# Patient Record
Sex: FEMALE | Race: WHITE | NOT HISPANIC OR LATINO
[De-identification: names, ages, dates, MRNs, and addresses within clinical notes are randomized per-mention and may not be internally consistent; named-entity substitution may affect disease eponyms.]

---

## 2017-04-05 ENCOUNTER — APPOINTMENT (OUTPATIENT)
Dept: HEMATOLOGY ONCOLOGY | Facility: CLINIC | Age: 77
End: 2017-04-05

## 2017-04-05 VITALS
BODY MASS INDEX: 28.71 KG/M2 | SYSTOLIC BLOOD PRESSURE: 150 MMHG | RESPIRATION RATE: 15 BRPM | WEIGHT: 156 LBS | DIASTOLIC BLOOD PRESSURE: 75 MMHG | HEART RATE: 79 BPM | TEMPERATURE: 98.9 F | HEIGHT: 62 IN

## 2017-04-10 ENCOUNTER — APPOINTMENT (OUTPATIENT)
Dept: INFUSION THERAPY | Facility: CLINIC | Age: 77
End: 2017-04-10

## 2017-04-13 ENCOUNTER — APPOINTMENT (OUTPATIENT)
Dept: INFUSION THERAPY | Facility: CLINIC | Age: 77
End: 2017-04-13

## 2017-04-13 LAB
BASOPHILS # BLD: 0.04 TH/MM3
BASOPHILS NFR BLD: 0.6 %
EOSINOPHIL # BLD: 0.1 TH/MM3
EOSINOPHIL NFR BLD: 1.4 %
ERYTHROCYTE [DISTWIDTH] IN BLOOD BY AUTOMATED COUNT: 22.7 %
GRANULOCYTES # BLD: 5.7 TH/MM3
GRANULOCYTES NFR BLD: 78.9 %
HCT VFR BLD AUTO: 28.9 %
HGB BLD-MCNC: 8.9 G/DL
IMM GRANULOCYTES # BLD: 0.04 TH/MM3
IMM GRANULOCYTES NFR BLD: 0.6 %
LYMPHOCYTES # BLD: 0.79 TH/MM3
LYMPHOCYTES NFR BLD: 10.9 %
MCH RBC QN AUTO: 21.2 PG
MCHC RBC AUTO-ENTMCNC: 30.8 G/DL
MCV RBC AUTO: 68.8 FL
MONOCYTES # BLD: 0.55 TH/MM3
MONOCYTES NFR BLD: 7.6 %
PLATELET # BLD: 275 TH/MM3
PMV BLD AUTO: 11.1 FL
RBC # BLD AUTO: 4.2 MIL/MM3
WBC # BLD: 7.22 TH/MM3

## 2017-04-14 ENCOUNTER — RESULT REVIEW (OUTPATIENT)
Age: 77
End: 2017-04-14

## 2017-04-14 LAB
INR PPP: 2.8
PROTHROMBIN TIME: 32.1 SEC

## 2017-04-20 ENCOUNTER — APPOINTMENT (OUTPATIENT)
Dept: INFUSION THERAPY | Facility: CLINIC | Age: 77
End: 2017-04-20

## 2017-04-24 ENCOUNTER — APPOINTMENT (OUTPATIENT)
Dept: HEMATOLOGY ONCOLOGY | Facility: CLINIC | Age: 77
End: 2017-04-24

## 2017-04-24 LAB
BASOPHILS # BLD: 0.05 TH/MM3
BASOPHILS NFR BLD: 0.8 %
EOSINOPHIL # BLD: 0.13 TH/MM3
EOSINOPHIL NFR BLD: 2.2 %
ERYTHROCYTE [DISTWIDTH] IN BLOOD BY AUTOMATED COUNT: 23 %
GRANULOCYTES # BLD: 4.38 TH/MM3
GRANULOCYTES NFR BLD: 74.1 %
HCT VFR BLD AUTO: 29.2 %
HGB BLD-MCNC: 8.9 G/DL
IMM GRANULOCYTES # BLD: 0.06 TH/MM3
IMM GRANULOCYTES NFR BLD: 1 %
LYMPHOCYTES # BLD: 0.83 TH/MM3
LYMPHOCYTES NFR BLD: 14 %
MCH RBC QN AUTO: 20.9 PG
MCHC RBC AUTO-ENTMCNC: 30.5 G/DL
MCV RBC AUTO: 68.5 FL
MONOCYTES # BLD: 0.47 TH/MM3
MONOCYTES NFR BLD: 7.9 %
PLATELET # BLD: 163 TH/MM3
RBC # BLD AUTO: 4.26 MIL/MM3
WBC # BLD: 5.92 TH/MM3

## 2017-05-03 ENCOUNTER — APPOINTMENT (OUTPATIENT)
Dept: HEMATOLOGY ONCOLOGY | Facility: CLINIC | Age: 77
End: 2017-05-03

## 2017-05-03 ENCOUNTER — RESULT REVIEW (OUTPATIENT)
Age: 77
End: 2017-05-03

## 2017-05-03 VITALS
HEART RATE: 73 BPM | TEMPERATURE: 98.6 F | SYSTOLIC BLOOD PRESSURE: 135 MMHG | WEIGHT: 152 LBS | HEIGHT: 62 IN | BODY MASS INDEX: 27.97 KG/M2 | DIASTOLIC BLOOD PRESSURE: 67 MMHG | RESPIRATION RATE: 14 BRPM

## 2017-05-04 LAB
ALBUMIN MFR SERPL ELPH: 53.5 %
ALBUMIN SERPL ELPH-MCNC: 3.9 G/DL
ALBUMIN SERPL-MCNC: 3.7 G/DL
ALBUMIN/GLOB SERPL ELPH: 1.2 ZZ
ALBUMIN/GLOB SERPL: 1.28
ALP SERPL-CCNC: 68 IU/L
ALPHA1 GLOB MFR SERPL ELPH: 6.6 %
ALPHA1 GLOB SERPL ELPH-MCNC: 0.5 G/DL
ALPHA2 GLOB MFR SERPL ELPH: 8 %
ALPHA2 GLOB SERPL ELPH-MCNC: 0.6 G/DL
ALT SERPL-CCNC: 13 IU/L
ANION GAP SERPL CALC-SCNC: 11 MEQ/L
AST SERPL-CCNC: 19 IU/L
B-GLOBULIN SERPL ELPH-MCNC: 0.9 G/DL
BASOPHILS # BLD: 0.05 TH/MM3
BASOPHILS NFR BLD: 0.8 %
BETA1 GLOB MFR SERPL ELPH: 12.1 %
BILIRUB SERPL-MCNC: 1.5 MG/DL
BUN SERPL-MCNC: 14 MG/DL
BUN/CREAT SERPL: 14.9 %
CALCIUM SERPL-MCNC: 8.7 MG/DL
CHLORIDE SERPL-SCNC: 98 MEQ/L
CO2 SERPL-SCNC: 30 MEQ/L
CREAT SERPL-MCNC: 0.94 MG/DL
EOSINOPHIL # BLD: 0.19 TH/MM3
EOSINOPHIL NFR BLD: 3 %
ERYTHROCYTE [DISTWIDTH] IN BLOOD BY AUTOMATED COUNT: 22.2 %
FERRITIN SERPL-MCNC: 1319 NG/ML
GAMMA GLOB MFR SERPL ELPH: 19.8 %
GAMMA GLOB SERPL ELPH-MCNC: 1.4 G/DL
GFR SERPL CREATININE-BSD FRML MDRD: 58
GLUCOSE SERPL-MCNC: 65 MG/DL
GRANULOCYTES # BLD: 4.43 TH/MM3
GRANULOCYTES NFR BLD: 70.2 %
HCT VFR BLD AUTO: 29.8 %
HGB BLD-MCNC: 9.3 G/DL
IMM GRANULOCYTES # BLD: 0.03 TH/MM3
IMM GRANULOCYTES NFR BLD: 0.5 %
LACTATE DEHYDROGENASE (NORTH): 244 IU/L
LYMPHOCYTES # BLD: 1.15 TH/MM3
LYMPHOCYTES NFR BLD: 18.2 %
MCH RBC QN AUTO: 21 PG
MCHC RBC AUTO-ENTMCNC: 31.2 G/DL
MCV RBC AUTO: 67.3 FL
MONOCYTES # BLD: 0.46 TH/MM3
MONOCYTES NFR BLD: 7.3 %
PLATELET # BLD: 201 TH/MM3
PMV BLD AUTO: 10.8 FL
POTASSIUM SERPL-SCNC: 3.9 MMOL/L
PROT PATTERN SERPL ELPH-IMP: 7.2 G/DL
PROT PATTERN SERPL ELPH-IMP: NORMAL
PROT SERPL-MCNC: 6.6 G/DL
PROT SERPL-MCNC: 7.2 G/DL
RBC # BLD AUTO: 4.43 MIL/MM3
RETICS/RBC NFR: 1.3 %
SODIUM SERPL-SCNC: 139 MEQ/L
TIBC SERPL-MCNC: 226 UG/DL
WBC # BLD: 6.31 TH/MM3

## 2017-05-08 LAB
B2 MICROGLOB SERPL-MCNC: 3.3 MG/L
EPO SERPL-MCNC: 13.8 MIU/ML
HGB A MFR BLD: 96.9 %
HGB A2 MFR BLD: 3.1 %
HGB FRACT BLD ELPH CITRATE-IMP: NORMAL
INTERPRETATION SERPL IFE-IMP: NORMAL
KAPPA LC FREE SER-MCNC: 46 MG/L
KAPPA LC FREE/LAMBDA FREE SER: 1.13
LAMBDA LC FREE SERPL-MCNC: 40.8 MG/L

## 2017-05-09 ENCOUNTER — APPOINTMENT (OUTPATIENT)
Dept: HEMATOLOGY ONCOLOGY | Facility: CLINIC | Age: 77
End: 2017-05-09

## 2017-05-26 ENCOUNTER — APPOINTMENT (OUTPATIENT)
Dept: HEMATOLOGY ONCOLOGY | Facility: CLINIC | Age: 77
End: 2017-05-26

## 2017-05-26 VITALS
DIASTOLIC BLOOD PRESSURE: 62 MMHG | RESPIRATION RATE: 14 BRPM | HEART RATE: 80 BPM | BODY MASS INDEX: 27.42 KG/M2 | SYSTOLIC BLOOD PRESSURE: 129 MMHG | TEMPERATURE: 98.2 F | WEIGHT: 149 LBS | HEIGHT: 62 IN

## 2017-05-30 ENCOUNTER — RESULT REVIEW (OUTPATIENT)
Age: 77
End: 2017-05-30

## 2017-05-30 ENCOUNTER — APPOINTMENT (OUTPATIENT)
Dept: HEMATOLOGY ONCOLOGY | Facility: CLINIC | Age: 77
End: 2017-05-30

## 2017-05-30 LAB
BASOPHILS # BLD: 0.03 TH/MM3
BASOPHILS NFR BLD: 0.4 %
EOSINOPHIL # BLD: 0.2 TH/MM3
EOSINOPHIL NFR BLD: 2.8 %
ERYTHROCYTE [DISTWIDTH] IN BLOOD BY AUTOMATED COUNT: 20.2 %
GRANULOCYTES # BLD: 5.47 TH/MM3
GRANULOCYTES NFR BLD: 77.8 %
HCT VFR BLD AUTO: 25.3 %
HGB BLD-MCNC: 7.7 G/DL
HGB RETIC QN: 22.2 PG
IMM GRANULOCYTES # BLD: 0.01 TH/MM3
IMM GRANULOCYTES NFR BLD: 0.1 %
IMM RETICS NFR: 6.2 %
LYMPHOCYTES # BLD: 0.85 TH/MM3
LYMPHOCYTES NFR BLD: 12.1 %
MCH RBC QN AUTO: 20.1 PG
MCHC RBC AUTO-ENTMCNC: 30.4 G/DL
MCV RBC AUTO: 66.1 FL
MONOCYTES # BLD: 0.48 TH/MM3
MONOCYTES NFR BLD: 6.8 %
PLATELET # BLD: 195 TH/MM3
PMV BLD AUTO: 9.5 FL
RBC # BLD AUTO: 3.83 MIL/MM3
RETICS/RBC NFR: 1.74 %
WBC # BLD: 7.04 TH/MM3

## 2017-06-01 LAB
BASOPHILS # BLD: 0.04 TH/MM3
BASOPHILS NFR BLD: 0.6 %
EOSINOPHIL # BLD: 0.2 TH/MM3
EOSINOPHIL NFR BLD: 2.9 %
ERYTHROCYTE [DISTWIDTH] IN BLOOD BY AUTOMATED COUNT: 20.4 %
FERRITIN SERPL-MCNC: 946 NG/ML
GRANULOCYTES # BLD: 5.13 TH/MM3
GRANULOCYTES NFR BLD: 74.7 %
HAPTOGLOB SERPL-MCNC: < 20 MG/DL
HCT VFR BLD AUTO: 26.6 %
HGB BLD-MCNC: 8.1 G/DL
IMM GRANULOCYTES # BLD: 0.02 TH/MM3
IMM GRANULOCYTES NFR BLD: 0.3 %
LYMPHOCYTES # BLD: 0.95 TH/MM3
LYMPHOCYTES NFR BLD: 13.8 %
MCH RBC QN AUTO: 20 PG
MCHC RBC AUTO-ENTMCNC: 30.5 G/DL
MCV RBC AUTO: 65.7 FL
MONOCYTES # BLD: 0.53 TH/MM3
MONOCYTES NFR BLD: 7.7 %
PLATELET # BLD: 212 TH/MM3
PMV BLD AUTO: 10.1 FL
RBC # BLD AUTO: 4.05 MIL/MM3
TIBC SERPL-MCNC: 201 UG/DL
WBC # BLD: 6.87 TH/MM3

## 2017-06-02 ENCOUNTER — APPOINTMENT (OUTPATIENT)
Dept: INFUSION THERAPY | Facility: CLINIC | Age: 77
End: 2017-06-02

## 2017-06-02 LAB
BASOPHILS # BLD: 0.03 TH/MM3
BASOPHILS NFR BLD: 0.5 %
EOSINOPHIL # BLD: 0.12 TH/MM3
EOSINOPHIL NFR BLD: 1.9 %
ERYTHROCYTE [DISTWIDTH] IN BLOOD BY AUTOMATED COUNT: 20.2 %
GRANULOCYTES # BLD: 4.88 TH/MM3
GRANULOCYTES NFR BLD: 75.4 %
HCT VFR BLD AUTO: 25.7 %
HGB BLD-MCNC: 7.8 G/DL
IMM GRANULOCYTES # BLD: 0.01 TH/MM3
IMM GRANULOCYTES NFR BLD: 0.2 %
LYMPHOCYTES # BLD: 1.04 TH/MM3
LYMPHOCYTES NFR BLD: 16.1 %
MCH RBC QN AUTO: 19.9 PG
MCHC RBC AUTO-ENTMCNC: 30.4 G/DL
MCV RBC AUTO: 65.7 FL
MONOCYTES # BLD: 0.38 TH/MM3
MONOCYTES NFR BLD: 5.9 %
PLATELET # BLD: 191 TH/MM3
PMV BLD AUTO: 10.1 FL
RBC # BLD AUTO: 3.91 MIL/MM3
WBC # BLD: 6.46 TH/MM3

## 2017-06-05 LAB
ABO + RH BLD: NORMAL
BLD GP AB SCN SERPL QL: NEGATIVE
LDH SERPL-CCNC: 259 U/L
LDH1 CFR SERPL ELPH: 32 %
LDH1/LDH2 SERPL-CRTO: 1.08
LDH2 CFR SERPL ELPH: 29 %
LDH3 CFR SERPL ELPH: 17 %
LDH4 CFR SERPL ELPH: 8 %
LDH5 CFR SERPL ELPH: 14 %

## 2017-06-13 ENCOUNTER — APPOINTMENT (OUTPATIENT)
Dept: HEMATOLOGY ONCOLOGY | Facility: CLINIC | Age: 77
End: 2017-06-13

## 2017-06-13 ENCOUNTER — RESULT REVIEW (OUTPATIENT)
Age: 77
End: 2017-06-13

## 2017-06-13 VITALS
TEMPERATURE: 98 F | DIASTOLIC BLOOD PRESSURE: 53 MMHG | SYSTOLIC BLOOD PRESSURE: 112 MMHG | HEIGHT: 62 IN | HEART RATE: 63 BPM | RESPIRATION RATE: 14 BRPM | WEIGHT: 150 LBS | BODY MASS INDEX: 27.6 KG/M2

## 2017-06-20 ENCOUNTER — APPOINTMENT (OUTPATIENT)
Dept: HEMATOLOGY ONCOLOGY | Facility: CLINIC | Age: 77
End: 2017-06-20

## 2017-06-20 LAB
BASOPHILS # BLD: 0.03 TH/MM3
BASOPHILS NFR BLD: 0.4 %
EOSINOPHIL # BLD: 0.14 TH/MM3
EOSINOPHIL NFR BLD: 1.9 %
ERYTHROCYTE [DISTWIDTH] IN BLOOD BY AUTOMATED COUNT: 20.3 %
GRANULOCYTES # BLD: 5.42 TH/MM3
GRANULOCYTES NFR BLD: 75.3 %
HCT VFR BLD AUTO: 26.1 %
HGB BLD-MCNC: 8 G/DL
IMM GRANULOCYTES # BLD: 0.04 TH/MM3
IMM GRANULOCYTES NFR BLD: 0.6 %
LYMPHOCYTES # BLD: 1.14 TH/MM3
LYMPHOCYTES NFR BLD: 15.8 %
MCH RBC QN AUTO: 20.6 PG
MCHC RBC AUTO-ENTMCNC: 30.7 G/DL
MCV RBC AUTO: 67.3 FL
MONOCYTES # BLD: 0.43 TH/MM3
MONOCYTES NFR BLD: 6 %
PLATELET # BLD: 164 TH/MM3
PMV BLD AUTO: 9.7 FL
RBC # BLD AUTO: 3.88 MIL/MM3
WBC # BLD: 7.2 TH/MM3

## 2017-06-27 ENCOUNTER — APPOINTMENT (OUTPATIENT)
Dept: HEMATOLOGY ONCOLOGY | Facility: CLINIC | Age: 77
End: 2017-06-27

## 2017-06-27 ENCOUNTER — RX RENEWAL (OUTPATIENT)
Age: 77
End: 2017-06-27

## 2017-06-27 LAB
BASOPHILS # BLD: 0.09 TH/MM3
BASOPHILS NFR BLD: 1.4 %
EOSINOPHIL # BLD: 0.16 TH/MM3
EOSINOPHIL NFR BLD: 2.4 %
ERYTHROCYTE [DISTWIDTH] IN BLOOD BY AUTOMATED COUNT: 19.8 %
GRANULOCYTES # BLD: 4.89 TH/MM3
GRANULOCYTES NFR BLD: 74.2 %
HCT VFR BLD AUTO: 26.3 %
HGB BLD-MCNC: 8 G/DL
IMM GRANULOCYTES # BLD: 0.26 TH/MM3
IMM GRANULOCYTES NFR BLD: 3.9 %
LYMPHOCYTES # BLD: 0.89 TH/MM3
LYMPHOCYTES NFR BLD: 13.5 %
MCH RBC QN AUTO: 20.5 PG
MCHC RBC AUTO-ENTMCNC: 30.4 G/DL
MCV RBC AUTO: 67.4 FL
MONOCYTES # BLD: 0.3 TH/MM3
MONOCYTES NFR BLD: 4.6 %
PLATELET # BLD: 183 TH/MM3
PMV BLD AUTO: 10.1 FL
RBC # BLD AUTO: 3.9 MIL/MM3
WBC # BLD: 6.59 TH/MM3

## 2017-07-07 ENCOUNTER — RESULT REVIEW (OUTPATIENT)
Age: 77
End: 2017-07-07

## 2017-07-07 ENCOUNTER — APPOINTMENT (OUTPATIENT)
Dept: INFUSION THERAPY | Facility: CLINIC | Age: 77
End: 2017-07-07

## 2017-07-07 ENCOUNTER — APPOINTMENT (OUTPATIENT)
Dept: HEMATOLOGY ONCOLOGY | Facility: CLINIC | Age: 77
End: 2017-07-07

## 2017-07-07 VITALS
BODY MASS INDEX: 27.23 KG/M2 | RESPIRATION RATE: 14 BRPM | TEMPERATURE: 98.1 F | HEIGHT: 62 IN | DIASTOLIC BLOOD PRESSURE: 60 MMHG | SYSTOLIC BLOOD PRESSURE: 149 MMHG | WEIGHT: 148 LBS | HEART RATE: 82 BPM

## 2017-07-07 LAB
BASOPHILS # BLD: 0.05 TH/MM3
BASOPHILS NFR BLD: 0.6 %
EOSINOPHIL # BLD: 0.22 TH/MM3
EOSINOPHIL NFR BLD: 2.5 %
ERYTHROCYTE [DISTWIDTH] IN BLOOD BY AUTOMATED COUNT: 18.7 %
GRANULOCYTES # BLD: 6.49 TH/MM3
GRANULOCYTES NFR BLD: 75 %
HCT VFR BLD AUTO: 27.7 %
HGB BLD-MCNC: 8.3 G/DL
IMM GRANULOCYTES # BLD: 0.06 TH/MM3
IMM GRANULOCYTES NFR BLD: 0.7 %
LYMPHOCYTES # BLD: 1.33 TH/MM3
LYMPHOCYTES NFR BLD: 15.4 %
MCH RBC QN AUTO: 20.1 PG
MCHC RBC AUTO-ENTMCNC: 30 G/DL
MCV RBC AUTO: 67.2 FL
MONOCYTES # BLD: 0.5 TH/MM3
MONOCYTES NFR BLD: 5.8 %
PLATELET # BLD: 179 TH/MM3
PMV BLD AUTO: 10.2 FL
RBC # BLD AUTO: 4.12 MIL/MM3
WBC # BLD: 8.65 TH/MM3

## 2017-07-14 ENCOUNTER — APPOINTMENT (OUTPATIENT)
Dept: INFUSION THERAPY | Facility: CLINIC | Age: 77
End: 2017-07-14

## 2017-07-14 LAB
BASOPHILS # BLD: 0.06 TH/MM3
BASOPHILS NFR BLD: 0.9 %
EOSINOPHIL # BLD: 0.17 TH/MM3
EOSINOPHIL NFR BLD: 2.7 %
ERYTHROCYTE [DISTWIDTH] IN BLOOD BY AUTOMATED COUNT: 18.4 %
GRANULOCYTES # BLD: 4.62 TH/MM3
GRANULOCYTES NFR BLD: 72.4 %
HCT VFR BLD AUTO: 26.3 %
HGB BLD-MCNC: 8 G/DL
IMM GRANULOCYTES # BLD: 0.05 TH/MM3
IMM GRANULOCYTES NFR BLD: 0.8 %
LYMPHOCYTES # BLD: 1.01 TH/MM3
LYMPHOCYTES NFR BLD: 15.8 %
MCH RBC QN AUTO: 20.6 PG
MCHC RBC AUTO-ENTMCNC: 30.4 G/DL
MCV RBC AUTO: 67.8 FL
MONOCYTES # BLD: 0.47 TH/MM3
MONOCYTES NFR BLD: 7.4 %
PLATELET # BLD: 175 TH/MM3
PMV BLD AUTO: 10.2 FL
RBC # BLD AUTO: 3.88 MIL/MM3
WBC # BLD: 6.38 TH/MM3

## 2017-07-21 ENCOUNTER — APPOINTMENT (OUTPATIENT)
Dept: INFUSION THERAPY | Facility: CLINIC | Age: 77
End: 2017-07-21

## 2017-07-21 LAB
BASOPHILS # BLD: 0.04 TH/MM3
BASOPHILS NFR BLD: 0.7 %
EOSINOPHIL # BLD: 0.19 TH/MM3
EOSINOPHIL NFR BLD: 3.3 %
ERYTHROCYTE [DISTWIDTH] IN BLOOD BY AUTOMATED COUNT: 18.6 %
GRANULOCYTES # BLD: 4.37 TH/MM3
GRANULOCYTES NFR BLD: 76.2 %
HCT VFR BLD AUTO: 26.1 %
HGB BLD-MCNC: 7.7 G/DL
IMM GRANULOCYTES # BLD: 0.03 TH/MM3
IMM GRANULOCYTES NFR BLD: 0.5 %
LYMPHOCYTES # BLD: 0.73 TH/MM3
LYMPHOCYTES NFR BLD: 12.7 %
MCH RBC QN AUTO: 20.3 PG
MCHC RBC AUTO-ENTMCNC: 29.5 G/DL
MCV RBC AUTO: 68.9 FL
MONOCYTES # BLD: 0.38 TH/MM3
MONOCYTES NFR BLD: 6.6 %
PLATELET # BLD: 180 TH/MM3
PMV BLD AUTO: 10.1 FL
RBC # BLD AUTO: 3.79 MIL/MM3
WBC # BLD: 5.74 TH/MM3

## 2017-07-24 ENCOUNTER — APPOINTMENT (OUTPATIENT)
Dept: HEMATOLOGY ONCOLOGY | Facility: CLINIC | Age: 77
End: 2017-07-24

## 2017-07-24 LAB
BASOPHILS # BLD: 0.05 TH/MM3
BASOPHILS NFR BLD: 0.9 %
EOSINOPHIL # BLD: 0.13 TH/MM3
EOSINOPHIL NFR BLD: 2.2 %
ERYTHROCYTE [DISTWIDTH] IN BLOOD BY AUTOMATED COUNT: 18.3 %
GRANULOCYTES # BLD: 4.51 TH/MM3
GRANULOCYTES NFR BLD: 77.6 %
HCT VFR BLD AUTO: 27.4 %
HGB BLD-MCNC: 8.1 G/DL
IMM GRANULOCYTES # BLD: 0.03 TH/MM3
IMM GRANULOCYTES NFR BLD: 0.5 %
LYMPHOCYTES # BLD: 0.79 TH/MM3
LYMPHOCYTES NFR BLD: 13.6 %
MCH RBC QN AUTO: 20.3 PG
MCHC RBC AUTO-ENTMCNC: 29.6 G/DL
MCV RBC AUTO: 68.7 FL
MONOCYTES # BLD: 0.3 TH/MM3
MONOCYTES NFR BLD: 5.2 %
PLATELET # BLD: 197 TH/MM3
PMV BLD AUTO: 9.4 FL
RBC # BLD AUTO: 3.99 MIL/MM3
WBC # BLD: 5.81 TH/MM3

## 2017-07-28 ENCOUNTER — APPOINTMENT (OUTPATIENT)
Dept: INFUSION THERAPY | Facility: CLINIC | Age: 77
End: 2017-07-28

## 2017-08-01 LAB
BASOPHILS # BLD: 0.05 TH/MM3
BASOPHILS NFR BLD: 0.9 %
EOSINOPHIL # BLD: 0.2 TH/MM3
EOSINOPHIL NFR BLD: 3.7 %
ERYTHROCYTE [DISTWIDTH] IN BLOOD BY AUTOMATED COUNT: 18.4 %
GRANULOCYTES # BLD: 3.95 TH/MM3
GRANULOCYTES NFR BLD: 73.8 %
HCT VFR BLD AUTO: 27.7 %
HGB BLD-MCNC: 8.1 G/DL
IMM GRANULOCYTES # BLD: 0.04 TH/MM3
IMM GRANULOCYTES NFR BLD: 0.7 %
LYMPHOCYTES # BLD: 0.79 TH/MM3
LYMPHOCYTES NFR BLD: 14.7 %
MCH RBC QN AUTO: 20.3 PG
MCHC RBC AUTO-ENTMCNC: 29.2 G/DL
MCV RBC AUTO: 69.3 FL
MONOCYTES # BLD: 0.33 TH/MM3
MONOCYTES NFR BLD: 6.2 %
PLATELET # BLD: 190 TH/MM3
PMV BLD AUTO: 9.6 FL
RBC # BLD AUTO: 4 MIL/MM3
WBC # BLD: 5.36 TH/MM3

## 2017-08-04 ENCOUNTER — APPOINTMENT (OUTPATIENT)
Dept: INFUSION THERAPY | Facility: CLINIC | Age: 77
End: 2017-08-04

## 2017-08-04 ENCOUNTER — APPOINTMENT (OUTPATIENT)
Dept: HEMATOLOGY ONCOLOGY | Facility: CLINIC | Age: 77
End: 2017-08-04

## 2017-08-04 VITALS
HEIGHT: 62 IN | RESPIRATION RATE: 14 BRPM | HEART RATE: 73 BPM | SYSTOLIC BLOOD PRESSURE: 146 MMHG | TEMPERATURE: 97.9 F | DIASTOLIC BLOOD PRESSURE: 73 MMHG | WEIGHT: 148 LBS | BODY MASS INDEX: 27.23 KG/M2

## 2017-08-04 LAB
BASOPHILS # BLD: 0.05 TH/MM3
BASOPHILS NFR BLD: 0.9 %
EOSINOPHIL # BLD: 0.19 TH/MM3
EOSINOPHIL NFR BLD: 3.5 %
ERYTHROCYTE [DISTWIDTH] IN BLOOD BY AUTOMATED COUNT: 18.3 %
GRANULOCYTES # BLD: 3.86 TH/MM3
GRANULOCYTES NFR BLD: 70.3 %
HCT VFR BLD AUTO: 29 %
HGB BLD-MCNC: 8.7 G/DL
IMM GRANULOCYTES # BLD: 0.02 TH/MM3
IMM GRANULOCYTES NFR BLD: 0.4 %
LYMPHOCYTES # BLD: 1.05 TH/MM3
LYMPHOCYTES NFR BLD: 19.1 %
MCH RBC QN AUTO: 20.7 PG
MCHC RBC AUTO-ENTMCNC: 30 G/DL
MCV RBC AUTO: 68.9 FL
MONOCYTES # BLD: 0.32 TH/MM3
MONOCYTES NFR BLD: 5.8 %
PLATELET # BLD: 186 TH/MM3
PMV BLD AUTO: 10.2 FL
RBC # BLD AUTO: 4.21 MIL/MM3
WBC # BLD: 5.49 TH/MM3

## 2017-08-11 ENCOUNTER — OUTPATIENT (OUTPATIENT)
Dept: OUTPATIENT SERVICES | Facility: HOSPITAL | Age: 77
LOS: 1 days | Discharge: HOME | End: 2017-08-11

## 2017-08-11 ENCOUNTER — APPOINTMENT (OUTPATIENT)
Dept: INFUSION THERAPY | Facility: CLINIC | Age: 77
End: 2017-08-11

## 2017-08-11 VITALS
HEART RATE: 74 BPM | TEMPERATURE: 98.2 F | DIASTOLIC BLOOD PRESSURE: 60 MMHG | RESPIRATION RATE: 18 BRPM | SYSTOLIC BLOOD PRESSURE: 132 MMHG

## 2017-08-11 DIAGNOSIS — R31.9 HEMATURIA, UNSPECIFIED: ICD-10-CM

## 2017-08-11 DIAGNOSIS — D50.9 IRON DEFICIENCY ANEMIA, UNSPECIFIED: ICD-10-CM

## 2017-08-11 DIAGNOSIS — D64.9 ANEMIA, UNSPECIFIED: ICD-10-CM

## 2017-08-11 LAB
BASOPHILS # BLD: 0.06 TH/MM3
BASOPHILS NFR BLD: 1.2 %
EOSINOPHIL # BLD: 0.21 TH/MM3
EOSINOPHIL NFR BLD: 4.2 %
ERYTHROCYTE [DISTWIDTH] IN BLOOD BY AUTOMATED COUNT: 17.9 %
GRANULOCYTES # BLD: 3.45 TH/MM3
GRANULOCYTES NFR BLD: 69.2 %
HCT VFR BLD AUTO: 31 %
HGB BLD-MCNC: 9.2 G/DL
IMM GRANULOCYTES # BLD: 0.04 TH/MM3
IMM GRANULOCYTES NFR BLD: 0.8 %
LYMPHOCYTES # BLD: 0.86 TH/MM3
LYMPHOCYTES NFR BLD: 17.2 %
MCH RBC QN AUTO: 20.5 PG
MCHC RBC AUTO-ENTMCNC: 29.7 G/DL
MCV RBC AUTO: 69.2 FL
MONOCYTES # BLD: 0.37 TH/MM3
MONOCYTES NFR BLD: 7.4 %
PLATELET # BLD: 159 TH/MM3
PMV BLD AUTO: 10.5 FL
RBC # BLD AUTO: 4.48 MIL/MM3
WBC # BLD: 4.99 TH/MM3

## 2017-08-18 ENCOUNTER — APPOINTMENT (OUTPATIENT)
Dept: INFUSION THERAPY | Facility: CLINIC | Age: 77
End: 2017-08-18

## 2017-08-18 VITALS
SYSTOLIC BLOOD PRESSURE: 125 MMHG | HEART RATE: 72 BPM | DIASTOLIC BLOOD PRESSURE: 61 MMHG | TEMPERATURE: 97.9 F | RESPIRATION RATE: 14 BRPM

## 2017-08-21 LAB
BASOPHILS # BLD: 0.05 TH/MM3
BASOPHILS NFR BLD: 1 %
EOSINOPHIL # BLD: 0.2 TH/MM3
EOSINOPHIL NFR BLD: 4 %
ERYTHROCYTE [DISTWIDTH] IN BLOOD BY AUTOMATED COUNT: 17.4 %
GRANULOCYTES # BLD: 3.18 TH/MM3
GRANULOCYTES NFR BLD: 64.1 %
HCT VFR BLD AUTO: 32.8 %
HGB BLD-MCNC: 9.7 G/DL
IMM GRANULOCYTES # BLD: 0.05 TH/MM3
IMM GRANULOCYTES NFR BLD: 1 %
LYMPHOCYTES # BLD: 1.07 TH/MM3
LYMPHOCYTES NFR BLD: 21.6 %
MCH RBC QN AUTO: 20.3 PG
MCHC RBC AUTO-ENTMCNC: 29.6 G/DL
MCV RBC AUTO: 68.5 FL
MONOCYTES # BLD: 0.41 TH/MM3
MONOCYTES NFR BLD: 8.3 %
PLATELET # BLD: 194 TH/MM3
PMV BLD AUTO: 10.6 FL
RBC # BLD AUTO: 4.79 MIL/MM3
WBC # BLD: 4.96 TH/MM3

## 2017-08-25 ENCOUNTER — RESULT REVIEW (OUTPATIENT)
Age: 77
End: 2017-08-25

## 2017-08-25 ENCOUNTER — APPOINTMENT (OUTPATIENT)
Dept: INFUSION THERAPY | Facility: CLINIC | Age: 77
End: 2017-08-25

## 2017-09-01 ENCOUNTER — APPOINTMENT (OUTPATIENT)
Dept: INFUSION THERAPY | Facility: CLINIC | Age: 77
End: 2017-09-01

## 2017-09-01 ENCOUNTER — APPOINTMENT (OUTPATIENT)
Dept: HEMATOLOGY ONCOLOGY | Facility: CLINIC | Age: 77
End: 2017-09-01

## 2017-09-01 VITALS
SYSTOLIC BLOOD PRESSURE: 143 MMHG | BODY MASS INDEX: 26.87 KG/M2 | WEIGHT: 146 LBS | TEMPERATURE: 97.7 F | HEIGHT: 62 IN | DIASTOLIC BLOOD PRESSURE: 77 MMHG | RESPIRATION RATE: 18 BRPM | HEART RATE: 67 BPM

## 2017-09-01 LAB
BASOPHILS # BLD: 0.06 TH/MM3
BASOPHILS NFR BLD: 1.1 %
EOSINOPHIL # BLD: 0.21 TH/MM3
EOSINOPHIL NFR BLD: 3.7 %
ERYTHROCYTE [DISTWIDTH] IN BLOOD BY AUTOMATED COUNT: 15.6 %
GRANULOCYTES # BLD: 3.89 TH/MM3
GRANULOCYTES NFR BLD: 68.9 %
HCT VFR BLD AUTO: 29.7 %
HGB BLD-MCNC: 9 G/DL
IMM GRANULOCYTES # BLD: 0.07 TH/MM3
IMM GRANULOCYTES NFR BLD: 1.2 %
LYMPHOCYTES # BLD: 1.07 TH/MM3
LYMPHOCYTES NFR BLD: 18.9 %
MCH RBC QN AUTO: 20.2 PG
MCHC RBC AUTO-ENTMCNC: 30.3 G/DL
MCV RBC AUTO: 66.6 FL
MONOCYTES # BLD: 0.35 TH/MM3
MONOCYTES NFR BLD: 6.2 %
PLATELET # BLD: 152 TH/MM3
PMV BLD AUTO: 10 FL
RBC # BLD AUTO: 4.46 MIL/MM3
WBC # BLD: 5.65 TH/MM3

## 2017-09-08 ENCOUNTER — APPOINTMENT (OUTPATIENT)
Dept: INFUSION THERAPY | Facility: CLINIC | Age: 77
End: 2017-09-08

## 2017-09-12 ENCOUNTER — APPOINTMENT (OUTPATIENT)
Dept: HEMATOLOGY ONCOLOGY | Facility: CLINIC | Age: 77
End: 2017-09-12

## 2017-09-13 LAB
BASOPHILS # BLD: 0.03 TH/MM3
BASOPHILS NFR BLD: 0.5 %
EOSINOPHIL # BLD: 0.23 TH/MM3
EOSINOPHIL NFR BLD: 4.2 %
ERYTHROCYTE [DISTWIDTH] IN BLOOD BY AUTOMATED COUNT: 16.6 %
GRANULOCYTES # BLD: 3.87 TH/MM3
GRANULOCYTES NFR BLD: 70.7 %
HCT VFR BLD AUTO: 28.2 %
HGB BLD-MCNC: 8.3 G/DL
IMM GRANULOCYTES # BLD: 0.03 TH/MM3
IMM GRANULOCYTES NFR BLD: 0.5 %
LYMPHOCYTES # BLD: 0.96 TH/MM3
LYMPHOCYTES NFR BLD: 17.5 %
MCH RBC QN AUTO: 19.8 PG
MCHC RBC AUTO-ENTMCNC: 29.4 G/DL
MCV RBC AUTO: 67.1 FL
MONOCYTES # BLD: 0.36 TH/MM3
MONOCYTES NFR BLD: 6.6 %
PLATELET # BLD: 170 TH/MM3
PMV BLD AUTO: 10.7 FL
RBC # BLD AUTO: 4.2 MIL/MM3
WBC # BLD: 5.48 TH/MM3

## 2017-09-15 ENCOUNTER — RESULT REVIEW (OUTPATIENT)
Age: 77
End: 2017-09-15

## 2017-09-15 ENCOUNTER — APPOINTMENT (OUTPATIENT)
Dept: INFUSION THERAPY | Facility: CLINIC | Age: 77
End: 2017-09-15

## 2017-09-18 LAB
BASOPHILS # BLD: 0.03 TH/MM3
BASOPHILS NFR BLD: 0.6 %
EOSINOPHIL # BLD: 0.19 TH/MM3
EOSINOPHIL NFR BLD: 3.8 %
ERYTHROCYTE [DISTWIDTH] IN BLOOD BY AUTOMATED COUNT: 16.5 %
FERRITIN SERPL-MCNC: 397 NG/ML
FOLATE SERPL-MCNC: > 20 NG/ML
GRANULOCYTES # BLD: 3.7 TH/MM3
GRANULOCYTES NFR BLD: 74 %
HCT VFR BLD AUTO: 28.1 %
HGB BLD-MCNC: 8.3 G/DL
IMM GRANULOCYTES # BLD: 0.02 TH/MM3
IMM GRANULOCYTES NFR BLD: 0.4 %
IRON SERPL-MCNC: 31 UG/DL
LYMPHOCYTES # BLD: 0.73 TH/MM3
LYMPHOCYTES NFR BLD: 14.6 %
MCH RBC QN AUTO: 20 PG
MCHC RBC AUTO-ENTMCNC: 29.5 G/DL
MCV RBC AUTO: 67.5 FL
MONOCYTES # BLD: 0.33 TH/MM3
MONOCYTES NFR BLD: 6.6 %
PLATELET # BLD: 167 TH/MM3
PMV BLD AUTO: 9.9 FL
RBC # BLD AUTO: 4.16 MIL/MM3
TIBC SERPL-MCNC: 219 UG/DL
VIT B12 SERPL-MCNC: 349 PG/ML
WBC # BLD: 5 TH/MM3

## 2017-09-29 ENCOUNTER — RESULT REVIEW (OUTPATIENT)
Age: 77
End: 2017-09-29

## 2017-09-29 ENCOUNTER — APPOINTMENT (OUTPATIENT)
Dept: INFUSION THERAPY | Facility: CLINIC | Age: 77
End: 2017-09-29

## 2017-09-29 ENCOUNTER — APPOINTMENT (OUTPATIENT)
Dept: HEMATOLOGY ONCOLOGY | Facility: CLINIC | Age: 77
End: 2017-09-29

## 2017-09-29 VITALS
RESPIRATION RATE: 16 BRPM | HEIGHT: 62 IN | DIASTOLIC BLOOD PRESSURE: 60 MMHG | WEIGHT: 146 LBS | TEMPERATURE: 97.6 F | HEART RATE: 67 BPM | BODY MASS INDEX: 26.87 KG/M2 | SYSTOLIC BLOOD PRESSURE: 131 MMHG

## 2017-09-29 RX ORDER — CHLORHEXIDINE GLUCONATE 4 %
325 (65 FE) LIQUID (ML) TOPICAL
Refills: 0 | Status: COMPLETED | COMMUNITY
End: 2017-09-29

## 2017-10-12 ENCOUNTER — RESULT REVIEW (OUTPATIENT)
Age: 77
End: 2017-10-12

## 2017-10-12 ENCOUNTER — APPOINTMENT (OUTPATIENT)
Dept: HEMATOLOGY ONCOLOGY | Facility: CLINIC | Age: 77
End: 2017-10-12

## 2017-10-12 ENCOUNTER — APPOINTMENT (OUTPATIENT)
Dept: INFUSION THERAPY | Facility: CLINIC | Age: 77
End: 2017-10-12

## 2017-10-12 VITALS
HEART RATE: 80 BPM | WEIGHT: 146 LBS | HEIGHT: 62 IN | DIASTOLIC BLOOD PRESSURE: 71 MMHG | SYSTOLIC BLOOD PRESSURE: 125 MMHG | TEMPERATURE: 98.3 F | RESPIRATION RATE: 16 BRPM | BODY MASS INDEX: 26.87 KG/M2

## 2017-10-13 ENCOUNTER — APPOINTMENT (OUTPATIENT)
Dept: INFUSION THERAPY | Facility: CLINIC | Age: 77
End: 2017-10-13

## 2017-10-13 LAB
ALBUMIN MFR SERPL ELPH: 53.1 %
ALBUMIN SERPL ELPH-MCNC: 3.8 G/DL
ALBUMIN SERPL-MCNC: 3.4 G/DL
ALBUMIN/GLOB SERPL ELPH: 1.1 ZZ
ALBUMIN/GLOB SERPL: 1.17
ALP SERPL-CCNC: 93 IU/L
ALPHA1 GLOB MFR SERPL ELPH: 6.4 %
ALPHA1 GLOB SERPL ELPH-MCNC: 0.5 G/DL
ALPHA2 GLOB MFR SERPL ELPH: 7.6 %
ALPHA2 GLOB SERPL ELPH-MCNC: 0.5 G/DL
ALT SERPL-CCNC: 7 IU/L
ANION GAP SERPL CALC-SCNC: 6 MEQ/L
AST SERPL-CCNC: 17 IU/L
B-GLOBULIN SERPL ELPH-MCNC: 0.9 G/DL
BASOPHILS # BLD: 0.08 TH/MM3
BASOPHILS NFR BLD: 1.1 %
BETA1 GLOB MFR SERPL ELPH: 12.2 %
BILIRUB SERPL-MCNC: 1.3 MG/DL
BUN SERPL-MCNC: 13 MG/DL
BUN/CREAT SERPL: 12.9 %
CALCIUM SERPL-MCNC: 8.7 MG/DL
CHLORIDE SERPL-SCNC: 103 MEQ/L
CO2 SERPL-SCNC: 30 MEQ/L
CREAT SERPL-MCNC: 1.01 MG/DL
EOSINOPHIL # BLD: 0.17 TH/MM3
EOSINOPHIL NFR BLD: 2.2 %
ERYTHROCYTE [DISTWIDTH] IN BLOOD BY AUTOMATED COUNT: 16.3 %
FERRITIN SERPL-MCNC: 437 NG/ML
GAMMA GLOB MFR SERPL ELPH: 20.7 %
GAMMA GLOB SERPL ELPH-MCNC: 1.5 G/DL
GFR SERPL CREATININE-BSD FRML MDRD: 54
GLUCOSE SERPL-MCNC: 95 MG/DL
GRANULOCYTES # BLD: 5.88 TH/MM3
GRANULOCYTES NFR BLD: 77.8 %
HAPTOGLOB SERPL-MCNC: < 20 MG/DL
HCT VFR BLD AUTO: 29.4 %
HGB BLD-MCNC: 8.8 G/DL
IMM GRANULOCYTES # BLD: 0.01 TH/MM3
IMM GRANULOCYTES NFR BLD: 0.1 %
IRON SERPL-MCNC: 30 UG/DL
LACTATE DEHYDROGENASE (NORTH): 313 IU/L
LYMPHOCYTES # BLD: 0.98 TH/MM3
LYMPHOCYTES NFR BLD: 13 %
MCH RBC QN AUTO: 19.9 PG
MCHC RBC AUTO-ENTMCNC: 29.9 G/DL
MCV RBC AUTO: 66.4 FL
MONOCYTES # BLD: 0.44 TH/MM3
MONOCYTES NFR BLD: 5.8 %
PLATELET # BLD: 174 TH/MM3
PMV BLD AUTO: 10 FL
POTASSIUM SERPL-SCNC: 4.2 MMOL/L
PROT PATTERN SERPL ELPH-IMP: 7.2 G/DL
PROT PATTERN SERPL ELPH-IMP: NORMAL
PROT SERPL-MCNC: 6.3 G/DL
PROT SERPL-MCNC: 7.2 G/DL
RBC # BLD AUTO: 4.43 MIL/MM3
RETICS/RBC NFR: 1.43 %
SODIUM SERPL-SCNC: 139 MEQ/L
WBC # BLD: 7.56 TH/MM3

## 2017-10-20 ENCOUNTER — APPOINTMENT (OUTPATIENT)
Dept: INFUSION THERAPY | Facility: CLINIC | Age: 77
End: 2017-10-20

## 2017-10-20 ENCOUNTER — RESULT REVIEW (OUTPATIENT)
Age: 77
End: 2017-10-20

## 2017-10-20 VITALS
TEMPERATURE: 97.2 F | SYSTOLIC BLOOD PRESSURE: 115 MMHG | DIASTOLIC BLOOD PRESSURE: 69 MMHG | HEART RATE: 80 BPM | RESPIRATION RATE: 18 BRPM

## 2017-10-20 VITALS
SYSTOLIC BLOOD PRESSURE: 140 MMHG | DIASTOLIC BLOOD PRESSURE: 71 MMHG | TEMPERATURE: 98.3 F | HEART RATE: 74 BPM | RESPIRATION RATE: 18 BRPM

## 2017-10-20 VITALS
TEMPERATURE: 98.1 F | DIASTOLIC BLOOD PRESSURE: 74 MMHG | SYSTOLIC BLOOD PRESSURE: 108 MMHG | HEART RATE: 97 BPM | RESPIRATION RATE: 18 BRPM

## 2017-10-20 LAB
BASOPHILS # BLD: 0.03 TH/MM3
BASOPHILS NFR BLD: 0.5 %
EOSINOPHIL # BLD: 0.11 TH/MM3
EOSINOPHIL NFR BLD: 1.8 %
ERYTHROCYTE [DISTWIDTH] IN BLOOD BY AUTOMATED COUNT: 17.4 %
GRANULOCYTES # BLD: 4.81 TH/MM3
GRANULOCYTES NFR BLD: 77.8 %
HCT VFR BLD AUTO: 23 %
HGB BLD-MCNC: 6.7 G/DL
IMM GRANULOCYTES # BLD: 0.05 TH/MM3
IMM GRANULOCYTES NFR BLD: 0.8 %
IRON SERPL-MCNC: 36 UG/DL
LYMPHOCYTES # BLD: 0.74 TH/MM3
LYMPHOCYTES NFR BLD: 12 %
MCH RBC QN AUTO: 19.6 PG
MCHC RBC AUTO-ENTMCNC: 29.1 G/DL
MCV RBC AUTO: 67.3 FL
MONOCYTES # BLD: 0.44 TH/MM3
MONOCYTES NFR BLD: 7.1 %
PLATELET # BLD: 211 TH/MM3
PMV BLD AUTO: 10.4 FL
RBC # BLD AUTO: 3.42 MIL/MM3
TIBC SERPL-MCNC: 208 UG/DL
WBC # BLD: 6.18 TH/MM3

## 2017-10-23 LAB — FERRITIN SERPL-MCNC: 303 NG/ML

## 2017-10-25 LAB
ABO + RH BLD: NORMAL
BLD GP AB SCN SERPL QL: NORMAL
R#: NORMAL

## 2017-10-27 ENCOUNTER — APPOINTMENT (OUTPATIENT)
Dept: INFUSION THERAPY | Facility: CLINIC | Age: 77
End: 2017-10-27

## 2017-10-27 LAB
BASOPHILS # BLD: 0.04 TH/MM3
BASOPHILS NFR BLD: 0.5 %
EOSINOPHIL # BLD: 0.14 TH/MM3
EOSINOPHIL NFR BLD: 1.9 %
ERYTHROCYTE [DISTWIDTH] IN BLOOD BY AUTOMATED COUNT: 19.9 %
GRANULOCYTES # BLD: 5.93 TH/MM3
GRANULOCYTES NFR BLD: 80.1 %
HCT VFR BLD AUTO: 24.4 %
HGB BLD-MCNC: 7.4 G/DL
IMM GRANULOCYTES # BLD: 0.12 TH/MM3
IMM GRANULOCYTES NFR BLD: 1.6 %
LYMPHOCYTES # BLD: 0.66 TH/MM3
LYMPHOCYTES NFR BLD: 8.9 %
MCH RBC QN AUTO: 20.7 PG
MCHC RBC AUTO-ENTMCNC: 30.3 G/DL
MCV RBC AUTO: 68.2 FL
MONOCYTES # BLD: 0.52 TH/MM3
MONOCYTES NFR BLD: 7 %
PLATELET # BLD: 196 TH/MM3
PMV BLD AUTO: 10.6 FL
RBC # BLD AUTO: 3.58 MIL/MM3
WBC # BLD: 7.41 TH/MM3

## 2017-11-03 ENCOUNTER — APPOINTMENT (OUTPATIENT)
Dept: INFUSION THERAPY | Facility: CLINIC | Age: 77
End: 2017-11-03

## 2017-11-08 LAB
BASOPHILS # BLD: 0.04 TH/MM3
BASOPHILS NFR BLD: 0.6 %
EOSINOPHIL # BLD: 0.1 TH/MM3
EOSINOPHIL NFR BLD: 1.5 %
ERYTHROCYTE [DISTWIDTH] IN BLOOD BY AUTOMATED COUNT: 19.3 %
GRANULOCYTES # BLD: 5.24 TH/MM3
GRANULOCYTES NFR BLD: 79.9 %
HCT VFR BLD AUTO: 25.3 %
HGB BLD-MCNC: 7.7 G/DL
IMM GRANULOCYTES # BLD: 0.04 TH/MM3
IMM GRANULOCYTES NFR BLD: 0.6 %
LYMPHOCYTES # BLD: 0.68 TH/MM3
LYMPHOCYTES NFR BLD: 10.4 %
MCH RBC QN AUTO: 20.5 PG
MCHC RBC AUTO-ENTMCNC: 30.4 G/DL
MCV RBC AUTO: 67.5 FL
MONOCYTES # BLD: 0.46 TH/MM3
MONOCYTES NFR BLD: 7 %
PLATELET # BLD: 195 TH/MM3
PMV BLD AUTO: 9.5 FL
RBC # BLD AUTO: 3.75 MIL/MM3
WBC # BLD: 6.56 TH/MM3

## 2017-11-10 ENCOUNTER — APPOINTMENT (OUTPATIENT)
Dept: HEMATOLOGY ONCOLOGY | Facility: CLINIC | Age: 77
End: 2017-11-10

## 2017-11-10 ENCOUNTER — APPOINTMENT (OUTPATIENT)
Dept: INFUSION THERAPY | Facility: CLINIC | Age: 77
End: 2017-11-10

## 2017-11-10 VITALS
DIASTOLIC BLOOD PRESSURE: 66 MMHG | HEIGHT: 62 IN | TEMPERATURE: 98.4 F | WEIGHT: 145 LBS | HEART RATE: 76 BPM | RESPIRATION RATE: 16 BRPM | BODY MASS INDEX: 26.68 KG/M2 | SYSTOLIC BLOOD PRESSURE: 135 MMHG

## 2017-11-13 LAB
BASOPHILS # BLD: 0.03 TH/MM3
BASOPHILS NFR BLD: 0.5 %
EOSINOPHIL # BLD: 0.1 TH/MM3
EOSINOPHIL NFR BLD: 1.7 %
ERYTHROCYTE [DISTWIDTH] IN BLOOD BY AUTOMATED COUNT: 18.9 %
FERRITIN SERPL-MCNC: 362 NG/ML
GRANULOCYTES # BLD: 4.7 TH/MM3
GRANULOCYTES NFR BLD: 78.6 %
HCT VFR BLD AUTO: 23 %
HGB BLD-MCNC: 6.8 G/DL
IMM GRANULOCYTES # BLD: 0.03 TH/MM3
IMM GRANULOCYTES NFR BLD: 0.5 %
IRON SERPL-MCNC: 21 UG/DL
LYMPHOCYTES # BLD: 0.69 TH/MM3
LYMPHOCYTES NFR BLD: 11.5 %
MCH RBC QN AUTO: 19.8 PG
MCHC RBC AUTO-ENTMCNC: 29.6 G/DL
MCV RBC AUTO: 66.9 FL
MONOCYTES # BLD: 0.43 TH/MM3
MONOCYTES NFR BLD: 7.2 %
PERCENT SATURATION (NORTH): 13.7 %
PLATELET # BLD: 193 TH/MM3
PMV BLD AUTO: 9.4 FL
RBC # BLD AUTO: 3.44 MIL/MM3
TIBC SERPL-MCNC: 200 UG/DL
WBC # BLD: 5.98 TH/MM3

## 2017-11-17 ENCOUNTER — APPOINTMENT (OUTPATIENT)
Dept: INFUSION THERAPY | Facility: CLINIC | Age: 77
End: 2017-11-17

## 2017-11-21 LAB
BASOPHILS # BLD: 0.02 TH/MM3
BASOPHILS NFR BLD: 0.3 %
EOSINOPHIL # BLD: 0.06 TH/MM3
EOSINOPHIL NFR BLD: 0.8 %
ERYTHROCYTE [DISTWIDTH] IN BLOOD BY AUTOMATED COUNT: 19.8 %
GRANULOCYTES # BLD: 6.21 TH/MM3
GRANULOCYTES NFR BLD: 82.4 %
HCT VFR BLD AUTO: 22.7 %
HGB BLD-MCNC: 6.7 G/DL
IMM GRANULOCYTES # BLD: 0.06 TH/MM3
IMM GRANULOCYTES NFR BLD: 0.8 %
LYMPHOCYTES # BLD: 0.72 TH/MM3
LYMPHOCYTES NFR BLD: 9.5 %
MCH RBC QN AUTO: 19.9 PG
MCHC RBC AUTO-ENTMCNC: 29.5 G/DL
MCV RBC AUTO: 67.4 FL
MONOCYTES # BLD: 0.47 TH/MM3
MONOCYTES NFR BLD: 6.2 %
PLATELET # BLD: 201 TH/MM3
RBC # BLD AUTO: 3.37 MIL/MM3
WBC # BLD: 7.54 TH/MM3

## 2017-11-24 ENCOUNTER — APPOINTMENT (OUTPATIENT)
Dept: INFUSION THERAPY | Facility: CLINIC | Age: 77
End: 2017-11-24

## 2017-11-24 LAB
BASOPHILS # BLD: 0.06 TH/MM3
BASOPHILS NFR BLD: 0.7 %
EOSINOPHIL # BLD: 0.02 TH/MM3
EOSINOPHIL NFR BLD: 0.2 %
ERYTHROCYTE [DISTWIDTH] IN BLOOD BY AUTOMATED COUNT: 20.9 %
GRANULOCYTES # BLD: 7.53 TH/MM3
GRANULOCYTES NFR BLD: 84.2 %
HCT VFR BLD AUTO: 24.3 %
HGB BLD-MCNC: 7.4 G/DL
IMM GRANULOCYTES # BLD: 0.17 TH/MM3
IMM GRANULOCYTES NFR BLD: 1.9 %
LYMPHOCYTES # BLD: 0.71 TH/MM3
LYMPHOCYTES NFR BLD: 7.9 %
MCH RBC QN AUTO: 20 PG
MCHC RBC AUTO-ENTMCNC: 30.5 G/DL
MCV RBC AUTO: 65.7 FL
MONOCYTES # BLD: 0.46 TH/MM3
MONOCYTES NFR BLD: 5.1 %
PLATELET # BLD: 267 TH/MM3
RBC # BLD AUTO: 3.7 MIL/MM3
WBC # BLD: 8.95 TH/MM3

## 2017-11-28 ENCOUNTER — RESULT REVIEW (OUTPATIENT)
Age: 77
End: 2017-11-28

## 2017-11-28 ENCOUNTER — APPOINTMENT (OUTPATIENT)
Dept: INFUSION THERAPY | Facility: CLINIC | Age: 77
End: 2017-11-28

## 2017-11-29 ENCOUNTER — OUTPATIENT (OUTPATIENT)
Dept: OUTPATIENT SERVICES | Facility: HOSPITAL | Age: 77
LOS: 1 days | Discharge: HOME | End: 2017-11-29

## 2017-11-29 ENCOUNTER — APPOINTMENT (OUTPATIENT)
Dept: INFUSION THERAPY | Facility: CLINIC | Age: 77
End: 2017-11-29

## 2017-11-29 DIAGNOSIS — D64.9 ANEMIA, UNSPECIFIED: ICD-10-CM

## 2017-11-29 DIAGNOSIS — D63.8 ANEMIA IN OTHER CHRONIC DISEASES CLASSIFIED ELSEWHERE: ICD-10-CM

## 2017-11-29 DIAGNOSIS — D50.9 IRON DEFICIENCY ANEMIA, UNSPECIFIED: ICD-10-CM

## 2017-11-29 DIAGNOSIS — R31.9 HEMATURIA, UNSPECIFIED: ICD-10-CM

## 2017-11-29 LAB
BASOPHILS # BLD: 0.03 TH/MM3
BASOPHILS NFR BLD: 0.4 %
EOSINOPHIL # BLD: 0.04 TH/MM3
EOSINOPHIL NFR BLD: 0.5 %
ERYTHROCYTE [DISTWIDTH] IN BLOOD BY AUTOMATED COUNT: 20.5 %
FERRITIN SERPL-MCNC: 1405 NG/ML
GRANULOCYTES # BLD: 6.44 TH/MM3
GRANULOCYTES NFR BLD: 85.4 %
HCT VFR BLD AUTO: 22.3 %
HGB BLD-MCNC: 6.6 G/DL
IMM GRANULOCYTES # BLD: 0.02 TH/MM3
IMM GRANULOCYTES NFR BLD: 0.3 %
IRON SERPL-MCNC: 30 UG/DL
LYMPHOCYTES # BLD: 0.61 TH/MM3
LYMPHOCYTES NFR BLD: 8.1 %
MCH RBC QN AUTO: 19.6 PG
MCHC RBC AUTO-ENTMCNC: 29.6 G/DL
MCV RBC AUTO: 66.4 FL
MONOCYTES # BLD: 0.4 TH/MM3
MONOCYTES NFR BLD: 5.3 %
PLATELET # BLD: 283 TH/MM3
RBC # BLD AUTO: 3.36 MIL/MM3
TIBC SERPL-MCNC: 196 UG/DL
WBC # BLD: 7.54 TH/MM3

## 2017-12-04 LAB
ABO + RH BLD: NORMAL
BLD GP AB SCN SERPL QL: NORMAL
R#: NORMAL
RBC LEUKO RED IRR REQ (NORTH): NORMAL

## 2017-12-08 ENCOUNTER — APPOINTMENT (OUTPATIENT)
Dept: INFUSION THERAPY | Facility: CLINIC | Age: 77
End: 2017-12-08

## 2017-12-08 ENCOUNTER — APPOINTMENT (OUTPATIENT)
Dept: HEMATOLOGY ONCOLOGY | Facility: CLINIC | Age: 77
End: 2017-12-08

## 2017-12-15 ENCOUNTER — RESULT REVIEW (OUTPATIENT)
Age: 77
End: 2017-12-15

## 2017-12-15 ENCOUNTER — APPOINTMENT (OUTPATIENT)
Dept: HEMATOLOGY ONCOLOGY | Facility: CLINIC | Age: 77
End: 2017-12-15

## 2017-12-16 LAB
BASOPHILS # BLD: 0.04 TH/MM3
BASOPHILS NFR BLD: 0.7 %
EOSINOPHIL # BLD: 0.08 TH/MM3
EOSINOPHIL NFR BLD: 1.4 %
ERYTHROCYTE [DISTWIDTH] IN BLOOD BY AUTOMATED COUNT: 23.1 %
FERRITIN SERPL-MCNC: 1543 NG/ML
GRANULOCYTES # BLD: 4.19 TH/MM3
GRANULOCYTES NFR BLD: 74.8 %
HCT VFR BLD AUTO: 21.5 %
HGB BLD-MCNC: 6.3 G/DL
IMM GRANULOCYTES # BLD: 0.01 TH/MM3
IMM GRANULOCYTES NFR BLD: 0.2 %
IRON SERPL-MCNC: 41 UG/DL
LYMPHOCYTES # BLD: 0.94 TH/MM3
LYMPHOCYTES NFR BLD: 16.8 %
MCH RBC QN AUTO: 21.6 PG
MCHC RBC AUTO-ENTMCNC: 29.3 G/DL
MCV RBC AUTO: 73.6 FL
MONOCYTES # BLD: 0.34 TH/MM3
MONOCYTES NFR BLD: 6.1 %
PLATELET # BLD: 295 TH/MM3
PMV BLD AUTO: 10.5 FL
RBC # BLD AUTO: 2.92 MIL/MM3
TIBC SERPL-MCNC: 247 UG/DL
WBC # BLD: 5.6 TH/MM3

## 2017-12-18 ENCOUNTER — APPOINTMENT (OUTPATIENT)
Dept: INFUSION THERAPY | Facility: CLINIC | Age: 77
End: 2017-12-18

## 2017-12-19 ENCOUNTER — APPOINTMENT (OUTPATIENT)
Dept: HEMATOLOGY ONCOLOGY | Facility: CLINIC | Age: 77
End: 2017-12-19

## 2017-12-19 ENCOUNTER — RESULT REVIEW (OUTPATIENT)
Age: 77
End: 2017-12-19

## 2017-12-19 VITALS
DIASTOLIC BLOOD PRESSURE: 63 MMHG | HEART RATE: 78 BPM | RESPIRATION RATE: 17 BRPM | WEIGHT: 146 LBS | HEIGHT: 62 IN | SYSTOLIC BLOOD PRESSURE: 145 MMHG | TEMPERATURE: 97 F | BODY MASS INDEX: 26.87 KG/M2

## 2017-12-21 ENCOUNTER — APPOINTMENT (OUTPATIENT)
Dept: INFUSION THERAPY | Facility: CLINIC | Age: 77
End: 2017-12-21

## 2017-12-21 LAB
BASOPHILS # BLD: 0.06 TH/MM3
BASOPHILS NFR BLD: 1 %
EOSINOPHIL # BLD: 0.08 TH/MM3
EOSINOPHIL NFR BLD: 1.3 %
ERYTHROCYTE [DISTWIDTH] IN BLOOD BY AUTOMATED COUNT: 22.8 %
GRANULOCYTES # BLD: 4.66 TH/MM3
GRANULOCYTES NFR BLD: 77.2 %
HCT VFR BLD AUTO: 24.5 %
HGB BLD-MCNC: 7.6 G/DL
IMM GRANULOCYTES # BLD: 0.09 TH/MM3
IMM GRANULOCYTES NFR BLD: 1.5 %
LYMPHOCYTES # BLD: 0.78 TH/MM3
LYMPHOCYTES NFR BLD: 12.9 %
MCH RBC QN AUTO: 23.8 PG
MCHC RBC AUTO-ENTMCNC: 31 G/DL
MCV RBC AUTO: 76.8 FL
MONOCYTES # BLD: 0.37 TH/MM3
MONOCYTES NFR BLD: 6.1 %
PLATELET # BLD: 260 TH/MM3
PMV BLD AUTO: 10.8 FL
RBC # BLD AUTO: 3.19 MIL/MM3
WBC # BLD: 6.04 TH/MM3

## 2017-12-22 ENCOUNTER — APPOINTMENT (OUTPATIENT)
Dept: INFUSION THERAPY | Facility: CLINIC | Age: 77
End: 2017-12-22

## 2017-12-27 LAB
ABO + RH BLD: NORMAL
ABO + RH BLD: NORMAL
BLD GP AB SCN SERPL QL: NORMAL
BLD GP AB SCN SERPL QL: NORMAL
Lab: NORMAL
R#: NORMAL
R#: NORMAL
RBC LEUKO RED IRR REQ (NORTH): NORMAL

## 2017-12-28 ENCOUNTER — APPOINTMENT (OUTPATIENT)
Dept: INFUSION THERAPY | Facility: CLINIC | Age: 77
End: 2017-12-28

## 2017-12-28 LAB
BASOPHILS # BLD: 0.03 TH/MM3
BASOPHILS NFR BLD: 0.6 %
EOSINOPHIL # BLD: 0.19 TH/MM3
EOSINOPHIL NFR BLD: 3.8 %
ERYTHROCYTE [DISTWIDTH] IN BLOOD BY AUTOMATED COUNT: 22.5 %
GRANULOCYTES # BLD: 3.25 TH/MM3
GRANULOCYTES NFR BLD: 65.3 %
HCT VFR BLD AUTO: 23.3 %
HGB BLD-MCNC: 7.1 G/DL
IMM GRANULOCYTES # BLD: 0.04 TH/MM3
IMM GRANULOCYTES NFR BLD: 0.8 %
LYMPHOCYTES # BLD: 1.1 TH/MM3
LYMPHOCYTES NFR BLD: 22.1 %
MCH RBC QN AUTO: 24.1 PG
MCHC RBC AUTO-ENTMCNC: 30.5 G/DL
MCV RBC AUTO: 79 FL
MONOCYTES # BLD: 0.37 TH/MM3
MONOCYTES NFR BLD: 7.4 %
PLATELET # BLD: 156 TH/MM3
RBC # BLD AUTO: 2.95 MIL/MM3
WBC # BLD: 4.98 TH/MM3

## 2018-01-04 ENCOUNTER — RESULT REVIEW (OUTPATIENT)
Age: 78
End: 2018-01-04

## 2018-01-04 ENCOUNTER — APPOINTMENT (OUTPATIENT)
Dept: INFUSION THERAPY | Facility: CLINIC | Age: 78
End: 2018-01-04

## 2018-01-05 ENCOUNTER — APPOINTMENT (OUTPATIENT)
Dept: INFUSION THERAPY | Facility: CLINIC | Age: 78
End: 2018-01-05

## 2018-01-11 ENCOUNTER — APPOINTMENT (OUTPATIENT)
Dept: INFUSION THERAPY | Facility: CLINIC | Age: 78
End: 2018-01-11

## 2018-01-15 LAB
BASOPHILS # BLD: 0.05 TH/MM3
BASOPHILS # BLD: 0.05 TH/MM3
BASOPHILS NFR BLD: 0.9 %
BASOPHILS NFR BLD: 0.9 %
EOSINOPHIL # BLD: 0.12 TH/MM3
EOSINOPHIL # BLD: 0.15 TH/MM3
EOSINOPHIL NFR BLD: 2.3 %
EOSINOPHIL NFR BLD: 2.6 %
ERYTHROCYTE [DISTWIDTH] IN BLOOD BY AUTOMATED COUNT: 20 %
ERYTHROCYTE [DISTWIDTH] IN BLOOD BY AUTOMATED COUNT: 21.8 %
GRANULOCYTES # BLD: 3.95 TH/MM3
GRANULOCYTES # BLD: 4.15 TH/MM3
GRANULOCYTES NFR BLD: 70.6 %
GRANULOCYTES NFR BLD: 74.7 %
HCT VFR BLD AUTO: 21.7 %
HCT VFR BLD AUTO: 25.2 %
HGB BLD-MCNC: 6.4 G/DL
HGB BLD-MCNC: 7.5 G/DL
IMM GRANULOCYTES # BLD: 0.01 TH/MM3
IMM GRANULOCYTES # BLD: 0.08 TH/MM3
IMM GRANULOCYTES NFR BLD: 0.2 %
IMM GRANULOCYTES NFR BLD: 1.4 %
LYMPHOCYTES # BLD: 0.81 TH/MM3
LYMPHOCYTES # BLD: 1.07 TH/MM3
LYMPHOCYTES NFR BLD: 15.3 %
LYMPHOCYTES NFR BLD: 18.2 %
MCH RBC QN AUTO: 23.4 PG
MCH RBC QN AUTO: 24.1 PG
MCHC RBC AUTO-ENTMCNC: 29.5 G/DL
MCHC RBC AUTO-ENTMCNC: 29.8 G/DL
MCV RBC AUTO: 79.5 FL
MCV RBC AUTO: 81 FL
MONOCYTES # BLD: 0.35 TH/MM3
MONOCYTES # BLD: 0.37 TH/MM3
MONOCYTES NFR BLD: 6.3 %
MONOCYTES NFR BLD: 6.6 %
PLATELET # BLD: 195 TH/MM3
PLATELET # BLD: 200 TH/MM3
RBC # BLD AUTO: 2.73 MIL/MM3
RBC # BLD AUTO: 3.11 MIL/MM3
WBC # BLD: 5.29 TH/MM3
WBC # BLD: 5.87 TH/MM3

## 2018-01-16 ENCOUNTER — APPOINTMENT (OUTPATIENT)
Dept: HEMATOLOGY ONCOLOGY | Facility: CLINIC | Age: 78
End: 2018-01-16

## 2018-01-16 ENCOUNTER — APPOINTMENT (OUTPATIENT)
Dept: INFUSION THERAPY | Facility: CLINIC | Age: 78
End: 2018-01-16

## 2018-01-16 VITALS
SYSTOLIC BLOOD PRESSURE: 119 MMHG | TEMPERATURE: 97.8 F | BODY MASS INDEX: 26.13 KG/M2 | RESPIRATION RATE: 14 BRPM | HEIGHT: 62 IN | DIASTOLIC BLOOD PRESSURE: 60 MMHG | HEART RATE: 72 BPM | WEIGHT: 142 LBS

## 2018-01-19 LAB
ABO + RH BLD: NORMAL
BASOPHILS # BLD: 0.07 TH/MM3
BASOPHILS NFR BLD: 1.3 %
BLD GP AB SCN SERPL QL: NORMAL
EOSINOPHIL # BLD: 0.16 TH/MM3
EOSINOPHIL NFR BLD: 2.9 %
ERYTHROCYTE [DISTWIDTH] IN BLOOD BY AUTOMATED COUNT: 19.3 %
FERRITIN SERPL-MCNC: 1076 NG/ML
FOLATE SERPL-MCNC: > 20 NG/ML
GRANULOCYTES # BLD: 3.96 TH/MM3
GRANULOCYTES NFR BLD: 71.4 %
HCT VFR BLD AUTO: 23.9 %
HGB BLD-MCNC: 7 G/DL
IMM GRANULOCYTES # BLD: 0.03 TH/MM3
IMM GRANULOCYTES NFR BLD: 0.5 %
IRON SERPL-MCNC: 47 UG/DL
LYMPHOCYTES # BLD: 0.94 TH/MM3
LYMPHOCYTES NFR BLD: 17 %
MCH RBC QN AUTO: 23.7 PG
MCHC RBC AUTO-ENTMCNC: 29.3 G/DL
MCV RBC AUTO: 81 FL
METHYLMALONATE SERPL-SCNC: 486 NMOL/L
MONOCYTES # BLD: 0.38 TH/MM3
MONOCYTES NFR BLD: 6.9 %
PERCENT SATURATION (NORTH): 20.5 %
PLATELET # BLD: 187 TH/MM3
R#: NORMAL
RBC # BLD AUTO: 2.95 MIL/MM3
RBC LEUKO RED IRR REQ (NORTH): NORMAL
TIBC SERPL-MCNC: 229 UG/DL
VIT B12 SERPL-MCNC: 498 PG/ML
WBC # BLD: 5.54 TH/MM3

## 2018-01-25 ENCOUNTER — RESULT REVIEW (OUTPATIENT)
Age: 78
End: 2018-01-25

## 2018-01-25 ENCOUNTER — APPOINTMENT (OUTPATIENT)
Dept: INFUSION THERAPY | Facility: CLINIC | Age: 78
End: 2018-01-25

## 2018-01-25 LAB
BASOPHILS # BLD: 0.05 TH/MM3
BASOPHILS NFR BLD: 0.8 %
EOSINOPHIL # BLD: 0.15 TH/MM3
EOSINOPHIL NFR BLD: 2.4 %
ERYTHROCYTE [DISTWIDTH] IN BLOOD BY AUTOMATED COUNT: 19.2 %
GRANULOCYTES # BLD: 4.67 TH/MM3
GRANULOCYTES NFR BLD: 73.5 %
HCT VFR BLD AUTO: 20.7 %
HGB BLD-MCNC: 6.1 G/DL
IMM GRANULOCYTES # BLD: 0.06 TH/MM3
IMM GRANULOCYTES NFR BLD: 0.9 %
LYMPHOCYTES # BLD: 0.99 TH/MM3
LYMPHOCYTES NFR BLD: 15.6 %
MCH RBC QN AUTO: 23.6 PG
MCHC RBC AUTO-ENTMCNC: 29.5 G/DL
MCV RBC AUTO: 79.9 FL
MONOCYTES # BLD: 0.43 TH/MM3
MONOCYTES NFR BLD: 6.8 %
PLATELET # BLD: 187 TH/MM3
RBC # BLD AUTO: 2.59 MIL/MM3
WBC # BLD: 6.35 TH/MM3

## 2018-01-26 ENCOUNTER — APPOINTMENT (OUTPATIENT)
Dept: INFUSION THERAPY | Facility: CLINIC | Age: 78
End: 2018-01-26

## 2018-01-26 VITALS
HEART RATE: 72 BPM | DIASTOLIC BLOOD PRESSURE: 73 MMHG | RESPIRATION RATE: 18 BRPM | SYSTOLIC BLOOD PRESSURE: 122 MMHG | TEMPERATURE: 98.4 F

## 2018-01-26 VITALS
RESPIRATION RATE: 16 BRPM | TEMPERATURE: 97.9 F | HEART RATE: 76 BPM | SYSTOLIC BLOOD PRESSURE: 121 MMHG | DIASTOLIC BLOOD PRESSURE: 66 MMHG

## 2018-01-26 VITALS
TEMPERATURE: 98.7 F | SYSTOLIC BLOOD PRESSURE: 103 MMHG | HEART RATE: 76 BPM | RESPIRATION RATE: 18 BRPM | DIASTOLIC BLOOD PRESSURE: 69 MMHG

## 2018-01-26 VITALS
SYSTOLIC BLOOD PRESSURE: 125 MMHG | HEART RATE: 78 BPM | DIASTOLIC BLOOD PRESSURE: 69 MMHG | TEMPERATURE: 98.03 F | RESPIRATION RATE: 18 BRPM

## 2018-01-26 VITALS
TEMPERATURE: 98.2 F | DIASTOLIC BLOOD PRESSURE: 61 MMHG | RESPIRATION RATE: 16 BRPM | HEART RATE: 73 BPM | SYSTOLIC BLOOD PRESSURE: 125 MMHG

## 2018-01-30 DIAGNOSIS — R31.9 HEMATURIA, UNSPECIFIED: ICD-10-CM

## 2018-02-01 ENCOUNTER — APPOINTMENT (OUTPATIENT)
Dept: INFUSION THERAPY | Facility: CLINIC | Age: 78
End: 2018-02-01

## 2018-02-01 ENCOUNTER — RESULT REVIEW (OUTPATIENT)
Age: 78
End: 2018-02-01

## 2018-02-05 ENCOUNTER — APPOINTMENT (OUTPATIENT)
Dept: HEMATOLOGY ONCOLOGY | Facility: CLINIC | Age: 78
End: 2018-02-05

## 2018-02-07 LAB
HCT VFR BLD CALC: 24.6 %
HGB BLD-MCNC: 7.4 G/DL
MCHC RBC-ENTMCNC: 23.9 PG
MCHC RBC-ENTMCNC: 30.1 G/DL
MCV RBC AUTO: 79.6 FL
PLATELET # BLD AUTO: 179 K/UL
PMV BLD: NORMAL
RBC # BLD: 3.09 M/UL
RBC # FLD: 17.3 %
WBC # FLD AUTO: 4.4 K/UL

## 2018-02-08 ENCOUNTER — LABORATORY RESULT (OUTPATIENT)
Age: 78
End: 2018-02-08

## 2018-02-08 ENCOUNTER — APPOINTMENT (OUTPATIENT)
Dept: INFUSION THERAPY | Facility: CLINIC | Age: 78
End: 2018-02-08

## 2018-02-08 RX ORDER — ERYTHROPOIETIN 10000 [IU]/ML
40000 INJECTION, SOLUTION INTRAVENOUS; SUBCUTANEOUS ONCE
Qty: 0 | Refills: 0 | Status: COMPLETED | OUTPATIENT
Start: 2018-02-08 | End: 2018-02-08

## 2018-02-08 RX ADMIN — ERYTHROPOIETIN 40000 UNIT(S): 10000 INJECTION, SOLUTION INTRAVENOUS; SUBCUTANEOUS at 11:31

## 2018-02-13 ENCOUNTER — APPOINTMENT (OUTPATIENT)
Dept: HEMATOLOGY ONCOLOGY | Facility: CLINIC | Age: 78
End: 2018-02-13

## 2018-02-15 ENCOUNTER — APPOINTMENT (OUTPATIENT)
Dept: INFUSION THERAPY | Facility: CLINIC | Age: 78
End: 2018-02-15

## 2018-02-15 ENCOUNTER — LABORATORY RESULT (OUTPATIENT)
Age: 78
End: 2018-02-15

## 2018-02-15 LAB
HCT VFR BLD CALC: 22.7 %
HCT VFR BLD CALC: 28 %
HGB BLD-MCNC: 6.7 G/DL
HGB BLD-MCNC: 8.4 G/DL
MCHC RBC-ENTMCNC: 22.9 PG
MCHC RBC-ENTMCNC: 23.5 PG
MCHC RBC-ENTMCNC: 29.5 G/DL
MCHC RBC-ENTMCNC: 30 G/DL
MCV RBC AUTO: 77.7 FL
MCV RBC AUTO: 78.4 FL
PLATELET # BLD AUTO: 135 K/UL
PLATELET # BLD AUTO: 190 K/UL
PMV BLD: NORMAL
PMV BLD: NORMAL
RBC # BLD: 2.92 M/UL
RBC # BLD: 3.57 M/UL
RBC # FLD: 17 %
RBC # FLD: 19.1 %
WBC # FLD AUTO: 3.02 K/UL
WBC # FLD AUTO: 5.57 K/UL

## 2018-02-15 RX ORDER — ERYTHROPOIETIN 10000 [IU]/ML
40000 INJECTION, SOLUTION INTRAVENOUS; SUBCUTANEOUS ONCE
Qty: 0 | Refills: 0 | Status: COMPLETED | OUTPATIENT
Start: 2018-02-15 | End: 2018-02-15

## 2018-02-15 RX ADMIN — ERYTHROPOIETIN 40000 UNIT(S): 10000 INJECTION, SOLUTION INTRAVENOUS; SUBCUTANEOUS at 12:50

## 2018-02-16 ENCOUNTER — APPOINTMENT (OUTPATIENT)
Dept: INFUSION THERAPY | Facility: CLINIC | Age: 78
End: 2018-02-16

## 2018-02-22 ENCOUNTER — APPOINTMENT (OUTPATIENT)
Dept: INFUSION THERAPY | Facility: CLINIC | Age: 78
End: 2018-02-22

## 2018-02-22 ENCOUNTER — LABORATORY RESULT (OUTPATIENT)
Age: 78
End: 2018-02-22

## 2018-02-22 ENCOUNTER — OUTPATIENT (OUTPATIENT)
Dept: OUTPATIENT SERVICES | Facility: HOSPITAL | Age: 78
LOS: 1 days | Discharge: HOME | End: 2018-02-22

## 2018-02-22 DIAGNOSIS — Z95.2 PRESENCE OF PROSTHETIC HEART VALVE: ICD-10-CM

## 2018-02-22 DIAGNOSIS — R31.9 HEMATURIA, UNSPECIFIED: ICD-10-CM

## 2018-02-22 DIAGNOSIS — D50.9 IRON DEFICIENCY ANEMIA, UNSPECIFIED: ICD-10-CM

## 2018-02-22 DIAGNOSIS — D64.9 ANEMIA, UNSPECIFIED: ICD-10-CM

## 2018-02-22 LAB
ABO + RH PNL BLD: NORMAL
BLD GP AB SCN SERPL QL: NORMAL
FOLATE SERPL-MCNC: >20 NG/ML
HCT VFR BLD CALC: 25.2 %
HGB BLD-MCNC: 7.5 G/DL
IRON SATN MFR SERPL: 35 %
IRON SERPL-MCNC: 66 UG/DL
MCHC RBC-ENTMCNC: 24.2 PG
MCHC RBC-ENTMCNC: 29.8 G/DL
MCV RBC AUTO: 81.3 FL
METHYLMALONATE SERPL-SCNC: 475 NMOL/L
PLATELET # BLD AUTO: 156 K/UL
PMV BLD: NORMAL
RBC # BLD: 3.1 M/UL
RBC # FLD: 18.9 %
TIBC SERPL-MCNC: 187 UG/DL
UIBC SERPL-MCNC: 121 UG/DL
VIT B12 SERPL-MCNC: 457 PG/ML
WBC # FLD AUTO: 5.27 K/UL

## 2018-02-22 RX ORDER — ERYTHROPOIETIN 10000 [IU]/ML
40000 INJECTION, SOLUTION INTRAVENOUS; SUBCUTANEOUS ONCE
Qty: 0 | Refills: 0 | Status: COMPLETED | OUTPATIENT
Start: 2018-02-22 | End: 2018-02-22

## 2018-02-22 RX ADMIN — ERYTHROPOIETIN 40000 UNIT(S): 10000 INJECTION, SOLUTION INTRAVENOUS; SUBCUTANEOUS at 12:34

## 2018-03-01 ENCOUNTER — LABORATORY RESULT (OUTPATIENT)
Age: 78
End: 2018-03-01

## 2018-03-01 ENCOUNTER — OUTPATIENT (OUTPATIENT)
Dept: OUTPATIENT SERVICES | Facility: HOSPITAL | Age: 78
LOS: 1 days | Discharge: HOME | End: 2018-03-01

## 2018-03-01 ENCOUNTER — APPOINTMENT (OUTPATIENT)
Dept: INFUSION THERAPY | Facility: CLINIC | Age: 78
End: 2018-03-01

## 2018-03-01 VITALS
RESPIRATION RATE: 16 BRPM | TEMPERATURE: 98 F | DIASTOLIC BLOOD PRESSURE: 66 MMHG | SYSTOLIC BLOOD PRESSURE: 131 MMHG | HEART RATE: 65 BPM

## 2018-03-01 LAB
HCT VFR BLD CALC: 23.7 %
HGB BLD-MCNC: 6.9 G/DL
MCHC RBC-ENTMCNC: 23.3 PG
MCHC RBC-ENTMCNC: 29.1 G/DL
MCV RBC AUTO: 80.1 FL
PLATELET # BLD AUTO: 192 K/UL
PMV BLD: NORMAL
RBC # BLD: 2.96 M/UL
RBC # FLD: 19 %
WBC # FLD AUTO: 5.38 K/UL

## 2018-03-01 RX ORDER — ERYTHROPOIETIN 10000 [IU]/ML
40000 INJECTION, SOLUTION INTRAVENOUS; SUBCUTANEOUS ONCE
Qty: 0 | Refills: 0 | Status: COMPLETED | OUTPATIENT
Start: 2018-03-01 | End: 2018-03-01

## 2018-03-01 RX ORDER — PREGABALIN 225 MG/1
1000 CAPSULE ORAL ONCE
Qty: 0 | Refills: 0 | Status: COMPLETED | OUTPATIENT
Start: 2018-03-01 | End: 2018-03-01

## 2018-03-01 RX ADMIN — ERYTHROPOIETIN 40000 UNIT(S): 10000 INJECTION, SOLUTION INTRAVENOUS; SUBCUTANEOUS at 11:59

## 2018-03-01 RX ADMIN — PREGABALIN 1000 MICROGRAM(S): 225 CAPSULE ORAL at 12:00

## 2018-03-02 DIAGNOSIS — D50.9 IRON DEFICIENCY ANEMIA, UNSPECIFIED: ICD-10-CM

## 2018-03-06 ENCOUNTER — LABORATORY RESULT (OUTPATIENT)
Age: 78
End: 2018-03-06

## 2018-03-06 ENCOUNTER — APPOINTMENT (OUTPATIENT)
Dept: HEMATOLOGY ONCOLOGY | Facility: CLINIC | Age: 78
End: 2018-03-06

## 2018-03-06 VITALS
BODY MASS INDEX: 24.66 KG/M2 | HEART RATE: 77 BPM | TEMPERATURE: 98.8 F | HEIGHT: 62 IN | RESPIRATION RATE: 16 BRPM | WEIGHT: 134 LBS | DIASTOLIC BLOOD PRESSURE: 67 MMHG | SYSTOLIC BLOOD PRESSURE: 115 MMHG

## 2018-03-07 ENCOUNTER — LABORATORY RESULT (OUTPATIENT)
Age: 78
End: 2018-03-07

## 2018-03-07 LAB
ABO + RH PNL BLD: NORMAL
BLD GP AB SCN SERPL QL: NORMAL
CANCER AG125 SERPL-ACNC: 222 U/ML
CANCER AG19-9 SERPL-ACNC: 14.8 U/ML
CANCER AG27-29 SERPL-ACNC: 32.9 U/ML
CEA SERPL-MCNC: 2.2 NG/ML
CRP SERPL-MCNC: 1.1 MG/DL
HCT VFR BLD CALC: 23.1 %
HGB BLD-MCNC: 6.7 G/DL
MCHC RBC-ENTMCNC: 23.1 PG
MCHC RBC-ENTMCNC: 29 G/DL
MCV RBC AUTO: 79.7 FL
PLATELET # BLD AUTO: 217 K/UL
PMV BLD: NORMAL
RBC # BLD: 2.9 M/UL
RBC # FLD: 18.7 %
WBC # FLD AUTO: 5.44 K/UL

## 2018-03-09 ENCOUNTER — LABORATORY RESULT (OUTPATIENT)
Age: 78
End: 2018-03-09

## 2018-03-09 ENCOUNTER — APPOINTMENT (OUTPATIENT)
Dept: HEMATOLOGY ONCOLOGY | Facility: CLINIC | Age: 78
End: 2018-03-09

## 2018-03-09 ENCOUNTER — APPOINTMENT (OUTPATIENT)
Dept: INFUSION THERAPY | Facility: CLINIC | Age: 78
End: 2018-03-09

## 2018-03-09 LAB
ABR COMMENT: NORMAL
PNH GRANULOCYTES: 0 %
PNH MONOCYTES: 0 %
PNH RBC - COMPLETE: 0 %
PNH RBC - PARTIAL: 0.01 %

## 2018-03-09 RX ORDER — ERYTHROPOIETIN 10000 [IU]/ML
40000 INJECTION, SOLUTION INTRAVENOUS; SUBCUTANEOUS ONCE
Qty: 0 | Refills: 0 | Status: COMPLETED | OUTPATIENT
Start: 2018-03-09 | End: 2018-03-09

## 2018-03-09 RX ADMIN — ERYTHROPOIETIN 40000 UNIT(S): 10000 INJECTION, SOLUTION INTRAVENOUS; SUBCUTANEOUS at 11:03

## 2018-03-10 ENCOUNTER — LABORATORY RESULT (OUTPATIENT)
Age: 78
End: 2018-03-10

## 2018-03-15 ENCOUNTER — LABORATORY RESULT (OUTPATIENT)
Age: 78
End: 2018-03-15

## 2018-03-15 ENCOUNTER — APPOINTMENT (OUTPATIENT)
Dept: HEMATOLOGY ONCOLOGY | Facility: CLINIC | Age: 78
End: 2018-03-15

## 2018-03-15 ENCOUNTER — OUTPATIENT (OUTPATIENT)
Dept: OUTPATIENT SERVICES | Facility: HOSPITAL | Age: 78
LOS: 1 days | Discharge: HOME | End: 2018-03-15

## 2018-03-15 ENCOUNTER — APPOINTMENT (OUTPATIENT)
Dept: INFUSION THERAPY | Facility: CLINIC | Age: 78
End: 2018-03-15

## 2018-03-15 ENCOUNTER — RESULT REVIEW (OUTPATIENT)
Age: 78
End: 2018-03-15

## 2018-03-15 DIAGNOSIS — D63.8 ANEMIA IN OTHER CHRONIC DISEASES CLASSIFIED ELSEWHERE: ICD-10-CM

## 2018-03-15 DIAGNOSIS — D64.9 ANEMIA, UNSPECIFIED: ICD-10-CM

## 2018-03-15 DIAGNOSIS — D50.9 IRON DEFICIENCY ANEMIA, UNSPECIFIED: ICD-10-CM

## 2018-03-15 RX ORDER — ERYTHROPOIETIN 10000 [IU]/ML
40000 INJECTION, SOLUTION INTRAVENOUS; SUBCUTANEOUS ONCE
Qty: 0 | Refills: 0 | Status: COMPLETED | OUTPATIENT
Start: 2018-03-15 | End: 2018-03-15

## 2018-03-15 RX ADMIN — ERYTHROPOIETIN 40000 UNIT(S): 10000 INJECTION, SOLUTION INTRAVENOUS; SUBCUTANEOUS at 17:37

## 2018-03-16 ENCOUNTER — LABORATORY RESULT (OUTPATIENT)
Age: 78
End: 2018-03-16

## 2018-03-16 ENCOUNTER — APPOINTMENT (OUTPATIENT)
Dept: HEMATOLOGY ONCOLOGY | Facility: CLINIC | Age: 78
End: 2018-03-16

## 2018-03-16 ENCOUNTER — INPATIENT (INPATIENT)
Facility: HOSPITAL | Age: 78
LOS: 7 days | Discharge: ANOTHER HOSPITAL | End: 2018-03-24
Attending: INTERNAL MEDICINE

## 2018-03-16 VITALS
DIASTOLIC BLOOD PRESSURE: 61 MMHG | RESPIRATION RATE: 18 BRPM | TEMPERATURE: 97 F | HEART RATE: 81 BPM | SYSTOLIC BLOOD PRESSURE: 132 MMHG

## 2018-03-16 VITALS
WEIGHT: 136 LBS | RESPIRATION RATE: 18 BRPM | DIASTOLIC BLOOD PRESSURE: 63 MMHG | TEMPERATURE: 97.7 F | HEART RATE: 77 BPM | SYSTOLIC BLOOD PRESSURE: 126 MMHG | HEIGHT: 62 IN | BODY MASS INDEX: 25.03 KG/M2

## 2018-03-16 DIAGNOSIS — I10 ESSENTIAL (PRIMARY) HYPERTENSION: ICD-10-CM

## 2018-03-16 DIAGNOSIS — D64.9 ANEMIA, UNSPECIFIED: ICD-10-CM

## 2018-03-16 DIAGNOSIS — Z98.890 OTHER SPECIFIED POSTPROCEDURAL STATES: ICD-10-CM

## 2018-03-16 DIAGNOSIS — E11.9 TYPE 2 DIABETES MELLITUS WITHOUT COMPLICATIONS: ICD-10-CM

## 2018-03-16 DIAGNOSIS — Z98.890 OTHER SPECIFIED POSTPROCEDURAL STATES: Chronic | ICD-10-CM

## 2018-03-16 LAB
ALBUMIN SERPL ELPH-MCNC: 3.7 G/DL — SIGNIFICANT CHANGE UP (ref 3.5–5.2)
ALBUMIN SERPL ELPH-MCNC: 3.8 G/DL — SIGNIFICANT CHANGE UP (ref 3.5–5.2)
ALP SERPL-CCNC: 94 U/L — SIGNIFICANT CHANGE UP (ref 30–115)
ALP SERPL-CCNC: 95 U/L — SIGNIFICANT CHANGE UP (ref 30–115)
ALT FLD-CCNC: 13 U/L — SIGNIFICANT CHANGE UP (ref 0–41)
ALT FLD-CCNC: 13 U/L — SIGNIFICANT CHANGE UP (ref 0–41)
ANION GAP SERPL CALC-SCNC: 10 MMOL/L — SIGNIFICANT CHANGE UP (ref 7–14)
ANION GAP SERPL CALC-SCNC: 12 MMOL/L — SIGNIFICANT CHANGE UP (ref 7–14)
APTT BLD: 29.8 SEC — SIGNIFICANT CHANGE UP (ref 27–39.2)
AST SERPL-CCNC: 53 U/L — HIGH (ref 0–41)
AST SERPL-CCNC: 54 U/L — HIGH (ref 0–41)
BILIRUB SERPL-MCNC: 2.5 MG/DL — HIGH (ref 0.2–1.2)
BILIRUB SERPL-MCNC: 2.9 MG/DL — HIGH (ref 0.2–1.2)
BUN SERPL-MCNC: 32 MG/DL — HIGH (ref 10–20)
BUN SERPL-MCNC: 33 MG/DL — HIGH (ref 10–20)
CALCIUM SERPL-MCNC: 8.2 MG/DL — LOW (ref 8.5–10.1)
CALCIUM SERPL-MCNC: 8.2 MG/DL — LOW (ref 8.5–10.1)
CHLORIDE SERPL-SCNC: 100 MMOL/L — SIGNIFICANT CHANGE UP (ref 98–110)
CHLORIDE SERPL-SCNC: 101 MMOL/L — SIGNIFICANT CHANGE UP (ref 98–110)
CO2 SERPL-SCNC: 28 MMOL/L — SIGNIFICANT CHANGE UP (ref 17–32)
CO2 SERPL-SCNC: 28 MMOL/L — SIGNIFICANT CHANGE UP (ref 17–32)
CREAT SERPL-MCNC: 1.2 MG/DL — SIGNIFICANT CHANGE UP (ref 0.7–1.5)
CREAT SERPL-MCNC: 1.2 MG/DL — SIGNIFICANT CHANGE UP (ref 0.7–1.5)
GLUCOSE SERPL-MCNC: 125 MG/DL — HIGH (ref 70–110)
GLUCOSE SERPL-MCNC: 228 MG/DL — HIGH (ref 70–110)
HCT VFR BLD CALC: 21.1 % — LOW (ref 37–47)
HGB BLD-MCNC: 6.4 G/DL — CRITICAL LOW (ref 12–16)
INR BLD: 1.96 RATIO — HIGH (ref 0.65–1.3)
LDH SERPL L TO P-CCNC: >1000 — HIGH (ref 50–242)
MCHC RBC-ENTMCNC: 24.4 PG — LOW (ref 27–31)
MCHC RBC-ENTMCNC: 30.3 G/DL — LOW (ref 32–37)
MCV RBC AUTO: 80.5 FL — LOW (ref 81–99)
NRBC # BLD: 0 /100 WBCS — SIGNIFICANT CHANGE UP (ref 0–0)
PLATELET # BLD AUTO: 221 K/UL — SIGNIFICANT CHANGE UP (ref 130–400)
POTASSIUM SERPL-MCNC: 4.1 MMOL/L — SIGNIFICANT CHANGE UP (ref 3.5–5)
POTASSIUM SERPL-MCNC: 4.2 MMOL/L — SIGNIFICANT CHANGE UP (ref 3.5–5)
POTASSIUM SERPL-SCNC: 4.1 MMOL/L — SIGNIFICANT CHANGE UP (ref 3.5–5)
POTASSIUM SERPL-SCNC: 4.2 MMOL/L — SIGNIFICANT CHANGE UP (ref 3.5–5)
PROT SERPL-MCNC: 6.2 G/DL — SIGNIFICANT CHANGE UP (ref 6–8)
PROT SERPL-MCNC: 6.4 G/DL — SIGNIFICANT CHANGE UP (ref 6–8)
PROTHROM AB SERPL-ACNC: 21.5 SEC — HIGH (ref 9.95–12.87)
RBC # BLD: 2.62 M/UL — LOW (ref 4.2–5.4)
RBC # BLD: 2.62 M/UL — LOW (ref 4.2–5.4)
RBC # FLD: 19.2 % — HIGH (ref 11.5–14.5)
RETICS #: 158.8 K/UL — HIGH (ref 25–125)
RETICS/RBC NFR: 6.1 % — HIGH (ref 0.5–1.5)
SODIUM SERPL-SCNC: 139 MMOL/L — SIGNIFICANT CHANGE UP (ref 135–146)
SODIUM SERPL-SCNC: 140 MMOL/L — SIGNIFICANT CHANGE UP (ref 135–146)
TYPE + AB SCN PNL BLD: SIGNIFICANT CHANGE UP
WBC # BLD: 6.13 K/UL — SIGNIFICANT CHANGE UP (ref 4.8–10.8)
WBC # FLD AUTO: 6.13 K/UL — SIGNIFICANT CHANGE UP (ref 4.8–10.8)

## 2018-03-16 RX ORDER — WARFARIN SODIUM 2.5 MG/1
5 TABLET ORAL ONCE
Qty: 0 | Refills: 0 | Status: COMPLETED | OUTPATIENT
Start: 2018-03-16 | End: 2018-03-16

## 2018-03-16 RX ORDER — FERROUS SULFATE 325(65) MG
325 TABLET ORAL DAILY
Qty: 0 | Refills: 0 | Status: DISCONTINUED | OUTPATIENT
Start: 2018-03-16 | End: 2018-03-24

## 2018-03-16 RX ORDER — FOLIC ACID 0.8 MG
1 TABLET ORAL DAILY
Qty: 0 | Refills: 0 | Status: DISCONTINUED | OUTPATIENT
Start: 2018-03-16 | End: 2018-03-24

## 2018-03-16 RX ORDER — ASPIRIN/CALCIUM CARB/MAGNESIUM 324 MG
81 TABLET ORAL DAILY
Qty: 0 | Refills: 0 | Status: DISCONTINUED | OUTPATIENT
Start: 2018-03-16 | End: 2018-03-16

## 2018-03-16 RX ORDER — METOPROLOL TARTRATE 50 MG
25 TABLET ORAL ONCE
Qty: 0 | Refills: 0 | Status: COMPLETED | OUTPATIENT
Start: 2018-03-16 | End: 2018-03-16

## 2018-03-16 RX ORDER — ATORVASTATIN CALCIUM 80 MG/1
40 TABLET, FILM COATED ORAL AT BEDTIME
Qty: 0 | Refills: 0 | Status: DISCONTINUED | OUTPATIENT
Start: 2018-03-16 | End: 2018-03-24

## 2018-03-16 RX ORDER — CEFTRIAXONE 500 MG/1
1 INJECTION, POWDER, FOR SOLUTION INTRAMUSCULAR; INTRAVENOUS EVERY 12 HOURS
Qty: 0 | Refills: 0 | Status: DISCONTINUED | OUTPATIENT
Start: 2018-03-16 | End: 2018-03-17

## 2018-03-16 RX ORDER — PANTOPRAZOLE SODIUM 20 MG/1
40 TABLET, DELAYED RELEASE ORAL
Qty: 0 | Refills: 0 | Status: DISCONTINUED | OUTPATIENT
Start: 2018-03-16 | End: 2018-03-24

## 2018-03-16 RX ORDER — RALOXIFENE HYDROCHLORIDE 60 MG/1
60 TABLET, COATED ORAL DAILY
Qty: 0 | Refills: 0 | Status: DISCONTINUED | OUTPATIENT
Start: 2018-03-16 | End: 2018-03-24

## 2018-03-16 RX ORDER — ATORVASTATIN CALCIUM 80 MG/1
10 TABLET, FILM COATED ORAL AT BEDTIME
Qty: 0 | Refills: 0 | Status: DISCONTINUED | OUTPATIENT
Start: 2018-03-16 | End: 2018-03-16

## 2018-03-16 RX ORDER — FUROSEMIDE 40 MG
20 TABLET ORAL DAILY
Qty: 0 | Refills: 0 | Status: DISCONTINUED | OUTPATIENT
Start: 2018-03-16 | End: 2018-03-24

## 2018-03-16 RX ORDER — METOPROLOL TARTRATE 50 MG
25 TABLET ORAL
Qty: 0 | Refills: 0 | Status: DISCONTINUED | OUTPATIENT
Start: 2018-03-16 | End: 2018-03-24

## 2018-03-16 RX ADMIN — WARFARIN SODIUM 5 MILLIGRAM(S): 2.5 TABLET ORAL at 22:51

## 2018-03-16 RX ADMIN — ATORVASTATIN CALCIUM 40 MILLIGRAM(S): 80 TABLET, FILM COATED ORAL at 23:02

## 2018-03-16 RX ADMIN — Medication 25 MILLIGRAM(S): at 22:51

## 2018-03-16 RX ADMIN — CEFTRIAXONE 100 GRAM(S): 500 INJECTION, POWDER, FOR SOLUTION INTRAMUSCULAR; INTRAVENOUS at 19:12

## 2018-03-16 NOTE — H&P ADULT - PMH
Anemia    Atrial fibrillation  on coumadin at home  Diabetes    Hypertension    Mitral valve replaced    Osteoporosis    Rheumatic fever

## 2018-03-16 NOTE — CONSULT NOTE ADULT - SUBJECTIVE AND OBJECTIVE BOX
Patient is a 77 year old Female sent in for admission by Dr. Alvarenga for low H/H, worsening hematuria & UTI. Patient w history of anemia (last PRBC transfusion 1 wk ago)     PAST MEDICAL & SURGICAL HISTORY: Anemia, Rheumatic fever, Diabetes, Osteoporosis, Atrial fibrillation: on coumadin at home, Hypertension, H/O mitral valve replacement    MEDS: atorvastatin 10 milliGRAM(s), cefTRIAXone   IVPB 1 Gram(s), ferrous    sulfate 325 milliGRAM(s), folic acid 1 milliGRAM(s), furosemide    Tablet 20 milliGRAM(s), metoprolol     tartrate 25 milliGRAM(s), pantoprazole    Tablet 40 milliGRAM(s), raloxifene 60 milliGRAM(s), warfarin 5 milliGRAM(s)    ALLERGIES: No Known Allergies    VS: T(F): 98.9, Max: 98.9 ( @ 20:35), HR: 85 (81 - 85), BP: 122/59 (122/59 - 132/61), RR: 18  GEN: Alert, awake, NAD  ABD/:    LABS/IMAGIN.4    6.13  )-----------( 221      ( 16 Mar 2018 18:06 )             21.1     139  |  101  |  32<H>  ----------------------------<  228<H>  4.2   |  28  |  1.2    Ca    8.2<L>      16 Mar 2018 18:06    TPro  6.4  /  Alb  3.8  /  TBili  2.9<H>  /  DBili  x   /  AST  54<H>  /  ALT  13  /  AlkPhos  95  03-16    PT/INR - ( 16 Mar 2018 19:00 )   PT: 21.50 sec;   INR: 1.96 ratio   PTT:29.8 sec      She had several episodes of gross hematuria and a right uteral stent placed for uncexplained narrowing or thickenening of the ureter, workup was negative for malignancy despite progressive weight loss. GI workup was negative. Heme workup showed low retic count and haptoglobin, elevated ferritin levels, elevated LDH but denied any fatigue or dizziness. She received 2 units pRBC last friday and she continued to have hematuria. Patient is a 77 year old Female sent in for admission by Dr. Alvarenga for low H/H, worsening hematuria & UTI. Patient w history of chronic anemia (last PRBC transfusion 1 wk ago).   Pt states she had intermittent gross hematuria for 1 year, w/u by  in [place], Dr. Pablo, 1.5 months ago-- per pt, cysto was neg, no urothelial tumors/masses but had "L ureteral obstruction" for which a L ureteral stent was placed 1.5 months ago. Unknown if hydronephrosis with STAR. She remained without hematuria since the stent had been placed until 2 days ago. Now endorses persistent hematuria, blood tinged to bright red without clots. +dysuria 1 wk ago, now resolved. Denies suprapubic fullness/pressure, incomplete emptying, PV fullness, abd/flank pain, fever/chills, N/V.        PAST MEDICAL & SURGICAL HISTORY: Anemia, Rheumatic fever, Diabetes, Osteoporosis, Atrial fibrillation: on coumadin at home, Hypertension, H/O mitral valve replacement    MEDS: atorvastatin 10 milliGRAM(s), cefTRIAXone   IVPB 1 Gram(s), ferrous    sulfate 325 milliGRAM(s), folic acid 1 milliGRAM(s), furosemide    Tablet 20 milliGRAM(s), metoprolol     tartrate 25 milliGRAM(s), pantoprazole    Tablet 40 milliGRAM(s), raloxifene 60 milliGRAM(s), warfarin 5 milliGRAM(s)    ALLERGIES: No Known Allergies    VS: T(F): 98.9, Max: 98.9 (03-16 @ 20:35), HR: 85 (81 - 85), BP: 122/59 (122/59 - 132/61), RR: 18  GEN: Alert, awake, NAD  ABD/:    LABS/IMAGIN.4    6.13  )-----------( 221      ( 16 Mar 2018 18:06 )             21.1     139  |  101  |  32<H>  ----------------------------<  228<H>  4.2   |  28  |  1.2    Ca    8.2<L>      16 Mar 2018 18:06    TPro  6.4  /  Alb  3.8  /  TBili  2.9<H>  /  DBili  x   /  AST  54<H>  /  ALT  13  /  AlkPhos  95  03-16    PT/INR - ( 16 Mar 2018 19:00 )   PT: 21.50 sec;   INR: 1.96 ratio   PTT:29.8 sec Patient is a 77 year old Female sent in for admission by Dr. Alvarenga for low H/H, worsening hematuria & UTI. Patient w history of chronic anemia (last PRBC transfusion 1 wk ago).   Pt states she had intermittent gross hematuria for 1 year, w/u by  in [place], Dr. Pablo, 1.5 months ago-- per pt, cysto was neg, no urothelial tumors/masses but had "L ureteral obstruction" for which a L ureteral stent was placed 1.5 months ago. Unknown if hydronephrosis with STAR. She remained without hematuria since the stent had been placed until 2 days ago. Now endorses persistent hematuria, blood tinged to bright red without clots. +dysuria 1 wk ago, now resolved. Denies suprapubic fullness/pressure, incomplete emptying, PV fullness, abd/flank pain, fever/chills, N/V.        PAST MEDICAL & SURGICAL HISTORY: Anemia, Rheumatic fever, Diabetes, Osteoporosis, Atrial fibrillation: on coumadin at home, Hypertension, H/O mitral valve replacement    MEDS: atorvastatin 10 milliGRAM(s), cefTRIAXone   IVPB 1 Gram(s), ferrous    sulfate 325 milliGRAM(s), folic acid 1 milliGRAM(s), furosemide    Tablet 20 milliGRAM(s), metoprolol     tartrate 25 milliGRAM(s), pantoprazole    Tablet 40 milliGRAM(s), raloxifene 60 milliGRAM(s), warfarin 5 milliGRAM(s)    ALLERGIES: No Known Allergies    Denies hx tobacco    VS: T(F): 98.9, Max: 98.9 (03-16 @ 20:35), HR: 85 (81 - 85), BP: 122/59 (122/59 - 132/61), RR: 18  GEN: Alert, awake, NAD  ABD/: soft, NT/ND, non-palpable bladder, no SP tenderness, no CVAT b/l    LABS/IMAGIN.4    6.13  )-----------( 221      ( 16 Mar 2018 18:06 )             21.1     139  |  101  |  32<H>  ----------------------------<  228<H>  4.2   |  28  |  1.2    Ca    8.2<L>      16 Mar 2018 18:06    TPro  6.4  /  Alb  3.8  /  TBili  2.9<H>  /  DBili  x   /  AST  54<H>  /  ALT  13  /  AlkPhos  95  03-16    PT/INR - ( 16 Mar 2018 19:00 )   PT: 21.50 sec;   INR: 1.96 ratio   PTT:29.8 sec

## 2018-03-16 NOTE — INPATIENT CERTIFICATION FOR MEDICARE PATIENTS - RISKS OF ADVERSE EVENTS
Concern for worsening infectious process worsening anemia/Concern for worsening infectious process/Other:

## 2018-03-16 NOTE — H&P ADULT - HISTORY OF PRESENT ILLNESS
77 year old female with chronic hypoproliferative anemia, no response to EPO and iron, transfusion dependent for 6-8 months. 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 She had several episodes of gross hematuria and a right uteral stent placed for uncexplained narrowing or thickenening of the ureter, workup was negative for malignancy despite progressive weight loss. GI workup was negative. Heme workup showed low retic count and haptoglobin, elevated ferritin levels, elevated LDH but denied any fatigue or dizziness. She received 2 units pRBC last friday and she continued to have hematuria. She presented to the hospital for hematuria without any other urinary symtpoms.

## 2018-03-16 NOTE — H&P ADULT - PROBLEM SELECTOR PLAN 1
Chronic  Extensive out pt work up- low haptoglobin, elevated LDH, low ret ct  No apparent response to iron and EPO  Recent transfusion 1 week ago given h/o hematuria  Plan for BM biopsy as outpt  Follow with hematology regarding further recs  f/u CBC, type and screen

## 2018-03-16 NOTE — H&P ADULT - ASSESSMENT
76 yo F with chronic, hypoproliferative anemia with no response to iron or EPO presented to the hospital as a direct admit under Dr. Alvarenga for hematuria and low hemoglobin. Pt received 2 units pRBCs last friday and continued to have hematuria, low hemoglobin, had a positive UTI outpatient.

## 2018-03-16 NOTE — H&P ADULT - NSHPPHYSICALEXAM_GEN_ALL_CORE
PHYSICAL EXAM:  GENERAL: NAD, speaks in full sentences, no signs of respiratory distress  CHEST/LUNG: Clear to auscultation bilaterally; No wheeze; No crackles; No accessory muscles used  HEART: Regular rate and rhythm; No murmurs;   ABDOMEN: Soft, Nontender, Nondistended; Bowel sounds present; No guarding  EXTREMITIES:  2+ Peripheral Pulses, No cyanosis or edema  PSYCH: AAOx3  NEUROLOGY: non-focal

## 2018-03-16 NOTE — H&P ADULT - NSHPREVIEWOFSYSTEMS_GEN_ALL_CORE
REVIEW OF SYSTEMS:    CONSTITUTIONAL: No weakness, fevers or chills  RESPIRATORY: No cough, wheezing, hemoptysis; No shortness of breath  CARDIOVASCULAR: No chest pain or palpitations  GASTROINTESTINAL: No abdominal or epigastric pain. No nausea, vomiting, or hematemesis; No diarrhea or constipation. No melena or hematochezia.  GENITOURINARY: No dysuria, frequency. c/o hematuria  NEUROLOGICAL: No numbness or weakness

## 2018-03-16 NOTE — H&P ADULT - ATTENDING COMMENTS
No required admission in the context of hematuria, lasting 2 days. Additionally she was noted to have elevated bilirubin in blood and urine, high ldh and retic, and worsening anemia, rasing suspicion for delayed transfusion reaction. She has mild hemolysis at baseline 2/2 metallic valve in mitral position, current work up reveals a much greater degree of hemolysis.   Continue on Coumadin, consider cardiology consult, worsening hemolysis maybe related to valve malfunction as well.  Start on IV antibiotics for UTI, given urethral stent, ID consult.   Continue coumadin for now, hold ASA.   Transfuse slowly if needed.  She will require observation over the weekend for management of worsening anemia.

## 2018-03-16 NOTE — H&P ADULT - PROBLEM SELECTOR PLAN 2
Positive UA out pt 3/15/18  f/u U cx  c/w iv rocephin  Once BMP resulted. will order CT abdomen to r/o pyelonephritis

## 2018-03-16 NOTE — CONSULT NOTE ADULT - ASSESSMENT
77 year old Female with   ·	Acute blood loss anemia with hx chronic anemia  ·	Gross hematuria x 1 yr (recent outpt w/u neg per pt) - current worsening hematuria may be 2* UTI & stent irritation  ·	L ureteral stent in place    PLAN:  1.	F/u outpt urine cx & treat appropriately  2.	CT A/P to check stent position  3.	Monitor H/H. Transfuse prn. 77 year old Female with   ·	Acute blood loss anemia with hx chronic anemia  ·	Gross hematuria x 1 yr (recent outpt w/u neg per pt) - current worsening hematuria may be 2* UTI & stent irritation w AC  ·	L ureteral stent in place    PLAN:  1.	F/u outpt urine cx & treat appropriately  2.	CT A/P to check stent position  3.	Monitor H/H. Transfuse prn.

## 2018-03-17 LAB
APPEARANCE UR: (no result)
APTT BLD: 29 SEC — SIGNIFICANT CHANGE UP (ref 27–39.2)
BACTERIA # UR AUTO: (no result) /HPF
BILIRUB UR-MCNC: (no result)
COLOR SPEC: (no result)
DIFF PNL FLD: (no result)
EPI CELLS # UR: (no result) /HPF
GLUCOSE UR QL: NEGATIVE MG/DL — SIGNIFICANT CHANGE UP
HCT VFR BLD CALC: 22.7 % — LOW (ref 37–47)
HGB BLD-MCNC: 7.1 G/DL — CRITICAL LOW (ref 12–16)
INR BLD: 1.84 RATIO — HIGH (ref 0.65–1.3)
KETONES UR-MCNC: 15
LDH SERPL L TO P-CCNC: >1000 — HIGH (ref 50–242)
LEUKOCYTE ESTERASE UR-ACNC: (no result)
MCHC RBC-ENTMCNC: 25.1 PG — LOW (ref 27–31)
MCHC RBC-ENTMCNC: 31.3 G/DL — LOW (ref 32–37)
MCV RBC AUTO: 80.2 FL — LOW (ref 81–99)
NITRITE UR-MCNC: POSITIVE
NRBC # BLD: 0 /100 WBCS — SIGNIFICANT CHANGE UP (ref 0–0)
PH UR: 5.5 — SIGNIFICANT CHANGE UP (ref 5–8)
PLATELET # BLD AUTO: 219 K/UL — SIGNIFICANT CHANGE UP (ref 130–400)
PROT UR-MCNC: >=300 MG/DL
PROTHROM AB SERPL-ACNC: 20.1 SEC — HIGH (ref 9.95–12.87)
RBC # BLD: 2.83 M/UL — LOW (ref 4.2–5.4)
RBC # BLD: 2.83 M/UL — LOW (ref 4.2–5.4)
RBC # FLD: 18.3 % — HIGH (ref 11.5–14.5)
RBC CASTS # UR COMP ASSIST: >50 /HPF
RETICS #: 172.6 K/UL — HIGH (ref 25–125)
RETICS/RBC NFR: 6.1 % — HIGH (ref 0.5–1.5)
SP GR SPEC: 1.01 — SIGNIFICANT CHANGE UP (ref 1.01–1.03)
UROBILINOGEN FLD QL: 1 MG/DL (ref 0.2–0.2)
WBC # BLD: 5.74 K/UL — SIGNIFICANT CHANGE UP (ref 4.8–10.8)
WBC # FLD AUTO: 5.74 K/UL — SIGNIFICANT CHANGE UP (ref 4.8–10.8)
WBC UR QL: (no result) /HPF

## 2018-03-17 RX ORDER — INSULIN LISPRO 100/ML
4 VIAL (ML) SUBCUTANEOUS
Qty: 0 | Refills: 0 | Status: DISCONTINUED | OUTPATIENT
Start: 2018-03-17 | End: 2018-03-24

## 2018-03-17 RX ORDER — CEFTRIAXONE 500 MG/1
1 INJECTION, POWDER, FOR SOLUTION INTRAMUSCULAR; INTRAVENOUS EVERY 24 HOURS
Qty: 0 | Refills: 0 | Status: DISCONTINUED | OUTPATIENT
Start: 2018-03-17 | End: 2018-03-24

## 2018-03-17 RX ORDER — WARFARIN SODIUM 2.5 MG/1
5 TABLET ORAL ONCE
Qty: 0 | Refills: 0 | Status: COMPLETED | OUTPATIENT
Start: 2018-03-17 | End: 2018-03-17

## 2018-03-17 RX ORDER — DEXTROSE 50 % IN WATER 50 %
25 SYRINGE (ML) INTRAVENOUS ONCE
Qty: 0 | Refills: 0 | Status: DISCONTINUED | OUTPATIENT
Start: 2018-03-17 | End: 2018-03-24

## 2018-03-17 RX ORDER — SODIUM CHLORIDE 9 MG/ML
1000 INJECTION, SOLUTION INTRAVENOUS
Qty: 0 | Refills: 0 | Status: DISCONTINUED | OUTPATIENT
Start: 2018-03-17 | End: 2018-03-24

## 2018-03-17 RX ORDER — INSULIN GLARGINE 100 [IU]/ML
12 INJECTION, SOLUTION SUBCUTANEOUS AT BEDTIME
Qty: 0 | Refills: 0 | Status: DISCONTINUED | OUTPATIENT
Start: 2018-03-17 | End: 2018-03-24

## 2018-03-17 RX ORDER — DEXTROSE 50 % IN WATER 50 %
12.5 SYRINGE (ML) INTRAVENOUS ONCE
Qty: 0 | Refills: 0 | Status: DISCONTINUED | OUTPATIENT
Start: 2018-03-17 | End: 2018-03-24

## 2018-03-17 RX ORDER — GLUCAGON INJECTION, SOLUTION 0.5 MG/.1ML
1 INJECTION, SOLUTION SUBCUTANEOUS ONCE
Qty: 0 | Refills: 0 | Status: DISCONTINUED | OUTPATIENT
Start: 2018-03-17 | End: 2018-03-24

## 2018-03-17 RX ORDER — DEXTROSE 50 % IN WATER 50 %
1 SYRINGE (ML) INTRAVENOUS ONCE
Qty: 0 | Refills: 0 | Status: DISCONTINUED | OUTPATIENT
Start: 2018-03-17 | End: 2018-03-24

## 2018-03-17 RX ORDER — INSULIN LISPRO 100/ML
VIAL (ML) SUBCUTANEOUS AT BEDTIME
Qty: 0 | Refills: 0 | Status: DISCONTINUED | OUTPATIENT
Start: 2018-03-17 | End: 2018-03-24

## 2018-03-17 RX ADMIN — Medication 1 MILLIGRAM(S): at 11:17

## 2018-03-17 RX ADMIN — Medication 25 MILLIGRAM(S): at 17:08

## 2018-03-17 RX ADMIN — CEFTRIAXONE 100 GRAM(S): 500 INJECTION, POWDER, FOR SOLUTION INTRAMUSCULAR; INTRAVENOUS at 12:38

## 2018-03-17 RX ADMIN — RALOXIFENE HYDROCHLORIDE 60 MILLIGRAM(S): 60 TABLET, COATED ORAL at 11:17

## 2018-03-17 RX ADMIN — CEFTRIAXONE 100 GRAM(S): 500 INJECTION, POWDER, FOR SOLUTION INTRAMUSCULAR; INTRAVENOUS at 06:08

## 2018-03-17 RX ADMIN — Medication 325 MILLIGRAM(S): at 11:17

## 2018-03-17 RX ADMIN — INSULIN GLARGINE 12 UNIT(S): 100 INJECTION, SOLUTION SUBCUTANEOUS at 21:42

## 2018-03-17 RX ADMIN — WARFARIN SODIUM 5 MILLIGRAM(S): 2.5 TABLET ORAL at 21:42

## 2018-03-17 RX ADMIN — PANTOPRAZOLE SODIUM 40 MILLIGRAM(S): 20 TABLET, DELAYED RELEASE ORAL at 07:46

## 2018-03-17 RX ADMIN — Medication 25 MILLIGRAM(S): at 05:26

## 2018-03-17 RX ADMIN — ATORVASTATIN CALCIUM 40 MILLIGRAM(S): 80 TABLET, FILM COATED ORAL at 21:43

## 2018-03-17 RX ADMIN — Medication 20 MILLIGRAM(S): at 05:26

## 2018-03-17 NOTE — PROGRESS NOTE ADULT - ASSESSMENT
Hemolytic Anemia:  s/p 1 unit slow transfusion given last night  LDH 1000, retic 6  Recent transfusion 1 week ago given h/o hematuria- possible delayed hemolytic reaction   Transfusion reaction lab work sent  Plan for BM biopsy as outpt  Monitor CBC and hemolysis panel daily   Cardio evaluation to determine if mechanical valve is cause of hemolytic anemia  2D echo ordered     Hematuria:   s/p ureteral stent placement   Uro c/s Dr ugarte- Flu outpt UCx results, CT A/P to evaluate stent placement  Hold ASA, must continue coumadin at this time for valves    c/w rocephin for possible UTI     Mechanical Valves:  2D echo pending   Cardiology to evaluate valves possible cause of hemolysis   continue with coumadin  check INR daily

## 2018-03-17 NOTE — CONSULT NOTE ADULT - SUBJECTIVE AND OBJECTIVE BOX
AUSTIN DAVIS 77yFemalePatient is a 77y old  Female who presents with a chief complaint of bleeding when urinating (16 Mar 2018 19:01)      Patient has history of:  No Known Allergies      Adult/Nursing Home residence    ACUTE LEUKOCYTOSIS  ^ACUTE LEUKOCYTOSIS  No h/o HF  Unknown h/o HF  No pertinent family history in first degree relatives  Anemia  Rheumatic fever  Diabetes  Osteoporosis  Mitral valve replaced  Atrial fibrillation  Hypertension  Hypertension  Mitral valve replaced  Diabetes  Anemia  H/O mitral valve replacement  Hematuria        Patient treated with:  cefTRIAXone   IVPB 1 Gram(s) IV Intermittent every 12 hours        PHYSICAL EXAM  T(F): 97.7 (18 @ 04:29), Max: 98.9 (18 @ 20:35)  HR: 78 (18 @ 04:29) (78 - 85)  BP: 110/54 (18 @ 04:29) (110/53 - 132/61)  RR: 18 (18 @ 04:29) (18 - 18)  SpO2: 97% (18 @ 03:00) (97% - 97%)  Daily     Daily   HEENT: normal, no nuchal rigidity  Cor: RSR Nl S1 S2  Lungs: clear      Abdomen: Nontender, Nl BS,     Ext: No clubbing,cyanosis or edema    LAB & RADIOLOGIC RESULTS:                        6.4    6.13  )-----------( 221      ( 16 Mar 2018 18:06 )             21.1             140  |  100  |  33<H>  ----------------------------<  125<H>  4.1   |  28  |  1.2      TPro  6.2  /  Alb  3.7  /  TBili  2.5<H>  /  DBili  x   /  AST  53<H>  /  ALT  13  /  AlkPhos  94             Creatinine, Serum: 1.2 mg/dL (18 @ 20:55)  eGFR if Non African American: 44 mL/min/1.73M2 (18 @ 20:55)  eGFR if : 51 mL/min/1.73M2 (18 @ 20:55)  Creatinine, Serum: 1.2 mg/dL (18 @ 18:06)  eGFR if Non African American: 44 mL/min/1.73M2 (18 @ 18:06)  eGFR if : 51 mL/min/1.73M2 (18 @ 18:06)        Urinalysis Basic - ( 17 Mar 2018 02:58 )    Color: Red / Appearance: Turbid / S.015 / pH: x  Gluc: x / Ketone: 15  / Bili: Large / Urobili: 1.0 mg/dL   Blood: x / Protein: >=300 mg/dL / Nitrite: Positive   Leuk Esterase: Moderate / RBC: >50 /HPF / WBC 10-25 /HPF   Sq Epi: x / Non Sq Epi: Few /HPF / Bacteria: Moderate /HPF      PT/INR - ( 16 Mar 2018 19:00 )   PT: 21.50 sec;   INR: 1.96 ratio         PTT - ( 16 Mar 2018 19:00 )  PTT:29.8 sec        Cxray: AUSTIN DAVIS 77yFemalePatient is a 77y old  Female who presents with a chief complaint of bleeding when urinating (16 Mar 2018 19:01)      Patient has history of:  No Known Allergies      ACUTE LEUKOCYTOSIS  ^ACUTE LEUKOCYTOSIS  No h/o HF  Unknown h/o HF  No pertinent family history in first degree relatives  Anemia  Rheumatic fever  Diabetes  Osteoporosis  Mitral valve replaced  Atrial fibrillation  Hypertension  Hypertension  Mitral valve replaced  Diabetes  Anemia  H/O mitral valve replacement  Hematuria        Patient treated with:  cefTRIAXone   IVPB 1 Gram(s) IV Intermittent every 12 hours        PHYSICAL EXAM  T(F): 97.7 (18 @ 04:29), Max: 98.9 (18 @ 20:35)  HR: 78 (18 @ 04:29) (78 - 85)  BP: 110/54 (18 @ 04:29) (110/53 - 132/61)  RR: 18 (18 @ 04:29) (18 - 18)  SpO2: 97% (18 @ 03:00) (97% - 97%)  Daily     Daily   HEENT: normal, no nuchal rigidity  Cor: RSR Nl S1 S2  Lungs: clear      Abdomen: Nontender, Nl BS,     Ext: No clubbing,cyanosis or edema    LAB & RADIOLOGIC RESULTS:                        6.4    6.13  )-----------( 221      ( 16 Mar 2018 18:06 )             21.1         -    140  |  100  |  33<H>  ----------------------------<  125<H>  4.1   |  28  |  1.2      TPro  6.2  /  Alb  3.7  /  TBili  2.5<H>  /  DBili  x   /  AST  53<H>  /  ALT  13  /  AlkPhos  94             Creatinine, Serum: 1.2 mg/dL (18 @ 20:55)  eGFR if Non African American: 44 mL/min/1.73M2 (18 @ 20:55)  eGFR if : 51 mL/min/1.73M2 (18 @ 20:55)  Creatinine, Serum: 1.2 mg/dL (18 @ 18:06)  eGFR if Non African American: 44 mL/min/1.73M2 (18 @ 18:06)  eGFR if : 51 mL/min/1.73M2 (18 @ 18:06)        Urinalysis Basic - ( 17 Mar 2018 02:58 )    Color: Red / Appearance: Turbid / S.015 / pH: x  Gluc: x / Ketone: 15  / Bili: Large / Urobili: 1.0 mg/dL   Blood: x / Protein: >=300 mg/dL / Nitrite: Positive   Leuk Esterase: Moderate / RBC: >50 /HPF / WBC 10-25 /HPF   Sq Epi: x / Non Sq Epi: Few /HPF / Bacteria: Moderate /HPF      PT/INR - ( 16 Mar 2018 19:00 )   PT: 21.50 sec;   INR: 1.96 ratio         PTT - ( 16 Mar 2018 19:00 )  PTT:29.8 sec

## 2018-03-17 NOTE — PROGRESS NOTE ADULT - SUBJECTIVE AND OBJECTIVE BOX
77 year old female with chronic hypoproliferative anemia, no response to EPO and iron, transfusion dependent for 6-8 months. 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 She had several episodes of gross hematuria and a right uteral stent placed for unexplained narrowing or thickening of the ureter, workup was negative for malignancy despite progressive weight loss. GI workup was negative. Heme workup showed low retic count and haptoglobin, elevated ferritin levels, elevated LDH but denied any fatigue or dizziness. She received 2 units pRBC last friday and she continued to have hematuria and evidence of hemolytic anemia.     MEDICATIONS  (STANDING):  atorvastatin 40 milliGRAM(s) Oral at bedtime  cefTRIAXone   IVPB 1 Gram(s) IV Intermittent every 12 hours  ferrous    sulfate 325 milliGRAM(s) Oral daily  folic acid 1 milliGRAM(s) Oral daily  furosemide    Tablet 20 milliGRAM(s) Oral daily  metoprolol     tartrate 25 milliGRAM(s) Oral two times a day  pantoprazole    Tablet 40 milliGRAM(s) Oral before breakfast  raloxifene 60 milliGRAM(s) Oral daily    MEDICATIONS  (PRN):      Allergies    No Known Allergies    Intolerances        Vital Signs Last 24 Hrs  T(C): 36.5 (17 Mar 2018 04:29), Max: 37.2 (16 Mar 2018 20:35)  T(F): 97.7 (17 Mar 2018 04:29), Max: 98.9 (16 Mar 2018 20:35)  HR: 78 (17 Mar 2018 04:29) (78 - 85)  BP: 110/54 (17 Mar 2018 04:29) (110/53 - 132/61)  BP(mean): --  RR: 18 (17 Mar 2018 04:29) (18 - 18)  SpO2: 97% (17 Mar 2018 03:00) (97% - 97%)    PHYSICAL EXAM  General: adult in NAD  CV: normal S1/S2 with no murmur rubs or gallops  Lungs: positive air movement b/l ant lungs,clear to auscultation, no wheezes, no rales  Abdomen: soft non-tender non-distended, no hepatosplenomegaly  Ext: no clubbing cyanosis or edema  Neuro: alert and oriented X 4, no focal deficits    LABS:                          6.4    6.13  )-----------( 221      ( 16 Mar 2018 18:06 )             21.1         Mean Cell Volume : 80.5 fL  Mean Cell Hemoglobin : 24.4 pg  Mean Cell Hemoglobin Concentration : 30.3 g/dL  Auto Neutrophil # : x  Auto Lymphocyte # : x  Auto Monocyte # : x  Auto Eosinophil # : x  Auto Basophil # : x  Auto Neutrophil % : x  Auto Lymphocyte % : x  Auto Monocyte % : x  Auto Eosinophil % : x  Auto Basophil % : x    Serial CBC's  03-16 @ 18:06  Hct-21.1 / Hgb-6.4 / Plat-221 / RBC-2.62 / WBC-6.13            03-16    140  |  100  |  33<H>  ----------------------------<  125<H>  4.1   |  28  |  1.2    Ca    8.2<L>      16 Mar 2018 20:55    TPro  6.2  /  Alb  3.7  /  TBili  2.5<H>  /  DBili  x   /  AST  53<H>  /  ALT  13  /  AlkPhos  94  03-16      PT/INR - ( 16 Mar 2018 19:00 )   PT: 21.50 sec;   INR: 1.96 ratio         PTT - ( 16 Mar 2018 19:00 )  PTT:29.8 sec    Lactate Dehydrogenase, Serum: >1000 (03.16.18 @ 18:06)    Reticulocyte Count in AM (03.16.18 @ 18:06)    RBC Count: 2.62 M/uL    Reticulocyte Percent: 6.1 %    Absolute Reticulocytes: 158.8 K/uL          Hemoglobin: 6.4 g/dL (03-16-18 @ 18:06)  WBC Count: 6.13 K/uL (03-16-18 @ 18:06)  Hematocrit: 21.1 % (03-16-18 @ 18:06)  Platelet Count - Automated: 221 K/uL (03-16-18 @ 18:06)  Reticulocyte Percent: 6.1 % (03-16-18 @ 18:06)    Urine Microscopic-Add On (NC) (03.17.18 @ 02:58)    Bacteria: Moderate /HPF    Epithelial Cells: Few /HPF    Red Blood Cell - Urine: >50 /HPF    White Blood Cell - Urine: 10-25 /HPF    Urinalysis (03.17.18 @ 02:58)    pH Urine: 5.5    Glucose Qualitative, Urine: Negative mg/dL    Blood, Urine: Large    Color: Red    Urine Appearance: Turbid    Bilirubin: Large    Ketone - Urine: 15    Specific Gravity: 1.015    Protein, Urine: >=300 mg/dL    Urobilinogen: 1.0 mg/dL    Nitrite: Positive    Leukocyte Esterase Concentration: Moderate 77 year old female with chronic hypoproliferative anemia, no response to EPO and iron, transfusion dependent for 6-8 months. 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 She had several episodes of gross hematuria and a right uteral stent placed for unexplained narrowing or thickening of the ureter, workup was negative for malignancy despite progressive weight loss. GI workup was negative. Heme workup showed low retic count and haptoglobin, elevated ferritin levels, elevated LDH but denied any fatigue or dizziness. She received 2 units pRBC last friday and she continued to have hematuria and evidence of hemolytic anemia. Patient feels well, states she continues to have some blood in her urine but seems mildly improved after start of antibiotics     MEDICATIONS  (STANDING):  atorvastatin 40 milliGRAM(s) Oral at bedtime  cefTRIAXone   IVPB 1 Gram(s) IV Intermittent every 12 hours  ferrous    sulfate 325 milliGRAM(s) Oral daily  folic acid 1 milliGRAM(s) Oral daily  furosemide    Tablet 20 milliGRAM(s) Oral daily  metoprolol     tartrate 25 milliGRAM(s) Oral two times a day  pantoprazole    Tablet 40 milliGRAM(s) Oral before breakfast  raloxifene 60 milliGRAM(s) Oral daily    MEDICATIONS  (PRN):      Allergies    No Known Allergies    Intolerances        Vital Signs Last 24 Hrs  T(C): 36.5 (17 Mar 2018 04:29), Max: 37.2 (16 Mar 2018 20:35)  T(F): 97.7 (17 Mar 2018 04:29), Max: 98.9 (16 Mar 2018 20:35)  HR: 78 (17 Mar 2018 04:29) (78 - 85)  BP: 110/54 (17 Mar 2018 04:29) (110/53 - 132/61)  BP(mean): --  RR: 18 (17 Mar 2018 04:29) (18 - 18)  SpO2: 97% (17 Mar 2018 03:00) (97% - 97%)    PHYSICAL EXAM  General: adult in NAD  CV: normal S1/S2 with no murmur rubs or gallops  Lungs: positive air movement b/l ant lungs,clear to auscultation, no wheezes, no rales  Abdomen: soft non-tender non-distended, no hepatosplenomegaly  Ext: no clubbing cyanosis or edema  Neuro: alert and oriented X 4, no focal deficits    LABS:                          6.4    6.13  )-----------( 221      ( 16 Mar 2018 18:06 )             21.1         Mean Cell Volume : 80.5 fL  Mean Cell Hemoglobin : 24.4 pg  Mean Cell Hemoglobin Concentration : 30.3 g/dL  Auto Neutrophil # : x  Auto Lymphocyte # : x  Auto Monocyte # : x  Auto Eosinophil # : x  Auto Basophil # : x  Auto Neutrophil % : x  Auto Lymphocyte % : x  Auto Monocyte % : x  Auto Eosinophil % : x  Auto Basophil % : x    Serial CBC's  03-16 @ 18:06  Hct-21.1 / Hgb-6.4 / Plat-221 / RBC-2.62 / WBC-6.13            03-16    140  |  100  |  33<H>  ----------------------------<  125<H>  4.1   |  28  |  1.2    Ca    8.2<L>      16 Mar 2018 20:55    TPro  6.2  /  Alb  3.7  /  TBili  2.5<H>  /  DBili  x   /  AST  53<H>  /  ALT  13  /  AlkPhos  94  03-16      PT/INR - ( 16 Mar 2018 19:00 )   PT: 21.50 sec;   INR: 1.96 ratio         PTT - ( 16 Mar 2018 19:00 )  PTT:29.8 sec    Lactate Dehydrogenase, Serum: >1000 (03.16.18 @ 18:06)    Reticulocyte Count in AM (03.16.18 @ 18:06)    RBC Count: 2.62 M/uL    Reticulocyte Percent: 6.1 %    Absolute Reticulocytes: 158.8 K/uL          Hemoglobin: 6.4 g/dL (03-16-18 @ 18:06)  WBC Count: 6.13 K/uL (03-16-18 @ 18:06)  Hematocrit: 21.1 % (03-16-18 @ 18:06)  Platelet Count - Automated: 221 K/uL (03-16-18 @ 18:06)  Reticulocyte Percent: 6.1 % (03-16-18 @ 18:06)    Urine Microscopic-Add On (NC) (03.17.18 @ 02:58)    Bacteria: Moderate /HPF    Epithelial Cells: Few /HPF    Red Blood Cell - Urine: >50 /HPF    White Blood Cell - Urine: 10-25 /HPF    Urinalysis (03.17.18 @ 02:58)    pH Urine: 5.5    Glucose Qualitative, Urine: Negative mg/dL    Blood, Urine: Large    Color: Red    Urine Appearance: Turbid    Bilirubin: Large    Ketone - Urine: 15    Specific Gravity: 1.015    Protein, Urine: >=300 mg/dL    Urobilinogen: 1.0 mg/dL    Nitrite: Positive    Leukocyte Esterase Concentration: Moderate

## 2018-03-17 NOTE — CONSULT NOTE ADULT - ASSESSMENT
78 y/o F was admitted for anemia and hematuria    A/P    1.	Valvular heart disease  2.	s/p  Mechanical Mitral and Aortic prosthetic valves  3.	with possible Valvular malfunction  4.	Atrial Fibrillation  5.	Mitral stenosis   6.	Severe AI  7.	HTN    Recommendations  ·	EKG  ·	Continue BB  ·	Keep AC with coumadin with target INR 2.5-3.5  ·	Will arrange for mechanical valves evaluation under fluoroscopy by Monday  ·	Hematology follow up 78 y/o F was admitted for anemia and hematuria    A/P    1.	Valvular heart disease  2.	s/p  Mechanical Mitral and Aortic prosthetic valves  3.	Atrial Fibrillation  4.	Mitral stenosis   5.	Severe AI  6.	HTN    Recommendations  ·	EKG  ·	Continue treatment  ·	Keep AC with coumadin with target INR 2.5-3.5  ·	Mechanical valves evaluation with [facility] and fluoroscopy (will discuss with patient's cardiologist in [place])  ·	Hematology follow up

## 2018-03-17 NOTE — CONSULT NOTE ADULT - SUBJECTIVE AND OBJECTIVE BOX
Cardiology Consultation    CHIEF COMPLAINT: Patient is a 77y old  Female who presents with a chief complaint of bleeding when urinating (16 Mar 2018 19:01)      HPI:  77 year old female with chronic hypoproliferative anemia, no response to EPO and iron, transfusion dependent for 6-8 months. 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 She had several episodes of gross hematuria and a right uteral stent placed for uncexplained narrowing or thickenening of the ureter, workup was negative for malignancy despite progressive weight loss. Patient denied any active chest pain, SOB or palpitation.      PAST MEDICAL & SURGICAL HISTORY:  Anemia  Rheumatic fever  Diabetes  Osteoporosis  Mitral valve replaced  Atrial fibrillation: on coumadin at home  Hypertension  H/O mitral valve replacement      SOCIAL HISTORY: Non smoker, no etoh or drug abuse    FAMILY HISTORY: FAMILY HISTORY:  No pertinent family history in first degree relatives      Home Medications:  Aspir 81 oral delayed release tablet: 1 tab(s) orally once a day (16 Mar 2018 18:16)  Coumadin 5 mg oral tablet: 1 tab(s) orally once a day (16 Mar 2018 18:16)  Evista 60 mg oral tablet: 1 tab(s) orally once a day (16 Mar 2018 18:16)  ferrous sulfate 324 mg (65 mg elemental iron) oral tablet: orally once a day (16 Mar 2018 18:16)  folic acid 1 mg oral tablet: 1 tab(s) orally once a day (16 Mar 2018 18:16)  furosemide 20 mg oral tablet: 1 tab(s) orally once a day (16 Mar 2018 18:16)  glimepiride 4 mg oral tablet: 1 tab(s) orally once a day (16 Mar 2018 18:16)  metFORMIN 500 mg oral tablet, extended release: 1 tab(s) orally once a day (16 Mar 2018 18:16)  Metoprolol Tartrate 25 mg oral tablet: 1 tab(s) orally 2 times a day (16 Mar 2018 18:16)  pantoprazole 40 mg oral delayed release tablet: 1 tab(s) orally once a day (16 Mar 2018 18:16)  pravastatin 40 mg oral tablet: 1 tab(s) orally once a day (16 Mar 2018 18:16)  ramipril 10 mg oral capsule: 1 cap(s) orally once a day (16 Mar 2018 18:16)      MEDICATIONS  (STANDING):  atorvastatin 40 milliGRAM(s) Oral at bedtime  cefTRIAXone   IVPB 1 Gram(s) IV Intermittent every 24 hours  dextrose 5%. 1000 milliLiter(s) (50 mL/Hr) IV Continuous <Continuous>  dextrose 50% Injectable 12.5 Gram(s) IV Push once  dextrose 50% Injectable 25 Gram(s) IV Push once  dextrose 50% Injectable 25 Gram(s) IV Push once  ferrous    sulfate 325 milliGRAM(s) Oral daily  folic acid 1 milliGRAM(s) Oral daily  furosemide    Tablet 20 milliGRAM(s) Oral daily  insulin glargine Injectable (LANTUS) 12 Unit(s) SubCutaneous at bedtime  insulin lispro (HumaLOG) corrective regimen sliding scale   SubCutaneous at bedtime  insulin lispro Injectable (HumaLOG) 4 Unit(s) SubCutaneous before breakfast  insulin lispro Injectable (HumaLOG) 4 Unit(s) SubCutaneous before lunch  insulin lispro Injectable (HumaLOG) 4 Unit(s) SubCutaneous before dinner  metoprolol     tartrate 25 milliGRAM(s) Oral two times a day  pantoprazole    Tablet 40 milliGRAM(s) Oral before breakfast  raloxifene 60 milliGRAM(s) Oral daily  warfarin 5 milliGRAM(s) Oral once    MEDICATIONS  (PRN):  dextrose Gel 1 Dose(s) Oral once PRN Blood Glucose LESS THAN 70 milliGRAM(s)/deciliter  glucagon  Injectable 1 milliGRAM(s) IntraMuscular once PRN Glucose LESS THAN 70 milligrams/deciliter      Allergies    No Known Allergies    Intolerances        REVIEW OF SYSTEMS:    All other review of systems is negative unless indicated above    VITAL SIGNS:   Vital Signs Last 24 Hrs  T(C): 37 (17 Mar 2018 20:34), Max: 37 (17 Mar 2018 03:00)  T(F): 98.6 (17 Mar 2018 20:34), Max: 98.6 (17 Mar 2018 03:00)  HR: 70 (17 Mar 2018 20:34) (70 - 78)  BP: 168/57 (17 Mar 2018 20:34) (110/53 - 168/57)  BP(mean): --  RR: 18 (17 Mar 2018 20:34) (18 - 18)  SpO2: 97% (17 Mar 2018 03:00) (97% - 97%)    I&O's Summary    16 Mar 2018 07:01  -  17 Mar 2018 07:00  --------------------------------------------------------  IN: 543 mL / OUT: 250 mL / NET: 293 mL    17 Mar 2018 07:01  -  17 Mar 2018 21:36  --------------------------------------------------------  IN: 120 mL / OUT: 0 mL / NET: 120 mL      PHYSICAL EXAM:    Constitutional: NAD, awake and alert, well-developed  Pulmonary: Non-labored, breath sounds are clear bilaterally, No wheezing, rales or rhonchi  Cardiovascular:  Regular S1 and S2 mechanical sound, LPS systolic murmurs, Apical systolic murmur.  Gastrointestinal: Bowel Sounds present, soft, nontender.   Neurological: Alert, no focal deficits  LE: +ve B/L edema      LABS: All Labs Reviewed:                        7.1    5.74  )-----------( 219      ( 17 Mar 2018 07:28 )             22.7                         6.4    6.13  )-----------( 221      ( 16 Mar 2018 18:06 )             21.1     16 Mar 2018 20:55    140    |  100    |  33     ----------------------------<  125    4.1     |  28     |  1.2    16 Mar 2018 18:06    139    |  101    |  32     ----------------------------<  228    4.2     |  28     |  1.2      Ca    8.2        16 Mar 2018 20:55  Ca    8.2        16 Mar 2018 18:06    TPro  6.2    /  Alb  3.7    /  TBili  2.5    /  DBili  x      /  AST  53     /  ALT  13     /  AlkPhos  94     16 Mar 2018 20:55  TPro  6.4    /  Alb  3.8    /  TBili  2.9    /  DBili  x      /  AST  54     /  ALT  13     /  AlkPhos  95     16 Mar 2018 18:06    PT/INR - ( 17 Mar 2018 07:28 )   PT: 20.10 sec;   INR: 1.84 ratio         PTT - ( 17 Mar 2018 07:28 )  PTT:29.0 sec    RADIOLOGY/EKG:  EKG  < from: Transthoracic Echocardiogram (03.17.18 @ 08:30) >  Summary:   1. Left ventricular ejection fraction, by visual estimation, is 60 to   65%.   2. Normal global left ventricular systolic function.   3. Severely enlarged left atrium.   4. Severely enlarged right atrium.   5. RV is mildly dilated, moderately hypokinetic.   6. Mechanical valve in the mitral position is not well visualized.   Increased flow velocities consistent with peak PG > 30 mmHg but MVA is   calculated > 2 cm^2 by PHT. Trace MR. Comparison with prior ECHO studies   would be helpful.   7.Moderate tricuspid regurgitation.   8. Severe aortic regurgitation.   9. Mechanical valve in aortic position is not well vusalized.  10. Estimated pulmonary artery systolic pressure is 62.6 mmHg assuming a   right atrial pressure of 15 mmHg, which isconsistent with severe   pulmonary hypertension.  11. Peak transaortic gradient equals 49.5 mmHg, mean transaortic gradient   equals 33.7 mmHg, the calculated aortic valve area equals 0.85 cm² by the   continuity equation consistent with severe aortic stenosis.    < end of copied text > Cardiology Consultation    CHIEF COMPLAINT: Patient is a 77y old  Female who presents with a chief complaint of bleeding when urinating (16 Mar 2018 19:01)      HPI:  77 year old female with chronic hypoproliferative anemia, no response to EPO and iron, transfusion dependent for 6-8 months. 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 She had several episodes of gross hematuria and a right uteral stent placed for uncexplained narrowing or thickenening of the ureter, workup has been negative for malignancy so far despite progressive weight loss. Patient denied any active chest pain, SOB or palpitation.      PAST MEDICAL & SURGICAL HISTORY:  Anemia  Rheumatic fever  Diabetes  Osteoporosis  MV, AV replaced  Atrial fibrillation: on coumadin at home  Hypertension  H/O mitral valve replacement      SOCIAL HISTORY: Non smoker, no etoh or drug abuse    FAMILY HISTORY: FAMILY HISTORY:  No pertinent family history in first degree relatives      Home Medications:  Aspir 81 oral delayed release tablet: 1 tab(s) orally once a day (16 Mar 2018 18:16)  Coumadin 5 mg oral tablet: 1 tab(s) orally once a day (16 Mar 2018 18:16)  Evista 60 mg oral tablet: 1 tab(s) orally once a day (16 Mar 2018 18:16)  ferrous sulfate 324 mg (65 mg elemental iron) oral tablet: orally once a day (16 Mar 2018 18:16)  folic acid 1 mg oral tablet: 1 tab(s) orally once a day (16 Mar 2018 18:16)  furosemide 20 mg oral tablet: 1 tab(s) orally once a day (16 Mar 2018 18:16)  glimepiride 4 mg oral tablet: 1 tab(s) orally once a day (16 Mar 2018 18:16)  metFORMIN 500 mg oral tablet, extended release: 1 tab(s) orally once a day (16 Mar 2018 18:16)  Metoprolol Tartrate 25 mg oral tablet: 1 tab(s) orally 2 times a day (16 Mar 2018 18:16)  pantoprazole 40 mg oral delayed release tablet: 1 tab(s) orally once a day (16 Mar 2018 18:16)  pravastatin 40 mg oral tablet: 1 tab(s) orally once a day (16 Mar 2018 18:16)  ramipril 10 mg oral capsule: 1 cap(s) orally once a day (16 Mar 2018 18:16)      MEDICATIONS  (STANDING):  atorvastatin 40 milliGRAM(s) Oral at bedtime  cefTRIAXone   IVPB 1 Gram(s) IV Intermittent every 24 hours  dextrose 5%. 1000 milliLiter(s) (50 mL/Hr) IV Continuous <Continuous>  dextrose 50% Injectable 12.5 Gram(s) IV Push once  dextrose 50% Injectable 25 Gram(s) IV Push once  dextrose 50% Injectable 25 Gram(s) IV Push once  ferrous    sulfate 325 milliGRAM(s) Oral daily  folic acid 1 milliGRAM(s) Oral daily  furosemide    Tablet 20 milliGRAM(s) Oral daily  insulin glargine Injectable (LANTUS) 12 Unit(s) SubCutaneous at bedtime  insulin lispro (HumaLOG) corrective regimen sliding scale   SubCutaneous at bedtime  insulin lispro Injectable (HumaLOG) 4 Unit(s) SubCutaneous before breakfast  insulin lispro Injectable (HumaLOG) 4 Unit(s) SubCutaneous before lunch  insulin lispro Injectable (HumaLOG) 4 Unit(s) SubCutaneous before dinner  metoprolol     tartrate 25 milliGRAM(s) Oral two times a day  pantoprazole    Tablet 40 milliGRAM(s) Oral before breakfast  raloxifene 60 milliGRAM(s) Oral daily  warfarin 5 milliGRAM(s) Oral once    MEDICATIONS  (PRN):  dextrose Gel 1 Dose(s) Oral once PRN Blood Glucose LESS THAN 70 milliGRAM(s)/deciliter  glucagon  Injectable 1 milliGRAM(s) IntraMuscular once PRN Glucose LESS THAN 70 milligrams/deciliter      Allergies    No Known Allergies    Intolerances        REVIEW OF SYSTEMS:    All other review of systems is negative unless indicated above    VITAL SIGNS:   Vital Signs Last 24 Hrs  T(C): 37 (17 Mar 2018 20:34), Max: 37 (17 Mar 2018 03:00)  T(F): 98.6 (17 Mar 2018 20:34), Max: 98.6 (17 Mar 2018 03:00)  HR: 70 (17 Mar 2018 20:34) (70 - 78)  BP: 168/57 (17 Mar 2018 20:34) (110/53 - 168/57)  BP(mean): --  RR: 18 (17 Mar 2018 20:34) (18 - 18)  SpO2: 97% (17 Mar 2018 03:00) (97% - 97%)    I&O's Summary    16 Mar 2018 07:01  -  17 Mar 2018 07:00  --------------------------------------------------------  IN: 543 mL / OUT: 250 mL / NET: 293 mL    17 Mar 2018 07:01  -  17 Mar 2018 21:36  --------------------------------------------------------  IN: 120 mL / OUT: 0 mL / NET: 120 mL      PHYSICAL EXAM:    Constitutional: NAD, awake and alert, well-developed  Pulmonary: Non-labored, breath sounds are clear bilaterally, No wheezing, rales or rhonchi  Cardiovascular:  Regular S1 and S2 mechanical sound, LPS systolic murmurs, Apical systolic murmur.  Gastrointestinal: Bowel Sounds present, soft, nontender.   Neurological: Alert, no focal deficits  LE: +ve B/L edema      LABS: All Labs Reviewed:                        7.1    5.74  )-----------( 219      ( 17 Mar 2018 07:28 )             22.7                         6.4    6.13  )-----------( 221      ( 16 Mar 2018 18:06 )             21.1     16 Mar 2018 20:55    140    |  100    |  33     ----------------------------<  125    4.1     |  28     |  1.2    16 Mar 2018 18:06    139    |  101    |  32     ----------------------------<  228    4.2     |  28     |  1.2      Ca    8.2        16 Mar 2018 20:55  Ca    8.2        16 Mar 2018 18:06    TPro  6.2    /  Alb  3.7    /  TBili  2.5    /  DBili  x      /  AST  53     /  ALT  13     /  AlkPhos  94     16 Mar 2018 20:55  TPro  6.4    /  Alb  3.8    /  TBili  2.9    /  DBili  x      /  AST  54     /  ALT  13     /  AlkPhos  95     16 Mar 2018 18:06    PT/INR - ( 17 Mar 2018 07:28 )   PT: 20.10 sec;   INR: 1.84 ratio         PTT - ( 17 Mar 2018 07:28 )  PTT:29.0 sec    RADIOLOGY/EKG:  EKG  < from: Transthoracic Echocardiogram (03.17.18 @ 08:30) >  Summary:   1. Left ventricular ejection fraction, by visual estimation, is 60 to   65%.   2. Normal global left ventricular systolic function.   3. Severely enlarged left atrium.   4. Severely enlarged right atrium.   5. RV is mildly dilated, moderately hypokinetic.   6. Mechanical valve in the mitral position is not well visualized.   Increased flow velocities consistent with peak PG > 30 mmHg but MVA is   calculated > 2 cm^2 by PHT. Trace MR. Comparison with prior ECHO studies   would be helpful.   7.Moderate tricuspid regurgitation.   8. Severe aortic regurgitation.   9. Mechanical valve in aortic position is not well vusalized.  10. Estimated pulmonary artery systolic pressure is 62.6 mmHg assuming a   right atrial pressure of 15 mmHg, which isconsistent with severe   pulmonary hypertension.  11. Peak transaortic gradient equals 49.5 mmHg, mean transaortic gradient   equals 33.7 mmHg, the calculated aortic valve area equals 0.85 cm² by the   continuity equation consistent with severe aortic stenosis.    < end of copied text >

## 2018-03-17 NOTE — CONSULT NOTE ADULT - ASSESSMENT
IMPRESSION     Urinary tract infection in pt with H/O mitral valve replacement, DM, rheumatic fever, hemolytic anemia, chronic hypoproliferative anemia, negative oncologic W/U, Ureteral stent for narrowing    Anemia (Hb 6.1) and hematuria (on coumadin)      SUGGESTIONs  Need Urine C&S (need copy of any outpt C&S along with a repeat done here)    Continue Rocephin     CT A/P to evaluate stent placement; Urology F/U    Cardiology evaluation IMPRESSION     Urinary tract infection in pt with H/O mitral valve replacement, DM, rheumatic fever, hemolytic anemia, chronic hypoproliferative anemia, negative oncologic W/U, Ureteral stent for narrowing    Anemia (Hb 6.1) and hematuria (on coumadin)      SUGGESTIONs  Await Urine C&S which was obtained last night.    Need copies of any recent Urine C&S    Decrease Rocephin to 1gm IVPB q24h    CT A/P to evaluate stent placement; Urology F/U    Cardiology evaluation

## 2018-03-18 LAB
ALBUMIN SERPL ELPH-MCNC: 3.7 G/DL — SIGNIFICANT CHANGE UP (ref 3.5–5.2)
ALP SERPL-CCNC: 82 U/L — SIGNIFICANT CHANGE UP (ref 30–115)
ALT FLD-CCNC: 13 U/L — SIGNIFICANT CHANGE UP (ref 0–41)
AST SERPL-CCNC: 53 U/L — HIGH (ref 0–41)
BILIRUB SERPL-MCNC: 3 MG/DL — HIGH (ref 0.2–1.2)
BUN SERPL-MCNC: 27 MG/DL — HIGH (ref 10–20)
CALCIUM SERPL-MCNC: 8.5 MG/DL — SIGNIFICANT CHANGE UP (ref 8.5–10.1)
CHLORIDE SERPL-SCNC: 101 MMOL/L — SIGNIFICANT CHANGE UP (ref 98–110)
CO2 SERPL-SCNC: 33 MMOL/L — HIGH (ref 17–32)
CREAT SERPL-MCNC: 1.3 MG/DL — SIGNIFICANT CHANGE UP (ref 0.7–1.5)
CULTURE RESULTS: NO GROWTH — SIGNIFICANT CHANGE UP
ESTIMATED AVERAGE GLUCOSE: 88 MG/DL — SIGNIFICANT CHANGE UP (ref 68–114)
GLUCOSE SERPL-MCNC: 116 MG/DL — HIGH (ref 70–110)
HAPTOGLOB SERPL-MCNC: <20 MG/DL — LOW (ref 34–200)
HBA1C BLD-MCNC: 4.7 % — SIGNIFICANT CHANGE UP (ref 4–5.6)
HCT VFR BLD CALC: 24.1 % — LOW (ref 37–47)
HGB BLD-MCNC: 7.3 G/DL — CRITICAL LOW (ref 12–16)
INR BLD: 1.93 RATIO — HIGH (ref 0.65–1.3)
INR BLD: 1.99 RATIO — HIGH (ref 0.65–1.3)
LDH SERPL L TO P-CCNC: >1000 — HIGH (ref 50–242)
MCHC RBC-ENTMCNC: 24.8 PG — LOW (ref 27–31)
MCHC RBC-ENTMCNC: 30.3 G/DL — LOW (ref 32–37)
MCV RBC AUTO: 82 FL — SIGNIFICANT CHANGE UP (ref 81–99)
NRBC # BLD: 0 /100 WBCS — SIGNIFICANT CHANGE UP (ref 0–0)
PLATELET # BLD AUTO: 193 K/UL — SIGNIFICANT CHANGE UP (ref 130–400)
POTASSIUM SERPL-MCNC: 4.4 MMOL/L — SIGNIFICANT CHANGE UP (ref 3.5–5)
POTASSIUM SERPL-SCNC: 4.4 MMOL/L — SIGNIFICANT CHANGE UP (ref 3.5–5)
PROT SERPL-MCNC: 6.2 G/DL — SIGNIFICANT CHANGE UP (ref 6–8)
PROTHROM AB SERPL-ACNC: 21.1 SEC — HIGH (ref 9.95–12.87)
PROTHROM AB SERPL-ACNC: 21.8 SEC — HIGH (ref 9.95–12.87)
RBC # BLD: 2.94 M/UL — LOW (ref 4.2–5.4)
RBC # BLD: 2.94 M/UL — LOW (ref 4.2–5.4)
RBC # FLD: 18.3 % — HIGH (ref 11.5–14.5)
RETICS #: 151.1 K/UL — HIGH (ref 25–125)
RETICS/RBC NFR: 5.1 % — HIGH (ref 0.5–1.5)
SODIUM SERPL-SCNC: 144 MMOL/L — SIGNIFICANT CHANGE UP (ref 135–146)
SPECIMEN SOURCE: SIGNIFICANT CHANGE UP
WBC # BLD: 5.29 K/UL — SIGNIFICANT CHANGE UP (ref 4.8–10.8)
WBC # FLD AUTO: 5.29 K/UL — SIGNIFICANT CHANGE UP (ref 4.8–10.8)

## 2018-03-18 RX ORDER — WARFARIN SODIUM 2.5 MG/1
5 TABLET ORAL ONCE
Qty: 0 | Refills: 0 | Status: COMPLETED | OUTPATIENT
Start: 2018-03-18 | End: 2018-03-18

## 2018-03-18 RX ADMIN — Medication 325 MILLIGRAM(S): at 11:32

## 2018-03-18 RX ADMIN — WARFARIN SODIUM 5 MILLIGRAM(S): 2.5 TABLET ORAL at 21:13

## 2018-03-18 RX ADMIN — CEFTRIAXONE 100 GRAM(S): 500 INJECTION, POWDER, FOR SOLUTION INTRAMUSCULAR; INTRAVENOUS at 11:34

## 2018-03-18 RX ADMIN — Medication 20 MILLIGRAM(S): at 05:42

## 2018-03-18 RX ADMIN — RALOXIFENE HYDROCHLORIDE 60 MILLIGRAM(S): 60 TABLET, COATED ORAL at 11:32

## 2018-03-18 RX ADMIN — Medication 4 UNIT(S): at 18:23

## 2018-03-18 RX ADMIN — PANTOPRAZOLE SODIUM 40 MILLIGRAM(S): 20 TABLET, DELAYED RELEASE ORAL at 08:20

## 2018-03-18 RX ADMIN — Medication 4 UNIT(S): at 11:31

## 2018-03-18 RX ADMIN — Medication 25 MILLIGRAM(S): at 05:41

## 2018-03-18 RX ADMIN — ATORVASTATIN CALCIUM 40 MILLIGRAM(S): 80 TABLET, FILM COATED ORAL at 21:14

## 2018-03-18 RX ADMIN — Medication 25 MILLIGRAM(S): at 18:16

## 2018-03-18 RX ADMIN — Medication 1 MILLIGRAM(S): at 11:32

## 2018-03-18 NOTE — PROGRESS NOTE ADULT - SUBJECTIVE AND OBJECTIVE BOX
77 year old female with chronic hypoproliferative anemia, no response to EPO and iron, transfusion dependent for 6-8 months. 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 She had several episodes of gross hematuria and a right uteral stent placed for unexplained narrowing or thickening of the ureter, workup was negative for malignancy despite progressive weight loss. GI workup was negative. Heme workup showed low retic count and haptoglobin, elevated ferritin levels, elevated LDH but denied any fatigue or dizziness. She received 2 units pRBC last friday and she continued to have hematuria and evidence of hemolytic anemia. Patient feels well, states she continues to have some blood in her urine but seems mildly improved after start of antibiotics     MEDICATIONS  (STANDING):  atorvastatin 40 milliGRAM(s) Oral at bedtime  cefTRIAXone   IVPB 1 Gram(s) IV Intermittent every 24 hours  dextrose 5%. 1000 milliLiter(s) (50 mL/Hr) IV Continuous <Continuous>  dextrose 50% Injectable 12.5 Gram(s) IV Push once  dextrose 50% Injectable 25 Gram(s) IV Push once  dextrose 50% Injectable 25 Gram(s) IV Push once  ferrous    sulfate 325 milliGRAM(s) Oral daily  folic acid 1 milliGRAM(s) Oral daily  furosemide    Tablet 20 milliGRAM(s) Oral daily  insulin glargine Injectable (LANTUS) 12 Unit(s) SubCutaneous at bedtime  insulin lispro (HumaLOG) corrective regimen sliding scale   SubCutaneous at bedtime  insulin lispro Injectable (HumaLOG) 4 Unit(s) SubCutaneous before breakfast  insulin lispro Injectable (HumaLOG) 4 Unit(s) SubCutaneous before lunch  insulin lispro Injectable (HumaLOG) 4 Unit(s) SubCutaneous before dinner  metoprolol     tartrate 25 milliGRAM(s) Oral two times a day  pantoprazole    Tablet 40 milliGRAM(s) Oral before breakfast  raloxifene 60 milliGRAM(s) Oral daily    MEDICATIONS  (PRN):  dextrose Gel 1 Dose(s) Oral once PRN Blood Glucose LESS THAN 70 milliGRAM(s)/deciliter  glucagon  Injectable 1 milliGRAM(s) IntraMuscular once PRN Glucose LESS THAN 70 milligrams/deciliter      Allergies    No Known Allergies    Intolerances        Vital Signs Last 24 Hrs  T(C): 36.2 (18 Mar 2018 05:03), Max: 37 (17 Mar 2018 20:34)  T(F): 97.2 (18 Mar 2018 05:03), Max: 98.6 (17 Mar 2018 20:34)  HR: 85 (18 Mar 2018 05:03) (70 - 85)  BP: 124/64 (18 Mar 2018 05:03) (111/56 - 168/57)  BP(mean): --  RR: 18 (18 Mar 2018 05:03) (18 - 18)  SpO2: --    PHYSICAL EXAM  General: adult in NAD  CV: normal S1/S2 + mechanical valve  Lungs: positive air movement b/l ant lungs,clear to auscultation, no wheezes, no rales  Abdomen: soft non-tender non-distended, no hepatosplenomegaly  Ext: B/L LE edema  Skin: no rashes and no petechiae  Neuro: alert and oriented X 4, no focal deficits    LABS:                          7.1    5.74  )-----------( 219      ( 17 Mar 2018 07:28 )             22.7         Mean Cell Volume : 80.2 fL  Mean Cell Hemoglobin : 25.1 pg  Mean Cell Hemoglobin Concentration : 31.3 g/dL  Auto Neutrophil # : x  Auto Lymphocyte # : x  Auto Monocyte # : x  Auto Eosinophil # : x  Auto Basophil # : x  Auto Neutrophil % : x  Auto Lymphocyte % : x  Auto Monocyte % : x  Auto Eosinophil % : x  Auto Basophil % : x    Serial CBC's  03-17 @ 07:28  Hct-22.7 / Hgb-7.1 / Plat-219 / RBC-2.83 / WBC-5.74          Serial CBC's  03-16 @ 18:06  Hct-21.1 / Hgb-6.4 / Plat-221 / RBC-2.62 / WBC-6.13            03-16    140  |  100  |  33<H>  ----------------------------<  125<H>  4.1   |  28  |  1.2    Ca    8.2<L>      16 Mar 2018 20:55    TPro  6.2  /  Alb  3.7  /  TBili  2.5<H>  /  DBili  x   /  AST  53<H>  /  ALT  13  /  AlkPhos  94  03-16      PT/INR - ( 17 Mar 2018 07:28 )   PT: 20.10 sec;   INR: 1.84 ratio         PTT - ( 17 Mar 2018 07:28 )  PTT:29.0 sec    Hematocrit: 22.7 % (03-17-18 @ 07:28)  Platelet Count - Automated: 219 K/uL (03-17-18 @ 07:28)  Hemoglobin: 7.1 g/dL (03-17-18 @ 07:28)  WBC Count: 5.74 K/uL (03-17-18 @ 07:28)  Reticulocyte Percent: 6.1 % (03-17-18 @ 07:28)  Hemoglobin: 6.4 g/dL (03-16-18 @ 18:06)  WBC Count: 6.13 K/uL (03-16-18 @ 18:06)  Hematocrit: 21.1 % (03-16-18 @ 18:06)  Platelet Count - Automated: 221 K/uL (03-16-18 @ 18:06)  Reticulocyte Percent: 6.1 % (03-16-18 @ 18:06)              RADIOLOGY & ADDITIONAL STUDIES:  < from: Transthoracic Echocardiogram (03.17.18 @ 08:30) >  Left Ventricle: The left ventricular internal cavity size is normal. Left   ventricular wall thickness is normal. Global LV systolic function was   normal. Left ventricular ejection fraction, by visual estimation, is 60   to 65%.  Right Ventricle: RV is mildly dilated, moderately hypokinetic.  Left Atrium: Severely enlarged left atrium.  Right Atrium: Severely enlarged right atrium.  Pericardium: There is no evidence of pericardial effusion.  Mitral Valve:Mechanical valve in the mitral position is not well   visualized. Increased flow velocities consistent with peak PG > 30 mmHg   but MVA is calculated > 2 cm^2 by PHT. Trace MR. Comparison with prior   ECHO studies would be helpful.  Tricuspid Valve: The tricuspid valve is normal in structure. Moderate   tricuspid regurgitation is visualized. Estimated pulmonary artery   systolic pressure is 62.6 mmHg assuming a right atrial pressure of 15   mmHg, which is consistent with severe pulmonary hypertension.  Aortic Valve: Peak transaortic gradient equals 49.5 mmHg, mean   transaortic gradient equals 33.7 mmHg, the calculated aortic valve area   equals 0.85 cm² by the continuity equation consistent with severe aortic   stenosis. Severe aortic valve regurgitation is seen. Mechanical valve in   aortic position is not well vusalized.  Aorta: The aortic root is normal in size and structure.  Pulmonary Artery: The main pulmonary artery is normal in size.    < end of copied text >      < from: Xray Chest 1 View AP/PA (03.16.18 @ 20:47) >  EXAM:  XR CHEST FRONTAL 1V            PROCEDURE DATE:  03/16/2018            INTERPRETATION:  Clinical History / Reason for exam: Clinical concern for   infection.    Comparison : Chest radiograph March 25, 2017.    Technique/Positioning: Single APview.    Findings:    Support devices: None.    Cardiac/mediastinum/hilum: Post median sternotomy and valve replacement.   Pulmonary vascular congestion.    Lung parenchyma/Pleura: Right basilar opacity. No pneumothorax.    Skeleton/soft tissues: Unchanged.    Impression:      Right basilar opacity.    Pulmonary vascular congestion.    < end of copied text >

## 2018-03-18 NOTE — PROGRESS NOTE ADULT - ASSESSMENT
Hemolytic Anemia:  s/p 1 unit slow transfusion given 3/16 Hgb 6.4-->7.1  LDH 1000, retic 6  Recent transfusion 1 week ago given h/o hematuria- possible delayed hemolytic reaction   Transfusion reaction lab work sent  Plan for BM biopsy as outpt  Monitor CBC and hemolysis panel daily   Cardio evaluation to determine if mechanical valve is cause of hemolytic anemia- patient will go for valve evaluation under fluroscopy on monday  2D echo completed    Hematuria:   s/p ureteral stent placement   Uro c/s Dr ugarte- Flu outpt UCx results, CT A/P to evaluate stent placement  Hold ASA, must continue coumadin at this time for valves    c/w rocephin for possible UTI     Mechanical Valves:  2D echo completed  Cardiology to evaluate valves possible cause of hemolysis   continue with coumadin goal 2.5-3.5   check INR daily

## 2018-03-19 ENCOUNTER — APPOINTMENT (OUTPATIENT)
Dept: HEMATOLOGY ONCOLOGY | Facility: CLINIC | Age: 78
End: 2018-03-19

## 2018-03-19 LAB
ABO + RH PNL BLD: NORMAL
ALBUMIN SERPL ELPH-MCNC: 3.8 G/DL — SIGNIFICANT CHANGE UP (ref 3.5–5.2)
ALBUMIN SERPL ELPH-MCNC: 3.9 G/DL
ALP BLD-CCNC: 96 U/L
ALP SERPL-CCNC: 81 U/L — SIGNIFICANT CHANGE UP (ref 30–115)
ALT FLD-CCNC: 14 U/L — SIGNIFICANT CHANGE UP (ref 0–41)
ALT SERPL-CCNC: 13 U/L
ANION GAP SERPL CALC-SCNC: 12 MMOL/L — SIGNIFICANT CHANGE UP (ref 7–14)
ANION GAP SERPL CALC-SCNC: 14 MMOL/L
AST SERPL-CCNC: 54 U/L
AST SERPL-CCNC: 59 U/L — HIGH (ref 0–41)
BASOPHILS # BLD AUTO: 0.05 K/UL — SIGNIFICANT CHANGE UP (ref 0–0.2)
BASOPHILS NFR BLD AUTO: 1 % — SIGNIFICANT CHANGE UP (ref 0–1)
BILIRUB DIRECT SERPL-MCNC: 0.5 MG/DL
BILIRUB DIRECT SERPL-MCNC: 0.5 MG/DL
BILIRUB SERPL-MCNC: 3.1 MG/DL
BILIRUB SERPL-MCNC: 3.3 MG/DL — HIGH (ref 0.2–1.2)
BLD GP AB SCN SERPL QL: NORMAL
BUN SERPL-MCNC: 25 MG/DL — HIGH (ref 10–20)
BUN SERPL-MCNC: 30 MG/DL
CALCIUM SERPL-MCNC: 8.4 MG/DL
CALCIUM SERPL-MCNC: 8.7 MG/DL — SIGNIFICANT CHANGE UP (ref 8.5–10.1)
CHLORIDE SERPL-SCNC: 101 MMOL/L
CHLORIDE SERPL-SCNC: 101 MMOL/L — SIGNIFICANT CHANGE UP (ref 98–110)
CO2 SERPL-SCNC: 27 MMOL/L
CO2 SERPL-SCNC: 29 MMOL/L — SIGNIFICANT CHANGE UP (ref 17–32)
CREAT SERPL-MCNC: 1.2 MG/DL
CREAT SERPL-MCNC: 1.2 MG/DL — SIGNIFICANT CHANGE UP (ref 0.7–1.5)
DIRECT COOMBS: NORMAL
EOSINOPHIL # BLD AUTO: 0.15 K/UL — SIGNIFICANT CHANGE UP (ref 0–0.7)
EOSINOPHIL NFR BLD AUTO: 2.9 % — SIGNIFICANT CHANGE UP (ref 0–8)
FERRITIN SERPL-MCNC: 876 NG/ML
GLUCOSE SERPL-MCNC: 113 MG/DL
GLUCOSE SERPL-MCNC: 183 MG/DL — HIGH (ref 70–110)
HCT VFR BLD CALC: 22.8 %
HCT VFR BLD CALC: 23.9 %
HCT VFR BLD CALC: 24.4 % — LOW (ref 37–47)
HCT VFR BLD CALC: 24.9 % — LOW (ref 37–47)
HGB BLD-MCNC: 6.8 G/DL
HGB BLD-MCNC: 7.1 G/DL
HGB BLD-MCNC: 7.1 G/DL — CRITICAL LOW (ref 12–16)
HGB BLD-MCNC: 7.4 G/DL — CRITICAL LOW (ref 12–16)
IMM GRANULOCYTES NFR BLD AUTO: 0.6 % — HIGH (ref 0.1–0.3)
INR BLD: 1.87 RATIO — HIGH (ref 0.65–1.3)
LDH SERPL L TO P-CCNC: >1000 — HIGH (ref 50–242)
LDH SERPL-CCNC: 961
LDH SERPL-CCNC: >1000
LYMPHOCYTES # BLD AUTO: 0.77 K/UL — LOW (ref 1.2–3.4)
LYMPHOCYTES # BLD AUTO: 15 % — LOW (ref 20.5–51.1)
MCHC RBC-ENTMCNC: 23.7 PG — LOW (ref 27–31)
MCHC RBC-ENTMCNC: 24 PG
MCHC RBC-ENTMCNC: 24.3 PG — LOW (ref 27–31)
MCHC RBC-ENTMCNC: 24.5 PG
MCHC RBC-ENTMCNC: 29.1 G/DL — LOW (ref 32–37)
MCHC RBC-ENTMCNC: 29.7 G/DL
MCHC RBC-ENTMCNC: 29.7 G/DL — LOW (ref 32–37)
MCHC RBC-ENTMCNC: 29.8 G/DL
MCV RBC AUTO: 80.6 FL
MCV RBC AUTO: 81.6 FL — SIGNIFICANT CHANGE UP (ref 81–99)
MCV RBC AUTO: 81.6 FL — SIGNIFICANT CHANGE UP (ref 81–99)
MCV RBC AUTO: 82.4 FL
MONOCYTES # BLD AUTO: 0.33 K/UL — SIGNIFICANT CHANGE UP (ref 0.1–0.6)
MONOCYTES NFR BLD AUTO: 6.4 % — SIGNIFICANT CHANGE UP (ref 1.7–9.3)
NEUTROPHILS # BLD AUTO: 3.82 K/UL — SIGNIFICANT CHANGE UP (ref 1.4–6.5)
NEUTROPHILS NFR BLD AUTO: 74.1 % — SIGNIFICANT CHANGE UP (ref 42.2–75.2)
NRBC # BLD: 0 /100 WBCS — SIGNIFICANT CHANGE UP (ref 0–0)
PLATELET # BLD AUTO: 203 K/UL
PLATELET # BLD AUTO: 204 K/UL — SIGNIFICANT CHANGE UP (ref 130–400)
PLATELET # BLD AUTO: 211 K/UL
PLATELET # BLD AUTO: 257 K/UL — SIGNIFICANT CHANGE UP (ref 130–400)
PMV BLD: 10.4 FL
PMV BLD: NORMAL
POST UNIT NUMBER: SIGNIFICANT CHANGE UP
POTASSIUM SERPL-MCNC: 4.2 MMOL/L — SIGNIFICANT CHANGE UP (ref 3.5–5)
POTASSIUM SERPL-SCNC: 4.2 MMOL/L — SIGNIFICANT CHANGE UP (ref 3.5–5)
POTASSIUM SERPL-SCNC: 4.4 MMOL/L
PROT SERPL-MCNC: 6.5 G/DL — SIGNIFICANT CHANGE UP (ref 6–8)
PROT SERPL-MCNC: 6.7 G/DL
PROTHROM AB SERPL-ACNC: 20.4 SEC — HIGH (ref 9.95–12.87)
RBC # BLD: 2.83 M/UL
RBC # BLD: 2.9 M/UL
RBC # BLD: 2.99 M/UL — LOW (ref 4.2–5.4)
RBC # BLD: 3.05 M/UL — LOW (ref 4.2–5.4)
RBC # BLD: 3.05 M/UL — LOW (ref 4.2–5.4)
RBC # FLD: 18.3 % — HIGH (ref 11.5–14.5)
RBC # FLD: 18.4 % — HIGH (ref 11.5–14.5)
RBC # FLD: 19 %
RBC # FLD: 19.1 %
RETICS # AUTO: 5.6 %
RETICS #: 172.3 K/UL — HIGH (ref 25–125)
RETICS AGGREG/RBC NFR: 157.4 K/UL
RETICS/RBC NFR: 5.7 % — HIGH (ref 0.5–1.5)
SODIUM SERPL-SCNC: 142 MMOL/L
SODIUM SERPL-SCNC: 142 MMOL/L — SIGNIFICANT CHANGE UP (ref 135–146)
WBC # BLD: 5.15 K/UL — SIGNIFICANT CHANGE UP (ref 4.8–10.8)
WBC # BLD: 5.42 K/UL — SIGNIFICANT CHANGE UP (ref 4.8–10.8)
WBC # FLD AUTO: 5.15 K/UL — SIGNIFICANT CHANGE UP (ref 4.8–10.8)
WBC # FLD AUTO: 5.42 K/UL — SIGNIFICANT CHANGE UP (ref 4.8–10.8)
WBC # FLD AUTO: 6.23 K/UL
WBC # FLD AUTO: 7.55 K/UL

## 2018-03-19 RX ORDER — WARFARIN SODIUM 2.5 MG/1
5 TABLET ORAL ONCE
Qty: 0 | Refills: 0 | Status: DISCONTINUED | OUTPATIENT
Start: 2018-03-19 | End: 2018-03-19

## 2018-03-19 RX ORDER — ENOXAPARIN SODIUM 100 MG/ML
60 INJECTION SUBCUTANEOUS
Qty: 0 | Refills: 0 | Status: DISCONTINUED | OUTPATIENT
Start: 2018-03-19 | End: 2018-03-20

## 2018-03-19 RX ORDER — WARFARIN SODIUM 2.5 MG/1
7 TABLET ORAL ONCE
Qty: 0 | Refills: 0 | Status: COMPLETED | OUTPATIENT
Start: 2018-03-19 | End: 2018-03-19

## 2018-03-19 RX ADMIN — Medication 325 MILLIGRAM(S): at 11:55

## 2018-03-19 RX ADMIN — ATORVASTATIN CALCIUM 40 MILLIGRAM(S): 80 TABLET, FILM COATED ORAL at 21:44

## 2018-03-19 RX ADMIN — PANTOPRAZOLE SODIUM 40 MILLIGRAM(S): 20 TABLET, DELAYED RELEASE ORAL at 06:13

## 2018-03-19 RX ADMIN — Medication 25 MILLIGRAM(S): at 17:03

## 2018-03-19 RX ADMIN — Medication 25 MILLIGRAM(S): at 05:40

## 2018-03-19 RX ADMIN — CEFTRIAXONE 100 GRAM(S): 500 INJECTION, POWDER, FOR SOLUTION INTRAMUSCULAR; INTRAVENOUS at 12:55

## 2018-03-19 RX ADMIN — Medication 4 UNIT(S): at 11:56

## 2018-03-19 RX ADMIN — ENOXAPARIN SODIUM 60 MILLIGRAM(S): 100 INJECTION SUBCUTANEOUS at 13:24

## 2018-03-19 RX ADMIN — Medication 20 MILLIGRAM(S): at 05:40

## 2018-03-19 RX ADMIN — RALOXIFENE HYDROCHLORIDE 60 MILLIGRAM(S): 60 TABLET, COATED ORAL at 11:56

## 2018-03-19 RX ADMIN — WARFARIN SODIUM 7 MILLIGRAM(S): 2.5 TABLET ORAL at 22:42

## 2018-03-19 RX ADMIN — Medication 1 MILLIGRAM(S): at 11:55

## 2018-03-19 NOTE — PROGRESS NOTE ADULT - SUBJECTIVE AND OBJECTIVE BOX
AUSTIN DAVIS  77y, Female      OVERNIGHT EVENTS:    none    VITALS:  T(F): 97.3, Max: 98.1 (03-18-18 @ 20:26)  HR: 77  BP: 148/66  RR: 20Vital Signs Last 24 Hrs  T(C): 36.3 (19 Mar 2018 13:39), Max: 36.7 (18 Mar 2018 20:26)  T(F): 97.3 (19 Mar 2018 13:39), Max: 98.1 (18 Mar 2018 20:26)  HR: 77 (19 Mar 2018 13:39) (77 - 87)  BP: 148/66 (19 Mar 2018 13:39) (119/62 - 165/68)  BP(mean): --  RR: 20 (19 Mar 2018 13:39) (20 - 20)  SpO2: --    TESTS & MEASUREMENTS:                        7.1    5.15  )-----------( 204      ( 19 Mar 2018 11:11 )             24.4     03-19    142  |  101  |  25<H>  ----------------------------<  183<H>  4.2   |  29  |  1.2    Ca    8.7      19 Mar 2018 08:36    TPro  6.5  /  Alb  3.8  /  TBili  3.3<H>  /  DBili  x   /  AST  59<H>  /  ALT  14  /  AlkPhos  81  03-19    LIVER FUNCTIONS - ( 19 Mar 2018 08:36 )  Alb: 3.8 g/dL / Pro: 6.5 g/dL / ALK PHOS: 81 U/L / ALT: 14 U/L / AST: 59 U/L / GGT: x             Culture - Urine (collected 03-17-18 @ 02:58)  Source: .Urine Clean Catch (Midstream)  Final Report (03-18-18 @ 09:54):    No growth    Culture - Blood (collected 03-16-18 @ 20:55)  Source: .Blood None  Preliminary Report (03-18-18 @ 11:01):    No growth to date.            RADIOLOGY & ADDITIONAL TESTS:    ANTIBIOTICS:< from: CT Abdomen and Pelvis No Cont (03.19.18 @ 13:00) >  Double-J left ureteral stent with proximal pigtail within the proximal   ureter as opposed to the left renal pelvis. Mild   dilatation/hydronephrosis of the upper pole calyces. No calculus is   identified along the course of the left ureter/stent    < end of copied text >    cefTRIAXone   IVPB 1 Gram(s) IV Intermittent every 24 hours

## 2018-03-19 NOTE — PROGRESS NOTE ADULT - SUBJECTIVE AND OBJECTIVE BOX
77 year old female with chronic hypoproliferative anemia, no response to EPO and iron, transfusion dependent for 6-8 months. 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 She had several episodes of gross hematuria and a right uteral stent placed for unexplained narrowing or thickening of the ureter, workup was negative for malignancy despite progressive weight loss. GI workup was negative. Heme workup showed low retic count and haptoglobin, elevated ferritin levels, elevated LDH but denied any fatigue or dizziness. She received 2 units pRBC last friday and she continued to have hematuria and evidence of hemolytic anemia. Patient feels well, states she continues to have some blood in her urine but seems mildly improved after start of antibiotics Hmoglobin on presentation 6.4 given 1unit of prbc hgb increased to 7.1    MEDICATIONS  (STANDING):  atorvastatin 40 milliGRAM(s) Oral at bedtime  cefTRIAXone   IVPB 1 Gram(s) IV Intermittent every 24 hours  dextrose 5%. 1000 milliLiter(s) (50 mL/Hr) IV Continuous <Continuous>  dextrose 50% Injectable 12.5 Gram(s) IV Push once  dextrose 50% Injectable 25 Gram(s) IV Push once  dextrose 50% Injectable 25 Gram(s) IV Push once  ferrous    sulfate 325 milliGRAM(s) Oral daily  folic acid 1 milliGRAM(s) Oral daily  furosemide    Tablet 20 milliGRAM(s) Oral daily  insulin glargine Injectable (LANTUS) 12 Unit(s) SubCutaneous at bedtime  insulin lispro (HumaLOG) corrective regimen sliding scale   SubCutaneous at bedtime  insulin lispro Injectable (HumaLOG) 4 Unit(s) SubCutaneous before breakfast  insulin lispro Injectable (HumaLOG) 4 Unit(s) SubCutaneous before lunch  insulin lispro Injectable (HumaLOG) 4 Unit(s) SubCutaneous before dinner  metoprolol     tartrate 25 milliGRAM(s) Oral two times a day  pantoprazole    Tablet 40 milliGRAM(s) Oral before breakfast  raloxifene 60 milliGRAM(s) Oral daily    MEDICATIONS  (PRN):  dextrose Gel 1 Dose(s) Oral once PRN Blood Glucose LESS THAN 70 milliGRAM(s)/deciliter  glucagon  Injectable 1 milliGRAM(s) IntraMuscular once PRN Glucose LESS THAN 70 milligrams/deciliter      Allergies    No Known Allergies    Intolerances        Vital Signs Last 24 Hrs  T(C): 36.2 (18 Mar 2018 05:03), Max: 37 (17 Mar 2018 20:34)  T(F): 97.2 (18 Mar 2018 05:03), Max: 98.6 (17 Mar 2018 20:34)  HR: 85 (18 Mar 2018 05:03) (70 - 85)  BP: 124/64 (18 Mar 2018 05:03) (111/56 - 168/57)  BP(mean): --  RR: 18 (18 Mar 2018 05:03) (18 - 18)  SpO2: --    PHYSICAL EXAM  General: adult in NAD  CV: normal S1/S2 + mechanical valve  Lungs: positive air movement b/l ant lungs,clear to auscultation, no wheezes, no rales  Abdomen: soft non-tender non-distended, no hepatosplenomegaly  Ext: B/L LE edema  Skin: no rashes and no petechiae  Neuro: alert and oriented X 4, no focal deficits    LABS:                                      7.4    5.42  )-----------( 257      ( 19 Mar 2018 08:36 )             24.9       Serial CBC's  03-17 @ 07:28  Hct-22.7 / Hgb-7.1 / Plat-219 / RBC-2.83 / WBC-5.74          Serial CBC's  03-16 @ 18:06  Hct-21.1 / Hgb-6.4 / Plat-221 / RBC-2.62 / WBC-6.13          03-18    144  |  101  |  27<H>  ----------------------------<  116<H>  4.4   |  33<H>  |  1.3    Ca    8.5      18 Mar 2018 10:38    TPro  6.2  /  Alb  3.7  /  TBili  3.0<H>  /  DBili  x   /  AST  53<H>  /  ALT  13  /  AlkPhos  82  03-18 03-16    140  |  100  |  33<H>  ----------------------------<  125<H>  4.1   |  28  |  1.2    Ca    8.2<L>      16 Mar 2018 20:55    TPro  6.2  /  Alb  3.7  /  TBili  2.5<H>  /  DBili  x   /  AST  53<H>  /  ALT  13  /  AlkPhos  94  03-16      PT/INR - ( 19 Mar 2018 08:36 )   PT: 20.40 sec;   INR: 1.87 ratio           PT/INR - ( 17 Mar 2018 07:28 )   PT: 20.10 sec;   INR: 1.84 ratio         PTT - ( 17 Mar 2018 07:28 )  PTT:29.0 sec    Hematocrit: 22.7 % (03-17-18 @ 07:28)  Platelet Count - Automated: 219 K/uL (03-17-18 @ 07:28)  Hemoglobin: 7.1 g/dL (03-17-18 @ 07:28)  WBC Count: 5.74 K/uL (03-17-18 @ 07:28)  Reticulocyte Percent: 6.1 % (03-17-18 @ 07:28)  Hemoglobin: 6.4 g/dL (03-16-18 @ 18:06)  WBC Count: 6.13 K/uL (03-16-18 @ 18:06)  Hematocrit: 21.1 % (03-16-18 @ 18:06)  Platelet Count - Automated: 221 K/uL (03-16-18 @ 18:06)  Reticulocyte Percent: 6.1 % (03-16-18 @ 18:06)              RADIOLOGY & ADDITIONAL STUDIES:  < from: Transthoracic Echocardiogram (03.17.18 @ 08:30) >  Left Ventricle: The left ventricular internal cavity size is normal. Left   ventricular wall thickness is normal. Global LV systolic function was   normal. Left ventricular ejection fraction, by visual estimation, is 60   to 65%.  Right Ventricle: RV is mildly dilated, moderately hypokinetic.  Left Atrium: Severely enlarged left atrium.  Right Atrium: Severely enlarged right atrium.  Pericardium: There is no evidence of pericardial effusion.  Mitral Valve:Mechanical valve in the mitral position is not well   visualized. Increased flow velocities consistent with peak PG > 30 mmHg   but MVA is calculated > 2 cm^2 by PHT. Trace MR. Comparison with prior   ECHO studies would be helpful.  Tricuspid Valve: The tricuspid valve is normal in structure. Moderate   tricuspid regurgitation is visualized. Estimated pulmonary artery   systolic pressure is 62.6 mmHg assuming a right atrial pressure of 15   mmHg, which is consistent with severe pulmonary hypertension.  Aortic Valve: Peak transaortic gradient equals 49.5 mmHg, mean   transaortic gradient equals 33.7 mmHg, the calculated aortic valve area   equals 0.85 cm² by the continuity equation consistent with severe aortic   stenosis. Severe aortic valve regurgitation is seen. Mechanical valve in   aortic position is not well vusalized.  Aorta: The aortic root is normal in size and structure.  Pulmonary Artery: The main pulmonary artery is normal in size.    < end of copied text >      < from: Xray Chest 1 View AP/PA (03.16.18 @ 20:47) >  EXAM:  XR CHEST FRONTAL 1V            PROCEDURE DATE:  03/16/2018            INTERPRETATION:  Clinical History / Reason for exam: Clinical concern for   infection.    Comparison : Chest radiograph March 25, 2017.    Technique/Positioning: Single APview.    Findings:    Support devices: None.    Cardiac/mediastinum/hilum: Post median sternotomy and valve replacement.   Pulmonary vascular congestion.    Lung parenchyma/Pleura: Right basilar opacity. No pneumothorax.    Skeleton/soft tissues: Unchanged.    Impression:      Right basilar opacity.    Pulmonary vascular congestion.    < end of copied text >

## 2018-03-19 NOTE — PROGRESS NOTE ADULT - ASSESSMENT
Hemolytic Anemia:  s/p 1 unit PRBC over the weekend  LDH 1000, retic 6  Hemolytic anemia- Delayed transfusion reaction vs valvular heart disease.  Cardiology note appreciated plan for [facility]ROE. Patient is yet to decide if she wants to go ahead or not.  Plan for BM biopsy as outpt  Monitor CBC and hemolysis panel daily       Hematuria:   s/p ureteral stent placement   Uro c/s Dr ugarte-   CT A/P to evaluate stent placement. GFR on lower side so it was not ordered over the weekend. We will order CT abd/pelvis non contrast to assess the stent  Hold ASA, must continue coumadin at this time for valves    c/w rocephin for possible UTI     Mechanical Valves:    continue with coumadin  check INR daily Hemolytic Anemia:  s/p 1 unit PRBC over the weekend  LDH 1000, retic 6  Hemolytic anemia- Delayed transfusion reaction vs valvular heart disease.  Cardiology note appreciated plan for MONROE. Patient agreed for it. Procedure likely to happen  tomorrow. keep her npo .   Give 6mg warfarin tonight.  Plan for BM biopsy as outpt  Monitor CBC and hemolysis panel daily       Hematuria:   s/p ureteral stent placement   Uro c/s Dr ugarte-   CT A/P to evaluate stent placement. GFR on lower side so it was not ordered over the weekend. We will order CT abd/pelvis non contrast to assess the stent  Hold ASA, must continue coumadin at this time for valves    c/w rocephin for possible UTI     Mechanical Valves:    continue with coumadin  check INR daily Hemolytic Anemia:  s/p 1 unit PRBC over the weekend  LDH 1000, retic 6  Hemolytic anemia- Delayed transfusion reaction vs valvular heart disease.  Cardiology note appreciated plan for [facility]ROE. Patient agreed for it. Procedure likely to happen  tomorrow. keep her npo .   Give 7 mg warfarin tonight.  Plan for BM biopsy as outpt  Monitor CBC and hemolysis panel daily       Hematuria:   s/p ureteral stent placement   Uro c/s Dr ugarte-   CT A/P to evaluate stent placement. GFR on lower side so it was not ordered over the weekend. We will order CT abd/pelvis non contrast to assess the stent  Hold ASA, must continue coumadin at this time for valves    c/w rocephin for possible UTI     Mechanical Valves:    continue with coumadin  check INR daily

## 2018-03-19 NOTE — PROGRESS NOTE ADULT - SUBJECTIVE AND OBJECTIVE BOX
77 year old female with chronic hypoproliferative anemia, no response to EPO and iron, transfusion dependent for 6-8 months. 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 She had several episodes of gross hematuria and a right uteral stent placed for unexplained narrowing or thickening of the ureter, workup was negative for malignancy despite progressive weight loss. GI workup was negative. Heme workup showed low retic count and haptoglobin, elevated ferritin levels, elevated LDH but denied any fatigue or dizziness. She received 2 units pRBC last friday and she continued to have hematuria and evidence of hemolytic anemia. Patient feels well, states she continues to have some blood in her urine but seems mildly improved after start of antibiotics     MEDICATIONS  (STANDING):  atorvastatin 40 milliGRAM(s) Oral at bedtime  cefTRIAXone   IVPB 1 Gram(s) IV Intermittent every 12 hours  ferrous    sulfate 325 milliGRAM(s) Oral daily  folic acid 1 milliGRAM(s) Oral daily  furosemide    Tablet 20 milliGRAM(s) Oral daily  metoprolol     tartrate 25 milliGRAM(s) Oral two times a day  pantoprazole    Tablet 40 milliGRAM(s) Oral before breakfast  raloxifene 60 milliGRAM(s) Oral daily    MEDICATIONS  (PRN):      Allergies    No Known Allergies    Intolerances        Vital Signs Last 24 Hrs  T(C): 36.2 (19 Mar 2018 04:53), Max: 36.8 (18 Mar 2018 15:03)  T(F): 97.1 (19 Mar 2018 04:53), Max: 98.3 (18 Mar 2018 15:03)  HR: 85 (19 Mar 2018 04:53) (74 - 87)  BP: 119/62 (19 Mar 2018 04:53) (119/62 - 165/68)  BP(mean): --  RR: 20 (19 Mar 2018 04:53) (20 - 20)  SpO2: --      PHYSICAL EXAM  General: adult in NAD  CV: normal S1/S2 with no murmur rubs or gallops  Lungs: positive air movement b/l ant lungs,clear to auscultation, no wheezes, no rales  Abdomen: soft non-tender non-distended, no hepatosplenomegaly  Ext: no clubbing cyanosis or edema  Neuro: alert and oriented X 4, no focal deficits    LABS:                             7.4    5.42  )-----------( 257      ( 19 Mar 2018 08:36 )             24.9   03-19    142  |  101  |  25<H>  ----------------------------<  183<H>  4.2   |  29  |  1.2    Ca    8.7      19 Mar 2018 08:36    TPro  6.5  /  Alb  3.8  /  TBili  3.3<H>  /  DBili  x   /  AST  59<H>  /  ALT  14  /  AlkPhos  81  03-19                     6.4    6.13  )-----------( 221      ( 16 Mar 2018 18:06 )             21.1         Mean Cell Volume : 80.5 fL  Mean Cell Hemoglobin : 24.4 pg  Mean Cell Hemoglobin Concentration : 30.3 g/dL  Auto Neutrophil # : x  Auto Lymphocyte # : x  Auto Monocyte # : x  Auto Eosinophil # : x  Auto Basophil # : x  Auto Neutrophil % : x  Auto Lymphocyte % : x  Auto Monocyte % : x  Auto Eosinophil % : x  Auto Basophil % : x    Serial CBC's  03-16 @ 18:06  Hct-21.1 / Hgb-6.4 / Plat-221 / RBC-2.62 / WBC-6.13            03-16    140  |  100  |  33<H>  ----------------------------<  125<H>  4.1   |  28  |  1.2    Ca    8.2<L>      16 Mar 2018 20:55    TPro  6.2  /  Alb  3.7  /  TBili  2.5<H>  /  DBili  x   /  AST  53<H>  /  ALT  13  /  AlkPhos  94  03-16      PT/INR - ( 16 Mar 2018 19:00 )   PT: 21.50 sec;   INR: 1.96 ratio         PTT - ( 16 Mar 2018 19:00 )  PTT:29.8 sec    Lactate Dehydrogenase, Serum: >1000 (03.16.18 @ 18:06)    Reticulocyte Count in AM (03.16.18 @ 18:06)    RBC Count: 2.62 M/uL    Reticulocyte Percent: 6.1 %    Absolute Reticulocytes: 158.8 K/uL          Hemoglobin: 6.4 g/dL (03-16-18 @ 18:06)  WBC Count: 6.13 K/uL (03-16-18 @ 18:06)  Hematocrit: 21.1 % (03-16-18 @ 18:06)  Platelet Count - Automated: 221 K/uL (03-16-18 @ 18:06)  Reticulocyte Percent: 6.1 % (03-16-18 @ 18:06)    Urine Microscopic-Add On (NC) (03.17.18 @ 02:58)    Bacteria: Moderate /HPF    Epithelial Cells: Few /HPF    Red Blood Cell - Urine: >50 /HPF    White Blood Cell - Urine: 10-25 /HPF    Urinalysis (03.17.18 @ 02:58)    pH Urine: 5.5    Glucose Qualitative, Urine: Negative mg/dL    Blood, Urine: Large    Color: Red    Urine Appearance: Turbid    Bilirubin: Large    Ketone - Urine: 15    Specific Gravity: 1.015    Protein, Urine: >=300 mg/dL    Urobilinogen: 1.0 mg/dL    Nitrite: Positive    Leukocyte Esterase Concentration: Moderate        Culture - Urine (03.17.18 @ 02:58)    Specimen Source: .Urine Clean Catch (Midstream)    Culture Results:   No growth          Culture - Blood (03.16.18 @ 20:55)    Specimen Source: .Blood None    Culture Results:   No growth to date.

## 2018-03-19 NOTE — PROGRESS NOTE ADULT - ASSESSMENT
Hemolytic Anemia:  s/p 1 unit slow transfusion given 3/16 Hgb 6.4-->7.1  LDH 1000, retic 6  Recent transfusion(2u prbc) 1 week ago given h/o hematuria- possible delayed hemolytic reaction   Transfusion reaction lab work sent  Plan for BM biopsy as outpt per heme/onc  Monitor CBC and hemolysis panel daily   Cardio evaluation to determine if mechanical valve is cause of hemolytic anemia- patient will go for valve evaluation under fluroscopy on monday  2D echo completed- results above (mitral valve needs further evaluation)    Hematuria:   s/p ureteral stent placement   Uro c/s Dr ugarte- Flu UCx results, CT A/P to evaluate stent placement (case d/w radiology)  Hold ASA, must continue coumadin at this time for valves    c/w rocephin for possible UTI   Urine culture inpatient- no growth (however patient has been on antibiotics)  hemoglobin stable    Mechanical Valves:  2D echo completed  Cardiology to evaluate valves possible cause of hemolysis   continue with coumadin goal 2.5-3.5   check INR daily   patient INR has paulino subtherapeutic x 3 days, start LMWH, c/w coumadin. will d/c LMWH once INR is in therapeutic range

## 2018-03-20 DIAGNOSIS — M81.0 AGE-RELATED OSTEOPOROSIS WITHOUT CURRENT PATHOLOGICAL FRACTURE: ICD-10-CM

## 2018-03-20 DIAGNOSIS — Z95.2 PRESENCE OF PROSTHETIC HEART VALVE: Chronic | ICD-10-CM

## 2018-03-20 DIAGNOSIS — Z96.0 PRESENCE OF UROGENITAL IMPLANTS: Chronic | ICD-10-CM

## 2018-03-20 DIAGNOSIS — N12 TUBULO-INTERSTITIAL NEPHRITIS, NOT SPECIFIED AS ACUTE OR CHRONIC: ICD-10-CM

## 2018-03-20 DIAGNOSIS — I09.9 RHEUMATIC HEART DISEASE, UNSPECIFIED: ICD-10-CM

## 2018-03-20 DIAGNOSIS — I48.91 UNSPECIFIED ATRIAL FIBRILLATION: ICD-10-CM

## 2018-03-20 LAB
ANION GAP SERPL CALC-SCNC: 11 MMOL/L — SIGNIFICANT CHANGE UP (ref 7–14)
APTT BLD: 33.7 SEC — SIGNIFICANT CHANGE UP (ref 27–39.2)
BASOPHILS # BLD AUTO: 0.05 K/UL — SIGNIFICANT CHANGE UP (ref 0–0.2)
BASOPHILS NFR BLD AUTO: 1 % — SIGNIFICANT CHANGE UP (ref 0–1)
BLD GP AB SCN SERPL QL: SIGNIFICANT CHANGE UP
BUN SERPL-MCNC: 25 MG/DL — HIGH (ref 10–20)
CALCIUM SERPL-MCNC: 8.5 MG/DL — SIGNIFICANT CHANGE UP (ref 8.5–10.1)
CHLORIDE SERPL-SCNC: 104 MMOL/L — SIGNIFICANT CHANGE UP (ref 98–110)
CO2 SERPL-SCNC: 29 MMOL/L — SIGNIFICANT CHANGE UP (ref 17–32)
CREAT SERPL-MCNC: 1.2 MG/DL — SIGNIFICANT CHANGE UP (ref 0.7–1.5)
EOSINOPHIL # BLD AUTO: 0.19 K/UL — SIGNIFICANT CHANGE UP (ref 0–0.7)
EOSINOPHIL NFR BLD AUTO: 3.8 % — SIGNIFICANT CHANGE UP (ref 0–8)
GLUCOSE SERPL-MCNC: 124 MG/DL — HIGH (ref 70–110)
HAPTOGLOB SERPL-MCNC: <20 MG/DL — LOW (ref 34–200)
HCT VFR BLD CALC: 22.9 % — LOW (ref 37–47)
HGB BLD-MCNC: 6.9 G/DL — CRITICAL LOW (ref 12–16)
IMM GRANULOCYTES NFR BLD AUTO: 0.4 % — HIGH (ref 0.1–0.3)
INR BLD: 2.1 RATIO — HIGH (ref 0.65–1.3)
INR BLD: 2.14 RATIO — HIGH (ref 0.65–1.3)
LDH SERPL L TO P-CCNC: 922 — HIGH (ref 50–242)
LYMPHOCYTES # BLD AUTO: 0.9 K/UL — LOW (ref 1.2–3.4)
LYMPHOCYTES # BLD AUTO: 18.1 % — LOW (ref 20.5–51.1)
MAGNESIUM SERPL-MCNC: 2.2 MG/DL — SIGNIFICANT CHANGE UP (ref 1.8–2.4)
MCHC RBC-ENTMCNC: 24.4 PG — LOW (ref 27–31)
MCHC RBC-ENTMCNC: 30.1 G/DL — LOW (ref 32–37)
MCV RBC AUTO: 80.9 FL — LOW (ref 81–99)
MONOCYTES # BLD AUTO: 0.33 K/UL — SIGNIFICANT CHANGE UP (ref 0.1–0.6)
MONOCYTES NFR BLD AUTO: 6.7 % — SIGNIFICANT CHANGE UP (ref 1.7–9.3)
NEUTROPHILS # BLD AUTO: 3.47 K/UL — SIGNIFICANT CHANGE UP (ref 1.4–6.5)
NEUTROPHILS NFR BLD AUTO: 70 % — SIGNIFICANT CHANGE UP (ref 42.2–75.2)
PLATELET # BLD AUTO: 203 K/UL — SIGNIFICANT CHANGE UP (ref 130–400)
POTASSIUM SERPL-MCNC: 4.3 MMOL/L — SIGNIFICANT CHANGE UP (ref 3.5–5)
POTASSIUM SERPL-SCNC: 4.3 MMOL/L — SIGNIFICANT CHANGE UP (ref 3.5–5)
PROTHROM AB SERPL-ACNC: 23 SEC — HIGH (ref 9.95–12.87)
PROTHROM AB SERPL-ACNC: 23.5 SEC — HIGH (ref 9.95–12.87)
RBC # BLD: 2.83 M/UL — LOW (ref 4.2–5.4)
RBC # BLD: 2.83 M/UL — LOW (ref 4.2–5.4)
RBC # FLD: 18.6 % — HIGH (ref 11.5–14.5)
RETICS #: 174.3 K/UL — HIGH (ref 25–125)
RETICS/RBC NFR: 6.2 % — HIGH (ref 0.5–1.5)
SODIUM SERPL-SCNC: 144 MMOL/L — SIGNIFICANT CHANGE UP (ref 135–146)
TYPE + AB SCN PNL BLD: SIGNIFICANT CHANGE UP
WBC # BLD: 4.96 K/UL — SIGNIFICANT CHANGE UP (ref 4.8–10.8)
WBC # FLD AUTO: 4.96 K/UL — SIGNIFICANT CHANGE UP (ref 4.8–10.8)

## 2018-03-20 RX ORDER — HEPARIN SODIUM 5000 [USP'U]/ML
1000 INJECTION INTRAVENOUS; SUBCUTANEOUS
Qty: 25000 | Refills: 0 | Status: DISCONTINUED | OUTPATIENT
Start: 2018-03-20 | End: 2018-03-21

## 2018-03-20 RX ADMIN — Medication 25 MILLIGRAM(S): at 17:50

## 2018-03-20 RX ADMIN — Medication 4 UNIT(S): at 17:50

## 2018-03-20 RX ADMIN — ENOXAPARIN SODIUM 60 MILLIGRAM(S): 100 INJECTION SUBCUTANEOUS at 08:07

## 2018-03-20 RX ADMIN — ATORVASTATIN CALCIUM 40 MILLIGRAM(S): 80 TABLET, FILM COATED ORAL at 21:34

## 2018-03-20 RX ADMIN — HEPARIN SODIUM 10 UNIT(S)/HR: 5000 INJECTION INTRAVENOUS; SUBCUTANEOUS at 19:51

## 2018-03-20 NOTE — PROGRESS NOTE ADULT - ASSESSMENT
pyelonephritis  Negative cultures.   Clinically asymptomatic  Give today's dose of iv antibiotic and from 3/21 start po Vantin 200mg q12h for 7 more days.  Recall prn please.

## 2018-03-20 NOTE — PRE-ANESTHESIA EVALUATION ADULT - NSRADCARDRESULTSFT_GEN_ALL_CORE
1. Left ventricular ejection fraction, by visual estimation, is 60 to 65%.    2. Normal global left ventricular systolic function.    3. Severely enlarged left atrium.    4. Severely enlarged right atrium.    5. RV is mildly dilated, moderately hypokinetic.    6. Mechanical valve in the mitral position is not well visualized.   Increased flow velocities consistent with peak PG > 30 mmHg but MVA is   calculated > 2 cm^2 by PHT. Trace MR. Comparison with prior ECHO studies   would be helpful.    7. Moderate tricuspid regurgitation.    8. Severe aortic regurgitation.    9. Mechanical valve in aortic position is not well vusalized.   10. Estimated pulmonary artery systolic pressure is 62.6 mmHg assuming a   right atrial pressure of 15 mmHg, which is consistent with severe pulmonary   hypertension.   11. Peak transaortic gradient equals 49.5 mmHg, mean transaortic gradient   equals 33.7 mmHg, the calculated aortic valve area equals 0.85 cmï¿½ by the   continuity equation consistent with severe aortic stenosis.

## 2018-03-20 NOTE — PRE-ANESTHESIA EVALUATION ADULT - NSANTHLABRESULTSFT_GEN_ALL_CORE
CBC Full  -  ( 20 Mar 2018 06:55 )  WBC Count : 4.96 K/uL  Hemoglobin : 6.9 g/dL  Hematocrit : 22.9 %  Platelet Count - Automated : 203 K/uL  Mean Cell Volume : 80.9 fL  Mean Cell Hemoglobin : 24.4 pg  Mean Cell Hemoglobin Concentration : 30.1 g/dL  Auto Neutrophil # : 3.47 K/uL  Auto Lymphocyte # : 0.90 K/uL  Auto Monocyte # : 0.33 K/uL  Auto Eosinophil # : 0.19 K/uL  Auto Basophil # : 0.05 K/uL  Auto Neutrophil % : 70.0 %  Auto Lymphocyte % : 18.1 %  Auto Monocyte % : 6.7 %  Auto Eosinophil % : 3.8 %  Auto Basophil % : 1.0 %

## 2018-03-20 NOTE — PROGRESS NOTE ADULT - ASSESSMENT
Hemolytic Anemia:  s/p 1 unit PRBC over the weekend  LDH 1000, retic 6  Hemolytic anemia- Delayed transfusion reaction vs valvular heart disease.  Cardiology note appreciated. MONROE today.  Plan for BM biopsy as outpt  Monitor CBC and hemolysis panel daily       Hematuria:   s/p ureteral stent placement   Uro c/s Dr ugarte-   CT A/P showed stent in proximal ureter rather than in pelvis. Hydronephrosis as well. Recall Urology.  Hold ASA, must continue coumadin at this time for valves    c/w rocephin for possible UTI     Mechanical Valves:    continue with coumadin  check INR daily

## 2018-03-20 NOTE — PROGRESS NOTE ADULT - SUBJECTIVE AND OBJECTIVE BOX
Patient is a 77y old  Female who presents with a chief complaint of bleeding when urinating and anemia (16 Mar 2018 19:01)      PAST MEDICAL & SURGICAL HISTORY:  Chronic Anemia  Rheumatic fever  Diabetes  Osteoporosis  Mitral valve replaced  Atrial fibrillation: on coumadin at home  Hypertension  H/O mitral valve replacement      MEDICATIONS  (STANDING):  atorvastatin 40 milliGRAM(s) Oral at bedtime  cefTRIAXone   IVPB 1 Gram(s) IV Intermittent every 24 hours  dextrose 5%. 1000 milliLiter(s) (50 mL/Hr) IV Continuous <Continuous>  dextrose 50% Injectable 12.5 Gram(s) IV Push once  dextrose 50% Injectable 25 Gram(s) IV Push once  dextrose 50% Injectable 25 Gram(s) IV Push once  enoxaparin Injectable 60 milliGRAM(s) SubCutaneous two times a day  ferrous    sulfate 325 milliGRAM(s) Oral daily  folic acid 1 milliGRAM(s) Oral daily  furosemide    Tablet 20 milliGRAM(s) Oral daily  insulin glargine Injectable (LANTUS) 12 Unit(s) SubCutaneous at bedtime  insulin lispro (HumaLOG) corrective regimen sliding scale   SubCutaneous at bedtime  insulin lispro Injectable (HumaLOG) 4 Unit(s) SubCutaneous before breakfast  insulin lispro Injectable (HumaLOG) 4 Unit(s) SubCutaneous before lunch  insulin lispro Injectable (HumaLOG) 4 Unit(s) SubCutaneous before dinner  metoprolol     tartrate 25 milliGRAM(s) Oral two times a day  pantoprazole    Tablet 40 milliGRAM(s) Oral before breakfast  raloxifene 60 milliGRAM(s) Oral daily    MEDICATIONS  (PRN):  dextrose Gel 1 Dose(s) Oral once PRN Blood Glucose LESS THAN 70 milliGRAM(s)/deciliter  glucagon  Injectable 1 milliGRAM(s) IntraMuscular once PRN Glucose LESS THAN 70 milligrams/deciliter      Overnight events:    Vital Signs Last 24 Hrs  T(C): 36.3 (20 Mar 2018 05:39), Max: 36.6 (19 Mar 2018 21:19)  T(F): 97.3 (20 Mar 2018 05:39), Max: 97.8 (19 Mar 2018 21:19)  HR: 73 (20 Mar 2018 05:39) (71 - 77)  BP: 126/65 (20 Mar 2018 05:39) (126/65 - 153/68)  BP(mean): --  RR: 20 (20 Mar 2018 05:39) (18 - 20)  SpO2: --  CAPILLARY BLOOD GLUCOSE  129 (20 Mar 2018 05:39)  155 (19 Mar 2018 20:36)  123 (19 Mar 2018 16:26)  238 (19 Mar 2018 11:47)        I&O's Summary      Physical Exam:    -     General : NAD    -      Cardiac: Irregular RR, Cardiac murmur appreciated over 2nd left intercoastal space radiating to [place]    -      Pulm: GBBS    -      GI: +ve Bowel sounds, soft non tender non distended abdomen, no organomegalies    -      Musculoskeletal: no joint or muscle pain, LE erythema over chin as patient scratches her feet always    -      Neuro: AAOx3,Motor power grossly intact, intact sensorium, Knee jerk reflexes +2, CN2-12 intact, Normal gait.         Labs:                        6.9    4.96  )-----------( 203      ( 20 Mar 2018 06:55 )             22.9             03-20    144  |  104  |  25<H>  ----------------------------<  124<H>  4.3   |  29  |  1.2    Ca    8.5      20 Mar 2018 06:55  Mg     2.2     03-20    TPro  6.5  /  Alb  3.8  /  TBili  3.3<H>  /  DBili  x   /  AST  59<H>  /  ALT  14  /  AlkPhos  81  03-19    LIVER FUNCTIONS - ( 19 Mar 2018 08:36 )  Alb: 3.8 g/dL / Pro: 6.5 g/dL / ALK PHOS: 81 U/L / ALT: 14 U/L / AST: 59 U/L / GGT: x                 PT/INR - ( 20 Mar 2018 06:55 )   PT: 23.00 sec;   INR: 2.10 ratio      Imaging:    CT Abdomen pelvis non contrast:   Double-J left ureteral stent with proximal pigtail within the proximal   ureter as opposed to the left renal pelvis. Mild   dilatation/hydronephrosis of the upper pole calyces. No calculus is   identified along the course of the left ureter/stent

## 2018-03-20 NOTE — PROGRESS NOTE ADULT - PROBLEM SELECTOR PLAN 1
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

## 2018-03-20 NOTE — PROCEDURE NOTE - ADDITIONAL PROCEDURE DETAILS
[facility]ROE Procedure Note:     After risks, benefits and alternatives of the procedure were explained, consent was signed and placed in the medical record.  Procedural timeout was taken.  Sedation was administered by anesthesia.  MONROE probe inserted without complication and MONROE performed.  Patient tolerated the procedure well without complication.  Post-procedure vital signs were stable.    MONROE findings discussed with patient.     MONROE findings:  LVEF       See full report for findings.      Post procedure vitals: Stable      Recommendations:  Continue current medications.        Vicki Miller MD MONROE Procedure Note:     After risks, benefits and alternatives of the procedure were explained, consent was signed and placed in the medical record.  Procedural timeout was taken.  Sedation was administered by anesthesia.  MONROE probe inserted without complication and MONROE performed.  Patient tolerated the procedure well without complication.  Post-procedure vital signs were stable.    MONROE findings discussed with patient.     MONROE findings:  LVEF 55-60%  Mechanical mitral valve with moderate MR and paravalvular leak.   A mobile echodense lesion was seen on the atrial aspect of the mitral valve possibly representing a thrombus.  Mechanical bileaflet aortic valve which appears to be functioning normally.     See full report for findings.      Post procedure vitals: Stable      Recommendations:  Continue current medications.  Will discuss with patient and cardiothoracic surgeon regarding further treatment options MONROE Procedure Note:     After risks, benefits and alternatives of the procedure were explained, consent was signed and placed in the medical record.  Procedural timeout was taken.  Sedation was administered by anesthesia.  MONROE probe inserted without complication and MONROE performed.  Patient tolerated the procedure well without complication.  Post-procedure vital signs were stable.    MONROE findings discussed with patient.     MONROE findings:  LVEF 55-60%  Mechanical mitral valve with moderate MR and paravalvular leak.   A mobile echodense lesion was seen on the atrial aspect of the mitral valve possibly representing a thrombus. Significant pannus noted.  Mechanical bileaflet aortic valve which appears to be functioning normally.     See full report for findings.      Post procedure vitals: Stable      Recommendations:  Continue current medications.  Check blood cultures.  [facility] prior to AVR/MVR

## 2018-03-20 NOTE — PROGRESS NOTE ADULT - SUBJECTIVE AND OBJECTIVE BOX
AUSTIN DAVIS  77y, Female      OVERNIGHT EVENTS:    no fevers, feels well, no complaints    VITALS:  T(F): 97.3, Max: 97.8 (03-19-18 @ 21:19)  HR: 73  BP: 126/65  RR: 20Vital Signs Last 24 Hrs  T(C): 36.3 (20 Mar 2018 05:39), Max: 36.6 (19 Mar 2018 21:19)  T(F): 97.3 (20 Mar 2018 05:39), Max: 97.8 (19 Mar 2018 21:19)  HR: 73 (20 Mar 2018 05:39) (71 - 77)  BP: 126/65 (20 Mar 2018 05:39) (126/65 - 153/68)  BP(mean): --  RR: 20 (20 Mar 2018 05:39) (18 - 20)  SpO2: --    TESTS & MEASUREMENTS:                        6.9    4.96  )-----------( 203      ( 20 Mar 2018 06:55 )             22.9     03-20    144  |  104  |  25<H>  ----------------------------<  124<H>  4.3   |  29  |  1.2    Ca    8.5      20 Mar 2018 06:55  Mg     2.2     03-20    TPro  6.5  /  Alb  3.8  /  TBili  3.3<H>  /  DBili  x   /  AST  59<H>  /  ALT  14  /  AlkPhos  81  03-19    LIVER FUNCTIONS - ( 19 Mar 2018 08:36 )  Alb: 3.8 g/dL / Pro: 6.5 g/dL / ALK PHOS: 81 U/L / ALT: 14 U/L / AST: 59 U/L / GGT: x             Culture - Urine (collected 03-17-18 @ 02:58)  Source: .Urine Clean Catch (Midstream)  Final Report (03-18-18 @ 09:54):    No growth    Culture - Blood (collected 03-16-18 @ 20:55)  Source: .Blood None  Preliminary Report (03-18-18 @ 11:01):    No growth to date.            RADIOLOGY & ADDITIONAL TESTS:    ANTIBIOTICS:  cefTRIAXone   IVPB 1 Gram(s) IV Intermittent every 24 hours

## 2018-03-20 NOTE — PRE-ANESTHESIA EVALUATION ADULT - NSANTHPEFT_GEN_ALL_CORE
AAOX3, NAD, NC,AT  CVS: S1S2 RRR  RESP: CTABL no W/R/R  ABD: Soft NT, ND   EXT: Motor sensory grossly intact.

## 2018-03-20 NOTE — CHART NOTE - NSCHARTNOTEFT_GEN_A_CORE
PACU ANESTHESIA ADMISSION NOTE      Procedure: MONROE  Post op diagnosis: Perivavular leak       ____  Intubated  TV:______       Rate: ______      FiO2: ______    ____  Patent Airway    __x__  Full return of protective reflexes    __x__  Full recovery from anesthesia / back to baseline status    Vitals:  T(C): 36.3 (03-20-18 @ 05:39), Max: 36.6 (03-19-18 @ 21:19)  HR: 73 (03-20-18 @ 05:39) (71 - 77)  BP: 148/67 (03-20-18 @ 11:24) (126/65 - 153/68)  RR: 20 (03-20-18 @ 05:39) (18 - 20)  SpO2: --    Mental Status:  ___x_ Awake   __x___ Alert   _____ Drowsy   _____ Sedated    Nausea/Vomiting:  ___x_ NO  ______Yes,   See Post - Op Orders          Pain Scale (0-10):  ____0_    Treatment: ____ None    ____ See Post - Op/PCA Orders    Post - Operative Fluids:   _x___ Oral   _x___ See Post - Op Orders    Plan: Discharge:   ____Home       ____x_Floor     _____Critical Care    _____  Other:_________________    Comments: smooth course, no anesthesia complications

## 2018-03-20 NOTE — PROGRESS NOTE ADULT - SUBJECTIVE AND OBJECTIVE BOX
77 year old female with chronic hypoproliferative anemia, no response to EPO and iron, transfusion dependent for 6-8 months. 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 She had several episodes of gross hematuria and a right uteral stent placed for unexplained narrowing or thickening of the ureter, workup was negative for malignancy despite progressive weight loss. GI workup was negative. Heme workup showed low retic count and haptoglobin, elevated ferritin levels, elevated LDH but denied any fatigue or dizziness. She received 2 units pRBC last friday and she continued to have hematuria and evidence of hemolytic anemia. Patient feels well, states she continues to have some blood in her urine but seems mildly improved after start of antibiotics     MEDICATIONS  (STANDING):  atorvastatin 40 milliGRAM(s) Oral at bedtime  cefTRIAXone   IVPB 1 Gram(s) IV Intermittent every 12 hours  ferrous    sulfate 325 milliGRAM(s) Oral daily  folic acid 1 milliGRAM(s) Oral daily  furosemide    Tablet 20 milliGRAM(s) Oral daily  metoprolol     tartrate 25 milliGRAM(s) Oral two times a day  pantoprazole    Tablet 40 milliGRAM(s) Oral before breakfast  raloxifene 60 milliGRAM(s) Oral daily    MEDICATIONS  (PRN):      Allergies    No Known Allergies    Intolerances      Vital Signs Last 24 Hrs  T(C): 36.3 (20 Mar 2018 05:39), Max: 36.6 (19 Mar 2018 21:19)  T(F): 97.3 (20 Mar 2018 05:39), Max: 97.8 (19 Mar 2018 21:19)  HR: 73 (20 Mar 2018 05:39) (71 - 77)  BP: 126/65 (20 Mar 2018 05:39) (126/65 - 153/68)  BP(mean): --  RR: 20 (20 Mar 2018 05:39) (18 - 20)  SpO2: --      PHYSICAL EXAM  General: adult in NAD  CV: normal S1/S2 with no murmur rubs or gallops  Lungs: positive air movement b/l ant lungs,clear to auscultation, no wheezes, no rales  Abdomen: soft non-tender non-distended, no hepatosplenomegaly  Ext: no clubbing cyanosis or edema  Neuro: alert and oriented X 3, no focal deficits    LABS:                          6.9    4.96  )-----------( 203      ( 20 Mar 2018 06:55 )             22.9   03-20    144  |  104  |  25<H>  ----------------------------<  124<H>  4.3   |  29  |  1.2    Ca    8.5      20 Mar 2018 06:55  Mg     2.2     03-20    TPro  6.5  /  Alb  3.8  /  TBili  3.3<H>  /  DBili  x   /  AST  59<H>  /  ALT  14  /  AlkPhos  81  03-19    PT/INR - ( 20 Mar 2018 06:55 )   PT: 23.00 sec;   INR: 2.10 ratio                                    7.4    5.42  )-----------( 257      ( 19 Mar 2018 08:36 )             24.9   03-19    142  |  101  |  25<H>  ----------------------------<  183<H>  4.2   |  29  |  1.2    Ca    8.7      19 Mar 2018 08:36    TPro  6.5  /  Alb  3.8  /  TBili  3.3<H>  /  DBili  x   /  AST  59<H>  /  ALT  14  /  AlkPhos  81  03-19                     6.4    6.13  )-----------( 221      ( 16 Mar 2018 18:06 )             21.1         Mean Cell Volume : 80.5 fL  Mean Cell Hemoglobin : 24.4 pg  Mean Cell Hemoglobin Concentration : 30.3 g/dL  Auto Neutrophil # : x  Auto Lymphocyte # : x  Auto Monocyte # : x  Auto Eosinophil # : x  Auto Basophil # : x  Auto Neutrophil % : x  Auto Lymphocyte % : x  Auto Monocyte % : x  Auto Eosinophil % : x  Auto Basophil % : x    Serial CBC's  03-16 @ 18:06  Hct-21.1 / Hgb-6.4 / Plat-221 / RBC-2.62 / WBC-6.13            03-16    140  |  100  |  33<H>  ----------------------------<  125<H>  4.1   |  28  |  1.2    Ca    8.2<L>      16 Mar 2018 20:55    TPro  6.2  /  Alb  3.7  /  TBili  2.5<H>  /  DBili  x   /  AST  53<H>  /  ALT  13  /  AlkPhos  94  03-16      PT/INR - ( 16 Mar 2018 19:00 )   PT: 21.50 sec;   INR: 1.96 ratio         PTT - ( 16 Mar 2018 19:00 )  PTT:29.8 sec    Lactate Dehydrogenase, Serum: >1000 (03.16.18 @ 18:06)    Reticulocyte Count in AM (03.16.18 @ 18:06)    RBC Count: 2.62 M/uL    Reticulocyte Percent: 6.1 %    Absolute Reticulocytes: 158.8 K/uL          Hemoglobin: 6.4 g/dL (03-16-18 @ 18:06)  WBC Count: 6.13 K/uL (03-16-18 @ 18:06)  Hematocrit: 21.1 % (03-16-18 @ 18:06)  Platelet Count - Automated: 221 K/uL (03-16-18 @ 18:06)  Reticulocyte Percent: 6.1 % (03-16-18 @ 18:06)    Urine Microscopic-Add On (NC) (03.17.18 @ 02:58)    Bacteria: Moderate /HPF    Epithelial Cells: Few /HPF    Red Blood Cell - Urine: >50 /HPF    White Blood Cell - Urine: 10-25 /HPF    Urinalysis (03.17.18 @ 02:58)    pH Urine: 5.5    Glucose Qualitative, Urine: Negative mg/dL    Blood, Urine: Large    Color: Red    Urine Appearance: Turbid    Bilirubin: Large    Ketone - Urine: 15    Specific Gravity: 1.015    Protein, Urine: >=300 mg/dL    Urobilinogen: 1.0 mg/dL    Nitrite: Positive    Leukocyte Esterase Concentration: Moderate        Culture - Urine (03.17.18 @ 02:58)    Specimen Source: .Urine Clean Catch (Midstream)    Culture Results:   No growth          Culture - Blood (03.16.18 @ 20:55)    Specimen Source: .Blood None    Culture Results:   No growth to date.      < from: CT Abdomen and Pelvis No Cont (03.19.18 @ 13:00) >  MPRESSION:    Double-J left ureteral stent with proximal pigtail within the proximal   ureter as opposed to the left renal pelvis. Mild   dilatation/hydronephrosis of the upper pole calyces. No calculus is   identified along the course of the left ureter/stent        < end of copied text > 77 year old female with chronic anemia, on Procrit, transfusion dependent for 6-8 months. She has history of chronic hematuria, source in unclear, she is s/p JJ stent to L ureter by Dr. Pablo and over the past 6 weeks hematuria improved.  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 Most recently she received 2 units of PRBC on 3/9/2018, and prior to admission presented with lesly hemolysis. 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 She was ruled out for delayed transfusion reaction and AIHA, echo revealed concerning valve abnormalities that may be accounting for worsening hemolysis.     MEDICATIONS  (STANDING):  atorvastatin 40 milliGRAM(s) Oral at bedtime  cefTRIAXone   IVPB 1 Gram(s) IV Intermittent every 12 hours  ferrous    sulfate 325 milliGRAM(s) Oral daily  folic acid 1 milliGRAM(s) Oral daily  furosemide    Tablet 20 milliGRAM(s) Oral daily  metoprolol     tartrate 25 milliGRAM(s) Oral two times a day  pantoprazole    Tablet 40 milliGRAM(s) Oral before breakfast  raloxifene 60 milliGRAM(s) Oral daily    MEDICATIONS  (PRN):      Allergies    No Known Allergies    Intolerances      Vital Signs Last 24 Hrs  T(C): 36.3 (20 Mar 2018 05:39), Max: 36.6 (19 Mar 2018 21:19)  T(F): 97.3 (20 Mar 2018 05:39), Max: 97.8 (19 Mar 2018 21:19)  HR: 73 (20 Mar 2018 05:39) (71 - 77)  BP: 126/65 (20 Mar 2018 05:39) (126/65 - 153/68)  BP(mean): --  RR: 20 (20 Mar 2018 05:39) (18 - 20)  SpO2: --      PHYSICAL EXAM  General: adult in NAD, jaundiced  CV: normal S1/S2 irregularly irregular, audible click  Lungs: positive air movement b/l ant lungs, clear to auscultation, no wheezes, no rales  Abdomen: soft non-tender non-distended, no hepatosplenomegaly  Ext: no clubbing cyanosis or edema  Neuro: alert and oriented X 3, no focal deficits    LABS:                          6.9    4.96  )-----------( 203      ( 20 Mar 2018 06:55 )             22.9   03-20    144  |  104  |  25<H>  ----------------------------<  124<H>  4.3   |  29  |  1.2    Ca    8.5      20 Mar 2018 06:55  Mg     2.2     03-20    TPro  6.5  /  Alb  3.8  /  TBili  3.3<H>  /  DBili  x   /  AST  59<H>  /  ALT  14  /  AlkPhos  81  03-19    PT/INR - ( 20 Mar 2018 06:55 )   PT: 23.00 sec;   INR: 2.10 ratio                                    7.4    5.42  )-----------( 257      ( 19 Mar 2018 08:36 )             24.9   03-19    142  |  101  |  25<H>  ----------------------------<  183<H>  4.2   |  29  |  1.2    Ca    8.7      19 Mar 2018 08:36    TPro  6.5  /  Alb  3.8  /  TBili  3.3<H>  /  DBili  x   /  AST  59<H>  /  ALT  14  /  AlkPhos  81  03-19                     6.4    6.13  )-----------( 221      ( 16 Mar 2018 18:06 )             21.1         Mean Cell Volume : 80.5 fL  Mean Cell Hemoglobin : 24.4 pg  Mean Cell Hemoglobin Concentration : 30.3 g/dL  Auto Neutrophil # : x  Auto Lymphocyte # : x  Auto Monocyte # : x  Auto Eosinophil # : x  Auto Basophil # : x  Auto Neutrophil % : x  Auto Lymphocyte % : x  Auto Monocyte % : x  Auto Eosinophil % : x  Auto Basophil % : x    Serial CBC's  03-16 @ 18:06  Hct-21.1 / Hgb-6.4 / Plat-221 / RBC-2.62 / WBC-6.13            03-16    140  |  100  |  33<H>  ----------------------------<  125<H>  4.1   |  28  |  1.2    Ca    8.2<L>      16 Mar 2018 20:55    TPro  6.2  /  Alb  3.7  /  TBili  2.5<H>  /  DBili  x   /  AST  53<H>  /  ALT  13  /  AlkPhos  94  03-16      PT/INR - ( 16 Mar 2018 19:00 )   PT: 21.50 sec;   INR: 1.96 ratio         PTT - ( 16 Mar 2018 19:00 )  PTT:29.8 sec    Lactate Dehydrogenase, Serum: >1000 (03.16.18 @ 18:06)    Reticulocyte Count in AM (03.16.18 @ 18:06)    RBC Count: 2.62 M/uL    Reticulocyte Percent: 6.1 %    Absolute Reticulocytes: 158.8 K/uL          Hemoglobin: 6.4 g/dL (03-16-18 @ 18:06)  WBC Count: 6.13 K/uL (03-16-18 @ 18:06)  Hematocrit: 21.1 % (03-16-18 @ 18:06)  Platelet Count - Automated: 221 K/uL (03-16-18 @ 18:06)  Reticulocyte Percent: 6.1 % (03-16-18 @ 18:06)    Urine Microscopic-Add On (NC) (03.17.18 @ 02:58)    Bacteria: Moderate /HPF    Epithelial Cells: Few /HPF    Red Blood Cell - Urine: >50 /HPF    White Blood Cell - Urine: 10-25 /HPF    Urinalysis (03.17.18 @ 02:58)    pH Urine: 5.5    Glucose Qualitative, Urine: Negative mg/dL    Blood, Urine: Large    Color: Red    Urine Appearance: Turbid    Bilirubin: Large    Ketone - Urine: 15    Specific Gravity: 1.015    Protein, Urine: >=300 mg/dL    Urobilinogen: 1.0 mg/dL    Nitrite: Positive    Leukocyte Esterase Concentration: Moderate        Culture - Urine (03.17.18 @ 02:58)    Specimen Source: .Urine Clean Catch (Midstream)    Culture Results:   No growth          Culture - Blood (03.16.18 @ 20:55)    Specimen Source: .Blood None    Culture Results:   No growth to date.      < from: CT Abdomen and Pelvis No Cont (03.19.18 @ 13:00) >  MPRESSION:    Double-J left ureteral stent with proximal pigtail within the proximal   ureter as opposed to the left renal pelvis. Mild   dilatation/hydronephrosis of the upper pole calyces. No calculus is   identified along the course of the left ureter/stent        < end of copied text >

## 2018-03-20 NOTE — PROGRESS NOTE ADULT - ASSESSMENT
Chronic hemolytic anemia  Rheumatic heart disease  Mitral valve replacement  Valvular A fib  Diabetes Mellitus  S/p Left ureteral dilation with Double J stent  Possible Pyelonephritis

## 2018-03-20 NOTE — CHART NOTE - NSCHARTNOTEFT_GEN_A_CORE
Patient had MONROE which was suggestive of Mitral valve possible atrial side thrombus and paravalvular leak   Called by Cardio  They recommend daily blood cultures  Starting Heparin drip and stopping coumadin when INR less than 2.5   as per cardiology  Patient transferred to Telemetry for monitoring  Plan discussed with Patient and .   Hospitalist accepted admission Dr Bennett

## 2018-03-20 NOTE — PACU DISCHARGE NOTE - COMMENTS
PACU vital signs are:  HR: 85       BP: 143   / 69      O2sat:  98  %     RR:  20      Temp:  97.6            Please discharge patient when criteria met.

## 2018-03-21 LAB
ANION GAP SERPL CALC-SCNC: 11 MMOL/L — SIGNIFICANT CHANGE UP (ref 7–14)
APTT BLD: 53.2 SEC — HIGH (ref 27–39.2)
APTT BLD: 66.7 SEC — HIGH (ref 27–39.2)
APTT BLD: 84.3 SEC — CRITICAL HIGH (ref 27–39.2)
BASOPHILS # BLD AUTO: 0.04 K/UL — SIGNIFICANT CHANGE UP (ref 0–0.2)
BASOPHILS # BLD AUTO: 0.05 K/UL — SIGNIFICANT CHANGE UP (ref 0–0.2)
BASOPHILS NFR BLD AUTO: 0.8 % — SIGNIFICANT CHANGE UP (ref 0–1)
BASOPHILS NFR BLD AUTO: 0.8 % — SIGNIFICANT CHANGE UP (ref 0–1)
BUN SERPL-MCNC: 24 MG/DL — HIGH (ref 10–20)
CALCIUM SERPL-MCNC: 8.5 MG/DL — SIGNIFICANT CHANGE UP (ref 8.5–10.1)
CHLORIDE SERPL-SCNC: 102 MMOL/L — SIGNIFICANT CHANGE UP (ref 98–110)
CO2 SERPL-SCNC: 28 MMOL/L — SIGNIFICANT CHANGE UP (ref 17–32)
CREAT SERPL-MCNC: 1.1 MG/DL — SIGNIFICANT CHANGE UP (ref 0.7–1.5)
EOSINOPHIL # BLD AUTO: 0.13 K/UL — SIGNIFICANT CHANGE UP (ref 0–0.7)
EOSINOPHIL # BLD AUTO: 0.22 K/UL — SIGNIFICANT CHANGE UP (ref 0–0.7)
EOSINOPHIL NFR BLD AUTO: 2.5 % — SIGNIFICANT CHANGE UP (ref 0–8)
EOSINOPHIL NFR BLD AUTO: 3.6 % — SIGNIFICANT CHANGE UP (ref 0–8)
GLUCOSE SERPL-MCNC: 136 MG/DL — HIGH (ref 70–110)
HCT VFR BLD CALC: 25.4 % — LOW (ref 37–47)
HCT VFR BLD CALC: 26.1 % — LOW (ref 37–47)
HGB BLD-MCNC: 7.9 G/DL — LOW (ref 12–16)
HGB BLD-MCNC: 8.2 G/DL — LOW (ref 12–16)
IMM GRANULOCYTES NFR BLD AUTO: 0.4 % — HIGH (ref 0.1–0.3)
IMM GRANULOCYTES NFR BLD AUTO: 0.5 % — HIGH (ref 0.1–0.3)
INR BLD: 2.02 RATIO — HIGH (ref 0.65–1.3)
INR BLD: 2.27 RATIO — HIGH (ref 0.65–1.3)
LYMPHOCYTES # BLD AUTO: 0.87 K/UL — LOW (ref 1.2–3.4)
LYMPHOCYTES # BLD AUTO: 1.37 K/UL — SIGNIFICANT CHANGE UP (ref 1.2–3.4)
LYMPHOCYTES # BLD AUTO: 16.5 % — LOW (ref 20.5–51.1)
LYMPHOCYTES # BLD AUTO: 22.6 % — SIGNIFICANT CHANGE UP (ref 20.5–51.1)
MAGNESIUM SERPL-MCNC: 2.1 MG/DL — SIGNIFICANT CHANGE UP (ref 1.8–2.4)
MCHC RBC-ENTMCNC: 25 PG — LOW (ref 27–31)
MCHC RBC-ENTMCNC: 25.4 PG — LOW (ref 27–31)
MCHC RBC-ENTMCNC: 31.1 G/DL — LOW (ref 32–37)
MCHC RBC-ENTMCNC: 31.4 G/DL — LOW (ref 32–37)
MCV RBC AUTO: 80.4 FL — LOW (ref 81–99)
MCV RBC AUTO: 80.8 FL — LOW (ref 81–99)
MONOCYTES # BLD AUTO: 0.24 K/UL — SIGNIFICANT CHANGE UP (ref 0.1–0.6)
MONOCYTES # BLD AUTO: 0.31 K/UL — SIGNIFICANT CHANGE UP (ref 0.1–0.6)
MONOCYTES NFR BLD AUTO: 4.5 % — SIGNIFICANT CHANGE UP (ref 1.7–9.3)
MONOCYTES NFR BLD AUTO: 5.1 % — SIGNIFICANT CHANGE UP (ref 1.7–9.3)
NEUTROPHILS # BLD AUTO: 3.98 K/UL — SIGNIFICANT CHANGE UP (ref 1.4–6.5)
NEUTROPHILS # BLD AUTO: 4.08 K/UL — SIGNIFICANT CHANGE UP (ref 1.4–6.5)
NEUTROPHILS NFR BLD AUTO: 67.4 % — SIGNIFICANT CHANGE UP (ref 42.2–75.2)
NEUTROPHILS NFR BLD AUTO: 75.3 % — HIGH (ref 42.2–75.2)
NRBC # BLD: 0 /100 WBCS — SIGNIFICANT CHANGE UP (ref 0–0)
PLATELET # BLD AUTO: 224 K/UL — SIGNIFICANT CHANGE UP (ref 130–400)
PLATELET # BLD AUTO: 228 K/UL — SIGNIFICANT CHANGE UP (ref 130–400)
POTASSIUM SERPL-MCNC: 4 MMOL/L — SIGNIFICANT CHANGE UP (ref 3.5–5)
POTASSIUM SERPL-SCNC: 4 MMOL/L — SIGNIFICANT CHANGE UP (ref 3.5–5)
PROTHROM AB SERPL-ACNC: 22.1 SEC — HIGH (ref 9.95–12.87)
PROTHROM AB SERPL-ACNC: 24.9 SEC — HIGH (ref 9.95–12.87)
RBC # BLD: 3.16 M/UL — LOW (ref 4.2–5.4)
RBC # BLD: 3.23 M/UL — LOW (ref 4.2–5.4)
RBC # FLD: 17.4 % — HIGH (ref 11.5–14.5)
RBC # FLD: 17.5 % — HIGH (ref 11.5–14.5)
SODIUM SERPL-SCNC: 141 MMOL/L — SIGNIFICANT CHANGE UP (ref 135–146)
VWF AG ACT/NOR PPP IA: 235 % — HIGH (ref 63–170)
WBC # BLD: 5.28 K/UL — SIGNIFICANT CHANGE UP (ref 4.8–10.8)
WBC # BLD: 6.06 K/UL — SIGNIFICANT CHANGE UP (ref 4.8–10.8)
WBC # FLD AUTO: 5.28 K/UL — SIGNIFICANT CHANGE UP (ref 4.8–10.8)
WBC # FLD AUTO: 6.06 K/UL — SIGNIFICANT CHANGE UP (ref 4.8–10.8)

## 2018-03-21 RX ORDER — HEPARIN SODIUM 5000 [USP'U]/ML
1000 INJECTION INTRAVENOUS; SUBCUTANEOUS
Qty: 25000 | Refills: 0 | Status: DISCONTINUED | OUTPATIENT
Start: 2018-03-21 | End: 2018-03-23

## 2018-03-21 RX ORDER — HEPARIN SODIUM 5000 [USP'U]/ML
1100 INJECTION INTRAVENOUS; SUBCUTANEOUS
Qty: 25000 | Refills: 0 | Status: DISCONTINUED | OUTPATIENT
Start: 2018-03-21 | End: 2018-03-21

## 2018-03-21 RX ADMIN — Medication 20 MILLIGRAM(S): at 06:05

## 2018-03-21 RX ADMIN — ATORVASTATIN CALCIUM 40 MILLIGRAM(S): 80 TABLET, FILM COATED ORAL at 21:27

## 2018-03-21 RX ADMIN — Medication 25 MILLIGRAM(S): at 06:04

## 2018-03-21 RX ADMIN — HEPARIN SODIUM 10 UNIT(S)/HR: 5000 INJECTION INTRAVENOUS; SUBCUTANEOUS at 19:49

## 2018-03-21 RX ADMIN — Medication 4 UNIT(S): at 17:42

## 2018-03-21 RX ADMIN — Medication 25 MILLIGRAM(S): at 17:43

## 2018-03-21 RX ADMIN — INSULIN GLARGINE 12 UNIT(S): 100 INJECTION, SOLUTION SUBCUTANEOUS at 21:27

## 2018-03-21 RX ADMIN — Medication 1 MILLIGRAM(S): at 12:10

## 2018-03-21 RX ADMIN — CEFTRIAXONE 100 GRAM(S): 500 INJECTION, POWDER, FOR SOLUTION INTRAMUSCULAR; INTRAVENOUS at 12:12

## 2018-03-21 RX ADMIN — Medication 325 MILLIGRAM(S): at 12:10

## 2018-03-21 RX ADMIN — RALOXIFENE HYDROCHLORIDE 60 MILLIGRAM(S): 60 TABLET, COATED ORAL at 12:11

## 2018-03-21 RX ADMIN — Medication 4 UNIT(S): at 08:15

## 2018-03-21 RX ADMIN — HEPARIN SODIUM 11 UNIT(S)/HR: 5000 INJECTION INTRAVENOUS; SUBCUTANEOUS at 04:00

## 2018-03-21 RX ADMIN — PANTOPRAZOLE SODIUM 40 MILLIGRAM(S): 20 TABLET, DELAYED RELEASE ORAL at 06:04

## 2018-03-21 RX ADMIN — HEPARIN SODIUM 10 UNIT(S)/HR: 5000 INJECTION INTRAVENOUS; SUBCUTANEOUS at 03:38

## 2018-03-21 RX ADMIN — Medication 4 UNIT(S): at 12:08

## 2018-03-21 NOTE — PROGRESS NOTE ADULT - SUBJECTIVE AND OBJECTIVE BOX
SUBJ:  77-yo patient, s/p mechanical MVR and AVR, presented with hemolytic anemia due to valve malfunction. S/p MONROE (MV pannus, small mobile mass (? thrombus versus vegetation), paravalvular leak with moderate MR). Patient denies SOB, palpitations.      MEDICATIONS  (STANDING):  atorvastatin 40 milliGRAM(s) Oral at bedtime  cefTRIAXone   IVPB 1 Gram(s) IV Intermittent every 24 hours  dextrose 5%. 1000 milliLiter(s) (50 mL/Hr) IV Continuous <Continuous>  dextrose 50% Injectable 12.5 Gram(s) IV Push once  dextrose 50% Injectable 25 Gram(s) IV Push once  dextrose 50% Injectable 25 Gram(s) IV Push once  ferrous    sulfate 325 milliGRAM(s) Oral daily  folic acid 1 milliGRAM(s) Oral daily  furosemide    Tablet 20 milliGRAM(s) Oral daily  heparin  Infusion 1100 Unit(s)/Hr (11 mL/Hr) IV Continuous <Continuous>  insulin glargine Injectable (LANTUS) 12 Unit(s) SubCutaneous at bedtime  insulin lispro (HumaLOG) corrective regimen sliding scale   SubCutaneous at bedtime  insulin lispro Injectable (HumaLOG) 4 Unit(s) SubCutaneous before breakfast  insulin lispro Injectable (HumaLOG) 4 Unit(s) SubCutaneous before lunch  insulin lispro Injectable (HumaLOG) 4 Unit(s) SubCutaneous before dinner  metoprolol     tartrate 25 milliGRAM(s) Oral two times a day  pantoprazole    Tablet 40 milliGRAM(s) Oral before breakfast  raloxifene 60 milliGRAM(s) Oral daily    MEDICATIONS  (PRN):  dextrose Gel 1 Dose(s) Oral once PRN Blood Glucose LESS THAN 70 milliGRAM(s)/deciliter  glucagon  Injectable 1 milliGRAM(s) IntraMuscular once PRN Glucose LESS THAN 70 milligrams/deciliter            Vital Signs Last 24 Hrs  T(C): 36 (21 Mar 2018 05:45), Max: 36.4 (20 Mar 2018 20:55)  T(F): 96.8 (21 Mar 2018 05:45), Max: 97.6 (20 Mar 2018 20:55)  HR: 72 (21 Mar 2018 05:45) (72 - 95)  BP: 137/64 (21 Mar 2018 05:45) (116/56 - 148/67)  BP(mean): --  RR: 18 (21 Mar 2018 05:45) (18 - 20)  SpO2: --    REVIEW OF SYSTEMS:  CONSTITUTIONAL: No fever, weight loss, or fatigue  Patient denies chest pain, shortness of breath or syncopal episodes.       PHYSICAL EXAM:  · CONSTITUTIONAL:	Well-developed, well nourished,   BMI-  · RESPIRATORY:   breath sounds equal; good air movement; respirations non-labored; clear to auscultation bilaterally; no rales, rhonchi or wheezing  · CARDIOVASCULAR	irregular rhythm, no gallop, murmur unchanged  · EXTREMITIES: No edema  · NECK: 	no JVD  	  TELEMETRY: A-fib    ECG:    LABS:                        6.9    4.96  )-----------( 203      ( 20 Mar 2018 06:55 )             22.9     03-20    144  |  104  |  25<H>  ----------------------------<  124<H>  4.3   |  29  |  1.2    Ca    8.5      20 Mar 2018 06:55  Mg     2.2     03-20          PT/INR - ( 20 Mar 2018 17:19 )   PT: 23.50 sec;   INR: 2.14 ratio         PTT - ( 21 Mar 2018 02:12 )  PTT:53.2 sec    I&O's Summary    BNP  RADIOLOGY & ADDITIONAL STUDIES:    IMPRESSION AND PLAN:  Mechanical valve malfunction with hemolytic anemia.    Recommend:  Heparin started but INR is still pending, needs to be checked today.  S/p RBC transfusion, Hb pending, needs to be followed daily.  BC done last night, please repeat daily.  [facility] on Friday if INR is < 1.7. Patient wants to go to [facility] for valve surgery; if INR is still high on Friday, may be better to transfer to [facility] for cath and surgery to be done there.  Keep on telemetry.

## 2018-03-21 NOTE — PROGRESS NOTE ADULT - ASSESSMENT
ASSESSMENT:    1.	Chronic hemolytic anemia.  2.	Rheumatic heart disease.  3.	Mitral valve replacement. Patient needs left heart catheterization prior to AVR/MVR. If agrees to stay for it, please hold coumadin and start IV Heparin once INR < 2.  4.	Valvular A fib.  5.	Diabetes Mellitus.  6.	S/p Left ureteral dilation with Double J stent.  7.	Possible Pyelonephritis.  8.	Hematuria. Resolved. CT showed Double J in ureter.  9.	Hypertension.  10.	Osteoporosis.    PLAN:    ·	F/U MONROE results possible due to Valve disease  ·	Keep Hg>7  ·	F/U hem/Onc.   ·	Continue with coumadin and lovenox bridge until INR 2.5-3.5.   ·	Continue ABX as per ID recommendations. F/U urology.  ·	Continue with insulin sliding scale.  ·	C/W Raloxifene. ASSESSMENT:    1.	Chronic hemolytic anemia. s/p 1 u prbc overnight. Hb 8.2 this morning.   2.	Rheumatic heart disease.  3.	Mitral valve replacement. Patient needs left heart catheterization prior to AVR/MVR. If agrees to stay for it, please hold coumadin and start IV Heparin once INR < 2.  4.	Valvular A fib.  5.	Diabetes Mellitus.  6.	S/p Left ureteral dilation with Double J stent.  7.	Possible Pyelonephritis.  8.	Hematuria. Resolved. CT showed Double J in ureter.  9.	Hypertension.  10.	Osteoporosis.    PLAN:    ·	F/U 8PM CBC,  ·	F/U MONROE results possible due to Valve disease  ·	Keep Hg>7  ·	F/U hem/Onc.   ·	Continue with coumadin and lovenox bridge until INR 2.5-3.5.   ·	Continue ABX as per ID recommendations. F/U urology.  ·	Continue with insulin sliding scale.  ·	C/W Raloxifene. ASSESSMENT:    1.	Chronic hemolytic anemia. s/p 1 u prbc overnight. Hb 8.2 this morning.   2.	Rheumatic heart disease.  3.	Mitral valve replacement. Patient needs left heart catheterization prior to AVR/MVR. Possible left heart cath on Friday vs. transfer to [place] for AVR/MVR if INR is still elevated.   4.	Valvular A fib.  5.	Diabetes Mellitus.  6.	S/p Left ureteral dilation with Double J stent.  7.	Possible Pyelonephritis.  8.	Hematuria. Resolved. CT showed Double J in ureter.  9.	Hypertension.  10.	Osteoporosis.    PLAN:    ·	F/U 8PM CBC,  ·	F/U MONROE results possible due to Valve disease  ·	Keep Hg>7  ·	F/U hem/Onc.   ·	Holding coumadin as per cardiology, continue heparin.  ·	Continue ABX as per ID recommendations. F/U urology.  ·	Continue with insulin sliding scale.  ·	C/W Raloxifene.

## 2018-03-21 NOTE — PROGRESS NOTE ADULT - SUBJECTIVE AND OBJECTIVE BOX
SUBJECTIVE:    Patient is a 77y old Female who presents with a chief complaint of bleeding when urinating (16 Mar 2018 19:01)    Currently admitted to medicine with the primary diagnosis of    Today is hospital day 5d. This morning she is resting comfortably in bed and reports no new issues or overnight events.     PAST MEDICAL & SURGICAL HISTORY  Anemia  Rheumatic fever  Diabetes  Osteoporosis  Mitral valve replaced  Atrial fibrillation: on coumadin at home  Hypertension  History of ureter stent  S/P AVR  H/O mitral valve replacement    SOCIAL HISTORY:  Negative for smoking/alcohol/drug use.     ALLERGIES:  No Known Allergies    MEDICATIONS:  STANDING MEDICATIONS  atorvastatin 40 milliGRAM(s) Oral at bedtime  cefTRIAXone   IVPB 1 Gram(s) IV Intermittent every 24 hours  dextrose 5%. 1000 milliLiter(s) IV Continuous <Continuous>  dextrose 50% Injectable 12.5 Gram(s) IV Push once  dextrose 50% Injectable 25 Gram(s) IV Push once  dextrose 50% Injectable 25 Gram(s) IV Push once  ferrous    sulfate 325 milliGRAM(s) Oral daily  folic acid 1 milliGRAM(s) Oral daily  furosemide    Tablet 20 milliGRAM(s) Oral daily  heparin  Infusion 1100 Unit(s)/Hr IV Continuous <Continuous>  insulin glargine Injectable (LANTUS) 12 Unit(s) SubCutaneous at bedtime  insulin lispro (HumaLOG) corrective regimen sliding scale   SubCutaneous at bedtime  insulin lispro Injectable (HumaLOG) 4 Unit(s) SubCutaneous before breakfast  insulin lispro Injectable (HumaLOG) 4 Unit(s) SubCutaneous before lunch  insulin lispro Injectable (HumaLOG) 4 Unit(s) SubCutaneous before dinner  metoprolol     tartrate 25 milliGRAM(s) Oral two times a day  pantoprazole    Tablet 40 milliGRAM(s) Oral before breakfast  raloxifene 60 milliGRAM(s) Oral daily    PRN MEDICATIONS  dextrose Gel 1 Dose(s) Oral once PRN  glucagon  Injectable 1 milliGRAM(s) IntraMuscular once PRN    VITALS:   T(F): 96.8  HR: 72  BP: 137/64  RR: 18  SpO2: --    LABS:                        6.9    4.96  )-----------( 203      ( 20 Mar 2018 06:55 )             22.9     03-20    144  |  104  |  25<H>  ----------------------------<  124<H>  4.3   |  29  |  1.2    Ca    8.5      20 Mar 2018 06:55  Mg     2.2     03-20      PT/INR - ( 20 Mar 2018 17:19 )   PT: 23.50 sec;   INR: 2.14 ratio         PTT - ( 21 Mar 2018 02:12 )  PTT:53.2 sec    RADIOLOGY:    Pending nuclear stress and echo.     PHYSICAL EXAM:  GEN: No acute distress  LUNGS: Clear to auscultation bilaterally   HEART: S1/S2 present. RRR.   ABD: Soft, non-tender, non-distended. Bowel sounds present  EXT: NC/NC/NE/2+PP/MARTELL  NEURO: AAOX3 SUBJECTIVE:    Patient is a 77y old Female who presents with a chief complaint of bleeding when urinating (16 Mar 2018 19:01)    Currently admitted to medicine with the primary diagnosis of chronic hemolytic anemia.     Today is hospital day 5d. This morning she is resting comfortably in bed s/p one unit prbc overnight.     PAST MEDICAL & SURGICAL HISTORY  Anemia  Rheumatic fever  Diabetes  Osteoporosis  Mitral valve replaced  Atrial fibrillation: on coumadin at home  Hypertension  History of ureter stent  S/P AVR  H/O mitral valve replacement    SOCIAL HISTORY:  Negative for smoking/alcohol/drug use.     ALLERGIES:  No Known Allergies    MEDICATIONS:  STANDING MEDICATIONS  atorvastatin 40 milliGRAM(s) Oral at bedtime  cefTRIAXone   IVPB 1 Gram(s) IV Intermittent every 24 hours  dextrose 5%. 1000 milliLiter(s) IV Continuous <Continuous>  dextrose 50% Injectable 12.5 Gram(s) IV Push once  dextrose 50% Injectable 25 Gram(s) IV Push once  dextrose 50% Injectable 25 Gram(s) IV Push once  ferrous    sulfate 325 milliGRAM(s) Oral daily  folic acid 1 milliGRAM(s) Oral daily  furosemide    Tablet 20 milliGRAM(s) Oral daily  heparin  Infusion 1100 Unit(s)/Hr IV Continuous <Continuous>  insulin glargine Injectable (LANTUS) 12 Unit(s) SubCutaneous at bedtime  insulin lispro (HumaLOG) corrective regimen sliding scale   SubCutaneous at bedtime  insulin lispro Injectable (HumaLOG) 4 Unit(s) SubCutaneous before breakfast  insulin lispro Injectable (HumaLOG) 4 Unit(s) SubCutaneous before lunch  insulin lispro Injectable (HumaLOG) 4 Unit(s) SubCutaneous before dinner  metoprolol     tartrate 25 milliGRAM(s) Oral two times a day  pantoprazole    Tablet 40 milliGRAM(s) Oral before breakfast  raloxifene 60 milliGRAM(s) Oral daily    PRN MEDICATIONS  dextrose Gel 1 Dose(s) Oral once PRN  glucagon  Injectable 1 milliGRAM(s) IntraMuscular once PRN    VITALS:   T(F): 96.8  HR: 72  BP: 137/64  RR: 18  SpO2: --    LABS:                        6.9    4.96  )-----------( 203      ( 20 Mar 2018 06:55 )             22.9     03-20    144  |  104  |  25<H>  ----------------------------<  124<H>  4.3   |  29  |  1.2    Ca    8.5      20 Mar 2018 06:55  Mg     2.2     03-20      PT/INR - ( 20 Mar 2018 17:19 )   PT: 23.50 sec;   INR: 2.14 ratio         PTT - ( 21 Mar 2018 02:12 )  PTT:53.2 sec    RADIOLOGY:    Pending nuclear stress and echo.     PHYSICAL EXAM:  GEN: No acute distress  LUNGS: Clear to auscultation bilaterally   HEART: S1/S2 present. RRR.   ABD: Soft, non-tender, non-distended. Bowel sounds present  EXT: NC/NC/NE/2+PP/MARTELL  NEURO: AAOX3

## 2018-03-21 NOTE — PROGRESS NOTE ADULT - ATTENDING COMMENTS
The patient was seen and examined. Agree with above.  Still had hematuria but lighter than before.  CBC from today shows Hb 7.1, stable and slightly improving.  Will continue current care.  Waiting for cardiology consult.  Urology consult appreciated. Will need CT A/P to evaluate stent.
I have spent at least 1 hour today coordinating care. MONROE this morning revealed possible clot and perivalvular leak on the mitral valve, blood culturer were recommended. Additionally cath was recommended for further work up, she will need to be switched over to Heparin gtt today.   I have discussed case with No and her , as well as her son Skyler and family member who is a ROSALINDA Palma.   Ultimately she is agreeable with the plan.  Case was also at length discussed with cardiology.  Transfuse 1 unit of pRBC today.   Daily Hemolysis panel, keep Hb above 7.0.
The patient was seen and examined. Agree with above.  Severe anemia with elevated retic count, low haptoglobin and marked elevated LDH, suggestive of hemolysis. Both direct and indirect [place] are negative.  Agree with above workup plan.  Monitor CBC daily.
Hemolysis is likely related  to compromised valve function, no evidence of autoimmune hemolysis or delayed hemolytic reaction.  For MONROE tomorrow, case was discussed with cardiology.  CT a/p done result is pending.   Continue on IV abx.  Keep Hb above 7.0.  Keep Coumadin between 2.5-3.5.
Patient seen and examined independently. I agree with the resident's note, physical exam, and plan except as below.  #hemolytic anemia due to valvular disease (hx of mechanical MVR/AVR) - sp transfusion - Hgb stable today 8.2 - no need for further transfusion at this point  #s/p MONROE - possible thrombus vs vegetation  - continue Iv heparin for anticoagulation , check blood cultures  #discussed plan with Dr Cifuentes today - hold coumadin, continue heparin PTT - 50 -80   cath on friday if INr <1.7 - if still elevated may transfer to [facility] for both cath and double valve replacement   follow up coags and hgb daily   followup with CM team for possible transfer to [facility] - need accepting MD, bed, insurance approval

## 2018-03-22 DIAGNOSIS — R06.02 SHORTNESS OF BREATH: ICD-10-CM

## 2018-03-22 LAB
ANION GAP SERPL CALC-SCNC: 10 MMOL/L — SIGNIFICANT CHANGE UP (ref 7–14)
APTT BLD: 57.1 SEC — HIGH (ref 27–39.2)
APTT BLD: 63 SEC — HIGH (ref 27–39.2)
APTT BLD: 66 SEC — HIGH (ref 27–39.2)
BASOPHILS # BLD AUTO: 0.04 K/UL — SIGNIFICANT CHANGE UP (ref 0–0.2)
BASOPHILS NFR BLD AUTO: 0.8 % — SIGNIFICANT CHANGE UP (ref 0–1)
BUN SERPL-MCNC: 25 MG/DL — HIGH (ref 10–20)
CALCIUM SERPL-MCNC: 8.2 MG/DL — LOW (ref 8.5–10.1)
CHLORIDE SERPL-SCNC: 102 MMOL/L — SIGNIFICANT CHANGE UP (ref 98–110)
CO2 SERPL-SCNC: 29 MMOL/L — SIGNIFICANT CHANGE UP (ref 17–32)
CREAT SERPL-MCNC: 1.2 MG/DL — SIGNIFICANT CHANGE UP (ref 0.7–1.5)
CULTURE RESULTS: SIGNIFICANT CHANGE UP
EOSINOPHIL # BLD AUTO: 0.25 K/UL — SIGNIFICANT CHANGE UP (ref 0–0.7)
EOSINOPHIL NFR BLD AUTO: 4.8 % — SIGNIFICANT CHANGE UP (ref 0–8)
FACT VIII ACT/NOR PPP: 160 % — HIGH (ref 50–150)
GLUCOSE SERPL-MCNC: 103 MG/DL — SIGNIFICANT CHANGE UP (ref 70–110)
HCT VFR BLD CALC: 24 % — LOW (ref 37–47)
HGB BLD-MCNC: 7.7 G/DL — LOW (ref 12–16)
IMM GRANULOCYTES NFR BLD AUTO: 0.6 % — HIGH (ref 0.1–0.3)
LYMPHOCYTES # BLD AUTO: 0.86 K/UL — LOW (ref 1.2–3.4)
LYMPHOCYTES # BLD AUTO: 16.6 % — LOW (ref 20.5–51.1)
MCHC RBC-ENTMCNC: 25.8 PG — LOW (ref 27–31)
MCHC RBC-ENTMCNC: 32.1 G/DL — SIGNIFICANT CHANGE UP (ref 32–37)
MCV RBC AUTO: 80.3 FL — LOW (ref 81–99)
MONOCYTES # BLD AUTO: 0.36 K/UL — SIGNIFICANT CHANGE UP (ref 0.1–0.6)
MONOCYTES NFR BLD AUTO: 6.9 % — SIGNIFICANT CHANGE UP (ref 1.7–9.3)
NEUTROPHILS # BLD AUTO: 3.65 K/UL — SIGNIFICANT CHANGE UP (ref 1.4–6.5)
NEUTROPHILS NFR BLD AUTO: 70.3 % — SIGNIFICANT CHANGE UP (ref 42.2–75.2)
NRBC # BLD: 0 /100 WBCS — SIGNIFICANT CHANGE UP (ref 0–0)
PLATELET # BLD AUTO: 202 K/UL — SIGNIFICANT CHANGE UP (ref 130–400)
POTASSIUM SERPL-MCNC: 3.6 MMOL/L — SIGNIFICANT CHANGE UP (ref 3.5–5)
POTASSIUM SERPL-SCNC: 3.6 MMOL/L — SIGNIFICANT CHANGE UP (ref 3.5–5)
RBC # BLD: 2.99 M/UL — LOW (ref 4.2–5.4)
RBC # FLD: 17.9 % — HIGH (ref 11.5–14.5)
SODIUM SERPL-SCNC: 141 MMOL/L — SIGNIFICANT CHANGE UP (ref 135–146)
SPECIMEN SOURCE: SIGNIFICANT CHANGE UP
WBC # BLD: 5.19 K/UL — SIGNIFICANT CHANGE UP (ref 4.8–10.8)
WBC # FLD AUTO: 5.19 K/UL — SIGNIFICANT CHANGE UP (ref 4.8–10.8)

## 2018-03-22 RX ADMIN — Medication 20 MILLIGRAM(S): at 05:57

## 2018-03-22 RX ADMIN — CEFTRIAXONE 100 GRAM(S): 500 INJECTION, POWDER, FOR SOLUTION INTRAMUSCULAR; INTRAVENOUS at 11:56

## 2018-03-22 RX ADMIN — Medication 325 MILLIGRAM(S): at 11:57

## 2018-03-22 RX ADMIN — ATORVASTATIN CALCIUM 40 MILLIGRAM(S): 80 TABLET, FILM COATED ORAL at 21:54

## 2018-03-22 RX ADMIN — INSULIN GLARGINE 12 UNIT(S): 100 INJECTION, SOLUTION SUBCUTANEOUS at 21:54

## 2018-03-22 RX ADMIN — Medication 4 UNIT(S): at 11:57

## 2018-03-22 RX ADMIN — RALOXIFENE HYDROCHLORIDE 60 MILLIGRAM(S): 60 TABLET, COATED ORAL at 11:57

## 2018-03-22 RX ADMIN — Medication 4 UNIT(S): at 08:10

## 2018-03-22 RX ADMIN — Medication 1 MILLIGRAM(S): at 11:58

## 2018-03-22 RX ADMIN — Medication 25 MILLIGRAM(S): at 17:50

## 2018-03-22 RX ADMIN — Medication 4 UNIT(S): at 17:49

## 2018-03-22 RX ADMIN — Medication 25 MILLIGRAM(S): at 05:57

## 2018-03-22 RX ADMIN — PANTOPRAZOLE SODIUM 40 MILLIGRAM(S): 20 TABLET, DELAYED RELEASE ORAL at 06:00

## 2018-03-22 NOTE — PROGRESS NOTE ADULT - SUBJECTIVE AND OBJECTIVE BOX
SUBJECTIVE:    Patient is a 77y old Female who presents with a chief complaint of bleeding when urinating (16 Mar 2018 19:01)    Currently admitted to medicine with the primary diagnosis of    Today is hospital day 6d. This morning she is resting comfortably in bed and reports no new issues or overnight events.     PAST MEDICAL & SURGICAL HISTORY  Anemia  Rheumatic fever  Diabetes  Osteoporosis  Mitral valve replaced  Atrial fibrillation: on coumadin at home  Hypertension  History of ureter stent  S/P AVR  H/O mitral valve replacement    SOCIAL HISTORY:  Negative for smoking/alcohol/drug use.     ALLERGIES:  No Known Allergies    MEDICATIONS:  STANDING MEDICATIONS  atorvastatin 40 milliGRAM(s) Oral at bedtime  cefTRIAXone   IVPB 1 Gram(s) IV Intermittent every 24 hours  dextrose 5%. 1000 milliLiter(s) IV Continuous <Continuous>  dextrose 50% Injectable 12.5 Gram(s) IV Push once  dextrose 50% Injectable 25 Gram(s) IV Push once  dextrose 50% Injectable 25 Gram(s) IV Push once  ferrous    sulfate 325 milliGRAM(s) Oral daily  folic acid 1 milliGRAM(s) Oral daily  furosemide    Tablet 20 milliGRAM(s) Oral daily  heparin  Infusion 1000 Unit(s)/Hr IV Continuous <Continuous>  insulin glargine Injectable (LANTUS) 12 Unit(s) SubCutaneous at bedtime  insulin lispro (HumaLOG) corrective regimen sliding scale   SubCutaneous at bedtime  insulin lispro Injectable (HumaLOG) 4 Unit(s) SubCutaneous before breakfast  insulin lispro Injectable (HumaLOG) 4 Unit(s) SubCutaneous before lunch  insulin lispro Injectable (HumaLOG) 4 Unit(s) SubCutaneous before dinner  metoprolol     tartrate 25 milliGRAM(s) Oral two times a day  pantoprazole    Tablet 40 milliGRAM(s) Oral before breakfast  raloxifene 60 milliGRAM(s) Oral daily    PRN MEDICATIONS  dextrose Gel 1 Dose(s) Oral once PRN  glucagon  Injectable 1 milliGRAM(s) IntraMuscular once PRN    VITALS:   T(F): 95.8  HR: 73  BP: 129/62  RR: 18  SpO2: 95%    LABS:                        7.7    5.19  )-----------( 202      ( 22 Mar 2018 07:00 )             24.0     03-22    141  |  102  |  25<H>  ----------------------------<  103  3.6   |  29  |  1.2    Ca    8.2<L>      22 Mar 2018 07:00  Mg     2.1     03-21      PT/INR - ( 21 Mar 2018 17:56 )   PT: 22.10 sec;   INR: 2.02 ratio         PTT - ( 22 Mar 2018 06:55 )  PTT:66.0 sec          Culture - Blood (collected 20 Mar 2018 16:00)  Source: .Blood Blood  Preliminary Report (22 Mar 2018 01:02):    No growth to date.          RADIOLOGY:  < from: CT Abdomen and Pelvis No Cont (03.19.18 @ 13:00) >  Double-J left ureteral stent with proximal pigtail within the proximal   ureter as opposed to the left renal pelvis. Mild   dilatation/hydronephrosis of the upper pole calyces. No calculus is   identified along the course of the left ureter/stent    < end of copied text >    < from: Transthoracic Echocardiogram (03.17.18 @ 08:30) >  Left Ventricle: The left ventricular internal cavity size is normal. Left   ventricular wall thickness is normal. Global LV systolic function was   normal. Left ventricular ejection fraction, by visual estimation, is 60   to 65%.  Right Ventricle: RV is mildly dilated, moderately hypokinetic.    < end of copied text >      PHYSICAL EXAM:  GEN: No acute distress  LUNGS: Clear to auscultation bilaterally   HEART: systolic murmur, irregular   ABD: soft, non tender, +BS   EXT: no edema  NEURO: AAOX3

## 2018-03-22 NOTE — CONSULT NOTE ADULT - SUBJECTIVE AND OBJECTIVE BOX
NEPHROLOGY CONSULTATION NOTE    Patient is a 77y Female whom presented to the hospital with     PAST MEDICAL & SURGICAL HISTORY:  Anemia  Rheumatic fever  Diabetes  Osteoporosis  Mitral valve replaced  Atrial fibrillation: on coumadin at home  Hypertension  History of ureter stent  S/P AVR  H/O mitral valve replacement    Allergies:  No Known Allergies    Home Medications Reviewed  Hospital Medications:   MEDICATIONS  (STANDING):  atorvastatin 40 milliGRAM(s) Oral at bedtime  cefTRIAXone   IVPB 1 Gram(s) IV Intermittent every 24 hours  dextrose 5%. 1000 milliLiter(s) (50 mL/Hr) IV Continuous <Continuous>  dextrose 50% Injectable 12.5 Gram(s) IV Push once  dextrose 50% Injectable 25 Gram(s) IV Push once  dextrose 50% Injectable 25 Gram(s) IV Push once  ferrous    sulfate 325 milliGRAM(s) Oral daily  folic acid 1 milliGRAM(s) Oral daily  furosemide    Tablet 20 milliGRAM(s) Oral daily  heparin  Infusion 1000 Unit(s)/Hr (10 mL/Hr) IV Continuous <Continuous>  insulin glargine Injectable (LANTUS) 12 Unit(s) SubCutaneous at bedtime  insulin lispro (HumaLOG) corrective regimen sliding scale   SubCutaneous at bedtime  insulin lispro Injectable (HumaLOG) 4 Unit(s) SubCutaneous before breakfast  insulin lispro Injectable (HumaLOG) 4 Unit(s) SubCutaneous before lunch  insulin lispro Injectable (HumaLOG) 4 Unit(s) SubCutaneous before dinner  metoprolol     tartrate 25 milliGRAM(s) Oral two times a day  pantoprazole    Tablet 40 milliGRAM(s) Oral before breakfast  raloxifene 60 milliGRAM(s) Oral daily      SOCIAL HISTORY:  Denies ETOH,Smoking,   FAMILY HISTORY:  No pertinent family history in first degree relatives        REVIEW OF SYSTEMS:  CONSTITUTIONAL: No weakness, fevers or chills  EYES/ENT: No visual changes;  No vertigo or throat pain   NECK: No pain or stiffness  RESPIRATORY: No cough, wheezing, hemoptysis; No shortness of breath  CARDIOVASCULAR: No chest pain or palpitations.  GASTROINTESTINAL: No abdominal or epigastric pain. No nausea, vomiting, or hematemesis; No diarrhea or constipation. No melena or hematochezia.  GENITOURINARY: No dysuria, frequency, foamy urine, urinary urgency, incontinence or hematuria  NEUROLOGICAL: No numbness or weakness  SKIN: No itching, burning, rashes, or lesions   VASCULAR: No bilateral lower extremity edema.   All other review of systems is negative unless indicated above.    VITALS:  T(F): 98.2 (18 @ 13:52), Max: 98.2 (18 @ 13:52)  HR: 97 (18 @ 13:52)  BP: 120/68 (18 @ 13:52)  RR: 18 (18 @ 13:52)  SpO2: 95% (18 @ 20:15)     @ 07:01  -   @ 07:00  --------------------------------------------------------  IN: 132 mL / OUT: 0 mL / NET: 132 mL            I&O's Detail    21 Mar 2018 07:01  -  22 Mar 2018 07:00  --------------------------------------------------------  IN:    heparin Infusion: 132 mL  Total IN: 132 mL    OUT:  Total OUT: 0 mL    Total NET: 132 mL            PHYSICAL EXAM:  Constitutional: NAD  HEENT: anicteric sclera, oropharynx clear, MMM  Neck: No JVD  Respiratory: CTAB, no wheezes, rales or rhonchi  Cardiovascular: S1, S2, RRR  Gastrointestinal: BS+, soft, NT/ND  Extremities: No cyanosis or clubbing. No peripheral edema  Neurological: A/O x 3, no focal deficits  Psychiatric: Normal mood, normal affect  : No CVA tenderness. No luna.   Skin: No rashes  Vascular Access:    LABS:      141  |  102  |  25<H>  ----------------------------<  103  3.6   |  29  |  1.2    Ca    8.2<L>      22 Mar 2018 07:00  Mg     2.1           Creatinine Trend: 1.2 <--, 1.1 <--, 1.2 <--, 1.2 <--, 1.3 <--, 1.2 <--, 1.2 <--                        7.7    5.19  )-----------( 202      ( 22 Mar 2018 07:00 )             24.0     Urine Studies:  Urinalysis Basic - ( 17 Mar 2018 02:58 )    Color: Red / Appearance: Turbid / S.015 / pH:   Gluc:  / Ketone: 15  / Bili: Large / Urobili: 1.0 mg/dL   Blood:  / Protein: >=300 mg/dL / Nitrite: Positive   Leuk Esterase: Moderate / RBC: >50 /HPF / WBC 10-25 /HPF   Sq Epi:  / Non Sq Epi: Few /HPF / Bacteria: Moderate /HPF                RADIOLOGY & ADDITIONAL STUDIES:    < from: CT Abdomen and Pelvis No Cont (18 @ 13:00) >  KIDNEYS: Unremarkable unenhanced appearance of the right kidney. There is   a left double-J ureteral stent with proximal pigtail within the proximal   ureter instead of the renal pelvis. Distal pigtail is within the urinary   bladder. There is suggestion of mild hydronephrosis of the upper pole   calyces. Left ureteral thickening is noted. No calculus is identified   along the course of the left ureter/stent.    < end of copied text >

## 2018-03-22 NOTE — PROGRESS NOTE ADULT - SUBJECTIVE AND OBJECTIVE BOX
AUSTIN DAVIS  77y Female    INTERVAL HPI/OVERNIGHT EVENTS:  Patient seen and examined.    ROS: All other systems are negative.    Vital Signs:    T(F): 98.2 (03-22-18 @ 13:52), Max: 98.2 (03-22-18 @ 13:52)  HR: 97 (03-22-18 @ 13:52) (73 - 97)  BP: 120/68 (03-22-18 @ 13:52) (120/68 - 179/69)  RR: 18 (03-22-18 @ 13:52) (16 - 18)  SpO2: 95% (03-21-18 @ 20:15) (95% - 95%)  I&O's Summary    21 Mar 2018 07:01  -  22 Mar 2018 07:00  --------------------------------------------------------  IN: 132 mL / OUT: 0 mL / NET: 132 mL      Daily     Daily   CAPILLARY BLOOD GLUCOSE  189 (22 Mar 2018 11:21)  117 (22 Mar 2018 08:03)  148 (21 Mar 2018 20:25)          PHYSICAL EXAM:    GENERAL: Alert & oriented X 3. NAD  SKIN: No rashes or lesions  HENT: Atrumatic. Normocephalic. PERRL. Moist membranes.  NECK: Supple, No JVD. No lymphadenopathy.  PULMONARY: CTA B/L. No wheezing. No rales  CVS: Normal S1, S2. Rate and Rythm are regular. No murmurs, rubs or gallops.  ABDOMEN: Soft, Nontender, Nondistended; Bowel sounds present  EXTREMITIES: Peripheral pulses intact. No edema B/L.  NEUROLOGIC:  No motor or sensory deficit.    Consultant(s) Notes Reviewed:  [x ] YES  [ ] NO  Care Discussed with Consultants/Other Providers [ x] YES  [ ] NO    EKG reviewed  Telemetry reviewed    LABS:                        7.7    5.19  )-----------( 202      ( 22 Mar 2018 07:00 )             24.0     03-22    141  |  102  |  25<H>  ----------------------------<  103  3.6   |  29  |  1.2    Ca    8.2<L>      22 Mar 2018 07:00  Mg     2.1     03-21      PT/INR - ( 21 Mar 2018 17:56 )   PT: 22.10 sec;   INR: 2.02 ratio         PTT - ( 22 Mar 2018 06:55 )  PTT:66.0 sec      Culture - Blood (collected 21 Mar 2018 10:22)  Source: .Blood None  Preliminary Report (22 Mar 2018 14:01):    No growth to date.    Culture - Blood (collected 20 Mar 2018 16:00)  Source: .Blood Blood  Preliminary Report (22 Mar 2018 01:02):    No growth to date.        RADIOLOGY & ADDITIONAL TESTS:      Imaging or report Personally Reviewed:  [ ] YES  [ ] NO    Medications:  Standing  atorvastatin 40 milliGRAM(s) Oral at bedtime  cefTRIAXone   IVPB 1 Gram(s) IV Intermittent every 24 hours  dextrose 5%. 1000 milliLiter(s) IV Continuous <Continuous>  dextrose 50% Injectable 12.5 Gram(s) IV Push once  dextrose 50% Injectable 25 Gram(s) IV Push once  dextrose 50% Injectable 25 Gram(s) IV Push once  ferrous    sulfate 325 milliGRAM(s) Oral daily  folic acid 1 milliGRAM(s) Oral daily  furosemide    Tablet 20 milliGRAM(s) Oral daily  heparin  Infusion 1000 Unit(s)/Hr IV Continuous <Continuous>  insulin glargine Injectable (LANTUS) 12 Unit(s) SubCutaneous at bedtime  insulin lispro (HumaLOG) corrective regimen sliding scale   SubCutaneous at bedtime  insulin lispro Injectable (HumaLOG) 4 Unit(s) SubCutaneous before breakfast  insulin lispro Injectable (HumaLOG) 4 Unit(s) SubCutaneous before lunch  insulin lispro Injectable (HumaLOG) 4 Unit(s) SubCutaneous before dinner  metoprolol     tartrate 25 milliGRAM(s) Oral two times a day  pantoprazole    Tablet 40 milliGRAM(s) Oral before breakfast  raloxifene 60 milliGRAM(s) Oral daily    PRN Meds  dextrose Gel 1 Dose(s) Oral once PRN  glucagon  Injectable 1 milliGRAM(s) IntraMuscular once PRN      Case discussed with resident    Care discussed with pt/family AUSTIN DAVIS  77y Female    INTERVAL HPI/OVERNIGHT EVENTS:  Patient seen and examined. Sitting in a chair comfortably. Denies any hematuria. No SOB.    ROS: All other systems are negative.    Vital Signs:    T(F): 98.2 (03-22-18 @ 13:52), Max: 98.2 (03-22-18 @ 13:52)  HR: 97 (03-22-18 @ 13:52) (73 - 97)  BP: 120/68 (03-22-18 @ 13:52) (120/68 - 179/69)  RR: 18 (03-22-18 @ 13:52) (16 - 18)  SpO2: 95% (03-21-18 @ 20:15) (95% - 95%)  I&O's Summary    21 Mar 2018 07:01  -  22 Mar 2018 07:00  --------------------------------------------------------  IN: 132 mL / OUT: 0 mL / NET: 132 mL      Daily     Daily   CAPILLARY BLOOD GLUCOSE  189 (22 Mar 2018 11:21)  117 (22 Mar 2018 08:03)  148 (21 Mar 2018 20:25)          PHYSICAL EXAM:    GENERAL: Alert & oriented X 3. NAD  SKIN: No rashes or lesions  HENT: Atrumatic. Normocephalic. PERRL. Moist membranes.  NECK: Supple, No JVD. No lymphadenopathy.  PULMONARY: CTA B/L. No wheezing. No rales  CVS: +ve mechanical aortic and mitral valve clicks. Rate and Rythm are regular. A III/VI syst mm over aortic area.  ABDOMEN: Soft, Nontender, Nondistended; Bowel sounds present  EXTREMITIES: Peripheral pulses intact. Trace LE edema B/L.  NEUROLOGIC:  No motor or sensory deficit.    Consultant(s) Notes Reviewed:  [x ] YES  [ ] NO  Care Discussed with Consultants/Other Providers [ x] YES  [ ] NO    EKG reviewed  Telemetry reviewed    LABS:                        7.7    5.19  )-----------( 202      ( 22 Mar 2018 07:00 )             24.0   Hemoglobin: 7.7 g/dL (03-22 @ 07:00)  Hemoglobin: 7.9 g/dL (03-21 @ 21:49)  Hemoglobin: 8.2 g/dL (03-21 @ 10:22)  Hemoglobin: 6.9 g/dL (03-20 @ 06:55)  Hemoglobin: 7.1 g/dL (03-19 @ 11:11)  Hemoglobin: 7.4 g/dL (03-19 @ 08:36)  Hemoglobin: 7.3 g/dL (03-18 @ 10:38)    03-22    141  |  102  |  25<H>  ----------------------------<  103  3.6   |  29  |  1.2    Ca    8.2<L>      22 Mar 2018 07:00  Mg     2.1     03-21      PT/INR - ( 21 Mar 2018 17:56 )   PT: 22.10 sec;   INR: 2.02 ratio         PTT - ( 22 Mar 2018 06:55 )  PTT:66.0 sec      Culture - Blood (collected 21 Mar 2018 10:22)  Source: .Blood None  Preliminary Report (22 Mar 2018 14:01):    No growth to date.    Culture - Blood (collected 20 Mar 2018 16:00)  Source: .Blood Blood  Preliminary Report (22 Mar 2018 01:02):    No growth to date.        RADIOLOGY & ADDITIONAL TESTS:    < from: CT Abdomen and Pelvis No Cont (03.19.18 @ 13:00) >  IMPRESSION:    Double-J left ureteral stent with proximal pigtail within the proximal   ureter as opposed to the left renal pelvis. Mild   dilatation/hydronephrosis of the upper pole calyces. No calculus is   identified along the course of the left ureter/stent    < end of copied text >    Imaging or report Personally Reviewed:  [ ] YES  [ ] NO    Medications:  Standing  atorvastatin 40 milliGRAM(s) Oral at bedtime  cefTRIAXone   IVPB 1 Gram(s) IV Intermittent every 24 hours  dextrose 5%. 1000 milliLiter(s) IV Continuous <Continuous>  dextrose 50% Injectable 12.5 Gram(s) IV Push once  dextrose 50% Injectable 25 Gram(s) IV Push once  dextrose 50% Injectable 25 Gram(s) IV Push once  ferrous    sulfate 325 milliGRAM(s) Oral daily  folic acid 1 milliGRAM(s) Oral daily  furosemide    Tablet 20 milliGRAM(s) Oral daily  heparin  Infusion 1000 Unit(s)/Hr IV Continuous <Continuous>  insulin glargine Injectable (LANTUS) 12 Unit(s) SubCutaneous at bedtime  insulin lispro (HumaLOG) corrective regimen sliding scale   SubCutaneous at bedtime  insulin lispro Injectable (HumaLOG) 4 Unit(s) SubCutaneous before breakfast  insulin lispro Injectable (HumaLOG) 4 Unit(s) SubCutaneous before lunch  insulin lispro Injectable (HumaLOG) 4 Unit(s) SubCutaneous before dinner  metoprolol     tartrate 25 milliGRAM(s) Oral two times a day  pantoprazole    Tablet 40 milliGRAM(s) Oral before breakfast  raloxifene 60 milliGRAM(s) Oral daily    PRN Meds  dextrose Gel 1 Dose(s) Oral once PRN  glucagon  Injectable 1 milliGRAM(s) IntraMuscular once PRN      Case discussed with resident    Care discussed with pt/family

## 2018-03-22 NOTE — CONSULT NOTE ADULT - ASSESSMENT
77 year old female with chronic hypoproliferative anemia, no response to EPO and iron, transfusion dependent for 6-8 months. She received a double mechanical valve replacement 12 years ago.   She had several episodes of gross hematuria and a left ureteral stent placed (one month ago) for unexplained narrowing or thickening of the ureter, workup was negative for malignancy despite progressive weight loss. GI workup was negative. Heme workup showed low retic count and haptoglobin, elevated ferritin levels, elevated LDH.     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    HEmaturia - episodes of gross hematuria can definitely contribute to anemia   - as of yesterday - urine has cleared as per the pt  - causes for hematuria - r/o UTI - UA with LE and nitrates noted     - irritation form stent not in place as per last CT 3-19 -18,  needs  f/u     - possible glomerulonephritis although less likely is Ig A GN, postinfectious GN (endocarditis?), or AIN    Proteinuria >300 on UA, DM for 14 yrs (on oral meds), is due in part to hematuria and probably DM, provided myeloma is ruled out.   Rx: Repeat UA, UCx, Urine protein/creat ratio       C3 C4 in serum        f/u     D/W medical staff

## 2018-03-22 NOTE — PROGRESS NOTE ADULT - SUBJECTIVE AND OBJECTIVE BOX
77 year old female with chronic anemia, on Procrit, transfusion dependent for 6-8 months. She has history of chronic hematuria, source in unclear, she is s/p JJ stent to L ureter by Dr. Pablo and over the past 6 weeks hematuria improved.  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 Most recently she received 2 units of PRBC on 3/9/2018, and prior to admission presented with lesly hemolysis. 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 She was ruled out for delayed transfusion reaction and AIHA, echo revealed concerning valve abnormalities that may be accounting for worsening hemolysis.     MEDICATIONS  (STANDING):  atorvastatin 40 milliGRAM(s) Oral at bedtime  cefTRIAXone   IVPB 1 Gram(s) IV Intermittent every 12 hours  ferrous    sulfate 325 milliGRAM(s) Oral daily  folic acid 1 milliGRAM(s) Oral daily  furosemide    Tablet 20 milliGRAM(s) Oral daily  metoprolol     tartrate 25 milliGRAM(s) Oral two times a day  pantoprazole    Tablet 40 milliGRAM(s) Oral before breakfast  raloxifene 60 milliGRAM(s) Oral daily    MEDICATIONS  (PRN):      Allergies    No Known Allergies    Intolerances      Vital Signs Last 24 Hrs  were reviewed      PHYSICAL EXAM  General: adult in NAD, jaundiced  CV: normal S1/S2 irregularly irregular, audible click  Lungs: positive air movement b/l ant lungs, clear to auscultation, no wheezes, no rales  Abdomen: soft non-tender non-distended, no hepatosplenomegaly  Ext: no clubbing cyanosis or edema  Neuro: alert and oriented X 3, no focal deficits    LABS:                          6.9    4.96  )-----------( 203      ( 20 Mar 2018 06:55 )             22.9   03-20    144  |  104  |  25<H>  ----------------------------<  124<H>  4.3   |  29  |  1.2    Ca    8.5      20 Mar 2018 06:55  Mg     2.2     03-20    TPro  6.5  /  Alb  3.8  /  TBili  3.3<H>  /  DBili  x   /  AST  59<H>  /  ALT  14  /  AlkPhos  81  03-19    PT/INR - ( 20 Mar 2018 06:55 )   PT: 23.00 sec;   INR: 2.10 ratio                                    7.4    5.42  )-----------( 257      ( 19 Mar 2018 08:36 )             24.9   03-19    142  |  101  |  25<H>  ----------------------------<  183<H>  4.2   |  29  |  1.2    Ca    8.7      19 Mar 2018 08:36    TPro  6.5  /  Alb  3.8  /  TBili  3.3<H>  /  DBili  x   /  AST  59<H>  /  ALT  14  /  AlkPhos  81  03-19                     6.4    6.13  )-----------( 221      ( 16 Mar 2018 18:06 )             21.1         Mean Cell Volume : 80.5 fL  Mean Cell Hemoglobin : 24.4 pg  Mean Cell Hemoglobin Concentration : 30.3 g/dL  Auto Neutrophil # : x  Auto Lymphocyte # : x  Auto Monocyte # : x  Auto Eosinophil # : x  Auto Basophil # : x  Auto Neutrophil % : x  Auto Lymphocyte % : x  Auto Monocyte % : x  Auto Eosinophil % : x  Auto Basophil % : x    Serial CBC's  03-16 @ 18:06  Hct-21.1 / Hgb-6.4 / Plat-221 / RBC-2.62 / WBC-6.13            03-16    140  |  100  |  33<H>  ----------------------------<  125<H>  4.1   |  28  |  1.2    Ca    8.2<L>      16 Mar 2018 20:55    TPro  6.2  /  Alb  3.7  /  TBili  2.5<H>  /  DBili  x   /  AST  53<H>  /  ALT  13  /  AlkPhos  94  03-16      PT/INR - ( 16 Mar 2018 19:00 )   PT: 21.50 sec;   INR: 1.96 ratio         PTT - ( 16 Mar 2018 19:00 )  PTT:29.8 sec    Lactate Dehydrogenase, Serum: >1000 (03.16.18 @ 18:06)    Reticulocyte Count in AM (03.16.18 @ 18:06)    RBC Count: 2.62 M/uL    Reticulocyte Percent: 6.1 %    Absolute Reticulocytes: 158.8 K/uL          Hemoglobin: 6.4 g/dL (03-16-18 @ 18:06)  WBC Count: 6.13 K/uL (03-16-18 @ 18:06)  Hematocrit: 21.1 % (03-16-18 @ 18:06)  Platelet Count - Automated: 221 K/uL (03-16-18 @ 18:06)  Reticulocyte Percent: 6.1 % (03-16-18 @ 18:06)    Urine Microscopic-Add On (NC) (03.17.18 @ 02:58)    Bacteria: Moderate /HPF    Epithelial Cells: Few /HPF    Red Blood Cell - Urine: >50 /HPF    White Blood Cell - Urine: 10-25 /HPF    Urinalysis (03.17.18 @ 02:58)    pH Urine: 5.5    Glucose Qualitative, Urine: Negative mg/dL    Blood, Urine: Large    Color: Red    Urine Appearance: Turbid    Bilirubin: Large    Ketone - Urine: 15    Specific Gravity: 1.015    Protein, Urine: >=300 mg/dL    Urobilinogen: 1.0 mg/dL    Nitrite: Positive    Leukocyte Esterase Concentration: Moderate        Culture - Urine (03.17.18 @ 02:58)    Specimen Source: .Urine Clean Catch (Midstream)    Culture Results:   No growth          Culture - Blood (03.16.18 @ 20:55)    Specimen Source: .Blood None    Culture Results:   No growth to date.      < from: CT Abdomen and Pelvis No Cont (03.19.18 @ 13:00) >  MPRESSION:    Double-J left ureteral stent with proximal pigtail within the proximal   ureter as opposed to the left renal pelvis. Mild   dilatation/hydronephrosis of the upper pole calyces. No calculus is   identified along the course of the left ureter/stent        < end of copied text >

## 2018-03-22 NOTE — PROGRESS NOTE ADULT - ASSESSMENT
Hemolytic Anemia:  likely 2/2 valve, no requirement for transfusion today    Hematuria likely 2/2 UTI:  resolved, continue on Cefriaxone    Mechanical Valves:  On Heparin gtt, for cath tomorrow, may require transfer to [facility] for valve replacement  Cardiology following

## 2018-03-22 NOTE — PROGRESS NOTE ADULT - ASSESSMENT
77 year old female with chronic hypoproliferative anemia, no response to EPO and iron, transfusion dependent for 6-8 months    1. 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 Cifuentes: hold coumadin, continue heparin PTT 50-80   -Cath on Friday if INR<1.7, if still elevated may transfer to [facility] for cath and double valve replacement     2. s/p left ureteral dilation with double J stent  -hematuria resolved, CT shows double J in ureter   ·+ UA, negative culture--Continue rocephin  -f/u nephrology consult    3. Diabetes   -Continue with insulin sliding scale.

## 2018-03-22 NOTE — PROGRESS NOTE ADULT - SUBJECTIVE AND OBJECTIVE BOX
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 NO SOB or CP.      MEDICATIONS  (STANDING):  atorvastatin 40 milliGRAM(s) Oral at bedtime  cefTRIAXone   IVPB 1 Gram(s) IV Intermittent every 24 hours  dextrose 5%. 1000 milliLiter(s) (50 mL/Hr) IV Continuous <Continuous>  dextrose 50% Injectable 12.5 Gram(s) IV Push once  dextrose 50% Injectable 25 Gram(s) IV Push once  dextrose 50% Injectable 25 Gram(s) IV Push once  ferrous    sulfate 325 milliGRAM(s) Oral daily  folic acid 1 milliGRAM(s) Oral daily  furosemide    Tablet 20 milliGRAM(s) Oral daily  heparin  Infusion 1000 Unit(s)/Hr (10 mL/Hr) IV Continuous <Continuous>  insulin glargine Injectable (LANTUS) 12 Unit(s) SubCutaneous at bedtime  insulin lispro (HumaLOG) corrective regimen sliding scale   SubCutaneous at bedtime  insulin lispro Injectable (HumaLOG) 4 Unit(s) SubCutaneous before breakfast  insulin lispro Injectable (HumaLOG) 4 Unit(s) SubCutaneous before lunch  insulin lispro Injectable (HumaLOG) 4 Unit(s) SubCutaneous before dinner  metoprolol     tartrate 25 milliGRAM(s) Oral two times a day  pantoprazole    Tablet 40 milliGRAM(s) Oral before breakfast  raloxifene 60 milliGRAM(s) Oral daily    MEDICATIONS  (PRN):  dextrose Gel 1 Dose(s) Oral once PRN Blood Glucose LESS THAN 70 milliGRAM(s)/deciliter  glucagon  Injectable 1 milliGRAM(s) IntraMuscular once PRN Glucose LESS THAN 70 milligrams/deciliter            Vital Signs Last 24 Hrs  T(C): 35.4 (22 Mar 2018 05:55), Max: 36.8 (21 Mar 2018 14:37)  T(F): 95.8 (22 Mar 2018 05:55), Max: 98.2 (21 Mar 2018 14:37)  HR: 73 (22 Mar 2018 05:55) (73 - 82)  BP: 129/62 (22 Mar 2018 05:55) (113/56 - 179/69)  BP(mean): --  RR: 18 (22 Mar 2018 05:55) (16 - 18)  SpO2: 95% (21 Mar 2018 20:15) (95% - 95%)    REVIEW OF SYSTEMS:  CONSTITUTIONAL: No fever, weight loss, or fatigue  Patient denies chest pain, shortness of breath or syncopal episodes.       PHYSICAL EXAM:  · CONSTITUTIONAL:	Well-developed, well nourished,   BMI-  · RESPIRATORY:   breath sounds equal; good air movement; respirations non-labored; clear to auscultation bilaterally; no rales, rhonchi or wheezing  · CARDIOVASCULAR	irregular rhythm,  no rub,  no murmur, no gallop  · EXTREMITIES: No edema      TELEMETRY: A-fib, VR controlled    LABS:                        7.7    5.19  )-----------( 202      ( 22 Mar 2018 07:00 )             24.0     03-22    141  |  102  |  25<H>  ----------------------------<  103  3.6   |  29  |  1.2    Ca    8.2<L>      22 Mar 2018 07:00  Mg     2.1     03-21          PT/INR - ( 21 Mar 2018 17:56 )   PT: 22.10 sec;   INR: 2.02 ratio         PTT - ( 22 Mar 2018 06:55 )  PTT:66.0 sec    I&O's Summary    21 Mar 2018 07:01  -  22 Mar 2018 07:00  --------------------------------------------------------  IN: 132 mL / OUT: 0 mL / NET: 132 mL      BNP  RADIOLOGY & ADDITIONAL STUDIES:    IMPRESSION AND PLAN:  MVR, AVR, thombus r/o vegetation on the MV, 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  Hemolytic anemia.  Permanent A-fib.    Plan:  Continue Heparin, Coumadin on hold.  Please repeat BC daily.  Check INR today, tomorrow.  [facility] tomorrow in INR < 1.7.  Will need AVR, MVR.

## 2018-03-22 NOTE — PROGRESS NOTE ADULT - NSHPATTENDINGPLANDISCUSS_GEN_ALL_CORE
medical attending
medical resident
cardiology fellow, hematology attending, patient's cardiologist in [place]
cardio and  at bedside

## 2018-03-22 NOTE — PROGRESS NOTE ADULT - ASSESSMENT
77 year old female with chronic hypoproliferative anemia, no response to EPO and iron, transfusion dependent for 6-8 months presented with hematuria.    1.	Hematuria  2.	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	Mitral valve Thrombus  4.	Ac. Pyelonephritis  5.	CkD-III  6.	Permanent AF  7.	DM-2  8.	HTN 77 year old female with chronic hypoproliferative anemia, no response to EPO and iron, transfusion dependent for 6-8 months presented with hematuria.    1.	Hematuria  2.	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	Mitral valve Thrombus  4.	Ac. Pyelonephritis  5.	CkD-III  6.	Permanent AF  7.	DM-2  8.	HTN        PLAN:    ·	Cont tele  ·	Follow CBC  ·	Urology eval  ·	Card, ID, and HEM/ONC eval noted  ·	Cont IV heparin. Follow PTT  ·	Cath in AM. Planning valve replacement  ·	Can switch to PO abx in AM  ·	FS AC & QHS. Cont Insulin and other meds.  ·	Plan of care D/W the family at length on bed side.

## 2018-03-23 ENCOUNTER — APPOINTMENT (OUTPATIENT)
Dept: INFUSION THERAPY | Facility: CLINIC | Age: 78
End: 2018-03-23

## 2018-03-23 LAB
APTT BLD: 28.9 SEC — SIGNIFICANT CHANGE UP (ref 27–39.2)
APTT BLD: 54.6 SEC — HIGH (ref 27–39.2)
APTT BLD: 57.6 SEC — HIGH (ref 27–39.2)
INR BLD: 1.28 RATIO — SIGNIFICANT CHANGE UP (ref 0.65–1.3)
PROTHROM AB SERPL-ACNC: 13.9 SEC — HIGH (ref 9.95–12.87)
VWF:RCO ACT/NOR PPP PL AGG: 207 % — HIGH (ref 45–133)

## 2018-03-23 RX ORDER — HEPARIN SODIUM 5000 [USP'U]/ML
1300 INJECTION INTRAVENOUS; SUBCUTANEOUS
Qty: 25000 | Refills: 0 | Status: DISCONTINUED | OUTPATIENT
Start: 2018-03-23 | End: 2018-03-24

## 2018-03-23 RX ORDER — SODIUM CHLORIDE 9 MG/ML
1000 INJECTION INTRAMUSCULAR; INTRAVENOUS; SUBCUTANEOUS
Qty: 0 | Refills: 0 | Status: DISCONTINUED | OUTPATIENT
Start: 2018-03-23 | End: 2018-03-24

## 2018-03-23 RX ORDER — HEPARIN SODIUM 5000 [USP'U]/ML
1000 INJECTION INTRAVENOUS; SUBCUTANEOUS
Qty: 25000 | Refills: 0 | Status: DISCONTINUED | OUTPATIENT
Start: 2018-03-23 | End: 2018-03-23

## 2018-03-23 RX ORDER — HEPARIN SODIUM 5000 [USP'U]/ML
10 INJECTION INTRAVENOUS; SUBCUTANEOUS
Qty: 25000 | Refills: 0 | Status: DISCONTINUED | OUTPATIENT
Start: 2018-03-23 | End: 2018-03-23

## 2018-03-23 RX ADMIN — RALOXIFENE HYDROCHLORIDE 60 MILLIGRAM(S): 60 TABLET, COATED ORAL at 18:06

## 2018-03-23 RX ADMIN — PANTOPRAZOLE SODIUM 40 MILLIGRAM(S): 20 TABLET, DELAYED RELEASE ORAL at 06:09

## 2018-03-23 RX ADMIN — Medication 4: at 22:32

## 2018-03-23 RX ADMIN — INSULIN GLARGINE 12 UNIT(S): 100 INJECTION, SOLUTION SUBCUTANEOUS at 21:16

## 2018-03-23 RX ADMIN — Medication 25 MILLIGRAM(S): at 06:09

## 2018-03-23 RX ADMIN — HEPARIN SODIUM 13 UNIT(S)/HR: 5000 INJECTION INTRAVENOUS; SUBCUTANEOUS at 23:41

## 2018-03-23 RX ADMIN — ATORVASTATIN CALCIUM 40 MILLIGRAM(S): 80 TABLET, FILM COATED ORAL at 21:16

## 2018-03-23 RX ADMIN — HEPARIN SODIUM 10 UNIT(S)/HR: 5000 INJECTION INTRAVENOUS; SUBCUTANEOUS at 20:04

## 2018-03-23 RX ADMIN — Medication 25 MILLIGRAM(S): at 18:07

## 2018-03-23 RX ADMIN — Medication 20 MILLIGRAM(S): at 06:09

## 2018-03-23 NOTE — PROGRESS NOTE ADULT - ASSESSMENT
77 year old female with chronic hypoproliferative anemia, no response to EPO and iron, transfusion dependent for 6-8 months presented with hematuria.    1.	Hematuria  2.	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	Mitral valve Thrombus  4.	Ac. Pyelonephritis  5.	CkD-III  6.	Permanent AF  7.	DM-2  8.	HTN        PLAN:    ·	Cont tele  ·	Follow CBC  ·	Urology eval  ·	Card, ID, and HEM/ONC eval noted  ·	Cont IV heparin. Follow PTT  ·	Cath in AM. Planning valve replacement  ·	Can switch to PO abx in AM  ·	FS AC & QHS. Cont Insulin and other meds.  ·	Plan of care D/W the family at length on bed side. 77 year old female with chronic hypoproliferative anemia, no response to EPO and iron, transfusion dependent for 6-8 months presented with hematuria.    1.	Hematuria  2.	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	Mitral valve Thrombus  4.	Ac. Pyelonephritis  5.	CkD-III  6.	Permanent AF  7.	DM-2  8.	HTN        PLAN:    ·	Cont tele  ·	Follow CBC  ·	Urology eval noted. Out pt. F/U  ·	Card, ID, and HEM/ONC eval noted  ·	Cont IV heparin. Follow PTT  ·	 Planning valve replacement  ·	Can switch to PO cefpodoxine in AM  ·	FS AC & QHS. Cont Insulin and other meds.  ·	Plan to transfer to [facility] in AM for Valve surgery under Dr. Escalante service.

## 2018-03-23 NOTE — PROGRESS NOTE ADULT - SUBJECTIVE AND OBJECTIVE BOX
JANA TELLEZ NOTE    She has history of chronic hematuria, source in unclear, she is s/p JJ stent to L ureter by Dr. Pablo and over the past 6 weeks hematuria improved.     Vital Signs Last 24 Hrs    T(F): 98.2 (23 Mar 2018 05:51), Max: 98.2 (22 Mar 2018 13:52)  HR: 80 (23 Mar 2018 05:51) (80 - 97)  BP: 122/58 (23 Mar 2018 05:51) (120/68 - 141/65)    RR: 18 (23 Mar 2018 05:51) (18 - 18)  SpO2: 96% (23 Mar 2018 08:40) (96% - 96%)     CT Abdomen and Pelvis No Cont (03.19.18 @ 13:00) >  Double-J left ureteral stent with proximal pigtail within the proximal   ureter as opposed to the left renal pelvis. Mild   dilatation/hydronephrosis of the upper pole calyces. No calculus is   identified along the course of the left ureter/stent

## 2018-03-23 NOTE — DIETITIAN INITIAL EVALUATION ADULT. - PERTINENT MEDS FT
heparin, abx, insulin, atorvastatin, ferrous sulfate, folic acid, furosemide, metoprolol, pantoprazole, raloxifene

## 2018-03-23 NOTE — PROGRESS NOTE ADULT - SUBJECTIVE AND OBJECTIVE BOX
I have personally seen and examined the patient.  I agree with the history and physical which I have reviewed and noted any changes below.  03-23-18 @ 13:25    PRE-OP DIAGNOSIS: Cardiac cath for Valvar heart disease    PROCEDURE: [facility]    Physician: Dr Davis  Assistant: Dr Mcelroy    ANESTHESIA TYPE:  [  ]General Anesthesia  [x ] Sedation  [  ] Local/Regional    ESTIMATED BLOOD LOSS:       mL    CONDITION  [  ] Critical  [  ] Serious  [  ]Fair  [  ]Good      SPECIMENS REMOVED (IF APPLICABLE):      IV CONTRAST:             mL      IMPLANTS (IF APPLICABLE)      FINDINGS    Left Heart Catheterization:  LVEF%:  LVEDP:  [ ] Normal Coronary Arteries  [ ] Luminal Irregularities  [ ] Non-obstructive CAD      LEFT HEART CATHERIZATION                                    Left main     LAD:                        Diag:    Left Circumflex:  OM:    Right Cornary Artery:  RPDA  RPL    Ramus Intermed:    INTERVENTION  IMPLANTS:    RIGHT HEART CATHERIZATION  PA:  PCW:  CO/CI:      POST-OP DIAGNOSIS          PLAN OF CARE  [ ] D/C Home today  [ ]  D/C in AM  [ ] Return to In-patient bed  [ ] Admit for observation  [ ] Return for staged procedure:  [ ] CT Surgery consult called  [ ]  Continue DAPT, B-blocker & Statin therapy

## 2018-03-23 NOTE — PROGRESS NOTE ADULT - ASSESSMENT
A/P pt s/p left stent placement    No further  intervention at this time    Pt to follow up out pt with her Urologist    attn aware

## 2018-03-23 NOTE — PROGRESS NOTE ADULT - SUBJECTIVE AND OBJECTIVE BOX
AUSTIN DAVIS  77y Female    INTERVAL HPI/OVERNIGHT EVENTS:  Patient seen and examined.    ROS: All other systems are negative.    Vital Signs:    T(F): 98.2 (03-23-18 @ 05:51), Max: 98.2 (03-23-18 @ 05:51)  HR: 80 (03-23-18 @ 05:51) (80 - 82)  BP: 122/58 (03-23-18 @ 05:51) (122/58 - 141/65)  RR: 18 (03-23-18 @ 05:51) (18 - 18)  SpO2: 96% (03-23-18 @ 08:40) (96% - 96%)  I&O's Summary    22 Mar 2018 07:01  -  23 Mar 2018 07:00  --------------------------------------------------------  IN: 240 mL / OUT: 0 mL / NET: 240 mL      Daily Height in cm: 157.48 (23 Mar 2018 13:11)    Daily NS AS Nutri Dx Weight: Unintended weight loss (23 Mar 2018 13:11)  CAPILLARY BLOOD GLUCOSE  131 (23 Mar 2018 11:53)  127 (23 Mar 2018 07:43)  219 (22 Mar 2018 20:11)          PHYSICAL EXAM:    GENERAL: Alert & oriented X 3. NAD  SKIN: No rashes or lesions  HENT: Atrumatic. Normocephalic. PERRL. Moist membranes.  NECK: Supple, No JVD. No lymphadenopathy.  PULMONARY: CTA B/L. No wheezing. No rales  CVS: Normal S1, S2. Rate and Rythm are regular. No murmurs, rubs or gallops.  ABDOMEN: Soft, Nontender, Nondistended; Bowel sounds present  EXTREMITIES: Peripheral pulses intact. No edema B/L.  NEUROLOGIC:  No motor or sensory deficit.    Consultant(s) Notes Reviewed:  [x ] YES  [ ] NO  Care Discussed with Consultants/Other Providers [ x] YES  [ ] NO    EKG reviewed  Telemetry reviewed    LABS:                        7.7    5.19  )-----------( 202      ( 22 Mar 2018 07:00 )             24.0     03-22    141  |  102  |  25<H>  ----------------------------<  103  3.6   |  29  |  1.2    Ca    8.2<L>      22 Mar 2018 07:00      PT/INR - ( 23 Mar 2018 08:37 )   PT: 13.90 sec;   INR: 1.28 ratio         PTT - ( 23 Mar 2018 08:37 )  PTT:57.6 sec      Culture - Blood (collected 22 Mar 2018 07:00)  Source: .Blood None  Preliminary Report (23 Mar 2018 15:01):    No growth to date.    Culture - Blood (collected 21 Mar 2018 21:49)  Source: .Blood None  Preliminary Report (23 Mar 2018 10:01):    No growth to date.    Culture - Blood (collected 21 Mar 2018 10:22)  Source: .Blood None  Preliminary Report (22 Mar 2018 14:01):    No growth to date.        RADIOLOGY & ADDITIONAL TESTS:      Imaging or report Personally Reviewed:  [ ] YES  [ ] NO    Medications:  Standing  atorvastatin 40 milliGRAM(s) Oral at bedtime  cefTRIAXone   IVPB 1 Gram(s) IV Intermittent every 24 hours  dextrose 5%. 1000 milliLiter(s) IV Continuous <Continuous>  dextrose 50% Injectable 12.5 Gram(s) IV Push once  dextrose 50% Injectable 25 Gram(s) IV Push once  dextrose 50% Injectable 25 Gram(s) IV Push once  ferrous    sulfate 325 milliGRAM(s) Oral daily  folic acid 1 milliGRAM(s) Oral daily  furosemide    Tablet 20 milliGRAM(s) Oral daily  heparin  Infusion 1000 Unit(s)/Hr IV Continuous <Continuous>  insulin glargine Injectable (LANTUS) 12 Unit(s) SubCutaneous at bedtime  insulin lispro (HumaLOG) corrective regimen sliding scale   SubCutaneous at bedtime  insulin lispro Injectable (HumaLOG) 4 Unit(s) SubCutaneous before breakfast  insulin lispro Injectable (HumaLOG) 4 Unit(s) SubCutaneous before lunch  insulin lispro Injectable (HumaLOG) 4 Unit(s) SubCutaneous before dinner  metoprolol     tartrate 25 milliGRAM(s) Oral two times a day  pantoprazole    Tablet 40 milliGRAM(s) Oral before breakfast  raloxifene 60 milliGRAM(s) Oral daily  sodium chloride 0.9%. 1000 milliLiter(s) IV Continuous <Continuous>    PRN Meds  dextrose Gel 1 Dose(s) Oral once PRN  glucagon  Injectable 1 milliGRAM(s) IntraMuscular once PRN      Case discussed with resident    Care discussed with pt/family AUSTIN DAVIS  77y Female    INTERVAL HPI/OVERNIGHT EVENTS:  Patient seen and examined. S/P cath. Clean coronaries. No new complaint. No hematuria    ROS: All other systems are negative.    Vital Signs:    T(F): 98.2 (03-23-18 @ 05:51), Max: 98.2 (03-23-18 @ 05:51)  HR: 80 (03-23-18 @ 05:51) (80 - 82)  BP: 122/58 (03-23-18 @ 05:51) (122/58 - 141/65)  RR: 18 (03-23-18 @ 05:51) (18 - 18)  SpO2: 96% (03-23-18 @ 08:40) (96% - 96%)  I&O's Summary    22 Mar 2018 07:01  -  23 Mar 2018 07:00  --------------------------------------------------------  IN: 240 mL / OUT: 0 mL / NET: 240 mL      Daily Height in cm: 157.48 (23 Mar 2018 13:11)    Daily NS AS Nutri Dx Weight: Unintended weight loss (23 Mar 2018 13:11)  CAPILLARY BLOOD GLUCOSE  131 (23 Mar 2018 11:53)  127 (23 Mar 2018 07:43)  219 (22 Mar 2018 20:11)          PHYSICAL EXAM:    GENERAL: Alert & oriented X 3. NAD  SKIN: No rashes or lesions  HENT: Atrumatic. Normocephalic. PERRL. Moist membranes.  NECK: Supple, No JVD. No lymphadenopathy.  PULMONARY: CTA B/L. No wheezing. No rales  CVS: +ve Aortic and Mitral mechanical valve clicks. Rate and Rythm are regular. A III/VI systolic mm over aortic area.  ABDOMEN: Soft, Nontender, Nondistended; Bowel sounds present  EXTREMITIES: Peripheral pulses intact. No edema B/L.  NEUROLOGIC:  No motor or sensory deficit.    Consultant(s) Notes Reviewed:  [x ] YES  [ ] NO  Care Discussed with Consultants/Other Providers [ x] YES  [ ] NO    EKG reviewed  Telemetry reviewed    LABS:                        7.7    5.19  )-----------( 202      ( 22 Mar 2018 07:00 )             24.0   Hemoglobin: 7.7 g/dL (03-22 @ 07:00)  Hemoglobin: 7.9 g/dL (03-21 @ 21:49)  Hemoglobin: 8.2 g/dL (03-21 @ 10:22)  Hemoglobin: 6.9 g/dL (03-20 @ 06:55)  Hemoglobin: 7.1 g/dL (03-19 @ 11:11)  Hemoglobin: 7.4 g/dL (03-19 @ 08:36)    03-22    141  |  102  |  25<H>  ----------------------------<  103  3.6   |  29  |  1.2    Ca    8.2<L>      22 Mar 2018 07:00      PT/INR - ( 23 Mar 2018 08:37 )   PT: 13.90 sec;   INR: 1.28 ratio         PTT - ( 23 Mar 2018 08:37 )  PTT:57.6 sec      Culture - Blood (collected 22 Mar 2018 07:00)  Source: .Blood None  Preliminary Report (23 Mar 2018 15:01):    No growth to date.    Culture - Blood (collected 21 Mar 2018 21:49)  Source: .Blood None  Preliminary Report (23 Mar 2018 10:01):    No growth to date.    Culture - Blood (collected 21 Mar 2018 10:22)  Source: .Blood None  Preliminary Report (22 Mar 2018 14:01):    No growth to date.        RADIOLOGY & ADDITIONAL TESTS:      Imaging or report Personally Reviewed:  [ ] YES  [ ] NO    Medications:  Standing  atorvastatin 40 milliGRAM(s) Oral at bedtime  cefTRIAXone   IVPB 1 Gram(s) IV Intermittent every 24 hours  dextrose 5%. 1000 milliLiter(s) IV Continuous <Continuous>  dextrose 50% Injectable 12.5 Gram(s) IV Push once  dextrose 50% Injectable 25 Gram(s) IV Push once  dextrose 50% Injectable 25 Gram(s) IV Push once  ferrous    sulfate 325 milliGRAM(s) Oral daily  folic acid 1 milliGRAM(s) Oral daily  furosemide    Tablet 20 milliGRAM(s) Oral daily  heparin  Infusion 1000 Unit(s)/Hr IV Continuous <Continuous>  insulin glargine Injectable (LANTUS) 12 Unit(s) SubCutaneous at bedtime  insulin lispro (HumaLOG) corrective regimen sliding scale   SubCutaneous at bedtime  insulin lispro Injectable (HumaLOG) 4 Unit(s) SubCutaneous before breakfast  insulin lispro Injectable (HumaLOG) 4 Unit(s) SubCutaneous before lunch  insulin lispro Injectable (HumaLOG) 4 Unit(s) SubCutaneous before dinner  metoprolol     tartrate 25 milliGRAM(s) Oral two times a day  pantoprazole    Tablet 40 milliGRAM(s) Oral before breakfast  raloxifene 60 milliGRAM(s) Oral daily  sodium chloride 0.9%. 1000 milliLiter(s) IV Continuous <Continuous>    PRN Meds  dextrose Gel 1 Dose(s) Oral once PRN  glucagon  Injectable 1 milliGRAM(s) IntraMuscular once PRN      Case discussed with resident    Care discussed with pt/family

## 2018-03-23 NOTE — PROGRESS NOTE ADULT - SUBJECTIVE AND OBJECTIVE BOX
SUBJECTIVE:    Patient is a 77y old Female who presents with a chief complaint of bleeding when urinating (16 Mar 2018 19:01)    Currently admitted to medicine with the primary diagnosis of    Today is hospital day 7d. This morning she is resting comfortably in bed and reports no new issues or overnight events.     PAST MEDICAL & SURGICAL HISTORY  Anemia  Rheumatic fever  Diabetes  Osteoporosis  Mitral valve replaced  Atrial fibrillation: on coumadin at home  Hypertension  History of ureter stent  S/P AVR  H/O mitral valve replacement    SOCIAL HISTORY:  Negative for smoking/alcohol/drug use.     ALLERGIES:  No Known Allergies    MEDICATIONS:  STANDING MEDICATIONS  atorvastatin 40 milliGRAM(s) Oral at bedtime  cefTRIAXone   IVPB 1 Gram(s) IV Intermittent every 24 hours  dextrose 5%. 1000 milliLiter(s) IV Continuous <Continuous>  dextrose 50% Injectable 12.5 Gram(s) IV Push once  dextrose 50% Injectable 25 Gram(s) IV Push once  dextrose 50% Injectable 25 Gram(s) IV Push once  ferrous    sulfate 325 milliGRAM(s) Oral daily  folic acid 1 milliGRAM(s) Oral daily  furosemide    Tablet 20 milliGRAM(s) Oral daily  heparin  Infusion 1000 Unit(s)/Hr IV Continuous <Continuous>  insulin glargine Injectable (LANTUS) 12 Unit(s) SubCutaneous at bedtime  insulin lispro (HumaLOG) corrective regimen sliding scale   SubCutaneous at bedtime  insulin lispro Injectable (HumaLOG) 4 Unit(s) SubCutaneous before breakfast  insulin lispro Injectable (HumaLOG) 4 Unit(s) SubCutaneous before lunch  insulin lispro Injectable (HumaLOG) 4 Unit(s) SubCutaneous before dinner  metoprolol     tartrate 25 milliGRAM(s) Oral two times a day  pantoprazole    Tablet 40 milliGRAM(s) Oral before breakfast  raloxifene 60 milliGRAM(s) Oral daily    PRN MEDICATIONS  dextrose Gel 1 Dose(s) Oral once PRN  glucagon  Injectable 1 milliGRAM(s) IntraMuscular once PRN    VITALS:   T(F): 98.2  HR: 80  BP: 122/58  RR: 18  SpO2: 96%    LABS:                        7.7    5.19  )-----------( 202      ( 22 Mar 2018 07:00 )             24.0     03-22    141  |  102  |  25<H>  ----------------------------<  103  3.6   |  29  |  1.2    Ca    8.2<L>      22 Mar 2018 07:00      PT/INR - ( 23 Mar 2018 08:37 )   PT: 13.90 sec;   INR: 1.28 ratio         PTT - ( 23 Mar 2018 08:37 )  PTT:57.6 sec          Culture - Blood (collected 21 Mar 2018 21:49)  Source: .Blood None  Preliminary Report (23 Mar 2018 10:01):    No growth to date.    Culture - Blood (collected 21 Mar 2018 10:22)  Source: .Blood None  Preliminary Report (22 Mar 2018 14:01):    No growth to date.    Culture - Blood (collected 20 Mar 2018 16:00)  Source: .Blood Blood  Preliminary Report (22 Mar 2018 01:02):    No growth to date.          RADIOLOGY:  < from: CT Abdomen and Pelvis No Cont (03.19.18 @ 13:00) >  IMPRESSION:    Double-J left ureteral stent with proximal pigtail within the proximal   ureter as opposed to the left renal pelvis. Mild   dilatation/hydronephrosis of the upper pole calyces. No calculus is   identified along the course of the left ureter/stent    < end of copied text >    PHYSICAL EXAM:  GEN: No acute distress  LUNGS: Clear to auscultation bilaterally   HEART: systolic murmur, irregular   ABD: soft, non tender, +BS  EXT: no edema, sitting with legs up.   NEURO: AAOX3

## 2018-03-23 NOTE — DIETITIAN INITIAL EVALUATION ADULT. - OTHER INFO
Pt reports  lbs (1 year ago), current wt 134.4 lbs. Pt reports weight loss was due to no appetite and poor PO intake, however Pt reports appetite & PO intake has since improved. PT is NPO today for procedure. Prior intake before NPO 50-75%. Skin: Edema 1+ B/L feet, intact, ecchymosis. Last BM 3/22. Pertinent Medical Information: Patient admitted with SOB and acute leukocytosis. 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 Reason for RD assessment: LOS Patient admitted with SOB and acute leukocytosis. 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 Reason for RD assessment: LOS. Pt reports samir/PO intake has been improving since admission (50-75% of meals). Now NPO today for procedure. Noted, reported 17% unintentional wt loss x 1 year. Does not meet criteria for PCM and pt intake has been improving, will continue to monitor.  Skin: Edema 1+ B/L feet, intact, ecchymosis. Last BM 3/22.

## 2018-03-23 NOTE — DIETITIAN INITIAL EVALUATION ADULT. - ENERGY NEEDS
Energy: 5452-6831 kcal/day (MSJ x 1.2-1.3 AF, ABW)  Protein: 67-73 g/day (1.1-1.2 g/kcal, ABW)  Fluids: 3858-9313 mL/day or per LIP

## 2018-03-23 NOTE — DIETITIAN INITIAL EVALUATION ADULT. - ORAL INTAKE PTA
Pt reports poor intake PTA due to having no appetite & fear due to acuteness of illness; Pt avoids green vegetables and tries to follow a low sodium diet at home. Pt consumes 3 meals/day plus snacks and 1 ensure.  At home, pt takes folic acid, vit D, iron, and other vitamins that could not be recalled. Pt dislikes mushrooms. Pt reports decreased samir/PO x 1 year PTA, consuming small meals/snacks + 1 Ensure daily.  At home, pt takes folic acid, vit D, iron, and other vitamins that could not be recalled. Pt dislikes mushrooms.

## 2018-03-23 NOTE — PROGRESS NOTE ADULT - ASSESSMENT
77 year old female with chronic hypoproliferative anemia, no response to EPO and iron, transfusion dependent for 6-8 months    1. 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 Cifuentes: Cath today, INR 1.28, hold coumadin, continue heparin PTT 50-80.     2. s/p left ureteral dilation with double J stent  -hematuria resolved, CT shows double J in ureter   ·+ UA, negative culture--Continue rocephin  -Seen by nephrology: f/u UA/UCX, pro/cr ratio   -Uro: no further intervention at this time, f/u with own urologist as outpt.       3. Diabetes   -Continue with insulin sliding scale.

## 2018-03-24 ENCOUNTER — INPATIENT (INPATIENT)
Facility: HOSPITAL | Age: 78
LOS: 11 days | Discharge: HOME HEALTH | DRG: 216 | End: 2018-04-05
Attending: THORACIC SURGERY (CARDIOTHORACIC VASCULAR SURGERY) | Admitting: THORACIC SURGERY (CARDIOTHORACIC VASCULAR SURGERY)
Payer: MEDICARE

## 2018-03-24 ENCOUNTER — TRANSCRIPTION ENCOUNTER (OUTPATIENT)
Age: 78
End: 2018-03-24

## 2018-03-24 VITALS
DIASTOLIC BLOOD PRESSURE: 70 MMHG | WEIGHT: 126.99 LBS | RESPIRATION RATE: 16 BRPM | SYSTOLIC BLOOD PRESSURE: 152 MMHG | OXYGEN SATURATION: 93 % | HEART RATE: 84 BPM

## 2018-03-24 VITALS
DIASTOLIC BLOOD PRESSURE: 56 MMHG | SYSTOLIC BLOOD PRESSURE: 118 MMHG | RESPIRATION RATE: 18 BRPM | HEART RATE: 82 BPM | TEMPERATURE: 97 F

## 2018-03-24 DIAGNOSIS — Z98.890 OTHER SPECIFIED POSTPROCEDURAL STATES: Chronic | ICD-10-CM

## 2018-03-24 DIAGNOSIS — Z96.0 PRESENCE OF UROGENITAL IMPLANTS: Chronic | ICD-10-CM

## 2018-03-24 DIAGNOSIS — Z95.2 PRESENCE OF PROSTHETIC HEART VALVE: Chronic | ICD-10-CM

## 2018-03-24 LAB
ALBUMIN SERPL ELPH-MCNC: 3.9 G/DL — SIGNIFICANT CHANGE UP (ref 3.3–5)
ALP SERPL-CCNC: 85 U/L — SIGNIFICANT CHANGE UP (ref 40–120)
ALT FLD-CCNC: 20 U/L — SIGNIFICANT CHANGE UP (ref 10–45)
ANION GAP SERPL CALC-SCNC: 10 MMOL/L — SIGNIFICANT CHANGE UP (ref 5–17)
ANION GAP SERPL CALC-SCNC: 11 MMOL/L — SIGNIFICANT CHANGE UP (ref 7–14)
ANISOCYTOSIS BLD QL: SIGNIFICANT CHANGE UP
APPEARANCE UR: (no result)
APTT BLD: 80.6 SEC — CRITICAL HIGH (ref 27–39.2)
APTT BLD: 91.9 SEC — HIGH (ref 27.5–37.4)
AST SERPL-CCNC: 71 U/L — HIGH (ref 10–40)
BASO STIPL BLD QL SMEAR: SIGNIFICANT CHANGE UP
BASOPHILS # BLD AUTO: 0.05 K/UL — SIGNIFICANT CHANGE UP (ref 0–0.2)
BASOPHILS NFR BLD AUTO: 0.6 % — SIGNIFICANT CHANGE UP (ref 0–2)
BASOPHILS NFR BLD AUTO: 0.9 % — SIGNIFICANT CHANGE UP (ref 0–1)
BILIRUB SERPL-MCNC: 3.5 MG/DL — HIGH (ref 0.2–1.2)
BILIRUB UR-MCNC: (no result)
BUN SERPL-MCNC: 23 MG/DL — HIGH (ref 10–20)
BUN SERPL-MCNC: 25 MG/DL — HIGH (ref 7–23)
C3 SERPL-MCNC: 95 MG/DL — SIGNIFICANT CHANGE UP (ref 80–180)
C4 SERPL-MCNC: 26 MG/DL — SIGNIFICANT CHANGE UP (ref 10–45)
CALCIUM SERPL-MCNC: 8.1 MG/DL — LOW (ref 8.5–10.1)
CALCIUM SERPL-MCNC: 8.7 MG/DL — SIGNIFICANT CHANGE UP (ref 8.4–10.5)
CHLORIDE SERPL-SCNC: 102 MMOL/L — SIGNIFICANT CHANGE UP (ref 96–108)
CHLORIDE SERPL-SCNC: 102 MMOL/L — SIGNIFICANT CHANGE UP (ref 98–110)
CO2 SERPL-SCNC: 29 MMOL/L — SIGNIFICANT CHANGE UP (ref 17–32)
CO2 SERPL-SCNC: 30 MMOL/L — SIGNIFICANT CHANGE UP (ref 22–31)
COLOR SPEC: (no result)
CREAT SERPL-MCNC: 1.1 MG/DL — SIGNIFICANT CHANGE UP (ref 0.7–1.5)
CREAT SERPL-MCNC: 1.28 MG/DL — SIGNIFICANT CHANGE UP (ref 0.5–1.3)
DACRYOCYTES BLD QL SMEAR: SLIGHT — SIGNIFICANT CHANGE UP
DIFF PNL FLD: (no result)
EOSINOPHIL # BLD AUTO: 0.25 K/UL — SIGNIFICANT CHANGE UP (ref 0–0.7)
EOSINOPHIL NFR BLD AUTO: 3 % — SIGNIFICANT CHANGE UP (ref 0–6)
EOSINOPHIL NFR BLD AUTO: 4.5 % — SIGNIFICANT CHANGE UP (ref 0–8)
GLUCOSE BLDC GLUCOMTR-MCNC: 137 MG/DL — HIGH (ref 70–99)
GLUCOSE BLDC GLUCOMTR-MCNC: 198 MG/DL — HIGH (ref 70–99)
GLUCOSE SERPL-MCNC: 143 MG/DL — HIGH (ref 70–99)
GLUCOSE SERPL-MCNC: 99 MG/DL — SIGNIFICANT CHANGE UP (ref 70–99)
GLUCOSE UR QL: NEGATIVE — SIGNIFICANT CHANGE UP
HBA1C BLD-MCNC: 4.6 % — SIGNIFICANT CHANGE UP (ref 4–5.6)
HCT VFR BLD CALC: 23.6 % — LOW (ref 37–47)
HCT VFR BLD CALC: 25.8 % — LOW (ref 34.5–45)
HGB BLD-MCNC: 7.1 G/DL — CRITICAL LOW (ref 12–16)
HGB BLD-MCNC: 7.7 G/DL — LOW (ref 11.5–15.5)
HYPOCHROMIA BLD QL: SLIGHT — SIGNIFICANT CHANGE UP
IMM GRANULOCYTES NFR BLD AUTO: 0.4 % — HIGH (ref 0.1–0.3)
INR BLD: 1.17 — HIGH (ref 0.88–1.16)
KETONES UR-MCNC: NEGATIVE — SIGNIFICANT CHANGE UP
LEUKOCYTE ESTERASE UR-ACNC: NEGATIVE — SIGNIFICANT CHANGE UP
LG PLATELETS BLD QL AUTO: PRESENT — SIGNIFICANT CHANGE UP
LYMPHOCYTES # BLD AUTO: 1.09 K/UL — LOW (ref 1.2–3.4)
LYMPHOCYTES # BLD AUTO: 18.7 % — SIGNIFICANT CHANGE UP (ref 13–44)
LYMPHOCYTES # BLD AUTO: 19.5 % — LOW (ref 20.5–51.1)
MACROCYTES BLD QL: SLIGHT — SIGNIFICANT CHANGE UP
MANUAL SMEAR VERIFICATION: SIGNIFICANT CHANGE UP
MCHC RBC-ENTMCNC: 24.4 PG — LOW (ref 27–34)
MCHC RBC-ENTMCNC: 24.7 PG — LOW (ref 27–31)
MCHC RBC-ENTMCNC: 29.8 G/DL — LOW (ref 32–36)
MCHC RBC-ENTMCNC: 30.1 G/DL — LOW (ref 32–37)
MCV RBC AUTO: 81.9 FL — SIGNIFICANT CHANGE UP (ref 80–100)
MCV RBC AUTO: 81.9 FL — SIGNIFICANT CHANGE UP (ref 81–99)
MICROCYTES BLD QL: SLIGHT — SIGNIFICANT CHANGE UP
MONOCYTES # BLD AUTO: 0.41 K/UL — SIGNIFICANT CHANGE UP (ref 0.1–0.6)
MONOCYTES NFR BLD AUTO: 4.8 % — SIGNIFICANT CHANGE UP (ref 2–14)
MONOCYTES NFR BLD AUTO: 7.3 % — SIGNIFICANT CHANGE UP (ref 1.7–9.3)
NEUTROPHILS # BLD AUTO: 3.77 K/UL — SIGNIFICANT CHANGE UP (ref 1.4–6.5)
NEUTROPHILS NFR BLD AUTO: 67.4 % — SIGNIFICANT CHANGE UP (ref 42.2–75.2)
NEUTROPHILS NFR BLD AUTO: 72.9 % — SIGNIFICANT CHANGE UP (ref 43–77)
NITRITE UR-MCNC: NEGATIVE — SIGNIFICANT CHANGE UP
NRBC # BLD: 0 /100 WBCS — SIGNIFICANT CHANGE UP (ref 0–0)
NT-PROBNP SERPL-SCNC: 2095 PG/ML — HIGH (ref 0–300)
OVALOCYTES BLD QL SMEAR: SLIGHT — SIGNIFICANT CHANGE UP
PH UR: 5.5 — SIGNIFICANT CHANGE UP (ref 5–8)
PLAT MORPH BLD: (no result)
PLATELET # BLD AUTO: 136 K/UL — LOW (ref 150–400)
PLATELET # BLD AUTO: 177 K/UL — SIGNIFICANT CHANGE UP (ref 130–400)
POIKILOCYTOSIS BLD QL AUTO: SLIGHT — SIGNIFICANT CHANGE UP
POLYCHROMASIA BLD QL SMEAR: SLIGHT — SIGNIFICANT CHANGE UP
POTASSIUM SERPL-MCNC: 4 MMOL/L — SIGNIFICANT CHANGE UP (ref 3.5–5)
POTASSIUM SERPL-MCNC: 4 MMOL/L — SIGNIFICANT CHANGE UP (ref 3.5–5.3)
POTASSIUM SERPL-SCNC: 4 MMOL/L — SIGNIFICANT CHANGE UP (ref 3.5–5)
POTASSIUM SERPL-SCNC: 4 MMOL/L — SIGNIFICANT CHANGE UP (ref 3.5–5.3)
PROT SERPL-MCNC: 6.8 G/DL — SIGNIFICANT CHANGE UP (ref 6–8.3)
PROT UR-MCNC: 100 MG/DL
PROTHROM AB SERPL-ACNC: 13 SEC — HIGH (ref 9.8–12.7)
RBC # BLD: 2.88 M/UL — LOW (ref 4.2–5.4)
RBC # BLD: 3.15 M/UL — LOW (ref 3.8–5.2)
RBC # FLD: 18.3 % — HIGH (ref 11.5–14.5)
RBC # FLD: 19 % — HIGH (ref 10.3–16.9)
RBC BLD AUTO: (no result)
SODIUM SERPL-SCNC: 142 MMOL/L — SIGNIFICANT CHANGE UP (ref 135–145)
SODIUM SERPL-SCNC: 142 MMOL/L — SIGNIFICANT CHANGE UP (ref 135–146)
SP GR SPEC: 1.02 — SIGNIFICANT CHANGE UP (ref 1–1.03)
SPHEROCYTES BLD QL SMEAR: SLIGHT — SIGNIFICANT CHANGE UP
TSH SERPL-MCNC: 2.97 UIU/ML — SIGNIFICANT CHANGE UP (ref 0.35–4.94)
UROBILINOGEN FLD QL: 0.2 E.U./DL — SIGNIFICANT CHANGE UP
WBC # BLD: 5.2 K/UL — SIGNIFICANT CHANGE UP (ref 3.8–10.5)
WBC # BLD: 5.59 K/UL — SIGNIFICANT CHANGE UP (ref 4.8–10.8)
WBC # FLD AUTO: 5.2 K/UL — SIGNIFICANT CHANGE UP (ref 3.8–10.5)
WBC # FLD AUTO: 5.59 K/UL — SIGNIFICANT CHANGE UP (ref 4.8–10.8)

## 2018-03-24 PROCEDURE — 71045 X-RAY EXAM CHEST 1 VIEW: CPT | Mod: 26

## 2018-03-24 PROCEDURE — 99222 1ST HOSP IP/OBS MODERATE 55: CPT

## 2018-03-24 PROCEDURE — 93010 ELECTROCARDIOGRAM REPORT: CPT

## 2018-03-24 RX ORDER — FERROUS SULFATE 325(65) MG
325 TABLET ORAL DAILY
Qty: 0 | Refills: 0 | Status: DISCONTINUED | OUTPATIENT
Start: 2018-03-24 | End: 2018-03-29

## 2018-03-24 RX ORDER — SODIUM CHLORIDE 9 MG/ML
3 INJECTION INTRAMUSCULAR; INTRAVENOUS; SUBCUTANEOUS EVERY 8 HOURS
Qty: 0 | Refills: 0 | Status: DISCONTINUED | OUTPATIENT
Start: 2018-03-24 | End: 2018-03-29

## 2018-03-24 RX ORDER — METFORMIN HYDROCHLORIDE 850 MG/1
1 TABLET ORAL
Qty: 0 | Refills: 0 | COMMUNITY

## 2018-03-24 RX ORDER — FUROSEMIDE 40 MG
1 TABLET ORAL
Qty: 0 | Refills: 0 | COMMUNITY

## 2018-03-24 RX ORDER — METOPROLOL TARTRATE 50 MG
1 TABLET ORAL
Qty: 0 | Refills: 0 | COMMUNITY
Start: 2018-03-24

## 2018-03-24 RX ORDER — ATORVASTATIN CALCIUM 80 MG/1
40 TABLET, FILM COATED ORAL AT BEDTIME
Qty: 0 | Refills: 0 | Status: DISCONTINUED | OUTPATIENT
Start: 2018-03-24 | End: 2018-03-29

## 2018-03-24 RX ORDER — FOLIC ACID 0.8 MG
1 TABLET ORAL
Qty: 0 | Refills: 0 | COMMUNITY
Start: 2018-03-24

## 2018-03-24 RX ORDER — INSULIN GLARGINE 100 [IU]/ML
0 INJECTION, SOLUTION SUBCUTANEOUS
Qty: 0 | Refills: 0 | COMMUNITY
Start: 2018-03-24

## 2018-03-24 RX ORDER — METOPROLOL TARTRATE 50 MG
25 TABLET ORAL EVERY 12 HOURS
Qty: 0 | Refills: 0 | Status: DISCONTINUED | OUTPATIENT
Start: 2018-03-24 | End: 2018-03-29

## 2018-03-24 RX ORDER — HEPARIN SODIUM 5000 [USP'U]/ML
1200 INJECTION INTRAVENOUS; SUBCUTANEOUS
Qty: 25000 | Refills: 0 | Status: DISCONTINUED | OUTPATIENT
Start: 2018-03-24 | End: 2018-03-24

## 2018-03-24 RX ORDER — PANTOPRAZOLE SODIUM 20 MG/1
1 TABLET, DELAYED RELEASE ORAL
Qty: 0 | Refills: 0 | COMMUNITY

## 2018-03-24 RX ORDER — METOPROLOL TARTRATE 50 MG
1 TABLET ORAL
Qty: 0 | Refills: 0 | COMMUNITY

## 2018-03-24 RX ORDER — INSULIN LISPRO 100/ML
VIAL (ML) SUBCUTANEOUS
Qty: 0 | Refills: 0 | Status: DISCONTINUED | OUTPATIENT
Start: 2018-03-24 | End: 2018-03-29

## 2018-03-24 RX ORDER — CEFPODOXIME PROXETIL 100 MG
1 TABLET ORAL
Qty: 4 | Refills: 0 | OUTPATIENT
Start: 2018-03-24 | End: 2018-03-27

## 2018-03-24 RX ORDER — DEXTROSE 50 % IN WATER 50 %
25 SYRINGE (ML) INTRAVENOUS ONCE
Qty: 0 | Refills: 0 | Status: DISCONTINUED | OUTPATIENT
Start: 2018-03-24 | End: 2018-03-29

## 2018-03-24 RX ORDER — DOCUSATE SODIUM 100 MG
100 CAPSULE ORAL THREE TIMES A DAY
Qty: 0 | Refills: 0 | Status: DISCONTINUED | OUTPATIENT
Start: 2018-03-24 | End: 2018-03-29

## 2018-03-24 RX ORDER — ASPIRIN/CALCIUM CARB/MAGNESIUM 324 MG
1 TABLET ORAL
Qty: 0 | Refills: 0 | COMMUNITY

## 2018-03-24 RX ORDER — CEFDINIR 250 MG/5ML
300 POWDER, FOR SUSPENSION ORAL
Qty: 0 | Refills: 0 | Status: COMPLETED | OUTPATIENT
Start: 2018-03-24 | End: 2018-03-28

## 2018-03-24 RX ORDER — WARFARIN SODIUM 2.5 MG/1
1 TABLET ORAL
Qty: 0 | Refills: 0 | COMMUNITY

## 2018-03-24 RX ORDER — GLIMEPIRIDE 1 MG
1 TABLET ORAL
Qty: 0 | Refills: 0 | COMMUNITY

## 2018-03-24 RX ORDER — SODIUM CHLORIDE 9 MG/ML
1000 INJECTION, SOLUTION INTRAVENOUS
Qty: 0 | Refills: 0 | Status: DISCONTINUED | OUTPATIENT
Start: 2018-03-24 | End: 2018-03-29

## 2018-03-24 RX ORDER — PANTOPRAZOLE SODIUM 20 MG/1
1 TABLET, DELAYED RELEASE ORAL
Qty: 0 | Refills: 0 | COMMUNITY
Start: 2018-03-24

## 2018-03-24 RX ORDER — FUROSEMIDE 40 MG
1 TABLET ORAL
Qty: 0 | Refills: 0 | COMMUNITY
Start: 2018-03-24

## 2018-03-24 RX ORDER — DEXTROSE 50 % IN WATER 50 %
1 SYRINGE (ML) INTRAVENOUS ONCE
Qty: 0 | Refills: 0 | Status: DISCONTINUED | OUTPATIENT
Start: 2018-03-24 | End: 2018-03-29

## 2018-03-24 RX ORDER — HEPARIN SODIUM 5000 [USP'U]/ML
0 INJECTION INTRAVENOUS; SUBCUTANEOUS
Qty: 0 | Refills: 0 | COMMUNITY
Start: 2018-03-24

## 2018-03-24 RX ORDER — RAMIPRIL 5 MG
1 CAPSULE ORAL
Qty: 0 | Refills: 0 | COMMUNITY

## 2018-03-24 RX ORDER — FOLIC ACID 0.8 MG
1 TABLET ORAL
Qty: 0 | Refills: 0 | COMMUNITY

## 2018-03-24 RX ORDER — DEXTROSE 50 % IN WATER 50 %
12.5 SYRINGE (ML) INTRAVENOUS ONCE
Qty: 0 | Refills: 0 | Status: DISCONTINUED | OUTPATIENT
Start: 2018-03-24 | End: 2018-03-29

## 2018-03-24 RX ORDER — FUROSEMIDE 40 MG
20 TABLET ORAL DAILY
Qty: 0 | Refills: 0 | Status: DISCONTINUED | OUTPATIENT
Start: 2018-03-24 | End: 2018-03-29

## 2018-03-24 RX ORDER — SENNA PLUS 8.6 MG/1
2 TABLET ORAL AT BEDTIME
Qty: 0 | Refills: 0 | Status: DISCONTINUED | OUTPATIENT
Start: 2018-03-24 | End: 2018-03-29

## 2018-03-24 RX ORDER — ATORVASTATIN CALCIUM 80 MG/1
1 TABLET, FILM COATED ORAL
Qty: 0 | Refills: 0 | COMMUNITY
Start: 2018-03-24

## 2018-03-24 RX ORDER — GLUCAGON INJECTION, SOLUTION 0.5 MG/.1ML
1 INJECTION, SOLUTION SUBCUTANEOUS ONCE
Qty: 0 | Refills: 0 | Status: DISCONTINUED | OUTPATIENT
Start: 2018-03-24 | End: 2018-03-29

## 2018-03-24 RX ORDER — POTASSIUM CHLORIDE 20 MEQ
10 PACKET (EA) ORAL DAILY
Qty: 0 | Refills: 0 | Status: DISCONTINUED | OUTPATIENT
Start: 2018-03-24 | End: 2018-03-29

## 2018-03-24 RX ORDER — PANTOPRAZOLE SODIUM 20 MG/1
40 TABLET, DELAYED RELEASE ORAL
Qty: 0 | Refills: 0 | Status: DISCONTINUED | OUTPATIENT
Start: 2018-03-24 | End: 2018-03-29

## 2018-03-24 RX ORDER — INSULIN LISPRO 100/ML
0 VIAL (ML) SUBCUTANEOUS
Qty: 0 | Refills: 0 | COMMUNITY
Start: 2018-03-24

## 2018-03-24 RX ORDER — FOLIC ACID 0.8 MG
1 TABLET ORAL DAILY
Qty: 0 | Refills: 0 | Status: DISCONTINUED | OUTPATIENT
Start: 2018-03-24 | End: 2018-03-29

## 2018-03-24 RX ORDER — HEPARIN SODIUM 5000 [USP'U]/ML
1000 INJECTION INTRAVENOUS; SUBCUTANEOUS
Qty: 25000 | Refills: 0 | Status: DISCONTINUED | OUTPATIENT
Start: 2018-03-24 | End: 2018-03-25

## 2018-03-24 RX ADMIN — Medication 25 MILLIGRAM(S): at 19:59

## 2018-03-24 RX ADMIN — Medication 4 UNIT(S): at 12:54

## 2018-03-24 RX ADMIN — SODIUM CHLORIDE 3 MILLILITER(S): 9 INJECTION INTRAMUSCULAR; INTRAVENOUS; SUBCUTANEOUS at 22:18

## 2018-03-24 RX ADMIN — HEPARIN SODIUM 12 UNIT(S)/HR: 5000 INJECTION INTRAVENOUS; SUBCUTANEOUS at 14:00

## 2018-03-24 RX ADMIN — ATORVASTATIN CALCIUM 40 MILLIGRAM(S): 80 TABLET, FILM COATED ORAL at 22:19

## 2018-03-24 RX ADMIN — Medication 325 MILLIGRAM(S): at 12:56

## 2018-03-24 RX ADMIN — Medication 100 MILLIGRAM(S): at 22:19

## 2018-03-24 RX ADMIN — PANTOPRAZOLE SODIUM 40 MILLIGRAM(S): 20 TABLET, DELAYED RELEASE ORAL at 06:16

## 2018-03-24 RX ADMIN — CEFTRIAXONE 100 GRAM(S): 500 INJECTION, POWDER, FOR SOLUTION INTRAMUSCULAR; INTRAVENOUS at 13:04

## 2018-03-24 RX ADMIN — SENNA PLUS 2 TABLET(S): 8.6 TABLET ORAL at 22:19

## 2018-03-24 RX ADMIN — RALOXIFENE HYDROCHLORIDE 60 MILLIGRAM(S): 60 TABLET, COATED ORAL at 12:57

## 2018-03-24 RX ADMIN — Medication 1 MILLIGRAM(S): at 12:56

## 2018-03-24 RX ADMIN — Medication 25 MILLIGRAM(S): at 05:51

## 2018-03-24 RX ADMIN — Medication 20 MILLIGRAM(S): at 05:51

## 2018-03-24 NOTE — DISCHARGE NOTE ADULT - HOSPITAL COURSE
No required admission in the context of hematuria, lasting 2 days. Additionally she was noted to have elevated bilirubin in blood and urine, high ldh and retic, and worsening anemia, rasing suspicion for delayed transfusion reaction. She has mild hemolysis at baseline 2/2 metallic valve in mitral position, current work up reveals a much greater degree of hemolysis.   She was initially on coumadin, pt was switched to heparin drip. Catheterization was completed on 3/23/2018--which revealed normal coronary arteries.   Patient concurrently was found to have a + UA and was started on IV rocephin given urethral stent. Patient to be discharged on PO vantin for 3 more days.   Patient is stable on heparin drip, and can be transferred to [facility] for aortic and mitral valve replacements.  Continue to hold aspirin and coumadin until accepting physician deems it acceptable to restart.

## 2018-03-24 NOTE — PROGRESS NOTE ADULT - SUBJECTIVE AND OBJECTIVE BOX
no chest pain and no sob       Vital Signs Last 24 Hrs  T(C): 35.9 (24 Mar 2018 05:47), Max: 36.5 (23 Mar 2018 20:30)  T(F): 96.7 (24 Mar 2018 05:47), Max: 97.7 (23 Mar 2018 20:30)  HR: 76 (24 Mar 2018 05:47) (76 - 80)  BP: 142/63 (24 Mar 2018 05:47) (109/54 - 142/63)  BP(mean): --  RR: 18 (24 Mar 2018 05:47) (18 - 18)  SpO2: 96% (23 Mar 2018 20:15) (96% - 96%)    PHYSICAL EXAM:  GENERAL: NAD, well-developed  HEAD:  Atraumatic, Normocephalic  EYES: EOMI, PERRLA, conjunctiva and sclera clear  NECK: Supple, No JVD  CHEST/LUNG: Clear to auscultation bilaterally; No wheeze  HEART: positive Heart murmur   ABDOMEN: Soft, Nontender, Nondistended; Bowel sounds present  EXTREMITIES:  2+ Peripheral Pulses, No clubbing, cyanosis, or edema  PSYCH: AAOx3  NEUROLOGY: non-focal                          7.1    5.59  )-----------( 177      ( 24 Mar 2018 07:24 )             23.6             PT/INR - ( 23 Mar 2018 08:37 )   PT: 13.90 sec;   INR: 1.28 ratio         PTT - ( 24 Mar 2018 07:24 )  PTT:80.6 sec        Culture - Blood (collected 22 Mar 2018 12:21)  Source: .Blood None  Preliminary Report (23 Mar 2018 23:02):    No growth to date.    Culture - Blood (collected 22 Mar 2018 07:00)  Source: .Blood None  Preliminary Report (23 Mar 2018 15:01):    No growth to date.    Culture - Blood (collected 21 Mar 2018 21:49)  Source: .Blood None  Preliminary Report (23 Mar 2018 10:01):    No growth to date.

## 2018-03-24 NOTE — DISCHARGE NOTE ADULT - PLAN OF CARE
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  Hemoglobin 7.1 today. Please continue to monitor.   Continue ferrous sulfate 325 mg daily.  Continue to hold coumadin, continue heparin drip for AVR/MVR. continue med management Please continue metoprolol 25mg twice daily   Continue heparin drip Please continue lasix 20mg daily and metoprolol 25mg twice daily replacement pt had catheterization yesterday which revealed normal coronary arteries.   Replacement to be done at [facility] Replacement to be done at [facility] continue abx Continue cefpodoxime for 3 more days, patient had a +UA on admission with negative cx  No evidence of hematuria currently.   Pt has outpatient urologist for double J stents placed 2 months ago placed for narrowing/unexplained thickening of the ureter. Pt can f/u with her outpatient urologist, Dr. Pablo, upon discharge.

## 2018-03-24 NOTE — DISCHARGE NOTE ADULT - CARE PROVIDER_API CALL
Jase Pablo), Urology  [address] [place] [address]  Phone: [phone]  Fax: [phone]

## 2018-03-24 NOTE — PROGRESS NOTE ADULT - ASSESSMENT
77 year old female with chronic hypoproliferative anemia, no response to EPO and iron, transfusion dependent for 6-8 months    1. 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   -Dr. Cifuentes: performed cath yesterday...    2. s/p left ureteral dilation with double J stent  -hematuria resolved, CT shows double J in ureter   ·+ UA, negative culture--switch from rocephin to cefpodoxime   -Seen by nephrology: f/u UA/UCX, pro/cr ratio   -Uro: no further intervention at this time, f/u with own urologist as outpt.       3. Diabetes   -Continue with insulin sliding scale. 77 year old female with chronic hypoproliferative anemia, no response to EPO and iron, transfusion dependent for 6-8 months    1. 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   -Dr. Cifuentes: performed cath yesterday-- normal coronary arteries     2. s/p left ureteral dilation with double J stent  -hematuria resolved, CT shows double J in ureter   ·+ UA, negative culture--switch from rocephin to cefpodoxime   -Uro: no further intervention at this time, f/u with own urologist as outpt, Dr. Pablo.       3. Diabetes   -Continue with insulin sliding scale.    PATIENT TO BE TRANSFERRED TO [facility] TODAY FOR AORTIC AND MITRAL VALVE REPLACEMENTS

## 2018-03-24 NOTE — DISCHARGE NOTE ADULT - MEDICATION SUMMARY - MEDICATIONS TO STOP TAKING
I will STOP taking the medications listed below when I get home from the hospital:    metFORMIN 500 mg oral tablet, extended release  -- 1 tab(s) by mouth once a day    Aspir 81 oral delayed release tablet  -- 1 tab(s) by mouth once a day    ramipril 10 mg oral capsule  -- 1 cap(s) by mouth once a day    glimepiride 4 mg oral tablet  -- 1 tab(s) by mouth once a day    Coumadin 5 mg oral tablet  -- 1 tab(s) by mouth once a day    pravastatin 40 mg oral tablet  -- 1 tab(s) by mouth once a day

## 2018-03-24 NOTE — H&P ADULT - NSHPSOCIALHISTORY_GEN_ALL_CORE
SOCIAL HISTORY:  Smoker:  FORMER- 7 pack years, quit 40 years ago  ETOH use:  NO     Ilicit Drug use:  NO  Occupation: Retired  Assisted device use (Cane / Walker): none  Live with: Family, 8 steps at home

## 2018-03-24 NOTE — H&P ADULT - PMH
Anemia    Atrial fibrillation  on coumadin at home  CVA (cerebral vascular accident)    Diabetes    Hypertension    Mitral valve replaced    Osteoporosis    Rheumatic fever

## 2018-03-24 NOTE — PROGRESS NOTE ADULT - SUBJECTIVE AND OBJECTIVE BOX
SUBJECTIVE:    Patient is a 77y old Female who presents with a chief complaint of bleeding when urinating (16 Mar 2018 19:01)    Currently admitted to medicine with the primary diagnosis of    Today is hospital day 8d. This morning she is resting comfortably in bed and reports no new issues or overnight events.     PAST MEDICAL & SURGICAL HISTORY  Anemia  Rheumatic fever  Diabetes  Osteoporosis  Mitral valve replaced  Atrial fibrillation: on coumadin at home  Hypertension  History of ureter stent  S/P AVR  H/O mitral valve replacement    SOCIAL HISTORY:  Negative for smoking/alcohol/drug use.     ALLERGIES:  No Known Allergies    MEDICATIONS:  STANDING MEDICATIONS  atorvastatin 40 milliGRAM(s) Oral at bedtime  cefTRIAXone   IVPB 1 Gram(s) IV Intermittent every 24 hours  dextrose 5%. 1000 milliLiter(s) IV Continuous <Continuous>  dextrose 50% Injectable 12.5 Gram(s) IV Push once  dextrose 50% Injectable 25 Gram(s) IV Push once  dextrose 50% Injectable 25 Gram(s) IV Push once  ferrous    sulfate 325 milliGRAM(s) Oral daily  folic acid 1 milliGRAM(s) Oral daily  furosemide    Tablet 20 milliGRAM(s) Oral daily  heparin  Infusion 1300 Unit(s)/Hr IV Continuous <Continuous>  insulin glargine Injectable (LANTUS) 12 Unit(s) SubCutaneous at bedtime  insulin lispro (HumaLOG) corrective regimen sliding scale   SubCutaneous at bedtime  insulin lispro Injectable (HumaLOG) 4 Unit(s) SubCutaneous before breakfast  insulin lispro Injectable (HumaLOG) 4 Unit(s) SubCutaneous before lunch  insulin lispro Injectable (HumaLOG) 4 Unit(s) SubCutaneous before dinner  metoprolol     tartrate 25 milliGRAM(s) Oral two times a day  pantoprazole    Tablet 40 milliGRAM(s) Oral before breakfast  raloxifene 60 milliGRAM(s) Oral daily  sodium chloride 0.9%. 1000 milliLiter(s) IV Continuous <Continuous>    PRN MEDICATIONS  dextrose Gel 1 Dose(s) Oral once PRN  glucagon  Injectable 1 milliGRAM(s) IntraMuscular once PRN    VITALS:   T(F): 96.7  HR: 76  BP: 142/63  RR: 18  SpO2: 96%    LABS:                        7.1    5.59  )-----------( 177      ( 24 Mar 2018 07:24 )             23.6           PT/INR - ( 23 Mar 2018 08:37 )   PT: 13.90 sec;   INR: 1.28 ratio         PTT - ( 24 Mar 2018 07:24 )  PTT:80.6 sec          Culture - Blood (collected 22 Mar 2018 12:21)  Source: .Blood None  Preliminary Report (23 Mar 2018 23:02):    No growth to date.    Culture - Blood (collected 22 Mar 2018 07:00)  Source: .Blood None  Preliminary Report (23 Mar 2018 15:01):    No growth to date.    Culture - Blood (collected 21 Mar 2018 21:49)  Source: .Blood None  Preliminary Report (23 Mar 2018 10:01):    No growth to date.    Culture - Blood (collected 21 Mar 2018 10:22)  Source: .Blood None  Preliminary Report (22 Mar 2018 14:01):    No growth to date.          RADIOLOGY:    PHYSICAL EXAM:  GEN: No acute distress  LUNGS: Clear to auscultation bilaterally   HEART: S1/S2 present. RRR.   ABD: Soft, non-tender, non-distended. Bowel sounds present  EXT: NC/NC/NE/2+PP/MARTELL  NEURO: AAOX3 SUBJECTIVE:    Patient is a 77y old Female who presents with a chief complaint of bleeding when urinating (16 Mar 2018 19:01)    Currently admitted to medicine with the primary diagnosis of    Today is hospital day 8d. This morning she is resting comfortably in bed and reports no new issues or overnight events.   PATIENT TO BE TRANSFERRED TO [facility] TODAY FOR AORTIC AND MITRAL VALVE REPLACEMENTS       PAST MEDICAL & SURGICAL HISTORY  Anemia  Rheumatic fever  Diabetes  Osteoporosis  Mitral valve replaced  Atrial fibrillation: on coumadin at home  Hypertension  History of ureter stent  S/P AVR  H/O mitral valve replacement    SOCIAL HISTORY:  Negative for smoking/alcohol/drug use.     ALLERGIES:  No Known Allergies    MEDICATIONS:  STANDING MEDICATIONS  atorvastatin 40 milliGRAM(s) Oral at bedtime  cefTRIAXone   IVPB 1 Gram(s) IV Intermittent every 24 hours  dextrose 5%. 1000 milliLiter(s) IV Continuous <Continuous>  dextrose 50% Injectable 12.5 Gram(s) IV Push once  dextrose 50% Injectable 25 Gram(s) IV Push once  dextrose 50% Injectable 25 Gram(s) IV Push once  ferrous    sulfate 325 milliGRAM(s) Oral daily  folic acid 1 milliGRAM(s) Oral daily  furosemide    Tablet 20 milliGRAM(s) Oral daily  heparin  Infusion 1300 Unit(s)/Hr IV Continuous <Continuous>  insulin glargine Injectable (LANTUS) 12 Unit(s) SubCutaneous at bedtime  insulin lispro (HumaLOG) corrective regimen sliding scale   SubCutaneous at bedtime  insulin lispro Injectable (HumaLOG) 4 Unit(s) SubCutaneous before breakfast  insulin lispro Injectable (HumaLOG) 4 Unit(s) SubCutaneous before lunch  insulin lispro Injectable (HumaLOG) 4 Unit(s) SubCutaneous before dinner  metoprolol     tartrate 25 milliGRAM(s) Oral two times a day  pantoprazole    Tablet 40 milliGRAM(s) Oral before breakfast  raloxifene 60 milliGRAM(s) Oral daily  sodium chloride 0.9%. 1000 milliLiter(s) IV Continuous <Continuous>    PRN MEDICATIONS  dextrose Gel 1 Dose(s) Oral once PRN  glucagon  Injectable 1 milliGRAM(s) IntraMuscular once PRN    VITALS:   T(F): 96.7  HR: 76  BP: 142/63  RR: 18  SpO2: 96%    LABS:                        7.1    5.59  )-----------( 177      ( 24 Mar 2018 07:24 )             23.6           PT/INR - ( 23 Mar 2018 08:37 )   PT: 13.90 sec;   INR: 1.28 ratio         PTT - ( 24 Mar 2018 07:24 )  PTT:80.6 sec          Culture - Blood (collected 22 Mar 2018 12:21)  Source: .Blood None  Preliminary Report (23 Mar 2018 23:02):    No growth to date.    Culture - Blood (collected 22 Mar 2018 07:00)  Source: .Blood None  Preliminary Report (23 Mar 2018 15:01):    No growth to date.    Culture - Blood (collected 21 Mar 2018 21:49)  Source: .Blood None  Preliminary Report (23 Mar 2018 10:01):    No growth to date.    Culture - Blood (collected 21 Mar 2018 10:22)  Source: .Blood None  Preliminary Report (22 Mar 2018 14:01):    No growth to date.

## 2018-03-24 NOTE — PROGRESS NOTE ADULT - ASSESSMENT
77 year old female with chronic hypoproliferative anemia, no response to EPO and iron, transfusion dependent for 6-8 months presented with hematuria.    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 Pyelonephritis  CkD-III  Permanent AF  DM-2  HTN    PLAN:    Cont tele  Follow CBC  Urology eval noted. Out pt. F/U  Card, ID, and HEM/ONC eval noted  Cont IV heparin. follow PTT   Planning valve replacement  Can switch to PO cefpodoxine   FS AC & QHS. Cont Insulin and other meds.  pending transfer to [facility] under DR Boris lr

## 2018-03-24 NOTE — PROGRESS NOTE ADULT - PROVIDER SPECIALTY LIST ADULT
Cardiology
Heme/Onc
Hospitalist
Infectious Disease
Internal Medicine
Urology
Heme/Onc
Infectious Disease

## 2018-03-24 NOTE — DISCHARGE NOTE ADULT - PATIENT PORTAL LINK FT
You can access the [facility] Patient Portal, offered by [facility], by registering with the following website: [email or web address]

## 2018-03-24 NOTE — H&P ADULT - NSHPREVIEWOFSYSTEMS_GEN_ALL_CORE
Review of Systems  CONSTITUTIONAL:  +fatigue; Denies Fevers / chills, sweats, weight loss, weight gain                                      NEURO:  Denies paresthesia, seizures, syncope, confusion                                                                                EYES:  Denies Blurry vision, discharge, pain, loss of vision                                                                                    ENMT:  Denies Difficulty hearing, vertigo, dysphagia, epistaxis, recent dental work                                       CV:  +YUNG, orthopnea; Denies Chest pain, palpitations                                                                                          RESPIRATORY:  +SOB; Denies Wheezing, cough / sputum, hemoptysis                                                                GI:  Denies Nausea, vomiting, diarrhea, constipation, melena, difficulty swallowing                                               : +hematuria, dysuria; Denies urgency, incontinence                                                                                         MUSCULOSKELETAL:  Denies arthritis, joint swelling, muscle weakness                                                             SKIN/BREAST:  Denies rash, itching, hair loss, masses                                                                                            PSYCH:  Denies depression, anxiety, suicidal ideation                                                                                               HEME/LYMPH:  Denies bruises easily, enlarged lymph nodes, tender lymph nodes                                        ENDOCRINE:  Denies cold intolerance, heat intolerance, polydipsia

## 2018-03-24 NOTE — PROGRESS NOTE ADULT - SUBJECTIVE AND OBJECTIVE BOX
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 S/p LHC yesterday, no obstructive CAD. No complaints.    MEDICATIONS  (STANDING):  atorvastatin 40 milliGRAM(s) Oral at bedtime  cefTRIAXone   IVPB 1 Gram(s) IV Intermittent every 24 hours  dextrose 5%. 1000 milliLiter(s) (50 mL/Hr) IV Continuous <Continuous>  dextrose 50% Injectable 12.5 Gram(s) IV Push once  dextrose 50% Injectable 25 Gram(s) IV Push once  dextrose 50% Injectable 25 Gram(s) IV Push once  ferrous    sulfate 325 milliGRAM(s) Oral daily  folic acid 1 milliGRAM(s) Oral daily  furosemide    Tablet 20 milliGRAM(s) Oral daily  heparin  Infusion 1200 Unit(s)/Hr (12 mL/Hr) IV Continuous <Continuous>  insulin glargine Injectable (LANTUS) 12 Unit(s) SubCutaneous at bedtime  insulin lispro (HumaLOG) corrective regimen sliding scale   SubCutaneous at bedtime  insulin lispro Injectable (HumaLOG) 4 Unit(s) SubCutaneous before breakfast  insulin lispro Injectable (HumaLOG) 4 Unit(s) SubCutaneous before lunch  insulin lispro Injectable (HumaLOG) 4 Unit(s) SubCutaneous before dinner  metoprolol     tartrate 25 milliGRAM(s) Oral two times a day  pantoprazole    Tablet 40 milliGRAM(s) Oral before breakfast  raloxifene 60 milliGRAM(s) Oral daily  sodium chloride 0.9%. 1000 milliLiter(s) (50 mL/Hr) IV Continuous <Continuous>    MEDICATIONS  (PRN):  dextrose Gel 1 Dose(s) Oral once PRN Blood Glucose LESS THAN 70 milliGRAM(s)/deciliter  glucagon  Injectable 1 milliGRAM(s) IntraMuscular once PRN Glucose LESS THAN 70 milligrams/deciliter            Vital Signs Last 24 Hrs  T(C): 35.9 (24 Mar 2018 05:47), Max: 36.5 (23 Mar 2018 20:30)  T(F): 96.7 (24 Mar 2018 05:47), Max: 97.7 (23 Mar 2018 20:30)  HR: 76 (24 Mar 2018 05:47) (76 - 80)  BP: 142/63 (24 Mar 2018 05:47) (109/54 - 142/63)  BP(mean): --  RR: 18 (24 Mar 2018 05:47) (18 - 18)  SpO2: 96% (23 Mar 2018 20:15) (96% - 96%)    REVIEW OF SYSTEMS:  CONSTITUTIONAL: No fever, weight loss, or fatigue  Patient denies chest pain, shortness of breath or syncopal episodes.       PHYSICAL EXAM:  · CONSTITUTIONAL:	Well-developed, well nourished,    · RESPIRATORY:   breath sounds equal; good air movement; respirations non-labored; clear to auscultation bilaterally; no rales, rhonchi or wheezing  · CARDIOVASCULAR	irregular rhythm,  no rub, no gallop, murmur unchanged  · EXTREMITIES: No cyanosis, clubbing or edema, right wrist: no hematoma  	  TELEMETRY: VR controlled    LABS:                        7.1    5.59  )-----------( 177      ( 24 Mar 2018 07:24 )             23.6     03-24    142  |  102  |  23<H>  ----------------------------<  99  4.0   |  29  |  1.1    Ca    8.1<L>      24 Mar 2018 07:24          PT/INR - ( 23 Mar 2018 08:37 )   PT: 13.90 sec;   INR: 1.28 ratio         PTT - ( 24 Mar 2018 07:24 )  PTT:80.6 sec    I&O's Summary    23 Mar 2018 07:01  -  24 Mar 2018 07:00  --------------------------------------------------------  IN: 144 mL / OUT: 0 mL / NET: 144 mL    24 Mar 2018 07:01  -  24 Mar 2018 12:12  --------------------------------------------------------  IN: 240 mL / OUT: 0 mL / NET: 240 mL      BNP  RADIOLOGY & ADDITIONAL STUDIES:    IMPRESSION AND PLAN:  Rheumatic heart disease, s/p mechanical MVR, AVR.  MV malfunction with pannus, thrombus, PVL.  Hemolytic anemia.  A-fib.    Plan:  Continue IV Heparin, no Coumadin for now.  Continue current medications.  Follow all blood cultures.  To be transferred to [facility] today for CTS evaluation and MVR (possibly, AVR as well).

## 2018-03-24 NOTE — H&P ADULT - ASSESSMENT
77 year old Hebrew-speaking female with history of chronic hypoproliferative anemia, transfusion dependent (sees Dr. Ruiz as an outpatient) with no improvement after Fe and EPO, Afib on Coumadin, NIDDM, osteoporosis, idiopathic ureteral stricture s/p left ureteral stent, CVA 20 years ago without residual deficits, HTN, HLD, Rheumatic fever as a child s/p mechanical MVR and AVR 14 years ago at [facility] with Dr. Christie, who was recently admitted to [facility] on 3/17/18 for persistent hematuria x 1 year and progressive SOB/YUNG with associated 4 pillow orthopnea, NYHA class IV symptoms (dyspnea after climbing 4 stairs).  Workup demonstrated severe mechanical AR/AS and mobile echodensity on mechanical MR and non-obstructive CAD.  Admitted to [facility] for pre-surgical testing.     Neurovascular: No delirium, pain well controlled on current regimen.  -Hx of CVA, no deficits.  CT head on admission, f/u results.   -C/w PRNs for pain control    Respiratory: Saturating well on room air  -Admitted with baseline SOB/YUNG.  Bedside PFTs ordered, f/u results.   -CXR on admission, f/u results.   -Encourage IS 10x/hour while awake; C+DB; Ambulation  -Monitor SpO2 for respiratory status    Cardiovascular: HD Stable, HR controlled. History above, EF 60%  -Afib: Hep gtt for AC, goal PTT 50-70 and Lopressor 25mg BID  -MV thrombus vs vegetation: Hep gtt  -AVR/MVR: CT chest, ECHO, carotids, EKG for pre-op testing.  Surgery this admission with Dr. Escalante  -HTN: BB, holding ACE 2/2 periop risk.   -Resume appropriate home medications  -Monitor HR/BP/Tele    GI: Stable, tolerating PO  -GI PPX: Protonix  -Colace and senna for bowel regimen    /Renal: BUN/Cr: 23/1.1; History above  -Hematuria:  consulted, appreciate recs  -UA on admission, f/u results.   -Trend AM Cr  -Monitor I/O    ID: Afebrile, Asymptomatic  -WCC 5.59  -UTI on previous admission, complete Cefpodoxime regimen for 3 days.    -MV thrombus vs. IE, pan cultures sent. F/u results.   -F/u UA, send culture if indicated.   -Continue to monitor for SIRS    Endo: A1C: 4.7; TSH: pending; Hx of NIDDM  -C/w ISS for glycemic control  -Trend FS    Heme/onc: H/H pending; chronic anemia  -Left message for heme/onc.  Pt follows with Dr. Ruiz as an outpatient.    -Resumed Fe/Folate  -Resume Evista for osteoporosis.  -Maintain active T/s for transfusion if needed.   -DVT PPX: Hep gtt and SCDs  -CBC, chem, coags in AM    Dispo: Admit to CT Surgery.  OR workup in progress. 77 year old Greenlandic-speaking female with history of chronic hypoproliferative anemia, transfusion dependent (sees Dr. Ruiz as an outpatient) with no improvement after Fe and EPO, Afib on Coumadin, NIDDM, osteoporosis, idiopathic ureteral stricture s/p left ureteral stent, CVA 20 years ago without residual deficits, HTN, HLD, Rheumatic fever as a child s/p mechanical MVR and AVR 14 years ago at [facility] with Dr. Christie, who was recently admitted to [facility] on 3/17/18 for persistent hematuria x 1 year and progressive SOB/YUNG with associated 4 pillow orthopnea, NYHA class IV symptoms (dyspnea after climbing 4 stairs).  Workup demonstrated severe mechanical AR/AS and mobile echodensity on mechanical MR and non-obstructive CAD.  Admitted to [facility] for pre-surgical testing.     Neurovascular: No delirium, pain well controlled on current regimen.  -Hx of CVA, no deficits.  CT head on admission, f/u results.   -C/w PRNs for pain control    Respiratory: Saturating well on room air  -Admitted with baseline SOB/YUNG.  Bedside PFTs ordered, f/u results.   -CXR on admission, f/u results.   -Encourage IS 10x/hour while awake; C+DB; Ambulation  -Monitor SpO2 for respiratory status    Cardiovascular: HD Stable, HR controlled. History above, EF 60%  -Afib: Hep gtt for AC, goal PTT 50-70 and Lopressor 25mg BID  -MV thrombus vs vegetation: Hep gtt  -AVR/MVR: CT chest, ECHO, carotids, EKG for pre-op testing.  Surgery this admission with Dr. Escalante  -HTN: BB, holding ACE 2/2 periop risk.   -Resume appropriate home medications  -Monitor HR/BP/Tele    GI: Stable, tolerating PO  -GI PPX: Protonix  -Colace and senna for bowel regimen    /Renal: BUN/Cr: 23/1.1; History above  -Hematuria:  consulted, appreciate recs  -UA on admission, f/u results.   -Trend AM Cr  -Monitor I/O    ID: Afebrile, Asymptomatic  -WCC 5.59  -UTI on previous admission, Cefdinir 300mg PO x 4 days.    -MV thrombus vs. IE, pan cultures sent. F/u results.   -No empiric IV abx per Dr. Hemli at this time.  -F/u UA, send culture if indicated.   -Continue to monitor for SIRS    Endo: A1C: 4.7; TSH: pending; Hx of NIDDM  -C/w ISS for glycemic control  -Trend FS    Heme/onc: H/H pending; chronic anemia  -Left message for heme/onc.  Pt follows with Dr. Ruiz as an outpatient.    -Resumed Fe/Folate  -Resume Evista for osteoporosis.  -Maintain active T/s for transfusion if needed.   -DVT PPX: Hep gtt and SCDs  -CBC, chem, coags in AM    Dispo: Admit to CT Surgery.  OR workup in progress.

## 2018-03-24 NOTE — DISCHARGE NOTE ADULT - CARE PLAN
Principal Discharge DX:	Anemia  Secondary Diagnosis:	Atrial fibrillation  Secondary Diagnosis:	Hypertension  Secondary Diagnosis:	H/O mitral valve replacement  Secondary Diagnosis:	S/P AVR  Secondary Diagnosis:	Pyelonephritis Principal Discharge DX:	Anemia  Goal:	mechanical hemolysis 2/2 to valvular disease  Assessment and plan of treatment:	Pt was admitted with anemia, s/p PRBC transfusions.  Hemoglobin 7.1 today. Please continue to monitor.   Continue ferrous sulfate 325 mg daily.  Continue to hold coumadin, continue heparin drip for AVR/MVR.  Secondary Diagnosis:	Atrial fibrillation  Goal:	continue med management  Assessment and plan of treatment:	Please continue metoprolol 25mg twice daily   Continue heparin drip  Secondary Diagnosis:	Hypertension  Goal:	continue med management  Assessment and plan of treatment:	Please continue lasix 20mg daily and metoprolol 25mg twice daily  Secondary Diagnosis:	H/O mitral valve replacement  Goal:	replacement  Assessment and plan of treatment:	pt had catheterization yesterday which revealed normal coronary arteries.   Replacement to be done at [place]  Secondary Diagnosis:	S/P AVR  Goal:	replacement  Assessment and plan of treatment:	Replacement to be done at [facility]  Secondary Diagnosis:	Pyelonephritis  Goal:	continue abx  Assessment and plan of treatment:	Continue cefpodoxime for 3 more days, patient had a +UA on admission with negative cx  No evidence of hematuria currently.   Pt has outpatient urologist for double J stents placed 2 months ago placed for narrowing/unexplained thickening of the ureter. Pt can f/u with her outpatient urologist, Dr. Pablo, upon discharge.

## 2018-03-24 NOTE — H&P ADULT - NSHPPHYSICALEXAM_GEN_ALL_CORE
CONSTITUTIONAL: Sitting up in bed, no acute distress.   NEURO:  AAOx3, answers questions appropriately, no AMS or focal deficits.                 EYES:  WNL  ENMT: WNL  CV:  Irregular, rate controlled.  +THAI III/VI at RUSB, no r/g. Pulses 2+ throughout, 1+ b/l LE pitting edema.   RESPIRATORY: No acute distress on RA.  CTA b/l, no w/r/r.   GI: ND, NBS, non-TTP.  : No suprapubic pressure or TTP.  MUSCULOSKELETAL: WNL  SKIN / BREAST: +Area of erythema with excoriation on anterior aspect of RLE, non-TTP. No purulence.

## 2018-03-24 NOTE — CONSULT NOTE ADULT - SUBJECTIVE AND OBJECTIVE BOX
Patient is a 77y old  Female who presents with a chief complaint of Prosthetic MV thrombus vs. IE; Severe AS (24 Mar 2018 16:16)      HPI:  This is a 77 year old Fijian-speaking female with history of chronic hypoproliferative anemia, transfusion dependent (sees Dr. Ruiz as an outpatient) with no improvement after Fe and EPO, Afib on Coumadin, NIDDM, osteoporosis, idiopathic ureteral stricture s/p left ureteral stent, CVA 20 years ago without residual deficits, HTN, HLD, Rheumatic fever as a child s/p mechanical MVR and AVR 14 years ago at [facility] with Dr. Christie, who was recently admitted to [facility] on 3/17/18 for persistent hematuria x 1 year and progressive SOB/YUNG with associated 4 pillow orthopnea, NYHA class IV symptoms (dyspnea after climbing 4 stairs).  While inpatient, she was noted to have worsening anemia beyond her current baseline as well as UTI for which she was placed on IV rocephin and transitioned to PO Cefpodoxime upon discharge.  She underwent ECHO on 3/17/18 that revealed EF 60%, dilated LA/RV, mechanical MV with mobile echodensity, mechanical AS with EZEQUIEL 0.85cm2, Mean/peak gradients 33.7mmHg/49.5mmHg with severe AR, and severe pulmonary hypertension with PASP 62.6mmHg and cardiac catheterization on 3/23/18 that demonstrated non-obstructive CAD.  Dr. Tomer Escalante was consulted and on 3/24/18, she was transferred to [facility] under his care to complete pre-surgical evaluation for possible AVR/MVR.  On admission, pt continues to endorse hematuria with mild intermittent dysuria but denies suprapubic pressure or urinary frequency/discharge as well as urethral discharge as well as chronic SOB at rest.  She also denies HA, AMS, CP, palpitations, cough, hemoptysis, n/v/d, hematemesis, hematochezia/melena, or fevers. (24 Mar 2018 16:16)    : Pt with hematuria x1 yr. Was seen by one urologist and then Dr Pablo in [place]. Per pt's son who is reliable she had a CT with contrast as well as a cytoscopy as an outpatient, both of which were negative for malignancy. Pt was found to have a left ureteral obstruction and underwent a left ureteral stent placement this January. The stent is due to be changed in 1 year. Patient denies a history of kidney stones. She smoked over 35 years ago for 5 years, around 1 pack/week. She reports for the last three days her urine has been clear. She has no burning or difficulty emptying her bladder. Pt had a UTI at [facility] one week ago and was treated with ceftriaxone. They also did a CT non con which confirmed the stent was in appropriate position.       Vital Signs Last 24 Hrs  T(C): 36.4 (24 Mar 2018 17:55), Max: 36.5 (23 Mar 2018 20:30)  T(F): 97.6 (24 Mar 2018 17:55), Max: 97.7 (23 Mar 2018 20:30)  HR: 84 (24 Mar 2018 16:49) (76 - 84)  BP: 152/70 (24 Mar 2018 16:49) (109/54 - 152/70)  BP(mean): 104 (24 Mar 2018 16:49) (104 - 104)  RR: 16 (24 Mar 2018 16:49) (16 - 18)  SpO2: 93% (24 Mar 2018 16:49) (93% - 96%)  I&O's Summary      PE:  Gen: NAD  Abd: NTND  : voiding without difficulty     LABS:                        7.1    5.59  )-----------( 177      ( 24 Mar 2018 07:24 )             23.6     03-24    142  |  102  |  23<H>  ----------------------------<  99  4.0   |  29  |  1.1    Ca    8.1<L>      24 Mar 2018 07:24      PT/INR - ( 23 Mar 2018 08:37 )   PT: 13.90 sec;   INR: 1.28 ratio         PTT - ( 24 Mar 2018 07:24 )  PTT:80.6 sec  Cultures  Culture Results:   No growth to date. (03-23 @ 08:37)  Culture Results:   No growth to date. (03-22 @ 12:21)  Culture Results:   No growth to date. (03-22 @ 07:00)  Culture Results:   No growth to date. (03-21 @ 21:49)  Culture Results:   No growth to date. (03-21 @ 10:22)  Culture Results:   No growth to date. (03-20 @ 16:00)      A/P: 77 year old Fijian-speaking female with history of chronic hypoproliferative anemia, transfusion dependent (sees Dr. Ruiz as an outpatient) with no improvement after Fe and EPO, Afib on Coumadin, NIDDM, osteoporosis, idiopathic ureteral stricture s/p left ureteral stent, CVA 20 years ago without residual deficits, HTN, HLD. For chronic hematuria:  1- Pt had a CT w contrast and a cytoscopy as outpatient both of which were negative  2- Repeat UA, Ucx  3- Urine cytology  4- Nephrology consult to r/o intrinsic renal disease   5- If pt develops clots or urine darkens, please re-consult  6- Pt should followup with her urologist in [place]  7- D/w  team

## 2018-03-24 NOTE — DISCHARGE NOTE ADULT - MEDICATION SUMMARY - MEDICATIONS TO TAKE
I will START or STAY ON the medications listed below when I get home from the hospital:    heparin  -- Indication: For Atrial fibrillation    insulin glargine  -- Indication: For Diabetes    HumaLOG KwikPen (Concentrated) 200 units/mL subcutaneous solution  --  subcutaneous   -- Indication: For Diabetes    atorvastatin 40 mg oral tablet  -- 1 tab(s) by mouth once a day (at bedtime)  -- Indication: For DLD    Evista 60 mg oral tablet  -- 1 tab(s) by mouth once a day  -- Indication: For Anti-neoplastic     metoprolol tartrate 25 mg oral tablet  -- 1 tab(s) by mouth 2 times a day  -- Indication: For Atrial fibrillation    furosemide 20 mg oral tablet  -- 1 tab(s) by mouth once a day  -- Indication: For Shortness of breath    ferrous sulfate 324 mg (65 mg elemental iron) oral tablet  -- orally once a day  -- Indication: For Anemia    pantoprazole 40 mg oral delayed release tablet  -- 1 tab(s) by mouth once a day (before a meal)  -- Indication: For GERD    folic acid 1 mg oral tablet  -- 1 tab(s) by mouth once a day  -- Indication: For Supplementation I will START or STAY ON the medications listed below when I get home from the hospital:    heparin  -- Indication: For Atrial fibrillation    HumaLOG KwikPen (Concentrated) 200 units/mL subcutaneous solution  --  subcutaneous   -- Indication: For Diabetes    insulin glargine  -- Indication: For Diabetes    atorvastatin 40 mg oral tablet  -- 1 tab(s) by mouth once a day (at bedtime)  -- Indication: For DLD    Evista 60 mg oral tablet  -- 1 tab(s) by mouth once a day  -- Indication: For Anti-neoplastic     metoprolol tartrate 25 mg oral tablet  -- 1 tab(s) by mouth 2 times a day  -- Indication: For Atrial fibrillation    furosemide 20 mg oral tablet  -- 1 tab(s) by mouth once a day  -- Indication: For Shortness of breath    ferrous sulfate 324 mg (65 mg elemental iron) oral tablet  -- orally once a day  -- Indication: For Anemia    pantoprazole 40 mg oral delayed release tablet  -- 1 tab(s) by mouth once a day (before a meal)  -- Indication: For GERD    folic acid 1 mg oral tablet  -- 1 tab(s) by mouth once a day  -- Indication: For Supplementation

## 2018-03-24 NOTE — H&P ADULT - NSHPLABSRESULTS_GEN_ALL_CORE
< from: MONROE w/Probe Placement (03.20.18 @ 11:33) >    Summary:   1. Left ventricular ejection fraction, by visual estimation, is 55 to   60%.   2. Normal global left ventricular systolic function.   3. Mitral prosthesis regurgitation, thrombus and pannus.   4. A bileaflet mechanical valve was noted in the mitral position. Both   leaflets appeared to be mobile. There was a small mobile mass attached   the atrial aspect of the valve. This may denote either a thrombus or a   vegetation. Pannus was noted around the mitral valve.A moderate   paravalvular leak was noted along the posterior aspect of the valve.   5. Moderate tricuspid regurgitation.   6. Mild aortic regurgitation.   7. Mitral valve mean gradient is 12.8 mmHg consistent with severe mitral   stenosis.   8. Rightatrial enlargement.   9. Left atrial enlargement.  10. A mechanical bileaflet aortic valve was visualized. The leaflets were   mobile and appear to be functioning normally. The peak gradient across   the aortic valve was 29 mmHg, although a much higher gradient (67 mmHg)   documented on TTE.    < end of copied text >

## 2018-03-25 LAB
ALBUMIN SERPL ELPH-MCNC: 3.2 G/DL — LOW (ref 3.3–5)
ALP SERPL-CCNC: 67 U/L — SIGNIFICANT CHANGE UP (ref 40–120)
ALT FLD-CCNC: 15 U/L — SIGNIFICANT CHANGE UP (ref 10–45)
ANION GAP SERPL CALC-SCNC: 10 MMOL/L — SIGNIFICANT CHANGE UP (ref 5–17)
APTT BLD: 54.6 SEC — HIGH (ref 27.5–37.4)
APTT BLD: 58.4 SEC — HIGH (ref 27.5–37.4)
APTT BLD: 62.8 SEC — HIGH (ref 27.5–37.4)
APTT BLD: 66.1 SEC — HIGH (ref 27.5–37.4)
AST SERPL-CCNC: 58 U/L — HIGH (ref 10–40)
BASOPHILS NFR BLD AUTO: 0.9 % — SIGNIFICANT CHANGE UP (ref 0–2)
BILIRUB SERPL-MCNC: 3.3 MG/DL — HIGH (ref 0.2–1.2)
BUN SERPL-MCNC: 26 MG/DL — HIGH (ref 7–23)
CALCIUM SERPL-MCNC: 8.7 MG/DL — SIGNIFICANT CHANGE UP (ref 8.4–10.5)
CHLORIDE SERPL-SCNC: 101 MMOL/L — SIGNIFICANT CHANGE UP (ref 96–108)
CO2 SERPL-SCNC: 29 MMOL/L — SIGNIFICANT CHANGE UP (ref 22–31)
CREAT SERPL-MCNC: 1.22 MG/DL — SIGNIFICANT CHANGE UP (ref 0.5–1.3)
EOSINOPHIL NFR BLD AUTO: 4.8 % — SIGNIFICANT CHANGE UP (ref 0–6)
GLUCOSE BLDC GLUCOMTR-MCNC: 133 MG/DL — HIGH (ref 70–99)
GLUCOSE BLDC GLUCOMTR-MCNC: 149 MG/DL — HIGH (ref 70–99)
GLUCOSE BLDC GLUCOMTR-MCNC: 180 MG/DL — HIGH (ref 70–99)
GLUCOSE BLDC GLUCOMTR-MCNC: 184 MG/DL — HIGH (ref 70–99)
GLUCOSE SERPL-MCNC: 138 MG/DL — HIGH (ref 70–99)
HCT VFR BLD CALC: 23.1 % — LOW (ref 34.5–45)
HCT VFR BLD CALC: 27.3 % — LOW (ref 34.5–45)
HGB BLD-MCNC: 6.9 G/DL — CRITICAL LOW (ref 11.5–15.5)
HGB BLD-MCNC: 8.5 G/DL — LOW (ref 11.5–15.5)
INR BLD: 1.13 — SIGNIFICANT CHANGE UP (ref 0.88–1.16)
LYMPHOCYTES # BLD AUTO: 24 % — SIGNIFICANT CHANGE UP (ref 13–44)
MAGNESIUM SERPL-MCNC: 2 MG/DL — SIGNIFICANT CHANGE UP (ref 1.6–2.6)
MCHC RBC-ENTMCNC: 24.7 PG — LOW (ref 27–34)
MCHC RBC-ENTMCNC: 25.4 PG — LOW (ref 27–34)
MCHC RBC-ENTMCNC: 29.9 G/DL — LOW (ref 32–36)
MCHC RBC-ENTMCNC: 31.1 G/DL — LOW (ref 32–36)
MCV RBC AUTO: 81.7 FL — SIGNIFICANT CHANGE UP (ref 80–100)
MCV RBC AUTO: 82.8 FL — SIGNIFICANT CHANGE UP (ref 80–100)
MONOCYTES NFR BLD AUTO: 5.6 % — SIGNIFICANT CHANGE UP (ref 2–14)
NEUTROPHILS NFR BLD AUTO: 64.7 % — SIGNIFICANT CHANGE UP (ref 43–77)
PLATELET # BLD AUTO: 126 K/UL — LOW (ref 150–400)
PLATELET # BLD AUTO: 183 K/UL — SIGNIFICANT CHANGE UP (ref 150–400)
POTASSIUM SERPL-MCNC: 3.6 MMOL/L — SIGNIFICANT CHANGE UP (ref 3.5–5.3)
POTASSIUM SERPL-SCNC: 3.6 MMOL/L — SIGNIFICANT CHANGE UP (ref 3.5–5.3)
PROT SERPL-MCNC: 6.1 G/DL — SIGNIFICANT CHANGE UP (ref 6–8.3)
PROTHROM AB SERPL-ACNC: 12.6 SEC — SIGNIFICANT CHANGE UP (ref 9.8–12.7)
RBC # BLD: 2.79 M/UL — LOW (ref 3.8–5.2)
RBC # BLD: 3.34 M/UL — LOW (ref 3.8–5.2)
RBC # FLD: 17.7 % — HIGH (ref 10.3–16.9)
RBC # FLD: 18.9 % — HIGH (ref 10.3–16.9)
SODIUM SERPL-SCNC: 140 MMOL/L — SIGNIFICANT CHANGE UP (ref 135–145)
WBC # BLD: 4.6 K/UL — SIGNIFICANT CHANGE UP (ref 3.8–10.5)
WBC # BLD: 6 K/UL — SIGNIFICANT CHANGE UP (ref 3.8–10.5)
WBC # FLD AUTO: 4.6 K/UL — SIGNIFICANT CHANGE UP (ref 3.8–10.5)
WBC # FLD AUTO: 6 K/UL — SIGNIFICANT CHANGE UP (ref 3.8–10.5)

## 2018-03-25 PROCEDURE — 93880 EXTRACRANIAL BILAT STUDY: CPT | Mod: 26

## 2018-03-25 PROCEDURE — 70450 CT HEAD/BRAIN W/O DYE: CPT | Mod: 26

## 2018-03-25 PROCEDURE — 71250 CT THORAX DX C-: CPT | Mod: 26

## 2018-03-25 RX ORDER — FUROSEMIDE 40 MG
20 TABLET ORAL ONCE
Qty: 0 | Refills: 0 | Status: COMPLETED | OUTPATIENT
Start: 2018-03-25 | End: 2018-03-25

## 2018-03-25 RX ORDER — HEPARIN SODIUM 5000 [USP'U]/ML
1050 INJECTION INTRAVENOUS; SUBCUTANEOUS
Qty: 25000 | Refills: 0 | Status: DISCONTINUED | OUTPATIENT
Start: 2018-03-25 | End: 2018-03-25

## 2018-03-25 RX ORDER — HEPARIN SODIUM 5000 [USP'U]/ML
1050 INJECTION INTRAVENOUS; SUBCUTANEOUS
Qty: 25000 | Refills: 0 | Status: DISCONTINUED | OUTPATIENT
Start: 2018-03-25 | End: 2018-03-29

## 2018-03-25 RX ORDER — POTASSIUM CHLORIDE 20 MEQ
40 PACKET (EA) ORAL ONCE
Qty: 0 | Refills: 0 | Status: COMPLETED | OUTPATIENT
Start: 2018-03-25 | End: 2018-03-25

## 2018-03-25 RX ADMIN — Medication 20 MILLIGRAM(S): at 12:28

## 2018-03-25 RX ADMIN — Medication 325 MILLIGRAM(S): at 12:28

## 2018-03-25 RX ADMIN — SODIUM CHLORIDE 3 MILLILITER(S): 9 INJECTION INTRAMUSCULAR; INTRAVENOUS; SUBCUTANEOUS at 06:00

## 2018-03-25 RX ADMIN — ATORVASTATIN CALCIUM 40 MILLIGRAM(S): 80 TABLET, FILM COATED ORAL at 22:02

## 2018-03-25 RX ADMIN — CEFDINIR 300 MILLIGRAM(S): 250 POWDER, FOR SUSPENSION ORAL at 18:39

## 2018-03-25 RX ADMIN — PANTOPRAZOLE SODIUM 40 MILLIGRAM(S): 20 TABLET, DELAYED RELEASE ORAL at 07:48

## 2018-03-25 RX ADMIN — Medication 40 MILLIEQUIVALENT(S): at 07:52

## 2018-03-25 RX ADMIN — Medication 2: at 12:06

## 2018-03-25 RX ADMIN — Medication 10 MILLIEQUIVALENT(S): at 12:31

## 2018-03-25 RX ADMIN — SODIUM CHLORIDE 3 MILLILITER(S): 9 INJECTION INTRAMUSCULAR; INTRAVENOUS; SUBCUTANEOUS at 22:10

## 2018-03-25 RX ADMIN — SODIUM CHLORIDE 3 MILLILITER(S): 9 INJECTION INTRAMUSCULAR; INTRAVENOUS; SUBCUTANEOUS at 15:43

## 2018-03-25 RX ADMIN — Medication 2: at 22:02

## 2018-03-25 RX ADMIN — Medication 25 MILLIGRAM(S): at 05:59

## 2018-03-25 RX ADMIN — Medication 100 MILLIGRAM(S): at 22:02

## 2018-03-25 RX ADMIN — Medication 1 MILLIGRAM(S): at 12:28

## 2018-03-25 RX ADMIN — SENNA PLUS 2 TABLET(S): 8.6 TABLET ORAL at 22:02

## 2018-03-25 RX ADMIN — Medication 20 MILLIGRAM(S): at 05:59

## 2018-03-25 RX ADMIN — CEFDINIR 300 MILLIGRAM(S): 250 POWDER, FOR SUSPENSION ORAL at 05:59

## 2018-03-25 RX ADMIN — Medication 100 MILLIGRAM(S): at 06:01

## 2018-03-25 RX ADMIN — Medication 25 MILLIGRAM(S): at 18:39

## 2018-03-25 NOTE — PROGRESS NOTE ADULT - SUBJECTIVE AND OBJECTIVE BOX
Patient discussed on morning rounds with Dr. Wang    Operation / Date:  Admit 3/24/18 Admit for MV thrombus, pre-op Triple valve in future    SUBJECTIVE ASSESSMENT:  77y Female seen and examined. Today patient feels well, has no acute complaints.  She denies fever, chest pain, palpitations, SOB, abdominal pain, n/v/d/c.        Vital Signs Last 24 Hrs  T(C): 36.6 (25 Mar 2018 05:00), Max: 36.8 (24 Mar 2018 22:03)  T(F): 97.8 (25 Mar 2018 05:00), Max: 98.2 (24 Mar 2018 22:03)  HR: 68 (25 Mar 2018 09:00) (68 - 84)  BP: 116/50 (25 Mar 2018 09:00) (116/50 - 152/70)  BP(mean): 84 (25 Mar 2018 09:00) (84 - 108)  RR: 16 (25 Mar 2018 09:00) (14 - 18)  SpO2: 95% (25 Mar 2018 09:00) (93% - 95%)  I&O's Detail    24 Mar 2018 07:01  -  25 Mar 2018 07:00  --------------------------------------------------------  IN:    heparin Infusion: 90 mL  Total IN: 90 mL    OUT:  Total OUT: 0 mL    Total NET: 90 mL      PHYSICAL EXAM:    General: Sitting comfortably in chair, no acute distress    Neurological: awake alert oriented, no neuro deficits     Cardiovascular: Irregular rhythm, + III/VI THAI RUSB    Respiratory: Decreased b/l bases, no wheeze/rhonchi/rales    Gastrointestinal: +BS, soft nontender nondistended    Extremities: warm and well perfused, 1+ pitting B/L LE edema, no calf tenderness. +RLE ecchymosis on anterior shin    Vascular: 2+ peripheral pulses b/l      LABS:                        6.9    4.6   )-----------( 126      ( 25 Mar 2018 05:53 )             23.1       COUMADIN:  No    PT/INR - ( 25 Mar 2018 05:53 )   PT: 12.6 sec;   INR: 1.13          PTT - ( 25 Mar 2018 05:53 )  PTT:62.8 sec        140  |  101  |  26<H>  ----------------------------<  138<H>  3.6   |  29  |  1.22    Ca    8.7      25 Mar 2018 05:52  Mg     2.0         TPro  6.1  /  Alb  3.2<L>  /  TBili  3.3<H>  /  DBili  x   /  AST  58<H>  /  ALT  15  /  AlkPhos  67        Urinalysis Basic - ( 24 Mar 2018 19:56 )    Color: Red / Appearance: Turbid / S.020 / pH: x  Gluc: x / Ketone: NEGATIVE  / Bili: Small / Urobili: 0.2 E.U./dL   Blood: x / Protein: 100 mg/dL / Nitrite: NEGATIVE   Leuk Esterase: NEGATIVE / RBC: Many /HPF / WBC 5-10 /HPF   Sq Epi: x / Non Sq Epi: 0-5 /HPF / Bacteria: Present /HPF        MEDICATIONS  (STANDING):  atorvastatin 40 milliGRAM(s) Oral at bedtime  cefdinir 300 milliGRAM(s) Oral two times a day  dextrose 5%. 1000 milliLiter(s) (50 mL/Hr) IV Continuous <Continuous>  dextrose 50% Injectable 12.5 Gram(s) IV Push once  dextrose 50% Injectable 25 Gram(s) IV Push once  dextrose 50% Injectable 25 Gram(s) IV Push once  docusate sodium 100 milliGRAM(s) Oral three times a day  ferrous    sulfate 325 milliGRAM(s) Oral daily  folic acid 1 milliGRAM(s) Oral daily  furosemide    Tablet 20 milliGRAM(s) Oral daily  heparin  Infusion 1000 Unit(s)/Hr (10 mL/Hr) IV Continuous <Continuous>  insulin lispro (HumaLOG) corrective regimen sliding scale   SubCutaneous Before meals and at bedtime  metoprolol tartrate 25 milliGRAM(s) Oral every 12 hours  pantoprazole    Tablet 40 milliGRAM(s) Oral before breakfast  potassium chloride    Tablet ER 10 milliEquivalent(s) Oral daily  senna 2 Tablet(s) Oral at bedtime  sodium chloride 0.9% lock flush 3 milliLiter(s) IV Push every 8 hours    MEDICATIONS  (PRN):  dextrose Gel 1 Dose(s) Oral once PRN Blood Glucose LESS THAN 70 milliGRAM(s)/deciliter  glucagon  Injectable 1 milliGRAM(s) IntraMuscular once PRN Glucose LESS THAN 70 milligrams/deciliter        RADIOLOGY & ADDITIONAL TESTS:  CXR: pending official read: mild pulm congestion, b/l small pleural effusions vs atelectasis R > L, no obvious PTX

## 2018-03-25 NOTE — DIETITIAN INITIAL EVALUATION ADULT. - NS AS NUTRI INTERV MEALS SNACK
Other (specify)/cont on current diet and encourage po intake w/ meals and snacks; provide pt's food preferences.

## 2018-03-25 NOTE — DIETITIAN INITIAL EVALUATION ADULT. - OTHER INFO
78 y/o F, W/severe mechanical AR/AS and mobile echodensity on mechanical MR and non-obstructive CAD.  Admitted to [facility] for pre-surgical testing.  pt Cook Islander speaking, requests her son to interpret; on DASH/TLC/CST CHO diet, compliant w/ deit restrictions at home as well; reports 50-60% po intake at breakfast, denies N/V, last BM--> 2days ago; w/ NKFA; pt w/ unintentional wt loss d/t medical problems and worry over problems as family indicates; increased nutritional needs explained; skin: skin lessions B/L shin, venous discoloration; w/ 1+ B/L LE edema.

## 2018-03-25 NOTE — PROGRESS NOTE ADULT - ASSESSMENT
77 year old Greek-speaking female with history of chronic hypoproliferative anemia, transfusion dependent (sees Dr. Ruiz as an outpatient) with no improvement after Fe and EPO, Afib on Coumadin, NIDDM, osteoporosis, idiopathic ureteral stricture s/p left ureteral stent, CVA 20 years ago without residual deficits, HTN, HLD, Rheumatic fever as a child s/p mechanical MVR and AVR 14 years ago at [facility] with Dr. Christie, who was recently admitted to [facility] on 3/17/18 for persistent hematuria x 1 year and progressive SOB/YUNG with associated 4 pillow orthopnea, NYHA class IV symptoms (dyspnea after climbing 4 stairs).  Workup demonstrated severe mechanical AR/AS and mobile echodensity on mechanical MR and non-obstructive CAD.  Admitted to [facility] for pre-surgical testing.     Neurovascular: No delirium, pain well controlled on current regimen.  -Hx of CVA, no deficits.  f/u CT head results, Carotids     Respiratory: Saturating well on room air  -Encourage IS 10x/hour while awake; C+DB; Ambulation  -Monitor SpO2 for respiratory status  -Cont diuresis  -f/u PFTS  -CXR- b/l pleural effusions vs atelectasis, f/u AM PA/LAT    Cardiovascular: HD Stable, HR controlled. History above, EF 60%  -Afib: Hep gtt for AC, goal PTT 50-70 and Lopressor 25mg BID. AM PTT 62, f/u PTT 12p  -MV thrombus vs vegetation: Hep gtt  -MONROE reviewed by Dr. Wang, will likeley have triple valve this week with Dr. Escalante  -HTN: BB, holding ACE 2/2 periop risk.   -F/u CT chest results, carotids  -Monitor HR/BP/Tele    GI: Stable, tolerating PO  -GI PPX: Protonix  -Colace and senna for bowel regimen    /Renal: BUN/Cr: 26/1.22; History above  -Hematuria:  consulted, appreciate recs  -Monitor I/O    ID: Afebrile, Asymptomatic  -WCC 5.2  -UTI on previous admission, Cefdinir 300mg PO x 4 days.    -MV thrombus vs. IE, pan cultures sent. F/u results.   -Continue to monitor for SIRS    Endo: A1C: 4.7; TSH: WNL; Hx of NIDDM  -C/w ISS for glycemic control  -Trend FS    Heme/onc: H/H 6.9/23; chronic anemia  -f/u Heme/onc today, Left message yesterday.  Pt follows with Dr. Ruiz as an outpatient.    -Continue Fe/Folate  -1 U PRBC, per Dr. Wang this AM, f/u CBC   -DVT PPX: Hep gtt and SCDs    Dispo:  OR this week

## 2018-03-25 NOTE — DIETITIAN INITIAL EVALUATION ADULT. - ENERGY NEEDS
hT: 157.5cm, wt (3/24) 57.6kg, IBW: 50kg %IBW:  115% BMI:  23.2; ABW utilized for nutritional needs as ABW  within % of IBW; needs adjusted per age and sig unintentional wt loss/pre operative status

## 2018-03-26 LAB
ANION GAP SERPL CALC-SCNC: 10 MMOL/L — SIGNIFICANT CHANGE UP (ref 5–17)
APTT BLD: 67.8 SEC — HIGH (ref 27.5–37.4)
APTT BLD: 71.3 SEC — HIGH (ref 27.5–37.4)
APTT BLD: 74.5 SEC — HIGH (ref 27.5–37.4)
APTT BLD: 74.6 SEC — HIGH (ref 27.5–37.4)
BUN SERPL-MCNC: 30 MG/DL — HIGH (ref 7–23)
CALCIUM SERPL-MCNC: 9.2 MG/DL — SIGNIFICANT CHANGE UP (ref 8.4–10.5)
CHLORIDE SERPL-SCNC: 102 MMOL/L — SIGNIFICANT CHANGE UP (ref 96–108)
CO2 SERPL-SCNC: 30 MMOL/L — SIGNIFICANT CHANGE UP (ref 22–31)
CREAT SERPL-MCNC: 1.24 MG/DL — SIGNIFICANT CHANGE UP (ref 0.5–1.3)
CULTURE RESULTS: SIGNIFICANT CHANGE UP
CULTURE RESULTS: SIGNIFICANT CHANGE UP
GLUCOSE BLDC GLUCOMTR-MCNC: 125 MG/DL — HIGH (ref 70–99)
GLUCOSE BLDC GLUCOMTR-MCNC: 132 MG/DL — HIGH (ref 70–99)
GLUCOSE BLDC GLUCOMTR-MCNC: 194 MG/DL — HIGH (ref 70–99)
GLUCOSE BLDC GLUCOMTR-MCNC: 228 MG/DL — HIGH (ref 70–99)
GLUCOSE SERPL-MCNC: 131 MG/DL — HIGH (ref 70–99)
HCT VFR BLD CALC: 28.2 % — LOW (ref 34.5–45)
HGB BLD-MCNC: 8.7 G/DL — LOW (ref 11.5–15.5)
INR BLD: 1.01 — SIGNIFICANT CHANGE UP (ref 0.88–1.16)
MAGNESIUM SERPL-MCNC: 2 MG/DL — SIGNIFICANT CHANGE UP (ref 1.6–2.6)
MCHC RBC-ENTMCNC: 25.4 PG — LOW (ref 27–34)
MCHC RBC-ENTMCNC: 30.9 G/DL — LOW (ref 32–36)
MCV RBC AUTO: 82.2 FL — SIGNIFICANT CHANGE UP (ref 80–100)
PLATELET # BLD AUTO: 134 K/UL — LOW (ref 150–400)
POTASSIUM SERPL-MCNC: 4.1 MMOL/L — SIGNIFICANT CHANGE UP (ref 3.5–5.3)
POTASSIUM SERPL-SCNC: 4.1 MMOL/L — SIGNIFICANT CHANGE UP (ref 3.5–5.3)
PROTHROM AB SERPL-ACNC: 11.2 SEC — SIGNIFICANT CHANGE UP (ref 9.8–12.7)
RBC # BLD: 3.43 M/UL — LOW (ref 3.8–5.2)
RBC # FLD: 18.2 % — HIGH (ref 10.3–16.9)
SODIUM SERPL-SCNC: 142 MMOL/L — SIGNIFICANT CHANGE UP (ref 135–145)
SPECIMEN SOURCE: SIGNIFICANT CHANGE UP
SPECIMEN SOURCE: SIGNIFICANT CHANGE UP
WBC # BLD: 4.9 K/UL — SIGNIFICANT CHANGE UP (ref 3.8–10.5)
WBC # FLD AUTO: 4.9 K/UL — SIGNIFICANT CHANGE UP (ref 3.8–10.5)

## 2018-03-26 PROCEDURE — 71046 X-RAY EXAM CHEST 2 VIEWS: CPT | Mod: 26

## 2018-03-26 PROCEDURE — 94010 BREATHING CAPACITY TEST: CPT | Mod: 26

## 2018-03-26 RX ADMIN — PANTOPRAZOLE SODIUM 40 MILLIGRAM(S): 20 TABLET, DELAYED RELEASE ORAL at 06:30

## 2018-03-26 RX ADMIN — ATORVASTATIN CALCIUM 40 MILLIGRAM(S): 80 TABLET, FILM COATED ORAL at 21:55

## 2018-03-26 RX ADMIN — CEFDINIR 300 MILLIGRAM(S): 250 POWDER, FOR SUSPENSION ORAL at 12:24

## 2018-03-26 RX ADMIN — Medication 100 MILLIGRAM(S): at 21:55

## 2018-03-26 RX ADMIN — Medication 25 MILLIGRAM(S): at 19:16

## 2018-03-26 RX ADMIN — Medication 25 MILLIGRAM(S): at 06:30

## 2018-03-26 RX ADMIN — SODIUM CHLORIDE 3 MILLILITER(S): 9 INJECTION INTRAMUSCULAR; INTRAVENOUS; SUBCUTANEOUS at 21:56

## 2018-03-26 RX ADMIN — Medication 20 MILLIGRAM(S): at 06:30

## 2018-03-26 RX ADMIN — Medication 10 MILLIEQUIVALENT(S): at 12:24

## 2018-03-26 RX ADMIN — CEFDINIR 300 MILLIGRAM(S): 250 POWDER, FOR SUSPENSION ORAL at 19:16

## 2018-03-26 RX ADMIN — SENNA PLUS 2 TABLET(S): 8.6 TABLET ORAL at 21:55

## 2018-03-26 RX ADMIN — Medication 4: at 21:55

## 2018-03-26 RX ADMIN — Medication 1 MILLIGRAM(S): at 12:24

## 2018-03-26 RX ADMIN — SODIUM CHLORIDE 3 MILLILITER(S): 9 INJECTION INTRAMUSCULAR; INTRAVENOUS; SUBCUTANEOUS at 06:14

## 2018-03-26 RX ADMIN — HEPARIN SODIUM 11.5 UNIT(S)/HR: 5000 INJECTION INTRAVENOUS; SUBCUTANEOUS at 13:32

## 2018-03-26 RX ADMIN — Medication 325 MILLIGRAM(S): at 12:24

## 2018-03-26 RX ADMIN — SODIUM CHLORIDE 3 MILLILITER(S): 9 INJECTION INTRAMUSCULAR; INTRAVENOUS; SUBCUTANEOUS at 13:59

## 2018-03-26 NOTE — PROGRESS NOTE ADULT - ASSESSMENT
77 year old Sinhala-speaking female with history of chronic hypoproliferative anemia, transfusion dependent (sees Dr. Ruiz as an outpatient) with no improvement after Fe and EPO, Afib on Coumadin, NIDDM, osteoporosis, idiopathic ureteral stricture s/p left ureteral stent, CVA 20 years ago without residual deficits, HTN, HLD, Rheumatic fever as a child s/p mechanical MVR and AVR 14 years ago at [facility] with Dr. Christie, who was recently admitted to [facility] on 3/17/18 for persistent hematuria x 1 year and progressive SOB/YUNG with associated 4 pillow orthopnea, NYHA class IV symptoms (dyspnea after climbing 4 stairs).  Workup demonstrated severe mechanical AR/AS and mobile echodensity on mechanical MR and non-obstructive CAD.  Admitted to [facility] for pre-surgical testing.     Neurovascular: No delirium, pain well controlled on current regimen.  -Hx of CVA, no deficits.   -Carotids negative  CT head- results above, Dr. Escalante aware    Respiratory: Saturating well on room air  -Encourage IS 10x/hour while awake; C+DB; Ambulation  -Monitor SpO2 for respiratory status  -Cont diuresis  -f/u PFTS  -CXR- b/l pleural effusions vs atelectasis, f/u PA/LAT today    Cardiovascular: HD Stable, HR controlled. History above, EF 60%  -Afib: Hep gtt for AC, goal PTT 60-80 and Lopressor 25mg BID. AM PTT 67, f./u PTT 12p  -MV thrombus Hep gtt  -HTN: BB, holding ACE 2/2 periop risk.   -contine atorvastatin  -Dr. Escalante to review previous outpatient Echos to evaluate Aortic vave.   -Monitor HR/BP/Tele    GI: Stable, tolerating PO  -GI PPX: Protonix  -Colace and senna for bowel regimen    /Renal: BUN/Cr: 30/1.24 History above  -Hematuria:  consulted, appreciate recs  -Monitor I/O  -cont diuresis     ID: Afebrile, Asymptomatic  -WCC 4.9  -UTI on previous admission, Cefdinir 300mg PO x 4 days.    -Blood cx negative  -Continue to monitor for SIRS    Endo: A1C: 4.7; TSH: WNL; Hx of NIDDM  -C/w ISS for glycemic control  -Trend FS    Heme/onc: H/H 8.7/28.2; chronic anemia  -f/u Heme/onc , Left message overweekend.  Pt follows with Dr. Ruiz as an outpatient.    -Continue Fe/Folate  -1 UPRBC yesterday for low H&H  -DVT PPX: Hep gtt and SCDs    Dispo:  OR this week MVR, possible AVR

## 2018-03-26 NOTE — PROGRESS NOTE ADULT - SUBJECTIVE AND OBJECTIVE BOX
Patient discussed on morning rounds with Dr. Escalante    Operation / Date:  Pre-op MVR, questionable AVR    SUBJECTIVE ASSESSMENT:  77y Female  seen and examined. Feels well, did have blood tinged urine this AM, no acute events overnight.  Denies fever, chest pain, palpitations, SOB, abdominal pain, n/v.         Vital Signs Last 24 Hrs  T(C): 36.8 (26 Mar 2018 05:00), Max: 36.9 (25 Mar 2018 16:55)  T(F): 98.3 (26 Mar 2018 05:00), Max: 98.5 (25 Mar 2018 16:55)  HR: 66 (26 Mar 2018 05:38) (64 - 78)  BP: 111/60 (26 Mar 2018 05:38) (111/60 - 138/68)  BP(mean): 91 (26 Mar 2018 05:38) (79 - 106)  RR: 188 (26 Mar 2018 05:38) (15 - 188)  SpO2: 89% (26 Mar 2018 05:38) (89% - 95%)  I&O's Detail    25 Mar 2018 07:01  -  26 Mar 2018 07:00  --------------------------------------------------------  IN:    heparin Infusion: 30 mL    heparin Infusion: 20 mL    heparin Infusion: 63 mL    Oral Fluid: 120 mL    Packed Red Blood Cells: 375 mL  Total IN: 608 mL    OUT:    Voided: 550 mL  Total OUT: 550 mL    Total NET: 58 mL      26 Mar 2018 07:01  -  26 Mar 2018 10:35  --------------------------------------------------------  IN:    Oral Fluid: 180 mL  Total IN: 180 mL    OUT:  Total OUT: 0 mL    Total NET: 180 mL          PHYSICAL EXAM:    General: Sitting comfortably in chair, no acute distress    Neurological: awake alert oriented, no neuro deficits     Cardiovascular: Irregular rhythm, + III/VI THAI RUSB    Respiratory: Decreased b/l bases, no wheeze/rhonchi/rales    Gastrointestinal: +BS, soft nontender nondistended    Extremities: warm and well perfused, 1+ pitting B/L LE edema, no calf tenderness. +RLE ecchymosis on anterior shin    Vascular: 2+ peripheral pulses b/l     LABS:                        8.7    4.9   )-----------( 134      ( 26 Mar 2018 06:39 )             28.2       COUMADIN:  No    PT/INR - ( 26 Mar 2018 06:39 )   PT: 11.2 sec;   INR: 1.01          PTT - ( 26 Mar 2018 06:39 )  PTT:67.8 sec        142  |  102  |  30<H>  ----------------------------<  131<H>  4.1   |  30  |  1.24    Ca    9.2      26 Mar 2018 06:39  Mg     2.0         TPro  6.1  /  Alb  3.2<L>  /  TBili  3.3<H>  /  DBili  x   /  AST  58<H>  /  ALT  15  /  AlkPhos  67        Urinalysis Basic - ( 24 Mar 2018 19:56 )    Color: Red / Appearance: Turbid / S.020 / pH: x  Gluc: x / Ketone: NEGATIVE  / Bili: Small / Urobili: 0.2 E.U./dL   Blood: x / Protein: 100 mg/dL / Nitrite: NEGATIVE   Leuk Esterase: NEGATIVE / RBC: Many /HPF / WBC 5-10 /HPF   Sq Epi: x / Non Sq Epi: 0-5 /HPF / Bacteria: Present /HPF        MEDICATIONS  (STANDING):  atorvastatin 40 milliGRAM(s) Oral at bedtime  cefdinir 300 milliGRAM(s) Oral two times a day  dextrose 5%. 1000 milliLiter(s) (50 mL/Hr) IV Continuous <Continuous>  dextrose 50% Injectable 12.5 Gram(s) IV Push once  dextrose 50% Injectable 25 Gram(s) IV Push once  dextrose 50% Injectable 25 Gram(s) IV Push once  docusate sodium 100 milliGRAM(s) Oral three times a day  ferrous    sulfate 325 milliGRAM(s) Oral daily  folic acid 1 milliGRAM(s) Oral daily  furosemide    Tablet 20 milliGRAM(s) Oral daily  heparin  Infusion 1050 Unit(s)/Hr (11.5 mL/Hr) IV Continuous <Continuous>  insulin lispro (HumaLOG) corrective regimen sliding scale   SubCutaneous Before meals and at bedtime  metoprolol tartrate 25 milliGRAM(s) Oral every 12 hours  pantoprazole    Tablet 40 milliGRAM(s) Oral before breakfast  potassium chloride    Tablet ER 10 milliEquivalent(s) Oral daily  senna 2 Tablet(s) Oral at bedtime  sodium chloride 0.9% lock flush 3 milliLiter(s) IV Push every 8 hours    MEDICATIONS  (PRN):  dextrose Gel 1 Dose(s) Oral once PRN Blood Glucose LESS THAN 70 milliGRAM(s)/deciliter  glucagon  Injectable 1 milliGRAM(s) IntraMuscular once PRN Glucose LESS THAN 70 milligrams/deciliter        RADIOLOGY & ADDITIONAL TESTS:  CXR: pending official read: mild pulm congestion, b/l small pleural effusions vs atelectasis R > L, no obvious PTX    < from: CT Head No Cont (18 @ 13:37) >  IMPRESSION: Multispatial lesion following CSF density centered   predominantly within the left middle middle cranial fossa with extension   to the left suprasellar, left prepontine and left cerebellar cisterns   which may represent a epidermoid cyst. Age appropriate volume loss with   extensive small vessel ischemic disease.     < end of copied text >    < from: CT Chest No Cont (18 @ 13:37) >  IMPRESSION:    Multifocal moderate infectious/inflammatory pneumonitis; underlying   severe cardiomegaly with mild pulmonary venous congestion and moderate   layering right pleural effusion.    < end of copied text >

## 2018-03-27 LAB
ANION GAP SERPL CALC-SCNC: 11 MMOL/L — SIGNIFICANT CHANGE UP (ref 5–17)
APTT BLD: 58 SEC — HIGH (ref 27.5–37.4)
APTT BLD: 79 SEC — HIGH (ref 27.5–37.4)
BLD GP AB SCN SERPL QL: NEGATIVE — SIGNIFICANT CHANGE UP
BUN SERPL-MCNC: 28 MG/DL — HIGH (ref 7–23)
CALCIUM SERPL-MCNC: 9.1 MG/DL — SIGNIFICANT CHANGE UP (ref 8.4–10.5)
CHLORIDE SERPL-SCNC: 103 MMOL/L — SIGNIFICANT CHANGE UP (ref 96–108)
CHOLEST SERPL-MCNC: 108 MG/DL — SIGNIFICANT CHANGE UP (ref 10–199)
CO2 SERPL-SCNC: 29 MMOL/L — SIGNIFICANT CHANGE UP (ref 22–31)
CREAT SERPL-MCNC: 1.27 MG/DL — SIGNIFICANT CHANGE UP (ref 0.5–1.3)
CULTURE RESULTS: SIGNIFICANT CHANGE UP
GLUCOSE BLDC GLUCOMTR-MCNC: 131 MG/DL — HIGH (ref 70–99)
GLUCOSE BLDC GLUCOMTR-MCNC: 141 MG/DL — HIGH (ref 70–99)
GLUCOSE BLDC GLUCOMTR-MCNC: 145 MG/DL — HIGH (ref 70–99)
GLUCOSE BLDC GLUCOMTR-MCNC: 157 MG/DL — HIGH (ref 70–99)
GLUCOSE BLDC GLUCOMTR-MCNC: 184 MG/DL — HIGH (ref 70–99)
GLUCOSE SERPL-MCNC: 110 MG/DL — HIGH (ref 70–99)
HCT VFR BLD CALC: 25.6 % — LOW (ref 34.5–45)
HDLC SERPL-MCNC: 55 MG/DL — SIGNIFICANT CHANGE UP (ref 40–125)
HGB BLD-MCNC: 8 G/DL — LOW (ref 11.5–15.5)
INR BLD: 1.05 — SIGNIFICANT CHANGE UP (ref 0.88–1.16)
INR BLD: 1.09 — SIGNIFICANT CHANGE UP (ref 0.88–1.16)
LIPID PNL WITH DIRECT LDL SERPL: 36 MG/DL — SIGNIFICANT CHANGE UP
MAGNESIUM SERPL-MCNC: 2 MG/DL — SIGNIFICANT CHANGE UP (ref 1.6–2.6)
MCHC RBC-ENTMCNC: 25.9 PG — LOW (ref 27–34)
MCHC RBC-ENTMCNC: 31.3 G/DL — LOW (ref 32–36)
MCV RBC AUTO: 82.8 FL — SIGNIFICANT CHANGE UP (ref 80–100)
PLATELET # BLD AUTO: 126 K/UL — LOW (ref 150–400)
POTASSIUM SERPL-MCNC: 4.2 MMOL/L — SIGNIFICANT CHANGE UP (ref 3.5–5.3)
POTASSIUM SERPL-SCNC: 4.2 MMOL/L — SIGNIFICANT CHANGE UP (ref 3.5–5.3)
PROTHROM AB SERPL-ACNC: 11.7 SEC — SIGNIFICANT CHANGE UP (ref 9.8–12.7)
PROTHROM AB SERPL-ACNC: 12.1 SEC — SIGNIFICANT CHANGE UP (ref 9.8–12.7)
RBC # BLD: 3.09 M/UL — LOW (ref 3.8–5.2)
RBC # FLD: 18.2 % — HIGH (ref 10.3–16.9)
RH IG SCN BLD-IMP: POSITIVE — SIGNIFICANT CHANGE UP
SODIUM SERPL-SCNC: 143 MMOL/L — SIGNIFICANT CHANGE UP (ref 135–145)
SPECIMEN SOURCE: SIGNIFICANT CHANGE UP
TOTAL CHOLESTEROL/HDL RATIO MEASUREMENT: 2 RATIO — LOW (ref 3.3–7.1)
TRIGL SERPL-MCNC: 84 MG/DL — SIGNIFICANT CHANGE UP (ref 10–149)
WBC # BLD: 5 K/UL — SIGNIFICANT CHANGE UP (ref 3.8–10.5)
WBC # FLD AUTO: 5 K/UL — SIGNIFICANT CHANGE UP (ref 3.8–10.5)

## 2018-03-27 RX ADMIN — Medication 10 MILLIEQUIVALENT(S): at 13:13

## 2018-03-27 RX ADMIN — CEFDINIR 300 MILLIGRAM(S): 250 POWDER, FOR SUSPENSION ORAL at 13:16

## 2018-03-27 RX ADMIN — SODIUM CHLORIDE 3 MILLILITER(S): 9 INJECTION INTRAMUSCULAR; INTRAVENOUS; SUBCUTANEOUS at 21:46

## 2018-03-27 RX ADMIN — Medication 2: at 21:45

## 2018-03-27 RX ADMIN — SODIUM CHLORIDE 3 MILLILITER(S): 9 INJECTION INTRAMUSCULAR; INTRAVENOUS; SUBCUTANEOUS at 14:51

## 2018-03-27 RX ADMIN — Medication 1 MILLIGRAM(S): at 13:13

## 2018-03-27 RX ADMIN — Medication 100 MILLIGRAM(S): at 14:51

## 2018-03-27 RX ADMIN — CEFDINIR 300 MILLIGRAM(S): 250 POWDER, FOR SUSPENSION ORAL at 18:43

## 2018-03-27 RX ADMIN — Medication 25 MILLIGRAM(S): at 18:43

## 2018-03-27 RX ADMIN — Medication 20 MILLIGRAM(S): at 06:25

## 2018-03-27 RX ADMIN — PANTOPRAZOLE SODIUM 40 MILLIGRAM(S): 20 TABLET, DELAYED RELEASE ORAL at 06:25

## 2018-03-27 RX ADMIN — Medication 325 MILLIGRAM(S): at 13:14

## 2018-03-27 RX ADMIN — SODIUM CHLORIDE 3 MILLILITER(S): 9 INJECTION INTRAMUSCULAR; INTRAVENOUS; SUBCUTANEOUS at 06:25

## 2018-03-27 RX ADMIN — Medication 25 MILLIGRAM(S): at 06:25

## 2018-03-27 RX ADMIN — ATORVASTATIN CALCIUM 40 MILLIGRAM(S): 80 TABLET, FILM COATED ORAL at 21:45

## 2018-03-27 NOTE — PROGRESS NOTE ADULT - ASSESSMENT
77 year old Yoruba-speaking female with history of chronic hypoproliferative anemia, transfusion dependent (sees Dr. Ruiz as an outpatient) with no improvement after Fe and EPO, Afib on Coumadin, NIDDM, osteoporosis, idiopathic ureteral stricture s/p left ureteral stent, CVA 20 years ago without residual deficits, HTN, HLD, Rheumatic fever as a child s/p mechanical MVR and AVR 14 years ago at [facility] with Dr. Christie, who was recently admitted to [facility] on 3/17/18 for persistent hematuria x 1 year and progressive SOB/YUNG with associated 4 pillow orthopnea, NYHA class IV symptoms (dyspnea after climbing 4 stairs).  Workup demonstrated severe mechanical AR/AS and mobile echodensity on mechanical MR and non-obstructive CAD.  Admitted to [facility] for pre-surgical testing.      Neurovascular: No delirium, pain well controlled on current regimen.  -Hx of CVA, no deficits.   -Carotids negative  -CT head- results above, Dr. Escalante aware    Respiratory: Saturating well on room air  -Encourage IS 10x/hour while awake; C+DB; Ambulation  -Monitor SpO2 for respiratory status  -Cont diuresis  -PFTs in chart.   -CXR- PA/LAT stable.     Cardiovascular: HD Stable, HR controlled. History above, EF 60%  -Hx Afib, rheumatic fever s/p MVR/AVR, Mitral PVL, non obstructive CAD (Cath reviewed by Dr. Escalante)  -Cath today for FFR- no significant AS  -Hep gtt therapeutic x 3, stopped for cath, restart at 3:30p, f/u PTT  -OR thursday, Pre-op complete, f/u AM T&S  -monitor HR/BP/tele  -continue metoprolol 25mg q12, espplhyzoli1s 40mg QHS, lasix 20mg pO daily    GI: Stable, tolerating PO  -GI PPX: Protonix  -Colace and senna for bowel regimen    /Renal: BUN/Cr: 28/1.27 History above  -Hematuria:  consulted, appreciate recs  -Monitor I/O  -cont diuresis     ID: Afebrile, Asymptomatic  -WCC 5.0  -UTI on previous admission, Cefdinir 300mg PO x 4 days.    -Blood cx negative  -Continue to monitor for SIRS    Endo: A1C: 4.7; TSH: WNL; Hx of NIDDM  -C/w ISS for glycemic control  -Trend FS    Heme/onc: H/H 8.0/25.6; chronic anemia  -Continue Fe/Folate  -f/u AM CBC, possible PRBC tomorrow prior to OR  -DVT PPX: Hep gtt and SCDs    Dispo:  OR thursday.

## 2018-03-27 NOTE — PROGRESS NOTE ADULT - SUBJECTIVE AND OBJECTIVE BOX
Interventional Cardiology Radial band Removal Note    s/p Heparin 			    Pt without complaints.  VSS.    Right Radial access site Radar Hemoband in place, no hematoma or bleed  Radial pulse: 2+     Hemostasis achieved with manual release of hemoband.    no Vasovagal reaction.    Meds given: none    R radial no hematom or bleeding  Radial pulse: 2+     A/P:  s/p Dx FFR over aortic valve  -	continue to monitor  -	-OOB as tolerated  -	Post Procedure Instructions given  -                   Call [phone] if any access site issues.

## 2018-03-27 NOTE — PROGRESS NOTE ADULT - SUBJECTIVE AND OBJECTIVE BOX
Interventional Cardiology PA Precath Note    WILL UPDATE:    77 year old Russian-speaking female with history of chronic hypoproliferative anemia, transfusion dependent (sees Dr. Ruiz as an outpatient) with no improvement after Fe and EPO, Afib on Coumadin(currently on heparin gtt), NIDDM, osteoporosis, idiopathic ureteral stricture s/p left ureteral stent, CVA 20 years ago without residual deficits, HTN, HLD, Rheumatic fever as a child s/p mechanical MVR and AVR 14 years ago at [facility] with Dr. Christie, who was recently admitted to [facility] on 3/17/18 for persistent hematuria x 1 year and progressive SOB/YUNG with associated 4 pillow orthopnea, NYHA class IV symptoms (dyspnea after climbing 4 stairs).  Workup demonstrated severe mechanical AR/AS and mobile echodensity on mechanical MR and non-obstructive CAD.  Admitted to [facility] for pre-surgical testing for likely MVR +/- AVR this admission with Dr. Escalante.    Radiology:  X-ray:  CT Scan:  MRI:  Ultrasound/ECHO  Stress test:  Prior Cath Hx:    PMH:    PSH:    ALL: NKDA, NKFA. Denies shellfish/Contrast dye allergy.  SocHX: Denies EtoH/TOB/IVDU  FHx:   MEDS:   atorvastatin 40 milliGRAM(s) Oral at bedtime  cefdinir 300 milliGRAM(s) Oral two times a day  dextrose 5%. 1000 milliLiter(s) IV Continuous <Continuous>  dextrose 50% Injectable 12.5 Gram(s) IV Push once  dextrose 50% Injectable 25 Gram(s) IV Push once  dextrose 50% Injectable 25 Gram(s) IV Push once  dextrose Gel 1 Dose(s) Oral once PRN  docusate sodium 100 milliGRAM(s) Oral three times a day  ferrous    sulfate 325 milliGRAM(s) Oral daily  folic acid 1 milliGRAM(s) Oral daily  furosemide    Tablet 20 milliGRAM(s) Oral daily  glucagon  Injectable 1 milliGRAM(s) IntraMuscular once PRN  heparin  Infusion 1050 Unit(s)/Hr IV Continuous <Continuous>  insulin lispro (HumaLOG) corrective regimen sliding scale   SubCutaneous Before meals and at bedtime  metoprolol tartrate 25 milliGRAM(s) Oral every 12 hours  pantoprazole    Tablet 40 milliGRAM(s) Oral before breakfast  potassium chloride    Tablet ER 10 milliEquivalent(s) Oral daily  senna 2 Tablet(s) Oral at bedtime  sodium chloride 0.9% lock flush 3 milliLiter(s) IV Push every 8 hours    T(C): 36.8 (03-27-18 @ 05:00), Max: 37.2 (03-26-18 @ 15:00)  HR: 80 (03-27-18 @ 06:00) (64 - 662)  BP: 125/60 (03-27-18 @ 06:00) (114/59 - 142/70)  RR: 12 (03-27-18 @ 06:00) (12 - 14)  SpO2: 98% (03-27-18 @ 06:00) (96% - 98%)  Wt(kg): --  HEENT: NCAT, EOMI, PERRLA  NECK: No JVD, No carotid bruits B/L, +2 Carotid pulses B/L  PULM:  CTA B/L No W/R/R  CARD: RRR, +S1, +S2, No M/R/G  ABD: ND, +BS, NT, no masses  EXT: Warm, pedal edema yes/no, pitting/non-pitting  RECTAL: Guaiac negative/positive   NEURO: A & O x 3, no focal neurologic deficits  PULSES:	B	    R	      FEM         	                                            DP                          PT  Right		                                                  Yes/No     Bruit	    Left		                                                  Yes/No     Bruit                                8.0    5.0   )-----------( 126      ( 27 Mar 2018 05:48 )             25.6     03-27    143  |  103  |  28<H>  ----------------------------<  110<H>  4.2   |  29  |  1.27    Ca    9.1      27 Mar 2018 05:48  Mg     2.0     03-27            EKG:					  ASA III				Mallampati class: III	           77 year old Russian-speaking female with history of chronic hypoproliferative anemia, transfusion dependent (sees Dr. Ruiz as an outpatient) with no improvement after Fe and EPO, Afib on Coumadin(currently on heparin gtt), NIDDM, osteoporosis, idiopathic ureteral stricture s/p left ureteral stent, CVA 20 years ago without residual deficits, HTN, HLD, Rheumatic fever as a child s/p mechanical MVR and AVR 14 years ago at [facility] with Dr. Christie admitted found to have severe mechanical AR/AS and echodensity on mechanical MV s/p diagnostic cath with non-obstructive CAD at [place] now referred for diagnostic cath to further assess the aortic valve.    Sedation Plan:   conscious  Patient Is Suitable Candidate For Sedation?    no    Risks & benefits of procedure and sedation and risks and benefits for the alternative therapy have been explained to the patient in layman’s terms including but not limited to: allergic reaction, bleeding, infection, arrhythmia, respiratory compromise, renal and vascular compromise, limb damage, MI, CVA, emergent CABG/Vascular Surgery and death. Informed consent obtained and in chart. Interventional Cardiology PA Precath Note    WILL UPDATE:    77 year old [place]-speaking female with history of chronic hypoproliferative anemia, transfusion dependent (sees Dr. Ruiz as an outpatient) with no improvement after Fe and EPO, Afib on Coumadin(currently on heparin gtt), NIDDM, osteoporosis, idiopathic ureteral stricture s/p left ureteral stent, CVA 20 years ago without residual deficits, HTN, HLD, Rheumatic fever as a child s/p mechanical MVR and AVR 14 years ago at [facility] with Dr. Christie, who was recently admitted to [facility] on 3/17/18 for persistent hematuria x 1 year and progressive SOB/YUNG with associated 4 pillow orthopnea, NYHA class IV symptoms (dyspnea after climbing 4 stairs).  Workup demonstrated severe mechanical AR/AS and mobile echodensity on mechanical MR and non-obstructive CAD.  Admitted to [facility] for pre-surgical testing for likely MVR +/- AVR this admission with Dr. Escalante.    Radiology:  X-ray:  CT Scan:  MRI:  Ultrasound/ECHO  Stress test:  Prior Cath Hx:    PMH:    PSH:    ALL: NKDA, NKFA. Denies shellfish/Contrast dye allergy.  SocHX: Denies EtoH/TOB/IVDU  FHx:   MEDS:   atorvastatin 40 milliGRAM(s) Oral at bedtime  cefdinir 300 milliGRAM(s) Oral two times a day  dextrose 5%. 1000 milliLiter(s) IV Continuous <Continuous>  dextrose 50% Injectable 12.5 Gram(s) IV Push once  dextrose 50% Injectable 25 Gram(s) IV Push once  dextrose 50% Injectable 25 Gram(s) IV Push once  dextrose Gel 1 Dose(s) Oral once PRN  docusate sodium 100 milliGRAM(s) Oral three times a day  ferrous    sulfate 325 milliGRAM(s) Oral daily  folic acid 1 milliGRAM(s) Oral daily  furosemide    Tablet 20 milliGRAM(s) Oral daily  glucagon  Injectable 1 milliGRAM(s) IntraMuscular once PRN  heparin  Infusion 1050 Unit(s)/Hr IV Continuous <Continuous>  insulin lispro (HumaLOG) corrective regimen sliding scale   SubCutaneous Before meals and at bedtime  metoprolol tartrate 25 milliGRAM(s) Oral every 12 hours  pantoprazole    Tablet 40 milliGRAM(s) Oral before breakfast  potassium chloride    Tablet ER 10 milliEquivalent(s) Oral daily  senna 2 Tablet(s) Oral at bedtime  sodium chloride 0.9% lock flush 3 milliLiter(s) IV Push every 8 hours    T(C): 36.8 (03-27-18 @ 05:00), Max: 37.2 (03-26-18 @ 15:00)  HR: 80 (03-27-18 @ 06:00) (64 - 662)  BP: 125/60 (03-27-18 @ 06:00) (114/59 - 142/70)  RR: 12 (03-27-18 @ 06:00) (12 - 14)  SpO2: 98% (03-27-18 @ 06:00) (96% - 98%)  Wt(kg): --  HEENT: NCAT, EOMI, PERRLA  NECK: No JVD, No carotid bruits B/L, +2 Carotid pulses B/L  PULM:  CTA B/L No W/R/R  CARD: RRR, +S1, +S2, No M/R/G  ABD: ND, +BS, NT, no masses  EXT: Warm, pedal edema yes/no, pitting/non-pitting  RECTAL: Guaiac negative/positive   NEURO: A & O x 3, no focal neurologic deficits  PULSES:	B	    R	      FEM         	                                            DP                          PT  Right		                                                  Yes/No     Bruit	    Left		                                                  Yes/No     Bruit                                8.0    5.0   )-----------( 126      ( 27 Mar 2018 05:48 )             25.6     03-27    143  |  103  |  28<H>  ----------------------------<  110<H>  4.2   |  29  |  1.27    Ca    9.1      27 Mar 2018 05:48  Mg     2.0     03-27            EKG:					  ASA III				Mallampati class: III	           77 year old [place]ral[place]-speaking female with history of chronic hypoproliferative anemia, transfusion dependent (sees Dr. Ruiz as an outpatient) with no improvement after Fe and EPO, Afib on Coumadin(currently on heparin gtt), NIDDM, osteoporosis, idiopathic ureteral stricture s/p left ureteral stent, CVA 20 years ago without residual deficits, HTN, HLD, Rheumatic fever as a child s/p mechanical MVR and AVR 14 years ago at [facility] with Dr. Christie admitted found to have severe mechanical AR/AS and echodensity on mechanical MV s/p diagnostic cath with nml coronaries at [place] now referred for diagnostic cath to further assess the aortic valve in preparation for MVR +/- AVR this admission.    Sedation Plan:   conscious  Patient Is Suitable Candidate For Sedation?    no    Risks & benefits of procedure and sedation and risks and benefits for the alternative therapy have been explained to the patient in layman’s terms including but not limited to: allergic reaction, bleeding, infection, arrhythmia, respiratory compromise, renal and vascular compromise, limb damage, MI, CVA, emergent CABG/Vascular Surgery and death. Informed consent obtained and in chart. Interventional Cardiology PA Precath Note        77 year old Citizen of the [place]/English Speaking female with history of chronic hypoproliferative anemia, transfusion dependent (sees Dr. Ruiz as an outpatient) with no improvement after Fe and EPO, Afib on Coumadin(currently on heparin gtt), NIDDM, osteoporosis, idiopathic ureteral stricture s/p left ureteral stent, CVA 20 years ago without residual deficits, HTN, HLD, Rheumatic fever as a child s/p mechanical MVR and AVR 14 years ago at [facility] with Dr. Christie, who was recently admitted to [facility] on 3/17/18 for persistent hematuria x 1 year and progressive SOB/YUNG with associated 4 pillow orthopnea, NYHA class IV symptoms (dyspnea after climbing 4 stairs).  Workup demonstrated severe mechanical AR/AS and mobile echodensity on mechanical MR and non-obstructive CAD.  Admitted to [facility] for pre-surgical testing for likely MVR +/- AVR this admission with Dr. Escalante.    Radiology:  X-ray:  CT Scan:  MRI:  Ultrasound/ECHO  Stress test:  Prior Cath Hx:    PMH:    PSH:    ALL: NKDA, NKFA. Denies shellfish/Contrast dye allergy.  SocHX: Denies EtoH/TOB/IVDU  FHx:   MEDS:   atorvastatin 40 milliGRAM(s) Oral at bedtime  cefdinir 300 milliGRAM(s) Oral two times a day  dextrose 5%. 1000 milliLiter(s) IV Continuous <Continuous>  dextrose 50% Injectable 12.5 Gram(s) IV Push once  dextrose 50% Injectable 25 Gram(s) IV Push once  dextrose 50% Injectable 25 Gram(s) IV Push once  dextrose Gel 1 Dose(s) Oral once PRN  docusate sodium 100 milliGRAM(s) Oral three times a day  ferrous    sulfate 325 milliGRAM(s) Oral daily  folic acid 1 milliGRAM(s) Oral daily  furosemide    Tablet 20 milliGRAM(s) Oral daily  glucagon  Injectable 1 milliGRAM(s) IntraMuscular once PRN  heparin  Infusion 1050 Unit(s)/Hr IV Continuous <Continuous>  insulin lispro (HumaLOG) corrective regimen sliding scale   SubCutaneous Before meals and at bedtime  metoprolol tartrate 25 milliGRAM(s) Oral every 12 hours  pantoprazole    Tablet 40 milliGRAM(s) Oral before breakfast  potassium chloride    Tablet ER 10 milliEquivalent(s) Oral daily  senna 2 Tablet(s) Oral at bedtime  sodium chloride 0.9% lock flush 3 milliLiter(s) IV Push every 8 hours    T(C): 36.8 (03-27-18 @ 05:00), Max: 37.2 (03-26-18 @ 15:00)  HR: 80 (03-27-18 @ 06:00) (64 - 662)  BP: 125/60 (03-27-18 @ 06:00) (114/59 - 142/70)  RR: 12 (03-27-18 @ 06:00) (12 - 14)  SpO2: 98% (03-27-18 @ 06:00) (96% - 98%)  Wt(kg): --  HEENT: NCAT, EOMI, PERRLA  NECK: No JVD, No carotid bruits B/L  PULM:  Decreased BS at bases  CARD: irregularly irregular, +S1, +S2, IV/VI THAI heard  ABD: ND, +BS, NT, no masses  EXT: 1+ b/l LE edema  NEURO: A & O x 3, no focal neurologic deficits  PULSES:	2+ b/l throughout                              8.0    5.0   )-----------( 126      ( 27 Mar 2018 05:48 )             25.6     03-27    143  |  103  |  28<H>  ----------------------------<  110<H>  4.2   |  29  |  1.27    Ca    9.1      27 Mar 2018 05:48  Mg     2.0     03-27            EKG: AF rate controlled				  ASA III				Mallampati class: III	           77 year old Citizen of the [place]-speaking female with history of chronic hypoproliferative anemia, transfusion dependent (sees Dr. Ruiz as an outpatient) with no improvement after Fe and EPO, Afib on Coumadin(currently on heparin gtt), NIDDM, osteoporosis, idiopathic ureteral stricture s/p left ureteral stent, CVA 20 years ago without residual deficits, HTN, HLD, Rheumatic fever as a child s/p mechanical MVR and AVR 14 years ago at [facility] with Dr. Christie admitted found to have severe mechanical AR/AS and echodensity on mechanical MV s/p diagnostic cath with nml coronaries at [facility] now referred for diagnostic cath to further assess the aortic valve in preparation for MVR +/- AVR this admission.    Consent in chart  Sedation Plan:   conscious  Patient Is Suitable Candidate For Sedation?    no    Risks & benefits of procedure and sedation and risks and benefits for the alternative therapy have been explained to the patient in layman’s terms including but not limited to: allergic reaction, bleeding, infection, arrhythmia, respiratory compromise, renal and vascular compromise, limb damage, MI, CVA, emergent CABG/Vascular Surgery and death. Informed consent obtained and in chart. Interventional Cardiology PA Precath Note        77 year old Polish/English Speaking female with history of chronic hypoproliferative anemia, transfusion dependent (sees Dr. Ruiz as an outpatient) with no improvement after Fe and EPO, Afib on Coumadin(currently on heparin gtt), NIDDM, osteoporosis, idiopathic ureteral stricture s/p left ureteral stent, CVA 20 years ago without residual deficits, HTN, HLD, Rheumatic fever as a child s/p mechanical MVR and AVR 14 years ago at [facility] with Dr. Christie, who was recently admitted to [facility] on 3/17/18 for persistent hematuria x 1 year and progressive SOB/YUNG with associated 4 pillow orthopnea, NYHA class IV symptoms (dyspnea after climbing 4 stairs).  Workup demonstrated severe mechanical AR/AS and mobile echodensity on mechanical MR and non-obstructive CAD.  Admitted to [facility] for pre-surgical testing for likely MVR +/- AVR this admission with Dr. Escalante.    Radiology:  X-ray:  CT Scan:  MRI:  Ultrasound/ECHO  Stress test:  Prior Cath Hx:    PMH:    PSH:    ALL: NKDA, NKFA. Denies shellfish/Contrast dye allergy.  SocHX: Denies EtoH/TOB/IVDU  FHx:   MEDS:   atorvastatin 40 milliGRAM(s) Oral at bedtime  cefdinir 300 milliGRAM(s) Oral two times a day  dextrose 5%. 1000 milliLiter(s) IV Continuous <Continuous>  dextrose 50% Injectable 12.5 Gram(s) IV Push once  dextrose 50% Injectable 25 Gram(s) IV Push once  dextrose 50% Injectable 25 Gram(s) IV Push once  dextrose Gel 1 Dose(s) Oral once PRN  docusate sodium 100 milliGRAM(s) Oral three times a day  ferrous    sulfate 325 milliGRAM(s) Oral daily  folic acid 1 milliGRAM(s) Oral daily  furosemide    Tablet 20 milliGRAM(s) Oral daily  glucagon  Injectable 1 milliGRAM(s) IntraMuscular once PRN  heparin  Infusion 1050 Unit(s)/Hr IV Continuous <Continuous>  insulin lispro (HumaLOG) corrective regimen sliding scale   SubCutaneous Before meals and at bedtime  metoprolol tartrate 25 milliGRAM(s) Oral every 12 hours  pantoprazole    Tablet 40 milliGRAM(s) Oral before breakfast  potassium chloride    Tablet ER 10 milliEquivalent(s) Oral daily  senna 2 Tablet(s) Oral at bedtime  sodium chloride 0.9% lock flush 3 milliLiter(s) IV Push every 8 hours    T(C): 36.8 (03-27-18 @ 05:00), Max: 37.2 (03-26-18 @ 15:00)  HR: 80 (03-27-18 @ 06:00) (64 - 662)  BP: 125/60 (03-27-18 @ 06:00) (114/59 - 142/70)  RR: 12 (03-27-18 @ 06:00) (12 - 14)  SpO2: 98% (03-27-18 @ 06:00) (96% - 98%)  Wt(kg): --  HEENT: NCAT, EOMI, PERRLA  NECK: No JVD, No carotid bruits B/L  PULM:  Decreased BS at bases  CARD: irregularly irregular, +S1, +S2, IV/VI THAI heard  ABD: ND, +BS, NT, no masses  EXT: 1+ b/l LE edema  NEURO: A & O x 3, no focal neurologic deficits  PULSES:	2+ b/l throughout                              8.0    5.0   )-----------( 126      ( 27 Mar 2018 05:48 )             25.6     03-27    143  |  103  |  28<H>  ----------------------------<  110<H>  4.2   |  29  |  1.27    Ca    9.1      27 Mar 2018 05:48  Mg     2.0     03-27            EKG: AF rate controlled				  ASA III				Mallampati class: III	           77 year old Polish-speaking female with history of chronic hypoproliferative anemia, transfusion dependent (sees Dr. Ruiz as an outpatient) with no improvement after Fe and EPO, Afib on Coumadin(currently on heparin gtt), NIDDM, osteoporosis, idiopathic ureteral stricture s/p left ureteral stent, CVA 20 years ago without residual deficits, HTN, HLD, Rheumatic fever as a child s/p mechanical MVR and AVR 14 years ago at [facility] with Dr. Christie admitted found to have severe mechanical AR/AS and echodensity on mechanical MV s/p diagnostic cath with nml coronaries at [facility] now referred for diagnostic cath to further assess the aortic valve in preparation for MVR +/- AVR this admission.    Consent in chart  Case d/w Dr. Molina prior to procedure  Sedation Plan:   conscious  Patient Is Suitable Candidate For Sedation?    no    Risks & benefits of procedure and sedation and risks and benefits for the alternative therapy have been explained to the patient in layman’s terms including but not limited to: allergic reaction, bleeding, infection, arrhythmia, respiratory compromise, renal and vascular compromise, limb damage, MI, CVA, emergent CABG/Vascular Surgery and death. Informed consent obtained and in chart.

## 2018-03-27 NOTE — PROGRESS NOTE ADULT - SUBJECTIVE AND OBJECTIVE BOX
Patient discussed on morning rounds with Dr. Escalante    Operation / Date:  Pre-op Minimally invasive/sternotony MV repair/replacement, possible AVR    SUBJECTIVE ASSESSMENT:  77y Female seen and examined. No acute complaints. feels well, no acute events overnight. Denies fever, chest pain, palpitations, SOB, abdominal pain, n/v/d/c.         Vital Signs Last 24 Hrs  T(C): 36.8 (27 Mar 2018 05:00), Max: 37.2 (26 Mar 2018 15:00)  T(F): 98.2 (27 Mar 2018 05:00), Max: 99 (26 Mar 2018 15:00)  HR: 70 (27 Mar 2018 08:26) (64 - 80)  BP: 114/57 (27 Mar 2018 08:26) (114/57 - 142/70)  BP(mean): 85 (27 Mar 2018 08:26) (83 - 96)  RR: 12 (27 Mar 2018 08:26) (12 - 14)  SpO2: 98% (27 Mar 2018 08:26) (96% - 98%)  I&O's Detail    26 Mar 2018 07:01  -  27 Mar 2018 07:00  --------------------------------------------------------  IN:    heparin Infusion: 264.5 mL    Oral Fluid: 480 mL  Total IN: 744.5 mL    OUT:  Total OUT: 0 mL    Total NET: 744.5 mL      27 Mar 2018 07:01  -  27 Mar 2018 11:59  --------------------------------------------------------  IN:  Total IN: 0 mL    OUT:  Total OUT: 0 mL    Total NET: 0 mL        PHYSICAL EXAM:    General: Sitting comfortably in chair, no acute distress    Neurological: awake alert oriented, no neuro deficits     Cardiovascular: Irregular rhythm, + III/VI THAI RUSB    Respiratory: Decreased b/l bases, no wheeze/rhonchi/rales    Gastrointestinal: +BS, soft nontender nondistended    Extremities: warm and well perfused, trace/1+ pitting B/L LE edema, no calf tenderness. +RLE ecchymosis on anterior shin    Vascular: 2+ peripheral pulses b/l     Incision: R radial cath site: CDI.     LABS:                        8.0    5.0   )-----------( 126      ( 27 Mar 2018 05:48 )             25.6       COUMADIN:  No    PT/INR - ( 27 Mar 2018 05:48 )   PT: 11.7 sec;   INR: 1.05          PTT - ( 27 Mar 2018 05:48 )  PTT:79.0 sec    03-27    143  |  103  |  28<H>  ----------------------------<  110<H>  4.2   |  29  |  1.27    Ca    9.1      27 Mar 2018 05:48  Mg     2.0     03-27            MEDICATIONS  (STANDING):  atorvastatin 40 milliGRAM(s) Oral at bedtime  cefdinir 300 milliGRAM(s) Oral two times a day  dextrose 5%. 1000 milliLiter(s) (50 mL/Hr) IV Continuous <Continuous>  dextrose 50% Injectable 12.5 Gram(s) IV Push once  dextrose 50% Injectable 25 Gram(s) IV Push once  dextrose 50% Injectable 25 Gram(s) IV Push once  docusate sodium 100 milliGRAM(s) Oral three times a day  ferrous    sulfate 325 milliGRAM(s) Oral daily  folic acid 1 milliGRAM(s) Oral daily  furosemide    Tablet 20 milliGRAM(s) Oral daily  heparin  Infusion 1050 Unit(s)/Hr (11.5 mL/Hr) IV Continuous <Continuous>  insulin lispro (HumaLOG) corrective regimen sliding scale   SubCutaneous Before meals and at bedtime  metoprolol tartrate 25 milliGRAM(s) Oral every 12 hours  pantoprazole    Tablet 40 milliGRAM(s) Oral before breakfast  potassium chloride    Tablet ER 10 milliEquivalent(s) Oral daily  senna 2 Tablet(s) Oral at bedtime  sodium chloride 0.9% lock flush 3 milliLiter(s) IV Push every 8 hours    MEDICATIONS  (PRN):  dextrose Gel 1 Dose(s) Oral once PRN Blood Glucose LESS THAN 70 milliGRAM(s)/deciliter  glucagon  Injectable 1 milliGRAM(s) IntraMuscular once PRN Glucose LESS THAN 70 milligrams/deciliter        RADIOLOGY & ADDITIONAL TESTS:

## 2018-03-27 NOTE — PROGRESS NOTE ADULT - SUBJECTIVE AND OBJECTIVE BOX
Procedure: FFR of prosthetic mechanical aortic valve  Indication: AS  Complication: none    Result:  1) FFR 0.85 reduced to 0.82 with Adenosine.    Impression: No significant AS across prosthetic valve.

## 2018-03-28 LAB
ANION GAP SERPL CALC-SCNC: 11 MMOL/L — SIGNIFICANT CHANGE UP (ref 5–17)
APTT BLD: 60.1 SEC — HIGH (ref 27.5–37.4)
BUN SERPL-MCNC: 33 MG/DL — HIGH (ref 7–23)
CALCIUM SERPL-MCNC: 8.9 MG/DL — SIGNIFICANT CHANGE UP (ref 8.4–10.5)
CHLORIDE SERPL-SCNC: 102 MMOL/L — SIGNIFICANT CHANGE UP (ref 96–108)
CO2 SERPL-SCNC: 28 MMOL/L — SIGNIFICANT CHANGE UP (ref 22–31)
CREAT SERPL-MCNC: 1.34 MG/DL — HIGH (ref 0.5–1.3)
CULTURE RESULTS: SIGNIFICANT CHANGE UP
GLUCOSE BLDC GLUCOMTR-MCNC: 105 MG/DL — HIGH (ref 70–99)
GLUCOSE BLDC GLUCOMTR-MCNC: 124 MG/DL — HIGH (ref 70–99)
GLUCOSE BLDC GLUCOMTR-MCNC: 137 MG/DL — HIGH (ref 70–99)
GLUCOSE BLDC GLUCOMTR-MCNC: 176 MG/DL — HIGH (ref 70–99)
GLUCOSE SERPL-MCNC: 126 MG/DL — HIGH (ref 70–99)
HCT VFR BLD CALC: 27.6 % — LOW (ref 34.5–45)
HCT VFR BLD CALC: 30.1 % — LOW (ref 34.5–45)
HGB BLD-MCNC: 7.9 G/DL — LOW (ref 11.5–15.5)
HGB BLD-MCNC: 9.4 G/DL — LOW (ref 11.5–15.5)
INR BLD: 1.02 — SIGNIFICANT CHANGE UP (ref 0.88–1.16)
MAGNESIUM SERPL-MCNC: 2.1 MG/DL — SIGNIFICANT CHANGE UP (ref 1.6–2.6)
MCHC RBC-ENTMCNC: 23.9 PG — LOW (ref 27–34)
MCHC RBC-ENTMCNC: 25.5 PG — LOW (ref 27–34)
MCHC RBC-ENTMCNC: 28.6 G/DL — LOW (ref 32–36)
MCHC RBC-ENTMCNC: 31.2 G/DL — LOW (ref 32–36)
MCV RBC AUTO: 81.8 FL — SIGNIFICANT CHANGE UP (ref 80–100)
MCV RBC AUTO: 83.4 FL — SIGNIFICANT CHANGE UP (ref 80–100)
PLATELET # BLD AUTO: 165 K/UL — SIGNIFICANT CHANGE UP (ref 150–400)
PLATELET # BLD AUTO: 166 K/UL — SIGNIFICANT CHANGE UP (ref 150–400)
POTASSIUM SERPL-MCNC: 4.2 MMOL/L — SIGNIFICANT CHANGE UP (ref 3.5–5.3)
POTASSIUM SERPL-SCNC: 4.2 MMOL/L — SIGNIFICANT CHANGE UP (ref 3.5–5.3)
PROTHROM AB SERPL-ACNC: 11.3 SEC — SIGNIFICANT CHANGE UP (ref 9.8–12.7)
RBC # BLD: 3.31 M/UL — LOW (ref 3.8–5.2)
RBC # BLD: 3.68 M/UL — LOW (ref 3.8–5.2)
RBC # FLD: 17.3 % — HIGH (ref 10.3–16.9)
RBC # FLD: 18.3 % — HIGH (ref 10.3–16.9)
SODIUM SERPL-SCNC: 141 MMOL/L — SIGNIFICANT CHANGE UP (ref 135–145)
SPECIMEN SOURCE: SIGNIFICANT CHANGE UP
WBC # BLD: 4.5 K/UL — SIGNIFICANT CHANGE UP (ref 3.8–10.5)
WBC # BLD: 5 K/UL — SIGNIFICANT CHANGE UP (ref 3.8–10.5)
WBC # FLD AUTO: 4.5 K/UL — SIGNIFICANT CHANGE UP (ref 3.8–10.5)
WBC # FLD AUTO: 5 K/UL — SIGNIFICANT CHANGE UP (ref 3.8–10.5)

## 2018-03-28 PROCEDURE — 74176 CT ABD & PELVIS W/O CONTRAST: CPT | Mod: 26

## 2018-03-28 RX ORDER — DIATRIZOATE MEGLUMINE 180 MG/ML
30 INJECTION, SOLUTION INTRAVESICAL ONCE
Qty: 0 | Refills: 0 | Status: COMPLETED | OUTPATIENT
Start: 2018-03-28 | End: 2018-03-28

## 2018-03-28 RX ORDER — CHLORHEXIDINE GLUCONATE 213 G/1000ML
10 SOLUTION TOPICAL ONCE
Qty: 0 | Refills: 0 | Status: COMPLETED | OUTPATIENT
Start: 2018-03-28 | End: 2018-03-29

## 2018-03-28 RX ORDER — CHLORHEXIDINE GLUCONATE 213 G/1000ML
1 SOLUTION TOPICAL ONCE
Qty: 0 | Refills: 0 | Status: COMPLETED | OUTPATIENT
Start: 2018-03-28 | End: 2018-03-28

## 2018-03-28 RX ORDER — CHLORHEXIDINE GLUCONATE 213 G/1000ML
1 SOLUTION TOPICAL ONCE
Qty: 0 | Refills: 0 | Status: COMPLETED | OUTPATIENT
Start: 2018-03-29 | End: 2018-03-29

## 2018-03-28 RX ADMIN — Medication 25 MILLIGRAM(S): at 05:42

## 2018-03-28 RX ADMIN — CEFDINIR 300 MILLIGRAM(S): 250 POWDER, FOR SUSPENSION ORAL at 18:08

## 2018-03-28 RX ADMIN — SODIUM CHLORIDE 3 MILLILITER(S): 9 INJECTION INTRAMUSCULAR; INTRAVENOUS; SUBCUTANEOUS at 05:44

## 2018-03-28 RX ADMIN — CHLORHEXIDINE GLUCONATE 1 APPLICATION(S): 213 SOLUTION TOPICAL at 21:57

## 2018-03-28 RX ADMIN — Medication 100 MILLIGRAM(S): at 13:52

## 2018-03-28 RX ADMIN — ATORVASTATIN CALCIUM 40 MILLIGRAM(S): 80 TABLET, FILM COATED ORAL at 22:01

## 2018-03-28 RX ADMIN — Medication 2: at 22:01

## 2018-03-28 RX ADMIN — Medication 1 MILLIGRAM(S): at 12:22

## 2018-03-28 RX ADMIN — SODIUM CHLORIDE 3 MILLILITER(S): 9 INJECTION INTRAMUSCULAR; INTRAVENOUS; SUBCUTANEOUS at 21:48

## 2018-03-28 RX ADMIN — Medication 20 MILLIGRAM(S): at 05:42

## 2018-03-28 RX ADMIN — CEFDINIR 300 MILLIGRAM(S): 250 POWDER, FOR SUSPENSION ORAL at 05:42

## 2018-03-28 RX ADMIN — DIATRIZOATE MEGLUMINE 30 MILLILITER(S): 180 INJECTION, SOLUTION INTRAVESICAL at 13:51

## 2018-03-28 RX ADMIN — HEPARIN SODIUM 11.5 UNIT(S)/HR: 5000 INJECTION INTRAVENOUS; SUBCUTANEOUS at 14:45

## 2018-03-28 RX ADMIN — PANTOPRAZOLE SODIUM 40 MILLIGRAM(S): 20 TABLET, DELAYED RELEASE ORAL at 08:06

## 2018-03-28 RX ADMIN — Medication 10 MILLIEQUIVALENT(S): at 12:22

## 2018-03-28 RX ADMIN — Medication 325 MILLIGRAM(S): at 12:22

## 2018-03-28 RX ADMIN — Medication 25 MILLIGRAM(S): at 18:08

## 2018-03-28 RX ADMIN — SODIUM CHLORIDE 3 MILLILITER(S): 9 INJECTION INTRAMUSCULAR; INTRAVENOUS; SUBCUTANEOUS at 13:52

## 2018-03-28 NOTE — PROGRESS NOTE ADULT - SUBJECTIVE AND OBJECTIVE BOX
Planned Date of Surgery: 3/29/18                                                                                                                 Surgeon: Dr. Escalante    Procedure: Mini-MVR    HPI:  This is a 77 year old Slovenian-speaking female with history of chronic hypoproliferative anemia, transfusion dependent (sees Dr. Ruiz as an outpatient) with no improvement after Fe and EPO, Afib on Coumadin, NIDDM, osteoporosis, idiopathic ureteral stricture s/p left ureteral stent, CVA 20 years ago without residual deficits, HTN, HLD, Rheumatic fever as a child s/p mechanical MVR and AVR 14 years ago at [facility] with Dr. Christie, who was recently admitted to [facility] on 3/17/18 for persistent hematuria x 1 year and progressive SOB/YUNG with associated 4 pillow orthopnea, NYHA class IV symptoms (dyspnea after climbing 4 stairs).  While inpatient, she was noted to have worsening anemia beyond her current baseline as well as UTI for which she was placed on IV rocephin and transitioned to PO Cefpodoxime upon discharge.  She underwent ECHO on 3/17/18 that revealed EF 60%, dilated LA/RV, mechanical MV with mobile echodensity, mechanical AS with EZEQUIEL 0.85cm2, Mean/peak gradients 33.7mmHg/49.5mmHg with severe AR, and severe pulmonary hypertension with PASP 62.6mmHg and cardiac catheterization on 3/23/18 that demonstrated non-obstructive CAD.  Dr. Tomer Escalante was consulted and on 3/24/18, she was transferred to [facility] under his care to complete pre-surgical evaluation for possible AVR/MVR.  On admission, pt continues to endorse hematuria with mild intermittent dysuria but denies suprapubic pressure or urinary frequency/discharge as well as urethral discharge as well as chronic SOB at rest.  She also denies HA, AMS, CP, palpitations, cough, hemoptysis, n/v/d, hematemesis, hematochezia/melena, or fevers.     PAST MEDICAL & SURGICAL HISTORY:  CVA (cerebral vascular accident)  Anemia  Rheumatic fever  Diabetes  Osteoporosis  Mitral valve replaced  Atrial fibrillation: on coumadin at home  Hypertension  History of ureter stent  S/P AVR  H/O mitral valve replacement      No Known Allergies    MEDICATIONS  (STANDING):  atorvastatin 40 milliGRAM(s) Oral at bedtime  cefdinir 300 milliGRAM(s) Oral two times a day  dextrose 5%. 1000 milliLiter(s) (50 mL/Hr) IV Continuous <Continuous>  dextrose 50% Injectable 12.5 Gram(s) IV Push once  dextrose 50% Injectable 25 Gram(s) IV Push once  dextrose 50% Injectable 25 Gram(s) IV Push once  docusate sodium 100 milliGRAM(s) Oral three times a day  ferrous    sulfate 325 milliGRAM(s) Oral daily  folic acid 1 milliGRAM(s) Oral daily  furosemide    Tablet 20 milliGRAM(s) Oral daily  heparin  Infusion 1050 Unit(s)/Hr (11.5 mL/Hr) IV Continuous <Continuous>  insulin lispro (HumaLOG) corrective regimen sliding scale   SubCutaneous Before meals and at bedtime  metoprolol tartrate 25 milliGRAM(s) Oral every 12 hours  pantoprazole    Tablet 40 milliGRAM(s) Oral before breakfast  potassium chloride    Tablet ER 10 milliEquivalent(s) Oral daily  senna 2 Tablet(s) Oral at bedtime  sodium chloride 0.9% lock flush 3 milliLiter(s) IV Push every 8 hours    MEDICATIONS  (PRN):  dextrose Gel 1 Dose(s) Oral once PRN Blood Glucose LESS THAN 70 milliGRAM(s)/deciliter  glucagon  Injectable 1 milliGRAM(s) IntraMuscular once PRN Glucose LESS THAN 70 milligrams/deciliter      On Beta Blocker? YES    Labs:                        7.9    5.0   )-----------( 165      ( 28 Mar 2018 05:47 )             27.6     03-28    141  |  102  |  33<H>  ----------------------------<  126<H>  4.2   |  28  |  1.34<H>    Ca    8.9      28 Mar 2018 05:46  Mg     2.1     03-28      PT/INR - ( 28 Mar 2018 05:48 )   PT: 11.3 sec;   INR: 1.02          PTT - ( 28 Mar 2018 05:48 )  PTT:60.1 sec    ABO Interpretation: B (03-27-18 @ 05:30)    Hgb A1C: 4.6%    EKG:  < from: 12 Lead ECG (03.24.18 @ 17:24) >    Ventricular Rate 93 BPM    Atrial Rate 90 BPM    QRS Duration 92 ms    Q-T Interval 380 ms    QTC Calculation(Bezet) 472 ms    R Axis 94 degrees    T Axis 17 degrees    Diagnosis Line Atrial fibrillation  Rightward axis  Pulmonary disease pattern  Abnormal ECG    Confirmed by NICK GAN MD ([id number]) on 3/26/2018 9:35:35 AM    < end of copied text >      CXR:  < from: Xray Chest 2 Views PA/Lat (03.26.18 @ 09:45) >    EXAM:  XR CHEST PA LAT 2V                          PROCEDURE DATE:  03/26/2018                     INTERPRETATION:  PA AND LATERAL CHEST X-RAY    HISTORY: pre-op    PRIOR STUDIES: 3/24/2018.    FINDINGS: The lungs are clear.  Pulmonary venous hypertension. Trace   right pleural effusion which is decreased as compared to prior study.   Cardiomegaly. Status post mitral and tricuspid and aortic valve   annuloplastys. Sternal wires. Enlarged main pulmonary artery.    IMPRESSION:   Trace right pleural effusion which is decreased as compared to prior   study.    < end of copied text >      CT Scans:  < from: CT Chest No Cont (03.25.18 @ 13:37) >      IMPRESSION:    Multifocal moderate infectious/inflammatory pneumonitis; underlying   severe cardiomegaly with mild pulmonary venous congestion and moderate   layering right pleural effusion.    < end of copied text >      Cath Report: non-obstructive CAD      Echo:  < from: MONROE w/Probe Placement (03.20.18 @ 11:33) >    Summary:   1. Left ventricular ejection fraction, by visual estimation, is 55 to   60%.   2. Normal global left ventricular systolic function.   3. Mitral prosthesis regurgitation, thrombus and pannus.   4. A bileaflet mechanical valve was noted in the mitral position. Both   leaflets appeared to be mobile. There was a small mobile mass attached   the atrial aspect of the valve. This may denote either a thrombus or a   vegetation. Pannus was noted around the mitral valve.A moderate   paravalvular leak was noted along the posterior aspect of the valve.   5. Moderate tricuspid regurgitation.   6. Mild aortic regurgitation.   7. Mitral valve mean gradient is 12.8 mmHg consistent with severe mitral   stenosis.   8. Rightatrial enlargement.   9. Left atrial enlargement.  10. A mechanical bileaflet aortic valve was visualized. The leaflets were   mobile and appear to be functioning normally. The peak gradient across   the aortic valve was 29 mmHg, although a much higher gradient (67 mmHg)   documented on TTE.    < end of copied text >    PFT's:  in chart    Carotid Duplex:  < from: US Duplex Carotid Arteries Complete, Bilateral (03.25.18 @ 14:28) >  IMPRESSION:  No hemodynamically significant carotid artery stenosis bilaterally.    < end of copied text >    Consult in Chart?  YES  Consent in Chart? YES   Pre-op Orders Placed? YES  Blood Products Ordered? YES  NPO ordered? YES

## 2018-03-28 NOTE — CHART NOTE - NSCHARTNOTEFT_GEN_A_CORE
Admitting Diagnosis:   Patient is a 77y old  Female who presents with a chief complaint of Prosthetic MV thrombus vs. IE; Severe AS (24 Mar 2018 16:16)      PAST MEDICAL & SURGICAL HISTORY:  CVA (cerebral vascular accident)  Anemia  Rheumatic fever  Diabetes  Osteoporosis  Mitral valve replaced  Atrial fibrillation: on coumadin at home  Hypertension  History of ureter stent  S/P AVR  H/O mitral valve replacement      Current Nutrition Order:  Dash/TLC, CST CHO    PO Intake: Good (%) [   ]  Fair (50-75%) [X] Poor (<25%) [   ]    GI Issues:   Denies N/V/D/C    Pain:  Denies     Skin Integrity:  R radial cath site  b/l shin ecchymosis     Labs:   03-28    141  |  102  |  33<H>  ----------------------------<  126<H>  4.2   |  28  |  1.34<H>    Ca    8.9      28 Mar 2018 05:46  Mg     2.1     03-28      CAPILLARY BLOOD GLUCOSE      POCT Blood Glucose.: 137 mg/dL (28 Mar 2018 11:37)  POCT Blood Glucose.: 124 mg/dL (28 Mar 2018 06:37)  POCT Blood Glucose.: 157 mg/dL (27 Mar 2018 21:05)  POCT Blood Glucose.: 131 mg/dL (27 Mar 2018 17:00)      Medications:  MEDICATIONS  (STANDING):  atorvastatin 40 milliGRAM(s) Oral at bedtime  cefdinir 300 milliGRAM(s) Oral two times a day  dextrose 5%. 1000 milliLiter(s) (50 mL/Hr) IV Continuous <Continuous>  dextrose 50% Injectable 12.5 Gram(s) IV Push once  dextrose 50% Injectable 25 Gram(s) IV Push once  dextrose 50% Injectable 25 Gram(s) IV Push once  docusate sodium 100 milliGRAM(s) Oral three times a day  ferrous    sulfate 325 milliGRAM(s) Oral daily  folic acid 1 milliGRAM(s) Oral daily  furosemide    Tablet 20 milliGRAM(s) Oral daily  heparin  Infusion 1050 Unit(s)/Hr (11.5 mL/Hr) IV Continuous <Continuous>  insulin lispro (HumaLOG) corrective regimen sliding scale   SubCutaneous Before meals and at bedtime  metoprolol tartrate 25 milliGRAM(s) Oral every 12 hours  pantoprazole    Tablet 40 milliGRAM(s) Oral before breakfast  potassium chloride    Tablet ER 10 milliEquivalent(s) Oral daily  senna 2 Tablet(s) Oral at bedtime  sodium chloride 0.9% lock flush 3 milliLiter(s) IV Push every 8 hours    MEDICATIONS  (PRN):  dextrose Gel 1 Dose(s) Oral once PRN Blood Glucose LESS THAN 70 milliGRAM(s)/deciliter  glucagon  Injectable 1 milliGRAM(s) IntraMuscular once PRN Glucose LESS THAN 70 milligrams/deciliter      Weight:  No new wt     Weight Change:   Kindly take current to assess     Estimated energy needs:   ABW 57.6kg used for EER and adjusted for possible pre-op/age  25-30kcal/kg (1440-1728kcal), 1.2-1.4g/kg (69-81g), 30-35ml/kg (1728-2016ml)    Subjective:   76 y/o F admitted with severe mechanical AR/AS and mobile echodensity on mechanical MR and non-obstructive CAD.  Admitted to [facility] for pre-surgical testing. Pt is Guinean speaking with son present to translate. She reports fair intake; consuming ~50-75% depending on the meal severed. Denies GI distress and pain. Encouraged continued compliance with Dash/TLC, CST CHO diet. Will follow.     Previous Nutrition Diagnosis:  Unintended weight loss RT inability to meet needs po per medical events and stress AEB pt reports 25% sig/unintentional wt loss x 1year    Active [X]  Resolved [   ]    If resolved, new PES:     Goal:  Pt to demonstrate diet compliance and consume at least 75% of EER     Recommendations:  1. Continue current diet.   2. Add glucerna BID if intake <50%.   3. Monitor lytes and replete prn.   4. Trend weekly wts.     Education:   Reinforced Dash/TLC, CST CHO diet     Risk Level: High [X] Moderate [   ] Low [   ]

## 2018-03-29 ENCOUNTER — RESULT REVIEW (OUTPATIENT)
Age: 78
End: 2018-03-29

## 2018-03-29 ENCOUNTER — APPOINTMENT (OUTPATIENT)
Dept: CARDIOTHORACIC SURGERY | Facility: HOSPITAL | Age: 78
End: 2018-03-29
Payer: MEDICARE

## 2018-03-29 DIAGNOSIS — I38 ENDOCARDITIS, VALVE UNSPECIFIED: ICD-10-CM

## 2018-03-29 DIAGNOSIS — R31.9 HEMATURIA, UNSPECIFIED: ICD-10-CM

## 2018-03-29 DIAGNOSIS — Z79.4 LONG TERM (CURRENT) USE OF INSULIN: ICD-10-CM

## 2018-03-29 DIAGNOSIS — Z95.2 PRESENCE OF PROSTHETIC HEART VALVE: ICD-10-CM

## 2018-03-29 DIAGNOSIS — E11.22 TYPE 2 DIABETES MELLITUS WITH DIABETIC CHRONIC KIDNEY DISEASE: ICD-10-CM

## 2018-03-29 DIAGNOSIS — M81.0 AGE-RELATED OSTEOPOROSIS WITHOUT CURRENT PATHOLOGICAL FRACTURE: ICD-10-CM

## 2018-03-29 DIAGNOSIS — Z79.01 LONG TERM (CURRENT) USE OF ANTICOAGULANTS: ICD-10-CM

## 2018-03-29 DIAGNOSIS — E11.9 TYPE 2 DIABETES MELLITUS WITHOUT COMPLICATIONS: ICD-10-CM

## 2018-03-29 DIAGNOSIS — I10 ESSENTIAL (PRIMARY) HYPERTENSION: ICD-10-CM

## 2018-03-29 DIAGNOSIS — N10 ACUTE PYELONEPHRITIS: ICD-10-CM

## 2018-03-29 DIAGNOSIS — N39.0 URINARY TRACT INFECTION, SITE NOT SPECIFIED: ICD-10-CM

## 2018-03-29 DIAGNOSIS — T82.6XXA INFECTION AND INFLAMMATORY REACTION DUE TO CARDIAC VALVE PROSTHESIS, INITIAL ENCOUNTER: ICD-10-CM

## 2018-03-29 DIAGNOSIS — I12.9 HYPERTENSIVE CHRONIC KIDNEY DISEASE WITH STAGE 1 THROUGH STAGE 4 CHRONIC KIDNEY DISEASE, OR UNSPECIFIED CHRONIC KIDNEY DISEASE: ICD-10-CM

## 2018-03-29 DIAGNOSIS — I09.9 RHEUMATIC HEART DISEASE, UNSPECIFIED: ICD-10-CM

## 2018-03-29 DIAGNOSIS — N18.3 CHRONIC KIDNEY DISEASE, STAGE 3 (MODERATE): ICD-10-CM

## 2018-03-29 DIAGNOSIS — I48.91 UNSPECIFIED ATRIAL FIBRILLATION: ICD-10-CM

## 2018-03-29 DIAGNOSIS — D59.4 OTHER NONAUTOIMMUNE HEMOLYTIC ANEMIAS: ICD-10-CM

## 2018-03-29 DIAGNOSIS — T82.03XA LEAKAGE OF HEART VALVE PROSTHESIS, INITIAL ENCOUNTER: ICD-10-CM

## 2018-03-29 LAB
ALBUMIN SERPL ELPH-MCNC: 2.7 G/DL — LOW (ref 3.3–5)
ALBUMIN SERPL ELPH-MCNC: 3.6 G/DL — SIGNIFICANT CHANGE UP (ref 3.3–5)
ALP SERPL-CCNC: 44 U/L — SIGNIFICANT CHANGE UP (ref 40–120)
ALP SERPL-CCNC: 47 U/L — SIGNIFICANT CHANGE UP (ref 40–120)
ALT FLD-CCNC: 11 U/L — SIGNIFICANT CHANGE UP (ref 10–45)
ALT FLD-CCNC: 14 U/L — SIGNIFICANT CHANGE UP (ref 10–45)
ANION GAP SERPL CALC-SCNC: 11 MMOL/L — SIGNIFICANT CHANGE UP (ref 5–17)
ANION GAP SERPL CALC-SCNC: 12 MMOL/L — SIGNIFICANT CHANGE UP (ref 5–17)
APTT BLD: 29 SEC — SIGNIFICANT CHANGE UP (ref 27.5–37.4)
APTT BLD: 33.7 SEC — SIGNIFICANT CHANGE UP (ref 27.5–37.4)
APTT BLD: 72.9 SEC — HIGH (ref 27.5–37.4)
AST SERPL-CCNC: 44 U/L — HIGH (ref 10–40)
AST SERPL-CCNC: 48 U/L — HIGH (ref 10–40)
BASE EXCESS BLDA CALC-SCNC: -3 MMOL/L — LOW (ref -2–3)
BASOPHILS NFR BLD AUTO: 0.2 % — SIGNIFICANT CHANGE UP (ref 0–2)
BILIRUB DIRECT SERPL-MCNC: 1.2 MG/DL — HIGH (ref 0–0.2)
BILIRUB INDIRECT FLD-MCNC: 3.3 MG/DL — HIGH (ref 0.2–1)
BILIRUB SERPL-MCNC: 4 MG/DL — HIGH (ref 0.2–1.2)
BILIRUB SERPL-MCNC: 4.5 MG/DL — HIGH (ref 0.2–1.2)
BUN SERPL-MCNC: 25 MG/DL — HIGH (ref 7–23)
BUN SERPL-MCNC: 27 MG/DL — HIGH (ref 7–23)
BUN SERPL-MCNC: 28 MG/DL — HIGH (ref 7–23)
BUN SERPL-MCNC: 28 MG/DL — HIGH (ref 7–23)
CALCIUM SERPL-MCNC: 8.6 MG/DL — SIGNIFICANT CHANGE UP (ref 8.4–10.5)
CALCIUM SERPL-MCNC: 8.9 MG/DL — SIGNIFICANT CHANGE UP (ref 8.4–10.5)
CALCIUM SERPL-MCNC: 9.2 MG/DL — SIGNIFICANT CHANGE UP (ref 8.4–10.5)
CALCIUM SERPL-MCNC: 9.3 MG/DL — SIGNIFICANT CHANGE UP (ref 8.4–10.5)
CHLORIDE SERPL-SCNC: 100 MMOL/L — SIGNIFICANT CHANGE UP (ref 96–108)
CHLORIDE SERPL-SCNC: 103 MMOL/L — SIGNIFICANT CHANGE UP (ref 96–108)
CHLORIDE SERPL-SCNC: 104 MMOL/L — SIGNIFICANT CHANGE UP (ref 96–108)
CHLORIDE SERPL-SCNC: 99 MMOL/L — SIGNIFICANT CHANGE UP (ref 96–108)
CO2 SERPL-SCNC: 24 MMOL/L — SIGNIFICANT CHANGE UP (ref 22–31)
CO2 SERPL-SCNC: 25 MMOL/L — SIGNIFICANT CHANGE UP (ref 22–31)
CO2 SERPL-SCNC: 27 MMOL/L — SIGNIFICANT CHANGE UP (ref 22–31)
CO2 SERPL-SCNC: 27 MMOL/L — SIGNIFICANT CHANGE UP (ref 22–31)
CREAT SERPL-MCNC: 1.13 MG/DL — SIGNIFICANT CHANGE UP (ref 0.5–1.3)
CREAT SERPL-MCNC: 1.22 MG/DL — SIGNIFICANT CHANGE UP (ref 0.5–1.3)
CREAT SERPL-MCNC: 1.22 MG/DL — SIGNIFICANT CHANGE UP (ref 0.5–1.3)
CREAT SERPL-MCNC: 1.24 MG/DL — SIGNIFICANT CHANGE UP (ref 0.5–1.3)
CULTURE RESULTS: SIGNIFICANT CHANGE UP
EOSINOPHIL NFR BLD AUTO: 0.2 % — SIGNIFICANT CHANGE UP (ref 0–6)
GAS PNL BLDA: SIGNIFICANT CHANGE UP
GLUCOSE BLDC GLUCOMTR-MCNC: 100 MG/DL — HIGH (ref 70–99)
GLUCOSE BLDC GLUCOMTR-MCNC: 112 MG/DL — HIGH (ref 70–99)
GLUCOSE BLDC GLUCOMTR-MCNC: 112 MG/DL — HIGH (ref 70–99)
GLUCOSE BLDC GLUCOMTR-MCNC: 120 MG/DL — HIGH (ref 70–99)
GLUCOSE BLDC GLUCOMTR-MCNC: 129 MG/DL — HIGH (ref 70–99)
GLUCOSE BLDC GLUCOMTR-MCNC: 135 MG/DL — HIGH (ref 70–99)
GLUCOSE BLDC GLUCOMTR-MCNC: 137 MG/DL — HIGH (ref 70–99)
GLUCOSE BLDC GLUCOMTR-MCNC: 167 MG/DL — HIGH (ref 70–99)
GLUCOSE BLDC GLUCOMTR-MCNC: 91 MG/DL — SIGNIFICANT CHANGE UP (ref 70–99)
GLUCOSE SERPL-MCNC: 127 MG/DL — HIGH (ref 70–99)
GLUCOSE SERPL-MCNC: 173 MG/DL — HIGH (ref 70–99)
GLUCOSE SERPL-MCNC: 228 MG/DL — HIGH (ref 70–99)
GLUCOSE SERPL-MCNC: 99 MG/DL — SIGNIFICANT CHANGE UP (ref 70–99)
GRAM STN FLD: SIGNIFICANT CHANGE UP
HCO3 BLDA-SCNC: 22 MMOL/L — SIGNIFICANT CHANGE UP (ref 21–28)
HCT VFR BLD CALC: 25.9 % — LOW (ref 34.5–45)
HCT VFR BLD CALC: 28.2 % — LOW (ref 34.5–45)
HCT VFR BLD CALC: 29.2 % — LOW (ref 34.5–45)
HCT VFR BLD CALC: 29.3 % — LOW (ref 34.5–45)
HGB BLD-MCNC: 8.2 G/DL — LOW (ref 11.5–15.5)
HGB BLD-MCNC: 9 G/DL — LOW (ref 11.5–15.5)
HGB BLD-MCNC: 9.1 G/DL — LOW (ref 11.5–15.5)
HGB BLD-MCNC: 9.7 G/DL — LOW (ref 11.5–15.5)
INR BLD: 1.03 — SIGNIFICANT CHANGE UP (ref 0.88–1.16)
INR BLD: 1.13 — SIGNIFICANT CHANGE UP (ref 0.88–1.16)
INR BLD: 1.42 — HIGH (ref 0.88–1.16)
LACTATE SERPL-SCNC: 0.7 MMOL/L — SIGNIFICANT CHANGE UP (ref 0.5–2)
LACTATE SERPL-SCNC: 1.8 MMOL/L — SIGNIFICANT CHANGE UP (ref 0.5–2)
LACTATE SERPL-SCNC: 1.9 MMOL/L — SIGNIFICANT CHANGE UP (ref 0.5–2)
LYMPHOCYTES # BLD AUTO: 4.4 % — LOW (ref 13–44)
MAGNESIUM SERPL-MCNC: 1.5 MG/DL — LOW (ref 1.6–2.6)
MAGNESIUM SERPL-MCNC: 1.7 MG/DL — SIGNIFICANT CHANGE UP (ref 1.6–2.6)
MAGNESIUM SERPL-MCNC: 2 MG/DL — SIGNIFICANT CHANGE UP (ref 1.6–2.6)
MCHC RBC-ENTMCNC: 25.6 PG — LOW (ref 27–34)
MCHC RBC-ENTMCNC: 25.7 PG — LOW (ref 27–34)
MCHC RBC-ENTMCNC: 25.8 PG — LOW (ref 27–34)
MCHC RBC-ENTMCNC: 27 PG — SIGNIFICANT CHANGE UP (ref 27–34)
MCHC RBC-ENTMCNC: 31.1 G/DL — LOW (ref 32–36)
MCHC RBC-ENTMCNC: 31.7 G/DL — LOW (ref 32–36)
MCHC RBC-ENTMCNC: 31.9 G/DL — LOW (ref 32–36)
MCHC RBC-ENTMCNC: 33.2 G/DL — SIGNIFICANT CHANGE UP (ref 32–36)
MCV RBC AUTO: 80.8 FL — SIGNIFICANT CHANGE UP (ref 80–100)
MCV RBC AUTO: 81.2 FL — SIGNIFICANT CHANGE UP (ref 80–100)
MCV RBC AUTO: 81.3 FL — SIGNIFICANT CHANGE UP (ref 80–100)
MCV RBC AUTO: 82.5 FL — SIGNIFICANT CHANGE UP (ref 80–100)
MONOCYTES NFR BLD AUTO: 1.9 % — LOW (ref 2–14)
NEUTROPHILS NFR BLD AUTO: 93.3 % — HIGH (ref 43–77)
PCO2 BLDA: 41 MMHG — SIGNIFICANT CHANGE UP (ref 32–45)
PH BLDA: 7.35 — SIGNIFICANT CHANGE UP (ref 7.35–7.45)
PHOSPHATE SERPL-MCNC: 5.1 MG/DL — HIGH (ref 2.5–4.5)
PHOSPHATE SERPL-MCNC: 5.1 MG/DL — HIGH (ref 2.5–4.5)
PLATELET # BLD AUTO: 108 K/UL — LOW (ref 150–400)
PLATELET # BLD AUTO: 109 K/UL — LOW (ref 150–400)
PLATELET # BLD AUTO: 116 K/UL — LOW (ref 150–400)
PLATELET # BLD AUTO: 79 K/UL — LOW (ref 150–400)
PO2 BLDA: 136 MMHG — HIGH (ref 83–108)
POTASSIUM SERPL-MCNC: 3.6 MMOL/L — SIGNIFICANT CHANGE UP (ref 3.5–5.3)
POTASSIUM SERPL-MCNC: 4.2 MMOL/L — SIGNIFICANT CHANGE UP (ref 3.5–5.3)
POTASSIUM SERPL-MCNC: 4.4 MMOL/L — SIGNIFICANT CHANGE UP (ref 3.5–5.3)
POTASSIUM SERPL-MCNC: 4.6 MMOL/L — SIGNIFICANT CHANGE UP (ref 3.5–5.3)
POTASSIUM SERPL-SCNC: 3.6 MMOL/L — SIGNIFICANT CHANGE UP (ref 3.5–5.3)
POTASSIUM SERPL-SCNC: 4.2 MMOL/L — SIGNIFICANT CHANGE UP (ref 3.5–5.3)
POTASSIUM SERPL-SCNC: 4.4 MMOL/L — SIGNIFICANT CHANGE UP (ref 3.5–5.3)
POTASSIUM SERPL-SCNC: 4.6 MMOL/L — SIGNIFICANT CHANGE UP (ref 3.5–5.3)
PROT SERPL-MCNC: 4.8 G/DL — LOW (ref 6–8.3)
PROT SERPL-MCNC: 5.5 G/DL — LOW (ref 6–8.3)
PROTHROM AB SERPL-ACNC: 11.4 SEC — SIGNIFICANT CHANGE UP (ref 9.8–12.7)
PROTHROM AB SERPL-ACNC: 12.6 SEC — SIGNIFICANT CHANGE UP (ref 9.8–12.7)
PROTHROM AB SERPL-ACNC: 15.9 SEC — HIGH (ref 9.8–12.7)
RBC # BLD: 3.19 M/UL — LOW (ref 3.8–5.2)
RBC # BLD: 3.49 M/UL — LOW (ref 3.8–5.2)
RBC # BLD: 3.55 M/UL — LOW (ref 3.8–5.2)
RBC # BLD: 3.59 M/UL — LOW (ref 3.8–5.2)
RBC # FLD: 16.2 % — SIGNIFICANT CHANGE UP (ref 10.3–16.9)
RBC # FLD: 16.5 % — SIGNIFICANT CHANGE UP (ref 10.3–16.9)
RBC # FLD: 16.9 % — SIGNIFICANT CHANGE UP (ref 10.3–16.9)
RBC # FLD: 17.6 % — HIGH (ref 10.3–16.9)
SAO2 % BLDA: 98 % — SIGNIFICANT CHANGE UP (ref 95–100)
SODIUM SERPL-SCNC: 135 MMOL/L — SIGNIFICANT CHANGE UP (ref 135–145)
SODIUM SERPL-SCNC: 138 MMOL/L — SIGNIFICANT CHANGE UP (ref 135–145)
SODIUM SERPL-SCNC: 141 MMOL/L — SIGNIFICANT CHANGE UP (ref 135–145)
SODIUM SERPL-SCNC: 142 MMOL/L — SIGNIFICANT CHANGE UP (ref 135–145)
SPECIMEN SOURCE: SIGNIFICANT CHANGE UP
SPECIMEN SOURCE: SIGNIFICANT CHANGE UP
WBC # BLD: 10.3 K/UL — SIGNIFICANT CHANGE UP (ref 3.8–10.5)
WBC # BLD: 12.3 K/UL — HIGH (ref 3.8–10.5)
WBC # BLD: 4.6 K/UL — SIGNIFICANT CHANGE UP (ref 3.8–10.5)
WBC # BLD: 7.4 K/UL — SIGNIFICANT CHANGE UP (ref 3.8–10.5)
WBC # FLD AUTO: 10.3 K/UL — SIGNIFICANT CHANGE UP (ref 3.8–10.5)
WBC # FLD AUTO: 12.3 K/UL — HIGH (ref 3.8–10.5)
WBC # FLD AUTO: 4.6 K/UL — SIGNIFICANT CHANGE UP (ref 3.8–10.5)
WBC # FLD AUTO: 7.4 K/UL — SIGNIFICANT CHANGE UP (ref 3.8–10.5)

## 2018-03-29 PROCEDURE — 33430 REPLACEMENT OF MITRAL VALVE: CPT

## 2018-03-29 PROCEDURE — 93010 ELECTROCARDIOGRAM REPORT: CPT

## 2018-03-29 PROCEDURE — 33530 CORONARY ARTERY BYPASS/REOP: CPT

## 2018-03-29 PROCEDURE — 71045 X-RAY EXAM CHEST 1 VIEW: CPT | Mod: 26

## 2018-03-29 PROCEDURE — 33430 REPLACEMENT OF MITRAL VALVE: CPT | Mod: 80

## 2018-03-29 PROCEDURE — 99291 CRITICAL CARE FIRST HOUR: CPT

## 2018-03-29 PROCEDURE — 99292 CRITICAL CARE ADDL 30 MIN: CPT

## 2018-03-29 PROCEDURE — 33530 CORONARY ARTERY BYPASS/REOP: CPT | Mod: 80

## 2018-03-29 RX ORDER — DIPHENHYDRAMINE HCL 50 MG
25 CAPSULE ORAL ONCE
Qty: 0 | Refills: 0 | Status: COMPLETED | OUTPATIENT
Start: 2018-03-29 | End: 2018-03-30

## 2018-03-29 RX ORDER — ACETAMINOPHEN 500 MG
1000 TABLET ORAL ONCE
Qty: 0 | Refills: 0 | Status: COMPLETED | OUTPATIENT
Start: 2018-03-29 | End: 2018-03-29

## 2018-03-29 RX ORDER — CEFAZOLIN SODIUM 1 G
2000 VIAL (EA) INJECTION EVERY 8 HOURS
Qty: 0 | Refills: 0 | Status: COMPLETED | OUTPATIENT
Start: 2018-03-29 | End: 2018-03-31

## 2018-03-29 RX ORDER — EPINEPHRINE 0.3 MG/.3ML
0.01 INJECTION INTRAMUSCULAR; SUBCUTANEOUS
Qty: 4 | Refills: 0 | Status: DISCONTINUED | OUTPATIENT
Start: 2018-03-29 | End: 2018-03-30

## 2018-03-29 RX ORDER — ALBUMIN HUMAN 25 %
250 VIAL (ML) INTRAVENOUS ONCE
Qty: 0 | Refills: 0 | Status: COMPLETED | OUTPATIENT
Start: 2018-03-29 | End: 2018-03-29

## 2018-03-29 RX ORDER — CEFAZOLIN SODIUM 1 G
2000 VIAL (EA) INJECTION EVERY 8 HOURS
Qty: 0 | Refills: 0 | Status: DISCONTINUED | OUTPATIENT
Start: 2018-03-29 | End: 2018-03-29

## 2018-03-29 RX ORDER — HEPARIN SODIUM 5000 [USP'U]/ML
5000 INJECTION INTRAVENOUS; SUBCUTANEOUS EVERY 8 HOURS
Qty: 0 | Refills: 0 | Status: DISCONTINUED | OUTPATIENT
Start: 2018-03-29 | End: 2018-03-31

## 2018-03-29 RX ORDER — INSULIN HUMAN 100 [IU]/ML
1 INJECTION, SOLUTION SUBCUTANEOUS
Qty: 50 | Refills: 0 | Status: DISCONTINUED | OUTPATIENT
Start: 2018-03-29 | End: 2018-03-30

## 2018-03-29 RX ORDER — KETOROLAC TROMETHAMINE 30 MG/ML
30 SYRINGE (ML) INJECTION ONCE
Qty: 0 | Refills: 0 | Status: DISCONTINUED | OUTPATIENT
Start: 2018-03-29 | End: 2018-03-29

## 2018-03-29 RX ORDER — FENTANYL CITRATE 50 UG/ML
25 INJECTION INTRAVENOUS ONCE
Qty: 0 | Refills: 0 | Status: DISCONTINUED | OUTPATIENT
Start: 2018-03-29 | End: 2018-03-29

## 2018-03-29 RX ORDER — FUROSEMIDE 40 MG
20 TABLET ORAL ONCE
Qty: 0 | Refills: 0 | Status: COMPLETED | OUTPATIENT
Start: 2018-03-29 | End: 2018-03-29

## 2018-03-29 RX ORDER — PANTOPRAZOLE SODIUM 20 MG/1
40 TABLET, DELAYED RELEASE ORAL DAILY
Qty: 0 | Refills: 0 | Status: DISCONTINUED | OUTPATIENT
Start: 2018-03-29 | End: 2018-03-30

## 2018-03-29 RX ORDER — SODIUM CHLORIDE 9 MG/ML
1000 INJECTION, SOLUTION INTRAVENOUS
Qty: 0 | Refills: 0 | Status: DISCONTINUED | OUTPATIENT
Start: 2018-03-29 | End: 2018-04-03

## 2018-03-29 RX ORDER — MAGNESIUM SULFATE 500 MG/ML
2 VIAL (ML) INJECTION ONCE
Qty: 0 | Refills: 0 | Status: COMPLETED | OUTPATIENT
Start: 2018-03-29 | End: 2018-03-29

## 2018-03-29 RX ORDER — DEXTROSE 50 % IN WATER 50 %
25 SYRINGE (ML) INTRAVENOUS
Qty: 0 | Refills: 0 | Status: DISCONTINUED | OUTPATIENT
Start: 2018-03-29 | End: 2018-04-05

## 2018-03-29 RX ORDER — DEXTROSE 50 % IN WATER 50 %
50 SYRINGE (ML) INTRAVENOUS
Qty: 0 | Refills: 0 | Status: DISCONTINUED | OUTPATIENT
Start: 2018-03-29 | End: 2018-04-05

## 2018-03-29 RX ADMIN — Medication 125 MILLILITER(S): at 15:00

## 2018-03-29 RX ADMIN — Medication 125 MILLILITER(S): at 15:59

## 2018-03-29 RX ADMIN — PANTOPRAZOLE SODIUM 40 MILLIGRAM(S): 20 TABLET, DELAYED RELEASE ORAL at 05:53

## 2018-03-29 RX ADMIN — FENTANYL CITRATE 25 MICROGRAM(S): 50 INJECTION INTRAVENOUS at 15:04

## 2018-03-29 RX ADMIN — Medication 400 MILLIGRAM(S): at 21:09

## 2018-03-29 RX ADMIN — Medication 25 MILLIGRAM(S): at 05:53

## 2018-03-29 RX ADMIN — FENTANYL CITRATE 25 MICROGRAM(S): 50 INJECTION INTRAVENOUS at 15:00

## 2018-03-29 RX ADMIN — PANTOPRAZOLE SODIUM 40 MILLIGRAM(S): 20 TABLET, DELAYED RELEASE ORAL at 19:11

## 2018-03-29 RX ADMIN — Medication 50 GRAM(S): at 23:15

## 2018-03-29 RX ADMIN — FENTANYL CITRATE 25 MICROGRAM(S): 50 INJECTION INTRAVENOUS at 15:06

## 2018-03-29 RX ADMIN — Medication 1000 MILLIGRAM(S): at 21:34

## 2018-03-29 RX ADMIN — Medication 125 MILLILITER(S): at 16:43

## 2018-03-29 RX ADMIN — Medication 2000 MILLIGRAM(S): at 23:49

## 2018-03-29 RX ADMIN — Medication 20 MILLIGRAM(S): at 16:45

## 2018-03-29 RX ADMIN — Medication 30 MILLIGRAM(S): at 23:49

## 2018-03-29 RX ADMIN — Medication 2000 MILLIGRAM(S): at 17:11

## 2018-03-29 RX ADMIN — SODIUM CHLORIDE 3 MILLILITER(S): 9 INJECTION INTRAMUSCULAR; INTRAVENOUS; SUBCUTANEOUS at 05:42

## 2018-03-29 RX ADMIN — FENTANYL CITRATE 25 MICROGRAM(S): 50 INJECTION INTRAVENOUS at 17:07

## 2018-03-29 RX ADMIN — CHLORHEXIDINE GLUCONATE 10 MILLILITER(S): 213 SOLUTION TOPICAL at 05:55

## 2018-03-29 RX ADMIN — CHLORHEXIDINE GLUCONATE 1 APPLICATION(S): 213 SOLUTION TOPICAL at 05:54

## 2018-03-29 RX ADMIN — INSULIN HUMAN 1 UNIT(S)/HR: 100 INJECTION, SOLUTION SUBCUTANEOUS at 14:00

## 2018-03-29 NOTE — PROGRESS NOTE ADULT - SUBJECTIVE AND OBJECTIVE BOX
CTICU  CRITICAL  CARE  attending     Hand off received 					   Pertinent clinical, laboratory, radiographic, hemodynamic, echocardiographic, respiratory data, microbiologic data and chart were reviewed and analyzed frequently throughout the course of the day and night  Patient seen and examined with CTS/ SH attending at bedside      This is a 77 year old Hebrew-speaking female with history of chronic hypoproliferative anemia, transfusion dependent (sees Dr. Ruiz as an outpatient) with no improvement after Fe and EPO, Afib on Coumadin, NIDDM, osteoporosis, idiopathic ureteral stricture s/p left ureteral stent, CVA 20 years ago without residual deficits, HTN, HLD, Rheumatic fever as a child s/p mechanical MVR and AVR 14 years ago at [facility] with Dr. Christie, who was recently admitted to [facility] on 3/17/18 for persistent hematuria x 1 year and progressive SOB/YUNG with associated 4 pillow orthopnea, NYHA class IV symptoms (dyspnea after climbing 4 stairs).  While inpatient, she was noted to have worsening anemia beyond her current baseline as well as UTI for which she was placed on IV rocephin and transitioned to PO Cefpodoxime upon discharge.  She underwent ECHO on 3/17/18 that revealed EF 60%, dilated LA/RV, mechanical MV with mobile echodensity, mechanical AS with EZEQUIEL 0.85cm2, Mean/peak gradients 33.7mmHg/49.5mmHg with severe AR, and severe pulmonary hypertension with PASP 62.6mmHg and cardiac catheterization on 3/23/18 that demonstrated non-obstructive CAD.  Dr. Tomer Escalante was consulted and on 3/24/18, she was transferred to [facility] under his care to complete pre-surgical evaluation for possible AVR/MVR.  On admission, pt continues to endorse hematuria with mild intermittent dysuria but denies suprapubic pressure or urinary frequency/discharge as well as urethral discharge as well as chronic SOB at rest.  She also denies HA, AMS, CP, palpitations, cough, hemoptysis, n/v/d, hematemesis, hematochezia/melena, or fevers.       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  ICU Vital Signs Last 24 Hrs  T(C): 37.6 (29 Mar 2018 20:45), Max: 37.7 (29 Mar 2018 17:33)  T(F): 99.7 (29 Mar 2018 20:45), Max: 99.8 (29 Mar 2018 17:33)  HR: 116 (29 Mar 2018 22:00) (56 - 124)  BP: 131/74 (29 Mar 2018 05:00) (127/70 - 131/74)  BP(mean): 89 (29 Mar 2018 05:00) (78 - 89)  ABP: 116/54 (29 Mar 2018 22:00) (101/61 - 132/60)  ABP(mean): 74 (29 Mar 2018 22:00) (60 - 84)  RR: 22 (29 Mar 2018 22:00) (11 - 22)  SpO2: 98% (29 Mar 2018 22:00) (95% - 100%)    Adult Advanced Hemodynamics Last 24 Hrs  CVP(mm Hg): 11 (29 Mar 2018 22:00) (6 - 14)  CVP(cm H2O): --  CO: --  CI: --  PA: --  PA(mean): --  PCWP: --  SVR: --  SVRI: --  PVR: --  PVRI: --, ABG - ( 29 Mar 2018 22:03 )  pH: 7.39  /  pCO2: 43    /  pO2: 152   / HCO3: 25    / Base Excess: -0.1  /  SaO2: 99                Mode: AC/ CMV (Assist Control/ Continuous Mandatory Ventilation)  RR (machine): 14  TV (machine): 500  FiO2: 40  PEEP: 5  ITime: 1  MAP: 9  PIP: 24    Intake and output was reviewed and the fluid balance was calculated  Daily Height in cm: 157.5 (29 Mar 2018 04:00)    Daily   I&O's Summary    28 Mar 2018 07:01  -  29 Mar 2018 07:00  --------------------------------------------------------  IN: 800 mL / OUT: 750 mL / NET: 50 mL    29 Mar 2018 07:01  -  29 Mar 2018 22:45  --------------------------------------------------------  IN: 2781 mL / OUT: 2150 mL / NET: 631 mL        All lines and drain sites were assessed  Glycemic trend was reviewed CAPILLARY BLOOD GLUCOSE      POCT Blood Glucose.: 91 mg/dL (29 Mar 2018 21:55)    NEURO; No acute change in mental status.  CVS: S1, S2, No S3.  RESPIRATORY: Auscultation of the chest reveals equal breath sounds.  GI: Abdomen is soft. No tenderness. + Bowel sounds.  Extremities are warm and well perfused  Wounds appear clean and unremarkable  Antibiotics are perioperative cefazolin.    labs  CBC Full  -  ( 29 Mar 2018 21:59 )  WBC Count : 7.4 K/uL  Hemoglobin : 9.7 g/dL  Hematocrit : 29.2 %  Platelet Count - Automated : 109 K/uL  Mean Cell Volume : 81.3 fL  Mean Cell Hemoglobin : 27.0 pg  Mean Cell Hemoglobin Concentration : 33.2 g/dL  Auto Neutrophil # : x  Auto Lymphocyte # : x  Auto Monocyte # : x  Auto Eosinophil # : x  Auto Basophil # : x  Auto Neutrophil % : x  Auto Lymphocyte % : x  Auto Monocyte % : x  Auto Eosinophil % : x  Auto Basophil % : x    03-29    142  |  103  |  25<H>  ----------------------------<  99  4.6   |  27  |  1.22    Ca    8.6      29 Mar 2018 22:00  Phos  5.1     03-29  Mg     1.5     03-29    TPro  5.5<L>  /  Alb  3.6  /  TBili  4.0<H>  /  DBili  x   /  AST  44<H>  /  ALT  11  /  AlkPhos  44  03-29    PT/INR - ( 29 Mar 2018 21:59 )   PT: 12.6 sec;   INR: 1.13          PTT - ( 29 Mar 2018 21:59 )  PTT:33.7 sec  The current medications were reviewed   MEDICATIONS  (STANDING):  ceFAZolin  Injectable. 2000 milliGRAM(s) IV Push every 8 hours  dextrose 50% Injectable 50 milliLiter(s) IV Push every 15 minutes  dextrose 50% Injectable 25 milliLiter(s) IV Push every 15 minutes  EPINEPHrine     Infusion 0.01 MICROgram(s)/kG/Min (2.16 mL/Hr) IV Continuous <Continuous>  heparin  Injectable 5000 Unit(s) SubCutaneous every 8 hours  insulin Infusion 1 Unit(s)/Hr (1 mL/Hr) IV Continuous <Continuous>  magnesium sulfate  IVPB 2 Gram(s) IV Intermittent once  pantoprazole  Injectable 40 milliGRAM(s) IV Push daily  sodium chloride 0.45%. 1000 milliLiter(s) (10 mL/Hr) IV Continuous <Continuous>    MEDICATIONS  (PRN):       PROBLEM LIST/ ASSESSMENT:  HEALTH ISSUES - PROBLEM Dx:        Patient is a 77y old  Female who presents with  Prosthetic MV thrombus vs. IE; Severe AS (24 Mar 2018 16:16)  S/P MVR.  Hemodynamically stable.  Good urine output.   Fair oxygenation.  Overall doing well.          My plan includes :  EXTUBATE  Close hemodynamic, ventilatory and drain monitoring and management.  Monitor for arrhythmias and monitor parameters for organ perfusion  Monitor neurologic status  Head of the bed should remain elevated to 45 deg .   Chest PT and IS will be encouraged  Monitor adequacy of oxygenation and ventilation and attempt to wean oxygen  Nutritional goals will be met using po eventually , ensure adequate caloric intake and monitor  the same  Stress ulcer and VTE prophylaxis will be achieved    Glycemic control is satisfactory  Electrolytes have been repleted as necessary and wound care has been carried out. Pain control has been achieved.   Aggressive  physical therapy and early mobility and ambulation goals will be met   The family was updated about the course and plan  CRITICAL CARE TIME SPENT in evaluation and management, reassessments, review and interpretation of labs and x-rays, ventilator and hemodynamic management, formulating a plan and coordinating care: ___30____ MIN.  Time does not include procedural time.  CTICU ATTENDING     					    Renny Sparks MD

## 2018-03-29 NOTE — PROGRESS NOTE ADULT - SUBJECTIVE AND OBJECTIVE BOX
CTICU  CRITICAL  CARE  attending     Hand off received 					   Pertinent clinical, laboratory, radiographic, hemodynamic, echocardiographic, respiratory data, microbiologic data and chart were reviewed and analyzed frequently throughout the course of the day and night  Patient seen and examined with CTS/ SH attending at bedside  Pt is a 77y , Female, HEALTH ISSUES - PROBLEM Dx:      , FAMILY HISTORY:  No pertinent family history in first degree relatives  PAST MEDICAL & SURGICAL HISTORY:  CVA (cerebral vascular accident)  Anemia  Rheumatic fever  Diabetes  Osteoporosis  Mitral valve replaced  Atrial fibrillation: on coumadin at home  Hypertension  History of ureter stent  S/P AVR  H/O mitral valve replacement    Patient is a 77y old  Female who presents with a chief complaint of 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  ICU Vital Signs Last 24 Hrs  T(C): 36.6 (29 Mar 2018 14:08), Max: 36.7 (28 Mar 2018 17:01)  T(F): 97.9 (29 Mar 2018 14:08), Max: 98.1 (28 Mar 2018 17:01)  HR: 72 (29 Mar 2018 14:00) (62 - 124)  BP: 131/74 (29 Mar 2018 05:00) (127/70 - 147/81)  BP(mean): 89 (29 Mar 2018 05:00) (78 - 112)  ABP: 128/52 (29 Mar 2018 14:00) (108/46 - 128/56)  ABP(mean): 76 (29 Mar 2018 14:00) (66 - 76)  RR: 16 (29 Mar 2018 14:00) (11 - 20)  SpO2: 100% (29 Mar 2018 14:00) (94% - 100%)    Adult Advanced Hemodynamics Last 24 Hrs  CVP(mm Hg): 9 (29 Mar 2018 14:00) (6 - 13)  CVP(cm H2O): --  CO: --  CI: --  PA: --  PA(mean): --  PCWP: --  SVR: --  SVRI: --  PVR: --  PVRI: --, ABG - ( 29 Mar 2018 12:28 )  pH: 7.35  /  pCO2: 41    /  pO2: 136   / HCO3: 22    / Base Excess: -3.0  /  SaO2: 98                Mode: AC/ CMV (Assist Control/ Continuous Mandatory Ventilation)  RR (machine): 14  TV (machine): 500  FiO2: 50  PEEP: 5  ITime: 1  MAP: 9  PIP: 28    Intake and output was reviewed and the fluid balance was calculated  Daily Height in cm: 157.5 (29 Mar 2018 04:00)    Daily   I&O's Summary    28 Mar 2018 07:01  -  29 Mar 2018 07:00  --------------------------------------------------------  IN: 800 mL / OUT: 750 mL / NET: 50 mL    29 Mar 2018 07:01  -  29 Mar 2018 14:56  --------------------------------------------------------  IN: 450 mL / OUT: 335 mL / NET: 115 mL        All lines and drain sites were assessed  [facility]      POCT Blood Glucose.: 137 mg/dL (29 Mar 2018 05:38)    No acute change in mental status  Auscultation of the chest reveals equal bs  Abdomen is soft  Extremities are warm and well perfused  Wounds appear clean and unremarkable  Antibiotics are periop    labs  CBC Full  -  ( 29 Mar 2018 12:27 )  WBC Count : 12.3 K/uL  Hemoglobin : 9.0 g/dL  Hematocrit : 28.2 %  Platelet Count - Automated : 79 K/uL  Mean Cell Volume : 80.8 fL  Mean Cell Hemoglobin : 25.8 pg  Mean Cell Hemoglobin Concentration : 31.9 g/dL  Auto Neutrophil # : x  Auto Lymphocyte # : x  Auto Monocyte # : x  Auto Eosinophil # : x  Auto Basophil # : x  Auto Neutrophil % : 93.3 %  Auto Lymphocyte % : 4.4 %  Auto Monocyte % : 1.9 %  Auto Eosinophil % : 0.2 %  Auto Basophil % : 0.2 %    03-29    135  |  100  |  28<H>  ----------------------------<  228<H>  4.2   |  24  |  1.13    Ca    9.2      29 Mar 2018 12:27  Phos  5.1     03-29  Mg     1.7     03-29      PT/INR - ( 29 Mar 2018 12:27 )   PT: 15.9 sec;   INR: 1.42          PTT - ( 29 Mar 2018 12:27 )  PTT:29.0 sec  The current medications were reviewed   MEDICATIONS  (STANDING):  albumin human  5% IVPB 250 milliLiter(s) IV Intermittent once  albumin human  5% IVPB 250 milliLiter(s) IV Intermittent once  ceFAZolin  Injectable. 2000 milliGRAM(s) IV Push every 8 hours  dextrose 50% Injectable 50 milliLiter(s) IV Push every 15 minutes  dextrose 50% Injectable 25 milliLiter(s) IV Push every 15 minutes  EPINEPHrine     Infusion 0.01 MICROgram(s)/kG/Min (2.16 mL/Hr) IV Continuous <Continuous>  fentaNYL    Injectable 25 MICROGram(s) IV Push once  fentaNYL    Injectable 25 MICROGram(s) IV Push once  heparin  Injectable 5000 Unit(s) SubCutaneous every 8 hours  insulin Infusion 1 Unit(s)/Hr (1 mL/Hr) IV Continuous <Continuous>  pantoprazole  Injectable 40 milliGRAM(s) IV Push daily  sodium chloride 0.45%. 1000 milliLiter(s) (10 mL/Hr) IV Continuous <Continuous>    MEDICATIONS  (PRN):       PROBLEM LIST/ ASSESSMENT:  HEALTH ISSUES - PROBLEM Dx:      ,   Patient is a 77y old  Female who presents with a chief complaint of Prosthetic MV thrombus vs. IE; Severe AS (24 Mar 2018 16:16)     s/p ohs  acute changes include acute respiratory failure    My plan includes :  close hemodynamic, ventilatory and drain monitoring and management per post op routine    Monitor for arrhythmias and monitor parameters for organ perfusion  monitor neurologic status  Head of the bed should remain elevated to 45 deg .   chest PT and IS will be encouraged  monitor adequacy of oxygenation and ventilation and attempt to wean oxygen  Nutritional goals will be met using po eventually , ensure adequate caloric intake and montior the same  Stress ulcer and VTE prophylaxis will be achieved    Glycemic control is satisfactory  Electrolytes have been repleted as necessary and wound care has been carried out. Pain control has been achieved.   agressive physical therapy and early mobility and ambulation goals will be met   The family was updated about the course and plan  CRITICAL CARE TIME SPENT in evaluation and management, reassessments, review and interpretation of labs and x-rays, ventilator and hemodynamic management, formulating a plan and coordinating care: ___90____ MIN.  Time does not include procedural time.  CTICU ATTENDING     					    Ugo Caruso MD

## 2018-03-29 NOTE — BRIEF OPERATIVE NOTE - PROCEDURE
<<-----Click on this checkbox to enter Procedure Mitral valve replacement  03/29/2018    Active  MODONOGUE

## 2018-03-29 NOTE — BRIEF OPERATIVE NOTE - PRE-OP DX
Mitral valve insufficiency, unspecified etiology  03/29/2018    Active  Lauren Quinteros Mitral valve stenosis, unspecified etiology  03/29/2018    Active  Lauren Quinteros

## 2018-03-30 ENCOUNTER — APPOINTMENT (OUTPATIENT)
Dept: INFUSION THERAPY | Facility: CLINIC | Age: 78
End: 2018-03-30

## 2018-03-30 LAB
ALBUMIN SERPL ELPH-MCNC: 3.3 G/DL — SIGNIFICANT CHANGE UP (ref 3.3–5)
ALBUMIN SERPL ELPH-MCNC: 3.6 G/DL — SIGNIFICANT CHANGE UP (ref 3.3–5)
ALP SERPL-CCNC: 44 U/L — SIGNIFICANT CHANGE UP (ref 40–120)
ALP SERPL-CCNC: 46 U/L — SIGNIFICANT CHANGE UP (ref 40–120)
ALT FLD-CCNC: 6 U/L — LOW (ref 10–45)
ALT FLD-CCNC: 9 U/L — LOW (ref 10–45)
ANION GAP SERPL CALC-SCNC: 12 MMOL/L — SIGNIFICANT CHANGE UP (ref 5–17)
ANION GAP SERPL CALC-SCNC: 12 MMOL/L — SIGNIFICANT CHANGE UP (ref 5–17)
ANION GAP SERPL CALC-SCNC: 17 MMOL/L — SIGNIFICANT CHANGE UP (ref 5–17)
APTT BLD: 28.4 SEC — SIGNIFICANT CHANGE UP (ref 27.5–37.4)
APTT BLD: 28.6 SEC — SIGNIFICANT CHANGE UP (ref 27.5–37.4)
APTT BLD: 30.5 SEC — SIGNIFICANT CHANGE UP (ref 27.5–37.4)
AST SERPL-CCNC: 39 U/L — SIGNIFICANT CHANGE UP (ref 10–40)
AST SERPL-CCNC: 42 U/L — HIGH (ref 10–40)
BILIRUB SERPL-MCNC: 2.7 MG/DL — HIGH (ref 0.2–1.2)
BILIRUB SERPL-MCNC: 3.1 MG/DL — HIGH (ref 0.2–1.2)
BLD GP AB SCN SERPL QL: NEGATIVE — SIGNIFICANT CHANGE UP
BUN SERPL-MCNC: 25 MG/DL — HIGH (ref 7–23)
BUN SERPL-MCNC: 25 MG/DL — HIGH (ref 7–23)
BUN SERPL-MCNC: 27 MG/DL — HIGH (ref 7–23)
CALCIUM SERPL-MCNC: 8.5 MG/DL — SIGNIFICANT CHANGE UP (ref 8.4–10.5)
CALCIUM SERPL-MCNC: 8.6 MG/DL — SIGNIFICANT CHANGE UP (ref 8.4–10.5)
CALCIUM SERPL-MCNC: 8.8 MG/DL — SIGNIFICANT CHANGE UP (ref 8.4–10.5)
CHLORIDE SERPL-SCNC: 102 MMOL/L — SIGNIFICANT CHANGE UP (ref 96–108)
CHLORIDE SERPL-SCNC: 104 MMOL/L — SIGNIFICANT CHANGE UP (ref 96–108)
CHLORIDE SERPL-SCNC: 104 MMOL/L — SIGNIFICANT CHANGE UP (ref 96–108)
CK SERPL-CCNC: 205 U/L — HIGH (ref 25–170)
CO2 SERPL-SCNC: 20 MMOL/L — LOW (ref 22–31)
CO2 SERPL-SCNC: 26 MMOL/L — SIGNIFICANT CHANGE UP (ref 22–31)
CO2 SERPL-SCNC: 27 MMOL/L — SIGNIFICANT CHANGE UP (ref 22–31)
CREAT SERPL-MCNC: 1.23 MG/DL — SIGNIFICANT CHANGE UP (ref 0.5–1.3)
CREAT SERPL-MCNC: 1.33 MG/DL — HIGH (ref 0.5–1.3)
CREAT SERPL-MCNC: 1.34 MG/DL — HIGH (ref 0.5–1.3)
GAS PNL BLDA: SIGNIFICANT CHANGE UP
GLUCOSE BLDC GLUCOMTR-MCNC: 101 MG/DL — HIGH (ref 70–99)
GLUCOSE BLDC GLUCOMTR-MCNC: 103 MG/DL — HIGH (ref 70–99)
GLUCOSE BLDC GLUCOMTR-MCNC: 104 MG/DL — HIGH (ref 70–99)
GLUCOSE BLDC GLUCOMTR-MCNC: 152 MG/DL — HIGH (ref 70–99)
GLUCOSE BLDC GLUCOMTR-MCNC: 161 MG/DL — HIGH (ref 70–99)
GLUCOSE BLDC GLUCOMTR-MCNC: 166 MG/DL — HIGH (ref 70–99)
GLUCOSE BLDC GLUCOMTR-MCNC: 191 MG/DL — HIGH (ref 70–99)
GLUCOSE BLDC GLUCOMTR-MCNC: 80 MG/DL — SIGNIFICANT CHANGE UP (ref 70–99)
GLUCOSE BLDC GLUCOMTR-MCNC: 89 MG/DL — SIGNIFICANT CHANGE UP (ref 70–99)
GLUCOSE BLDC GLUCOMTR-MCNC: 95 MG/DL — SIGNIFICANT CHANGE UP (ref 70–99)
GLUCOSE BLDC GLUCOMTR-MCNC: 97 MG/DL — SIGNIFICANT CHANGE UP (ref 70–99)
GLUCOSE BLDC GLUCOMTR-MCNC: 99 MG/DL — SIGNIFICANT CHANGE UP (ref 70–99)
GLUCOSE SERPL-MCNC: 101 MG/DL — HIGH (ref 70–99)
GLUCOSE SERPL-MCNC: 176 MG/DL — HIGH (ref 70–99)
GLUCOSE SERPL-MCNC: 184 MG/DL — HIGH (ref 70–99)
HCT VFR BLD CALC: 24.2 % — LOW (ref 34.5–45)
HCT VFR BLD CALC: 28.7 % — LOW (ref 34.5–45)
HCT VFR BLD CALC: 28.8 % — LOW (ref 34.5–45)
HCT VFR BLD CALC: 30 % — LOW (ref 34.5–45)
HGB BLD-MCNC: 7.9 G/DL — LOW (ref 11.5–15.5)
HGB BLD-MCNC: 9.5 G/DL — LOW (ref 11.5–15.5)
HGB BLD-MCNC: 9.6 G/DL — LOW (ref 11.5–15.5)
HGB BLD-MCNC: 9.6 G/DL — LOW (ref 11.5–15.5)
INR BLD: 1.08 — SIGNIFICANT CHANGE UP (ref 0.88–1.16)
INR BLD: 1.17 — HIGH (ref 0.88–1.16)
INR BLD: 1.21 — HIGH (ref 0.88–1.16)
LACTATE SERPL-SCNC: 1.5 MMOL/L — SIGNIFICANT CHANGE UP (ref 0.5–2)
LACTATE SERPL-SCNC: 5.3 MMOL/L — CRITICAL HIGH (ref 0.5–2)
MAGNESIUM SERPL-MCNC: 2.7 MG/DL — HIGH (ref 1.6–2.6)
MAGNESIUM SERPL-MCNC: 2.9 MG/DL — HIGH (ref 1.6–2.6)
MAGNESIUM SERPL-MCNC: 3.1 MG/DL — HIGH (ref 1.6–2.6)
MCHC RBC-ENTMCNC: 26.8 PG — LOW (ref 27–34)
MCHC RBC-ENTMCNC: 27.1 PG — SIGNIFICANT CHANGE UP (ref 27–34)
MCHC RBC-ENTMCNC: 27.2 PG — SIGNIFICANT CHANGE UP (ref 27–34)
MCHC RBC-ENTMCNC: 27.5 PG — SIGNIFICANT CHANGE UP (ref 27–34)
MCHC RBC-ENTMCNC: 32 G/DL — SIGNIFICANT CHANGE UP (ref 32–36)
MCHC RBC-ENTMCNC: 32.6 G/DL — SIGNIFICANT CHANGE UP (ref 32–36)
MCHC RBC-ENTMCNC: 33.1 G/DL — SIGNIFICANT CHANGE UP (ref 32–36)
MCHC RBC-ENTMCNC: 33.3 G/DL — SIGNIFICANT CHANGE UP (ref 32–36)
MCV RBC AUTO: 82.2 FL — SIGNIFICANT CHANGE UP (ref 80–100)
MCV RBC AUTO: 82.5 FL — SIGNIFICANT CHANGE UP (ref 80–100)
MCV RBC AUTO: 82.9 FL — SIGNIFICANT CHANGE UP (ref 80–100)
MCV RBC AUTO: 83.8 FL — SIGNIFICANT CHANGE UP (ref 80–100)
PHOSPHATE SERPL-MCNC: 3.8 MG/DL — SIGNIFICANT CHANGE UP (ref 2.5–4.5)
PHOSPHATE SERPL-MCNC: 5.8 MG/DL — HIGH (ref 2.5–4.5)
PHOSPHATE SERPL-MCNC: 6.1 MG/DL — HIGH (ref 2.5–4.5)
PLATELET # BLD AUTO: 110 K/UL — LOW (ref 150–400)
PLATELET # BLD AUTO: 72 K/UL — LOW (ref 150–400)
PLATELET # BLD AUTO: 88 K/UL — LOW (ref 150–400)
PLATELET # BLD AUTO: 90 K/UL — LOW (ref 150–400)
POTASSIUM SERPL-MCNC: 4.1 MMOL/L — SIGNIFICANT CHANGE UP (ref 3.5–5.3)
POTASSIUM SERPL-MCNC: 4.2 MMOL/L — SIGNIFICANT CHANGE UP (ref 3.5–5.3)
POTASSIUM SERPL-MCNC: 4.5 MMOL/L — SIGNIFICANT CHANGE UP (ref 3.5–5.3)
POTASSIUM SERPL-SCNC: 4.1 MMOL/L — SIGNIFICANT CHANGE UP (ref 3.5–5.3)
POTASSIUM SERPL-SCNC: 4.2 MMOL/L — SIGNIFICANT CHANGE UP (ref 3.5–5.3)
POTASSIUM SERPL-SCNC: 4.5 MMOL/L — SIGNIFICANT CHANGE UP (ref 3.5–5.3)
PROT SERPL-MCNC: 5.5 G/DL — LOW (ref 6–8.3)
PROT SERPL-MCNC: 5.9 G/DL — LOW (ref 6–8.3)
PROTHROM AB SERPL-ACNC: 12 SEC — SIGNIFICANT CHANGE UP (ref 9.8–12.7)
PROTHROM AB SERPL-ACNC: 13 SEC — HIGH (ref 9.8–12.7)
PROTHROM AB SERPL-ACNC: 13.5 SEC — HIGH (ref 9.8–12.7)
RBC # BLD: 2.92 M/UL — LOW (ref 3.8–5.2)
RBC # BLD: 3.49 M/UL — LOW (ref 3.8–5.2)
RBC # BLD: 3.49 M/UL — LOW (ref 3.8–5.2)
RBC # BLD: 3.58 M/UL — LOW (ref 3.8–5.2)
RBC # FLD: 16.5 % — SIGNIFICANT CHANGE UP (ref 10.3–16.9)
RBC # FLD: 16.7 % — SIGNIFICANT CHANGE UP (ref 10.3–16.9)
RBC # FLD: 16.7 % — SIGNIFICANT CHANGE UP (ref 10.3–16.9)
RBC # FLD: 17.1 % — HIGH (ref 10.3–16.9)
RH IG SCN BLD-IMP: POSITIVE — SIGNIFICANT CHANGE UP
SODIUM SERPL-SCNC: 139 MMOL/L — SIGNIFICANT CHANGE UP (ref 135–145)
SODIUM SERPL-SCNC: 142 MMOL/L — SIGNIFICANT CHANGE UP (ref 135–145)
SODIUM SERPL-SCNC: 143 MMOL/L — SIGNIFICANT CHANGE UP (ref 135–145)
TROPONIN T SERPL-MCNC: 0.3 NG/ML — CRITICAL HIGH (ref 0–0.01)
WBC # BLD: 5.1 K/UL — SIGNIFICANT CHANGE UP (ref 3.8–10.5)
WBC # BLD: 6.6 K/UL — SIGNIFICANT CHANGE UP (ref 3.8–10.5)
WBC # BLD: 6.8 K/UL — SIGNIFICANT CHANGE UP (ref 3.8–10.5)
WBC # BLD: 9.6 K/UL — SIGNIFICANT CHANGE UP (ref 3.8–10.5)
WBC # FLD AUTO: 5.1 K/UL — SIGNIFICANT CHANGE UP (ref 3.8–10.5)
WBC # FLD AUTO: 6.6 K/UL — SIGNIFICANT CHANGE UP (ref 3.8–10.5)
WBC # FLD AUTO: 6.8 K/UL — SIGNIFICANT CHANGE UP (ref 3.8–10.5)
WBC # FLD AUTO: 9.6 K/UL — SIGNIFICANT CHANGE UP (ref 3.8–10.5)

## 2018-03-30 PROCEDURE — 71045 X-RAY EXAM CHEST 1 VIEW: CPT | Mod: 26

## 2018-03-30 PROCEDURE — 70498 CT ANGIOGRAPHY NECK: CPT | Mod: 26

## 2018-03-30 PROCEDURE — 70496 CT ANGIOGRAPHY HEAD: CPT | Mod: 26

## 2018-03-30 PROCEDURE — 93306 TTE W/DOPPLER COMPLETE: CPT | Mod: 26

## 2018-03-30 PROCEDURE — 99292 CRITICAL CARE ADDL 30 MIN: CPT

## 2018-03-30 PROCEDURE — 70450 CT HEAD/BRAIN W/O DYE: CPT | Mod: 26,59

## 2018-03-30 PROCEDURE — 99291 CRITICAL CARE FIRST HOUR: CPT

## 2018-03-30 PROCEDURE — 95951: CPT | Mod: 26

## 2018-03-30 RX ORDER — ASPIRIN/CALCIUM CARB/MAGNESIUM 324 MG
81 TABLET ORAL DAILY
Qty: 0 | Refills: 0 | Status: DISCONTINUED | OUTPATIENT
Start: 2018-03-30 | End: 2018-04-05

## 2018-03-30 RX ORDER — POTASSIUM CHLORIDE 20 MEQ
20 PACKET (EA) ORAL ONCE
Qty: 0 | Refills: 0 | Status: COMPLETED | OUTPATIENT
Start: 2018-03-30 | End: 2018-03-30

## 2018-03-30 RX ORDER — SODIUM CHLORIDE 9 MG/ML
1000 INJECTION, SOLUTION INTRAVENOUS
Qty: 0 | Refills: 0 | Status: DISCONTINUED | OUTPATIENT
Start: 2018-03-30 | End: 2018-04-05

## 2018-03-30 RX ORDER — WARFARIN SODIUM 2.5 MG/1
5 TABLET ORAL ONCE
Qty: 0 | Refills: 0 | Status: DISCONTINUED | OUTPATIENT
Start: 2018-03-30 | End: 2018-03-30

## 2018-03-30 RX ORDER — PANTOPRAZOLE SODIUM 20 MG/1
40 TABLET, DELAYED RELEASE ORAL
Qty: 0 | Refills: 0 | Status: DISCONTINUED | OUTPATIENT
Start: 2018-03-30 | End: 2018-04-05

## 2018-03-30 RX ORDER — DOCUSATE SODIUM 100 MG
100 CAPSULE ORAL THREE TIMES A DAY
Qty: 0 | Refills: 0 | Status: DISCONTINUED | OUTPATIENT
Start: 2018-03-30 | End: 2018-04-05

## 2018-03-30 RX ORDER — SENNA PLUS 8.6 MG/1
2 TABLET ORAL AT BEDTIME
Qty: 0 | Refills: 0 | Status: DISCONTINUED | OUTPATIENT
Start: 2018-03-30 | End: 2018-04-05

## 2018-03-30 RX ORDER — HUMAN INSULIN 100 [IU]/ML
10 INJECTION, SUSPENSION SUBCUTANEOUS ONCE
Qty: 0 | Refills: 0 | Status: DISCONTINUED | OUTPATIENT
Start: 2018-03-30 | End: 2018-03-30

## 2018-03-30 RX ORDER — SODIUM BICARBONATE 1 MEQ/ML
50 SYRINGE (ML) INTRAVENOUS ONCE
Qty: 0 | Refills: 0 | Status: COMPLETED | OUTPATIENT
Start: 2018-03-30 | End: 2018-03-30

## 2018-03-30 RX ORDER — ATORVASTATIN CALCIUM 80 MG/1
40 TABLET, FILM COATED ORAL AT BEDTIME
Qty: 0 | Refills: 0 | Status: DISCONTINUED | OUTPATIENT
Start: 2018-03-30 | End: 2018-04-05

## 2018-03-30 RX ORDER — PROPOFOL 10 MG/ML
30 INJECTION, EMULSION INTRAVENOUS ONCE
Qty: 0 | Refills: 0 | Status: DISCONTINUED | OUTPATIENT
Start: 2018-03-30 | End: 2018-03-31

## 2018-03-30 RX ORDER — LEVETIRACETAM 250 MG/1
500 TABLET, FILM COATED ORAL EVERY 12 HOURS
Qty: 0 | Refills: 0 | Status: DISCONTINUED | OUTPATIENT
Start: 2018-03-30 | End: 2018-04-01

## 2018-03-30 RX ORDER — GLUCAGON INJECTION, SOLUTION 0.5 MG/.1ML
1 INJECTION, SOLUTION SUBCUTANEOUS ONCE
Qty: 0 | Refills: 0 | Status: DISCONTINUED | OUTPATIENT
Start: 2018-03-30 | End: 2018-04-05

## 2018-03-30 RX ORDER — ACETYLCYSTEINE 200 MG/ML
600 VIAL (ML) MISCELLANEOUS
Qty: 0 | Refills: 0 | Status: DISCONTINUED | OUTPATIENT
Start: 2018-03-30 | End: 2018-04-03

## 2018-03-30 RX ORDER — INSULIN LISPRO 100/ML
VIAL (ML) SUBCUTANEOUS
Qty: 0 | Refills: 0 | Status: DISCONTINUED | OUTPATIENT
Start: 2018-03-30 | End: 2018-04-05

## 2018-03-30 RX ORDER — LIDOCAINE 4 G/100G
1 CREAM TOPICAL DAILY
Qty: 0 | Refills: 0 | Status: DISCONTINUED | OUTPATIENT
Start: 2018-03-30 | End: 2018-04-05

## 2018-03-30 RX ORDER — MAGNESIUM SULFATE 500 MG/ML
2 VIAL (ML) INJECTION ONCE
Qty: 0 | Refills: 0 | Status: COMPLETED | OUTPATIENT
Start: 2018-03-30 | End: 2018-03-30

## 2018-03-30 RX ORDER — NOREPINEPHRINE BITARTRATE/D5W 8 MG/250ML
0.01 PLASTIC BAG, INJECTION (ML) INTRAVENOUS
Qty: 8 | Refills: 0 | Status: DISCONTINUED | OUTPATIENT
Start: 2018-03-30 | End: 2018-04-01

## 2018-03-30 RX ORDER — PROPOFOL 10 MG/ML
5 INJECTION, EMULSION INTRAVENOUS ONCE
Qty: 0 | Refills: 0 | Status: COMPLETED | OUTPATIENT
Start: 2018-03-30 | End: 2018-03-30

## 2018-03-30 RX ORDER — POLYETHYLENE GLYCOL 3350 17 G/17G
17 POWDER, FOR SOLUTION ORAL ONCE
Qty: 0 | Refills: 0 | Status: DISCONTINUED | OUTPATIENT
Start: 2018-03-30 | End: 2018-04-05

## 2018-03-30 RX ORDER — SODIUM CHLORIDE 9 MG/ML
1000 INJECTION INTRAMUSCULAR; INTRAVENOUS; SUBCUTANEOUS
Qty: 0 | Refills: 0 | Status: DISCONTINUED | OUTPATIENT
Start: 2018-03-30 | End: 2018-04-03

## 2018-03-30 RX ORDER — INSULIN GLARGINE 100 [IU]/ML
10 INJECTION, SOLUTION SUBCUTANEOUS AT BEDTIME
Qty: 0 | Refills: 0 | Status: DISCONTINUED | OUTPATIENT
Start: 2018-03-30 | End: 2018-03-30

## 2018-03-30 RX ORDER — FOLIC ACID 0.8 MG
1 TABLET ORAL DAILY
Qty: 0 | Refills: 0 | Status: DISCONTINUED | OUTPATIENT
Start: 2018-03-30 | End: 2018-04-05

## 2018-03-30 RX ORDER — ACETAMINOPHEN 500 MG
650 TABLET ORAL EVERY 6 HOURS
Qty: 0 | Refills: 0 | Status: DISCONTINUED | OUTPATIENT
Start: 2018-03-30 | End: 2018-04-05

## 2018-03-30 RX ORDER — LEVETIRACETAM 250 MG/1
1000 TABLET, FILM COATED ORAL ONCE
Qty: 0 | Refills: 0 | Status: COMPLETED | OUTPATIENT
Start: 2018-03-30 | End: 2018-03-30

## 2018-03-30 RX ORDER — ALBUMIN HUMAN 25 %
250 VIAL (ML) INTRAVENOUS ONCE
Qty: 0 | Refills: 0 | Status: COMPLETED | OUTPATIENT
Start: 2018-03-30 | End: 2018-03-30

## 2018-03-30 RX ORDER — AMIODARONE HYDROCHLORIDE 400 MG/1
150 TABLET ORAL ONCE
Qty: 0 | Refills: 0 | Status: COMPLETED | OUTPATIENT
Start: 2018-03-30 | End: 2018-03-30

## 2018-03-30 RX ORDER — TRAMADOL HYDROCHLORIDE 50 MG/1
25 TABLET ORAL EVERY 8 HOURS
Qty: 0 | Refills: 0 | Status: DISCONTINUED | OUTPATIENT
Start: 2018-03-30 | End: 2018-04-01

## 2018-03-30 RX ORDER — DEXTROSE 50 % IN WATER 50 %
1 SYRINGE (ML) INTRAVENOUS ONCE
Qty: 0 | Refills: 0 | Status: DISCONTINUED | OUTPATIENT
Start: 2018-03-30 | End: 2018-04-05

## 2018-03-30 RX ORDER — PROPOFOL 10 MG/ML
5 INJECTION, EMULSION INTRAVENOUS
Qty: 1000 | Refills: 0 | Status: DISCONTINUED | OUTPATIENT
Start: 2018-03-30 | End: 2018-03-31

## 2018-03-30 RX ORDER — METOPROLOL TARTRATE 50 MG
12.5 TABLET ORAL EVERY 12 HOURS
Qty: 0 | Refills: 0 | Status: DISCONTINUED | OUTPATIENT
Start: 2018-03-30 | End: 2018-03-30

## 2018-03-30 RX ORDER — FERROUS SULFATE 325(65) MG
325 TABLET ORAL DAILY
Qty: 0 | Refills: 0 | Status: DISCONTINUED | OUTPATIENT
Start: 2018-03-30 | End: 2018-04-05

## 2018-03-30 RX ADMIN — LEVETIRACETAM 400 MILLIGRAM(S): 250 TABLET, FILM COATED ORAL at 11:50

## 2018-03-30 RX ADMIN — Medication 2: at 16:09

## 2018-03-30 RX ADMIN — HEPARIN SODIUM 5000 UNIT(S): 5000 INJECTION INTRAVENOUS; SUBCUTANEOUS at 21:45

## 2018-03-30 RX ADMIN — Medication 25 MILLIGRAM(S): at 00:25

## 2018-03-30 RX ADMIN — PANTOPRAZOLE SODIUM 40 MILLIGRAM(S): 20 TABLET, DELAYED RELEASE ORAL at 06:48

## 2018-03-30 RX ADMIN — Medication 100 MILLIGRAM(S): at 21:45

## 2018-03-30 RX ADMIN — LIDOCAINE 1 PATCH: 4 CREAM TOPICAL at 12:25

## 2018-03-30 RX ADMIN — Medication 100 MILLIGRAM(S): at 14:09

## 2018-03-30 RX ADMIN — Medication 50 GRAM(S): at 01:24

## 2018-03-30 RX ADMIN — Medication 2: at 21:46

## 2018-03-30 RX ADMIN — Medication 125 MILLILITER(S): at 05:36

## 2018-03-30 RX ADMIN — Medication 30 MILLIGRAM(S): at 00:12

## 2018-03-30 RX ADMIN — AMIODARONE HYDROCHLORIDE 150 MILLIGRAM(S): 400 TABLET ORAL at 00:35

## 2018-03-30 RX ADMIN — Medication 50 MILLIEQUIVALENT(S): at 11:51

## 2018-03-30 RX ADMIN — Medication 2 MILLIGRAM(S): at 09:59

## 2018-03-30 RX ADMIN — Medication 650 MILLIGRAM(S): at 05:36

## 2018-03-30 RX ADMIN — TRAMADOL HYDROCHLORIDE 25 MILLIGRAM(S): 50 TABLET ORAL at 22:49

## 2018-03-30 RX ADMIN — Medication 2000 MILLIGRAM(S): at 16:09

## 2018-03-30 RX ADMIN — Medication 325 MILLIGRAM(S): at 12:24

## 2018-03-30 RX ADMIN — Medication 81 MILLIGRAM(S): at 12:25

## 2018-03-30 RX ADMIN — SENNA PLUS 2 TABLET(S): 8.6 TABLET ORAL at 21:45

## 2018-03-30 RX ADMIN — LEVETIRACETAM 400 MILLIGRAM(S): 250 TABLET, FILM COATED ORAL at 21:45

## 2018-03-30 RX ADMIN — ATORVASTATIN CALCIUM 40 MILLIGRAM(S): 80 TABLET, FILM COATED ORAL at 21:49

## 2018-03-30 RX ADMIN — Medication 2000 MILLIGRAM(S): at 07:41

## 2018-03-30 RX ADMIN — Medication 12.5 MILLIGRAM(S): at 09:47

## 2018-03-30 RX ADMIN — TRAMADOL HYDROCHLORIDE 25 MILLIGRAM(S): 50 TABLET ORAL at 09:48

## 2018-03-30 RX ADMIN — PROPOFOL 5 MILLIGRAM(S): 10 INJECTION, EMULSION INTRAVENOUS at 11:52

## 2018-03-30 RX ADMIN — Medication 100 MILLIEQUIVALENT(S): at 11:30

## 2018-03-30 RX ADMIN — TRAMADOL HYDROCHLORIDE 25 MILLIGRAM(S): 50 TABLET ORAL at 10:00

## 2018-03-30 RX ADMIN — HEPARIN SODIUM 5000 UNIT(S): 5000 INJECTION INTRAVENOUS; SUBCUTANEOUS at 06:48

## 2018-03-30 RX ADMIN — HEPARIN SODIUM 5000 UNIT(S): 5000 INJECTION INTRAVENOUS; SUBCUTANEOUS at 14:09

## 2018-03-30 RX ADMIN — Medication 1 MILLIGRAM(S): at 12:24

## 2018-03-30 RX ADMIN — Medication 2: at 12:24

## 2018-03-30 NOTE — PROGRESS NOTE ADULT - SUBJECTIVE AND OBJECTIVE BOX
Asked to intubate this patient S/P seizure with full stomach.  Patient was unresponsive, no sedation given.  Under direct vision of VC X 1 TRE passed 7.5 ID cuffed ETT.  Cuff up, BS=BL,, positive qualitative CO2 indicator, Tube secured, BS=BL.  O2 SAT maintained at 97 throughout.

## 2018-03-30 NOTE — CONSULT NOTE ADULT - SUBJECTIVE AND OBJECTIVE BOX
STROKE CODE CONSULT NOTE        HPI:  This is a 77 year old Vietnamese-speaking female with history of chronic hypoproliferative anemia, transfusion dependent, Afib on Coumadin, NIDDM, osteoporosis,ureteral stricture s/p left ureteral stent, CVA 20 years ago without residual deficits, HTN, HLD, RHD s/p mechanical MVR and AVR 14 years ago at [facility] with Dr. Christie, who was recently admitted to [facility] on 3/17/18 for persistent hematuria x 1 year and progressive SOB/YUNG with associated 4 pillow orthopnea, NYHA class IV symptoms (dyspnea after climbing 4 stairs).  While inpatient, she was noted to have worsening anemia beyond her current baseline as well as UTI for which she was placed on IV rocephin and transitioned to PO Cefpodoxime upon discharge.  She underwent ECHO on 3/17/18 that revealed EF 60%, dilated LA/RV, mechanical MV with mobile echodensity, mechanical AS with EZEQUIEL 0.85cm2, Mean/peak gradients 33.7mmHg/49.5mmHg with severe AR, and severe pulmonary hypertension with PASP 62.6mmHg and cardiac catheterization on 3/23/18 that demonstrated non-obstructive CAD.  Dr. Tomer Escalante was consulted and on 3/24/18, she was transferred to [facility] under his care to complete pre-surgical evaluation for possible AVR/MVR.  She underwent uncomplicated MVR via Right mini- thoracotomy yesterday and was extubated overnight. Per CTICU staff, pt was doing well and ambulating in the morning. After she returned to bed, around 9:45AM, RN noticed that patient had body shaking with b/l upward gaze and was unresponsive to verbal cues or painful stimuli. No tongue biting or incontinence noted by staff. Stroke code was called and pt was intubated for airway protection. Pt received ativan and propofol during intubation.    PAST MEDICAL & SURGICAL HISTORY:  CVA (cerebral vascular accident)  Anemia  Rheumatic fever  Diabetes  Osteoporosis  Mitral valve replaced  Atrial fibrillation: on coumadin at home  Hypertension  History of ureter stent  S/P AVR  H/O mitral valve replacement      REVIEW OF SYSTEMS:  Unable to obtain as pt intubated and sedated    MEDICATIONS  (STANDING):  acetylcysteine  Oral Solution 600 milliGRAM(s) Oral two times a day  aspirin enteric coated 81 milliGRAM(s) Oral daily  atorvastatin 40 milliGRAM(s) Oral at bedtime  ceFAZolin  Injectable. 2000 milliGRAM(s) IV Push every 8 hours  dextrose 5%. 1000 milliLiter(s) (50 mL/Hr) IV Continuous <Continuous>  dextrose 50% Injectable 50 milliLiter(s) IV Push every 15 minutes  dextrose 50% Injectable 25 milliLiter(s) IV Push every 15 minutes  docusate sodium 100 milliGRAM(s) Oral three times a day  ferrous    sulfate 325 milliGRAM(s) Oral daily  folic acid 1 milliGRAM(s) Oral daily  heparin  Injectable 5000 Unit(s) SubCutaneous every 8 hours  insulin lispro (HumaLOG) corrective regimen sliding scale   SubCutaneous Before meals and at bedtime  levETIRAcetam  IVPB 500 milliGRAM(s) IV Intermittent every 12 hours  lidocaine   Patch 1 Patch Transdermal daily  metoprolol tartrate 12.5 milliGRAM(s) Oral every 12 hours  norepinephrine Infusion 0.01 MICROgram(s)/kG/Min (1.08 mL/Hr) IV Continuous <Continuous>  pantoprazole    Tablet 40 milliGRAM(s) Oral before breakfast  propofol Infusion 5 MICROgram(s)/kG/Min (1.728 mL/Hr) IV Continuous <Continuous>  senna 2 Tablet(s) Oral at bedtime  sodium chloride 0.45%. 1000 milliLiter(s) (10 mL/Hr) IV Continuous <Continuous>  sodium chloride 0.9%. 1000 milliLiter(s) (50 mL/Hr) IV Continuous <Continuous>    MEDICATIONS  (PRN):  acetaminophen   Tablet 650 milliGRAM(s) Oral every 6 hours PRN mild pain  dextrose Gel 1 Dose(s) Oral once PRN Blood Glucose LESS THAN 70 milliGRAM(s)/deciliter  glucagon  Injectable 1 milliGRAM(s) IntraMuscular once PRN Glucose LESS THAN 70 milligrams/deciliter  polyethylene glycol 3350 17 Gram(s) Oral once PRN Constipation  traMADol 25 milliGRAM(s) Oral every 8 hours PRN Severe Pain (7 - 10)      Allergies    No Known Allergies    Intolerances        FAMILY HISTORY:  No pertinent family history in first degree relatives      VITAL SIGNS:  Vital Signs Last 24 Hrs  T(C): 36.6 (30 Mar 2018 09:22), Max: 37.7 (29 Mar 2018 17:33)  T(F): 97.8 (30 Mar 2018 09:22), Max: 99.8 (29 Mar 2018 17:33)  HR: 66 (30 Mar 2018 12:00) (56 - 128)  BP: 121/62 (30 Mar 2018 09:35) (110/59 - 121/62)  BP(mean): 80 (30 Mar 2018 09:35) (76 - 80)  RR: 17 (30 Mar 2018 12:00) (11 - 23)  SpO2: 100% (30 Mar 2018 12:00) (93% - 100%)    PHYSICAL EXAMINATION:  Constitutional: sedated and intubated  Eyes: Conjunctiva and sclera clear, gaze midline  Neurologic  Mental status: unresponsive to verbal cues or painful stimuli. No nystagmus. Small pupils but reactive to light bilaterally. Face symmetrical, no flattening of nasolabial fold.  Motor: no atrophy or fasciculations. No tremor. No hypo/hyperkinesia. Strength 0/5 in all extremities  Reflexes: biceps 2R/2L, triceps 2R/2L, brachioradialis 2R/2L, patellar 2R/2L, ankles 2R/2L. Plantar reflex areflexic  Coordination: unable to assess  Sensory: no response to painful stimuli  Gait: unable to assess      LABS:                        7.9    5.1   )-----------( 88       ( 30 Mar 2018 11:15 )             24.2     03-30    139  |  102  |  27<H>  ----------------------------<  184<H>  4.2   |  20<L>  |  1.34<H>    Ca    8.6      30 Mar 2018 10:22  Phos  5.8     03-30  Mg     2.9     03-30    TPro  5.9<L>  /  Alb  3.6  /  TBili  2.7<H>  /  DBili  x   /  AST  39  /  ALT  6<L>  /  AlkPhos  46  03-30    PT/INR - ( 30 Mar 2018 10:22 )   PT: 13.0 sec;   INR: 1.17          PTT - ( 30 Mar 2018 10:22 )  PTT:30.5 sec      RADIOLOGY & ADDITIONAL STUDIES:      EXAM:  CT BRAIN STROKE PROTOCOL                          PROCEDURE DATE:  03/30/2018                     INTERPRETATION:  PROCEDURE: CT head without intravenous contrast    INDICATIONS: Status post cardiac surgery. Rule out stroke.    TECHNIQUE:  Serial axial images were obtained from the skull base to the   vertex without the use of intravenous contrast.    COMPARISON EXAMINATION: CT brain dated 3/25/2018.    FINDINGS:    VENTRICLES AND SULCI: Parenchymal volume loss is present which is   commensurate with patient age. No hydrocephalus.  INTRA-AXIAL: No acute transcortical infarct, acute hemorrhage, or midline   shift is present. Again, there are some scattered areas of   hypoattenuation within the periventricular and subcortical white matter,   consistent with chronic microvascular ischemic disease, unchanged.  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   VISUALIZED SINUSES: No air-fluid levels are identified.   VISUALIZED MASTOIDS:  Clear.  CALVARIUM:  Normal. Oral intubation and/or oroenteric tubing is noted in   the oral cavity.      IMPRESSION:    1. No acute intracranial hemorrhage or territorial infarction.    2.  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    3.  Chronic microvascular ischemic disease, unchanged.    EXAM:  CT ANGIO NECK (W)AW IC                          EXAM:  CT ANGIO BRAIN (W)AW IC                          PROCEDURE DATE:  03/30/2018     100  Optiray 350   5           INTERPRETATION:  PROCEDURE: CTA brain with and without intravenous   contrast.    INDICATION: Status post cardiac surgery; rule out stroke    TECHNIQUE: Multiple thin section axial images were obtained through the   [place] following the intravenous injection of contrast. MPR   sagittal and coronal MIP images were generated from the axial images. 3-D   reconstructions were also obtained and postprocessed on a independent   workstation.    COMPARISON: None    FINDINGS: The CTA examination of the [place] demonstrates the   internal carotid arteries to be normal in caliber. There is a normal   bifurcation into the A1 and M1 segments. There is a normal MCA   bifurcation. There is tortuosity of the vertebrobasilar system. The right   vertebral artery is underdeveloped. The vertebral arteries are otherwise   normal in caliber. The basilar artery is normal in caliber. There is a   normal bifurcation into the posterior cerebral arteries. There are no   areas of stenosis, dilatation or aneurysm.    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     IMPRESSION: No large vessel occlusion.      PROCEDURE: CTA neck with and without intravenous contrast.    INDICATION: Status post cardiac surgery; rule out stroke    TECHNIQUE: Multiple axial thin section were obtained through the neck   following the intravenous injection of contrast. MPR sagittal and coronal   MIP images were generated from the axial images. 3-D reconstructions were   also obtained and postprocessed on a independent workstation.    COMPARISON: None    FINDINGS: The CTA examination demonstrates the right common carotid   artery to be normal in caliber. There is a normal bifurcation into the   right internal and external carotid arteries. There is minimum   calcification at the right carotid bifurcation. There is no   hemodynamically significant stenosis.     The left common carotid artery is normal in caliber. There is a normal   bifurcation into the left internal and external carotid arteries. There   is minimum calcification at the left carotid bifurcation. There is no   hemodynamically significant stenosis.     The visualized vertebral arteries are normal in caliber.    The aortic arch appears intact without narrowing of the origin of the   great vessels.    IMPRESSION: Normal CTA of the neck.

## 2018-03-30 NOTE — PROGRESS NOTE ADULT - SUBJECTIVE AND OBJECTIVE BOX
POD #1 s/p Re-op Mitral valve replacement     78yo with history of RHD s/p MVR/AVR both mechanical 14 years ago, admitted with MV stenosis and thrombosis.  There was also concern for AV stenosis but MONROE/FFR here showed normally functioning Aortic valve.  Pt has history of hypoproliferative, almost transfusion dependent anemia.  She underwent uncomplicated MVR via Right mini- thoracotomy under fibrillatory arrest.      Extubated overnight, chronic afib  Stable hemodynamics.       PMH :  CVA (cerebral vascular accident)  Anemia  Rheumatic fever  Diabetes  Osteoporosis  Mitral valve replaced  Atrial fibrillation  Hypertension  Mitral valve stenosis, unspecified etiology  Mitral valve insufficiency, unspecified etiology    Mitral valve replacement  History of ureter stent  S/P AVR  H/O mitral valve replacement    ROS pain   All other systems reviewed and negative.    ICU Vital Signs Last 24 Hrs  T(C): 37.2 (03-30-18 @ 05:00), Max: 37.7 (03-29-18 @ 17:33)  T(F): 98.9 (03-30-18 @ 05:00), Max: 99.8 (03-29-18 @ 17:33)  HR: 116 (03-30-18 @ 08:00) (56 - 126)  ABP: 114/50 (03-30-18 @ 08:00) (101/61 - 142/54)  ABP(mean): 72 (03-30-18 @ 08:00) (60 - 84)  RR: 23 (03-30-18 @ 08:00) (11 - 23)  SpO2: 93% (03-30-18 @ 08:00) (93% - 100%)    I&O's Summary    29 Mar 2018 07:01  -  30 Mar 2018 07:00  --------------------------------------------------------  IN: 3352 mL / OUT: 2785 mL / NET: 567 mL    30 Mar 2018 07:01  -  30 Mar 2018 08:04  --------------------------------------------------------  IN: 20.5 mL / OUT: 55 mL / NET: -34.5 mL      Mode: AC/ CMV (Assist Control/ Continuous Mandatory Ventilation)  RR (machine): 14  TV (machine): 500  FiO2: 40  PEEP: 5  ITime: 1  MAP: 9  PIP: 24    ABG - ( 30 Mar 2018 02:57 )  pH: 7.36  /  pCO2: 44    /  pO2: 219   / HCO3: 24    / Base Excess: -1.0  /  SaO2: 99                            9.6    6.8   )-----------( 72       ( 30 Mar 2018 03:07 )             28.8     30 Mar 2018 03:07    142    |  104    |  25     ----------------------------<  101    4.5     |  26     |  1.33     Ca    8.8        30 Mar 2018 03:07  Phos  6.1       30 Mar 2018 03:07  Mg     3.1       30 Mar 2018 03:07    TPro  5.5    /  Alb  3.3    /  TBili  3.1    /  DBili  x      /  AST  42     /  ALT  9      /  AlkPhos  44     30 Mar 2018 03:07    PT/INR - ( 30 Mar 2018 03:07 )   PT: 12.0 sec;   INR: 1.08     PTT - ( 30 Mar 2018 03:07 )  PTT:28.4 sec    MEDICATIONS  (STANDING):  atorvastatin 40 milliGRAM(s) Oral at bedtime  ceFAZolin  Injectable. 2000 milliGRAM(s) IV Push every 8 hours  docusate sodium 100 milliGRAM(s) Oral three times a day  ferrous    sulfate 325 milliGRAM(s) Oral daily  folic acid 1 milliGRAM(s) Oral daily  heparin  Injectable 5000 Unit(s) SubCutaneous every 8 hours  insulin Infusion 1 Unit(s)/Hr IV Continuous <Continuous>  insulin lispro (HumaLOG) corrective regimen sliding scale   SubCutaneous Before meals and at bedtime  lidocaine   Patch 1 Patch Transdermal daily  pantoprazole    Tablet 40 milliGRAM(s) Oral before breakfast  senna 2 Tablet(s) Oral at bedtime      Home Medications:  atorvastatin 40 mg oral tablet: 1 tab(s) orally once a day (at bedtime) (24 Mar 2018 17:27)  Coumadin 5 mg oral tablet: 1 tab(s) orally once a day (24 Mar 2018 17:27)  Evista 60 mg oral tablet: 1 tab(s) orally once a day (24 Mar 2018 17:27)  ferrous sulfate 324 mg (65 mg elemental iron) oral tablet: orally once a day (24 Mar 2018 17:27)  folic acid 1 mg oral tablet: 1 tab(s) orally once a day (24 Mar 2018 17:27)  furosemide 20 mg oral tablet: 1 tab(s) orally once a day (24 Mar 2018 17:27)  glimepiride 4 mg oral tablet: 1 tab(s) orally once a day (24 Mar 2018 17:27)  heparin:  (24 Mar 2018 17:27)  metFORMIN 500 mg oral tablet, extended release: 1 tab(s) orally once a day (24 Mar 2018 17:27)  metoprolol tartrate 25 mg oral tablet: 1 tab(s) orally 2 times a day (24 Mar 2018 17:27)  pantoprazole 40 mg oral delayed release tablet: 1 tab(s) orally once a day (before a meal) (24 Mar 2018 17:27)  pravastatin 40 mg oral tablet: 1 tab(s) orally once a day (24 Mar 2018 17:27)  ramipril 10 mg oral capsule: 1 cap(s) orally once a day (24 Mar 2018 17:27)    PHYSICAL EXAM:  Gen : no acute distress  Respiratory: decreased in the bases  Cardiovascular: S1 and S2, RRR, no M/G/R  Gastrointestinal: BS+, soft, NT/ND  Extremities: No peripheral edema  Vascular: 2+ peripheral pulses  Neurological: A/O x 3, no focal deficits  Incision: clean dry/ no sign of infection  Left subclavian +  RIJ +  Lines: no sign of infection

## 2018-03-30 NOTE — CONSULT NOTE ADULT - ATTENDING COMMENTS
Patient seen and examined with Resident.  Agree with above.  Patient became altered this morning with witnessed full body shaking compatible with seizure.  Patient remained altered afterwards but also had Propofol on board.  CT Head - no acute pathology  CTA Head and Neck - no large vessel occlusion.    Plan to load with Keppra 1gm followed by 500mg BID; vEEG    Reexamined patient later in day with family at bedside.  MS - She opened her eyes with slight pressure to her shoulder; intubated but tried to mouth words, follows simple commands  CN - EOMI, PERRL+, +blink to threat bilaterally, unable assess facial secondary to ETT, tongue midling  Motor - moving right side more than left side but seems to be decreased attention  Sensory - responds to pain on right better than on left  Cerebellar - uncooperative  Gait - deferred    Assessment and plan - probable seizure this morning with residual paralysis.  Can't fully rule out ischemia but lower probability so recommend repeat CT Head without contrast tomorrow if patient stable.  - Continue Keppra 500mg BID for seizure prophylaxis    95 minutes of critical care time spent involving high level decision making regarding major organ systems.

## 2018-03-30 NOTE — PHYSICAL THERAPY INITIAL EVALUATION ADULT - DISCHARGE DISPOSITION, PT EVAL
subacute rehab vs Home PT pending stair assessment and functional progress pending stair assessment and functional progress

## 2018-03-30 NOTE — PROGRESS NOTE ADULT - SUBJECTIVE AND OBJECTIVE BOX
CTICU  CRITICAL  CARE  attending     Hand off received 					   Pertinent clinical, laboratory, radiographic, hemodynamic, echocardiographic, respiratory data, microbiologic data and chart were reviewed and analyzed frequently throughout the course of the day and night  Patient seen and examined with CTS/ SH attending at bedside  Pt is a 77y , Female, HEALTH ISSUES - PROBLEM Dx:      , FAMILY HISTORY:  No pertinent family history in first degree relatives  PAST MEDICAL & SURGICAL HISTORY:  CVA (cerebral vascular accident)  Anemia  Rheumatic fever  Diabetes  Osteoporosis  Mitral valve replaced  Atrial fibrillation: on coumadin at home  Hypertension  History of ureter stent  S/P AVR  H/O mitral valve replacement    Patient is a 77y old  Female who presents with a chief complaint of 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  ICU Vital Signs Last 24 Hrs  T(C): 36.7 (30 Mar 2018 21:13), Max: 37.3 (30 Mar 2018 16:42)  T(F): 98.1 (30 Mar 2018 21:13), Max: 99.1 (30 Mar 2018 16:42)  HR: 78 (30 Mar 2018 23:00) (52 - 128)  BP: 121/62 (30 Mar 2018 09:35) (110/59 - 121/62)  BP(mean): 80 (30 Mar 2018 09:35) (76 - 80)  ABP: 152/58 (30 Mar 2018 23:00) (94/74 - 162/62)  ABP(mean): 86 (30 Mar 2018 23:00) (66 - 94)  RR: 17 (30 Mar 2018 23:00) (11 - 23)  SpO2: 100% (30 Mar 2018 23:00) (93% - 100%)    Adult Advanced Hemodynamics Last 24 Hrs  CVP(mm Hg): 37 (30 Mar 2018 10:00) (8 - 37)  CVP(cm H2O): --  CO: --  CI: --  PA: --  PA(mean): --  PCWP: --  SVR: --  SVRI: --  PVR: --  PVRI: --, ABG - ( 30 Mar 2018 15:38 )  pH: 7.55  /  pCO2: 27    /  pO2: 184   / HCO3: 23    / Base Excess: 1.6   /  SaO2: 99                Mode: SIMV with PS  RR (machine): 18  TV (machine): 500  FiO2: 50  PEEP: 8  ITime: 1  MAP: 8  PIP: 23    Intake and output was reviewed and the fluid balance was calculated  Daily     Daily   I&O's Summary    29 Mar 2018 07:01  -  30 Mar 2018 07:00  --------------------------------------------------------  IN: 3352 mL / OUT: 2785 mL / NET: 567 mL    30 Mar 2018 07:01  -  30 Mar 2018 23:49  --------------------------------------------------------  IN: 1377 mL / OUT: 950 mL / NET: 427 mL        All lines and drain sites were assessed  Glycemic trend was reviewedCAPILLARY BLOOD GLUCOSE  116 (30 Mar 2018 12:15)      POCT Blood Glucose.: 152 mg/dL (30 Mar 2018 21:33)    No acute change in mental status  Auscultation of the chest reveals equal bs  Abdomen is soft  Extremities are warm and well perfused  Wounds appear clean and unremarkable  Antibiotics are periop    labs  CBC Full  -  ( 30 Mar 2018 15:42 )  WBC Count : 6.6 K/uL  Hemoglobin : 9.5 g/dL  Hematocrit : 28.7 %  Platelet Count - Automated : 110 K/uL  Mean Cell Volume : 82.2 fL  Mean Cell Hemoglobin : 27.2 pg  Mean Cell Hemoglobin Concentration : 33.1 g/dL  Auto Neutrophil # : x  Auto Lymphocyte # : x  Auto Monocyte # : x  Auto Eosinophil # : x  Auto Basophil # : x  Auto Neutrophil % : x  Auto Lymphocyte % : x  Auto Monocyte % : x  Auto Eosinophil % : x  Auto Basophil % : x    03-30    143  |  104  |  25<H>  ----------------------------<  176<H>  4.1   |  27  |  1.23    Ca    8.5      30 Mar 2018 15:42  Phos  3.8     03-30  Mg     2.7     03-30    TPro  5.9<L>  /  Alb  3.6  /  TBili  2.7<H>  /  DBili  x   /  AST  39  /  ALT  6<L>  /  AlkPhos  46  03-30    PT/INR - ( 30 Mar 2018 15:42 )   PT: 13.5 sec;   INR: 1.21          PTT - ( 30 Mar 2018 15:42 )  PTT:28.6 sec  The current medications were reviewed   MEDICATIONS  (STANDING):  acetylcysteine  Oral Solution 600 milliGRAM(s) Oral two times a day  aspirin enteric coated 81 milliGRAM(s) Oral daily  atorvastatin 40 milliGRAM(s) Oral at bedtime  ceFAZolin  Injectable. 2000 milliGRAM(s) IV Push every 8 hours  dextrose 5%. 1000 milliLiter(s) (50 mL/Hr) IV Continuous <Continuous>  dextrose 50% Injectable 50 milliLiter(s) IV Push every 15 minutes  dextrose 50% Injectable 25 milliLiter(s) IV Push every 15 minutes  docusate sodium 100 milliGRAM(s) Oral three times a day  ferrous    sulfate 325 milliGRAM(s) Oral daily  folic acid 1 milliGRAM(s) Oral daily  heparin  Injectable 5000 Unit(s) SubCutaneous every 8 hours  insulin lispro (HumaLOG) corrective regimen sliding scale   SubCutaneous Before meals and at bedtime  levETIRAcetam  IVPB 500 milliGRAM(s) IV Intermittent every 12 hours  lidocaine   Patch 1 Patch Transdermal daily  norepinephrine Infusion 0.01 MICROgram(s)/kG/Min (1.08 mL/Hr) IV Continuous <Continuous>  pantoprazole    Tablet 40 milliGRAM(s) Oral before breakfast  propofol Infusion 5 MICROgram(s)/kG/Min (1.728 mL/Hr) IV Continuous <Continuous>  propofol Injectable 30 milliGRAM(s) IV Push once  senna 2 Tablet(s) Oral at bedtime  sodium chloride 0.45%. 1000 milliLiter(s) (10 mL/Hr) IV Continuous <Continuous>  sodium chloride 0.9%. 1000 milliLiter(s) (50 mL/Hr) IV Continuous <Continuous>    MEDICATIONS  (PRN):  acetaminophen   Tablet 650 milliGRAM(s) Oral every 6 hours PRN mild pain  dextrose Gel 1 Dose(s) Oral once PRN Blood Glucose LESS THAN 70 milliGRAM(s)/deciliter  glucagon  Injectable 1 milliGRAM(s) IntraMuscular once PRN Glucose LESS THAN 70 milligrams/deciliter  polyethylene glycol 3350 17 Gram(s) Oral once PRN Constipation  traMADol 25 milliGRAM(s) Oral every 8 hours PRN Severe Pain (7 - 10)       PROBLEM LIST/ ASSESSMENT:  HEALTH ISSUES - PROBLEM Dx:      ,   Patient is a 77y old  Female who presents with a chief complaint of 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   chest PT and IS will be encouraged  monitor adequacy of oxygenation and ventilation and attempt to wean oxygen  Nutritional goals will be met using po eventually , ensure adequate caloric intake and montior the same  Stress ulcer and VTE prophylaxis will be achieved    Glycemic control is satisfactory  Electrolytes have been repleted as necessary and wound care has been carried out. Pain control has been achieved.   agressive physical therapy and early mobility and ambulation goals will be met   The family was updated about the course and plan  CRITICAL CARE TIME SPENT in evaluation and management, reassessments, review and interpretation of labs and x-rays, ventilator and hemodynamic management, formulating a plan and coordinating care: __30____ MIN.  Time does not include procedural time.  CTICU ATTENDING     					    Yony Hoover MD CTICU  CRITICAL  CARE  attending     Hand off received @ 7p					   Pertinent clinical, laboratory, radiographic, hemodynamic, echocardiographic, respiratory data, microbiologic data and chart were reviewed and analyzed frequently throughout the course of the day and night  Patient seen and examined with CTS/ SH attending at bedside      7p-11p ICU management    Pt is a 77y , Female, post op day # 1 s/p MV replacement via right thoracotomy;  Extubated yesterday @ 11p; uneventful course until today am; when she had a witnessed seizure;  intubated for airway protection  CT/CTA head done; no acute pathology  Keppra 1g given  EEG leads on    Currently:  pt is intubated; non focal  MAP kept slightly higher for cerebral perfusion  acute post hemorrhagic anemia      , FAMILY HISTORY:  No pertinent family history in first degree relatives  PAST MEDICAL & SURGICAL HISTORY:  CVA (cerebral vascular accident)  Anemia  Rheumatic fever  Diabetes  Osteoporosis  Mitral valve replaced  Atrial fibrillation: on coumadin at home  Hypertension  History of ureter stent  S/P AVR  H/O mitral valve replacement    Patient is a 77y old  Female who presents with a chief complaint of 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  ICU Vital Signs Last 24 Hrs  T(C): 36.7 (30 Mar 2018 21:13), Max: 37.3 (30 Mar 2018 16:42)  T(F): 98.1 (30 Mar 2018 21:13), Max: 99.1 (30 Mar 2018 16:42)  HR: 78 (30 Mar 2018 23:00) (52 - 128)  BP: 121/62 (30 Mar 2018 09:35) (110/59 - 121/62)  BP(mean): 80 (30 Mar 2018 09:35) (76 - 80)  ABP: 152/58 (30 Mar 2018 23:00) (94/74 - 162/62)  ABP(mean): 86 (30 Mar 2018 23:00) (66 - 94)  RR: 17 (30 Mar 2018 23:00) (11 - 23)  SpO2: 100% (30 Mar 2018 23:00) (93% - 100%)    Adult Advanced Hemodynamics Last 24 Hrs  CVP(mm Hg): 37 (30 Mar 2018 10:00) (8 - 37)  CVP(cm H2O): --  CO: --  CI: --  PA: --  PA(mean): --  PCWP: --  SVR: --  SVRI: --  PVR: --  PVRI: --, ABG - ( 30 Mar 2018 15:38 )  pH: 7.55  /  pCO2: 27    /  pO2: 184   / HCO3: 23    / Base Excess: 1.6   /  SaO2: 99                Mode: SIMV with PS  RR (machine): 18  TV (machine): 500  FiO2: 50  PEEP: 8  ITime: 1  MAP: 8  PIP: 23    Intake and output was reviewed and the fluid balance was calculated  Daily     Daily   I&O's Summary    29 Mar 2018 07:01  -  30 Mar 2018 07:00  --------------------------------------------------------  IN: 3352 mL / OUT: 2785 mL / NET: 567 mL    30 Mar 2018 07:01  -  30 Mar 2018 23:49  --------------------------------------------------------  IN: 1377 mL / OUT: 950 mL / NET: 427 mL        All lines and drain sites were assessed  Glycemic trend was reviewedCAPILLARY BLOOD GLUCOSE  116 (30 Mar 2018 12:15)      POCT Blood Glucose.: 152 mg/dL (30 Mar 2018 21:33)    No acute change in mental status  Auscultation of the chest reveals equal bs  Abdomen is soft  Extremities are warm and well perfused  Wounds appear clean and unremarkable  Antibiotics are periop    labs  CBC Full  -  ( 30 Mar 2018 15:42 )  WBC Count : 6.6 K/uL  Hemoglobin : 9.5 g/dL  Hematocrit : 28.7 %  Platelet Count - Automated : 110 K/uL  Mean Cell Volume : 82.2 fL  Mean Cell Hemoglobin : 27.2 pg  Mean Cell Hemoglobin Concentration : 33.1 g/dL  Auto Neutrophil # : x  Auto Lymphocyte # : x  Auto Monocyte # : x  Auto Eosinophil # : x  Auto Basophil # : x  Auto Neutrophil % : x  Auto Lymphocyte % : x  Auto Monocyte % : x  Auto Eosinophil % : x  Auto Basophil % : x    03-30    143  |  104  |  25<H>  ----------------------------<  176<H>  4.1   |  27  |  1.23    Ca    8.5      30 Mar 2018 15:42  Phos  3.8     03-30  Mg     2.7     03-30    TPro  5.9<L>  /  Alb  3.6  /  TBili  2.7<H>  /  DBili  x   /  AST  39  /  ALT  6<L>  /  AlkPhos  46  03-30    PT/INR - ( 30 Mar 2018 15:42 )   PT: 13.5 sec;   INR: 1.21          PTT - ( 30 Mar 2018 15:42 )  PTT:28.6 sec  The current medications were reviewed   MEDICATIONS  (STANDING):  acetylcysteine  Oral Solution 600 milliGRAM(s) Oral two times a day  aspirin enteric coated 81 milliGRAM(s) Oral daily  atorvastatin 40 milliGRAM(s) Oral at bedtime  ceFAZolin  Injectable. 2000 milliGRAM(s) IV Push every 8 hours  dextrose 5%. 1000 milliLiter(s) (50 mL/Hr) IV Continuous <Continuous>  dextrose 50% Injectable 50 milliLiter(s) IV Push every 15 minutes  dextrose 50% Injectable 25 milliLiter(s) IV Push every 15 minutes  docusate sodium 100 milliGRAM(s) Oral three times a day  ferrous    sulfate 325 milliGRAM(s) Oral daily  folic acid 1 milliGRAM(s) Oral daily  heparin  Injectable 5000 Unit(s) SubCutaneous every 8 hours  insulin lispro (HumaLOG) corrective regimen sliding scale   SubCutaneous Before meals and at bedtime  levETIRAcetam  IVPB 500 milliGRAM(s) IV Intermittent every 12 hours  lidocaine   Patch 1 Patch Transdermal daily  norepinephrine Infusion 0.01 MICROgram(s)/kG/Min (1.08 mL/Hr) IV Continuous <Continuous>  pantoprazole    Tablet 40 milliGRAM(s) Oral before breakfast  propofol Infusion 5 MICROgram(s)/kG/Min (1.728 mL/Hr) IV Continuous <Continuous>  propofol Injectable 30 milliGRAM(s) IV Push once  senna 2 Tablet(s) Oral at bedtime  sodium chloride 0.45%. 1000 milliLiter(s) (10 mL/Hr) IV Continuous <Continuous>  sodium chloride 0.9%. 1000 milliLiter(s) (50 mL/Hr) IV Continuous <Continuous>    MEDICATIONS  (PRN):  acetaminophen   Tablet 650 milliGRAM(s) Oral every 6 hours PRN mild pain  dextrose Gel 1 Dose(s) Oral once PRN Blood Glucose LESS THAN 70 milliGRAM(s)/deciliter  glucagon  Injectable 1 milliGRAM(s) IntraMuscular once PRN Glucose LESS THAN 70 milligrams/deciliter  polyethylene glycol 3350 17 Gram(s) Oral once PRN Constipation  traMADol 25 milliGRAM(s) Oral every 8 hours PRN Severe Pain (7 - 10)       PROBLEM LIST/ ASSESSMENT:  HEALTH ISSUES - PROBLEM Dx:    seizures  acute resp failure with hypoxia  acute post hemorrhagic anemia      ,   Patient is a 77y old  Female who presents with a chief complaint of 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   chest PT and IS will be encouraged  monitor adequacy of oxygenation and ventilation and attempt to wean oxygen  Nutritional goals will be met using po eventually , ensure adequate caloric intake and montior the same  Stress ulcer and VTE prophylaxis will be achieved    Glycemic control is satisfactory  Electrolytes have been repleted as necessary and wound care has been carried out. Pain control has been achieved.   agressive physical therapy and early mobility and ambulation goals will be met   The family was updated about the course and plan  CRITICAL CARE TIME SPENT in evaluation and management, reassessments, review and interpretation of labs and x-rays, ventilator and hemodynamic management, formulating a plan and coordinating care: __30____ MIN.  Time does not include procedural time.  CTICU ATTENDING     					    Yony Hoover MD

## 2018-03-30 NOTE — PHYSICAL THERAPY INITIAL EVALUATION ADULT - ADDITIONAL COMMENTS
Pt lives with  and son in a house with 6 steps to enter. Pt was premorbidly independent with ambulation and ADLs. No HHC services. Pt has h/o 1 fall in past 6 mos.

## 2018-03-30 NOTE — PROGRESS NOTE ADULT - ASSESSMENT
76yo with history of RHD s/p MVR/AVR both mechanical 14 years ago, admitted with MV stenosis and thrombosis.  s/p Re-op MVR via right thoracotomy.     Problems  1. S/p Re-op MVR   2. chronic afib  3. CKD  4. h/o Mechanical AVR  5. Chronic anemia     Plan   Neuro -- pain control  CVS - currently off pressors  plan to restart coumadin tonight for h/o  AVR  chest tube management  Pulm - Stable oxygenation   GI - GI proph   - monitor UOP, Cr stable.   Endo - glycemic control  Heme - correct coagulapathy, monitor for bleeding  ID - periop antibiotics.       Critical post op.    Critical care time spent 70 min 78yo with history of RHD s/p MVR/AVR both mechanical 14 years ago, admitted with MV stenosis and thrombosis.  s/p Re-op MVR via right thoracotomy.     Problems  1. S/p Re-op MVR   2. chronic afib  3. STAR  4. h/o Mechanical AVR  5. Chronic anemia     Plan   Neuro -- pain control  CVS - currently off pressors  plan to restart coumadin tonight for h/o  AVR  chest tube management  Pulm - Stable oxygenation   GI - GI proph   - monitor UOP, Cr 1.3 will follow  Pt with h/o Hydronephrosis, has ureteral stents and history of recurrent UTIs  Endo - glycemic control  Heme - correct coagulapathy, monitor for bleeding  ID - periop antibiotics.       Critical post op.    Critical care time spent 70 min

## 2018-03-30 NOTE — PHYSICAL THERAPY INITIAL EVALUATION ADULT - GAIT DEVIATIONS NOTED, PT EVAL
decreased step length/B/L knee buckling/decreased stride length/decreased weight-shifting ability/decreased leann

## 2018-03-30 NOTE — PHYSICAL THERAPY INITIAL EVALUATION ADULT - CRITERIA FOR SKILLED THERAPEUTIC INTERVENTIONS
predicted duration of therapy intervention/anticipated equipment needs at discharge/anticipated discharge recommendation/functional limitations in following categories/rehab potential/therapy frequency/impairments found

## 2018-03-30 NOTE — PHYSICAL THERAPY INITIAL EVALUATION ADULT - ACTIVE RANGE OF MOTION EXAMINATION, REHAB EVAL
bilateral  lower extremity Active ROM was WFL (within functional limits)/R UE AROM restricted to 90 deg shoulder flex and abd 2/2 R thoracotomy precautions

## 2018-03-30 NOTE — PHYSICAL THERAPY INITIAL EVALUATION ADULT - PERTINENT HX OF CURRENT PROBLEM, REHAB EVAL
76yo with history of RHD s/p MVR/AVR both mechanical 14 years ago, admitted with MV stenosis and thrombosis.  There was also concern for AV stenosis but MONROE/FFR here showed normally functioning Aortic valve.  Pt has history of hypoproliferative, almost transfusion dependent anemia.

## 2018-03-30 NOTE — CONSULT NOTE ADULT - ASSESSMENT
76 yo F with Afib, RHD s/p MVR and AVR both mechanical, admitted with MV stenosis and thrombosis, s/p MV replacement, found to have seizure-like activity and unresponsive this morning. CT head negative for acute infarct or intracranial hemorrhage. CTA head and neck show no significant stenosis. Quick clearance of lactate consistent with likely seizure    - Keppra load with 1000mg, then start Keppra 500mg IV BID  - vEEG

## 2018-03-30 NOTE — PHYSICAL THERAPY INITIAL EVALUATION ADULT - GENERAL OBSERVATIONS, REHAB EVAL
Pt received sitting in chair with daughter to translate and RN Dagmar bedside in NAD +telemetry +2L O2 via NC +pulse ox +2 CTs (removed from suction by RN Dagmar) +RIJ cordis +luna +B/L SCDs.

## 2018-03-30 NOTE — PHYSICAL THERAPY INITIAL EVALUATION ADULT - IMPAIRMENTS CONTRIBUTING TO GAIT DEVIATIONS, PT EVAL
dec aerobic capacity/endurance, tachycardia to 120 bpm, c/o R chest tube site pain with ambulation/impaired balance/decreased strength/pain

## 2018-03-30 NOTE — PHYSICAL THERAPY INITIAL EVALUATION ADULT - MANUAL MUSCLE TESTING RESULTS, REHAB EVAL
LE: functional mobility >/= 3/5; RUE: DNT past 90 deg shoulder flex and abd 2/2 R thoracotomy precautions

## 2018-03-30 NOTE — PROGRESS NOTE ADULT - SUBJECTIVE AND OBJECTIVE BOX
Event note:     Called to patients room. pt noted to be having seizure activity while lying in Bed.   On my arrival she was stiff, unresponsive.  Electively intubated for airway protection  Treated with 2 mg IV ativan and propofol during intubation  Stroke coke initiated, CT head/CTA obtained -- reviewed with radiology and neuroradiologist.  Negative for bleed and large embolic CVA.     Labs during event notable for Glucose on 166  Lactate 5.3 pH 7.2 PCO2 56    After return from CT scan, pt was noted to be drowsy but moving all extremities to command    Problems  1. new onset seizure activity -- AMS/obtundation   2. s/p Re-op MVR  3. H/o chronic afib.     Plan   -- workup for seizure activity.   -- Keppra  -- CT head negative  -- hemodynamic support  -- Continue vent support for now until mentation is improved    Daughter by bedside informed and updated.

## 2018-03-31 LAB
ANION GAP SERPL CALC-SCNC: 11 MMOL/L — SIGNIFICANT CHANGE UP (ref 5–17)
ANION GAP SERPL CALC-SCNC: 12 MMOL/L — SIGNIFICANT CHANGE UP (ref 5–17)
ANION GAP SERPL CALC-SCNC: 15 MMOL/L — SIGNIFICANT CHANGE UP (ref 5–17)
APTT BLD: 30 SEC — SIGNIFICANT CHANGE UP (ref 27.5–37.4)
APTT BLD: 44 SEC — HIGH (ref 27.5–37.4)
APTT BLD: 46.4 SEC — HIGH (ref 27.5–37.4)
BUN SERPL-MCNC: 25 MG/DL — HIGH (ref 7–23)
BUN SERPL-MCNC: 25 MG/DL — HIGH (ref 7–23)
BUN SERPL-MCNC: 26 MG/DL — HIGH (ref 7–23)
CALCIUM SERPL-MCNC: 8.4 MG/DL — SIGNIFICANT CHANGE UP (ref 8.4–10.5)
CALCIUM SERPL-MCNC: 8.5 MG/DL — SIGNIFICANT CHANGE UP (ref 8.4–10.5)
CALCIUM SERPL-MCNC: 8.7 MG/DL — SIGNIFICANT CHANGE UP (ref 8.4–10.5)
CHLORIDE SERPL-SCNC: 103 MMOL/L — SIGNIFICANT CHANGE UP (ref 96–108)
CHLORIDE SERPL-SCNC: 103 MMOL/L — SIGNIFICANT CHANGE UP (ref 96–108)
CHLORIDE SERPL-SCNC: 104 MMOL/L — SIGNIFICANT CHANGE UP (ref 96–108)
CO2 SERPL-SCNC: 24 MMOL/L — SIGNIFICANT CHANGE UP (ref 22–31)
CO2 SERPL-SCNC: 26 MMOL/L — SIGNIFICANT CHANGE UP (ref 22–31)
CO2 SERPL-SCNC: 27 MMOL/L — SIGNIFICANT CHANGE UP (ref 22–31)
CREAT SERPL-MCNC: 1.18 MG/DL — SIGNIFICANT CHANGE UP (ref 0.5–1.3)
CREAT SERPL-MCNC: 1.23 MG/DL — SIGNIFICANT CHANGE UP (ref 0.5–1.3)
CREAT SERPL-MCNC: 1.25 MG/DL — SIGNIFICANT CHANGE UP (ref 0.5–1.3)
GAS PNL BLDA: SIGNIFICANT CHANGE UP
GLUCOSE BLDC GLUCOMTR-MCNC: 146 MG/DL — HIGH (ref 70–99)
GLUCOSE BLDC GLUCOMTR-MCNC: 186 MG/DL — HIGH (ref 70–99)
GLUCOSE BLDC GLUCOMTR-MCNC: 223 MG/DL — HIGH (ref 70–99)
GLUCOSE SERPL-MCNC: 124 MG/DL — HIGH (ref 70–99)
GLUCOSE SERPL-MCNC: 197 MG/DL — HIGH (ref 70–99)
GLUCOSE SERPL-MCNC: 201 MG/DL — HIGH (ref 70–99)
HCT VFR BLD CALC: 28.9 % — LOW (ref 34.5–45)
HCT VFR BLD CALC: 28.9 % — LOW (ref 34.5–45)
HCT VFR BLD CALC: 29 % — LOW (ref 34.5–45)
HGB BLD-MCNC: 9.3 G/DL — LOW (ref 11.5–15.5)
HGB BLD-MCNC: 9.4 G/DL — LOW (ref 11.5–15.5)
HGB BLD-MCNC: 9.6 G/DL — LOW (ref 11.5–15.5)
INR BLD: 1.11 — SIGNIFICANT CHANGE UP (ref 0.88–1.16)
INR BLD: 1.13 — SIGNIFICANT CHANGE UP (ref 0.88–1.16)
INR BLD: 1.19 — HIGH (ref 0.88–1.16)
MAGNESIUM SERPL-MCNC: 2.3 MG/DL — SIGNIFICANT CHANGE UP (ref 1.6–2.6)
MAGNESIUM SERPL-MCNC: 2.4 MG/DL — SIGNIFICANT CHANGE UP (ref 1.6–2.6)
MCHC RBC-ENTMCNC: 27.4 PG — SIGNIFICANT CHANGE UP (ref 27–34)
MCHC RBC-ENTMCNC: 32.2 G/DL — SIGNIFICANT CHANGE UP (ref 32–36)
MCHC RBC-ENTMCNC: 32.4 G/DL — SIGNIFICANT CHANGE UP (ref 32–36)
MCHC RBC-ENTMCNC: 33.2 G/DL — SIGNIFICANT CHANGE UP (ref 32–36)
MCV RBC AUTO: 82.3 FL — SIGNIFICANT CHANGE UP (ref 80–100)
MCV RBC AUTO: 84.5 FL — SIGNIFICANT CHANGE UP (ref 80–100)
MCV RBC AUTO: 85.3 FL — SIGNIFICANT CHANGE UP (ref 80–100)
PLATELET # BLD AUTO: 104 K/UL — LOW (ref 150–400)
PLATELET # BLD AUTO: 108 K/UL — LOW (ref 150–400)
PLATELET # BLD AUTO: 74 K/UL — LOW (ref 150–400)
POTASSIUM SERPL-MCNC: 3.8 MMOL/L — SIGNIFICANT CHANGE UP (ref 3.5–5.3)
POTASSIUM SERPL-MCNC: 4.2 MMOL/L — SIGNIFICANT CHANGE UP (ref 3.5–5.3)
POTASSIUM SERPL-MCNC: 4.3 MMOL/L — SIGNIFICANT CHANGE UP (ref 3.5–5.3)
POTASSIUM SERPL-SCNC: 3.8 MMOL/L — SIGNIFICANT CHANGE UP (ref 3.5–5.3)
POTASSIUM SERPL-SCNC: 4.2 MMOL/L — SIGNIFICANT CHANGE UP (ref 3.5–5.3)
POTASSIUM SERPL-SCNC: 4.3 MMOL/L — SIGNIFICANT CHANGE UP (ref 3.5–5.3)
PROTHROM AB SERPL-ACNC: 12.4 SEC — SIGNIFICANT CHANGE UP (ref 9.8–12.7)
PROTHROM AB SERPL-ACNC: 12.6 SEC — SIGNIFICANT CHANGE UP (ref 9.8–12.7)
PROTHROM AB SERPL-ACNC: 13.2 SEC — HIGH (ref 9.8–12.7)
RBC # BLD: 3.39 M/UL — LOW (ref 3.8–5.2)
RBC # BLD: 3.43 M/UL — LOW (ref 3.8–5.2)
RBC # BLD: 3.51 M/UL — LOW (ref 3.8–5.2)
RBC # FLD: 16.6 % — SIGNIFICANT CHANGE UP (ref 10.3–16.9)
RBC # FLD: 16.9 % — SIGNIFICANT CHANGE UP (ref 10.3–16.9)
RBC # FLD: 17 % — HIGH (ref 10.3–16.9)
SODIUM SERPL-SCNC: 141 MMOL/L — SIGNIFICANT CHANGE UP (ref 135–145)
SODIUM SERPL-SCNC: 141 MMOL/L — SIGNIFICANT CHANGE UP (ref 135–145)
SODIUM SERPL-SCNC: 143 MMOL/L — SIGNIFICANT CHANGE UP (ref 135–145)
VWF CBA/VWF AG PPP IA-RTO: SIGNIFICANT CHANGE UP
WBC # BLD: 7 K/UL — SIGNIFICANT CHANGE UP (ref 3.8–10.5)
WBC # BLD: 7.4 K/UL — SIGNIFICANT CHANGE UP (ref 3.8–10.5)
WBC # BLD: 7.7 K/UL — SIGNIFICANT CHANGE UP (ref 3.8–10.5)
WBC # FLD AUTO: 7 K/UL — SIGNIFICANT CHANGE UP (ref 3.8–10.5)
WBC # FLD AUTO: 7.4 K/UL — SIGNIFICANT CHANGE UP (ref 3.8–10.5)
WBC # FLD AUTO: 7.7 K/UL — SIGNIFICANT CHANGE UP (ref 3.8–10.5)

## 2018-03-31 PROCEDURE — 99292 CRITICAL CARE ADDL 30 MIN: CPT

## 2018-03-31 PROCEDURE — 99291 CRITICAL CARE FIRST HOUR: CPT

## 2018-03-31 PROCEDURE — 95951: CPT | Mod: 26

## 2018-03-31 RX ORDER — HEPARIN SODIUM 5000 [USP'U]/ML
600 INJECTION INTRAVENOUS; SUBCUTANEOUS
Qty: 25000 | Refills: 0 | Status: DISCONTINUED | OUTPATIENT
Start: 2018-03-31 | End: 2018-04-01

## 2018-03-31 RX ORDER — ACETAMINOPHEN 500 MG
1000 TABLET ORAL ONCE
Qty: 0 | Refills: 0 | Status: COMPLETED | OUTPATIENT
Start: 2018-03-31 | End: 2018-03-31

## 2018-03-31 RX ORDER — ALBUMIN HUMAN 25 %
250 VIAL (ML) INTRAVENOUS
Qty: 0 | Refills: 0 | Status: COMPLETED | OUTPATIENT
Start: 2018-03-31 | End: 2018-03-31

## 2018-03-31 RX ORDER — POTASSIUM CHLORIDE 20 MEQ
20 PACKET (EA) ORAL ONCE
Qty: 0 | Refills: 0 | Status: COMPLETED | OUTPATIENT
Start: 2018-03-31 | End: 2018-03-31

## 2018-03-31 RX ORDER — PROPOFOL 10 MG/ML
20 INJECTION, EMULSION INTRAVENOUS ONCE
Qty: 0 | Refills: 0 | Status: DISCONTINUED | OUTPATIENT
Start: 2018-03-31 | End: 2018-03-31

## 2018-03-31 RX ORDER — WARFARIN SODIUM 2.5 MG/1
5 TABLET ORAL ONCE
Qty: 0 | Refills: 0 | Status: COMPLETED | OUTPATIENT
Start: 2018-03-31 | End: 2018-03-31

## 2018-03-31 RX ORDER — MIDAZOLAM HYDROCHLORIDE 1 MG/ML
1 INJECTION, SOLUTION INTRAMUSCULAR; INTRAVENOUS ONCE
Qty: 0 | Refills: 0 | Status: DISCONTINUED | OUTPATIENT
Start: 2018-03-31 | End: 2018-03-31

## 2018-03-31 RX ORDER — FUROSEMIDE 40 MG
20 TABLET ORAL ONCE
Qty: 0 | Refills: 0 | Status: COMPLETED | OUTPATIENT
Start: 2018-03-31 | End: 2018-03-31

## 2018-03-31 RX ADMIN — HEPARIN SODIUM 600 UNIT(S)/HR: 5000 INJECTION INTRAVENOUS; SUBCUTANEOUS at 11:27

## 2018-03-31 RX ADMIN — PROPOFOL 1.73 MICROGRAM(S)/KG/MIN: 10 INJECTION, EMULSION INTRAVENOUS at 01:18

## 2018-03-31 RX ADMIN — Medication 1 MILLIGRAM(S): at 11:28

## 2018-03-31 RX ADMIN — Medication 325 MILLIGRAM(S): at 11:28

## 2018-03-31 RX ADMIN — LIDOCAINE 1 PATCH: 4 CREAM TOPICAL at 23:12

## 2018-03-31 RX ADMIN — LIDOCAINE 1 PATCH: 4 CREAM TOPICAL at 11:28

## 2018-03-31 RX ADMIN — Medication 1000 MILLIGRAM(S): at 18:14

## 2018-03-31 RX ADMIN — LEVETIRACETAM 400 MILLIGRAM(S): 250 TABLET, FILM COATED ORAL at 11:00

## 2018-03-31 RX ADMIN — ATORVASTATIN CALCIUM 40 MILLIGRAM(S): 80 TABLET, FILM COATED ORAL at 21:18

## 2018-03-31 RX ADMIN — Medication 400 MILLIGRAM(S): at 10:00

## 2018-03-31 RX ADMIN — TRAMADOL HYDROCHLORIDE 25 MILLIGRAM(S): 50 TABLET ORAL at 00:07

## 2018-03-31 RX ADMIN — Medication 125 MILLILITER(S): at 21:01

## 2018-03-31 RX ADMIN — Medication 125 MILLILITER(S): at 20:03

## 2018-03-31 RX ADMIN — Medication 100 MILLIEQUIVALENT(S): at 05:56

## 2018-03-31 RX ADMIN — PANTOPRAZOLE SODIUM 40 MILLIGRAM(S): 20 TABLET, DELAYED RELEASE ORAL at 06:04

## 2018-03-31 RX ADMIN — Medication 1000 MILLIGRAM(S): at 11:28

## 2018-03-31 RX ADMIN — MIDAZOLAM HYDROCHLORIDE 1 MILLIGRAM(S): 1 INJECTION, SOLUTION INTRAMUSCULAR; INTRAVENOUS at 04:22

## 2018-03-31 RX ADMIN — SENNA PLUS 2 TABLET(S): 8.6 TABLET ORAL at 21:18

## 2018-03-31 RX ADMIN — Medication 4: at 21:28

## 2018-03-31 RX ADMIN — PROPOFOL 1.73 MICROGRAM(S)/KG/MIN: 10 INJECTION, EMULSION INTRAVENOUS at 01:05

## 2018-03-31 RX ADMIN — LEVETIRACETAM 400 MILLIGRAM(S): 250 TABLET, FILM COATED ORAL at 21:18

## 2018-03-31 RX ADMIN — Medication 2000 MILLIGRAM(S): at 00:20

## 2018-03-31 RX ADMIN — Medication 100 MILLIGRAM(S): at 06:04

## 2018-03-31 RX ADMIN — WARFARIN SODIUM 5 MILLIGRAM(S): 2.5 TABLET ORAL at 21:18

## 2018-03-31 RX ADMIN — HEPARIN SODIUM 5000 UNIT(S): 5000 INJECTION INTRAVENOUS; SUBCUTANEOUS at 06:04

## 2018-03-31 RX ADMIN — Medication 81 MILLIGRAM(S): at 11:28

## 2018-03-31 RX ADMIN — Medication 2: at 17:44

## 2018-03-31 RX ADMIN — Medication 20 MILLIGRAM(S): at 06:30

## 2018-03-31 RX ADMIN — Medication 100 MILLIGRAM(S): at 21:18

## 2018-03-31 RX ADMIN — Medication 400 MILLIGRAM(S): at 17:44

## 2018-03-31 RX ADMIN — LIDOCAINE 1 PATCH: 4 CREAM TOPICAL at 00:37

## 2018-03-31 NOTE — PROGRESS NOTE ADULT - ASSESSMENT
76yo with history of RHD s/p MVR/AVR both mechanical 14 years ago, admitted with MV stenosis and thrombosis.  s/p Re-op MVR via right thoracotomy. Post op course complicated by witness seizure activity of unclear etiology.  Stroke workup negative.  Mentation back to baseline     Problems  1. S/p Re-op MVR   2. now onset seizure   3.chronic afib   4. h/o Mechanical AVR  5. Chronic anemia     Plan   Neuro -- seizure workup  CVS -- stable hemodynamics   plan to restart coumadin, heparin gtt   Pulm - Stable oxygenation   GI - GI proph   - gentle  diuresis  h/o Hydronephrosis, has ureteral stents and history of recurrent UTIs  Endo - glycemic control  Heme - DVT proph       Critical post op.    Critical care time spent 60  min

## 2018-03-31 NOTE — PROGRESS NOTE ADULT - SUBJECTIVE AND OBJECTIVE BOX
CTICU  CRITICAL  CARE  attending     Hand off received 					   Pertinent clinical, laboratory, radiographic, hemodynamic, echocardiographic, respiratory data, microbiologic data and chart were reviewed and analyzed frequently throughout the course of the day and night  Patient seen and examined with CTS/ SH attending at bedside      This is a 77 year old Upper sorbian-speaking female with history of chronic hypoproliferative anemia, transfusion dependent (sees Dr. Ruiz as an outpatient) with no improvement after Fe and EPO, Afib on Coumadin, NIDDM, osteoporosis, idiopathic ureteral stricture s/p left ureteral stent, CVA 20 years ago without residual deficits, HTN, HLD, Rheumatic fever as a child s/p mechanical MVR and AVR 14 years ago at [facility] with Dr. Christie, who was recently admitted to [facility] on 3/17/18 for persistent hematuria x 1 year and progressive SOB/YUNG with associated 4 pillow orthopnea, NYHA class IV symptoms (dyspnea after climbing 4 stairs).  While inpatient, she was noted to have worsening anemia beyond her current baseline as well as UTI for which she was placed on IV rocephin and transitioned to PO Cefpodoxime upon discharge.  She underwent ECHO on 3/17/18 that revealed EF 60%, dilated LA/RV, mechanical MV with mobile echodensity, mechanical AS with EZEQUIEL 0.85cm2, Mean/peak gradients 33.7mmHg/49.5mmHg with severe AR, and severe pulmonary hypertension with PASP 62.6mmHg and cardiac catheterization on 3/23/18 that demonstrated non-obstructive CAD.  Dr. Tomer Escalante was consulted and on 3/24/18, she was transferred to [facility] under his care to complete pre-surgical evaluation for possible AVR/MVR.  On admission, pt continues to endorse hematuria with mild intermittent dysuria but denies suprapubic pressure or urinary frequency/discharge as well as urethral discharge as well as chronic SOB at rest.  She also denies HA, AMS, CP, palpitations, cough, hemoptysis, n/v/d, hematemesis, hematochezia/melena, or fevers    She underwent MVR on 03/29/18.    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  ICU Vital Signs Last 24 Hrs  T(C): 36.6 (31 Mar 2018 20:58), Max: 37.1 (31 Mar 2018 10:00)  T(F): 97.8 (31 Mar 2018 20:58), Max: 98.8 (31 Mar 2018 10:00)  HR: 74 (31 Mar 2018 22:00) (68 - 94)  BP: --  BP(mean): --  ABP: 136/64 (31 Mar 2018 22:00) (136/50 - 190/64)  ABP(mean): 90 (31 Mar 2018 22:00) (76 - 108)  RR: 25 (31 Mar 2018 22:00) (17 - 30)  SpO2: 100% (31 Mar 2018 22:00) (96% - 100%)    Adult Advanced Hemodynamics Last 24 Hrs  CVP(mm Hg): --  CVP(cm H2O): --  CO: --  CI: --  PA: --  PA(mean): --  PCWP: --  SVR: --  SVRI: --  PVR: --  PVRI: --, ABG - ( 31 Mar 2018 12:43 )  pH: 7.44  /  pCO2: 40    /  pO2: 251   / HCO3: 27    / Base Excess: 2.3   /  SaO2: 99                Mode: CPAP with PS  FiO2: 40  PEEP: 5  PS: 10  ITime: 1  MAP: 9  PIP: 20    Intake and output was reviewed and the fluid balance was calculated  Daily     Daily   I&O's Summary    30 Mar 2018 07:01  -  31 Mar 2018 07:00  --------------------------------------------------------  IN: 1827 mL / OUT: 1545 mL / NET: 282 mL    31 Mar 2018 07:01  -  31 Mar 2018 22:09  --------------------------------------------------------  IN: 868 mL / OUT: 1495 mL / NET: -627 mL        All lines and drain sites were assessed  Glycemic trend was reviewed CAPILLARY BLOOD GLUCOSE  116 (30 Mar 2018 12:15)      POCT Blood Glucose.: 223 mg/dL (31 Mar 2018 21:24)    NEURO: No acute change in mental status.  CVS: S1, S2, No S3.  RESPIRATORY: Auscultation of the chest reveals equal breath sounds.  GI: Abdomen is soft. No tenderness. + Bowel sounds.  Extremities are warm and well perfused  Wounds appear clean and unremarkable  VASCULAR: + Distal pulses.         labs  CBC Full  -  ( 31 Mar 2018 17:16 )  WBC Count : 7.7 K/uL  Hemoglobin : 9.3 g/dL  Hematocrit : 28.9 %  Platelet Count - Automated : 108 K/uL  Mean Cell Volume : 85.3 fL  Mean Cell Hemoglobin : 27.4 pg  Mean Cell Hemoglobin Concentration : 32.2 g/dL  Auto Neutrophil # : x  Auto Lymphocyte # : x  Auto Monocyte # : x  Auto Eosinophil # : x  Auto Basophil # : x  Auto Neutrophil % : x  Auto Lymphocyte % : x  Auto Monocyte % : x  Auto Eosinophil % : x  Auto Basophil % : x    03-31    141  |  103  |  26<H>  ----------------------------<  197<H>  4.2   |  26  |  1.18    Ca    8.5      31 Mar 2018 17:17  Phos  3.8     03-30  Mg     2.3     03-31    TPro  5.9<L>  /  Alb  3.6  /  TBili  2.7<H>  /  DBili  x   /  AST  39  /  ALT  6<L>  /  AlkPhos  46  03-30    PT/INR - ( 31 Mar 2018 17:16 )   PT: 12.6 sec;   INR: 1.13          PTT - ( 31 Mar 2018 17:16 )  PTT:46.4 sec  The current medications were reviewed   MEDICATIONS  (STANDING):  acetylcysteine  Oral Solution 600 milliGRAM(s) Oral two times a day  aspirin enteric coated 81 milliGRAM(s) Oral daily  atorvastatin 40 milliGRAM(s) Oral at bedtime  dextrose 5%. 1000 milliLiter(s) (50 mL/Hr) IV Continuous <Continuous>  dextrose 50% Injectable 50 milliLiter(s) IV Push every 15 minutes  dextrose 50% Injectable 25 milliLiter(s) IV Push every 15 minutes  docusate sodium 100 milliGRAM(s) Oral three times a day  ferrous    sulfate 325 milliGRAM(s) Oral daily  folic acid 1 milliGRAM(s) Oral daily  heparin  Infusion. 600 Unit(s)/Hr (6 mL/Hr) IV Continuous <Continuous>  insulin lispro (HumaLOG) corrective regimen sliding scale   SubCutaneous Before meals and at bedtime  levETIRAcetam  IVPB 500 milliGRAM(s) IV Intermittent every 12 hours  lidocaine   Patch 1 Patch Transdermal daily  norepinephrine Infusion 0.01 MICROgram(s)/kG/Min (1.08 mL/Hr) IV Continuous <Continuous>  pantoprazole    Tablet 40 milliGRAM(s) Oral before breakfast  senna 2 Tablet(s) Oral at bedtime  sodium chloride 0.45%. 1000 milliLiter(s) (10 mL/Hr) IV Continuous <Continuous>  sodium chloride 0.9%. 1000 milliLiter(s) (50 mL/Hr) IV Continuous <Continuous>    MEDICATIONS  (PRN):  acetaminophen   Tablet 650 milliGRAM(s) Oral every 6 hours PRN mild pain  dextrose Gel 1 Dose(s) Oral once PRN Blood Glucose LESS THAN 70 milliGRAM(s)/deciliter  glucagon  Injectable 1 milliGRAM(s) IntraMuscular once PRN Glucose LESS THAN 70 milligrams/deciliter  polyethylene glycol 3350 17 Gram(s) Oral once PRN Constipation  traMADol 25 milliGRAM(s) Oral every 8 hours PRN Severe Pain (7 - 10)       PROBLEM LIST/ ASSESSMENT:  HEALTH ISSUES - PROBLEM Dx:        Patient is a 77y old  Female who presents with a chief complaint of 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        S/P Right Thoracotomy approach MVR.  Postoperative course complicated by Grand mal seizure activity.  Reintubated electively for airway protection.  EEG: Focal epileptiform  seizure activity.  Hemodynamically stable.  Borderline urine output after brisk diuresis.  + Chronic hematuria. Also present for years preop.  Good oxygenation.  Overall doing well.  Ambulating with assistance.       My plan includes :  Continue IV Keppra.  PO coumadin for valve prosthesis.  Close hemodynamic, ventilatory and drain monitoring and management.  Monitor for arrhythmias and monitor parameters for organ perfusion  Monitor neurologic status  Head of the bed should remain elevated to 45 deg .   Chest PT and IS will be encouraged  Monitor adequacy of oxygenation and ventilation and attempt to wean oxygen  Nutritional goals will be met using po eventually , ensure adequate caloric intake and monitor  the same  Stress ulcer and VTE prophylaxis will be achieved    Otimize Glycemic control.  Electrolytes have been repleted as necessary and wound care has been carried out. Pain control has been achieved.   Aggressive  physical therapy and early mobility and ambulation goals will be met   The family was updated about the course and plan  CRITICAL CARE TIME SPENT in evaluation and management, reassessments, review and interpretation of labs and x-rays, ventilator and hemodynamic management, formulating a plan and coordinating care: ___30____ MIN.  Time does not include procedural time.  CTICU ATTENDING     					      Renny Sparks MD

## 2018-03-31 NOTE — PROGRESS NOTE ADULT - SUBJECTIVE AND OBJECTIVE BOX
POD #2 s/p Re-op Mitral valve replacement   post op course complicated by witness tonic seizure of unclear etiology intubated yesterday, extubated this AM    76yo with history of RHD s/p MVR/AVR both mechanical 14 years ago, admitted with MV stenosis and thrombosis.  There was also concern for AV stenosis but MONROE/FFR here showed normally functioning Aortic valve.  Pt has history of hypoproliferative, almost transfusion dependent anemia.  She underwent uncomplicated MVR via Right mini- thoracotomy under fibrillatory arrest.      Extubated overnight, chronic afib  Stable hemodynamics.     PMH :  CVA (cerebral vascular accident)  Anemia  Rheumatic fever  Diabetes  Osteoporosis  Mitral valve replaced  Atrial fibrillation  Hypertension  History of ureter stent  S/P AVR  H/O mitral valve replacement    ROS awake alert.    All other systems reviewed and negative.    ICU Vital Signs Last 24 Hrs  T(C): 36.9 (03-31-18 @ 13:00), Max: 37.3 (03-30-18 @ 16:42)  T(F): 98.4 (03-31-18 @ 13:00), Max: 99.1 (03-30-18 @ 16:42)  HR: 74 (03-31-18 @ 14:00) (64 - 94)  ABP: 144/50 (03-31-18 @ 14:00) (94/74 - 190/64)  ABP(mean): 80 (03-31-18 @ 14:00) (70 - 108)  RR: 22 (03-31-18 @ 14:00) (16 - 28)  SpO2: 100% (03-31-18 @ 14:00) (98% - 100%)    I&O's Summary    30 Mar 2018 07:01  -  31 Mar 2018 07:00  --------------------------------------------------------  IN: 1827 mL / OUT: 1545 mL / NET: 282 mL    31 Mar 2018 07:01  -  31 Mar 2018 15:16  --------------------------------------------------------  IN: 170 mL / OUT: 925 mL / NET: -755 mL      Mode: CPAP with PS  FiO2: 40  PEEP: 5  PS: 10  ITime: 1  MAP: 9  PIP: 20    ABG - ( 31 Mar 2018 12:43 )  pH: 7.44  /  pCO2: 40    /  pO2: 251   / HCO3: 27    / Base Excess: 2.3   /  SaO2: 99                            9.4    7.4   )-----------( 74       ( 31 Mar 2018 12:44 )             29.0     31 Mar 2018 12:44    141    |  103    |  25     ----------------------------<  201    4.3     |  27     |  1.25     Ca    8.4        31 Mar 2018 12:44  Phos  3.8       30 Mar 2018 15:42  Mg     2.3       31 Mar 2018 12:44    TPro  5.9    /  Alb  3.6    /  TBili  2.7    /  DBili  x      /  AST  39     /  ALT  6      /  AlkPhos  46     30 Mar 2018 10:22    PT/INR - ( 31 Mar 2018 12:44 )   PT: 12.4 sec;   INR: 1.11     PTT - ( 31 Mar 2018 12:44 )  PTT:44.0 sec    MEDICATIONS  (STANDING):  aspirin enteric coated 81 milliGRAM(s) Oral daily  atorvastatin 40 milliGRAM(s) Oral at bedtime  docusate sodium 100 milliGRAM(s) Oral three times a day  ferrous    sulfate 325 milliGRAM(s) Oral daily  folic acid 1 milliGRAM(s) Oral daily  heparin  Infusion. 600 Unit(s)/Hr IV Continuous <Continuous>  insulin lispro (HumaLOG) corrective regimen sliding scale   SubCutaneous Before meals and at bedtime  levETIRAcetam  IVPB 500 milliGRAM(s) IV Intermittent every 12 hours  lidocaine   Patch 1 Patch Transdermal daily  norepinephrine Infusion 0.01 MICROgram(s)/kG/Min IV Continuous <Continuous>  pantoprazole    Tablet 40 milliGRAM(s) Oral before breakfast  senna 2 Tablet(s) Oral at bedtime  warfarin 5 milliGRAM(s) Oral once    Home Medications:  atorvastatin 40 mg oral tablet: 1 tab(s) orally once a day (at bedtime) (24 Mar 2018 17:27)  Coumadin 5 mg oral tablet: 1 tab(s) orally once a day (24 Mar 2018 17:27)  Evista 60 mg oral tablet: 1 tab(s) orally once a day (24 Mar 2018 17:27)  ferrous sulfate 324 mg (65 mg elemental iron) oral tablet: orally once a day (24 Mar 2018 17:27)  folic acid 1 mg oral tablet: 1 tab(s) orally once a day (24 Mar 2018 17:27)  furosemide 20 mg oral tablet: 1 tab(s) orally once a day (24 Mar 2018 17:27)  glimepiride 4 mg oral tablet: 1 tab(s) orally once a day (24 Mar 2018 17:27)  heparin:  (24 Mar 2018 17:27)  metFORMIN 500 mg oral tablet, extended release: 1 tab(s) orally once a day (24 Mar 2018 17:27)  metoprolol tartrate 25 mg oral tablet: 1 tab(s) orally 2 times a day (24 Mar 2018 17:27)  pantoprazole 40 mg oral delayed release tablet: 1 tab(s) orally once a day (before a meal) (24 Mar 2018 17:27)  pravastatin 40 mg oral tablet: 1 tab(s) orally once a day (24 Mar 2018 17:27)  ramipril 10 mg oral capsule: 1 cap(s) orally once a day (24 Mar 2018 17:27)    PHYSICAL EXAM:  Gen : no acute distress  Respiratory: decreased in the bases  Cardiovascular: S1 and S2, RRR, no M/G/R  Gastrointestinal: BS+, soft, NT/ND  Extremities: No peripheral edema  Vascular: 2+ peripheral pulses  Neurological: A/O x 3, no focal deficits  Incision: clean dry/ no sign of infection  Lines: no sign of infection

## 2018-04-01 LAB
ALBUMIN SERPL ELPH-MCNC: 3.4 G/DL — SIGNIFICANT CHANGE UP (ref 3.3–5)
ALP SERPL-CCNC: 51 U/L — SIGNIFICANT CHANGE UP (ref 40–120)
ALT FLD-CCNC: <5 U/L — LOW (ref 10–45)
ANION GAP SERPL CALC-SCNC: 10 MMOL/L — SIGNIFICANT CHANGE UP (ref 5–17)
ANION GAP SERPL CALC-SCNC: 12 MMOL/L — SIGNIFICANT CHANGE UP (ref 5–17)
APTT BLD: 28.2 SEC — SIGNIFICANT CHANGE UP (ref 27.5–37.4)
APTT BLD: 40.3 SEC — HIGH (ref 27.5–37.4)
AST SERPL-CCNC: 22 U/L — SIGNIFICANT CHANGE UP (ref 10–40)
BASE EXCESS BLDA CALC-SCNC: 0.7 MMOL/L — SIGNIFICANT CHANGE UP (ref -2–3)
BASE EXCESS BLDA CALC-SCNC: 2.5 MMOL/L — SIGNIFICANT CHANGE UP (ref -2–3)
BILIRUB DIRECT SERPL-MCNC: 0.4 MG/DL — HIGH (ref 0–0.2)
BILIRUB INDIRECT FLD-MCNC: 1.8 MG/DL — HIGH (ref 0.2–1)
BILIRUB SERPL-MCNC: 2.2 MG/DL — HIGH (ref 0.2–1.2)
BUN SERPL-MCNC: 24 MG/DL — HIGH (ref 7–23)
BUN SERPL-MCNC: 25 MG/DL — HIGH (ref 7–23)
CALCIUM SERPL-MCNC: 8.6 MG/DL — SIGNIFICANT CHANGE UP (ref 8.4–10.5)
CALCIUM SERPL-MCNC: 8.7 MG/DL — SIGNIFICANT CHANGE UP (ref 8.4–10.5)
CHLORIDE SERPL-SCNC: 100 MMOL/L — SIGNIFICANT CHANGE UP (ref 96–108)
CHLORIDE SERPL-SCNC: 103 MMOL/L — SIGNIFICANT CHANGE UP (ref 96–108)
CO2 SERPL-SCNC: 26 MMOL/L — SIGNIFICANT CHANGE UP (ref 22–31)
CO2 SERPL-SCNC: 27 MMOL/L — SIGNIFICANT CHANGE UP (ref 22–31)
CREAT SERPL-MCNC: 1.13 MG/DL — SIGNIFICANT CHANGE UP (ref 0.5–1.3)
CREAT SERPL-MCNC: 1.2 MG/DL — SIGNIFICANT CHANGE UP (ref 0.5–1.3)
CULTURE RESULTS: NO GROWTH — SIGNIFICANT CHANGE UP
GAS PNL BLDA: SIGNIFICANT CHANGE UP
GAS PNL BLDA: SIGNIFICANT CHANGE UP
GLUCOSE BLDC GLUCOMTR-MCNC: 106 MG/DL — HIGH (ref 70–99)
GLUCOSE BLDC GLUCOMTR-MCNC: 157 MG/DL — HIGH (ref 70–99)
GLUCOSE BLDC GLUCOMTR-MCNC: 196 MG/DL — HIGH (ref 70–99)
GLUCOSE BLDC GLUCOMTR-MCNC: 266 MG/DL — HIGH (ref 70–99)
GLUCOSE SERPL-MCNC: 103 MG/DL — HIGH (ref 70–99)
GLUCOSE SERPL-MCNC: 129 MG/DL — HIGH (ref 70–99)
HCO3 BLDA-SCNC: 26 MMOL/L — SIGNIFICANT CHANGE UP (ref 21–28)
HCO3 BLDA-SCNC: 28 MMOL/L — SIGNIFICANT CHANGE UP (ref 21–28)
HCT VFR BLD CALC: 26.4 % — LOW (ref 34.5–45)
HCT VFR BLD CALC: 26.9 % — LOW (ref 34.5–45)
HGB BLD-MCNC: 8.1 G/DL — LOW (ref 11.5–15.5)
HGB BLD-MCNC: 8.6 G/DL — LOW (ref 11.5–15.5)
INR BLD: 1.11 — SIGNIFICANT CHANGE UP (ref 0.88–1.16)
INR BLD: 1.18 — HIGH (ref 0.88–1.16)
MAGNESIUM SERPL-MCNC: 2.3 MG/DL — SIGNIFICANT CHANGE UP (ref 1.6–2.6)
MAGNESIUM SERPL-MCNC: 2.3 MG/DL — SIGNIFICANT CHANGE UP (ref 1.6–2.6)
MCHC RBC-ENTMCNC: 26.5 PG — LOW (ref 27–34)
MCHC RBC-ENTMCNC: 27.1 PG — SIGNIFICANT CHANGE UP (ref 27–34)
MCHC RBC-ENTMCNC: 30.7 G/DL — LOW (ref 32–36)
MCHC RBC-ENTMCNC: 32 G/DL — SIGNIFICANT CHANGE UP (ref 32–36)
MCV RBC AUTO: 84.9 FL — SIGNIFICANT CHANGE UP (ref 80–100)
MCV RBC AUTO: 86.3 FL — SIGNIFICANT CHANGE UP (ref 80–100)
PCO2 BLDA: 43 MMHG — SIGNIFICANT CHANGE UP (ref 32–45)
PCO2 BLDA: 47 MMHG — HIGH (ref 32–45)
PH BLDA: 7.39 — SIGNIFICANT CHANGE UP (ref 7.35–7.45)
PH BLDA: 7.4 — SIGNIFICANT CHANGE UP (ref 7.35–7.45)
PHOSPHATE SERPL-MCNC: 4.2 MG/DL — SIGNIFICANT CHANGE UP (ref 2.5–4.5)
PLATELET # BLD AUTO: 103 K/UL — LOW (ref 150–400)
PLATELET # BLD AUTO: 114 K/UL — LOW (ref 150–400)
PO2 BLDA: 109 MMHG — HIGH (ref 83–108)
PO2 BLDA: 170 MMHG — HIGH (ref 83–108)
POTASSIUM SERPL-MCNC: 4 MMOL/L — SIGNIFICANT CHANGE UP (ref 3.5–5.3)
POTASSIUM SERPL-MCNC: 4.2 MMOL/L — SIGNIFICANT CHANGE UP (ref 3.5–5.3)
POTASSIUM SERPL-SCNC: 4 MMOL/L — SIGNIFICANT CHANGE UP (ref 3.5–5.3)
POTASSIUM SERPL-SCNC: 4.2 MMOL/L — SIGNIFICANT CHANGE UP (ref 3.5–5.3)
PROT SERPL-MCNC: 6 G/DL — SIGNIFICANT CHANGE UP (ref 6–8.3)
PROTHROM AB SERPL-ACNC: 12.3 SEC — SIGNIFICANT CHANGE UP (ref 9.8–12.7)
PROTHROM AB SERPL-ACNC: 13.1 SEC — HIGH (ref 9.8–12.7)
RBC # BLD: 3.06 M/UL — LOW (ref 3.8–5.2)
RBC # BLD: 3.17 M/UL — LOW (ref 3.8–5.2)
RBC # FLD: 16.8 % — SIGNIFICANT CHANGE UP (ref 10.3–16.9)
RBC # FLD: 16.9 % — SIGNIFICANT CHANGE UP (ref 10.3–16.9)
SAO2 % BLDA: 98 % — SIGNIFICANT CHANGE UP (ref 95–100)
SAO2 % BLDA: 99 % — SIGNIFICANT CHANGE UP (ref 95–100)
SODIUM SERPL-SCNC: 138 MMOL/L — SIGNIFICANT CHANGE UP (ref 135–145)
SODIUM SERPL-SCNC: 140 MMOL/L — SIGNIFICANT CHANGE UP (ref 135–145)
SPECIMEN SOURCE: SIGNIFICANT CHANGE UP
WBC # BLD: 6.7 K/UL — SIGNIFICANT CHANGE UP (ref 3.8–10.5)
WBC # BLD: 6.9 K/UL — SIGNIFICANT CHANGE UP (ref 3.8–10.5)
WBC # FLD AUTO: 6.7 K/UL — SIGNIFICANT CHANGE UP (ref 3.8–10.5)
WBC # FLD AUTO: 6.9 K/UL — SIGNIFICANT CHANGE UP (ref 3.8–10.5)

## 2018-04-01 PROCEDURE — 99291 CRITICAL CARE FIRST HOUR: CPT

## 2018-04-01 PROCEDURE — 99292 CRITICAL CARE ADDL 30 MIN: CPT

## 2018-04-01 PROCEDURE — 71045 X-RAY EXAM CHEST 1 VIEW: CPT | Mod: 26,77,76

## 2018-04-01 PROCEDURE — 95812 EEG 41-60 MINUTES: CPT | Mod: 26

## 2018-04-01 PROCEDURE — 71045 X-RAY EXAM CHEST 1 VIEW: CPT | Mod: 26

## 2018-04-01 RX ORDER — OXYCODONE AND ACETAMINOPHEN 5; 325 MG/1; MG/1
1 TABLET ORAL EVERY 6 HOURS
Qty: 0 | Refills: 0 | Status: DISCONTINUED | OUTPATIENT
Start: 2018-04-01 | End: 2018-04-01

## 2018-04-01 RX ORDER — LEVETIRACETAM 250 MG/1
250 TABLET, FILM COATED ORAL
Qty: 0 | Refills: 0 | Status: DISCONTINUED | OUTPATIENT
Start: 2018-04-01 | End: 2018-04-05

## 2018-04-01 RX ORDER — WARFARIN SODIUM 2.5 MG/1
5 TABLET ORAL ONCE
Qty: 0 | Refills: 0 | Status: COMPLETED | OUTPATIENT
Start: 2018-04-01 | End: 2018-04-01

## 2018-04-01 RX ORDER — FUROSEMIDE 40 MG
40 TABLET ORAL ONCE
Qty: 0 | Refills: 0 | Status: COMPLETED | OUTPATIENT
Start: 2018-04-01 | End: 2018-04-01

## 2018-04-01 RX ORDER — HEPARIN SODIUM 5000 [USP'U]/ML
700 INJECTION INTRAVENOUS; SUBCUTANEOUS
Qty: 25000 | Refills: 0 | Status: DISCONTINUED | OUTPATIENT
Start: 2018-04-01 | End: 2018-04-01

## 2018-04-01 RX ORDER — OXYCODONE AND ACETAMINOPHEN 5; 325 MG/1; MG/1
1 TABLET ORAL EVERY 6 HOURS
Qty: 0 | Refills: 0 | Status: DISCONTINUED | OUTPATIENT
Start: 2018-04-01 | End: 2018-04-02

## 2018-04-01 RX ORDER — METOPROLOL TARTRATE 50 MG
12.5 TABLET ORAL EVERY 12 HOURS
Qty: 0 | Refills: 0 | Status: DISCONTINUED | OUTPATIENT
Start: 2018-04-01 | End: 2018-04-05

## 2018-04-01 RX ORDER — LEVETIRACETAM 250 MG/1
500 TABLET, FILM COATED ORAL
Qty: 0 | Refills: 0 | Status: DISCONTINUED | OUTPATIENT
Start: 2018-04-01 | End: 2018-04-01

## 2018-04-01 RX ORDER — HEPARIN SODIUM 5000 [USP'U]/ML
500 INJECTION INTRAVENOUS; SUBCUTANEOUS
Qty: 25000 | Refills: 0 | Status: DISCONTINUED | OUTPATIENT
Start: 2018-04-01 | End: 2018-04-02

## 2018-04-01 RX ADMIN — Medication 6: at 22:02

## 2018-04-01 RX ADMIN — SENNA PLUS 2 TABLET(S): 8.6 TABLET ORAL at 22:02

## 2018-04-01 RX ADMIN — OXYCODONE AND ACETAMINOPHEN 1 TABLET(S): 5; 325 TABLET ORAL at 22:08

## 2018-04-01 RX ADMIN — Medication 325 MILLIGRAM(S): at 11:53

## 2018-04-01 RX ADMIN — Medication 100 MILLIGRAM(S): at 06:23

## 2018-04-01 RX ADMIN — Medication 81 MILLIGRAM(S): at 11:53

## 2018-04-01 RX ADMIN — Medication 2: at 06:23

## 2018-04-01 RX ADMIN — OXYCODONE AND ACETAMINOPHEN 1 TABLET(S): 5; 325 TABLET ORAL at 23:08

## 2018-04-01 RX ADMIN — Medication 2: at 11:53

## 2018-04-01 RX ADMIN — Medication 12.5 MILLIGRAM(S): at 17:03

## 2018-04-01 RX ADMIN — ATORVASTATIN CALCIUM 40 MILLIGRAM(S): 80 TABLET, FILM COATED ORAL at 22:03

## 2018-04-01 RX ADMIN — OXYCODONE AND ACETAMINOPHEN 1 TABLET(S): 5; 325 TABLET ORAL at 10:39

## 2018-04-01 RX ADMIN — Medication 1 MILLIGRAM(S): at 11:53

## 2018-04-01 RX ADMIN — OXYCODONE AND ACETAMINOPHEN 1 TABLET(S): 5; 325 TABLET ORAL at 09:38

## 2018-04-01 RX ADMIN — Medication 100 MILLIGRAM(S): at 22:03

## 2018-04-01 RX ADMIN — TRAMADOL HYDROCHLORIDE 25 MILLIGRAM(S): 50 TABLET ORAL at 02:26

## 2018-04-01 RX ADMIN — Medication 100 MILLIGRAM(S): at 13:22

## 2018-04-01 RX ADMIN — Medication 40 MILLIGRAM(S): at 17:10

## 2018-04-01 RX ADMIN — LEVETIRACETAM 500 MILLIGRAM(S): 250 TABLET, FILM COATED ORAL at 17:03

## 2018-04-01 RX ADMIN — WARFARIN SODIUM 5 MILLIGRAM(S): 2.5 TABLET ORAL at 22:04

## 2018-04-01 RX ADMIN — TRAMADOL HYDROCHLORIDE 25 MILLIGRAM(S): 50 TABLET ORAL at 01:33

## 2018-04-01 RX ADMIN — PANTOPRAZOLE SODIUM 40 MILLIGRAM(S): 20 TABLET, DELAYED RELEASE ORAL at 06:24

## 2018-04-01 NOTE — PROGRESS NOTE ADULT - SUBJECTIVE AND OBJECTIVE BOX
POD #3 s/p Re-op Mitral valve replacement   post op course complicated by witness tonic seizure requiring brief intubation   no further seizure activity       78yo with history of RHD s/p MVR/AVR both mechanical 14 years ago, admitted with MV stenosis and thrombosis.  There was also concern for AV stenosis but MONROE/FFR here showed normally functioning Aortic valve.  Pt has history of hypoproliferative, almost transfusion dependent anemia.  She underwent uncomplicated MVR via Right mini- thoracotomy under fibrillatory arrest.      PMH :  CVA (cerebral vascular accident)  Anemia  Rheumatic fever  Diabetes  Osteoporosis  Mitral valve replaced  Atrial fibrillation  Hypertension  History of ureter stent  S/P AVR      PMH :  PROSTHETIC MV ENDOCARDITS  CVA (cerebral vascular accident)  Anemia  Rheumatic fever  Diabetes  Osteoporosis  Mitral valve replaced  Atrial fibrillation  Hypertension  Mitral valve stenosis, unspecified etiology  S/P AVR  H/O mitral valve replacement    ROS + pain   All other systems reviewed and negative.    ICU Vital Signs Last 24 Hrs  T(C): 36.8 (04-01-18 @ 05:05), Max: 37.1 (03-31-18 @ 10:00)  T(F): 98.3 (04-01-18 @ 05:05), Max: 98.8 (03-31-18 @ 10:00)  HR: 92 (04-01-18 @ 09:00) (74 - 96)  ABP: 138/48 (04-01-18 @ 09:00) (92/74 - 168/58)  ABP(mean): 76 (04-01-18 @ 09:00) (72 - 94)  RR: 20 (04-01-18 @ 09:00) (17 - 33)  SpO2: 99% (04-01-18 @ 09:00) (96% - 100%)    I&O's Summary    31 Mar 2018 07:01  -  01 Apr 2018 07:00  --------------------------------------------------------  IN: 1303 mL / OUT: 2020 mL / NET: -717 mL    01 Apr 2018 07:01  -  01 Apr 2018 09:36  --------------------------------------------------------  IN: 27 mL / OUT: 225 mL / NET: -198 mL        ABG - ( 01 Apr 2018 02:25 )  pH: 7.39  /  pCO2: 47    /  pO2: 170   / HCO3: 28    / Base Excess: 2.5   /  SaO2: 99                            8.1    6.9   )-----------( 103      ( 01 Apr 2018 02:21 )             26.4     01 Apr 2018 02:21    138    |  100    |  24     ----------------------------<  103    4.0     |  26     |  1.20     Ca    8.6        01 Apr 2018 02:21  Phos  4.2       01 Apr 2018 02:21  Mg     2.3       01 Apr 2018 02:21    TPro  6.0    /  Alb  3.4    /  TBili  2.2    /  DBili  0.4    /  AST  22     /  ALT  <5     /  AlkPhos  51     01 Apr 2018 02:21    PT/INR - ( 01 Apr 2018 02:21 )   PT: 12.3 sec;   INR: 1.11     PTT - ( 01 Apr 2018 02:21 )  PTT:40.3 sec    MEDICATIONS  (STANDING):  acetylcysteine  Oral Solution 600 milliGRAM(s) Oral two times a day  aspirin enteric coated 81 milliGRAM(s) Oral daily  atorvastatin 40 milliGRAM(s) Oral at bedtime  docusate sodium 100 milliGRAM(s) Oral three times a day  ferrous    sulfate 325 milliGRAM(s) Oral daily  folic acid 1 milliGRAM(s) Oral daily  heparin  Infusion. 500 Unit(s)/Hr IV Continuous <Continuous>  insulin lispro (HumaLOG) corrective regimen sliding scale   SubCutaneous Before meals and at bedtime  levETIRAcetam 500 milliGRAM(s) Oral two times a day  lidocaine   Patch 1 Patch Transdermal daily  metoprolol tartrate 12.5 milliGRAM(s) Oral every 12 hours  pantoprazole    Tablet 40 milliGRAM(s) Oral before breakfast  senna 2 Tablet(s) Oral at bedtime      Home Medications:  atorvastatin 40 mg oral tablet: 1 tab(s) orally once a day (at bedtime) (24 Mar 2018 17:27)  Coumadin 5 mg oral tablet: 1 tab(s) orally once a day (24 Mar 2018 17:27)  Evista 60 mg oral tablet: 1 tab(s) orally once a day (24 Mar 2018 17:27)  ferrous sulfate 324 mg (65 mg elemental iron) oral tablet: orally once a day (24 Mar 2018 17:27)  folic acid 1 mg oral tablet: 1 tab(s) orally once a day (24 Mar 2018 17:27)  furosemide 20 mg oral tablet: 1 tab(s) orally once a day (24 Mar 2018 17:27)  glimepiride 4 mg oral tablet: 1 tab(s) orally once a day (24 Mar 2018 17:27)  heparin:  (24 Mar 2018 17:27)  metFORMIN 500 mg oral tablet, extended release: 1 tab(s) orally once a day (24 Mar 2018 17:27)  metoprolol tartrate 25 mg oral tablet: 1 tab(s) orally 2 times a day (24 Mar 2018 17:27)  pantoprazole 40 mg oral delayed release tablet: 1 tab(s) orally once a day (before a meal) (24 Mar 2018 17:27)  pravastatin 40 mg oral tablet: 1 tab(s) orally once a day (24 Mar 2018 17:27)  ramipril 10 mg oral capsule: 1 cap(s) orally once a day (24 Mar 2018 17:27)    PHYSICAL EXAM:  Gen : no acute distress  Neck: No LAD, No JVD  Respiratory: decreased in the bases  Cardiovascular: S1 and S2, Irregular   Gastrointestinal: BS+, soft, NT/ND  Extremities: No peripheral edema  Vascular: 2+ peripheral pulses  Neurological: A/O x 3, no focal deficits  Incision: clean dry/ no sign of infection

## 2018-04-01 NOTE — PROGRESS NOTE ADULT - ASSESSMENT
78yo with history of RHD s/p MVR/AVR both mechanical 14 years ago, admitted with MV stenosis and thrombosis.  s/p Re-op MVR via right thoracotomy. Post op course complicated by witness seizure activity of unclear etiology.  Stroke workup negative.  Mentation back to baseline.  No further seizure activity     Problems  1. S/p Re-op MVR   2. now onset seizure   3. Chronic afib   4. h/o Mechanical AVR  5. Chronic anemia     Plan   Neuro -- seizure workup EEG possible focus of seizure noted.  Plan is to keep Keppra  CVS -- stable hemodynamics, starting low dose BB , diuresis  coumadin, heparin gtt for Afib and mechanical AVR   Pulm - Stable oxygenation   GI - GI proph   - gentle  diuresis  h/o Hydronephrosis, has ureteral stents and history of recurrent UTIs  Endo - glycemic control  Heme - DVT proph       Critical post op.    Critical care time spent 50min

## 2018-04-02 LAB
ALBUMIN SERPL ELPH-MCNC: 3 G/DL — LOW (ref 3.3–5)
ALP SERPL-CCNC: 51 U/L — SIGNIFICANT CHANGE UP (ref 40–120)
ALP SERPL-CCNC: 60 U/L — SIGNIFICANT CHANGE UP (ref 40–120)
ALP SERPL-CCNC: 77 U/L — SIGNIFICANT CHANGE UP (ref 40–120)
ALT FLD-CCNC: <5 U/L — LOW (ref 10–45)
ANION GAP SERPL CALC-SCNC: 10 MMOL/L — SIGNIFICANT CHANGE UP (ref 5–17)
ANION GAP SERPL CALC-SCNC: 8 MMOL/L — SIGNIFICANT CHANGE UP (ref 5–17)
ANION GAP SERPL CALC-SCNC: 8 MMOL/L — SIGNIFICANT CHANGE UP (ref 5–17)
APTT BLD: 33.4 SEC — SIGNIFICANT CHANGE UP (ref 27.5–37.4)
APTT BLD: 35.4 SEC — SIGNIFICANT CHANGE UP (ref 27.5–37.4)
APTT BLD: 36.7 SEC — SIGNIFICANT CHANGE UP (ref 27.5–37.4)
AST SERPL-CCNC: 14 U/L — SIGNIFICANT CHANGE UP (ref 10–40)
AST SERPL-CCNC: 15 U/L — SIGNIFICANT CHANGE UP (ref 10–40)
AST SERPL-CCNC: 17 U/L — SIGNIFICANT CHANGE UP (ref 10–40)
BILIRUB DIRECT SERPL-MCNC: 0.4 MG/DL — HIGH (ref 0–0.2)
BILIRUB INDIRECT FLD-MCNC: 1.3 MG/DL — HIGH (ref 0.2–1)
BILIRUB SERPL-MCNC: 1.7 MG/DL — HIGH (ref 0.2–1.2)
BILIRUB SERPL-MCNC: 1.9 MG/DL — HIGH (ref 0.2–1.2)
BILIRUB SERPL-MCNC: 2.1 MG/DL — HIGH (ref 0.2–1.2)
BLD GP AB SCN SERPL QL: NEGATIVE — SIGNIFICANT CHANGE UP
BUN SERPL-MCNC: 27 MG/DL — HIGH (ref 7–23)
BUN SERPL-MCNC: 28 MG/DL — HIGH (ref 7–23)
BUN SERPL-MCNC: 31 MG/DL — HIGH (ref 7–23)
CALCIUM SERPL-MCNC: 8.4 MG/DL — SIGNIFICANT CHANGE UP (ref 8.4–10.5)
CALCIUM SERPL-MCNC: 8.7 MG/DL — SIGNIFICANT CHANGE UP (ref 8.4–10.5)
CALCIUM SERPL-MCNC: 8.7 MG/DL — SIGNIFICANT CHANGE UP (ref 8.4–10.5)
CHLORIDE SERPL-SCNC: 100 MMOL/L — SIGNIFICANT CHANGE UP (ref 96–108)
CHLORIDE SERPL-SCNC: 102 MMOL/L — SIGNIFICANT CHANGE UP (ref 96–108)
CHLORIDE SERPL-SCNC: 99 MMOL/L — SIGNIFICANT CHANGE UP (ref 96–108)
CO2 SERPL-SCNC: 30 MMOL/L — SIGNIFICANT CHANGE UP (ref 22–31)
CO2 SERPL-SCNC: 30 MMOL/L — SIGNIFICANT CHANGE UP (ref 22–31)
CO2 SERPL-SCNC: 31 MMOL/L — SIGNIFICANT CHANGE UP (ref 22–31)
CREAT SERPL-MCNC: 1.07 MG/DL — SIGNIFICANT CHANGE UP (ref 0.5–1.3)
CREAT SERPL-MCNC: 1.13 MG/DL — SIGNIFICANT CHANGE UP (ref 0.5–1.3)
CREAT SERPL-MCNC: 1.14 MG/DL — SIGNIFICANT CHANGE UP (ref 0.5–1.3)
GAS PNL BLDA: SIGNIFICANT CHANGE UP
GLUCOSE BLDC GLUCOMTR-MCNC: 118 MG/DL — HIGH (ref 70–99)
GLUCOSE BLDC GLUCOMTR-MCNC: 207 MG/DL — HIGH (ref 70–99)
GLUCOSE BLDC GLUCOMTR-MCNC: 226 MG/DL — HIGH (ref 70–99)
GLUCOSE BLDC GLUCOMTR-MCNC: 276 MG/DL — HIGH (ref 70–99)
GLUCOSE SERPL-MCNC: 212 MG/DL — HIGH (ref 70–99)
GLUCOSE SERPL-MCNC: 263 MG/DL — HIGH (ref 70–99)
GLUCOSE SERPL-MCNC: 93 MG/DL — SIGNIFICANT CHANGE UP (ref 70–99)
HCT VFR BLD CALC: 22.8 % — LOW (ref 34.5–45)
HCT VFR BLD CALC: 27.2 % — LOW (ref 34.5–45)
HCT VFR BLD CALC: 28.6 % — LOW (ref 34.5–45)
HGB BLD-MCNC: 7.3 G/DL — LOW (ref 11.5–15.5)
HGB BLD-MCNC: 8.9 G/DL — LOW (ref 11.5–15.5)
HGB BLD-MCNC: 9 G/DL — LOW (ref 11.5–15.5)
INR BLD: 1.25 — HIGH (ref 0.88–1.16)
INR BLD: 1.26 — HIGH (ref 0.88–1.16)
INR BLD: 1.34 — HIGH (ref 0.88–1.16)
LACTATE SERPL-SCNC: 0.6 MMOL/L — SIGNIFICANT CHANGE UP (ref 0.5–2)
LACTATE SERPL-SCNC: 1 MMOL/L — SIGNIFICANT CHANGE UP (ref 0.5–2)
MAGNESIUM SERPL-MCNC: 1.8 MG/DL — SIGNIFICANT CHANGE UP (ref 1.6–2.6)
MAGNESIUM SERPL-MCNC: 2.2 MG/DL — SIGNIFICANT CHANGE UP (ref 1.6–2.6)
MAGNESIUM SERPL-MCNC: 2.3 MG/DL — SIGNIFICANT CHANGE UP (ref 1.6–2.6)
MCHC RBC-ENTMCNC: 27.2 PG — SIGNIFICANT CHANGE UP (ref 27–34)
MCHC RBC-ENTMCNC: 27.4 PG — SIGNIFICANT CHANGE UP (ref 27–34)
MCHC RBC-ENTMCNC: 28 PG — SIGNIFICANT CHANGE UP (ref 27–34)
MCHC RBC-ENTMCNC: 31.5 G/DL — LOW (ref 32–36)
MCHC RBC-ENTMCNC: 32 G/DL — SIGNIFICANT CHANGE UP (ref 32–36)
MCHC RBC-ENTMCNC: 32.7 G/DL — SIGNIFICANT CHANGE UP (ref 32–36)
MCV RBC AUTO: 85.1 FL — SIGNIFICANT CHANGE UP (ref 80–100)
MCV RBC AUTO: 85.5 FL — SIGNIFICANT CHANGE UP (ref 80–100)
MCV RBC AUTO: 86.9 FL — SIGNIFICANT CHANGE UP (ref 80–100)
PHOSPHATE SERPL-MCNC: 3.8 MG/DL — SIGNIFICANT CHANGE UP (ref 2.5–4.5)
PHOSPHATE SERPL-MCNC: 4 MG/DL — SIGNIFICANT CHANGE UP (ref 2.5–4.5)
PLATELET # BLD AUTO: 103 K/UL — LOW (ref 150–400)
PLATELET # BLD AUTO: 124 K/UL — LOW (ref 150–400)
PLATELET # BLD AUTO: 125 K/UL — LOW (ref 150–400)
POTASSIUM SERPL-MCNC: 4 MMOL/L — SIGNIFICANT CHANGE UP (ref 3.5–5.3)
POTASSIUM SERPL-MCNC: 4.3 MMOL/L — SIGNIFICANT CHANGE UP (ref 3.5–5.3)
POTASSIUM SERPL-MCNC: 4.3 MMOL/L — SIGNIFICANT CHANGE UP (ref 3.5–5.3)
POTASSIUM SERPL-SCNC: 4 MMOL/L — SIGNIFICANT CHANGE UP (ref 3.5–5.3)
POTASSIUM SERPL-SCNC: 4.3 MMOL/L — SIGNIFICANT CHANGE UP (ref 3.5–5.3)
POTASSIUM SERPL-SCNC: 4.3 MMOL/L — SIGNIFICANT CHANGE UP (ref 3.5–5.3)
PROT SERPL-MCNC: 5.3 G/DL — LOW (ref 6–8.3)
PROT SERPL-MCNC: 5.8 G/DL — LOW (ref 6–8.3)
PROT SERPL-MCNC: 6 G/DL — SIGNIFICANT CHANGE UP (ref 6–8.3)
PROTHROM AB SERPL-ACNC: 13.9 SEC — HIGH (ref 9.8–12.7)
PROTHROM AB SERPL-ACNC: 14 SEC — HIGH (ref 9.8–12.7)
PROTHROM AB SERPL-ACNC: 15 SEC — HIGH (ref 9.8–12.7)
RBC # BLD: 2.68 M/UL — LOW (ref 3.8–5.2)
RBC # BLD: 3.18 M/UL — LOW (ref 3.8–5.2)
RBC # BLD: 3.29 M/UL — LOW (ref 3.8–5.2)
RBC # FLD: 16.8 % — SIGNIFICANT CHANGE UP (ref 10.3–16.9)
RBC # FLD: 17 % — HIGH (ref 10.3–16.9)
RBC # FLD: 17.3 % — HIGH (ref 10.3–16.9)
RH IG SCN BLD-IMP: POSITIVE — SIGNIFICANT CHANGE UP
SODIUM SERPL-SCNC: 138 MMOL/L — SIGNIFICANT CHANGE UP (ref 135–145)
SODIUM SERPL-SCNC: 139 MMOL/L — SIGNIFICANT CHANGE UP (ref 135–145)
SODIUM SERPL-SCNC: 141 MMOL/L — SIGNIFICANT CHANGE UP (ref 135–145)
SURGICAL PATHOLOGY STUDY: SIGNIFICANT CHANGE UP
WBC # BLD: 5.2 K/UL — SIGNIFICANT CHANGE UP (ref 3.8–10.5)
WBC # BLD: 5.6 K/UL — SIGNIFICANT CHANGE UP (ref 3.8–10.5)
WBC # BLD: 6.1 K/UL — SIGNIFICANT CHANGE UP (ref 3.8–10.5)
WBC # FLD AUTO: 5.2 K/UL — SIGNIFICANT CHANGE UP (ref 3.8–10.5)
WBC # FLD AUTO: 5.6 K/UL — SIGNIFICANT CHANGE UP (ref 3.8–10.5)
WBC # FLD AUTO: 6.1 K/UL — SIGNIFICANT CHANGE UP (ref 3.8–10.5)

## 2018-04-02 PROCEDURE — 99291 CRITICAL CARE FIRST HOUR: CPT

## 2018-04-02 PROCEDURE — 71045 X-RAY EXAM CHEST 1 VIEW: CPT | Mod: 26

## 2018-04-02 RX ORDER — HEPARIN SODIUM 5000 [USP'U]/ML
600 INJECTION INTRAVENOUS; SUBCUTANEOUS
Qty: 25000 | Refills: 0 | Status: DISCONTINUED | OUTPATIENT
Start: 2018-04-02 | End: 2018-04-04

## 2018-04-02 RX ORDER — SODIUM CHLORIDE 9 MG/ML
500 INJECTION, SOLUTION INTRAVENOUS ONCE
Qty: 0 | Refills: 0 | Status: COMPLETED | OUTPATIENT
Start: 2018-04-02 | End: 2018-04-02

## 2018-04-02 RX ORDER — CHLORHEXIDINE GLUCONATE 213 G/1000ML
1 SOLUTION TOPICAL DAILY
Qty: 0 | Refills: 0 | Status: DISCONTINUED | OUTPATIENT
Start: 2018-04-02 | End: 2018-04-04

## 2018-04-02 RX ORDER — FUROSEMIDE 40 MG
20 TABLET ORAL DAILY
Qty: 0 | Refills: 0 | Status: DISCONTINUED | OUTPATIENT
Start: 2018-04-02 | End: 2018-04-03

## 2018-04-02 RX ORDER — WARFARIN SODIUM 2.5 MG/1
5 TABLET ORAL ONCE
Qty: 0 | Refills: 0 | Status: COMPLETED | OUTPATIENT
Start: 2018-04-02 | End: 2018-04-02

## 2018-04-02 RX ORDER — MAGNESIUM SULFATE 500 MG/ML
2 VIAL (ML) INJECTION ONCE
Qty: 0 | Refills: 0 | Status: COMPLETED | OUTPATIENT
Start: 2018-04-02 | End: 2018-04-02

## 2018-04-02 RX ORDER — OXYCODONE AND ACETAMINOPHEN 5; 325 MG/1; MG/1
1 TABLET ORAL ONCE
Qty: 0 | Refills: 0 | Status: DISCONTINUED | OUTPATIENT
Start: 2018-04-02 | End: 2018-04-02

## 2018-04-02 RX ADMIN — Medication 4: at 17:28

## 2018-04-02 RX ADMIN — LIDOCAINE 1 PATCH: 4 CREAM TOPICAL at 23:25

## 2018-04-02 RX ADMIN — PANTOPRAZOLE SODIUM 40 MILLIGRAM(S): 20 TABLET, DELAYED RELEASE ORAL at 06:37

## 2018-04-02 RX ADMIN — Medication 4: at 22:45

## 2018-04-02 RX ADMIN — Medication 12.5 MILLIGRAM(S): at 18:04

## 2018-04-02 RX ADMIN — Medication 81 MILLIGRAM(S): at 11:52

## 2018-04-02 RX ADMIN — Medication 50 GRAM(S): at 05:44

## 2018-04-02 RX ADMIN — Medication 12.5 MILLIGRAM(S): at 06:37

## 2018-04-02 RX ADMIN — SODIUM CHLORIDE 500 MILLILITER(S): 9 INJECTION, SOLUTION INTRAVENOUS at 02:24

## 2018-04-02 RX ADMIN — LEVETIRACETAM 250 MILLIGRAM(S): 250 TABLET, FILM COATED ORAL at 06:37

## 2018-04-02 RX ADMIN — Medication 325 MILLIGRAM(S): at 11:52

## 2018-04-02 RX ADMIN — LIDOCAINE 1 PATCH: 4 CREAM TOPICAL at 11:52

## 2018-04-02 RX ADMIN — Medication 20 MILLIGRAM(S): at 15:30

## 2018-04-02 RX ADMIN — ATORVASTATIN CALCIUM 40 MILLIGRAM(S): 80 TABLET, FILM COATED ORAL at 22:43

## 2018-04-02 RX ADMIN — Medication 6: at 12:20

## 2018-04-02 RX ADMIN — WARFARIN SODIUM 5 MILLIGRAM(S): 2.5 TABLET ORAL at 22:43

## 2018-04-02 RX ADMIN — Medication 100 MILLIGRAM(S): at 22:43

## 2018-04-02 RX ADMIN — OXYCODONE AND ACETAMINOPHEN 1 TABLET(S): 5; 325 TABLET ORAL at 22:43

## 2018-04-02 RX ADMIN — CHLORHEXIDINE GLUCONATE 1 APPLICATION(S): 213 SOLUTION TOPICAL at 12:15

## 2018-04-02 RX ADMIN — Medication 1 MILLIGRAM(S): at 11:52

## 2018-04-02 RX ADMIN — Medication 100 MILLIGRAM(S): at 06:37

## 2018-04-02 RX ADMIN — LEVETIRACETAM 250 MILLIGRAM(S): 250 TABLET, FILM COATED ORAL at 18:04

## 2018-04-02 RX ADMIN — SENNA PLUS 2 TABLET(S): 8.6 TABLET ORAL at 22:43

## 2018-04-02 RX ADMIN — OXYCODONE AND ACETAMINOPHEN 1 TABLET(S): 5; 325 TABLET ORAL at 23:40

## 2018-04-02 RX ADMIN — SODIUM CHLORIDE 500 MILLILITER(S): 9 INJECTION, SOLUTION INTRAVENOUS at 02:50

## 2018-04-02 NOTE — PROGRESS NOTE ADULT - SUBJECTIVE AND OBJECTIVE BOX
Stroke Neurology Follow-Up Visit    Interval History: Patient EEG shows a focal right epileptiform activity without sz. On keppra 250 BID. Doing well. No spells. Lefft yancy fully resolved.         Exam:  T(F): 97.6 (04-02-18 @ 20:43), Max: 98.6 (04-02-18 @ 01:00)  HR: 64 (04-02-18 @ 21:00) (64 - 84)  BP: 110/50 (04-02-18 @ 21:00) (110/50 - 127/60)  RR: 18 (04-02-18 @ 21:00) (16 - 22)  SpO2: 98% (04-02-18 @ 21:00) (91% - 100%)  Wt(kg): --    Gen: No acute distress, well-nourished  Neuro:  Mental status: Awake, alert and oriented x3.  Recent and remote memory intact.  Naming, repetition and comprehension intact.  Attention/concentration intact.  No dysarthria, no aphasia.  Fund of knowledge appropriate.    Cranial nerves: Pupils equally round and reactive to light, visual fields full, no nystagmus, extraocular muscles intact, V1 through V3 intact bilaterally and symmetric, face symmetric, hearing intact to finger rub, palate elevation symmetric, tongue was midline, sternocleidomastoid/shoulder shrug strength bilaterally 5/5.    Motor:  Normal bulk and tone, strength 5/5 in bilateral upper and lower extremities.   strength 5/5.  Rapid alternating movements intact and symmetric.   Sensation: Intact to light touch, proprioception, vibration, temperature, pinprick.  No neglect.   Coordination: No dysmetria on finger-to-nose and heel-to-shin.  No clumsiness.  Reflexes: 2+ in upper and lower extremities, absent Babinski bilaterally  Gait: Narrow and steady. No ataxia.  Romberg negative    MEDICATIONS  (STANDING):  acetylcysteine  Oral Solution 600 milliGRAM(s) Oral two times a day  aspirin enteric coated 81 milliGRAM(s) Oral daily  atorvastatin 40 milliGRAM(s) Oral at bedtime  chlorhexidine 2% Cloths 1 Application(s) Topical daily  dextrose 5%. 1000 milliLiter(s) (50 mL/Hr) IV Continuous <Continuous>  dextrose 50% Injectable 50 milliLiter(s) IV Push every 15 minutes  dextrose 50% Injectable 25 milliLiter(s) IV Push every 15 minutes  docusate sodium 100 milliGRAM(s) Oral three times a day  ferrous    sulfate 325 milliGRAM(s) Oral daily  folic acid 1 milliGRAM(s) Oral daily  furosemide   Injectable 20 milliGRAM(s) IV Push daily  heparin  Infusion 600 Unit(s)/Hr (7 mL/Hr) IV Continuous <Continuous>  insulin lispro (HumaLOG) corrective regimen sliding scale   SubCutaneous Before meals and at bedtime  levETIRAcetam 250 milliGRAM(s) Oral two times a day  lidocaine   Patch 1 Patch Transdermal daily  metoprolol tartrate 12.5 milliGRAM(s) Oral every 12 hours  oxyCODONE    5 mG/acetaminophen 325 mG 1 Tablet(s) Oral once  pantoprazole    Tablet 40 milliGRAM(s) Oral before breakfast  senna 2 Tablet(s) Oral at bedtime  sodium chloride 0.45%. 1000 milliLiter(s) (10 mL/Hr) IV Continuous <Continuous>  sodium chloride 0.9%. 1000 milliLiter(s) (50 mL/Hr) IV Continuous <Continuous>  warfarin 5 milliGRAM(s) Oral once    MEDICATIONS  (PRN):  acetaminophen   Tablet 650 milliGRAM(s) Oral every 6 hours PRN mild pain  dextrose Gel 1 Dose(s) Oral once PRN Blood Glucose LESS THAN 70 milliGRAM(s)/deciliter  glucagon  Injectable 1 milliGRAM(s) IntraMuscular once PRN Glucose LESS THAN 70 milligrams/deciliter  polyethylene glycol 3350 17 Gram(s) Oral once PRN Constipation      Labs:                        9.0    6.1   )-----------( 125      ( 02 Apr 2018 16:57 )             28.6     04-02    139  |  99  |  31<H>  ----------------------------<  212<H>  4.3   |  30  |  1.14    Ca    8.7      02 Apr 2018 16:57  Phos  3.8     04-02  Mg     2.2     04-02    TPro  6.0  /  Alb  3.0<L>  /  TBili  1.9<H>  /  DBili  x   /  AST  17  /  ALT  <5<L>  /  AlkPhos  77  04-02    Hemoglobin A1C, Whole Blood: 4.6 % (03-24 @ 19:47)  Thyroid Stimulating Hormone, Serum: 2.967 uIU/mL (03-24 @ 19:47)  Cholesterol, Serum: 108 mg/dL (03-27 @ 05:48)  HDL Cholesterol, Serum: 55 mg/dL (03-27 @ 05:48)  Triglycerides, Serum: 84 mg/dL (03-27 @ 05:48)    LIVER FUNCTIONS - ( 02 Apr 2018 16:57 )  Alb: 3.0 g/dL / Pro: 6.0 g/dL / ALK PHOS: 77 U/L / ALT: <5 U/L / AST: 17 U/L / GGT: x           PT/INR - ( 02 Apr 2018 16:57 )   PT: 15.0 sec;   INR: 1.34          PTT - ( 02 Apr 2018 16:57 )  PTT:33.4 sec    Radiology:    Assessment & Plan:    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 patient will cont on keppra for a month. will see patient as outpt one month from now.

## 2018-04-02 NOTE — PROGRESS NOTE ADULT - SUBJECTIVE AND OBJECTIVE BOX
CTICU  CRITICAL  CARE  attending     Hand off received 					   Pertinent clinical, laboratory, radiographic, hemodynamic, echocardiographic, respiratory data, microbiologic data and chart were reviewed and analyzed frequently throughout the course of the day and night  Patient seen and examined with CTS/ SH attending at bedside        This is a 77 year old Ukrainian-speaking female with history of chronic hypoproliferative anemia, transfusion dependent (sees Dr. Ruiz as an outpatient) with no improvement after Fe and EPO, Afib on Coumadin, NIDDM, osteoporosis, idiopathic ureteral stricture s/p left ureteral stent, CVA 20 years ago without residual deficits, HTN, HLD, Rheumatic fever as a child s/p mechanical MVR and AVR 14 years ago at [facility] with Dr. Christie, who was recently admitted to [facility] on 3/17/18 for persistent hematuria x 1 year and progressive SOB/YUNG with associated 4 pillow orthopnea, NYHA class IV symptoms (dyspnea after climbing 4 stairs).  While inpatient, she was noted to have worsening anemia beyond her current baseline as well as UTI for which she was placed on IV rocephin and transitioned to PO Cefpodoxime upon discharge.  She underwent ECHO on 3/17/18 that revealed EF 60%, dilated LA/RV, mechanical MV with mobile echodensity, mechanical AS with EZEQUIEL 0.85cm2, Mean/peak gradients 33.7mmHg/49.5mmHg with severe AR, and severe pulmonary hypertension with PASP 62.6mmHg and cardiac catheterization on 3/23/18 that demonstrated non-obstructive CAD.  Dr. Tomer Escalante was consulted and on 3/24/18, she was transferred to [facility] under his care to complete pre-surgical evaluation for possible AVR/MVR.  On admission, pt continues to endorse hematuria with mild intermittent dysuria but denies suprapubic pressure or urinary frequency/discharge as well as urethral discharge as well as chronic SOB at rest.  She also denies HA, AMS, CP, palpitations, cough, hemoptysis, n/v/d, hematemesis, hematochezia/melena, or fevers    She underwent MVR on 03/29/18.  Extubated>>>Reintubated. Finally Extubated.    Interval History: Patient EEG shows a focal right epileptiform activity without sz. On keppra 250 BID. Doing well. No spells. Randa hemiparesis   fully resolved.         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  ICU Vital Signs Last 24 Hrs  T(C): 36.4 (02 Apr 2018 20:43), Max: 37 (02 Apr 2018 01:00)  T(F): 97.6 (02 Apr 2018 20:43), Max: 98.6 (02 Apr 2018 01:00)  HR: 68 (03 Apr 2018 00:00) (58 - 84)  BP: 115/49 (03 Apr 2018 00:00) (110/50 - 130/50)  BP(mean): 65 (03 Apr 2018 00:00) (65 - 94)  ABP: 136/50 (02 Apr 2018 14:00) (100/44 - 164/60)  ABP(mean): 80 (02 Apr 2018 14:00) (60 - 88)  RR: 18 (03 Apr 2018 00:00) (16 - 22)  SpO2: 99% (03 Apr 2018 00:00) (91% - 100%)    Adult Advanced Hemodynamics Last 24 Hrs  CVP(mm Hg): 9 (02 Apr 2018 12:15) (6 - 21)  CVP(cm H2O): --  CO: --  CI: --  PA: --  PA(mean): --  PCWP: --  SVR: --  SVRI: --  PVR: --  PVRI: --, ABG - ( 02 Apr 2018 11:24 )  pH: 7.38  /  pCO2: 44    /  pO2: 128   / HCO3: 25    / Base Excess: -0.3  /  SaO2: 98                  Intake and output was reviewed and the fluid balance was calculated  Daily     Daily   I&O's Summary    01 Apr 2018 07:01  -  02 Apr 2018 07:00  --------------------------------------------------------  IN: 1672 mL / OUT: 1960 mL / NET: -288 mL    02 Apr 2018 07:01  -  03 Apr 2018 00:54  --------------------------------------------------------  IN: 273 mL / OUT: 1350 mL / NET: -1077 mL        All lines and drain sites were assessed  [facility]      POCT Blood Glucose.: 226 mg/dL (02 Apr 2018 22:48)    NEURO: No acute change in mental status  CVS: S1, S2, No S3.  RESPIRATORY: Auscultation of the chest reveals equal breath sounds.  GI: Abdomen is soft. No tenderness. + Bowel sounds.  Extremities are warm and well perfused.  VASCULAR: + Distal pulses.  Wounds appear clean and unremarkable      labs  CBC Full  -  ( 02 Apr 2018 16:57 )  WBC Count : 6.1 K/uL  Hemoglobin : 9.0 g/dL  Hematocrit : 28.6 %  Platelet Count - Automated : 125 K/uL  Mean Cell Volume : 86.9 fL  Mean Cell Hemoglobin : 27.4 pg  Mean Cell Hemoglobin Concentration : 31.5 g/dL  Auto Neutrophil # : x  Auto Lymphocyte # : x  Auto Monocyte # : x  Auto Eosinophil # : x  Auto Basophil # : x  Auto Neutrophil % : x  Auto Lymphocyte % : x  Auto Monocyte % : x  Auto Eosinophil % : x  Auto Basophil % : x    04-02    139  |  99  |  31<H>  ----------------------------<  212<H>  4.3   |  30  |  1.14    Ca    8.7      02 Apr 2018 16:57  Phos  3.8     04-02  Mg     2.2     04-02    TPro  6.0  /  Alb  3.0<L>  /  TBili  1.9<H>  /  DBili  x   /  AST  17  /  ALT  <5<L>  /  AlkPhos  77  04-02    PT/INR - ( 02 Apr 2018 16:57 )   PT: 15.0 sec;   INR: 1.34          PTT - ( 02 Apr 2018 16:57 )  PTT:33.4 sec  The current medications were reviewed   MEDICATIONS  (STANDING):  acetylcysteine  Oral Solution 600 milliGRAM(s) Oral two times a day  aspirin enteric coated 81 milliGRAM(s) Oral daily  atorvastatin 40 milliGRAM(s) Oral at bedtime  chlorhexidine 2% Cloths 1 Application(s) Topical daily  dextrose 5%. 1000 milliLiter(s) (50 mL/Hr) IV Continuous <Continuous>  dextrose 50% Injectable 50 milliLiter(s) IV Push every 15 minutes  dextrose 50% Injectable 25 milliLiter(s) IV Push every 15 minutes  docusate sodium 100 milliGRAM(s) Oral three times a day  ferrous    sulfate 325 milliGRAM(s) Oral daily  folic acid 1 milliGRAM(s) Oral daily  furosemide   Injectable 20 milliGRAM(s) IV Push daily  heparin  Infusion 600 Unit(s)/Hr (7 mL/Hr) IV Continuous <Continuous>  insulin lispro (HumaLOG) corrective regimen sliding scale   SubCutaneous Before meals and at bedtime  levETIRAcetam 250 milliGRAM(s) Oral two times a day  lidocaine   Patch 1 Patch Transdermal daily  metoprolol tartrate 12.5 milliGRAM(s) Oral every 12 hours  pantoprazole    Tablet 40 milliGRAM(s) Oral before breakfast  senna 2 Tablet(s) Oral at bedtime  sodium chloride 0.45%. 1000 milliLiter(s) (10 mL/Hr) IV Continuous <Continuous>  sodium chloride 0.9%. 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  Extubated Day number one.  Reintubated secondary to seizure activity.  Extubated.  Started on IV Keppra by neurology.  Stable hemodynamics and Respiratory status.  Good urine output.  Overall doing well.      My plan includes :  Close hemodynamic, ventilatory and drain monitoring and management.  Continue PO Keppra.  Monitor for arrhythmias and monitor parameters for organ perfusion  Monitor neurologic status  Head of the bed should remain elevated to 45 deg .   Chest PT and IS will be encouraged  Monitor adequacy of oxygenation and ventilation and attempt to wean oxygen  Nutritional goals will be met using po eventually , ensure adequate caloric intake and monitor  the same  Stress ulcer and VTE prophylaxis will be achieved    Glycemic control is satisfactory  Electrolytes have been repleted as necessary and wound care has been carried out. Pain control has been achieved.   Aggressive  physical therapy and early mobility and ambulation goals will be met   The family was updated about the course and plan  CRITICAL CARE TIME SPENT in evaluation and management, reassessments, review and interpretation of labs and x-rays, ventilator and hemodynamic management, formulating a plan and coordinating care: ___60____ MIN.  Time does not include procedural time.  CTICU ATTENDING     					    Renny Sparks MD CTICU  CRITICAL  CARE  attending     Hand off received 					   Pertinent clinical, laboratory, radiographic, hemodynamic, echocardiographic, respiratory data, microbiologic data and chart were reviewed and analyzed frequently throughout the course of the day and night  Patient seen and examined with CTS/ SH attending at bedside        This is a 77 year old Togolese-speaking female with history of chronic hypoproliferative anemia, transfusion dependent (sees Dr. Ruiz as an outpatient) with no improvement after Fe and EPO, Afib on Coumadin, NIDDM, osteoporosis, idiopathic ureteral stricture s/p left ureteral stent, CVA 20 years ago without residual deficits, HTN, HLD, Rheumatic fever as a child s/p mechanical MVR and AVR 14 years ago at [facility] with Dr. Christie, who was recently admitted to [facility] on 3/17/18 for persistent hematuria x 1 year and progressive SOB/YUNG with associated 4 pillow orthopnea, NYHA class IV symptoms (dyspnea after climbing 4 stairs).  While inpatient, she was noted to have worsening anemia beyond her current baseline as well as UTI for which she was placed on IV rocephin and transitioned to PO Cefpodoxime upon discharge.  She underwent ECHO on 3/17/18 that revealed EF 60%, dilated LA/RV, mechanical MV with mobile echodensity, mechanical AS with EZEQUIEL 0.85cm2, Mean/peak gradients 33.7mmHg/49.5mmHg with severe AR, and severe pulmonary hypertension with PASP 62.6mmHg and cardiac catheterization on 3/23/18 that demonstrated non-obstructive CAD.  Dr. Tomer Escalante was consulted and on 3/24/18, she was transferred to [facility] under his care to complete pre-surgical evaluation for possible AVR/MVR.  On admission, pt continues to endorse hematuria with mild intermittent dysuria but denies suprapubic pressure or urinary frequency/discharge as well as urethral discharge as well as chronic SOB at rest.  She also denies HA, AMS, CP, palpitations, cough, hemoptysis, n/v/d, hematemesis, hematochezia/melena, or fevers    She underwent MVR on 03/29/18.  Extubated>>>Reintubated. Finally Extubated.    Interval History: Patient EEG shows a focal right epileptiform activity without sz. On keppra 250 BID. Doing well. No spells. Randa hemiparesis   fully resolved.         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  ICU Vital Signs Last 24 Hrs  T(C): 36.4 (02 Apr 2018 20:43), Max: 37 (02 Apr 2018 01:00)  T(F): 97.6 (02 Apr 2018 20:43), Max: 98.6 (02 Apr 2018 01:00)  HR: 68 (03 Apr 2018 00:00) (58 - 84)  BP: 115/49 (03 Apr 2018 00:00) (110/50 - 130/50)  BP(mean): 65 (03 Apr 2018 00:00) (65 - 94)  ABP: 136/50 (02 Apr 2018 14:00) (100/44 - 164/60)  ABP(mean): 80 (02 Apr 2018 14:00) (60 - 88)  RR: 18 (03 Apr 2018 00:00) (16 - 22)  SpO2: 99% (03 Apr 2018 00:00) (91% - 100%)    Adult Advanced Hemodynamics Last 24 Hrs  CVP(mm Hg): 9 (02 Apr 2018 12:15) (6 - 21)  CVP(cm H2O): --  CO: --  CI: --  PA: --  PA(mean): --  PCWP: --  SVR: --  SVRI: --  PVR: --  PVRI: --, ABG - ( 02 Apr 2018 11:24 )  pH: 7.38  /  pCO2: 44    /  pO2: 128   / HCO3: 25    / Base Excess: -0.3  /  SaO2: 98                  Intake and output was reviewed and the fluid balance was calculated  Daily     Daily   I&O's Summary    01 Apr 2018 07:01  -  02 Apr 2018 07:00  --------------------------------------------------------  IN: 1672 mL / OUT: 1960 mL / NET: -288 mL    02 Apr 2018 07:01  -  03 Apr 2018 00:54  --------------------------------------------------------  IN: 273 mL / OUT: 1350 mL / NET: -1077 mL        All lines and drain sites were assessed  Glycemic trend was [facility]      POCT Blood Glucose.: 226 mg/dL (02 Apr 2018 22:48)    NEURO: No acute change in mental status  CVS: S1, S2, No S3.  RESPIRATORY: Auscultation of the chest reveals equal breath sounds.  GI: Abdomen is soft. No tenderness. + Bowel sounds.  Extremities are warm and well perfused.  VASCULAR: + Distal pulses.  Wounds appear clean and unremarkable      labs  CBC Full  -  ( 02 Apr 2018 16:57 )  WBC Count : 6.1 K/uL  Hemoglobin : 9.0 g/dL  Hematocrit : 28.6 %  Platelet Count - Automated : 125 K/uL  Mean Cell Volume : 86.9 fL  Mean Cell Hemoglobin : 27.4 pg  Mean Cell Hemoglobin Concentration : 31.5 g/dL  Auto Neutrophil # : x  Auto Lymphocyte # : x  Auto Monocyte # : x  Auto Eosinophil # : x  Auto Basophil # : x  Auto Neutrophil % : x  Auto Lymphocyte % : x  Auto Monocyte % : x  Auto Eosinophil % : x  Auto Basophil % : x    04-02    139  |  99  |  31<H>  ----------------------------<  212<H>  4.3   |  30  |  1.14    Ca    8.7      02 Apr 2018 16:57  Phos  3.8     04-02  Mg     2.2     04-02    TPro  6.0  /  Alb  3.0<L>  /  TBili  1.9<H>  /  DBili  x   /  AST  17  /  ALT  <5<L>  /  AlkPhos  77  04-02    PT/INR - ( 02 Apr 2018 16:57 )   PT: 15.0 sec;   INR: 1.34          PTT - ( 02 Apr 2018 16:57 )  PTT:33.4 sec  The current medications were reviewed   MEDICATIONS  (STANDING):  acetylcysteine  Oral Solution 600 milliGRAM(s) Oral two times a day  aspirin enteric coated 81 milliGRAM(s) Oral daily  atorvastatin 40 milliGRAM(s) Oral at bedtime  chlorhexidine 2% Cloths 1 Application(s) Topical daily  dextrose 5%. 1000 milliLiter(s) (50 mL/Hr) IV Continuous <Continuous>  dextrose 50% Injectable 50 milliLiter(s) IV Push every 15 minutes  dextrose 50% Injectable 25 milliLiter(s) IV Push every 15 minutes  docusate sodium 100 milliGRAM(s) Oral three times a day  ferrous    sulfate 325 milliGRAM(s) Oral daily  folic acid 1 milliGRAM(s) Oral daily  furosemide   Injectable 20 milliGRAM(s) IV Push daily  heparin  Infusion 600 Unit(s)/Hr (7 mL/Hr) IV Continuous <Continuous>  insulin lispro (HumaLOG) corrective regimen sliding scale   SubCutaneous Before meals and at bedtime  levETIRAcetam 250 milliGRAM(s) Oral two times a day  lidocaine   Patch 1 Patch Transdermal daily  metoprolol tartrate 12.5 milliGRAM(s) Oral every 12 hours  pantoprazole    Tablet 40 milliGRAM(s) Oral before breakfast  senna 2 Tablet(s) Oral at bedtime  sodium chloride 0.45%. 1000 milliLiter(s) (10 mL/Hr) IV Continuous <Continuous>  sodium chloride 0.9%. 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  Extubated Day number one.  Reintubated secondary to seizure activity.  Extubated.  Started on IV Keppra by neurology.  Stable hemodynamics and Respiratory status.  Good urine output.  Overall doing well.      My plan includes :  Close hemodynamic, ventilatory and drain monitoring and management.  Continue PO Keppra.  Continue anticoagulation.  Monitor for arrhythmias and monitor parameters for organ perfusion  Monitor neurologic status  Head of the bed should remain elevated to 45 deg .   Chest PT and IS will be encouraged  Monitor adequacy of oxygenation and ventilation and attempt to wean oxygen  Nutritional goals will be met using po eventually , ensure adequate caloric intake and monitor  the same  Stress ulcer and VTE prophylaxis will be achieved    Glycemic control is satisfactory  Electrolytes have been repleted as necessary and wound care has been carried out. Pain control has been achieved.   Aggressive  physical therapy and early mobility and ambulation goals will be met   The family was updated about the course and plan  CRITICAL CARE TIME SPENT in evaluation and management, reassessments, review and interpretation of labs and x-rays, ventilator and hemodynamic management, formulating a plan and coordinating care: ___60____ MIN.  Time does not include procedural time.  CTICU ATTENDING     					    Renny Sparks MD

## 2018-04-03 DIAGNOSIS — N13.30 UNSPECIFIED HYDRONEPHROSIS: ICD-10-CM

## 2018-04-03 DIAGNOSIS — I48.2 CHRONIC ATRIAL FIBRILLATION: ICD-10-CM
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
## 2018-04-03 LAB
ALBUMIN SERPL ELPH-MCNC: 3.1 G/DL — LOW (ref 3.3–5)
ALBUMIN SERPL ELPH-MCNC: 3.3 G/DL — SIGNIFICANT CHANGE UP (ref 3.3–5)
ALP SERPL-CCNC: 67 U/L — SIGNIFICANT CHANGE UP (ref 40–120)
ALP SERPL-CCNC: 68 U/L — SIGNIFICANT CHANGE UP (ref 40–120)
ALT FLD-CCNC: <5 U/L — LOW (ref 10–45)
ALT FLD-CCNC: <5 U/L — LOW (ref 10–45)
ANION GAP SERPL CALC-SCNC: 11 MMOL/L — SIGNIFICANT CHANGE UP (ref 5–17)
ANION GAP SERPL CALC-SCNC: 9 MMOL/L — SIGNIFICANT CHANGE UP (ref 5–17)
APTT BLD: 36.1 SEC — SIGNIFICANT CHANGE UP (ref 27.5–37.4)
APTT BLD: 42.9 SEC — HIGH (ref 27.5–37.4)
AST SERPL-CCNC: 16 U/L — SIGNIFICANT CHANGE UP (ref 10–40)
AST SERPL-CCNC: 20 U/L — SIGNIFICANT CHANGE UP (ref 10–40)
BILIRUB SERPL-MCNC: 2.3 MG/DL — HIGH (ref 0.2–1.2)
BILIRUB SERPL-MCNC: 2.3 MG/DL — HIGH (ref 0.2–1.2)
BUN SERPL-MCNC: 29 MG/DL — HIGH (ref 7–23)
BUN SERPL-MCNC: 33 MG/DL — HIGH (ref 7–23)
CALCIUM SERPL-MCNC: 8.6 MG/DL — SIGNIFICANT CHANGE UP (ref 8.4–10.5)
CALCIUM SERPL-MCNC: 8.7 MG/DL — SIGNIFICANT CHANGE UP (ref 8.4–10.5)
CHLORIDE SERPL-SCNC: 100 MMOL/L — SIGNIFICANT CHANGE UP (ref 96–108)
CHLORIDE SERPL-SCNC: 97 MMOL/L — SIGNIFICANT CHANGE UP (ref 96–108)
CO2 SERPL-SCNC: 30 MMOL/L — SIGNIFICANT CHANGE UP (ref 22–31)
CO2 SERPL-SCNC: 30 MMOL/L — SIGNIFICANT CHANGE UP (ref 22–31)
CREAT SERPL-MCNC: 1.04 MG/DL — SIGNIFICANT CHANGE UP (ref 0.5–1.3)
CREAT SERPL-MCNC: 1.08 MG/DL — SIGNIFICANT CHANGE UP (ref 0.5–1.3)
GLUCOSE BLDC GLUCOMTR-MCNC: 145 MG/DL — HIGH (ref 70–99)
GLUCOSE BLDC GLUCOMTR-MCNC: 147 MG/DL — HIGH (ref 70–99)
GLUCOSE BLDC GLUCOMTR-MCNC: 264 MG/DL — HIGH (ref 70–99)
GLUCOSE BLDC GLUCOMTR-MCNC: 287 MG/DL — HIGH (ref 70–99)
GLUCOSE SERPL-MCNC: 164 MG/DL — HIGH (ref 70–99)
GLUCOSE SERPL-MCNC: 93 MG/DL — SIGNIFICANT CHANGE UP (ref 70–99)
HCT VFR BLD CALC: 28.6 % — LOW (ref 34.5–45)
HCT VFR BLD CALC: 29.7 % — LOW (ref 34.5–45)
HGB BLD-MCNC: 9 G/DL — LOW (ref 11.5–15.5)
HGB BLD-MCNC: 9.6 G/DL — LOW (ref 11.5–15.5)
INR BLD: 1.45 — HIGH (ref 0.88–1.16)
INR BLD: 1.46 — HIGH (ref 0.88–1.16)
LACTATE SERPL-SCNC: 0.5 MMOL/L — SIGNIFICANT CHANGE UP (ref 0.5–2)
MAGNESIUM SERPL-MCNC: 2 MG/DL — SIGNIFICANT CHANGE UP (ref 1.6–2.6)
MAGNESIUM SERPL-MCNC: 2.2 MG/DL — SIGNIFICANT CHANGE UP (ref 1.6–2.6)
MCHC RBC-ENTMCNC: 27.2 PG — SIGNIFICANT CHANGE UP (ref 27–34)
MCHC RBC-ENTMCNC: 27.9 PG — SIGNIFICANT CHANGE UP (ref 27–34)
MCHC RBC-ENTMCNC: 31.5 G/DL — LOW (ref 32–36)
MCHC RBC-ENTMCNC: 32.3 G/DL — SIGNIFICANT CHANGE UP (ref 32–36)
MCV RBC AUTO: 86.3 FL — SIGNIFICANT CHANGE UP (ref 80–100)
MCV RBC AUTO: 86.4 FL — SIGNIFICANT CHANGE UP (ref 80–100)
PHOSPHATE SERPL-MCNC: 3.3 MG/DL — SIGNIFICANT CHANGE UP (ref 2.5–4.5)
PHOSPHATE SERPL-MCNC: 3.5 MG/DL — SIGNIFICANT CHANGE UP (ref 2.5–4.5)
PLATELET # BLD AUTO: 132 K/UL — LOW (ref 150–400)
PLATELET # BLD AUTO: 138 K/UL — LOW (ref 150–400)
POTASSIUM SERPL-MCNC: 4 MMOL/L — SIGNIFICANT CHANGE UP (ref 3.5–5.3)
POTASSIUM SERPL-MCNC: 4.3 MMOL/L — SIGNIFICANT CHANGE UP (ref 3.5–5.3)
POTASSIUM SERPL-SCNC: 4 MMOL/L — SIGNIFICANT CHANGE UP (ref 3.5–5.3)
POTASSIUM SERPL-SCNC: 4.3 MMOL/L — SIGNIFICANT CHANGE UP (ref 3.5–5.3)
PROT SERPL-MCNC: 5.8 G/DL — LOW (ref 6–8.3)
PROT SERPL-MCNC: 6.2 G/DL — SIGNIFICANT CHANGE UP (ref 6–8.3)
PROTHROM AB SERPL-ACNC: 16.2 SEC — HIGH (ref 9.8–12.7)
PROTHROM AB SERPL-ACNC: 16.3 SEC — HIGH (ref 9.8–12.7)
RBC # BLD: 3.31 M/UL — LOW (ref 3.8–5.2)
RBC # BLD: 3.44 M/UL — LOW (ref 3.8–5.2)
RBC # FLD: 17.3 % — HIGH (ref 10.3–16.9)
RBC # FLD: 17.5 % — HIGH (ref 10.3–16.9)
SODIUM SERPL-SCNC: 138 MMOL/L — SIGNIFICANT CHANGE UP (ref 135–145)
SODIUM SERPL-SCNC: 139 MMOL/L — SIGNIFICANT CHANGE UP (ref 135–145)
WBC # BLD: 5 K/UL — SIGNIFICANT CHANGE UP (ref 3.8–10.5)
WBC # BLD: 5.1 K/UL — SIGNIFICANT CHANGE UP (ref 3.8–10.5)
WBC # FLD AUTO: 5 K/UL — SIGNIFICANT CHANGE UP (ref 3.8–10.5)
WBC # FLD AUTO: 5.1 K/UL — SIGNIFICANT CHANGE UP (ref 3.8–10.5)

## 2018-04-03 PROCEDURE — 71045 X-RAY EXAM CHEST 1 VIEW: CPT | Mod: 26

## 2018-04-03 PROCEDURE — 71045 X-RAY EXAM CHEST 1 VIEW: CPT | Mod: 26,77

## 2018-04-03 RX ORDER — WARFARIN SODIUM 2.5 MG/1
5 TABLET ORAL ONCE
Qty: 0 | Refills: 0 | Status: COMPLETED | OUTPATIENT
Start: 2018-04-03 | End: 2018-04-03

## 2018-04-03 RX ORDER — POTASSIUM CHLORIDE 20 MEQ
20 PACKET (EA) ORAL ONCE
Qty: 0 | Refills: 0 | Status: COMPLETED | OUTPATIENT
Start: 2018-04-03 | End: 2018-04-03

## 2018-04-03 RX ORDER — SODIUM CHLORIDE 9 MG/ML
3 INJECTION INTRAMUSCULAR; INTRAVENOUS; SUBCUTANEOUS EVERY 8 HOURS
Qty: 0 | Refills: 0 | Status: DISCONTINUED | OUTPATIENT
Start: 2018-04-03 | End: 2018-04-04

## 2018-04-03 RX ORDER — FUROSEMIDE 40 MG
40 TABLET ORAL DAILY
Qty: 0 | Refills: 0 | Status: DISCONTINUED | OUTPATIENT
Start: 2018-04-03 | End: 2018-04-05

## 2018-04-03 RX ORDER — OXYCODONE AND ACETAMINOPHEN 5; 325 MG/1; MG/1
1 TABLET ORAL ONCE
Qty: 0 | Refills: 0 | Status: DISCONTINUED | OUTPATIENT
Start: 2018-04-03 | End: 2018-04-03

## 2018-04-03 RX ADMIN — OXYCODONE AND ACETAMINOPHEN 1 TABLET(S): 5; 325 TABLET ORAL at 20:36

## 2018-04-03 RX ADMIN — LEVETIRACETAM 250 MILLIGRAM(S): 250 TABLET, FILM COATED ORAL at 17:28

## 2018-04-03 RX ADMIN — LIDOCAINE 1 PATCH: 4 CREAM TOPICAL at 12:15

## 2018-04-03 RX ADMIN — Medication 12.5 MILLIGRAM(S): at 17:28

## 2018-04-03 RX ADMIN — Medication 6: at 12:15

## 2018-04-03 RX ADMIN — ATORVASTATIN CALCIUM 40 MILLIGRAM(S): 80 TABLET, FILM COATED ORAL at 21:05

## 2018-04-03 RX ADMIN — OXYCODONE AND ACETAMINOPHEN 1 TABLET(S): 5; 325 TABLET ORAL at 21:20

## 2018-04-03 RX ADMIN — Medication 325 MILLIGRAM(S): at 12:16

## 2018-04-03 RX ADMIN — Medication 6: at 17:30

## 2018-04-03 RX ADMIN — CHLORHEXIDINE GLUCONATE 1 APPLICATION(S): 213 SOLUTION TOPICAL at 12:22

## 2018-04-03 RX ADMIN — Medication 81 MILLIGRAM(S): at 12:22

## 2018-04-03 RX ADMIN — Medication 12.5 MILLIGRAM(S): at 06:30

## 2018-04-03 RX ADMIN — Medication 20 MILLIGRAM(S): at 06:30

## 2018-04-03 RX ADMIN — PANTOPRAZOLE SODIUM 40 MILLIGRAM(S): 20 TABLET, DELAYED RELEASE ORAL at 06:30

## 2018-04-03 RX ADMIN — LEVETIRACETAM 250 MILLIGRAM(S): 250 TABLET, FILM COATED ORAL at 06:31

## 2018-04-03 RX ADMIN — SODIUM CHLORIDE 3 MILLILITER(S): 9 INJECTION INTRAMUSCULAR; INTRAVENOUS; SUBCUTANEOUS at 21:03

## 2018-04-03 RX ADMIN — Medication 20 MILLIEQUIVALENT(S): at 13:07

## 2018-04-03 RX ADMIN — SODIUM CHLORIDE 3 MILLILITER(S): 9 INJECTION INTRAMUSCULAR; INTRAVENOUS; SUBCUTANEOUS at 13:59

## 2018-04-03 RX ADMIN — WARFARIN SODIUM 5 MILLIGRAM(S): 2.5 TABLET ORAL at 21:04

## 2018-04-03 RX ADMIN — Medication 1 MILLIGRAM(S): at 12:16

## 2018-04-04 LAB
ALBUMIN SERPL ELPH-MCNC: 3.3 G/DL — SIGNIFICANT CHANGE UP (ref 3.3–5)
ALP SERPL-CCNC: 79 U/L — SIGNIFICANT CHANGE UP (ref 40–120)
ALT FLD-CCNC: 5 U/L — LOW (ref 10–45)
ANION GAP SERPL CALC-SCNC: 10 MMOL/L — SIGNIFICANT CHANGE UP (ref 5–17)
ANION GAP SERPL CALC-SCNC: 8 MMOL/L — SIGNIFICANT CHANGE UP (ref 5–17)
APTT BLD: 32.4 SEC — SIGNIFICANT CHANGE UP (ref 27.5–37.4)
APTT BLD: 46.1 SEC — HIGH (ref 27.5–37.4)
AST SERPL-CCNC: 22 U/L — SIGNIFICANT CHANGE UP (ref 10–40)
BILIRUB SERPL-MCNC: 2 MG/DL — HIGH (ref 0.2–1.2)
BUN SERPL-MCNC: 36 MG/DL — HIGH (ref 7–23)
BUN SERPL-MCNC: 38 MG/DL — HIGH (ref 7–23)
CALCIUM SERPL-MCNC: 9 MG/DL — SIGNIFICANT CHANGE UP (ref 8.4–10.5)
CALCIUM SERPL-MCNC: 9 MG/DL — SIGNIFICANT CHANGE UP (ref 8.4–10.5)
CHLORIDE SERPL-SCNC: 94 MMOL/L — LOW (ref 96–108)
CHLORIDE SERPL-SCNC: 97 MMOL/L — SIGNIFICANT CHANGE UP (ref 96–108)
CO2 SERPL-SCNC: 31 MMOL/L — SIGNIFICANT CHANGE UP (ref 22–31)
CO2 SERPL-SCNC: 33 MMOL/L — HIGH (ref 22–31)
CREAT SERPL-MCNC: 1.1 MG/DL — SIGNIFICANT CHANGE UP (ref 0.5–1.3)
CREAT SERPL-MCNC: 1.12 MG/DL — SIGNIFICANT CHANGE UP (ref 0.5–1.3)
GLUCOSE BLDC GLUCOMTR-MCNC: 147 MG/DL — HIGH (ref 70–99)
GLUCOSE BLDC GLUCOMTR-MCNC: 160 MG/DL — HIGH (ref 70–99)
GLUCOSE BLDC GLUCOMTR-MCNC: 211 MG/DL — HIGH (ref 70–99)
GLUCOSE BLDC GLUCOMTR-MCNC: 322 MG/DL — HIGH (ref 70–99)
GLUCOSE SERPL-MCNC: 163 MG/DL — HIGH (ref 70–99)
GLUCOSE SERPL-MCNC: 291 MG/DL — HIGH (ref 70–99)
HCT VFR BLD CALC: 30.5 % — LOW (ref 34.5–45)
HCT VFR BLD CALC: 31.4 % — LOW (ref 34.5–45)
HGB BLD-MCNC: 9.4 G/DL — LOW (ref 11.5–15.5)
HGB BLD-MCNC: 9.5 G/DL — LOW (ref 11.5–15.5)
INR BLD: 1.65 — HIGH (ref 0.88–1.16)
INR BLD: 1.8 — HIGH (ref 0.88–1.16)
MAGNESIUM SERPL-MCNC: 1.9 MG/DL — SIGNIFICANT CHANGE UP (ref 1.6–2.6)
MCHC RBC-ENTMCNC: 26.6 PG — LOW (ref 27–34)
MCHC RBC-ENTMCNC: 27 PG — SIGNIFICANT CHANGE UP (ref 27–34)
MCHC RBC-ENTMCNC: 30.3 G/DL — LOW (ref 32–36)
MCHC RBC-ENTMCNC: 30.8 G/DL — LOW (ref 32–36)
MCV RBC AUTO: 87.6 FL — SIGNIFICANT CHANGE UP (ref 80–100)
MCV RBC AUTO: 88 FL — SIGNIFICANT CHANGE UP (ref 80–100)
PHOSPHATE SERPL-MCNC: 3.3 MG/DL — SIGNIFICANT CHANGE UP (ref 2.5–4.5)
PLATELET # BLD AUTO: 152 K/UL — SIGNIFICANT CHANGE UP (ref 150–400)
PLATELET # BLD AUTO: 185 K/UL — SIGNIFICANT CHANGE UP (ref 150–400)
POTASSIUM SERPL-MCNC: 4.8 MMOL/L — SIGNIFICANT CHANGE UP (ref 3.5–5.3)
POTASSIUM SERPL-MCNC: 5.1 MMOL/L — SIGNIFICANT CHANGE UP (ref 3.5–5.3)
POTASSIUM SERPL-SCNC: 4.8 MMOL/L — SIGNIFICANT CHANGE UP (ref 3.5–5.3)
POTASSIUM SERPL-SCNC: 5.1 MMOL/L — SIGNIFICANT CHANGE UP (ref 3.5–5.3)
PROT SERPL-MCNC: 6.1 G/DL — SIGNIFICANT CHANGE UP (ref 6–8.3)
PROTHROM AB SERPL-ACNC: 18.5 SEC — HIGH (ref 9.8–12.7)
PROTHROM AB SERPL-ACNC: 20.2 SEC — HIGH (ref 9.8–12.7)
RBC # BLD: 3.48 M/UL — LOW (ref 3.8–5.2)
RBC # BLD: 3.57 M/UL — LOW (ref 3.8–5.2)
RBC # FLD: 18 % — HIGH (ref 10.3–16.9)
RBC # FLD: 18.2 % — HIGH (ref 10.3–16.9)
SODIUM SERPL-SCNC: 135 MMOL/L — SIGNIFICANT CHANGE UP (ref 135–145)
SODIUM SERPL-SCNC: 138 MMOL/L — SIGNIFICANT CHANGE UP (ref 135–145)
WBC # BLD: 5 K/UL — SIGNIFICANT CHANGE UP (ref 3.8–10.5)
WBC # BLD: 6.4 K/UL — SIGNIFICANT CHANGE UP (ref 3.8–10.5)
WBC # FLD AUTO: 5 K/UL — SIGNIFICANT CHANGE UP (ref 3.8–10.5)
WBC # FLD AUTO: 6.4 K/UL — SIGNIFICANT CHANGE UP (ref 3.8–10.5)

## 2018-04-04 PROCEDURE — 71046 X-RAY EXAM CHEST 2 VIEWS: CPT | Mod: 26

## 2018-04-04 PROCEDURE — 71045 X-RAY EXAM CHEST 1 VIEW: CPT | Mod: 26,76,59

## 2018-04-04 RX ORDER — WARFARIN SODIUM 2.5 MG/1
5 TABLET ORAL ONCE
Qty: 0 | Refills: 0 | Status: COMPLETED | OUTPATIENT
Start: 2018-04-04 | End: 2018-04-04

## 2018-04-04 RX ORDER — ACETAMINOPHEN 500 MG
1000 TABLET ORAL ONCE
Qty: 0 | Refills: 0 | Status: DISCONTINUED | OUTPATIENT
Start: 2018-04-04 | End: 2018-04-05

## 2018-04-04 RX ORDER — SODIUM CHLORIDE 9 MG/ML
3 INJECTION INTRAMUSCULAR; INTRAVENOUS; SUBCUTANEOUS EVERY 8 HOURS
Qty: 0 | Refills: 0 | Status: DISCONTINUED | OUTPATIENT
Start: 2018-04-04 | End: 2018-04-05

## 2018-04-04 RX ORDER — ACETAMINOPHEN 500 MG
650 TABLET ORAL EVERY 6 HOURS
Qty: 0 | Refills: 0 | Status: DISCONTINUED | OUTPATIENT
Start: 2018-04-04 | End: 2018-04-04

## 2018-04-04 RX ORDER — OXYCODONE AND ACETAMINOPHEN 5; 325 MG/1; MG/1
1 TABLET ORAL EVERY 4 HOURS
Qty: 0 | Refills: 0 | Status: DISCONTINUED | OUTPATIENT
Start: 2018-04-04 | End: 2018-04-05

## 2018-04-04 RX ORDER — MAGNESIUM OXIDE 400 MG ORAL TABLET 241.3 MG
400 TABLET ORAL ONCE
Qty: 0 | Refills: 0 | Status: COMPLETED | OUTPATIENT
Start: 2018-04-04 | End: 2018-04-04

## 2018-04-04 RX ADMIN — Medication 40 MILLIGRAM(S): at 05:21

## 2018-04-04 RX ADMIN — SODIUM CHLORIDE 3 MILLILITER(S): 9 INJECTION INTRAMUSCULAR; INTRAVENOUS; SUBCUTANEOUS at 05:03

## 2018-04-04 RX ADMIN — Medication 325 MILLIGRAM(S): at 11:43

## 2018-04-04 RX ADMIN — Medication 12.5 MILLIGRAM(S): at 05:20

## 2018-04-04 RX ADMIN — MAGNESIUM OXIDE 400 MG ORAL TABLET 400 MILLIGRAM(S): 241.3 TABLET ORAL at 04:58

## 2018-04-04 RX ADMIN — SODIUM CHLORIDE 3 MILLILITER(S): 9 INJECTION INTRAMUSCULAR; INTRAVENOUS; SUBCUTANEOUS at 23:26

## 2018-04-04 RX ADMIN — Medication 1 MILLIGRAM(S): at 11:43

## 2018-04-04 RX ADMIN — Medication 2: at 07:24

## 2018-04-04 RX ADMIN — OXYCODONE AND ACETAMINOPHEN 1 TABLET(S): 5; 325 TABLET ORAL at 23:09

## 2018-04-04 RX ADMIN — SODIUM CHLORIDE 3 MILLILITER(S): 9 INJECTION INTRAMUSCULAR; INTRAVENOUS; SUBCUTANEOUS at 14:49

## 2018-04-04 RX ADMIN — LEVETIRACETAM 250 MILLIGRAM(S): 250 TABLET, FILM COATED ORAL at 05:21

## 2018-04-04 RX ADMIN — Medication 12.5 MILLIGRAM(S): at 18:09

## 2018-04-04 RX ADMIN — PANTOPRAZOLE SODIUM 40 MILLIGRAM(S): 20 TABLET, DELAYED RELEASE ORAL at 07:24

## 2018-04-04 RX ADMIN — Medication 81 MILLIGRAM(S): at 11:43

## 2018-04-04 RX ADMIN — LIDOCAINE 1 PATCH: 4 CREAM TOPICAL at 00:07

## 2018-04-04 RX ADMIN — Medication 8: at 23:34

## 2018-04-04 RX ADMIN — Medication 4: at 14:48

## 2018-04-04 RX ADMIN — ATORVASTATIN CALCIUM 40 MILLIGRAM(S): 80 TABLET, FILM COATED ORAL at 23:35

## 2018-04-04 RX ADMIN — WARFARIN SODIUM 5 MILLIGRAM(S): 2.5 TABLET ORAL at 23:36

## 2018-04-04 RX ADMIN — LEVETIRACETAM 250 MILLIGRAM(S): 250 TABLET, FILM COATED ORAL at 18:09

## 2018-04-04 RX ADMIN — Medication 650 MILLIGRAM(S): at 04:58

## 2018-04-04 NOTE — CHART NOTE - NSCHARTNOTEFT_GEN_A_CORE
Per Dr. Escalante, chest tube clamped this AM, f/u CXR reveal small R apical airspace, no difference from CXR this AM.  Per Dr. Escalante and Dr. Caruso, Chest tube to be removed at bedside. Chest tube removed, tie down secured and occlusive dressing placed. F/u CXR small stable R apical airspace.

## 2018-04-04 NOTE — PROGRESS NOTE ADULT - ASSESSMENT
77 year old F PMHx chronic hypoproliferative anemia, transfusion dependent (sees Dr. Ruiz as an outpatient) with no improvement after Fe and EPO, Afib on Coumadin, NIDDM, osteoporosis, idiopathic ureteral stricture s/p left ureteral stent, CVA 20 years ago without residual deficits, HTN, HLD, Rheumatic fever as a child s/p mechanical MVR and AVR 14 years ago at [facility] with Dr. Christie, who was recently admitted to [facility] on 3/17/18 for persistent hematuria x 1 year and progressive SOB/YUNG with associated 4 pillow orthopnea, NYHA class IV symptoms (dyspnea after climbing 4 stairs).  While inpatient, she was noted to have worsening anemia beyond her current baseline as well as UTI for which she was placed on IV rocephin and transitioned to PO Cefpodoxime upon discharge.  She underwent ECHO on 3/17/18 that revealed EF 60%, dilated LA/RV, mechanical MV with mobile echodensity, mechanical AS with EZEQUIEL 0.85cm2, Mean/peak gradients 33.7mmHg/49.5mmHg with severe AR, and severe pulmonary hypertension with PASP 62.6mmHg and cardiac catheterization on 3/23/18 that demonstrated non-obstructive CAD.  Dr. Tomer Escalante was consulted and on 3/24/18, she was transferred to [facility] under his care to complete pre-surgical evaluation. On 3/29/19 patient underwent mini MVR, intra op TVP placed for fib arrest. Patient transferred to CT ICU. POD1, extubated, witnessed seziure activity, and reintubated for airway protection. Stroke code called, CT head negative, EEG placed showing epileptic activity and Keppra started per Dr. Soriano. She was later extubated POD2, and hep gtt/coumadin started or mech valves and afib.  POD3 trending H&H for hematuria, given PRBC. POD5 transferred to [facility]    A/P:  Neurovascular: No delirium. Pain well controlled with current regimen.  -Post op seziure activity, Dr. Soriano following, COntinue keppra 250mg BID. Will need f/u with Dr. Soriano as outpatient  -monitor Neuro status     Cardiovascular: Hemodynamically stable. HR controlled.  -Hx HTN, HLD, mech AVR/MVR, afib on coumadin, s/p Mini MVR EF 60%  -continue metoprolo 12.5mg BID, assa 81mg daily, atorvastatin 40mg QHS  -continue warfarin 5mg tonight, INR 1.6. Stopped Hep gtt to remove chest tube per Dr. Escalante.  Per Dr. Escalante hep gtt can be stopped when INR reaches 1.8  -monitor hr/bp/tele     Respiratory: 02 Sat = 98% on 2LNC  -wean o2  -Encourage ambulation C+DB and Use of IS 10x / hr while awake.  -CXR- chest tube clamped, R apical airspace, repeat CXR PA/LAT per sign out to f/u.    GI: Stable.  -cont protonix for GI PPX.  -cont bowel regimen  -PO Diet.    Renal / : BUN/Cr 36/1.12  -Monitor renal function.  -Monitor I/O's.  -cont lasix 40mg daily, replete lytes PRN  -repeat bmp 4p  -chronic hematuria, continue to monitor     Endocrine:    -A1c 4.7- FS contolled, continue to monitor, continue ISS  -TSH WNL     Hematologic: Hx anemia, followed as outpatient, H&H stable  -cont folic acid/Fe    ID:afebrile, wbc 5.0  -Observe for SIRS/Sepsis Syndrome.    Prophylaxis:  -hep gtt and coumadin  -SCD's    Disposition:  -Home tomorrow if INR therapeutic 77 year old F PMHx chronic hypoproliferative anemia, transfusion dependent (sees Dr. Ruiz as an outpatient) with no improvement after Fe and EPO, Afib on Coumadin, NIDDM, osteoporosis, idiopathic ureteral stricture s/p left ureteral stent, CVA 20 years ago without residual deficits, HTN, HLD, Rheumatic fever as a child s/p mechanical MVR and AVR 14 years ago at [facility] with Dr. Christie, who was recently admitted to [facility] on 3/17/18 for persistent hematuria x 1 year and progressive SOB/YUNG with associated 4 pillow orthopnea, NYHA class IV symptoms (dyspnea after climbing 4 stairs).  While inpatient, she was noted to have worsening anemia beyond her current baseline as well as UTI for which she was placed on IV rocephin and transitioned to PO Cefpodoxime upon discharge.  She underwent ECHO on 3/17/18 that revealed EF 60%, dilated LA/RV, mechanical MV with mobile echodensity, mechanical AS with EZEQUIEL 0.85cm2, Mean/peak gradients 33.7mmHg/49.5mmHg with severe AR, and severe pulmonary hypertension with PASP 62.6mmHg and cardiac catheterization on 3/23/18 that demonstrated non-obstructive CAD.  Dr. Tomer Escalante was consulted and on 3/24/18, she was transferred to [facility] under his care to complete pre-surgical evaluation. On 3/29/19 patient underwent mini MVR, intra op TVP placed for fib arrest. Patient transferred to CT ICU. POD1, extubated, witnessed seziure activity, and reintubated for airway protection. Stroke code called, CT head negative, EEG placed showing epileptic activity and Keppra started per Dr. Soriano. She was later extubated POD2, and [facility].  POD3 trending H&H for hematuria, given PRBC. POD5 transferred to [facility]    A/P:  Neurovascular: No delirium. Pain well controlled with current regimen.  -Post op seziure activity, Dr. Soriano following, COntinue keppra 250mg BID. Will need f/u with Dr. Soriano as outpatient  -monitor Neuro status   -Cont tylenol PRN pain, lidoderm patch    Cardiovascular: Hemodynamically stable. HR controlled.  -Hx HTN, HLD, mech AVR/MVR, afib on coumadin, s/p Mini MVR EF 60%  -continue metoprolo 12.5mg BID, assa 81mg daily, atorvastatin 40mg QHS  -continue warfarin 5mg tonight, INR 1.6. Stopped Hep gtt to remove chest tube per Dr. Escalante.  Per Dr. Escalante hep gtt can be stopped when INR reaches 1.8  -monitor hr/bp/tele     Respiratory: 02 Sat = 98% on RA  -Encourage ambulation C+DB and Use of IS 10x / hr while awake.  -CXR- chest tube clamped, R apical airspace, repeat CXR PA/LAT per sign out to f/u.    GI: Stable.  -cont protonix for GI PPX.  -cont bowel regimen  -PO Diet.    Renal / : BUN/Cr 36/1.12  -Monitor renal function.  -Monitor I/O's.  -cont lasix 40mg daily, replete lytes PRN  -repeat bmp 4p  -chronic hematuria, continue to monitor     Endocrine:    -A1c 4.7- FS controlled today, continue to monitor, continue ISS  -TSH WNL     Hematologic: Hx anemia, followed as outpatient, H&H stable  -cont folic acid/Fe    ID: afebrile, wbc 5.0  -Observe for SIRS/Sepsis Syndrome.    Prophylaxis:  -hep gtt and coumadin  -SCD's    Disposition:  -Home tomorrow if INR therapeutic

## 2018-04-04 NOTE — PROGRESS NOTE ADULT - SUBJECTIVE AND OBJECTIVE BOX
Transfer from      Operation / Date: 3/29/18 Mini MVR EF 60%    SUBJECTIVE ASSESSMENT:  77y Female         Vital Signs Last 24 Hrs  T(C): 36.3 (04 Apr 2018 05:00), Max: 37.1 (03 Apr 2018 17:15)  T(F): 97.3 (04 Apr 2018 05:00), Max: 98.7 (03 Apr 2018 17:15)  HR: 64 (04 Apr 2018 06:10) (62 - 89)  BP: 134/73 (04 Apr 2018 05:00) (107/56 - 141/59)  BP(mean): 98 (04 Apr 2018 05:00) (66 - 98)  RR: 14 (04 Apr 2018 06:10) (14 - 18)  SpO2: 98% (04 Apr 2018 06:10) (95% - 100%)  I&O's Detail    03 Apr 2018 07:01  -  04 Apr 2018 07:00  --------------------------------------------------------  IN:    heparin Infusion: 168 mL  Total IN: 168 mL    OUT:    Chest Tube: 450 mL    Indwelling Catheter - Urethral: 375 mL    Voided: 950 mL  Total OUT: 1775 mL    Total NET: -1607 mL      04 Apr 2018 07:01  -  04 Apr 2018 09:17  --------------------------------------------------------  IN:    heparin Infusion: 8 mL  Total IN: 8 mL    OUT:    Voided: 250 mL  Total OUT: 250 mL    Total NET: -242 mL          CHEST TUBE: Yes clamped.   KEITH DRAIN:  No.  EPICARDIAL WIRES: No.  TIE DOWNS: Yes  HA: No.    PHYSICAL EXAM:    General:     Neurological:    Cardiovascular:    Respiratory:    Gastrointestinal:    Extremities:    Vascular:    Incision Sites:    LABS:                        9.5    5.0   )-----------( 152      ( 04 Apr 2018 02:22 )             31.4       COUMADIN:  yes, Mechanical AV/MV  and afib     PT/INR - ( 04 Apr 2018 02:21 )   PT: 18.5 sec;   INR: 1.65          PTT - ( 04 Apr 2018 02:21 )  PTT:46.1 sec    04-04    138  |  97  |  36<H>  ----------------------------<  163<H>  5.1   |  33<H>  |  1.12    Ca    9.0      04 Apr 2018 02:21  Phos  3.3     04-04  Mg     1.9     04-04    TPro  6.1  /  Alb  3.3  /  TBili  2.0<H>  /  DBili  x   /  AST  22  /  ALT  5<L>  /  AlkPhos  79  04-04          MEDICATIONS  (STANDING):  acetaminophen  IVPB. 1000 milliGRAM(s) IV Intermittent once  aspirin enteric coated 81 milliGRAM(s) Oral daily  atorvastatin 40 milliGRAM(s) Oral at bedtime  dextrose 5%. 1000 milliLiter(s) (50 mL/Hr) IV Continuous <Continuous>  dextrose 50% Injectable 50 milliLiter(s) IV Push every 15 minutes  dextrose 50% Injectable 25 milliLiter(s) IV Push every 15 minutes  docusate sodium 100 milliGRAM(s) Oral three times a day  ferrous    sulfate 325 milliGRAM(s) Oral daily  folic acid 1 milliGRAM(s) Oral daily  furosemide    Tablet 40 milliGRAM(s) Oral daily  insulin lispro (HumaLOG) corrective regimen sliding scale   SubCutaneous Before meals and at bedtime  levETIRAcetam 250 milliGRAM(s) Oral two times a day  lidocaine   Patch 1 Patch Transdermal daily  metoprolol tartrate 12.5 milliGRAM(s) Oral every 12 hours  pantoprazole    Tablet 40 milliGRAM(s) Oral before breakfast  senna 2 Tablet(s) Oral at bedtime  sodium chloride 0.9% lock flush 3 milliLiter(s) IV Push every 8 hours  warfarin 5 milliGRAM(s) Oral once    MEDICATIONS  (PRN):  acetaminophen   Tablet 650 milliGRAM(s) Oral every 6 hours PRN mild pain  dextrose Gel 1 Dose(s) Oral once PRN Blood Glucose LESS THAN 70 milliGRAM(s)/deciliter  glucagon  Injectable 1 milliGRAM(s) IntraMuscular once PRN Glucose LESS THAN 70 milligrams/deciliter  polyethylene glycol 3350 17 Gram(s) Oral once PRN Constipation        RADIOLOGY & ADDITIONAL TESTS:  CXR: R apical airspace, moderate pulm congestion, R sided atelecatsis vs consolidation Transfer from      Operation / Date: 3/29/18 Mini MVR EF 60%    SUBJECTIVE ASSESSMENT:  77y Female seen and examined.  Patient feels well but reports pain at chest tube site. Denies fever, chest pain, palpitations, SOB, abdominal pain, n/v/d/c. IS ambulating, using  IS pulling 500cc, tolerating PO diet, and having normal BMs. NO acute events overnight.         Vital Signs Last 24 Hrs  T(C): 36.3 (04 Apr 2018 05:00), Max: 37.1 (03 Apr 2018 17:15)  T(F): 97.3 (04 Apr 2018 05:00), Max: 98.7 (03 Apr 2018 17:15)  HR: 64 (04 Apr 2018 06:10) (62 - 89)  BP: 134/73 (04 Apr 2018 05:00) (107/56 - 141/59)  BP(mean): 98 (04 Apr 2018 05:00) (66 - 98)  RR: 14 (04 Apr 2018 06:10) (14 - 18)  SpO2: 98% (04 Apr 2018 06:10) (95% - 100%)  I&O's Detail    03 Apr 2018 07:01  -  04 Apr 2018 07:00  --------------------------------------------------------  IN:    heparin Infusion: 168 mL  Total IN: 168 mL    OUT:    Chest Tube: 450 mL    Indwelling Catheter - Urethral: 375 mL    Voided: 950 mL  Total OUT: 1775 mL    Total NET: -1607 mL      04 Apr 2018 07:01  -  04 Apr 2018 09:17  --------------------------------------------------------  IN:    heparin Infusion: 8 mL  Total IN: 8 mL    OUT:    Voided: 250 mL  Total OUT: 250 mL    Total NET: -242 mL          CHEST TUBE: Yes clamped.   KEITH DRAIN:  No.  EPICARDIAL WIRES: No.  TIE DOWNS: Yes  HA: No.    PHYSICAL EXAM:    General: Patient lying comfortably in bed, no acute distress     Neurological: Alert and oriented. No focal neurological deficits     Cardiovascular: S1S2, irregular rhythm, no murmurs appreciated on exam     Respiratory: Coarse BS on R side, otherwise CTA, no wheeze/rales.    Gastrointestinal: Abdomen soft, non tender, non distended     Extremities: Warm and well perfused. 1+ b/l LE edema, calf tenderness     Vascular: 2+ Peripheral pulses     Incision Sites: L thoracotomy: CDI, Moderate ecchymosis around breast, no hematoma.  L CT site: CDI.  L groin: CDI, no hematoma/bruit.     LABS:                        9.5    5.0   )-----------( 152      ( 04 Apr 2018 02:22 )             31.4       COUMADIN:  yes, Mechanical valve  and afib     PT/INR - ( 04 Apr 2018 02:21 )   PT: 18.5 sec;   INR: 1.65          PTT - ( 04 Apr 2018 02:21 )  PTT:46.1 sec    04-04    138  |  97  |  36<H>  ----------------------------<  163<H>  5.1   |  33<H>  |  1.12    Ca    9.0      04 Apr 2018 02:21  Phos  3.3     04-04  Mg     1.9     04-04    TPro  6.1  /  Alb  3.3  /  TBili  2.0<H>  /  DBili  x   /  AST  22  /  ALT  5<L>  /  AlkPhos  79  04-04          MEDICATIONS  (STANDING):  acetaminophen  IVPB. 1000 milliGRAM(s) IV Intermittent once  aspirin enteric coated 81 milliGRAM(s) Oral daily  atorvastatin 40 milliGRAM(s) Oral at bedtime  dextrose 5%. 1000 milliLiter(s) (50 mL/Hr) IV Continuous <Continuous>  dextrose 50% Injectable 50 milliLiter(s) IV Push every 15 minutes  dextrose 50% Injectable 25 milliLiter(s) IV Push every 15 minutes  docusate sodium 100 milliGRAM(s) Oral three times a day  ferrous    sulfate 325 milliGRAM(s) Oral daily  folic acid 1 milliGRAM(s) Oral daily  furosemide    Tablet 40 milliGRAM(s) Oral daily  insulin lispro (HumaLOG) corrective regimen sliding scale   SubCutaneous Before meals and at bedtime  levETIRAcetam 250 milliGRAM(s) Oral two times a day  lidocaine   Patch 1 Patch Transdermal daily  metoprolol tartrate 12.5 milliGRAM(s) Oral every 12 hours  pantoprazole    Tablet 40 milliGRAM(s) Oral before breakfast  senna 2 Tablet(s) Oral at bedtime  sodium chloride 0.9% lock flush 3 milliLiter(s) IV Push every 8 hours  warfarin 5 milliGRAM(s) Oral once    MEDICATIONS  (PRN):  acetaminophen   Tablet 650 milliGRAM(s) Oral every 6 hours PRN mild pain  dextrose Gel 1 Dose(s) Oral once PRN Blood Glucose LESS THAN 70 milliGRAM(s)/deciliter  glucagon  Injectable 1 milliGRAM(s) IntraMuscular once PRN Glucose LESS THAN 70 milligrams/deciliter  polyethylene glycol 3350 17 Gram(s) Oral once PRN Constipation        RADIOLOGY & ADDITIONAL TESTS:  CXR: R apical airspace, moderate pulm congestion, R sided atelecatsis vs consolidation

## 2018-04-05 ENCOUNTER — TRANSCRIPTION ENCOUNTER (OUTPATIENT)
Age: 78
End: 2018-04-05

## 2018-04-05 VITALS
HEART RATE: 88 BPM | DIASTOLIC BLOOD PRESSURE: 57 MMHG | OXYGEN SATURATION: 90 % | SYSTOLIC BLOOD PRESSURE: 129 MMHG | RESPIRATION RATE: 20 BRPM

## 2018-04-05 LAB
ANION GAP SERPL CALC-SCNC: 10 MMOL/L — SIGNIFICANT CHANGE UP (ref 5–17)
APTT BLD: 31.8 SEC — SIGNIFICANT CHANGE UP (ref 27.5–37.4)
BUN SERPL-MCNC: 34 MG/DL — HIGH (ref 7–23)
CALCIUM SERPL-MCNC: 9.3 MG/DL — SIGNIFICANT CHANGE UP (ref 8.4–10.5)
CHLORIDE SERPL-SCNC: 95 MMOL/L — LOW (ref 96–108)
CO2 SERPL-SCNC: 33 MMOL/L — HIGH (ref 22–31)
CREAT SERPL-MCNC: 1.05 MG/DL — SIGNIFICANT CHANGE UP (ref 0.5–1.3)
GLUCOSE BLDC GLUCOMTR-MCNC: 131 MG/DL — HIGH (ref 70–99)
GLUCOSE BLDC GLUCOMTR-MCNC: 291 MG/DL — HIGH (ref 70–99)
GLUCOSE SERPL-MCNC: 153 MG/DL — HIGH (ref 70–99)
HCT VFR BLD CALC: 31.6 % — LOW (ref 34.5–45)
HGB BLD-MCNC: 9.6 G/DL — LOW (ref 11.5–15.5)
INR BLD: 1.96 — HIGH (ref 0.88–1.16)
MAGNESIUM SERPL-MCNC: 2 MG/DL — SIGNIFICANT CHANGE UP (ref 1.6–2.6)
MCHC RBC-ENTMCNC: 26.7 PG — LOW (ref 27–34)
MCHC RBC-ENTMCNC: 30.4 G/DL — LOW (ref 32–36)
MCV RBC AUTO: 88 FL — SIGNIFICANT CHANGE UP (ref 80–100)
PHOSPHATE SERPL-MCNC: 3.6 MG/DL — SIGNIFICANT CHANGE UP (ref 2.5–4.5)
PLATELET # BLD AUTO: 204 K/UL — SIGNIFICANT CHANGE UP (ref 150–400)
POTASSIUM SERPL-MCNC: 4.5 MMOL/L — SIGNIFICANT CHANGE UP (ref 3.5–5.3)
POTASSIUM SERPL-SCNC: 4.5 MMOL/L — SIGNIFICANT CHANGE UP (ref 3.5–5.3)
PROTHROM AB SERPL-ACNC: 22 SEC — HIGH (ref 9.8–12.7)
RBC # BLD: 3.59 M/UL — LOW (ref 3.8–5.2)
RBC # FLD: 18.4 % — HIGH (ref 10.3–16.9)
SODIUM SERPL-SCNC: 138 MMOL/L — SIGNIFICANT CHANGE UP (ref 135–145)
WBC # BLD: 5.8 K/UL — SIGNIFICANT CHANGE UP (ref 3.8–10.5)
WBC # FLD AUTO: 5.8 K/UL — SIGNIFICANT CHANGE UP (ref 3.8–10.5)

## 2018-04-05 PROCEDURE — 87040 BLOOD CULTURE FOR BACTERIA: CPT

## 2018-04-05 PROCEDURE — 97164 PT RE-EVAL EST PLAN CARE: CPT

## 2018-04-05 PROCEDURE — 80048 BASIC METABOLIC PNL TOTAL CA: CPT

## 2018-04-05 PROCEDURE — 83880 ASSAY OF NATRIURETIC PEPTIDE: CPT

## 2018-04-05 PROCEDURE — C1889: CPT

## 2018-04-05 PROCEDURE — 85025 COMPLETE CBC W/AUTO DIFF WBC: CPT

## 2018-04-05 PROCEDURE — 80076 HEPATIC FUNCTION PANEL: CPT

## 2018-04-05 PROCEDURE — 86923 COMPATIBILITY TEST ELECTRIC: CPT

## 2018-04-05 PROCEDURE — 84295 ASSAY OF SERUM SODIUM: CPT

## 2018-04-05 PROCEDURE — 70450 CT HEAD/BRAIN W/O DYE: CPT

## 2018-04-05 PROCEDURE — P9045: CPT

## 2018-04-05 PROCEDURE — 81001 URINALYSIS AUTO W/SCOPE: CPT

## 2018-04-05 PROCEDURE — 84484 ASSAY OF TROPONIN QUANT: CPT

## 2018-04-05 PROCEDURE — 93880 EXTRACRANIAL BILAT STUDY: CPT

## 2018-04-05 PROCEDURE — 71046 X-RAY EXAM CHEST 2 VIEWS: CPT

## 2018-04-05 PROCEDURE — 36415 COLL VENOUS BLD VENIPUNCTURE: CPT

## 2018-04-05 PROCEDURE — 85610 PROTHROMBIN TIME: CPT

## 2018-04-05 PROCEDURE — 71046 X-RAY EXAM CHEST 2 VIEWS: CPT | Mod: 26

## 2018-04-05 PROCEDURE — 36430 TRANSFUSION BLD/BLD COMPNT: CPT

## 2018-04-05 PROCEDURE — 70498 CT ANGIOGRAPHY NECK: CPT

## 2018-04-05 PROCEDURE — 80053 COMPREHEN METABOLIC PANEL: CPT

## 2018-04-05 PROCEDURE — 80061 LIPID PANEL: CPT

## 2018-04-05 PROCEDURE — 87075 CULTR BACTERIA EXCEPT BLOOD: CPT

## 2018-04-05 PROCEDURE — 86900 BLOOD TYPING SEROLOGIC ABO: CPT

## 2018-04-05 PROCEDURE — 97116 GAIT TRAINING THERAPY: CPT

## 2018-04-05 PROCEDURE — 88300 SURGICAL PATH GROSS: CPT

## 2018-04-05 PROCEDURE — 93005 ELECTROCARDIOGRAM TRACING: CPT

## 2018-04-05 PROCEDURE — 85027 COMPLETE CBC AUTOMATED: CPT

## 2018-04-05 PROCEDURE — 83605 ASSAY OF LACTIC ACID: CPT

## 2018-04-05 PROCEDURE — 87070 CULTURE OTHR SPECIMN AEROBIC: CPT

## 2018-04-05 PROCEDURE — P9016: CPT

## 2018-04-05 PROCEDURE — 83036 HEMOGLOBIN GLYCOSYLATED A1C: CPT

## 2018-04-05 PROCEDURE — 94150 VITAL CAPACITY TEST: CPT

## 2018-04-05 PROCEDURE — 94002 VENT MGMT INPAT INIT DAY: CPT

## 2018-04-05 PROCEDURE — C1887: CPT

## 2018-04-05 PROCEDURE — C1769: CPT

## 2018-04-05 PROCEDURE — 74176 CT ABD & PELVIS W/O CONTRAST: CPT

## 2018-04-05 PROCEDURE — 82803 BLOOD GASES ANY COMBINATION: CPT

## 2018-04-05 PROCEDURE — 95951: CPT

## 2018-04-05 PROCEDURE — 82550 ASSAY OF CK (CPK): CPT

## 2018-04-05 PROCEDURE — 86901 BLOOD TYPING SEROLOGIC RH(D): CPT

## 2018-04-05 PROCEDURE — 71250 CT THORAX DX C-: CPT

## 2018-04-05 PROCEDURE — 97162 PT EVAL MOD COMPLEX 30 MIN: CPT

## 2018-04-05 PROCEDURE — 82962 GLUCOSE BLOOD TEST: CPT

## 2018-04-05 PROCEDURE — 85730 THROMBOPLASTIN TIME PARTIAL: CPT

## 2018-04-05 PROCEDURE — 93306 TTE W/DOPPLER COMPLETE: CPT

## 2018-04-05 PROCEDURE — 84132 ASSAY OF SERUM POTASSIUM: CPT

## 2018-04-05 PROCEDURE — 83735 ASSAY OF MAGNESIUM: CPT

## 2018-04-05 PROCEDURE — 70496 CT ANGIOGRAPHY HEAD: CPT

## 2018-04-05 PROCEDURE — 86850 RBC ANTIBODY SCREEN: CPT

## 2018-04-05 PROCEDURE — 71045 X-RAY EXAM CHEST 1 VIEW: CPT

## 2018-04-05 PROCEDURE — 84100 ASSAY OF PHOSPHORUS: CPT

## 2018-04-05 PROCEDURE — 82330 ASSAY OF CALCIUM: CPT

## 2018-04-05 PROCEDURE — 84443 ASSAY THYROID STIM HORMONE: CPT

## 2018-04-05 RX ORDER — DOCUSATE SODIUM 100 MG
1 CAPSULE ORAL
Qty: 90 | Refills: 0 | OUTPATIENT
Start: 2018-04-05 | End: 2018-05-04

## 2018-04-05 RX ORDER — FOLIC ACID 0.8 MG
1 TABLET ORAL
Qty: 30 | Refills: 0 | OUTPATIENT
Start: 2018-04-05 | End: 2018-05-04

## 2018-04-05 RX ORDER — WARFARIN SODIUM 2.5 MG/1
1 TABLET ORAL
Qty: 0 | Refills: 0 | COMMUNITY

## 2018-04-05 RX ORDER — PANTOPRAZOLE SODIUM 20 MG/1
1 TABLET, DELAYED RELEASE ORAL
Qty: 30 | Refills: 0 | OUTPATIENT
Start: 2018-04-05 | End: 2018-05-04

## 2018-04-05 RX ORDER — FUROSEMIDE 40 MG
1 TABLET ORAL
Qty: 30 | Refills: 0 | OUTPATIENT
Start: 2018-04-05 | End: 2018-05-04

## 2018-04-05 RX ORDER — RALOXIFENE HYDROCHLORIDE 60 MG/1
1 TABLET, COATED ORAL
Qty: 0 | Refills: 0 | COMMUNITY

## 2018-04-05 RX ORDER — RAMIPRIL 5 MG
1 CAPSULE ORAL
Qty: 0 | Refills: 0 | COMMUNITY

## 2018-04-05 RX ORDER — ACETAMINOPHEN 500 MG
2 TABLET ORAL
Qty: 240 | Refills: 0 | OUTPATIENT
Start: 2018-04-05 | End: 2018-05-04

## 2018-04-05 RX ORDER — METFORMIN HYDROCHLORIDE 850 MG/1
1 TABLET ORAL
Qty: 30 | Refills: 0 | OUTPATIENT
Start: 2018-04-05 | End: 2018-05-04

## 2018-04-05 RX ORDER — FERROUS SULFATE 325(65) MG
1 TABLET ORAL
Qty: 90 | Refills: 0 | OUTPATIENT
Start: 2018-04-05 | End: 2018-05-04

## 2018-04-05 RX ORDER — ASPIRIN/CALCIUM CARB/MAGNESIUM 324 MG
1 TABLET ORAL
Qty: 30 | Refills: 0 | OUTPATIENT
Start: 2018-04-05 | End: 2018-05-04

## 2018-04-05 RX ORDER — GLIMEPIRIDE 1 MG
1 TABLET ORAL
Qty: 30 | Refills: 0 | OUTPATIENT
Start: 2018-04-05 | End: 2018-05-04

## 2018-04-05 RX ORDER — METOPROLOL TARTRATE 50 MG
0.5 TABLET ORAL
Qty: 30 | Refills: 0 | OUTPATIENT
Start: 2018-04-05 | End: 2018-05-04

## 2018-04-05 RX ORDER — WARFARIN SODIUM 2.5 MG/1
1 TABLET ORAL
Qty: 30 | Refills: 0 | OUTPATIENT
Start: 2018-04-05 | End: 2018-05-04

## 2018-04-05 RX ORDER — RALOXIFENE HYDROCHLORIDE 60 MG/1
1 TABLET, COATED ORAL
Qty: 30 | Refills: 0 | OUTPATIENT
Start: 2018-04-05 | End: 2018-05-04

## 2018-04-05 RX ORDER — GLIMEPIRIDE 1 MG
1 TABLET ORAL
Qty: 0 | Refills: 0 | COMMUNITY

## 2018-04-05 RX ORDER — LEVETIRACETAM 250 MG/1
1 TABLET, FILM COATED ORAL
Qty: 60 | Refills: 0 | OUTPATIENT
Start: 2018-04-05 | End: 2018-05-04

## 2018-04-05 RX ORDER — METFORMIN HYDROCHLORIDE 850 MG/1
1 TABLET ORAL
Qty: 0 | Refills: 0 | COMMUNITY

## 2018-04-05 RX ORDER — FERROUS SULFATE 325(65) MG
0 TABLET ORAL
Qty: 0 | Refills: 0 | COMMUNITY

## 2018-04-05 RX ADMIN — SODIUM CHLORIDE 3 MILLILITER(S): 9 INJECTION INTRAMUSCULAR; INTRAVENOUS; SUBCUTANEOUS at 06:04

## 2018-04-05 RX ADMIN — PANTOPRAZOLE SODIUM 40 MILLIGRAM(S): 20 TABLET, DELAYED RELEASE ORAL at 06:03

## 2018-04-05 RX ADMIN — Medication 100 MILLIGRAM(S): at 06:03

## 2018-04-05 RX ADMIN — LEVETIRACETAM 250 MILLIGRAM(S): 250 TABLET, FILM COATED ORAL at 06:03

## 2018-04-05 RX ADMIN — Medication 40 MILLIGRAM(S): at 06:03

## 2018-04-05 RX ADMIN — Medication 325 MILLIGRAM(S): at 12:10

## 2018-04-05 RX ADMIN — Medication 81 MILLIGRAM(S): at 12:10

## 2018-04-05 RX ADMIN — Medication 6: at 12:26

## 2018-04-05 RX ADMIN — Medication 12.5 MILLIGRAM(S): at 06:03

## 2018-04-05 RX ADMIN — OXYCODONE AND ACETAMINOPHEN 1 TABLET(S): 5; 325 TABLET ORAL at 01:44

## 2018-04-05 RX ADMIN — Medication 1 MILLIGRAM(S): at 12:10

## 2018-04-05 RX ADMIN — SODIUM CHLORIDE 3 MILLILITER(S): 9 INJECTION INTRAMUSCULAR; INTRAVENOUS; SUBCUTANEOUS at 12:27

## 2018-04-05 NOTE — DISCHARGE NOTE ADULT - CARE PROVIDERS DIRECT ADDRESSES
[email or web address]

## 2018-04-05 NOTE — DISCHARGE NOTE ADULT - CARE PLAN
Principal Discharge DX:	Rheumatic mitral stenosis  Goal:	To recover from surgery  Assessment and plan of treatment:	-Please follow up with Dr. Escalante on Tuesday 4/10/18 @ 1:00pm.  The office is located at [facility], [address], [facility], 4th floor. Call us with any questions #[phone].    -Walk daily as tolerated and use your incentive spirometer every hour.    -No driving or strenuous activity/exercise for 6 weeks, or until cleared by your surgeon.    -Gently clean your incisions with anti-bacterial soap and water, pat dry.  You may leave them open to air.  You are going home with 2 stitches in your right chest area, where the chest tubes were removed.  These stitches will be removed in your follow up visit.  You may gently clean the area with soap and water, pat dry and cover with gauze and adhesive (tape) for the next 2-3 days.      -Call your doctor if you have shortness of breath, chest pain not relieved by pain medication, dizziness, fever >101.5, or increased redness or drainage from incisions.  Secondary Diagnosis:	Atrial fibrillation  Assessment and plan of treatment:	-Please follow up with Dr. Darrel Wolfe, your cardiologist, on Monday 4/16/18 @ 12:15pm.  The office is located at [address] [place] [address].  Office number is #[phone]    [address] [place] [address]: [phone]  Secondary Diagnosis:	Seizure  Assessment and plan of treatment:	After surgery you suffered from a seizure.  For this, a neurologist saw you in the hospital and you were started on anti-seizure medications.  Please continue this medication (Keppra) for the next month and follow up with Dr. Soriano (Neurologist) on Monday 4/23/18 @ 4:30pm.  The office and phone number are listed below. Principal Discharge DX:	Rheumatic mitral stenosis  Goal:	To recover from surgery  Assessment and plan of treatment:	-Please follow up with Dr. Escalante on Tuesday 4/10/18 @ 1:00pm.  The office is located at [facility], [address] [place] [address], [facility], 4th floor. Call us with any questions #[phone].    -Walk daily as tolerated and use your incentive spirometer every hour.    -No driving or strenuous activity/exercise for 6 weeks, or until cleared by your surgeon.    -Gently clean your incisions with anti-bacterial soap and water, pat dry.  You may leave them open to air.  You are going home with 2 stitches in your right chest area, where the chest tubes were removed.  These stitches will be removed in your follow up visit.  You may gently clean the area with soap and water, pat dry and cover with gauze and adhesive (tape) for the next 2-3 days.      -Call your doctor if you have shortness of breath, chest pain not relieved by pain medication, dizziness, fever >101.5, or increased redness or drainage from incisions.  Secondary Diagnosis:	Atrial fibrillation  Assessment and plan of treatment:	-Please follow up with Dr. Darrel Wolfe, your cardiologist, on Monday 4/16/18 @ 12:15pm.  The office is located at [address] [place] [address].  Office number is #[phone].  *****PLEASE CHECK YOUR INR TOMORROW 4/6/18 and have the results sent to Dr. Wolfe.  The INR company can be reached at [phone] with any questions.  Secondary Diagnosis:	Seizure  Assessment and plan of treatment:	After surgery you suffered from a seizure.  For this, a neurologist saw you in the hospital and you were started on anti-seizure medications.  Please continue this medication (Keppra) for the next month and follow up with Dr. Soriano (Neurologist) on Monday 4/23/18 @ 4:30pm.  The office and phone number are listed below.

## 2018-04-05 NOTE — DISCHARGE NOTE ADULT - MEDICATION SUMMARY - MEDICATIONS TO STOP TAKING
I will STOP taking the medications listed below when I get home from the hospital:    metoprolol tartrate 25 mg oral tablet  -- 1 tab(s) by mouth 2 times a day    furosemide 20 mg oral tablet  -- 1 tab(s) by mouth once a day    heparin    ramipril 10 mg oral capsule  -- 1 cap(s) by mouth once a day I will STOP taking the medications listed below when I get home from the hospital:    ferrous sulfate 324 mg (65 mg elemental iron) oral tablet  -- orally once a day    metoprolol tartrate 25 mg oral tablet  -- 1 tab(s) by mouth 2 times a day    heparin    ramipril 10 mg oral capsule  -- 1 cap(s) by mouth once a day

## 2018-04-05 NOTE — DISCHARGE NOTE ADULT - CARE PROVIDER_API CALL
oTmer Escalante), Cardiovascular Surgery  [address]  [address]th [place] [address]  Phone: [phone]  Fax: [phone]    Pascual Soriano), Neurology; Vascular Neurology  [address] [address]  Phone: [phone]  Fax: [phone]

## 2018-04-05 NOTE — DISCHARGE NOTE ADULT - PLAN OF CARE
To recover from surgery -Please follow up with Dr. Escalante on Tuesday 4/10/18 @ 1:00pm.  The office is located at [facility], [address] [place] [address], [facility], 4th floor. Call us with any questions #[phone].    -Walk daily as tolerated and use your incentive spirometer every hour.    -No driving or strenuous activity/exercise for 6 weeks, or until cleared by your surgeon.    -Gently clean your incisions with anti-bacterial soap and water, pat dry.  You may leave them open to air.  You are going home with 2 stitches in your right chest area, where the chest tubes were removed.  These stitches will be removed in your follow up visit.  You may gently clean the area with soap and water, pat dry and cover with gauze and adhesive (tape) for the next 2-3 days.      -Call your doctor if you have shortness of breath, chest pain not relieved by pain medication, dizziness, fever >101.5, or increased redness or drainage from incisions. -Please follow up with Dr. Darrel Wolfe, your cardiologist, on Monday 4/16/18 @ 12:15pm.  The office is located at [address] [place] [address].  Office number is #[phone]    [address] [place] [address]: [phone] After surgery you suffered from a seizure.  For this, a neurologist saw you in the hospital and you were started on anti-seizure medications.  Please continue this medication (Keppra) for the next month and follow up with Dr. Soriano (Neurologist) on Monday 4/23/18 @ 4:30pm.  The office and phone number are listed below. -Please follow up with Dr. Darrel Wolfe, your cardiologist, on Monday 4/16/18 @ 12:15pm.  The office is located at [address] [place] [address].  Office number is #[phone].  *****PLEASE CHECK YOUR INR TOMORROW 4/6/18 and have the results sent to Dr. Wolfe.  The INR company can be reached at [phone] with any questions.

## 2018-04-05 NOTE — PROGRESS NOTE ADULT - PROVIDER SPECIALTY LIST ADULT
Anesthesia
CT Surgery
Cardiology
Cardiology
Critical Care
Intervent Cardiology
Neurology
Critical Care

## 2018-04-05 NOTE — PROGRESS NOTE ADULT - ASSESSMENT
-Dr. Escalante 4/10/18 @ 1:00pm  -Dr. Darrel Wolfe (referring) 1-2 weeks: 4/16/18 @ 12:15pm  [address] [place] [address]: [phone]  Dr. Soriano (neuro) 4/23/18 @ 4:30pm  [address]: [phone]

## 2018-04-05 NOTE — DISCHARGE NOTE ADULT - HOSPITAL COURSE
This is a 77 year old female with PMHx of chronic hypoproliferative anemia, transfusion dependent (sees Dr. Ruiz as an outpatient), on chronic iron supplementation, Afib on Coumadin (home lab draws, followed by Dr. Wolfe), NIDDM, osteoporosis (takes preventative PO medication for it), idiopathic ureteral stricture s/p left ureteral stent, CVA 20 years ago without residual deficits, HTN, HLD, Rheumatic fever as a child s/p mechanical MVR and AVR 14 years ago at [facility] with Dr. Christie, who was recently admitted to [facility] on 3/17/18 for persistent hematuria x 1 year and progressive SOB/YUNG with associated 4 pillow orthopnea, NYHA class IV symptoms (dyspnea after climbing 4 stairs).  While inpatient, she was noted to have worsening anemia beyond her current baseline as well as UTI for which she was placed on IV rocephin and transitioned to PO Cefpodoxime upon discharge.  She underwent ECHO on 3/17/18 that revealed EF 60%, dilated LA/RV, mechanical MV with mobile echodensity, mechanical AS with EZEQUIEL 0.85cm2, Mean/peak gradients 33.7mmHg/49.5mmHg with severe AR, and severe pulmonary hypertension with PASP 62mmHg.  Cardiac catheterization on 3/23/18 demonstrated non-obstructive CAD.  On 3/24/18, she was transferred to [facility] under the care of Dr. Escalante to complete pre-surgical evaluation.     On 3/29/19 patient underwent mini MVR, intra-op TVP placed for vfib arrest.  On POD1, patient was extubated, then had witnessed seizure activity requiring reintubation for airway protection.  Stroke code called, CT head negative, EEG placed showing epileptic activity and Keppra started per Dr. Soriano. She was later extubated POD#2, and hep gtt/coumadin started for previous aortic mech valve and h/o afib.  POD3 received PRBC for anemia. POD5 transferred to [facility].  Now patient is POD#7, patient is ambulating well with assistance of a walker, feels well, hemodynamically stable, eating PO diet and with controlled post-op pain.  Stable from neurologic standpoint.  Stable to be discharged home per Dr. Escalante.  Patient will receive home INR draws.  I have instructed her to have her next one done tomorrow (she has a machine at home and does it herself, then sends the results to the [facility] at phone #[phone], and they send the results to Dr. Wolfe for review.  We contacted her urologist Dr. Pablo to discuss INR goals given her h/o hematuria.  Dr. Hoover spoke to him _____________.  Of note, after removing her last CT, she had a tiny/small right apical pneumothorax, but stable and unchanged on 3 serial CXR's over 2 days.  She will go home on Lasix without K+ supplementation given her levels have been 4.5-5.1 over the past 2-3 days.  Will readdress as out-patient. This is a 77 year old female with PMHx of chronic hypoproliferative anemia, transfusion dependent (sees Dr. Ruiz as an outpatient), on chronic iron supplementation, Afib on Coumadin (home lab draws, followed by Dr. Wolfe), NIDDM, osteoporosis (takes preventative PO medication for it), idiopathic ureteral stricture s/p left ureteral stent, CVA 20 years ago without residual deficits, HTN, HLD, Rheumatic fever as a child s/p mechanical MVR and AVR 14 years ago at [facility] with Dr. Christie, who was recently admitted to [facility] on 3/17/18 for persistent hematuria x 1 year and progressive SOB/YUNG with associated 4 pillow orthopnea, NYHA class IV symptoms (dyspnea after climbing 4 stairs).  While inpatient, she was noted to have worsening anemia beyond her current baseline as well as UTI for which she was placed on IV rocephin and transitioned to PO Cefpodoxime upon discharge.  She underwent ECHO on 3/17/18 that revealed EF 60%, dilated LA/RV, mechanical MV with mobile echodensity, mechanical AS with EZEQUIEL 0.85cm2, Mean/peak gradients 33.7mmHg/49.5mmHg with severe AR, and severe pulmonary hypertension with PASP 62mmHg.  Cardiac catheterization on 3/23/18 demonstrated non-obstructive CAD.  On 3/24/18, she was transferred to [facility] under the care of Dr. Escalante to complete pre-surgical evaluation.     On 3/29/19 patient underwent mini MVR, intra-op TVP placed for vfib arrest.  On POD1, patient was extubated, then had witnessed seizure activity requiring reintubation for airway protection.  Stroke code called, CT head negative, EEG placed showing epileptic activity and Keppra started per Dr. Soriano. She was later extubated POD#2, and hep gtt/coumadin started for previous aortic mech valve and h/o afib.  POD3 received PRBC for anemia. POD5 transferred to [facility].  Now patient is POD#7, patient is ambulating well with assistance of a walker, feels well, hemodynamically stable, eating PO diet and with controlled post-op pain.  Stable from neurologic standpoint.  Stable to be discharged home per Dr. Escalante.  Patient will receive home INR draws.  I have instructed her to have her next one done tomorrow (she has a machine at home and does it herself, then sends the results to the [employer] at phone #[phone], and they send the results to Dr. Wolfe for review.  We contacted her urologist Dr. Pablo to discuss INR goals given her h/o hematuria.  Dr. Hoover spoke to him, and there is no contraindication for continuing Coumadin, continue to monitor INR closely.      Of note, after removing her last CT, she had a tiny/small right apical pneumothorax, but stable and unchanged on 3 serial CXR's over 2 days.  She will go home on Lasix without K+ supplementation given her levels have been 4.5-5.1 over the past 2-3 days.  Will readdress as out-patient.

## 2018-04-05 NOTE — PROGRESS NOTE ADULT - SUBJECTIVE AND OBJECTIVE BOX
Patient discussed on morning rounds with Tomer Gurrola    Operation / Date:  3/29/18: Mini- MVR EF 60%    Surgeon: Tomer Gurrola    Referring Physician: Dr. Cifuentes    SUBJECTIVE ASSESSMENT:  Patient feels well, denies any SOB, CP or dizziness.  States she has been walking (2 times today so far), confirmed by  who is at bedside.  She denies any N/V/D at this time.  Eating PO diet w/o issues.  Using her IS, pulling about 750mL-1L.  Very anxious to go home.  Feels ready.      Hospital Course:    Vital Signs Last 24 Hrs  T(C): 36.1 (05 Apr 2018 10:00), Max: 37.2 (05 Apr 2018 05:00)  T(F): 96.9 (05 Apr 2018 10:00), Max: 98.9 (05 Apr 2018 05:00)  HR: 86 (05 Apr 2018 09:11) (65 - 86)  BP: 117/53 (05 Apr 2018 09:11) (102/60 - 136/83)  BP(mean): 70 (05 Apr 2018 09:11) (65 - 101)  RR: 20 (05 Apr 2018 09:11) (16 - 20)  SpO2: 89% (05 Apr 2018 09:11) (89% - 98%)  I&O's Detail    04 Apr 2018 07:01  -  05 Apr 2018 07:00  --------------------------------------------------------  IN:    heparin Infusion: 8 mL    Oral Fluid: 360 mL  Total IN: 368 mL    OUT:    Voided: 1300 mL  Total OUT: 1300 mL    Total NET: -932 mL      05 Apr 2018 07:01  -  05 Apr 2018 11:24  --------------------------------------------------------  IN:    Oral Fluid: 120 mL  Total IN: 120 mL    OUT:    Voided: 300 mL  Total OUT: 300 mL    Total NET: -180 mL      EPICARDIAL WIRES REMOVED: Yes  TIE DOWNS REMOVED: **Right groin stitch removed at bedside this am.  Also, 2 tie downs remain right chest.  One was from a CT removed on 4/1/18, the other was from a CT removed yesterday.  Unclear tie down was from which tube.     PHYSICAL EXAM:    General: NAD; Sitting in chair.  Cooperative and pleasant.    Neurological: No focal deficits.  Good strength B/L UE and LE.  Steady gait on ambulation with walker.      Cardiovascular: S1S2, faint residual murmur heard Rt and LSB.  Best heart left upper sternal border.     Respiratory: Rales right lower lung.  Otherwise clear, no wheezing.     Gastrointestinal: Soft, non-tender, non-distended    Extremities: Warm and well perfused.  +generalized edema lower legs wtih hyperpigmentation.  Pt states this is her baseline.      Vascular: Pedal pulses palpabie.     Incision Sites: Right thoracotomy clean and dry.  No drainage.  CT sites clean with 2 tie downs.  Wound cleaned and dressing applied    LABS:                        9.6    5.8   )-----------( 204      ( 05 Apr 2018 08:01 )             31.6       COUMADIN:  Yes   INR 1.9.  Dose: 5mg    Indication: for afib and prosthetic mitral valve.  Goal INR 2-3.     PT/INR - ( 05 Apr 2018 08:01 )   PT: 22.0 sec;   INR: 1.96          PTT - ( 05 Apr 2018 08:01 )  PTT:31.8 sec    04-05    138  |  95<L>  |  34<H>  ----------------------------<  153<H>  4.5   |  33<H>  |  1.05    Ca    9.3      05 Apr 2018 08:01  Phos  3.6     04-05  Mg     2.0     04-05    TPro  6.1  /  Alb  3.3  /  TBili  2.0<H>  /  DBili  x   /  AST  22  /  ALT  5<L>  /  AlkPhos  79  04-04      MEDICATIONS  (STANDING):  acetaminophen  IVPB. 1000 milliGRAM(s) IV Intermittent once  aspirin enteric coated 81 milliGRAM(s) Oral daily  atorvastatin 40 milliGRAM(s) Oral at bedtime  dextrose 5%. 1000 milliLiter(s) (50 mL/Hr) IV Continuous <Continuous>  dextrose 50% Injectable 50 milliLiter(s) IV Push every 15 minutes  dextrose 50% Injectable 25 milliLiter(s) IV Push every 15 minutes  docusate sodium 100 milliGRAM(s) Oral three times a day  ferrous    sulfate 325 milliGRAM(s) Oral daily  folic acid 1 milliGRAM(s) Oral daily  furosemide    Tablet 40 milliGRAM(s) Oral daily  insulin lispro (HumaLOG) corrective regimen sliding scale   SubCutaneous Before meals and at bedtime  levETIRAcetam 250 milliGRAM(s) Oral two times a day  lidocaine   Patch 1 Patch Transdermal daily  metoprolol tartrate 12.5 milliGRAM(s) Oral every 12 hours  pantoprazole    Tablet 40 milliGRAM(s) Oral before breakfast  senna 2 Tablet(s) Oral at bedtime  sodium chloride 0.9% lock flush 3 milliLiter(s) IV Push every 8 hours      Discharge CXR:< from: Xray Chest 2 Views PA/Lat (04.04.18 @ 15:02) >  FINDINGS: Cardiomegaly as before. Status post open heart surgery   including mitral and tricuspid valve replacements. Interval removal of   right chest tube. Tiny right apical pneumothorax grossly unchanged.   Loculated right lower zone pleural effusion unchanged.   Opacification/consolidation right middle lobe, unchanged. The left lung   is clear.    IMPRESSION:   Interval removal of prior right chest tube.    < end of copied text >    ***Another PA/Lat done today showing very similar findings of small right apical pneumothorax.  Official reading pending.       Discharge ECHO:< from: Echocardiogram (03.30.18 @ 15:46) >  The left atrium is severely dilated. The right atrium is dilated.There is   a   prosthetic aortic valve. There is mild aortic regurgitation. AI severity   cannot be estimated accurately due to contamination from mitral inflow   signal.   Possible stenosis of the aortic valve - The calculated aortic valve EOA   using   the continuity equation is 0.9 cm2. The calculated aortic valve EOAI is   0.6   cm2/m2. The peak pressure gradient is 63 mmHg. The mean pressure   gradient is   30 mmHg. The dimensionless index (ratio of LVOTvelocity to aortic   velocity)   was calculated to be 0.33.  There is a bioprosthetic mitral valve. There   is   trace mitral regurgitation. The mean pressure gradient is 6 mmHg at a   heart   rate of 64 bpm.  Structurally normal tricuspid valve. There is mild to   moderate tricuspid regurgitation. The mean gradient across the tricuspid   valve   is 3 mmHg  The pulmonary artery systolic pressure is estimated to be 60   mmHg.   This study shows evidence of pulmonary hypertension.  The pulmonic valve   is   not well visualized. There is mild pulmonic regurgitation.  The right   ventricle is normal in size and function.There is moderate concentric   left   ventricular hypertrophy. The left ventricular wall motion is normal. The   left   ventricular ejection fraction is 57%.  No aortic root dilatation.There   is no   pericardial effusion    < end of copied text > Patient discussed on morning rounds with Tomer Gurrola    Operation / Date:  3/29/18: Mini- MVR EF 60%    Surgeon: Tomer Gurrola    Referring Physician: Dr. Cifuentes    SUBJECTIVE ASSESSMENT:  Patient feels well, denies any SOB, CP or dizziness.  States she has been walking (2 times today so far), confirmed by  who is at bedside.  She denies any N/V/D at this time.  Eating PO diet w/o issues.  Using her IS, pulling about 750mL-1L.  Very anxious to go home.  Feels ready.      Hospital Course/HPI: This is a 77 year old female with PMHx of chronic hypoproliferative anemia, transfusion dependent (sees Dr. Ruiz as an outpatient), on chronic iron supplementation, Afib on Coumadin (home lab draws, followed by Dr. Wolfe), NIDDM, osteoporosis (takes preventative PO medication for it), idiopathic ureteral stricture s/p left ureteral stent, CVA 20 years ago without residual deficits, HTN, HLD, Rheumatic fever as a child s/p mechanical MVR and AVR 14 years ago at [facility] with Dr. Christie, who was recently admitted to [facility] on 3/17/18 for persistent hematuria x 1 year and progressive SOB/YUNG with associated 4 pillow orthopnea, NYHA class IV symptoms (dyspnea after climbing 4 stairs).  While inpatient, she was noted to have worsening anemia beyond her current baseline as well as UTI for which she was placed on IV rocephin and transitioned to PO Cefpodoxime upon discharge.  She underwent ECHO on 3/17/18 that revealed EF 60%, dilated LA/RV, mechanical MV with mobile echodensity, mechanical AS with EZEQUIEL 0.85cm2, Mean/peak gradients 33.7mmHg/49.5mmHg with severe AR, and severe pulmonary hypertension with PASP 62mmHg.  Cardiac catheterization on 3/23/18 demonstrated non-obstructive CAD.  On 3/24/18, she was transferred to [facility] under the care of Dr. Escalante to complete pre-surgical evaluation.     On 3/29/19 patient underwent mini MVR, intra-op TVP placed for vfib arrest.  On POD1, patient was extubated, then had witnessed seizure activity requiring reintubation for airway protection.  Stroke code called, CT head negative, EEG placed showing epileptic activity and Keppra started per Dr. Soriano. She was later extubated POD#2, and hep gtt/coumadin started for previous aortic mech valve and h/o afib.  POD3 received PRBC for anemia. POD5 transferred to [facility].  Now patient is POD#7, patient is ambulating well with assistance of a walker, feels well, hemodynamically stable, eating PO diet and with controlled post-op pain.  Stable from neurologic standpoint.  Stable to be discharged home per Dr. Escalante.  Patient will receive home INR draws.  I have instructed her to have her next one done tomorrow (she has a machine at home and does it herself, then sends the results to the INR company at phone #[phone], and they send the results to Dr. Wolfe for review.  We contacted her urologist Dr. Pablo to discuss INR goals given her h/o hematuria.  Dr. Hoover spoke to him _____________.  Of note, after removing her last CT, she had a tiny/small right apical pneumothorax, but stable and unchanged on 3 serial CXR's over 2 days.  She will go home on Lasix without K+ supplementation given her levels have been 4.5-5.1 over the past 2-3 days.  Will readdress as out-patient.      Vital Signs Last 24 Hrs  T(C): 36.1 (05 Apr 2018 10:00), Max: 37.2 (05 Apr 2018 05:00)  T(F): 96.9 (05 Apr 2018 10:00), Max: 98.9 (05 Apr 2018 05:00)  HR: 86 (05 Apr 2018 09:11) (65 - 86)  BP: 117/53 (05 Apr 2018 09:11) (102/60 - 136/83)  BP(mean): 70 (05 Apr 2018 09:11) (65 - 101)  RR: 20 (05 Apr 2018 09:11) (16 - 20)  SpO2: 89% (05 Apr 2018 09:11) (89% - 98%)  I&O's Detail    04 Apr 2018 07:01  -  05 Apr 2018 07:00  --------------------------------------------------------  IN:    heparin Infusion: 8 mL    Oral Fluid: 360 mL  Total IN: 368 mL    OUT:    Voided: 1300 mL  Total OUT: 1300 mL    Total NET: -932 mL      05 Apr 2018 07:01  -  05 Apr 2018 11:24  --------------------------------------------------------  IN:    Oral Fluid: 120 mL  Total IN: 120 mL    OUT:    Voided: 300 mL  Total OUT: 300 mL    Total NET: -180 mL      EPICARDIAL WIRES REMOVED: Yes  TIE DOWNS REMOVED: **Right groin stitch removed at bedside this am.  Also, 2 tie downs remain right chest.  One was from a CT removed on 4/1/18, the other was from a CT removed yesterday.  Unclear tie down was from which tube.     PHYSICAL EXAM:    General: NAD; Sitting in chair.  Cooperative and pleasant.    Neurological: No focal deficits.  Good strength B/L UE and LE.  Steady gait on ambulation with walker.      Cardiovascular: S1S2, faint residual murmur heard Rt and LSB.  Best heart left upper sternal border.     Respiratory: Rales right lower lung.  Otherwise clear, no wheezing.     Gastrointestinal: Soft, non-tender, non-distended    Extremities: Warm and well perfused.  +generalized edema lower legs wtih hyperpigmentation.  Pt states this is her baseline.      Vascular: Pedal pulses palpabie.     Incision Sites: Right thoracotomy clean and dry.  No drainage.  CT sites clean with 2 tie downs.  Wound cleaned and dressing applied    LABS:                        9.6    5.8   )-----------( 204      ( 05 Apr 2018 08:01 )             31.6       COUMADIN:  Yes   INR 1.9.  Dose: 5mg    Indication: for chronic afib and mechanical aortic valve.  Goal INR 2-3.     PT/INR - ( 05 Apr 2018 08:01 )   PT: 22.0 sec;   INR: 1.96          PTT - ( 05 Apr 2018 08:01 )  PTT:31.8 sec    04-05    138  |  95<L>  |  34<H>  ----------------------------<  153<H>  4.5   |  33<H>  |  1.05    Ca    9.3      05 Apr 2018 08:01  Phos  3.6     04-05  Mg     2.0     04-05    TPro  6.1  /  Alb  3.3  /  TBili  2.0<H>  /  DBili  x   /  AST  22  /  ALT  5<L>  /  AlkPhos  79  04-04      MEDICATIONS  (STANDING):  acetaminophen  IVPB. 1000 milliGRAM(s) IV Intermittent once  aspirin enteric coated 81 milliGRAM(s) Oral daily  atorvastatin 40 milliGRAM(s) Oral at bedtime  dextrose 5%. 1000 milliLiter(s) (50 mL/Hr) IV Continuous <Continuous>  dextrose 50% Injectable 50 milliLiter(s) IV Push every 15 minutes  dextrose 50% Injectable 25 milliLiter(s) IV Push every 15 minutes  docusate sodium 100 milliGRAM(s) Oral three times a day  ferrous    sulfate 325 milliGRAM(s) Oral daily  folic acid 1 milliGRAM(s) Oral daily  furosemide    Tablet 40 milliGRAM(s) Oral daily  insulin lispro (HumaLOG) corrective regimen sliding scale   SubCutaneous Before meals and at bedtime  levETIRAcetam 250 milliGRAM(s) Oral two times a day  lidocaine   Patch 1 Patch Transdermal daily  metoprolol tartrate 12.5 milliGRAM(s) Oral every 12 hours  pantoprazole    Tablet 40 milliGRAM(s) Oral before breakfast  senna 2 Tablet(s) Oral at bedtime  sodium chloride 0.9% lock flush 3 milliLiter(s) IV Push every 8 hours      Discharge CXR:< from: Xray Chest 2 Views PA/Lat (04.04.18 @ 15:02) >  FINDINGS: Cardiomegaly as before. Status post open heart surgery   including mitral and tricuspid valve replacements. Interval removal of   right chest tube. Tiny right apical pneumothorax grossly unchanged.   Loculated right lower zone pleural effusion unchanged.   Opacification/consolidation right middle lobe, unchanged. The left lung   is clear.    IMPRESSION:   Interval removal of prior right chest tube.    < end of copied text >    ***Another PA/Lat done today showing very similar findings of small right apical pneumothorax.  Official reading pending.       Discharge ECHO:< from: Echocardiogram (03.30.18 @ 15:46) >  The left atrium is severely dilated. The right atrium is dilated.There is   a   prosthetic aortic valve. There is mild aortic regurgitation. AI severity   cannot be estimated accurately due to contamination from mitral inflow   signal.   Possible stenosis of the aortic valve - The calculated aortic valve EOA   using   the continuity equation is 0.9 cm2. The calculated aortic valve EOAI is   0.6   cm2/m2. The peak pressure gradient is 63 mmHg. The mean pressure   gradient is   30 mmHg. The dimensionless index (ratio of LVOTvelocity to aortic   velocity)   was calculated to be 0.33.  There is a bioprosthetic mitral valve. There   is   trace mitral regurgitation. The mean pressure gradient is 6 mmHg at a   heart   rate of 64 bpm.  Structurally normal tricuspid valve. There is mild to   moderate tricuspid regurgitation. The mean gradient across the tricuspid   valve   is 3 mmHg  The pulmonary artery systolic pressure is estimated to be 60   mmHg.   This study shows evidence of pulmonary hypertension.  The pulmonic valve   is   not well visualized. There is mild pulmonic regurgitation.  The right   ventricle is normal in size and function.There is moderate concentric   left   ventricular hypertrophy. The left ventricular wall motion is normal. The   left   ventricular ejection fraction is 57%.  No aortic root dilatation.There   is no   pericardial effusion    < end of copied text > Patient discussed on morning rounds with Tomer Gurrola    Operation / Date:  3/29/18: Mini- MVR EF 60%    Surgeon: Tomer Gurrola    Referring Physician: Dr. Cifuentes    SUBJECTIVE ASSESSMENT:  Patient feels well, denies any SOB, CP or dizziness.  States she has been walking (2 times today so far), confirmed by  who is at bedside.  She denies any N/V/D at this time.  Eating PO diet w/o issues.  Using her IS, pulling about 750mL-1L.  Very anxious to go home.  Feels ready.      Hospital Course/HPI: This is a 77 year old female with PMHx of chronic hypoproliferative anemia, transfusion dependent (sees Dr. Ruiz as an outpatient), on chronic iron supplementation, Afib on Coumadin (home lab draws, followed by Dr. Wolfe), NIDDM, osteoporosis (takes preventative PO medication for it), idiopathic ureteral stricture s/p left ureteral stent, CVA 20 years ago without residual deficits, HTN, HLD, Rheumatic fever as a child s/p mechanical MVR and AVR 14 years ago at [facility] with Dr. Christie, who was recently admitted to [facility] on 3/17/18 for persistent hematuria x 1 year and progressive SOB/YUNG with associated 4 pillow orthopnea, NYHA class IV symptoms (dyspnea after climbing 4 stairs).  While inpatient, she was noted to have worsening anemia beyond her current baseline as well as UTI for which she was placed on IV rocephin and transitioned to PO Cefpodoxime upon discharge.  She underwent ECHO on 3/17/18 that revealed EF 60%, dilated LA/RV, mechanical MV with mobile echodensity, mechanical AS with EZEQUIEL 0.85cm2, Mean/peak gradients 33.7mmHg/49.5mmHg with severe AR, and severe pulmonary hypertension with PASP 62mmHg.  Cardiac catheterization on 3/23/18 demonstrated non-obstructive CAD.  On 3/24/18, she was transferred to [facility] under the care of Dr. Escalante to complete pre-surgical evaluation.     On 3/29/19 patient underwent mini MVR, intra-op TVP placed for vfib arrest.  On POD1, patient was extubated, then had witnessed seizure activity requiring reintubation for airway protection.  Stroke code called, CT head negative, EEG placed showing epileptic activity and Keppra started per Dr. Soriano. She was later extubated POD#2, and hep gtt/coumadin started for previous aortic mech valve and h/o afib.  POD3 received PRBC for anemia. POD5 transferred to [facility].  Now patient is POD#7, patient is ambulating well with assistance of a walker, feels well, hemodynamically stable, eating PO diet and with controlled post-op pain.  Stable from neurologic standpoint.  Stable to be discharged home per Dr. Escalante.  Patient will receive home INR draws.  I have instructed her to have her next one done tomorrow (she has a machine at home and does it herself, then sends the results to the INR company at phone #[phone], and they send the results to Dr. Wolfe for review.  We contacted her urologist Dr. Pablo to discuss INR goals given her h/o hematuria.  Dr. Hoover spoke to him, no contraindication for continuing Coumadin, as benefit outweighs risk.      Of note, after removing her last CT, she had a tiny/small right apical pneumothorax, but stable and unchanged on 3 serial CXR's over 2 days.  She will go home on Lasix without K+ supplementation given her levels have been 4.5-5.1 over the past 2-3 days.  Will readdress as out-patient.      Vital Signs Last 24 Hrs  T(C): 36.1 (05 Apr 2018 10:00), Max: 37.2 (05 Apr 2018 05:00)  T(F): 96.9 (05 Apr 2018 10:00), Max: 98.9 (05 Apr 2018 05:00)  HR: 86 (05 Apr 2018 09:11) (65 - 86)  BP: 117/53 (05 Apr 2018 09:11) (102/60 - 136/83)  BP(mean): 70 (05 Apr 2018 09:11) (65 - 101)  RR: 20 (05 Apr 2018 09:11) (16 - 20)  SpO2: 89% (05 Apr 2018 09:11) (89% - 98%)  I&O's Detail    04 Apr 2018 07:01  -  05 Apr 2018 07:00  --------------------------------------------------------  IN:    heparin Infusion: 8 mL    Oral Fluid: 360 mL  Total IN: 368 mL    OUT:    Voided: 1300 mL  Total OUT: 1300 mL    Total NET: -932 mL      05 Apr 2018 07:01  -  05 Apr 2018 11:24  --------------------------------------------------------  IN:    Oral Fluid: 120 mL  Total IN: 120 mL    OUT:    Voided: 300 mL  Total OUT: 300 mL    Total NET: -180 mL      EPICARDIAL WIRES REMOVED: Yes  TIE DOWNS REMOVED: **Right groin stitch removed at bedside this am.  Also, 2 tie downs remain right chest.  One was from a CT removed on 4/1/18, the other was from a CT removed yesterday.  Unclear tie down was from which tube.     PHYSICAL EXAM:    General: NAD; Sitting in chair.  Cooperative and pleasant.    Neurological: No focal deficits.  Good strength B/L UE and LE.  Steady gait on ambulation with walker.      Cardiovascular: S1S2, faint residual murmur heard Rt and LSB.  Best heart left upper sternal border.     Respiratory: Rales right lower lung.  Otherwise clear, no wheezing.     Gastrointestinal: Soft, non-tender, non-distended    Extremities: Warm and well perfused.  +generalized edema lower legs wtih hyperpigmentation.  Pt states this is her baseline.      Vascular: Pedal pulses palpabie.     Incision Sites: Right thoracotomy clean and dry.  No drainage.  CT sites clean with 2 tie downs.  Wound cleaned and dressing applied    LABS:                        9.6    5.8   )-----------( 204      ( 05 Apr 2018 08:01 )             31.6       COUMADIN:  Yes   INR 1.9.  Dose: 5mg    Indication: for chronic afib and mechanical aortic valve.  Goal INR 2-3.     PT/INR - ( 05 Apr 2018 08:01 )   PT: 22.0 sec;   INR: 1.96          PTT - ( 05 Apr 2018 08:01 )  PTT:31.8 sec    04-05    138  |  95<L>  |  34<H>  ----------------------------<  153<H>  4.5   |  33<H>  |  1.05    Ca    9.3      05 Apr 2018 08:01  Phos  3.6     04-05  Mg     2.0     04-05    TPro  6.1  /  Alb  3.3  /  TBili  2.0<H>  /  DBili  x   /  AST  22  /  ALT  5<L>  /  AlkPhos  79  04-04      MEDICATIONS  (STANDING):  acetaminophen  IVPB. 1000 milliGRAM(s) IV Intermittent once  aspirin enteric coated 81 milliGRAM(s) Oral daily  atorvastatin 40 milliGRAM(s) Oral at bedtime  dextrose 5%. 1000 milliLiter(s) (50 mL/Hr) IV Continuous <Continuous>  dextrose 50% Injectable 50 milliLiter(s) IV Push every 15 minutes  dextrose 50% Injectable 25 milliLiter(s) IV Push every 15 minutes  docusate sodium 100 milliGRAM(s) Oral three times a day  ferrous    sulfate 325 milliGRAM(s) Oral daily  folic acid 1 milliGRAM(s) Oral daily  furosemide    Tablet 40 milliGRAM(s) Oral daily  insulin lispro (HumaLOG) corrective regimen sliding scale   SubCutaneous Before meals and at bedtime  levETIRAcetam 250 milliGRAM(s) Oral two times a day  lidocaine   Patch 1 Patch Transdermal daily  metoprolol tartrate 12.5 milliGRAM(s) Oral every 12 hours  pantoprazole    Tablet 40 milliGRAM(s) Oral before breakfast  senna 2 Tablet(s) Oral at bedtime  sodium chloride 0.9% lock flush 3 milliLiter(s) IV Push every 8 hours      Discharge CXR:< from: Xray Chest 2 Views PA/Lat (04.04.18 @ 15:02) >  FINDINGS: Cardiomegaly as before. Status post open heart surgery   including mitral and tricuspid valve replacements. Interval removal of   right chest tube. Tiny right apical pneumothorax grossly unchanged.   Loculated right lower zone pleural effusion unchanged.   Opacification/consolidation right middle lobe, unchanged. The left lung   is clear.    IMPRESSION:   Interval removal of prior right chest tube.    < end of copied text >    ***Another PA/Lat done today showing very similar findings of small right apical pneumothorax.  Official reading pending.       Discharge ECHO:< from: Echocardiogram (03.30.18 @ 15:46) >  The left atrium is severely dilated. The right atrium is dilated.There is   a   prosthetic aortic valve. There is mild aortic regurgitation. AI severity   cannot be estimated accurately due to contamination from mitral inflow   signal.   Possible stenosis of the aortic valve - The calculated aortic valve EOA   using   the continuity equation is 0.9 cm2. The calculated aortic valve EOAI is   0.6   cm2/m2. The peak pressure gradient is 63 mmHg. The mean pressure   gradient is   30 mmHg. The dimensionless index (ratio of LVOTvelocity to aortic   velocity)   was calculated to be 0.33.  There is a bioprosthetic mitral valve. There   is   trace mitral regurgitation. The mean pressure gradient is 6 mmHg at a   heart   rate of 64 bpm.  Structurally normal tricuspid valve. There is mild to   moderate tricuspid regurgitation. The mean gradient across the tricuspid   valve   is 3 mmHg  The pulmonary artery systolic pressure is estimated to be 60   mmHg.   This study shows evidence of pulmonary hypertension.  The pulmonic valve   is   not well visualized. There is mild pulmonic regurgitation.  The right   ventricle is normal in size and function.There is moderate concentric   left   ventricular hypertrophy. The left ventricular wall motion is normal. The   left   ventricular ejection fraction is 57%.  No aortic root dilatation.There   is no   pericardial effusion    < end of copied text >

## 2018-04-05 NOTE — DISCHARGE NOTE ADULT - NS AS ACTIVITY OBS
Walking-Indoors allowed/Stairs allowed/Walking-Outdoors allowed/No Heavy lifting/straining/Showering allowed

## 2018-04-05 NOTE — DISCHARGE NOTE ADULT - PATIENT PORTAL LINK FT
You can access the [facility] Patient Portal, offered by [facility], by registering with the following website: [email or web address]

## 2018-04-05 NOTE — DISCHARGE NOTE ADULT - MEDICATION SUMMARY - MEDICATIONS TO TAKE
I will START or STAY ON the medications listed below when I get home from the hospital:    acetaminophen 325 mg oral tablet  -- 2 tab(s) by mouth every 6 hours, As needed, mild pain  -- Indication: For mild to moderate pain    oxyCODONE-acetaminophen 5 mg-325 mg oral tablet  -- 1 tab(s) by mouth every 4 hours, As needed, for severe Pain (4 - 6) MDD:6 tabs  -- Indication: For severe pain    aspirin 81 mg oral delayed release tablet  -- 1 tab(s) by mouth once a day  -- Indication: For heart disease    Coumadin 5 mg oral tablet  -- 1 tab(s) by mouth once a day  -- Indication: For blood thinner for afib and prosthetic mitral valve    levETIRAcetam 250 mg oral tablet  -- 1 tab(s) by mouth 2 times a day  -- Indication: For seizure post-op    metFORMIN 500 mg oral tablet, extended release  -- 1 tab(s) by mouth once a day  -- Indication: For diabetes    glimepiride 4 mg oral tablet  -- 1 tab(s) by mouth once a day  -- Indication: For diabetes    pravastatin 40 mg oral tablet  -- 1 tab(s) by mouth once a day  -- Indication: For Cholesterol    Evista 60 mg oral tablet  -- 1 tab(s) by mouth once a day  -- Indication: For to prevent osteoporosis    metoprolol tartrate 25 mg oral tablet  -- 0.5 tab(s) by mouth every 12 hours   -- It is very important that you take or use this exactly as directed.  Do not skip doses or discontinue unless directed by your doctor.  May cause drowsiness.  Alcohol may intensify this effect.  Use care when operating dangerous machinery.  Some non-prescription drugs may aggravate your condition.  Read all labels carefully.  If a warning appears, check with your doctor before taking.  Take with food or milk.  This drug may impair the ability to drive or operate machinery.  Use care until you become familiar with its effects.    -- Indication: For blood pressure and heart rate control    furosemide 40 mg oral tablet  -- 1 tab(s) by mouth once a day  -- Indication: For diuretic to help you urinate    FeroSul 325 mg (65 mg elemental iron) oral tablet  -- 1 tab(s) by mouth 3 times a day   -- Check with your doctor before becoming pregnant.  Do not chew, break, or crush.  May discolor urine or feces.    -- Indication: For iron supplement    docusate sodium 100 mg oral capsule  -- 1 cap(s) by mouth 3 times a day, for constipation while on Iron.  Hold for loose/frequent stools.   -- Indication: For stool softener    pantoprazole 40 mg oral delayed release tablet  -- 1 tab(s) by mouth once a day (before a meal)  -- Indication: For stomach ulcer prevention    folic acid 1 mg oral tablet  -- 1 tab(s) by mouth once a day  -- Indication: For supplement I will START or STAY ON the medications listed below when I get home from the hospital:    acetaminophen 325 mg oral tablet  -- 2 tab(s) by mouth every 6 hours, As needed, mild pain  -- Indication: For mild to moderate pain    oxyCODONE-acetaminophen 5 mg-325 mg oral tablet  -- 1 tab(s) by mouth every 4 hours, As needed, for severe Pain (4 - 6) MDD:6 tabs  -- Indication: For Severe pain    aspirin 81 mg oral delayed release tablet  -- 1 tab(s) by mouth once a day  -- Indication: For to prevent heart disease    Coumadin 5 mg oral tablet  -- 1 tab(s) by mouth once a day  -- Indication: For Atrial fibrillation and mechanical aortic valve    levETIRAcetam 250 mg oral tablet  -- 1 tab(s) by mouth 2 times a day  -- Indication: For Seizure prevention    metFORMIN 500 mg oral tablet, extended release  -- 1 tab(s) by mouth once a day  -- Indication: For diabetes    glimepiride 4 mg oral tablet  -- 1 tab(s) by mouth once a day  -- Indication: For diabetes    pravastatin 40 mg oral tablet  -- 1 tab(s) by mouth once a day  -- Indication: For Cholesterol control    Evista 60 mg oral tablet  -- 1 tab(s) by mouth once a day  -- Indication: For bone health    metoprolol tartrate 25 mg oral tablet  -- 0.5 tab(s) by mouth every 12 hours   -- It is very important that you take or use this exactly as directed.  Do not skip doses or discontinue unless directed by your doctor.  May cause drowsiness.  Alcohol may intensify this effect.  Use care when operating dangerous machinery.  Some non-prescription drugs may aggravate your condition.  Read all labels carefully.  If a warning appears, check with your doctor before taking.  Take with food or milk.  This drug may impair the ability to drive or operate machinery.  Use care until you become familiar with its effects.    -- Indication: For blood pressure and heart rate control    furosemide 40 mg oral tablet  -- 1 tab(s) by mouth once a day  -- Indication: For diuretic to help you urinate    FeroSul 325 mg (65 mg elemental iron) oral tablet  -- 1 tab(s) by mouth 3 times a day   -- Check with your doctor before becoming pregnant.  Do not chew, break, or crush.  May discolor urine or feces.    -- Indication: For iron supplement    docusate sodium 100 mg oral capsule  -- 1 cap(s) by mouth 3 times a day, for constipation while on Iron.  Hold for loose/frequent stools.   -- Indication: For Stool softener    pantoprazole 40 mg oral delayed release tablet  -- 1 tab(s) by mouth once a day (before a meal)  -- Indication: For Stomach ulcer prevention    folic acid 1 mg oral tablet  -- 1 tab(s) by mouth once a day  -- Indication: For Supplement

## 2018-04-06 RX ORDER — METOPROLOL TARTRATE 25 MG/1
25 TABLET, FILM COATED ORAL
Refills: 0 | Status: COMPLETED | COMMUNITY
End: 2018-04-06

## 2018-04-06 RX ORDER — PANTOPRAZOLE SODIUM 40 MG/10ML
40 INJECTION, POWDER, FOR SOLUTION INTRAVENOUS
Refills: 0 | Status: COMPLETED | COMMUNITY
End: 2018-04-06

## 2018-04-06 RX ORDER — FUROSEMIDE 20 MG/1
20 TABLET ORAL
Refills: 0 | Status: COMPLETED | COMMUNITY
End: 2018-04-06

## 2018-04-06 RX ORDER — RAMIPRIL 10 MG/1
10 CAPSULE ORAL
Refills: 0 | Status: COMPLETED | COMMUNITY
End: 2018-04-06

## 2018-04-09 ENCOUNTER — FORM ENCOUNTER (OUTPATIENT)
Age: 78
End: 2018-04-09

## 2018-04-09 LAB — Lab: 0.5

## 2018-04-10 ENCOUNTER — APPOINTMENT (OUTPATIENT)
Dept: CARDIOTHORACIC SURGERY | Facility: CLINIC | Age: 78
End: 2018-04-10
Payer: MEDICARE

## 2018-04-10 ENCOUNTER — OUTPATIENT (OUTPATIENT)
Dept: OUTPATIENT SERVICES | Facility: HOSPITAL | Age: 78
LOS: 1 days | End: 2018-04-10
Payer: MEDICARE

## 2018-04-10 VITALS
OXYGEN SATURATION: 96 % | HEART RATE: 89 BPM | DIASTOLIC BLOOD PRESSURE: 64 MMHG | BODY MASS INDEX: 22.31 KG/M2 | TEMPERATURE: 96.9 F | WEIGHT: 122 LBS | RESPIRATION RATE: 19 BRPM | SYSTOLIC BLOOD PRESSURE: 137 MMHG

## 2018-04-10 DIAGNOSIS — Z87.891 PERSONAL HISTORY OF NICOTINE DEPENDENCE: ICD-10-CM

## 2018-04-10 DIAGNOSIS — Z79.2 LONG TERM (CURRENT) USE OF ANTIBIOTICS: ICD-10-CM

## 2018-04-10 DIAGNOSIS — R31.9 HEMATURIA, UNSPECIFIED: ICD-10-CM

## 2018-04-10 DIAGNOSIS — I11.0 HYPERTENSIVE HEART DISEASE WITH HEART FAILURE: ICD-10-CM

## 2018-04-10 DIAGNOSIS — T82.03XA LEAKAGE OF HEART VALVE PROSTHESIS, INITIAL ENCOUNTER: ICD-10-CM

## 2018-04-10 DIAGNOSIS — Z98.890 OTHER SPECIFIED POSTPROCEDURAL STATES: Chronic | ICD-10-CM

## 2018-04-10 DIAGNOSIS — D61.9 APLASTIC ANEMIA, UNSPECIFIED: ICD-10-CM

## 2018-04-10 DIAGNOSIS — M81.0 AGE-RELATED OSTEOPOROSIS WITHOUT CURRENT PATHOLOGICAL FRACTURE: ICD-10-CM

## 2018-04-10 DIAGNOSIS — Z96.0 PRESENCE OF UROGENITAL IMPLANTS: Chronic | ICD-10-CM

## 2018-04-10 DIAGNOSIS — E78.5 HYPERLIPIDEMIA, UNSPECIFIED: ICD-10-CM
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
## 2018-04-10 DIAGNOSIS — R56.9 UNSPECIFIED CONVULSIONS: ICD-10-CM

## 2018-04-10 DIAGNOSIS — T82.01XA BREAKDOWN (MECHANICAL) OF HEART VALVE PROSTHESIS, INITIAL ENCOUNTER: ICD-10-CM

## 2018-04-10 DIAGNOSIS — E11.9 TYPE 2 DIABETES MELLITUS WITHOUT COMPLICATIONS: ICD-10-CM

## 2018-04-10 DIAGNOSIS — Z95.2 PRESENCE OF PROSTHETIC HEART VALVE: Chronic | ICD-10-CM

## 2018-04-10 DIAGNOSIS — I25.10 ATHEROSCLEROTIC HEART DISEASE OF NATIVE CORONARY ARTERY WITHOUT ANGINA PECTORIS: ICD-10-CM

## 2018-04-10 DIAGNOSIS — Z86.73 PERSONAL HISTORY OF TRANSIENT ISCHEMIC ATTACK (TIA), AND CEREBRAL INFARCTION WITHOUT RESIDUAL DEFICITS: ICD-10-CM

## 2018-04-10 DIAGNOSIS — I50.33 ACUTE ON CHRONIC DIASTOLIC (CONGESTIVE) HEART FAILURE: ICD-10-CM

## 2018-04-10 DIAGNOSIS — I48.2 CHRONIC ATRIAL FIBRILLATION: ICD-10-CM

## 2018-04-10 DIAGNOSIS — Z79.01 LONG TERM (CURRENT) USE OF ANTICOAGULANTS: ICD-10-CM

## 2018-04-10 DIAGNOSIS — I08.0 RHEUMATIC DISORDERS OF BOTH MITRAL AND AORTIC VALVES: ICD-10-CM

## 2018-04-10 DIAGNOSIS — D62 ACUTE POSTHEMORRHAGIC ANEMIA: ICD-10-CM

## 2018-04-10 PROCEDURE — 71046 X-RAY EXAM CHEST 2 VIEWS: CPT | Mod: 26

## 2018-04-10 PROCEDURE — 71046 X-RAY EXAM CHEST 2 VIEWS: CPT

## 2018-04-10 PROCEDURE — 99024 POSTOP FOLLOW-UP VISIT: CPT

## 2018-04-12 ENCOUNTER — LABORATORY RESULT (OUTPATIENT)
Age: 78
End: 2018-04-12

## 2018-04-12 ENCOUNTER — APPOINTMENT (OUTPATIENT)
Dept: HEMATOLOGY ONCOLOGY | Facility: CLINIC | Age: 78
End: 2018-04-12

## 2018-04-12 ENCOUNTER — APPOINTMENT (OUTPATIENT)
Dept: INFUSION THERAPY | Facility: CLINIC | Age: 78
End: 2018-04-12

## 2018-04-12 RX ORDER — ERYTHROPOIETIN 10000 [IU]/ML
40000 INJECTION, SOLUTION INTRAVENOUS; SUBCUTANEOUS ONCE
Qty: 0 | Refills: 0 | Status: COMPLETED | OUTPATIENT
Start: 2018-04-12 | End: 2018-04-12

## 2018-04-12 RX ORDER — PREGABALIN 225 MG/1
1000 CAPSULE ORAL ONCE
Qty: 0 | Refills: 0 | Status: COMPLETED | OUTPATIENT
Start: 2018-04-12 | End: 2018-04-12

## 2018-04-12 RX ADMIN — ERYTHROPOIETIN 40000 UNIT(S): 10000 INJECTION, SOLUTION INTRAVENOUS; SUBCUTANEOUS at 12:31

## 2018-04-12 RX ADMIN — PREGABALIN 1000 MICROGRAM(S): 225 CAPSULE ORAL at 12:30

## 2018-04-18 ENCOUNTER — APPOINTMENT (OUTPATIENT)
Dept: INFUSION THERAPY | Facility: CLINIC | Age: 78
End: 2018-04-18

## 2018-04-18 ENCOUNTER — APPOINTMENT (OUTPATIENT)
Dept: HEMATOLOGY ONCOLOGY | Facility: CLINIC | Age: 78
End: 2018-04-18

## 2018-04-18 ENCOUNTER — LABORATORY RESULT (OUTPATIENT)
Age: 78
End: 2018-04-18

## 2018-04-18 VITALS
DIASTOLIC BLOOD PRESSURE: 60 MMHG | BODY MASS INDEX: 25.21 KG/M2 | TEMPERATURE: 98 F | HEIGHT: 62 IN | WEIGHT: 137 LBS | RESPIRATION RATE: 16 BRPM | SYSTOLIC BLOOD PRESSURE: 130 MMHG | HEART RATE: 86 BPM

## 2018-04-19 LAB
ALBUMIN SERPL ELPH-MCNC: 3.8 G/DL
ALP BLD-CCNC: 160 U/L
ALT SERPL-CCNC: 13 U/L
ANION GAP SERPL CALC-SCNC: 14 MMOL/L
APPEARANCE: ABNORMAL
AST SERPL-CCNC: 27 U/L
BILIRUB SERPL-MCNC: 1.9 MG/DL
BILIRUBIN URINE: NEGATIVE
BLOOD URINE: ABNORMAL
BUN SERPL-MCNC: 23 MG/DL
CALCIUM SERPL-MCNC: 8.6 MG/DL
CHLORIDE SERPL-SCNC: 100 MMOL/L
CO2 SERPL-SCNC: 31 MMOL/L
COLOR: YELLOW
CREAT SERPL-MCNC: 1.1 MG/DL
GLUCOSE QUALITATIVE U: NEGATIVE MG/DL
GLUCOSE SERPL-MCNC: 160 MG/DL
HCT VFR BLD CALC: 27.1 %
HCT VFR BLD CALC: 27.3 %
HGB BLD-MCNC: 8.3 G/DL
HGB BLD-MCNC: 8.5 G/DL
KETONES URINE: NEGATIVE
LDH SERPL-CCNC: 419
LEUKOCYTE ESTERASE URINE: ABNORMAL
MCHC RBC-ENTMCNC: 25.5 PG
MCHC RBC-ENTMCNC: 26.5 PG
MCHC RBC-ENTMCNC: 30.6 G/DL
MCHC RBC-ENTMCNC: 31.1 G/DL
MCV RBC AUTO: 83.4 FL
MCV RBC AUTO: 85 FL
NITRITE URINE: NEGATIVE
PH URINE: 6
PLATELET # BLD AUTO: 246 K/UL
PLATELET # BLD AUTO: 270 K/UL
PMV BLD: 11 FL
PMV BLD: 11.1 FL
POTASSIUM SERPL-SCNC: 4 MMOL/L
PROT SERPL-MCNC: 7 G/DL
PROTEIN URINE: 30 MG/DL
RBC # BLD: 3.21 M/UL
RBC # BLD: 3.25 M/UL
RBC # FLD: 18.3 %
RBC # FLD: 18.8 %
RETICS # AUTO: 4.5 %
RETICS AGGREG/RBC NFR: 149.5 K/UL
SODIUM SERPL-SCNC: 145 MMOL/L
SPECIFIC GRAVITY URINE: 1.01
UROBILINOGEN URINE: 0.2 MG/DL (ref 0.2–?)
WBC # FLD AUTO: 6.74 K/UL
WBC # FLD AUTO: 6.84 K/UL

## 2018-04-23 ENCOUNTER — APPOINTMENT (OUTPATIENT)
Dept: NEUROLOGY | Facility: CLINIC | Age: 78
End: 2018-04-23
Payer: MEDICARE

## 2018-04-23 ENCOUNTER — FORM ENCOUNTER (OUTPATIENT)
Age: 78
End: 2018-04-23

## 2018-04-23 VITALS
OXYGEN SATURATION: 93 % | DIASTOLIC BLOOD PRESSURE: 66 MMHG | BODY MASS INDEX: 25.21 KG/M2 | HEIGHT: 62 IN | HEART RATE: 83 BPM | WEIGHT: 137 LBS | SYSTOLIC BLOOD PRESSURE: 126 MMHG

## 2018-04-23 DIAGNOSIS — G40.909 EPILEPSY, UNSPECIFIED, NOT INTRACTABLE, W/OUT STATUS EPILEPTICUS: ICD-10-CM

## 2018-04-23 PROCEDURE — 99213 OFFICE O/P EST LOW 20 MIN: CPT

## 2018-04-24 ENCOUNTER — APPOINTMENT (OUTPATIENT)
Dept: NEUROLOGY | Facility: CLINIC | Age: 78
End: 2018-04-24
Payer: MEDICARE

## 2018-04-24 ENCOUNTER — APPOINTMENT (OUTPATIENT)
Dept: CARDIOTHORACIC SURGERY | Facility: CLINIC | Age: 78
End: 2018-04-24
Payer: MEDICARE

## 2018-04-24 ENCOUNTER — OUTPATIENT (OUTPATIENT)
Dept: OUTPATIENT SERVICES | Facility: HOSPITAL | Age: 78
LOS: 1 days | End: 2018-04-24
Payer: MEDICARE

## 2018-04-24 VITALS
OXYGEN SATURATION: 92 % | WEIGHT: 134 LBS | TEMPERATURE: 97.1 F | DIASTOLIC BLOOD PRESSURE: 63 MMHG | HEART RATE: 92 BPM | RESPIRATION RATE: 19 BRPM | BODY MASS INDEX: 24.51 KG/M2 | SYSTOLIC BLOOD PRESSURE: 139 MMHG

## 2018-04-24 DIAGNOSIS — Z96.0 PRESENCE OF UROGENITAL IMPLANTS: Chronic | ICD-10-CM

## 2018-04-24 DIAGNOSIS — Z95.2 PRESENCE OF PROSTHETIC HEART VALVE: Chronic | ICD-10-CM

## 2018-04-24 DIAGNOSIS — Z98.890 OTHER SPECIFIED POSTPROCEDURAL STATES: Chronic | ICD-10-CM

## 2018-04-24 PROCEDURE — 99024 POSTOP FOLLOW-UP VISIT: CPT

## 2018-04-24 PROCEDURE — 71046 X-RAY EXAM CHEST 2 VIEWS: CPT

## 2018-04-24 PROCEDURE — 71046 X-RAY EXAM CHEST 2 VIEWS: CPT | Mod: 26

## 2018-04-24 PROCEDURE — 95816 EEG AWAKE AND DROWSY: CPT

## 2018-04-25 ENCOUNTER — APPOINTMENT (OUTPATIENT)
Dept: INFUSION THERAPY | Facility: CLINIC | Age: 78
End: 2018-04-25

## 2018-04-25 ENCOUNTER — LABORATORY RESULT (OUTPATIENT)
Age: 78
End: 2018-04-25

## 2018-04-25 ENCOUNTER — APPOINTMENT (OUTPATIENT)
Dept: HEMATOLOGY ONCOLOGY | Facility: CLINIC | Age: 78
End: 2018-04-25

## 2018-04-25 RX ORDER — ERYTHROPOIETIN 10000 [IU]/ML
40000 INJECTION, SOLUTION INTRAVENOUS; SUBCUTANEOUS ONCE
Qty: 0 | Refills: 0 | Status: COMPLETED | OUTPATIENT
Start: 2018-04-25 | End: 2018-04-25

## 2018-04-25 RX ADMIN — ERYTHROPOIETIN 40000 UNIT(S): 10000 INJECTION, SOLUTION INTRAVENOUS; SUBCUTANEOUS at 11:45

## 2018-04-26 LAB
HCT VFR BLD CALC: 24.3 %
HGB BLD-MCNC: 7.3 G/DL
MCHC RBC-ENTMCNC: 24.8 PG
MCHC RBC-ENTMCNC: 30 G/DL
MCV RBC AUTO: 82.7 FL
PLATELET # BLD AUTO: 159 K/UL
PMV BLD: 11.7 FL
RBC # BLD: 2.94 M/UL
RBC # FLD: 18.2 %
WBC # FLD AUTO: 5.1 K/UL

## 2018-04-27 ENCOUNTER — APPOINTMENT (OUTPATIENT)
Dept: HEMATOLOGY ONCOLOGY | Facility: CLINIC | Age: 78
End: 2018-04-27

## 2018-05-01 ENCOUNTER — OTHER (OUTPATIENT)
Age: 78
End: 2018-05-01

## 2018-05-02 ENCOUNTER — APPOINTMENT (OUTPATIENT)
Dept: HEMATOLOGY ONCOLOGY | Facility: CLINIC | Age: 78
End: 2018-05-02

## 2018-05-02 ENCOUNTER — LABORATORY RESULT (OUTPATIENT)
Age: 78
End: 2018-05-02

## 2018-05-02 ENCOUNTER — APPOINTMENT (OUTPATIENT)
Dept: INFUSION THERAPY | Facility: CLINIC | Age: 78
End: 2018-05-02

## 2018-05-02 VITALS
SYSTOLIC BLOOD PRESSURE: 139 MMHG | BODY MASS INDEX: 24.66 KG/M2 | WEIGHT: 134 LBS | HEART RATE: 81 BPM | TEMPERATURE: 98.7 F | HEIGHT: 62 IN | DIASTOLIC BLOOD PRESSURE: 63 MMHG | RESPIRATION RATE: 16 BRPM

## 2018-05-02 DIAGNOSIS — R31.9 HEMATURIA, UNSPECIFIED: ICD-10-CM

## 2018-05-02 DIAGNOSIS — D59.4 OTHER NONAUTOIMMUNE HEMOLYTIC ANEMIAS: ICD-10-CM

## 2018-05-02 DIAGNOSIS — E53.8 DEFICIENCY OF OTHER SPECIFIED B GROUP VITAMINS: ICD-10-CM

## 2018-05-02 RX ORDER — ERYTHROPOIETIN 10000 [IU]/ML
40000 INJECTION, SOLUTION INTRAVENOUS; SUBCUTANEOUS ONCE
Qty: 0 | Refills: 0 | Status: COMPLETED | OUTPATIENT
Start: 2018-05-02 | End: 2018-05-02

## 2018-05-02 RX ORDER — ASPIRIN ENTERIC COATED TABLETS 81 MG 81 MG/1
81 TABLET, DELAYED RELEASE ORAL
Qty: 30 | Refills: 1 | Status: DISCONTINUED | COMMUNITY
End: 2018-05-02

## 2018-05-02 RX ADMIN — ERYTHROPOIETIN 40000 UNIT(S): 10000 INJECTION, SOLUTION INTRAVENOUS; SUBCUTANEOUS at 15:48

## 2018-05-03 LAB
HCT VFR BLD CALC: 24.5 %
HGB BLD-MCNC: 7.4 G/DL
MCHC RBC-ENTMCNC: 24.2 PG
MCHC RBC-ENTMCNC: 30.2 G/DL
MCV RBC AUTO: 80.1 FL
PLATELET # BLD AUTO: 168 K/UL
PMV BLD: 11.3 FL
RBC # BLD: 3.06 M/UL
RBC # FLD: 18.4 %
RETICS # AUTO: 3.9 %
RETICS AGGREG/RBC NFR: 118.6 K/UL
WBC # FLD AUTO: 5.31 K/UL

## 2018-05-09 ENCOUNTER — APPOINTMENT (OUTPATIENT)
Dept: INFUSION THERAPY | Facility: CLINIC | Age: 78
End: 2018-05-09

## 2018-05-09 ENCOUNTER — LABORATORY RESULT (OUTPATIENT)
Age: 78
End: 2018-05-09

## 2018-05-09 ENCOUNTER — APPOINTMENT (OUTPATIENT)
Dept: HEMATOLOGY ONCOLOGY | Facility: CLINIC | Age: 78
End: 2018-05-09

## 2018-05-09 RX ORDER — ERYTHROPOIETIN 10000 [IU]/ML
40000 INJECTION, SOLUTION INTRAVENOUS; SUBCUTANEOUS ONCE
Qty: 0 | Refills: 0 | Status: COMPLETED | OUTPATIENT
Start: 2018-05-09 | End: 2018-05-09

## 2018-05-09 RX ORDER — PREGABALIN 225 MG/1
1000 CAPSULE ORAL ONCE
Qty: 0 | Refills: 0 | Status: COMPLETED | OUTPATIENT
Start: 2018-05-09 | End: 2018-05-09

## 2018-05-09 RX ADMIN — PREGABALIN 1000 MICROGRAM(S): 225 CAPSULE ORAL at 12:13

## 2018-05-09 RX ADMIN — ERYTHROPOIETIN 40000 UNIT(S): 10000 INJECTION, SOLUTION INTRAVENOUS; SUBCUTANEOUS at 12:13

## 2018-05-10 LAB
HCT VFR BLD CALC: 24.5 %
HGB BLD-MCNC: 7.4 G/DL
MCHC RBC-ENTMCNC: 23.6 PG
MCHC RBC-ENTMCNC: 30.2 G/DL
MCV RBC AUTO: 78.3 FL
PLATELET # BLD AUTO: 183 K/UL
PMV BLD: 11.8 FL
RBC # BLD: 3.13 M/UL
RBC # FLD: 18.1 %
WBC # FLD AUTO: 5.19 K/UL

## 2018-05-15 ENCOUNTER — RESULT REVIEW (OUTPATIENT)
Age: 78
End: 2018-05-15

## 2018-05-16 ENCOUNTER — LABORATORY RESULT (OUTPATIENT)
Age: 78
End: 2018-05-16

## 2018-05-16 ENCOUNTER — APPOINTMENT (OUTPATIENT)
Dept: HEMATOLOGY ONCOLOGY | Facility: CLINIC | Age: 78
End: 2018-05-16

## 2018-05-16 ENCOUNTER — APPOINTMENT (OUTPATIENT)
Dept: INFUSION THERAPY | Facility: CLINIC | Age: 78
End: 2018-05-16

## 2018-05-16 LAB
ABO + RH PNL BLD: NORMAL
BLD GP AB SCN SERPL QL: NORMAL
HCT VFR BLD CALC: 20.7 %
HGB BLD-MCNC: 6.1 G/DL
IRON SATN MFR SERPL: 16 %
IRON SERPL-MCNC: 29 UG/DL
LDH SERPL-CCNC: 321
MCHC RBC-ENTMCNC: 22.4 PG
MCHC RBC-ENTMCNC: 29.5 G/DL
MCV RBC AUTO: 76.1 FL
PLATELET # BLD AUTO: 189 K/UL
PMV BLD: 10.5 FL
RBC # BLD: 2.72 M/UL
RBC # FLD: 17.2 %
TIBC SERPL-MCNC: 180 UG/DL
UIBC SERPL-MCNC: 151 UG/DL
WBC # FLD AUTO: 5.84 K/UL

## 2018-05-16 RX ORDER — ERYTHROPOIETIN 10000 [IU]/ML
40000 INJECTION, SOLUTION INTRAVENOUS; SUBCUTANEOUS ONCE
Qty: 0 | Refills: 0 | Status: COMPLETED | OUTPATIENT
Start: 2018-05-16 | End: 2018-05-16

## 2018-05-16 RX ADMIN — ERYTHROPOIETIN 40000 UNIT(S): 10000 INJECTION, SOLUTION INTRAVENOUS; SUBCUTANEOUS at 12:53

## 2018-05-17 ENCOUNTER — APPOINTMENT (OUTPATIENT)
Dept: INFUSION THERAPY | Facility: CLINIC | Age: 78
End: 2018-05-17

## 2018-05-17 LAB
APPEARANCE: ABNORMAL
BILIRUBIN URINE: ABNORMAL
BLOOD URINE: ABNORMAL
COLOR: ABNORMAL
FERRITIN SERPL-MCNC: 733 NG/ML
FOLATE SERPL-MCNC: >20 NG/ML
GLUCOSE QUALITATIVE U: NEGATIVE MG/DL
KETONES URINE: 40
LEUKOCYTE ESTERASE URINE: ABNORMAL
NITRITE URINE: POSITIVE
PH URINE: 5
PROTEIN URINE: >=300 MG/DL
SPECIFIC GRAVITY URINE: 1.01
UROBILINOGEN URINE: 2 MG/DL (ref 0.2–?)
VIT B12 SERPL-MCNC: 773 PG/ML

## 2018-05-23 ENCOUNTER — LABORATORY RESULT (OUTPATIENT)
Age: 78
End: 2018-05-23

## 2018-05-23 ENCOUNTER — APPOINTMENT (OUTPATIENT)
Dept: INFUSION THERAPY | Facility: CLINIC | Age: 78
End: 2018-05-23

## 2018-05-23 ENCOUNTER — APPOINTMENT (OUTPATIENT)
Dept: HEMATOLOGY ONCOLOGY | Facility: CLINIC | Age: 78
End: 2018-05-23

## 2018-05-23 LAB
ABO + RH PNL BLD: NORMAL
BACTERIA UR CULT: NORMAL
BLD GP AB SCN SERPL QL: NORMAL
HCT VFR BLD CALC: 25.3 %
HGB BLD-MCNC: 7.5 G/DL
MCHC RBC-ENTMCNC: 23.2 PG
MCHC RBC-ENTMCNC: 29.6 G/DL
MCV RBC AUTO: 78.3 FL
METHYLMALONATE SERPL-SCNC: 534 NMOL/L
PLATELET # BLD AUTO: 221 K/UL
PMV BLD: 10 FL
RBC # BLD: 3.23 M/UL
RBC # FLD: 18 %
WBC # FLD AUTO: 5.63 K/UL

## 2018-05-23 RX ORDER — ERYTHROPOIETIN 10000 [IU]/ML
40000 INJECTION, SOLUTION INTRAVENOUS; SUBCUTANEOUS ONCE
Qty: 0 | Refills: 0 | Status: COMPLETED | OUTPATIENT
Start: 2018-05-23 | End: 2018-05-23

## 2018-05-23 RX ADMIN — ERYTHROPOIETIN 40000 UNIT(S): 10000 INJECTION, SOLUTION INTRAVENOUS; SUBCUTANEOUS at 12:07

## 2018-05-24 ENCOUNTER — APPOINTMENT (OUTPATIENT)
Dept: INFUSION THERAPY | Facility: CLINIC | Age: 78
End: 2018-05-24

## 2018-05-29 ENCOUNTER — INPATIENT (INPATIENT)
Facility: HOSPITAL | Age: 78
LOS: 0 days | Discharge: ANOTHER HOSPITAL | End: 2018-05-30
Attending: INTERNAL MEDICINE | Admitting: INTERNAL MEDICINE
Payer: MEDICARE

## 2018-05-29 ENCOUNTER — OUTPATIENT (OUTPATIENT)
Dept: OUTPATIENT SERVICES | Facility: HOSPITAL | Age: 78
LOS: 1 days | End: 2018-05-29
Payer: MEDICARE

## 2018-05-29 ENCOUNTER — APPOINTMENT (OUTPATIENT)
Dept: CARDIOTHORACIC SURGERY | Facility: CLINIC | Age: 78
End: 2018-05-29
Payer: MEDICARE

## 2018-05-29 VITALS
SYSTOLIC BLOOD PRESSURE: 121 MMHG | WEIGHT: 137 LBS | DIASTOLIC BLOOD PRESSURE: 54 MMHG | HEART RATE: 86 BPM | RESPIRATION RATE: 19 BRPM | OXYGEN SATURATION: 96 % | TEMPERATURE: 97 F | BODY MASS INDEX: 25.06 KG/M2

## 2018-05-29 VITALS
OXYGEN SATURATION: 99 % | SYSTOLIC BLOOD PRESSURE: 106 MMHG | HEART RATE: 64 BPM | DIASTOLIC BLOOD PRESSURE: 59 MMHG | RESPIRATION RATE: 18 BRPM | TEMPERATURE: 99 F

## 2018-05-29 DIAGNOSIS — Z96.0 PRESENCE OF UROGENITAL IMPLANTS: Chronic | ICD-10-CM

## 2018-05-29 DIAGNOSIS — Z95.2 PRESENCE OF PROSTHETIC HEART VALVE: Chronic | ICD-10-CM

## 2018-05-29 DIAGNOSIS — Z98.890 OTHER SPECIFIED POSTPROCEDURAL STATES: Chronic | ICD-10-CM

## 2018-05-29 LAB
ALBUMIN SERPL ELPH-MCNC: 3.3 G/DL — SIGNIFICANT CHANGE UP (ref 3.3–5)
ALBUMIN SERPL ELPH-MCNC: 3.4 G/DL — LOW (ref 3.5–5.2)
ALP SERPL-CCNC: 80 U/L — SIGNIFICANT CHANGE UP (ref 40–120)
ALP SERPL-CCNC: 85 U/L — SIGNIFICANT CHANGE UP (ref 30–115)
ALT FLD-CCNC: 5 U/L — LOW (ref 10–45)
ALT FLD-CCNC: 5 U/L — SIGNIFICANT CHANGE UP (ref 0–41)
ANION GAP SERPL CALC-SCNC: 11 MMOL/L — SIGNIFICANT CHANGE UP (ref 5–17)
ANION GAP SERPL CALC-SCNC: 11 MMOL/L — SIGNIFICANT CHANGE UP (ref 7–14)
APPEARANCE UR: (no result)
APTT BLD: 28.8 SEC — SIGNIFICANT CHANGE UP (ref 27–39.2)
APTT BLD: 32 SEC — SIGNIFICANT CHANGE UP (ref 27.5–37.4)
AST SERPL-CCNC: 17 U/L — SIGNIFICANT CHANGE UP (ref 0–41)
AST SERPL-CCNC: 17 U/L — SIGNIFICANT CHANGE UP (ref 10–40)
BASE EXCESS BLDV CALC-SCNC: 2.8 MMOL/L — HIGH (ref -2–2)
BASOPHILS # BLD AUTO: 0.02 K/UL — SIGNIFICANT CHANGE UP (ref 0–0.2)
BASOPHILS NFR BLD AUTO: 0.2 % — SIGNIFICANT CHANGE UP (ref 0–1)
BILIRUB SERPL-MCNC: 2.1 MG/DL — HIGH (ref 0.2–1.2)
BILIRUB SERPL-MCNC: 2.1 MG/DL — HIGH (ref 0.2–1.2)
BILIRUB UR-MCNC: (no result)
BLD GP AB SCN SERPL QL: SIGNIFICANT CHANGE UP
BUN SERPL-MCNC: 35 MG/DL — HIGH (ref 7–23)
BUN SERPL-MCNC: 36 MG/DL — HIGH (ref 10–20)
CA-I SERPL-SCNC: 1.05 MMOL/L — LOW (ref 1.12–1.3)
CALCIUM SERPL-MCNC: 7.7 MG/DL — LOW (ref 8.5–10.1)
CALCIUM SERPL-MCNC: 8.1 MG/DL — LOW (ref 8.4–10.5)
CHLORIDE SERPL-SCNC: 86 MMOL/L — LOW (ref 96–108)
CHLORIDE SERPL-SCNC: 90 MMOL/L — LOW (ref 98–110)
CK MB CFR SERPL CALC: 2.2 NG/ML — SIGNIFICANT CHANGE UP (ref 0.6–6.3)
CK SERPL-CCNC: 54 U/L — SIGNIFICANT CHANGE UP (ref 0–225)
CO2 SERPL-SCNC: 27 MMOL/L — SIGNIFICANT CHANGE UP (ref 22–31)
CO2 SERPL-SCNC: 28 MMOL/L — SIGNIFICANT CHANGE UP (ref 17–32)
COLOR SPEC: (no result)
CREAT SERPL-MCNC: 2.27 MG/DL — HIGH (ref 0.5–1.3)
CREAT SERPL-MCNC: 2.4 MG/DL — HIGH (ref 0.7–1.5)
DIFF PNL FLD: (no result)
EOSINOPHIL # BLD AUTO: 0.01 K/UL — SIGNIFICANT CHANGE UP (ref 0–0.7)
EOSINOPHIL NFR BLD AUTO: 0.1 % — SIGNIFICANT CHANGE UP (ref 0–8)
GAS PNL BLDV: 129 MMOL/L — LOW (ref 136–145)
GAS PNL BLDV: SIGNIFICANT CHANGE UP
GLUCOSE SERPL-MCNC: 149 MG/DL — HIGH (ref 70–99)
GLUCOSE SERPL-MCNC: 183 MG/DL — HIGH (ref 70–99)
GLUCOSE UR QL: NEGATIVE MG/DL — SIGNIFICANT CHANGE UP
HCO3 BLDV-SCNC: 28 MMOL/L — SIGNIFICANT CHANGE UP (ref 22–29)
HCT VFR BLD CALC: 14 % — LOW (ref 37–47)
HCT VFR BLD CALC: 14.3 % — CRITICAL LOW (ref 34.5–45)
HCT VFR BLDA CALC: 10.8 % — LOW (ref 34–44)
HGB BLD CALC-MCNC: 3.5 G/DL — CRITICAL LOW (ref 14–18)
HGB BLD-MCNC: 4.3 G/DL — CRITICAL LOW (ref 11.5–15.5)
HGB BLD-MCNC: 4.3 G/DL — CRITICAL LOW (ref 12–16)
IMM GRANULOCYTES NFR BLD AUTO: 0.8 % — HIGH (ref 0.1–0.3)
INR BLD: 1.13 — SIGNIFICANT CHANGE UP (ref 0.88–1.16)
INR BLD: 1.14 RATIO — SIGNIFICANT CHANGE UP (ref 0.65–1.3)
KETONES UR-MCNC: 40
LACTATE BLDV-MCNC: 1.1 MMOL/L — SIGNIFICANT CHANGE UP (ref 0.5–1.6)
LEUKOCYTE ESTERASE UR-ACNC: (no result)
LIDOCAIN IGE QN: 25 U/L — SIGNIFICANT CHANGE UP (ref 7–60)
LYMPHOCYTES # BLD AUTO: 0.79 K/UL — LOW (ref 1.2–3.4)
LYMPHOCYTES # BLD AUTO: 8.4 % — LOW (ref 20.5–51.1)
MCHC RBC-ENTMCNC: 21.4 PG — LOW (ref 27–34)
MCHC RBC-ENTMCNC: 22.2 PG — LOW (ref 27–31)
MCHC RBC-ENTMCNC: 30.1 G/DL — LOW (ref 32–36)
MCHC RBC-ENTMCNC: 30.7 G/DL — LOW (ref 32–37)
MCV RBC AUTO: 71.1 FL — LOW (ref 80–100)
MCV RBC AUTO: 72.2 FL — LOW (ref 81–99)
MONOCYTES # BLD AUTO: 0.73 K/UL — HIGH (ref 0.1–0.6)
MONOCYTES NFR BLD AUTO: 7.7 % — SIGNIFICANT CHANGE UP (ref 1.7–9.3)
NEUTROPHILS # BLD AUTO: 7.81 K/UL — HIGH (ref 1.4–6.5)
NEUTROPHILS NFR BLD AUTO: 82.8 % — HIGH (ref 42.2–75.2)
NITRITE UR-MCNC: POSITIVE
NRBC # BLD: 0 /100 WBCS — SIGNIFICANT CHANGE UP (ref 0–0)
PCO2 BLDV: 46 MMHG — SIGNIFICANT CHANGE UP (ref 41–51)
PH BLDV: 7.39 — SIGNIFICANT CHANGE UP (ref 7.26–7.43)
PH UR: 6 — SIGNIFICANT CHANGE UP (ref 5–8)
PLATELET # BLD AUTO: 292 K/UL — SIGNIFICANT CHANGE UP (ref 130–400)
PLATELET # BLD AUTO: 310 K/UL — SIGNIFICANT CHANGE UP (ref 150–400)
PO2 BLDV: 24 MMHG — SIGNIFICANT CHANGE UP (ref 20–40)
POTASSIUM BLDV-SCNC: 4.3 MMOL/L — SIGNIFICANT CHANGE UP (ref 3.3–5.6)
POTASSIUM SERPL-MCNC: 4.8 MMOL/L — SIGNIFICANT CHANGE UP (ref 3.5–5)
POTASSIUM SERPL-MCNC: 4.8 MMOL/L — SIGNIFICANT CHANGE UP (ref 3.5–5.3)
POTASSIUM SERPL-SCNC: 4.8 MMOL/L — SIGNIFICANT CHANGE UP (ref 3.5–5)
POTASSIUM SERPL-SCNC: 4.8 MMOL/L — SIGNIFICANT CHANGE UP (ref 3.5–5.3)
PROT SERPL-MCNC: 6.1 G/DL — SIGNIFICANT CHANGE UP (ref 6–8)
PROT SERPL-MCNC: 6.6 G/DL — SIGNIFICANT CHANGE UP (ref 6–8.3)
PROT UR-MCNC: >=300 MG/DL
PROTHROM AB SERPL-ACNC: 12.4 SEC — SIGNIFICANT CHANGE UP (ref 9.95–12.87)
PROTHROM AB SERPL-ACNC: 12.6 SEC — SIGNIFICANT CHANGE UP (ref 9.8–12.7)
RBC # BLD: 1.94 M/UL — LOW (ref 4.2–5.4)
RBC # BLD: 2.01 M/UL — LOW (ref 3.8–5.2)
RBC # FLD: 17.7 % — HIGH (ref 11.5–14.5)
RBC # FLD: 18.1 % — HIGH (ref 10.3–16.9)
SAO2 % BLDV: 37 % — SIGNIFICANT CHANGE UP
SODIUM SERPL-SCNC: 124 MMOL/L — LOW (ref 135–145)
SODIUM SERPL-SCNC: 129 MMOL/L — LOW (ref 135–146)
SP GR SPEC: 1.02 — SIGNIFICANT CHANGE UP (ref 1.01–1.03)
TROPONIN T SERPL-MCNC: 0.01 NG/ML — SIGNIFICANT CHANGE UP
TYPE + AB SCN PNL BLD: SIGNIFICANT CHANGE UP
UROBILINOGEN FLD QL: 1 MG/DL (ref 0.2–0.2)
WBC # BLD: 9.44 K/UL — SIGNIFICANT CHANGE UP (ref 4.8–10.8)
WBC # BLD: 9.6 K/UL — SIGNIFICANT CHANGE UP (ref 3.8–10.5)
WBC # FLD AUTO: 9.44 K/UL — SIGNIFICANT CHANGE UP (ref 4.8–10.8)
WBC # FLD AUTO: 9.6 K/UL — SIGNIFICANT CHANGE UP (ref 3.8–10.5)

## 2018-05-29 PROCEDURE — 99222 1ST HOSP IP/OBS MODERATE 55: CPT

## 2018-05-29 PROCEDURE — 85027 COMPLETE CBC AUTOMATED: CPT

## 2018-05-29 PROCEDURE — 80053 COMPREHEN METABOLIC PANEL: CPT

## 2018-05-29 PROCEDURE — 99024 POSTOP FOLLOW-UP VISIT: CPT

## 2018-05-29 PROCEDURE — 85610 PROTHROMBIN TIME: CPT

## 2018-05-29 PROCEDURE — 85730 THROMBOPLASTIN TIME PARTIAL: CPT

## 2018-05-29 RX ORDER — SENNA PLUS 8.6 MG/1
2 TABLET ORAL AT BEDTIME
Qty: 0 | Refills: 0 | Status: DISCONTINUED | OUTPATIENT
Start: 2018-05-29 | End: 2018-05-30

## 2018-05-29 RX ORDER — CEFTRIAXONE 500 MG/1
1 INJECTION, POWDER, FOR SOLUTION INTRAMUSCULAR; INTRAVENOUS ONCE
Qty: 0 | Refills: 0 | Status: COMPLETED | OUTPATIENT
Start: 2018-05-29 | End: 2018-05-29

## 2018-05-29 RX ORDER — CEFTRIAXONE 500 MG/1
INJECTION, POWDER, FOR SOLUTION INTRAMUSCULAR; INTRAVENOUS
Qty: 0 | Refills: 0 | Status: DISCONTINUED | OUTPATIENT
Start: 2018-05-29 | End: 2018-05-30

## 2018-05-29 RX ORDER — SODIUM CHLORIDE 9 MG/ML
1000 INJECTION, SOLUTION INTRAVENOUS
Qty: 0 | Refills: 0 | Status: DISCONTINUED | OUTPATIENT
Start: 2018-05-29 | End: 2018-05-30

## 2018-05-29 RX ORDER — METOPROLOL TARTRATE 50 MG
25 TABLET ORAL
Qty: 0 | Refills: 0 | Status: DISCONTINUED | OUTPATIENT
Start: 2018-05-29 | End: 2018-05-30

## 2018-05-29 RX ORDER — SODIUM CHLORIDE 9 MG/ML
1000 INJECTION INTRAMUSCULAR; INTRAVENOUS; SUBCUTANEOUS
Qty: 0 | Refills: 0 | Status: DISCONTINUED | OUTPATIENT
Start: 2018-05-29 | End: 2018-05-30

## 2018-05-29 RX ORDER — PANTOPRAZOLE SODIUM 20 MG/1
40 TABLET, DELAYED RELEASE ORAL
Qty: 0 | Refills: 0 | Status: DISCONTINUED | OUTPATIENT
Start: 2018-05-29 | End: 2018-05-30

## 2018-05-29 RX ORDER — FUROSEMIDE 40 MG
40 TABLET ORAL DAILY
Qty: 0 | Refills: 0 | Status: DISCONTINUED | OUTPATIENT
Start: 2018-05-29 | End: 2018-05-29

## 2018-05-29 RX ORDER — INSULIN LISPRO 100/ML
4 VIAL (ML) SUBCUTANEOUS
Qty: 0 | Refills: 0 | Status: DISCONTINUED | OUTPATIENT
Start: 2018-05-29 | End: 2018-05-30

## 2018-05-29 RX ORDER — RALOXIFENE HYDROCHLORIDE 60 MG/1
60 TABLET, COATED ORAL DAILY
Qty: 0 | Refills: 0 | Status: DISCONTINUED | OUTPATIENT
Start: 2018-05-29 | End: 2018-05-30

## 2018-05-29 RX ORDER — ATORVASTATIN CALCIUM 80 MG/1
20 TABLET, FILM COATED ORAL AT BEDTIME
Qty: 0 | Refills: 0 | Status: DISCONTINUED | OUTPATIENT
Start: 2018-05-29 | End: 2018-05-30

## 2018-05-29 RX ORDER — GLUCAGON INJECTION, SOLUTION 0.5 MG/.1ML
1 INJECTION, SOLUTION SUBCUTANEOUS ONCE
Qty: 0 | Refills: 0 | Status: DISCONTINUED | OUTPATIENT
Start: 2018-05-29 | End: 2018-05-30

## 2018-05-29 RX ORDER — DEXTROSE 50 % IN WATER 50 %
25 SYRINGE (ML) INTRAVENOUS ONCE
Qty: 0 | Refills: 0 | Status: DISCONTINUED | OUTPATIENT
Start: 2018-05-29 | End: 2018-05-30

## 2018-05-29 RX ORDER — FOLIC ACID 0.8 MG
1 TABLET ORAL DAILY
Qty: 0 | Refills: 0 | Status: DISCONTINUED | OUTPATIENT
Start: 2018-05-29 | End: 2018-05-30

## 2018-05-29 RX ORDER — INSULIN GLARGINE 100 [IU]/ML
12 INJECTION, SOLUTION SUBCUTANEOUS AT BEDTIME
Qty: 0 | Refills: 0 | Status: DISCONTINUED | OUTPATIENT
Start: 2018-05-29 | End: 2018-05-30

## 2018-05-29 RX ORDER — CEFTRIAXONE 500 MG/1
1 INJECTION, POWDER, FOR SOLUTION INTRAMUSCULAR; INTRAVENOUS EVERY 24 HOURS
Qty: 0 | Refills: 0 | Status: DISCONTINUED | OUTPATIENT
Start: 2018-05-30 | End: 2018-05-30

## 2018-05-29 RX ORDER — FERROUS SULFATE 325(65) MG
325 TABLET ORAL THREE TIMES A DAY
Qty: 0 | Refills: 0 | Status: DISCONTINUED | OUTPATIENT
Start: 2018-05-29 | End: 2018-05-30

## 2018-05-29 RX ORDER — DOCUSATE SODIUM 100 MG
100 CAPSULE ORAL THREE TIMES A DAY
Qty: 0 | Refills: 0 | Status: DISCONTINUED | OUTPATIENT
Start: 2018-05-29 | End: 2018-05-30

## 2018-05-29 RX ORDER — DEXTROSE 50 % IN WATER 50 %
15 SYRINGE (ML) INTRAVENOUS ONCE
Qty: 0 | Refills: 0 | Status: DISCONTINUED | OUTPATIENT
Start: 2018-05-29 | End: 2018-05-30

## 2018-05-29 RX ORDER — DEXTROSE 50 % IN WATER 50 %
12.5 SYRINGE (ML) INTRAVENOUS ONCE
Qty: 0 | Refills: 0 | Status: DISCONTINUED | OUTPATIENT
Start: 2018-05-29 | End: 2018-05-30

## 2018-05-29 RX ORDER — INSULIN LISPRO 100/ML
VIAL (ML) SUBCUTANEOUS
Qty: 0 | Refills: 0 | Status: DISCONTINUED | OUTPATIENT
Start: 2018-05-29 | End: 2018-05-30

## 2018-05-29 RX ORDER — ACETAMINOPHEN 500 MG
650 TABLET ORAL EVERY 6 HOURS
Qty: 0 | Refills: 0 | Status: DISCONTINUED | OUTPATIENT
Start: 2018-05-29 | End: 2018-05-30

## 2018-05-29 RX ADMIN — SODIUM CHLORIDE 75 MILLILITER(S): 9 INJECTION INTRAMUSCULAR; INTRAVENOUS; SUBCUTANEOUS at 22:57

## 2018-05-29 RX ADMIN — CEFTRIAXONE 100 GRAM(S): 500 INJECTION, POWDER, FOR SOLUTION INTRAMUSCULAR; INTRAVENOUS at 22:50

## 2018-05-29 RX ADMIN — Medication 325 MILLIGRAM(S): at 22:53

## 2018-05-29 RX ADMIN — Medication 25 MILLIGRAM(S): at 22:57

## 2018-05-29 RX ADMIN — INSULIN GLARGINE 12 UNIT(S): 100 INJECTION, SOLUTION SUBCUTANEOUS at 22:49

## 2018-05-29 NOTE — ED ADULT NURSE NOTE - OBJECTIVE STATEMENT
pt presents c/o abd pain and hematuria. pt has been receiving blood transfusions over the last several months for bleeding during urination due to having a hole in her kidney which is leaking into her bladder. pt states more severe bleeding over the past 2 weeks with weight loss and poor appetite.

## 2018-05-29 NOTE — H&P ADULT - ASSESSMENT
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 to get cystoscopy when patient is stable, likely tomorrow  - will hold coumadin for the time being  - c/w folic acid  - check cardiac enzyme    # Afib on coumadin  - hold coumadin  - continue with metoprolol    # UA positive  - will start rocephin given positive UA and possible intervention tomorrow  - f/u UCx    # DM  - start insulin glargine and lispro    # STAR on CKD  - likely pre-renal, will get 3U PRBC  - start NS @ 77  - hold lasix  - check BMP in am    # HTN  - lasix, metoprolol    # DVT ppx  - SQH    # dispo  - attempt to transfer to 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, multifactorial anemia, ureteral stricture s/ L ureteral stent presents with hemoglobin of 4.3    # anemia  - most likely cause of anemia this time is the bleeding from the urethral orifice  - seen by hematology, is known to them for many years so will not pursue any aggressive workup for the time being  - transfuse 3U PRBC, check CBC in the am  - appreciate urology recommendations of transferring the patient to [facility] to get cystoscopy when patient is stable, likely tomorrow  - will hold coumadin for the time being  - c/w folic acid  - check cardiac enzyme    # Afib on coumadin  - hold coumadin  - continue with metoprolol    # UA positive  - will start rocephin given positive UA and possible intervention tomorrow  - f/u UCx    # DM  - start insulin glargine and lispro    # STAR on CKD  - likely pre-renal, will get 3U PRBC  - start NS @ 77  - hold lasix  - check BMP in am    # HTN  - hold lasix, metoprolol    # DVT ppx  - hold pharmacologic prophylaxis  - sequential compression device    # dispo  - attempt to transfer to [facility] tomorrow for the cystoscopy 77F w Afib, metallic AVR, 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, multifactorial anemia, ureteral stricture s/ L ureteral stent presents with hemoglobin of 4.3    # anemia  - most likely cause of anemia this time is the bleeding from the urethral orifice  - seen by hematology, is known to them for many years so will not pursue any aggressive workup for the time being  - transfuse 3U PRBC, check CBC in the am  - appreciate urology recommendations of transferring the patient to [facility] to get cystoscopy when patient is stable, likely tomorrow  - will hold coumadin for the time being  - c/w folic acid  - check cardiac enzyme    # Afib on coumadin  - hold coumadin  - continue with metoprolol    # UA positive  - will start rocephin given positive UA and possible intervention tomorrow  - f/u UCx    # DM  - start insulin glargine and lispro    # STAR on CKD  - likely pre-renal and/or obstruction  - will get 3U PRBC  - start NS @ 77  - hold lasix  - check BMP in am  - check ultrasound    # HTN  - hold lasix, metoprolol    # DVT ppx  - hold pharmacologic prophylaxis  - sequential compression device    # dispo  - attempt to transfer to [facility] tomorrow for the cystoscopy 77F w Afib, metallic AVR, 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  - appreciate urology recommendations of transferring the patient to [facility].  - c/w folic acid  - check cardiac enzyme    # Afib had been on coumadin, switched to sc lovenox.  - continue with metoprolol  - currently holding lovenox as well given possible active retroperitoneal bleed on CT a/p.    # UA positive  - currently on  rocephin given positive UA and possible intervention  - f/u UCx    # DM  - start insulin glargine and lispro    # STAR on CKD  - likely pre-renal and/or obstruction  - will get 3U PRBC  - start NS @ 77  - holding  lasix  - check ultrasound    # HTN  - hold lasix, metoprolol    # DVT ppx  - hold pharmacologic prophylaxis  - sequential compression device    # dispo  - Awaiting  transfer to [facility]  for possible  intervention.

## 2018-05-29 NOTE — ED PROVIDER NOTE - PHYSICAL EXAMINATION
VITAL SIGNS: I have reviewed nursing notes and confirm.  CONSTITUTIONAL: Well-developed; well-nourished; in no acute distress. pt comfortable.  SKIN: skin exam is warm and dry, no acute rash.   HEAD: Normocephalic; atraumatic.  EYES:  EOM intact; conjunctiva and sclera clear.  ENT: No nasal discharge; airway clear.   NECK: Supple; non tender.  CARD: S1, S2 normal; no murmurs, gallops, or rubs. Regular rate and rhythm. posterior tibial and radial pulses 2+  RESP: No wheezes, rales or rhonchi. cta b/l. no use of accessory muscles. no retractions  ABD: Normal bowel sounds; soft; tenderness to epigastric and L. cva;   EXT: Normal ROM. No  cyanosis or edema.  BACK: L cva tenderness  LYMPH: No acute cervical adenopathy.  NEURO: Alert, oriented, grossly unremarkable.    PSYCH: Cooperative, appropriate.

## 2018-05-29 NOTE — H&P ADULT - NSHPLABSRESULTS_GEN_ALL_CORE
LABS    CBC                        4.3    9.44  )-----------( 292      ( 29 May 2018 16:24 )             14.0     ----------------------------------------------------------------------------------------------------  CMP      129<L>  |  90<L>  |  36<H>  ----------------------------<  183<H>  4.8   |  28  |  2.4<H>    Ca    7.7<L>      29 May 2018 16:24    TPro  6.1  /  Alb  3.4<L>  /  TBili  2.1<H>  /  DBili  x   /  AST  17  /  ALT  5   /  AlkPhos  85          ----------------------------------------------------------------------------------------------------  Coagulation  PT/INR - ( 29 May 2018 16:24 )   PT: 12.40 sec;   INR: 1.14 ratio         PTT - ( 29 May 2018 16:24 )  PTT:28.8 sec  ----------------------------------------------------------------------------------------------------  Urinalysis  Urinalysis Basic - ( 29 May 2018 17:32 )    Color: Red / Appearance: Cloudy / S.020 / pH: x  Gluc: x / Ketone: 40  / Bili: Large / Urobili: 1.0 mg/dL   Blood: x / Protein: >=300 mg/dL / Nitrite: Positive   Leuk Esterase: Moderate / RBC: >50 /HPF / WBC >50 /HPF   Sq Epi: x / Non Sq Epi: Occasional /HPF / Bacteria: TNTC /HPF      ----------------------------------------------------------------------------------------------------  Cardiac Enzyme  CARDIAC MARKERS ( 29 May 2018 16:24 )  x     / 0.01 ng/mL / 54 U/L / x     / 2.2 ng/mL    **************************************************************    RADIOLOGY

## 2018-05-29 NOTE — CONSULT NOTE ADULT - ASSESSMENT
77y old Female with    PLAN:  1.	R/B evan 77y old Female with anemia, gross hematuria.    PLAN:  1.	R/B sono to check for hydro/PVR. If high residual, place   2.	Consider discussing with Dr. Pablo and transferring care to [facility] 77y old Female with anemia, gross hematuria, L ureteral stent for poss L stricture.    PLAN:  1.	R/B sono to check for hydro/PVR. If high residual, place 18F [place] & irrigate w sterile H2O prn  2.	F/u urine cx  3.	Transfuse prn  4.	Consider discussing with  Dr. Pablo and transferring care to [facility] for further workup/management 77y old Female with anemia, gross hematuria, L ureteral stent for poss L stricture, ?UTI.    PLAN:  1.	R/B sono to check for hydro/PVR. If high residual, place 18F [place] & irrigate w sterile H2O prn  2.	F/u urine cx  3.	Transfuse prn  4.	Consider discussing with  Dr. Pablo and transferring care to [facility] for further workup/management

## 2018-05-29 NOTE — CONSULT NOTE ADULT - ASSESSMENT
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    1. 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 Hematuria:   --Urology evaluation  --Hold lovenox  --Consider luna catheter  --Two 18 G peripheral IVs  --H/H every 8 hrs    3. Metallic valve/afib:  --Hold A/c for now  --Cardiology c/s for Dr Ochoa

## 2018-05-29 NOTE — H&P ADULT - HISTORY OF PRESENT ILLNESS
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 Repeat Hb over here is still 4.3  - patient endorses feeling lightheaded for the past 2 days as well  - has gross hematuria for the past 14 months, sometimes has no bleeding for 2-3 weeks at a time. Sometimes has trouble urinating because of clots. Currently endorses the same gross hematuria.  - patient follows with urology Dr. Pablo in [facility], recently had the L ureteral JJ stent changed last week, was found to have bloody jets from L ureteral orifice. She was scheduled to have a cystoscopy to control the bleeding tomorrow at 8 am    - denies fever, chills, dysuria

## 2018-05-29 NOTE — H&P ADULT - NSHPPHYSICALEXAM_GEN_ALL_CORE
Vitals:   T(C): 37.3 (05-29-18 @ 15:49), Max: 37.3 (05-29-18 @ 15:49)  HR: 64 (05-29-18 @ 15:49) (64 - 64)  BP: 106/59 (05-29-18 @ 15:49) (106/59 - 106/59)  RR: 18 (05-29-18 @ 15:49) (18 - 18)  SpO2: 99% (05-29-18 @ 15:49) (99% - 99%)    Physical Exam  GENERAL: NAD  HEAD:  Atraumatic, normocephalic  EYES: EOMI, PERRLA, conjunctiva and sclera clear  NECK: Supple, no JVD  CHEST/LUNG: Clear to auscultation bilaterally; no wheeze; no crackles; no accessory muscles used  HEART: Regular rate and rhythm, S1, S2, metallic click  ABDOMEN: Soft, nontender, nondistended; bowel sounds present; no guarding  EXTREMITIES:  2+ peripheral pulses, 2+ edema  PSYCH: AAOx3  NEUROLOGY: non focal  SKIN: No rashes or lesions

## 2018-05-29 NOTE — ED PROVIDER NOTE - PROGRESS NOTE DETAILS
kidney and bladder ultrasound recommended by nephro kidney and bladder ultrasound recommended by urology nephrlogayush informed-- continue blood transfusion will discuss CT with iv contrast in morning

## 2018-05-29 NOTE — ED PROVIDER NOTE - ATTENDING CONTRIBUTION TO CARE
I have reviewed triage notes and vital signs available at this time.  I have reviewed lab results, if any, available at this time.  I have reviewed EKGs, if any, available at this time.     I have reviewed radiology results and radiographs/scans, if any, available at this time.      I have personally seen, evaluated and participated in this patient's care and find this patient's history and physical examination are consistent with the chart documentation, unless noted below.  ***  patient sent here for low hemoglobin, chronic hematuria and elevated creatnine. patient complains of generalized weakness. Labs reviewed. Hemodynamically stable. Heme/onc requested patient be admitted for further eval.  blood to be transfused. I have reviewed triage notes and vital signs available at this time.  I have reviewed lab results, if any, available at this time.  I have reviewed EKGs, if any, available at this time.     I have reviewed radiology results and radiographs/scans, if any, available at this time.      I have personally seen, evaluated and participated in this patient's care and find this patient's history and physical examination are consistent with the chart documentation, unless noted below.  ***  patient sent here for low hemoglobin, chronic hematuria and elevated creatnine. patient complains of generalized weakness. Labs reviewed. Hemodynamically stable. Heme/onc requested patient be admitted for further eval.  blood to be transfused

## 2018-05-29 NOTE — CONSULT NOTE ADULT - SUBJECTIVE AND OBJECTIVE BOX
76 y/o F well known to Dr Alvarenga was sent in for Hb of 4. She had a urological procedure done a few days back by Dr Pablo at [facility].   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 Today, she c/o significant fatigue. She has been on coumadin for metallic aortic valve with goal INR between 1.5 to 2.5 due to h/o bleeding. Her metallic mitral valve was changed to tissue valve in Mar 2018 by Dr Tomer Escalante at [facility].  Denies having CP, SOB, palpitations or syncope. No fever.    PAST MEDICAL & SURGICAL HISTORY:  CVA (cerebral vascular accident)  Anemia  Rheumatic fever  Diabetes  Osteoporosis  Mitral valve replaced  Atrial fibrillation: on coumadin at home  Hypertension  History of ureter stent  S/P AVR  H/O mitral valve replacement    FAMILY HISTORY:  No pertinent family history in first degree relatives    Social history:  Non smoker, No EtOH    Allergies  No Known Allergies    Home medications:  Coumadin  Keppra  Pravastatin  Folic acid  Glimeperide  Metformin  Protonix  Metoprolol  Lasix  Folic acid  Percocet    Vital Signs Last 24 Hrs  T(C): 37.3 (29 May 2018 15:49), Max: 37.3 (29 May 2018 15:49)  T(F): 99.2 (29 May 2018 15:49), Max: 99.2 (29 May 2018 15:49)  HR: 64 (29 May 2018 15:49) (64 - 64)  BP: 106/59 (29 May 2018 15:49) (106/59 - 106/59)  BP(mean): --  RR: 18 (29 May 2018 15:49) (18 - 18)  SpO2: 99% (29 May 2018 15:49) (99% - 99%)    Labs:                        4.3    9.44  )-----------( 292      ( 29 May 2018 16:24 )             14.0             05-29    129<L>  |  90<L>  |  36<H>  ----------------------------<  183<H>  4.8   |  28  |  2.4<H>    Ca    7.7<L>      29 May 2018 16:24    TPro  6.1  /  Alb  3.4<L>  /  TBili  2.1<H>  /  DBili  x   /  AST  17  /  ALT  5   /  AlkPhos  85  05-29    LIVER FUNCTIONS - ( 29 May 2018 16:24 )  Alb: 3.4 g/dL / Pro: 6.1 g/dL / ALK PHOS: 85 U/L / ALT: 5 U/L / AST: 17 U/L / GGT: x                 PT/INR - ( 29 May 2018 16:24 )   PT: 12.40 sec;   INR: 1.14 ratio         PTT - ( 29 May 2018 16:24 )  PTT:28.8 sec  CARDIAC MARKERS ( 29 May 2018 16:24 )  x     / 0.01 ng/mL / 54 U/L / x     / 2.2 ng/mL        Urinalysis Basic - ( 29 May 2018 17:32 )    Color: Red / Appearance: Cloudy / S.020 / pH: x  Gluc: x / Ketone: 40  / Bili: Large / Urobili: 1.0 mg/dL   Blood: x / Protein: >=300 mg/dL / Nitrite: Positive   Leuk Esterase: Moderate / RBC: >50 /HPF / WBC >50 /HPF   Sq Epi: x / Non Sq Epi: Occasional /HPF / Bacteria: TNTC /HPF

## 2018-05-29 NOTE — ED PROVIDER NOTE - OBJECTIVE STATEMENT
78y/o F w/ hx of prostatic mitral valve replace, metal aortic valve replacement, HTN, Diabetes, anemia-- 2/2 bleeding from urinary tract-- pt following up by nephrologist and neurologist in [facility]-- 3 months had stent placed to ureter for stenosis and possible reason to bleed-- since then bleeding has been worsening--today has been the worst. hg of 4 so she was sent over.  pt only complaints of generalized weakness. no cp or sob.

## 2018-05-29 NOTE — CONSULT NOTE ADULT - SUBJECTIVE AND OBJECTIVE BOX
Patient is a 77y old Female    PAST MEDICAL & SURGICAL HISTORY:  CVA (cerebral vascular accident)  Anemia  Rheumatic fever  Diabetes  Osteoporosis  Mitral valve replaced  Atrial fibrillation: on coumadin at home  Hypertension  History of ureter stent  S/P AVR  H/O mitral valve replacement    MEDS:  MEDICATIONS  (STANDING):    MEDICATIONS  (PRN):    ALLERGIES: No Known Allergies      VS: T(F): 99.2, Max: 99.2 ( @ 15:49), HR: 64 (64 - 64), BP: 106/59 (106/59 - 106/59), RR: 18, SpO2: 99% (99% - 99%)  GEN: Alert, awake, NAD  ABD: soft, NT/ND, non-palpable bladder    LABS/IMAGIN.3    9.44  )-----------( 292      ( 29 May 2018 16:24 )             14.0       129<L>  |  90<L>  |  36<H>  ----------------------------<  183<H>  4.8   |  28  |  2.4<H>    Ca    7.7<L>      29 May 2018 16:24    TPro  6.1  /  Alb  3.4<L>  /  TBili  2.1<H>  /  DBili  x   /  AST  17  /  ALT  5   /  AlkPhos  85      PT/INR - ( 29 May 2018 16:24 )   PT: 12.40 sec;   INR: 1.14 ratio   PTT - ( 29 May 2018 16:24 )  PTT:28.8 sec    Urinalysis Basic - ( 29 May 2018 17:32 )  Color: Red / Appearance: Cloudy / S.020 / pH: x  Gluc: x / Ketone: 40  / Bili: Large / Urobili: 1.0 mg/dL   Blood: x / Protein: >=300 mg/dL / Nitrite: Positive   Leuk Esterase: Moderate / RBC: x / WBC x   Sq Epi: x / Non Sq Epi: x / Bacteria: x Patient is a 77y old Female history of chronic hypoproliferative anemia, transfusion dependent (sees Dr. Ruiz, Afib on Coumadin, NIDDM, osteoporosis, idiopathic ureteral stricture s/p left ureteral stent (changed last week) by Dr Pablo, CVA 20 years ago without residual deficits, HTN, HLD, Rheumatic fever as a child s/p mechanical MVR and AVR 14 years ago. Patient c/p persistent hematuria x1 yr. Followed by Dr. Pablo. Recent cysto last week showed +active blood coming from R ureteral orifice.     PAST MEDICAL & SURGICAL HISTORY:  CVA (cerebral vascular accident)  Anemia  Rheumatic fever  Diabetes  Osteoporosis  Mitral valve replaced  Atrial fibrillation: on coumadin at home  Hypertension  History of ureter stent  S/P AVR  H/O mitral valve replacement  ALLERGIES: No Known Allergies    VS: T(F): 99.2, Max: 99.2 ( @ 15:49), HR: 64 (64 - 64), BP: 106/59 (106/59 - 106/59), RR: 18, SpO2: 99% (99% - 99%)  GEN: Alert, awake, NAD  ABD: soft, NT/ND, non-palpable bladder    LABS/IMAGIN.3    9.44  )-----------( 292      ( 29 May 2018 16:24 )             14.0       129<L>  |  90<L>  |  36<H>  ----------------------------<  183<H>  4.8   |  28  |  2.4<H>    Ca    7.7<L>      29 May 2018 16:24    TPro  6.1  /  Alb  3.4<L>  /  TBili  2.1<H>  /  DBili  x   /  AST  17  /  ALT  5   /  AlkPhos  85      PT/INR - ( 29 May 2018 16:24 )   PT: 12.40 sec;   INR: 1.14 ratio   PTT - ( 29 May 2018 16:24 )  PTT:28.8 sec    Urinalysis Basic - ( 29 May 2018 17:32 )  Color: Red / Appearance: Cloudy / S.020 / pH: x  Gluc: x / Ketone: 40  / Bili: Large / Urobili: 1.0 mg/dL   Blood: x / Protein: >=300 mg/dL / Nitrite: Positive   Leuk Esterase: Moderate / RBC: x / WBC x   Sq Epi: x / Non Sq Epi: x / Bacteria: x

## 2018-05-29 NOTE — ED PROVIDER NOTE - NS ED ROS FT
ROS: No fever/chills, No headache/photophobia/eye pain/changes in vision, No ear pain/sore throat/dysphagia, No chest pain/palpitations, no SOB/cough/wheeze/stridor, No abdominal pain, No N/V/D/melena, no dysuria/frequency/discharge, No neck/back pain, no rash, no changes in neurological status/function.      +generalized weakness.  +bleeding from urinary tract

## 2018-05-30 ENCOUNTER — TRANSCRIPTION ENCOUNTER (OUTPATIENT)
Age: 78
End: 2018-05-30

## 2018-05-30 ENCOUNTER — APPOINTMENT (OUTPATIENT)
Dept: INFUSION THERAPY | Facility: CLINIC | Age: 78
End: 2018-05-30

## 2018-05-30 ENCOUNTER — APPOINTMENT (OUTPATIENT)
Dept: HEMATOLOGY ONCOLOGY | Facility: CLINIC | Age: 78
End: 2018-05-30

## 2018-05-30 VITALS
HEART RATE: 83 BPM | DIASTOLIC BLOOD PRESSURE: 55 MMHG | SYSTOLIC BLOOD PRESSURE: 114 MMHG | RESPIRATION RATE: 18 BRPM | TEMPERATURE: 100 F

## 2018-05-30 LAB
ANION GAP SERPL CALC-SCNC: 15 MMOL/L — HIGH (ref 7–14)
BUN SERPL-MCNC: 35 MG/DL — HIGH (ref 10–20)
CALCIUM SERPL-MCNC: 7.7 MG/DL — LOW (ref 8.5–10.1)
CHLORIDE SERPL-SCNC: 92 MMOL/L — LOW (ref 98–110)
CO2 SERPL-SCNC: 27 MMOL/L — SIGNIFICANT CHANGE UP (ref 17–32)
CREAT SERPL-MCNC: 2.3 MG/DL — HIGH (ref 0.7–1.5)
CULTURE RESULTS: NO GROWTH — SIGNIFICANT CHANGE UP
GLUCOSE SERPL-MCNC: 29 MG/DL — CRITICAL LOW (ref 70–99)
GRAM STN FLD: NO GROWTH — SIGNIFICANT CHANGE UP
HCT VFR BLD CALC: 20.4 % — LOW (ref 37–47)
HCT VFR BLD CALC: 22.1 % — LOW (ref 37–47)
HGB BLD-MCNC: 6.7 G/DL — CRITICAL LOW (ref 12–16)
HGB BLD-MCNC: 7.3 G/DL — CRITICAL LOW (ref 12–16)
MAGNESIUM SERPL-MCNC: 2 MG/DL — SIGNIFICANT CHANGE UP (ref 1.8–2.4)
MCHC RBC-ENTMCNC: 24.9 PG — LOW (ref 27–31)
MCHC RBC-ENTMCNC: 24.9 PG — LOW (ref 27–31)
MCHC RBC-ENTMCNC: 32.8 G/DL — SIGNIFICANT CHANGE UP (ref 32–37)
MCHC RBC-ENTMCNC: 33 G/DL — SIGNIFICANT CHANGE UP (ref 32–37)
MCV RBC AUTO: 75.4 FL — LOW (ref 81–99)
MCV RBC AUTO: 75.8 FL — LOW (ref 81–99)
NRBC # BLD: 0 /100 WBCS — SIGNIFICANT CHANGE UP (ref 0–0)
NRBC # BLD: 0 /100 WBCS — SIGNIFICANT CHANGE UP (ref 0–0)
PHOSPHATE SERPL-MCNC: 4.7 MG/DL — SIGNIFICANT CHANGE UP (ref 2.1–4.9)
PLATELET # BLD AUTO: 248 K/UL — SIGNIFICANT CHANGE UP (ref 130–400)
PLATELET # BLD AUTO: 249 K/UL — SIGNIFICANT CHANGE UP (ref 130–400)
POTASSIUM SERPL-MCNC: 4.6 MMOL/L — SIGNIFICANT CHANGE UP (ref 3.5–5)
POTASSIUM SERPL-SCNC: 4.6 MMOL/L — SIGNIFICANT CHANGE UP (ref 3.5–5)
RBC # BLD: 2.69 M/UL — LOW (ref 4.2–5.4)
RBC # BLD: 2.93 M/UL — LOW (ref 4.2–5.4)
RBC # FLD: 17.2 % — HIGH (ref 11.5–14.5)
RBC # FLD: 17.5 % — HIGH (ref 11.5–14.5)
SODIUM SERPL-SCNC: 134 MMOL/L — LOW (ref 135–146)
SPECIMEN SOURCE: SIGNIFICANT CHANGE UP
TROPONIN T SERPL-MCNC: <0.01 NG/ML — SIGNIFICANT CHANGE UP
TROPONIN T SERPL-MCNC: <0.01 NG/ML — SIGNIFICANT CHANGE UP
WBC # BLD: 10.35 K/UL — SIGNIFICANT CHANGE UP (ref 4.8–10.8)
WBC # BLD: 9.56 K/UL — SIGNIFICANT CHANGE UP (ref 4.8–10.8)
WBC # FLD AUTO: 10.35 K/UL — SIGNIFICANT CHANGE UP (ref 4.8–10.8)
WBC # FLD AUTO: 9.56 K/UL — SIGNIFICANT CHANGE UP (ref 4.8–10.8)

## 2018-05-30 RX ORDER — TRAMADOL HYDROCHLORIDE 50 MG/1
50 TABLET ORAL ONCE
Qty: 0 | Refills: 0 | Status: DISCONTINUED | OUTPATIENT
Start: 2018-05-30 | End: 2018-05-30

## 2018-05-30 RX ORDER — INSULIN GLARGINE 100 [IU]/ML
9 INJECTION, SOLUTION SUBCUTANEOUS
Qty: 0 | Refills: 0 | COMMUNITY
Start: 2018-05-30

## 2018-05-30 RX ORDER — DOCUSATE SODIUM 100 MG
1 CAPSULE ORAL
Qty: 0 | Refills: 0 | COMMUNITY
Start: 2018-05-30

## 2018-05-30 RX ORDER — SODIUM CHLORIDE 9 MG/ML
75 INJECTION INTRAMUSCULAR; INTRAVENOUS; SUBCUTANEOUS
Qty: 0 | Refills: 0 | COMMUNITY
Start: 2018-05-30

## 2018-05-30 RX ORDER — INSULIN GLARGINE 100 [IU]/ML
9 INJECTION, SOLUTION SUBCUTANEOUS AT BEDTIME
Qty: 0 | Refills: 0 | Status: DISCONTINUED | OUTPATIENT
Start: 2018-05-30 | End: 2018-05-30

## 2018-05-30 RX ORDER — INSULIN LISPRO 100/ML
3 VIAL (ML) SUBCUTANEOUS
Qty: 0 | Refills: 0 | COMMUNITY
Start: 2018-05-30

## 2018-05-30 RX ORDER — INSULIN LISPRO 100/ML
3 VIAL (ML) SUBCUTANEOUS
Qty: 0 | Refills: 0 | Status: DISCONTINUED | OUTPATIENT
Start: 2018-05-30 | End: 2018-05-30

## 2018-05-30 RX ORDER — CEFTRIAXONE 500 MG/1
1 INJECTION, POWDER, FOR SOLUTION INTRAMUSCULAR; INTRAVENOUS
Qty: 0 | Refills: 0 | COMMUNITY
Start: 2018-05-30

## 2018-05-30 RX ORDER — TRAMADOL HYDROCHLORIDE 50 MG/1
25 TABLET ORAL ONCE
Qty: 0 | Refills: 0 | Status: DISCONTINUED | OUTPATIENT
Start: 2018-05-30 | End: 2018-05-30

## 2018-05-30 RX ADMIN — Medication 3 UNIT(S): at 17:30

## 2018-05-30 RX ADMIN — Medication 325 MILLIGRAM(S): at 15:17

## 2018-05-30 RX ADMIN — TRAMADOL HYDROCHLORIDE 50 MILLIGRAM(S): 50 TABLET ORAL at 04:26

## 2018-05-30 RX ADMIN — Medication 325 MILLIGRAM(S): at 05:48

## 2018-05-30 RX ADMIN — PANTOPRAZOLE SODIUM 40 MILLIGRAM(S): 20 TABLET, DELAYED RELEASE ORAL at 05:48

## 2018-05-30 RX ADMIN — RALOXIFENE HYDROCHLORIDE 60 MILLIGRAM(S): 60 TABLET, COATED ORAL at 11:38

## 2018-05-30 RX ADMIN — Medication 1 MILLIGRAM(S): at 11:38

## 2018-05-30 RX ADMIN — Medication 25 MILLIGRAM(S): at 17:30

## 2018-05-30 RX ADMIN — Medication 25 MILLIGRAM(S): at 05:48

## 2018-05-30 RX ADMIN — Medication 100 MILLIGRAM(S): at 15:17

## 2018-05-30 NOTE — DISCHARGE NOTE ADULT - HOSPITAL COURSE
77F admitted for acute blood loss anemia likely 2/2 L renal pelvis laceration as demonstrated on CT abdomen. Pt has had chronic hematuria with L jj stent palcement 14 months ago for L ureteral structure with re-manipulation of stents 2 weeks ago at [facility] with Dr Li in [facility].    Since then patient has had hematuria AND was schedule for cystoscopy on Wednesday 5/30 with Dr li to eval L uretero jet gross blood. PMHx of Afib, metallic AVR on lovenox (usually on coumadin though held for procedure), bioprosthetic MVR, multifactorial anemia, ureteral stricture s/ L ureteral stent.    Admission complicated by + UA pending UCx on rocephin, urinary retention 2/2 bladder clots with luna in place to be irrigated prn, STAR (baseine Cr 1.0) likely related to urinary bladder retention vs L renal pelvis laceration with Cr 2.1 this admission seen by nephro rec'd urine electrolyte evalution, IVF, and strict ins and outs though.     Presenting symptoms were for lightheaded x 2 days evaluted by CT surgeon who found Hgb 4 and sent to ED. Repeat Hgb in ED 4.3    Hospital Course: given 3U prbc with repeat Hgb 7.3. + continued hematuria and 6 hour hgb 6.7 given 2nd uprbc. INR, platelets 200's. Bleeding source likely from "Laceration of the posterior left renal pelvis, resulting in intra and extraluminal retroperitoneal hemorrhage. Perinephric hemorrhage and adjacent retroperitoneal hematoma (measuring 5.6 x 2.4 x 8.1 cm)." as seen on CT abdomen. Other complicating factors managed as above. Pt was transferred late inthe evening to [facility] to the care to Dr Li, urology.     [facility] instructed to   repeat CBC, T/S on transfer  repeat Urine cultures, and Urine electrolytes c/w rocephin for now  C/W Luna and irrigate prn  F/u AM cbc/ BMP for hgb and creatining  CW fluids (pt not volume overload LE swollen but non pitting, lungs clear)  CT abdomen IMAGE DISK included with patient on transfer  Pt needs Follow up with cardiology for holding anticoagulation with prostatic mitral valve Aortic valves and Afib  Dr Cooper Daniel   [id number]

## 2018-05-30 NOTE — DISCHARGE NOTE ADULT - PROVIDER TOKENS
FREE:[LAST:[Samy, Urology],PHONE:[(   )    -],FAX:[(   )    -],ADDRESS:[[facility] - Direct Transfer]]

## 2018-05-30 NOTE — CONSULT NOTE ADULT - SUBJECTIVE AND OBJECTIVE BOX
SUBJECTIVE:    Patient is a 77y old Female who presents with a chief complaint of anemia (29 May 2018 19:26)    Currently admitted to medicine with the primary diagnosis of Severe anemia     Today is hospital day 1d.     History    77F w Afib, metallic AVR, 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, multifactorial anemia, ureteral stricture s/ L ureteral stent   - Recently had her metallic MV changed for a prosthetic bovine mitral valve (3/29/18) after being found to have valvular dysfunction  - Aortic metallic valve was left in place (had rheumatic fever as a child requiring MVR, AVR 14 years ago)    Saw CT surgeon 2 days PTP, labs showed Hb of 4, was called and advsied to go to ED. Repeat Hb in ED 4.3. Reports feeling lightheaded for the past 2 days & has gross hematuria for the past 14 months; at time has no bleeding for 2-3 weeks at a time. Sometimes has trouble urinating because of clots. Currently endorses the same gross hematuria.    Follows with urology (Dr. Pablo in [facility]) recently had the L ureteral JJ stent changed 1week PTP, was found to have bloody jets from L ureteral orifice.   She was scheduled to have a cystoscopy to control the bleeding 18 at 8 am.    PAST MEDICAL & SURGICAL HISTORY  CVA (cerebral vascular accident)  Anemia  Rheumatic fever  Diabetes  Osteoporosis  Mitral valve replaced  Atrial fibrillation: on coumadin at home  Hypertension  History of ureter stent  S/P AVR  H/O mitral valve replacement    SOCIAL HISTORY:  Negative for smoking/alcohol/drug use.     ALLERGIES:  No Known Allergies    MEDICATIONS:  STANDING MEDICATIONS  atorvastatin 20 milliGRAM(s) Oral at bedtime  cefTRIAXone   IVPB      cefTRIAXone   IVPB 1 Gram(s) IV Intermittent every 24 hours  dextrose 5%. 1000 milliLiter(s) IV Continuous <Continuous>  dextrose 50% Injectable 12.5 Gram(s) IV Push once  dextrose 50% Injectable 25 Gram(s) IV Push once  dextrose 50% Injectable 25 Gram(s) IV Push once  docusate sodium 100 milliGRAM(s) Oral three times a day  ferrous    sulfate 325 milliGRAM(s) Oral three times a day  folic acid 1 milliGRAM(s) Oral daily  insulin glargine Injectable (LANTUS) 12 Unit(s) SubCutaneous at bedtime  insulin lispro (HumaLOG) corrective regimen sliding scale   SubCutaneous three times a day before meals  insulin lispro Injectable (HumaLOG) 4 Unit(s) SubCutaneous three times a day before meals  metoprolol tartrate 25 milliGRAM(s) Oral two times a day  pantoprazole    Tablet 40 milliGRAM(s) Oral before breakfast  raloxifene 60 milliGRAM(s) Oral daily  sodium chloride 0.9%. 1000 milliLiter(s) IV Continuous <Continuous>    PRN MEDICATIONS  acetaminophen   Tablet 650 milliGRAM(s) Oral every 6 hours PRN  dextrose 40% Gel 15 Gram(s) Oral once PRN  glucagon  Injectable 1 milliGRAM(s) IntraMuscular once PRN  senna 2 Tablet(s) Oral at bedtime PRN    VITALS:   Vital Signs Last 24 Hrs  T(C): 36.2 (30 May 2018 05:39), Max: 37.3 (29 May 2018 15:49)  T(F): 97.2 (30 May 2018 05:39), Max: 99.2 (29 May 2018 15:49)  HR: 84 (30 May 2018 05:39) (64 - 84)  BP: 157/70 (30 May 2018 05:39) (106/59 - 157/70)  RR: 18 (30 May 2018 05:39) (18 - 18)  SpO2: 96% (30 May 2018 08:26) (96% - 99%)    LABS:                        7.3    10.35 )-----------( 249      ( 30 May 2018 05:56 )             22.1     05-30    134<L>  |  92<L>  |  35<H>  ----------------------------<  29<LL>  4.6   |  27  |  2.3<H>    Ca    7.7<L>      30 May 2018 05:56  Phos  4.7     05-30  Mg     2.0     05-30    TPro  6.1  /  Alb  3.4<L>  /  TBili  2.1<H>  /  DBili  x   /  AST  17  /  ALT  5   /  AlkPhos  85  05-29    PT/INR - ( 29 May 2018 16:24 )   PT: 12.40 sec;   INR: 1.14 ratio         PTT - ( 29 May 2018 16:24 )  PTT:28.8 sec  Urinalysis Basic - ( 29 May 2018 17:32 )    Color: Red / Appearance: Cloudy / S.020 / pH: x  Gluc: x / Ketone: 40  / Bili: Large / Urobili: 1.0 mg/dL   Blood: x / Protein: >=300 mg/dL / Nitrite: Positive   Leuk Esterase: Moderate / RBC: >50 /HPF / WBC >50 /HPF   Sq Epi: x / Non Sq Epi: Occasional /HPF / Bacteria: TNTC /HPF        Troponin T, Serum: <0.01 ng/mL (18 @ 05:56)  Troponin T, Serum: 0.01 ng/mL (18 @ 16:24)  Creatine Kinase, Serum: 54 U/L (18 @ 16:24)      CARDIAC MARKERS ( 30 May 2018 05:56 )  x     / <0.01 ng/mL / x     / x     / x      CARDIAC MARKERS ( 29 May 2018 16:24 )  x     / 0.01 ng/mL / 54 U/L / x     / 2.2 ng/mL      RADIOLOGY:    PHYSICAL EXAM: SUBJECTIVE:    Patient is a 77y old Female who presents with a chief complaint of anemia (29 May 2018 19:26)    Currently admitted to medicine with the primary diagnosis of Severe anemia     Today is hospital day 1d. Comfortable, no complaints at this time.    History    77F w Afib, metallic AVR, 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, multifactorial anemia, ureteral stricture s/ L ureteral stent   - Recently had her metallic MV changed for a prosthetic bovine mitral valve (3/29/18) after being found to have valvular dysfunction  - Aortic metallic valve was left in place (had rheumatic fever as a child requiring MVR, AVR 14 years ago)    Saw CT surgeon 2 days PTP, labs showed Hb of 4, was called and advsied to go to ED. Repeat Hb in ED 4.3. Reports feeling lightheaded for the past 2 days & has gross hematuria for the past 14 months; at time has no bleeding for 2-3 weeks at a time. Sometimes has trouble urinating because of clots. Currently endorses the same gross hematuria.    Follows with urology (Dr. Pablo in [facility]) recently had the L ureteral JJ stent changed 1week PTP, was found to have bloody jets from L ureteral orifice.   She was scheduled to have a cystoscopy to control the bleeding 18 at 8 am.    PAST MEDICAL & SURGICAL HISTORY  CVA (cerebral vascular accident)  Anemia  Rheumatic fever  Diabetes  Osteoporosis  Mitral valve replaced  Atrial fibrillation: on coumadin at home  Hypertension  History of ureter stent  S/P AVR  H/O mitral valve replacement    SOCIAL HISTORY:  Negative for smoking/alcohol/drug use.     ALLERGIES:  No Known Allergies    MEDICATIONS:  STANDING MEDICATIONS  atorvastatin 20 milliGRAM(s) Oral at bedtime  cefTRIAXone   IVPB      cefTRIAXone   IVPB 1 Gram(s) IV Intermittent every 24 hours  dextrose 5%. 1000 milliLiter(s) IV Continuous <Continuous>  dextrose 50% Injectable 12.5 Gram(s) IV Push once  dextrose 50% Injectable 25 Gram(s) IV Push once  dextrose 50% Injectable 25 Gram(s) IV Push once  docusate sodium 100 milliGRAM(s) Oral three times a day  ferrous    sulfate 325 milliGRAM(s) Oral three times a day  folic acid 1 milliGRAM(s) Oral daily  insulin glargine Injectable (LANTUS) 12 Unit(s) SubCutaneous at bedtime  insulin lispro (HumaLOG) corrective regimen sliding scale   SubCutaneous three times a day before meals  insulin lispro Injectable (HumaLOG) 4 Unit(s) SubCutaneous three times a day before meals  metoprolol tartrate 25 milliGRAM(s) Oral two times a day  pantoprazole    Tablet 40 milliGRAM(s) Oral before breakfast  raloxifene 60 milliGRAM(s) Oral daily  sodium chloride 0.9%. 1000 milliLiter(s) IV Continuous <Continuous>    PRN MEDICATIONS  acetaminophen   Tablet 650 milliGRAM(s) Oral every 6 hours PRN  dextrose 40% Gel 15 Gram(s) Oral once PRN  glucagon  Injectable 1 milliGRAM(s) IntraMuscular once PRN  senna 2 Tablet(s) Oral at bedtime PRN    VITALS:   Vital Signs Last 24 Hrs  T(C): 36.2 (30 May 2018 05:39), Max: 37.3 (29 May 2018 15:49)  T(F): 97.2 (30 May 2018 05:39), Max: 99.2 (29 May 2018 15:49)  HR: 84 (30 May 2018 05:39) (64 - 84)  BP: 157/70 (30 May 2018 05:39) (106/59 - 157/70)  RR: 18 (30 May 2018 05:39) (18 - 18)  SpO2: 96% (30 May 2018 08:26) (96% - 99%)    LABS:                        7.3    10.35 )-----------( 249      ( 30 May 2018 05:56 )             22.1     05-30    134<L>  |  92<L>  |  35<H>  ----------------------------<  29<LL>  4.6   |  27  |  2.3<H>    Ca    7.7<L>      30 May 2018 05:56  Phos  4.7     05-30  Mg     2.0     05-30    TPro  6.1  /  Alb  3.4<L>  /  TBili  2.1<H>  /  DBili  x   /  AST  17  /  ALT  5   /  AlkPhos  85  05-29    PT/INR - ( 29 May 2018 16:24 )   PT: 12.40 sec;   INR: 1.14 ratio         PTT - ( 29 May 2018 16:24 )  PTT:28.8 sec  Urinalysis Basic - ( 29 May 2018 17:32 )    Color: Red / Appearance: Cloudy / S.020 / pH: x  Gluc: x / Ketone: 40  / Bili: Large / Urobili: 1.0 mg/dL   Blood: x / Protein: >=300 mg/dL / Nitrite: Positive   Leuk Esterase: Moderate / RBC: >50 /HPF / WBC >50 /HPF   Sq Epi: x / Non Sq Epi: Occasional /HPF / Bacteria: TNTC /HPF        Troponin T, Serum: <0.01 ng/mL (18 @ 05:56)  Troponin T, Serum: 0.01 ng/mL (18 @ 16:24)  Creatine Kinase, Serum: 54 U/L (18 @ 16:24)      CARDIAC MARKERS ( 30 May 2018 05:56 )  x     / <0.01 ng/mL / x     / x     / x      CARDIAC MARKERS ( 29 May 2018 16:24 )  x     / 0.01 ng/mL / 54 U/L / x     / 2.2 ng/mL      RADIOLOGY:    PHYSICAL EXAM:    PHYSICAL EXAM:  GENERAL: NAD, speaks in full sentences, no signs of respiratory distress, lying flat in bed  HEAD:  Atraumatic, Normocephalic  EYES: EOMI, conjunctiva and sclera clear  NECK: Supple  CHEST/LUNG: right sided crackles, no wheeze, no rhonchi  HEART: Irregular rhythm, +murmur  ABDOMEN: Soft, Nontender, Nondistended; Bowel sounds present; No guarding  EXTREMITIES:  chronic mild venous stasis changes  PSYCH: AAOx3  NEUROLOGY: follows all commands

## 2018-05-30 NOTE — DISCHARGE NOTE ADULT - CONDITIONS AT DISCHARGE
pt has luna in place since 5/30 and right AC 18G inserted on 5/29.  pt is alert and oriented x 4 with  at side

## 2018-05-30 NOTE — DISCHARGE NOTE ADULT - CARE PLAN
Principal Discharge DX:	Severe anemia  Goal:	Surgical Intervention by urology  Assessment and plan of treatment:	You acute anemia is likely from the bleeding around your left kidney.  For this you are being transferred to [facility]  Secondary Diagnosis:	STAR (acute kidney injury)  Goal:	Resolution  Assessment and plan of treatment:	You were retaining urine in your bladder from a blood clot and it was relieved with placement of a Luna catheter this admission this likely caused the injury to your kidney. Your baseline Creatinine is around 1.05 and on admission it was 2.1 please be evaluated   On transfer the accepting hospital should check Ur Cr & Ur lytes - Calculate FeNA / FeUrea and keep tract of your ins and outs and continue te luna catheter, flushing with 50cc sterile NS as needed.  Secondary Diagnosis:	S/P AVR  Goal:	Discuss with cardiology when to restart anticoagulation  Assessment and plan of treatment:	When you came in with low hemoglobin your anticoagulation (lovenox) was held.  Secondary Diagnosis:	Diabetes  Goal:	maintain BS < 200  Assessment and plan of treatment:	you were hypoglycemia this morning so your insulin was lowered, it should be adjusted according to your  blood sugars.  Secondary Diagnosis:	Urinary tract infection  Goal:	follow up a urine culture  Assessment and plan of treatment:	You had a positive urinalysis (+ nitrites and + leukesterase) and were started on rocephin, please have your doctors at [facility] send a urine culture. A urine culture was sent here but not resulted by the time you were transferred

## 2018-05-30 NOTE — CONSULT NOTE ADULT - SUBJECTIVE AND OBJECTIVE BOX
CHIEF COMPLAINT:Patient is a 77y old  Female who presents with a chief complaint of Acute blood loss anemia (30 May 2018 12:43)    HISTORY OF PRESENT ILLNESS:     Patient is a 77F w Afib, hx of rheumatic fever s/p metallic AVR, bioprosthetic MVR treated with coumadin, multifactorial anemia, ureteral stricture s/ L ureteral stent.  - patient also recently had her metallic mitral valve changed for a prosthetic bovine mitral valve on 3/29/18 after being found to have valvular dysfunction; the aortic metallic valve was left in place (had rheumatic fever as a child requiring MVR, AVR 14 years ago)  - went to see her CT surgeon 2 days ago and had her blood checked which showed a hemoglobin on 4 so the patient received a phone call today and was asked to come to the hospital. Repeat Hb over here is still 4.3, trended up to 6 after transfusion  - patient endorses feeling lightheaded for the past 2 days as well  - has gross hematuria for the past 14 months, sometimes has no bleeding for 2-3 weeks at a time. Sometimes has trouble urinating because of clots. Currently endorses the same gross hematuria.  - patient follows with urology Dr. Pablo in [facility], recently had the L ureteral JJ stent changed last week, was found to have bloody jets from L ureteral orifice. She was scheduled to have a cystoscopy.    Patient denies Chest pain, palpitations, or SOB at rest. She does have SOB on exertion. + lightheaded +dizzy +blurry vision  pt states she was dx with a fib 38 years ago and was treated with coumadin, now lovenox, which is currently held as there might be a retroperitoneal bleed.    - denies fever, chills, dysuria (29 May 2018 19:26)    PAST MEDICAL & SURGICAL HISTORY:  CVA (cerebral vascular accident)  Anemia  Rheumatic fever  Diabetes  Osteoporosis  Mitral valve replaced  Atrial fibrillation: on coumadin at home  Hypertension  History of ureter stent  S/P AVR  H/O mitral valve replacement    FAMILY HISTORY:  No pertinent family history in first degree relatives    Allergies  No Known Allergies  	  Home Medications:  cefTRIAXone: 1 gram(s) intravenous once a day (30 May 2018 12:48)  docusate sodium 100 mg oral capsule: 1 cap(s) orally 3 times a day (30 May 2018 12:48)  insulin glargine: 9 unit(s) subcutaneous once a day (at bedtime) (30 May 2018 12:48)  insulin lispro (concentrated) 200 units/mL subcutaneous solution: 3 unit(s) subcutaneous 3 times a day before meals (30 May 2018 12:48)  sodium chloride 0.9% injectable solution: 75 milliliters/hr (30 May 2018 12:48)    MEDICATIONS  (STANDING):  atorvastatin 20 milliGRAM(s) Oral at bedtime  cefTRIAXone   IVPB      cefTRIAXone   IVPB 1 Gram(s) IV Intermittent every 24 hours  dextrose 5%. 1000 milliLiter(s) (50 mL/Hr) IV Continuous <Continuous>  dextrose 50% Injectable 12.5 Gram(s) IV Push once  dextrose 50% Injectable 25 Gram(s) IV Push once  dextrose 50% Injectable 25 Gram(s) IV Push once  docusate sodium 100 milliGRAM(s) Oral three times a day  ferrous    sulfate 325 milliGRAM(s) Oral three times a day  folic acid 1 milliGRAM(s) Oral daily  insulin glargine Injectable (LANTUS) 9 Unit(s) SubCutaneous at bedtime  insulin lispro (HumaLOG) corrective regimen sliding scale   SubCutaneous three times a day before meals  insulin lispro Injectable (HumaLOG) 3 Unit(s) SubCutaneous three times a day before meals  metoprolol tartrate 25 milliGRAM(s) Oral two times a day  pantoprazole    Tablet 40 milliGRAM(s) Oral before breakfast  raloxifene 60 milliGRAM(s) Oral daily  sodium chloride 0.9%. 1000 milliLiter(s) (75 mL/Hr) IV Continuous <Continuous>    MEDICATIONS  (PRN):  acetaminophen   Tablet 650 milliGRAM(s) Oral every 6 hours PRN mild pain  dextrose 40% Gel 15 Gram(s) Oral once PRN Blood Glucose LESS THAN 70 milliGRAM(s)/deciliter  glucagon  Injectable 1 milliGRAM(s) IntraMuscular once PRN Glucose LESS THAN 70 milligrams/deciliter  senna 2 Tablet(s) Oral at bedtime PRN Constipation    SOCIAL HISTORY:    [  ] active smoker  [x] non smoker  [  ] Etoh  [  ] recreational drugs    REVIEW OF SYSTEMS:  14 point ROS negative except as mentioned above in HPI    PHYSICAL EXAM:  T(C): 36.9 (05-30-18 @ 13:00), Max: 37.3 (05-29-18 @ 15:49)  HR: 80 (05-30-18 @ 13:00) (64 - 84)  BP: 110/55 (05-30-18 @ 13:00) (106/59 - 157/70)  RR: 16 (05-30-18 @ 13:00) (16 - 18)  SpO2: 96% (05-30-18 @ 08:26) (96% - 99%)  Wt(kg): --  I&O's Summary    29 May 2018 07:01  -  30 May 2018 07:00  --------------------------------------------------------  IN: 329 mL / OUT: 800 mL / NET: -471 mL    30 May 2018 07:01  -  30 May 2018 14:21  --------------------------------------------------------  IN: 0 mL / OUT: 150 mL / NET: -150 mL    General Appearance: in NAD	  HEENT: NC, No JVD appreciated  Cardiovascular: irregular rhythm, S1 S2 heard  Respiratory: cta b/l  Gastrointestinal:  Soft, Non-tender, BS +	  Neurologic: No focal deficits, AAOx3  Extremities: ROM wnl, trace pitting edema  Skin: No rashes, + hyperpigmentation in LE, - ecchymoses, No cyanosis  Vascular: Peripheral pulses palpable 2+ bilaterally    LABS:	 	                        6.7    9.56  )-----------( 248      ( 30 May 2018 12:39 )             20.4     05-30    134<L>  |  92<L>  |  35<H>  ----------------------------<  29<LL>  4.6   |  27  |  2.3<H>    Ca    7.7<L>      30 May 2018 05:56  Phos  4.7     05-30  Mg     2.0     05-30    TPro  6.1  /  Alb  3.4<L>  /  TBili  2.1<H>  /  DBili  x   /  AST  17  /  ALT  5   /  AlkPhos  85  05-29    eGFR if Non African American: 20 mL/min/1.73M2 (05-30-18 @ 05:56)  eGFR if Non African American: 19 mL/min/1.73M2 (05-29-18 @ 16:24)      CARDIAC MARKERS:  05-30-18 @ 12:39  TROPONIN-T  <0.01 ng/mL  CKMB  --  CREATINE KINASE  --  05-30-18 @ 05:56  TROPONIN-T  <0.01 ng/mL  CKMB  --  CREATINE KINASE  --  05-29-18 @ 16:24  TROPONIN-T  0.01 ng/mL  CKMB  2.2 ng/mL  CREATINE KINASE  54 U/L    ECG: 	  < from: 12 Lead ECG (05.29.18 @ 17:51) >  Diagnosis Line Atrial fibrillation  Rightward axis  Low voltage QRS  Cannot rule out Anterior infarct , age undetermined  Abnormal ECG    < end of copied text >    < from: US Kidney and Bladder (05.30.18 @ 10:41) >  Impression:    1.  Left moderate hemonephrosis correlating with CT examination.. Left   perinephric fluid.. Left renal stent.    2.  Nondiagnostic examination of the urinary bladder.    < end of copied text >  < from: CT Abdomen and Pelvis No Cont (05.30.18 @ 09:33) >  IMPRESSION:    Laceration of the posterior left renal pelvis, resulting in intra and   extraluminal retroperitoneal hemorrhage.  Proximal pigtail of the   double-J stent is seen posterior to the renal pelvis.  Perinephric   hemorrhage and adjacent retroperitoneal hematoma (measuring 5.6 x 2.4 x   8.1 cm).    Dr. Nieto discussed preliminary findings with KETTY, MARINO POON on   5/30/2018 10:30 AM with readback.    < end of copied text >      PREVIOUS DIAGNOSTIC TESTING:    [ ] Echocardiogram:   < from: Echocardiogram (03.30.18 @ 15:46) >  Interpretation Summary  The left atrium is severely dilated. The right atrium is dilated.There is   a   prosthetic aortic valve. There is mild aortic regurgitation. AI severity   cannot be estimated accurately due to contamination from mitral inflow   signal.   Possible stenosis of the aortic valve - The calculated aortic valve EOA   using   the continuity equation is 0.9 cm2. The calculated aortic valve EOAI is   0.6   cm2/m2. The peak pressure gradient is 63 mmHg. The mean pressure   gradient is   30 mmHg. The dimensionless index (ratio of LVOTvelocity to aortic   velocity)   was calculated to be 0.33.  There is a bioprosthetic mitral valve. There   is   trace mitral regurgitation. The mean pressure gradient is 6 mmHg at a   heart   rate of 64 bpm.  Structurally normal tricuspid valve. There is mild to   moderate tricuspid regurgitation. The mean gradient across the tricuspid   valve   is 3 mmHg  The pulmonary artery systolic pressure is estimated to be 60   mmHg.   This study shows evidence of pulmonary hypertension.  The pulmonic valve   is   not well visualized. There is mild pulmonic regurgitation.  The right   ventricle is normal in size and function.There is moderate concentric   left   ventricular hypertrophy. The left ventricular wall motion is normal. The   left   ventricular ejection fraction is 57%.  No aortic root dilatation.There   is no   pericardial effusion.No prior study for comparison. Recommend MONROE for   better   visualization of the aortic valve if clinically indicated.      < end of copied text >  < from: MONROE w/Probe Placement (03.20.18 @ 11:33) >  Summary:   1. Left ventricular ejection fraction, by visual estimation, is 55 to   60%.   2. Normal global left ventricular systolic function.   3. Mitral prosthesis regurgitation, thrombus and pannus.   4. A bileaflet mechanical valve was noted in the mitral position. Both   leaflets appeared to be mobile. There was a small mobile mass attached   the atrial aspect of the valve. This may denote either a thrombus or a   vegetation. Pannus was noted around the mitral valve.A moderate   paravalvular leak was noted along the posterior aspect of the valve.   5. Moderate tricuspid regurgitation.   6. Mild aortic regurgitation.   7. Mitral valve mean gradient is 12.8 mmHg consistent with severe mitral   stenosis.   8. Rightatrial enlargement.   9. Left atrial enlargement.  10. A mechanical bileaflet aortic valve was visualized. The leaflets were   mobile and appear to be functioning normally. The peak gradient across   the aortic valve was 29 mmHg, although a much higher gradient (67 mmHg)   documented on TTE.    < end of copied text >

## 2018-05-30 NOTE — CONSULT NOTE ADULT - ASSESSMENT
77F w Afib, metallic AVR, 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, multifactorial anemia, ureteral stricture s/ L ureteral stent   - Recently had her metallic MV changed for a prosthetic bovine mitral valve (3/29/18) after being found to have valvular dysfunction  - Aortic metallic valve was left in place (had rheumatic fever as a child requiring MVR, AVR 14 years ago)    Saw CT surgeon 2 days PTP, labs showed Hb of 4, was called and advsied to go to ED. Repeat Hb in ED 4.3. Reports feeling lightheaded for the past 2 days & has gross hematuria for the past 14 months; at time has no bleeding for 2-3 weeks at a time. Sometimes has trouble urinating because of clots. Currently endorses the same gross hematuria.    Follows with urology (Dr. Pablo in [facility]) recently had the L ureteral JJ stent changed 1week PTP, was found to have bloody jets from L ureteral orifice.   She was scheduled to have a cystoscopy to control the bleeding 5/30/18 at 8 am.    Admitted to medicine for symptomatic anemia. Renal consulted for STAR on CKD.    # STAR on CKD IIIa (Baseline 4/2018: Cr 1.1 & GFR 50 & BUN 30s)   - Today Cr 2.3 / BUN 35 / GFR 20  - Check renal sonogram     # Symptomatic Anemia s/p 3 unit PRBC - multifactorial etiology (anticoagulation & Metallic valve & bleeding ureteral stent)  - Hb Baseline 8-9; no lactic acidosis; no tachycardia; no hypotension  - Urine studies - large bili (s/o hemolysis) + RBC (s/o bleeding 2/2 urologic etiology); proteinura >300 (present 3/2018)  - Hold a/c  - CBC BiD  - Urology & H/O following    Attending to see. 77F w Afib, metallic AVR, 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, multifactorial anemia, ureteral stricture s/ L ureteral stent   - Recently had her metallic MV changed for a prosthetic bovine mitral valve (3/29/18) after being found to have valvular dysfunction  - Aortic metallic valve was left in place (had rheumatic fever as a child requiring MVR, AVR 14 years ago)    Saw CT surgeon 2 days PTP, labs showed Hb of 4, was called and advsied to go to ED. Repeat Hb in ED 4.3. Reports feeling lightheaded for the past 2 days & has gross hematuria for the past 14 months; at time has no bleeding for 2-3 weeks at a time. Sometimes has trouble urinating because of clots. Currently endorses the same gross hematuria.    Follows with urology (Dr. Pablo in [facility]) recently had the L ureteral JJ stent changed 1week PTP, was found to have bloody jets from L ureteral orifice.   She was scheduled to have a cystoscopy to control the bleeding 5/30/18 at 8 am.    Admitted to medicine for symptomatic anemia. Renal consulted for STAR on CKD.    CT a/p - Laceration of the posterior left renal pelvis, resulting in intra and extraluminal retroperitoneal hemorrhage. Proximal pigtail of the double-J stent is seen posterior to the renal pelvis. Perinephric hemorrhage and adjacent retroperitoneal hematoma (measuring 5.6 x 2.4 x 8.1 cm).       # STAR on CKD IIIa (Baseline 4/2018: Cr 1.1 & GFR 50 & BUN 30s) - prerenal vs obstructive uropathy  - Initially c/o abdominal pain 2/2 inability to urinate - Bladder sono showed 1L resolved s/p luna placement; luna still in place - bloody fluid in urine bag  - Today Cr 2.3 / BUN 35 / GFR 20  - Avoid hypotension & nephrotoxic agents  - check Ur Cr & Ur lytes - Calculate FeNA / FeUrea  - C/w NS IVF hydration - monitor for volume overload, right pleural effusion detected on CT  - Check I&Os    # Symptomatic Anemia s/p 3 unit PRBC - multifactorial etiology (anticoagulation & Metallic valve & bleeding ureteral stent - wtih retroperitoneal hematoma)  - JJ Left ureteral stent placed ~14mo ago; replaced ~1-2 wks ago - noting bloody jets during the procedure  - Hb Baseline 8-9; no lactic acidosis; no tachycardia; no hypotension  - Urine studies - large bili (s/o hemolysis) + RBC (s/o bleeding 2/2 urologic etiology); proteinura >300 (present 3/2018)  - Hold a/c  - CBC BiD  - Keep T&S  - Transfuse Hb <7  - Urology & H/O following  - Gen surgery eval    Attending to see. 77F w Afib, metallic AVR, 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, multifactorial anemia, ureteral stricture s/ L ureteral stent   - Recently had her metallic MV changed for a prosthetic bovine mitral valve (3/29/18) after being found to have valvular dysfunction  - Aortic metallic valve was left in place (had rheumatic fever as a child requiring MVR, AVR 14 years ago)    Saw CT surgeon 2 days PTP, labs showed Hb of 4, was called and advsied to go to ED. Repeat Hb in ED 4.3. Reports feeling lightheaded for the past 2 days & has gross hematuria for the past 14 months; at time has no bleeding for 2-3 weeks at a time. Sometimes has trouble urinating because of clots. Currently endorses the same gross hematuria.    Follows with urology (Dr. Pablo in [facility]) recently had the L ureteral JJ stent changed 1week PTP, was found to have bloody jets from L ureteral orifice.   She was scheduled to have a cystoscopy to control the bleeding 5/30/18 at 8 am.    Admitted to medicine for symptomatic anemia. Renal consulted for STAR on CKD.    CT a/p - Laceration of the posterior left renal pelvis, resulting in intra and extraluminal retroperitoneal hemorrhage. Proximal pigtail of the double-J stent is seen posterior to the renal pelvis. Perinephric hemorrhage and adjacent retroperitoneal hematoma (measuring 5.6 x 2.4 x 8.1 cm).       # STAR on CKD IIIa (Baseline 4/2018: Cr 1.1 & GFR 50 & BUN 30s) - prerenal vs obstructive uropathy  - Initially c/o abdominal pain 2/2 inability to urinate - Bladder sono showed 1L resolved s/p luna placement; luna still in place - bloody fluid in urine bag  - Today Cr 2.3 / BUN 35 / GFR 20  - Avoid hypotension & nephrotoxic agents  - check Ur Cr & Ur lytes - Calculate FeNA / FeUrea  - C/w NS IVF hydration - monitor for volume overload, right pleural effusion detected on CT  - Check I&Os    # Symptomatic Anemia s/p 3 unit PRBC - multifactorial etiology (anticoagulation & Metallic valve & bleeding ureteral stent - wtih retroperitoneal hematoma)  - JJ Left ureteral stent placed ~14mo ago; replaced ~1-2 wks ago - noting bloody jets during the procedure  - Hb Baseline 8-9; no lactic acidosis; no tachycardia; no hypotension  - Urine studies - large bili (s/o hemolysis) + RBC (s/o bleeding 2/2 urologic etiology); proteinura >300 (present 3/2018)  - Hold a/c  - CBC BiD  - Keep T&S  - Consider Cystoscopy to evaluate for bladder Ca  - Transfuse Hb <7  - Urology & H/O following  - Gen surgery eval    Attending to see.

## 2018-05-30 NOTE — PROGRESS NOTE ADULT - ASSESSMENT
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    1. 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 Hematuria:   --Urology evaluation  --Hold lovenox  --Consider luna catheter  --Two 18 G peripheral IVs  --H/H every 8 hrs    3. Metallic valve/afib:  --Hold A/c for now  --Cardiology c/s for Dr Ochoa

## 2018-05-30 NOTE — PROGRESS NOTE ADULT - ASSESSMENT
77-year-old female /w a-fib on coumadin, metal AVR, bovine prosthetic MVR, L ureteral stricture s/p L ureteral stent, CVA, DM sent in by CT surgeon after Hb was noted to be 4.3 during routine f/u for recent replacement of metal MVR with prosthetic MVR. Currently admitted with severe anemia 2/2 urogenital bleed. Patient underwent replacement of L ureteral stent one week ago by Dr. Waddell (urologist) @ [facility], at which point bloody jet was noted from L ureteral orifice. Outpatient cystoscopy was originally planned for 8am this morning. Patient will possibly be transferred to [facility] for cystoscopy, pending discussion b/t our Urology service and Dr. Waddell @ [facility].    # Acute on chronic anemia 2/2 urogenital hemorrhage  - most likely cause of anemia this time is the bleeding from the urethral orifice  - seen by hematology, is known to them for many years so will not pursue any aggressive workup for the time being  - s/p Xfusion 3U pRBC, AM Hb = 7.3  - continue to monitor Hb  - Xfuse PRN  - Urology will d/w Dr. Waddell regarding possible Xger  - f/u UCx  - f/u Renal Sono  - will hold coumadin for the time being  - c/w folic acid  - troponins .02->.03    # Afib on coumadin  - hold coumadin  - continue with metoprolol  - Cardio consult ordered re: no A/C /w non-native valves    # UA positive  - c/w IV Rocephin  - f/u UCx    # DM  - FS monitoring  - patient had low fingersticks this AM (29, 49), resolved with juice (149).  - Will adjust insulin    # STAR on CKD  - likely pre-renal and/or obstruction  - s/p Xfusion 3 U pRBC  - c/w NS @ 77  - hold lasix  - Am BMP: WNL, mild hypoNa (134), Cr 2.4->2.3  - continue to trend Cr  - check ultrasound    # HTN  - hold lasix, metoprolol    # DVT ppx  - hold pharmacologic prophylaxis  - sequential compression device    # dispo  - [place] Xfer to [facility] pending coordination of care b/t urology and Dr. Waddell

## 2018-05-30 NOTE — PROGRESS NOTE ADULT - SUBJECTIVE AND OBJECTIVE BOX
SUBJECTIVE:  Today is hospital day 1d.    Overnight Events:   - 900cc urinary retention relieved by luna catheter placement  - Low fingersticks early this AM (, 49), patient received juice, f/u fingerstick @ 8:15am = 149    PAST MEDICAL & SURGICAL HISTORY  CVA (cerebral vascular accident)  Anemia  Rheumatic fever  Diabetes  Osteoporosis  Mitral valve replaced  Atrial fibrillation: on coumadin at home  Hypertension  History of ureter stent  S/P AVR  H/O mitral valve replacement    SOCIAL HISTORY:    [ ] Smoker    ALLERGIES:  No Known Allergies      MEDICATIONS:  STANDING MEDICATIONS  atorvastatin 20 milliGRAM(s) Oral at bedtime  cefTRIAXone   IVPB      cefTRIAXone   IVPB 1 Gram(s) IV Intermittent every 24 hours  dextrose 5%. 1000 milliLiter(s) IV Continuous <Continuous>  dextrose 50% Injectable 12.5 Gram(s) IV Push once  dextrose 50% Injectable 25 Gram(s) IV Push once  dextrose 50% Injectable 25 Gram(s) IV Push once  docusate sodium 100 milliGRAM(s) Oral three times a day  ferrous    sulfate 325 milliGRAM(s) Oral three times a day  folic acid 1 milliGRAM(s) Oral daily  insulin glargine Injectable (LANTUS) 12 Unit(s) SubCutaneous at bedtime  insulin lispro (HumaLOG) corrective regimen sliding scale   SubCutaneous three times a day before meals  insulin lispro Injectable (HumaLOG) 4 Unit(s) SubCutaneous three times a day before meals  metoprolol tartrate 25 milliGRAM(s) Oral two times a day  pantoprazole    Tablet 40 milliGRAM(s) Oral before breakfast  raloxifene 60 milliGRAM(s) Oral daily  sodium chloride 0.9%. 1000 milliLiter(s) IV Continuous <Continuous>      PRN MEDICATIONS  acetaminophen   Tablet 650 milliGRAM(s) Oral every 6 hours PRN  dextrose 40% Gel 15 Gram(s) Oral once PRN  glucagon  Injectable 1 milliGRAM(s) IntraMuscular once PRN  senna 2 Tablet(s) Oral at bedtime PRN    VITALS:   T(F): , Max: 99.2 (18 @ 15:49)  HR:  (64 - 84)  BP:  (106/59 - 157/70)  SpO2:  (96% - 99%)    18 @ 07:01  -  18 @ 07:00  --------------------------------------------------------  IN: 329 mL / OUT: 800 mL / NET: -471 mL        PHYSICAL EXAM:  GEN: Tired-appearing, underweight  LUNGS: CTABL  HEART: Irregular rate and rhythm, S1/S2 /w metallic click, ?Grade II systolic murmurs @ 2nd R ICS & LLSB  ABD: soft, non-tender, non-distended  EXT: 2+ edema of LE b/l  NEURO: AAOX3  Height (cm): 157.48 (18 @ 22:04)  Weight (kg): 66.9 (18 @ 22:04)  BMI (kg/m2): 27 (18 @ 22:04)  BSA (m2): 1.68 (18 @ 22:04)  LABS:                        7.3    10.35 )-----------( 249      ( 30 May 2018 05:56 )             22.1     0530    134<L>  |  92<L>  |  35<H>  ----------------------------<  29<LL>  4.6   |  27  |  2.3<H>    Ca    7.7<L>      30 May 2018 05:56  Phos  4.7       Mg     2.0         TPro  6.1  /  Alb  3.4<L>  /  TBili  2.1<H>  /  DBili  x   /  AST  17  /  ALT  5   /  AlkPhos  85      PT/INR - ( 29 May 2018 16:24 )   PT: 12.40 sec;   INR: 1.14 ratio         PTT - ( 29 May 2018 16:24 )  PTT:28.8 sec  Urinalysis Basic - ( 29 May 2018 17:32 )    Color: Red / Appearance: Cloudy / S.020 / pH: x  Gluc: x / Ketone: 40  / Bili: Large / Urobili: 1.0 mg/dL   Blood: x / Protein: >=300 mg/dL / Nitrite: Positive   Leuk Esterase: Moderate / RBC: >50 /HPF / WBC >50 /HPF   Sq Epi: x / Non Sq Epi: Occasional /HPF / Bacteria: TNTC /HPF    Troponin T, Serum: <0.01 ng/mL (18 @ 05:56)  Troponin T, Serum: 0.01 ng/mL (18 @ 16:24)  Creatine Kinase, Serum: 54 U/L (18 @ 16:24)      CARDIAC MARKERS ( 30 May 2018 05:56 )  x     / <0.01 ng/mL / x     / x     / x      CARDIAC MARKERS ( 29 May 2018 16:24 )  x     / 0.01 ng/mL / 54 U/L / x     / 2.2 ng/mL      RADIOLOGY: SUBJECTIVE:  Today is hospital day 1d.    Overnight Events:   - 900cc urinary retention relieved by luna catheter placement  - Low fingersticks early this AM (, 49), patient received juice, f/u fingerstick @ 8:15am = 149    PAST MEDICAL & SURGICAL HISTORY  CVA (cerebral vascular accident)  Anemia  Rheumatic fever  Diabetes  Osteoporosis  Mitral valve replaced  Atrial fibrillation: on coumadin at home  Hypertension  History of ureter stent  S/P AVR  H/O mitral valve replacement    SOCIAL HISTORY:    [ ] Smoker    ALLERGIES:  No Known Allergies      MEDICATIONS:  STANDING MEDICATIONS  atorvastatin 20 milliGRAM(s) Oral at bedtime  cefTRIAXone   IVPB      cefTRIAXone   IVPB 1 Gram(s) IV Intermittent every 24 hours  dextrose 5%. 1000 milliLiter(s) IV Continuous <Continuous>  dextrose 50% Injectable 12.5 Gram(s) IV Push once  dextrose 50% Injectable 25 Gram(s) IV Push once  dextrose 50% Injectable 25 Gram(s) IV Push once  docusate sodium 100 milliGRAM(s) Oral three times a day  ferrous    sulfate 325 milliGRAM(s) Oral three times a day  folic acid 1 milliGRAM(s) Oral daily  insulin glargine Injectable (LANTUS) 12 Unit(s) SubCutaneous at bedtime  insulin lispro (HumaLOG) corrective regimen sliding scale   SubCutaneous three times a day before meals  insulin lispro Injectable (HumaLOG) 4 Unit(s) SubCutaneous three times a day before meals  metoprolol tartrate 25 milliGRAM(s) Oral two times a day  pantoprazole    Tablet 40 milliGRAM(s) Oral before breakfast  raloxifene 60 milliGRAM(s) Oral daily  sodium chloride 0.9%. 1000 milliLiter(s) IV Continuous <Continuous>      PRN MEDICATIONS  acetaminophen   Tablet 650 milliGRAM(s) Oral every 6 hours PRN  dextrose 40% Gel 15 Gram(s) Oral once PRN  glucagon  Injectable 1 milliGRAM(s) IntraMuscular once PRN  senna 2 Tablet(s) Oral at bedtime PRN    VITALS:   T(F): , Max: 99.2 (18 @ 15:49)  HR:  (64 - 84)  BP:  (106/59 - 157/70)  SpO2:  (96% - 99%)    18 @ 07:01  -  18 @ 07:00  --------------------------------------------------------  IN: 329 mL / OUT: 800 mL / NET: -471 mL        PHYSICAL EXAM:  GEN: Tired-appearing, underweight  LUNGS: CTABL  HEART: Irregular rate and rhythm, S1/S2 /w metallic click, ?Grade II systolic murmurs @ 2nd R ICS & LLSB  ABD: soft, non-tender, non-distended. Luna in place draining grossly bloody urine.  EXT: 2+ edema of LE b/l  NEURO: AAOX3  Height (cm): 157.48 (18 @ 22:04)  Weight (kg): 66.9 (18 @ 22:04)  BMI (kg/m2): 27 (18 @ 22:04)  BSA (m2): 1.68 (18 @ 22:04)  LABS:                        7.3    10.35 )-----------( 249      ( 30 May 2018 05:56 )             22.1     05-30    134<L>  |  92<L>  |  35<H>  ----------------------------<  29<LL>  4.6   |  27  |  2.3<H>    Ca    7.7<L>      30 May 2018 05:56  Phos  4.7       Mg     2.0         TPro  6.1  /  Alb  3.4<L>  /  TBili  2.1<H>  /  DBili  x   /  AST  17  /  ALT  5   /  AlkPhos  85  05-29    PT/INR - ( 29 May 2018 16:24 )   PT: 12.40 sec;   INR: 1.14 ratio         PTT - ( 29 May 2018 16:24 )  PTT:28.8 sec  Urinalysis Basic - ( 29 May 2018 17:32 )    Color: Red / Appearance: Cloudy / S.020 / pH: x  Gluc: x / Ketone: 40  / Bili: Large / Urobili: 1.0 mg/dL   Blood: x / Protein: >=300 mg/dL / Nitrite: Positive   Leuk Esterase: Moderate / RBC: >50 /HPF / WBC >50 /HPF   Sq Epi: x / Non Sq Epi: Occasional /HPF / Bacteria: TNTC /HPF    Troponin T, Serum: <0.01 ng/mL (18 @ 05:56)  Troponin T, Serum: 0.01 ng/mL (18 @ 16:24)  Creatine Kinase, Serum: 54 U/L (18 @ 16:24)      CARDIAC MARKERS ( 30 May 2018 05:56 )  x     / <0.01 ng/mL / x     / x     / x      CARDIAC MARKERS ( 29 May 2018 16:24 )  x     / 0.01 ng/mL / 54 U/L / x     / 2.2 ng/mL      RADIOLOGY:

## 2018-05-30 NOTE — DISCHARGE NOTE ADULT - PLAN OF CARE
Surgical Intervention by urology You acute anemia is likely from the bleeding around your left kidney.  For this you are being transferred to [facility] You were retaining urine in your bladder from a blood clot and it was relieved with placement of a Luna catheter this admission this likely caused the injury to your kidney. Your baseline Creatinine is around 1.05 and on admission it was 2.1 please be evaluated   On transfer the accepting hospital should check Ur Cr & Ur lytes - Calculate FeNA / FeUrea and keep tract of your ins and outs and continue te luna catheter, flushing with 50cc sterile NS as needed. Discuss with cardiology when to restart anticoagulation When you came in with low hemoglobin your anticoagulation (lovenox) was held. maintain BS < 200 you were hypoglycemia this morning so your insulin was lowered, it should be adjusted according to your  blood sugars. follow up a urine culture You had a positive urinalysis (+ nitrites and + leukesterase) and were started on rocephin, please have your doctors at [facility] send a urine culture. A urine culture was sent here but not resulted by the time you were transferred

## 2018-05-30 NOTE — DISCHARGE NOTE ADULT - ADDITIONAL INSTRUCTIONS
All Medical Records you would like are available to you at:  [facility]: [phone]    Please bring your medications (bottles and all) with your to your doctors appointments over the next month

## 2018-05-30 NOTE — PROGRESS NOTE ADULT - SUBJECTIVE AND OBJECTIVE BOX
Pt seen and examined at bedside.    Vital Signs Last 24 Hrs  T(C): 36.2 (30 May 2018 05:39), Max: 37.3 (29 May 2018 15:49)  T(F): 97.2 (30 May 2018 05:39), Max: 99.2 (29 May 2018 15:49)  HR: 84 (30 May 2018 05:39) (64 - 84)  BP: 157/70 (30 May 2018 05:39) (106/59 - 157/70)  BP(mean): --  RR: 18 (30 May 2018 05:39) (18 - 18)  SpO2: 96% (30 May 2018 08:26) (96% - 99%)    MEDICATIONS  (STANDING):  atorvastatin 20 milliGRAM(s) Oral at bedtime  cefTRIAXone   IVPB      cefTRIAXone   IVPB 1 Gram(s) IV Intermittent every 24 hours  dextrose 5%. 1000 milliLiter(s) (50 mL/Hr) IV Continuous <Continuous>  dextrose 50% Injectable 12.5 Gram(s) IV Push once  dextrose 50% Injectable 25 Gram(s) IV Push once  dextrose 50% Injectable 25 Gram(s) IV Push once  docusate sodium 100 milliGRAM(s) Oral three times a day  ferrous    sulfate 325 milliGRAM(s) Oral three times a day  folic acid 1 milliGRAM(s) Oral daily  insulin glargine Injectable (LANTUS) 12 Unit(s) SubCutaneous at bedtime  insulin lispro (HumaLOG) corrective regimen sliding scale   SubCutaneous three times a day before meals  insulin lispro Injectable (HumaLOG) 4 Unit(s) SubCutaneous three times a day before meals  metoprolol tartrate 25 milliGRAM(s) Oral two times a day  pantoprazole    Tablet 40 milliGRAM(s) Oral before breakfast  raloxifene 60 milliGRAM(s) Oral daily  sodium chloride 0.9%. 1000 milliLiter(s) (75 mL/Hr) IV Continuous <Continuous>    MEDICATIONS  (PRN):  acetaminophen   Tablet 650 milliGRAM(s) Oral every 6 hours PRN mild pain  dextrose 40% Gel 15 Gram(s) Oral once PRN Blood Glucose LESS THAN 70 milliGRAM(s)/deciliter  glucagon  Injectable 1 milliGRAM(s) IntraMuscular once PRN Glucose LESS THAN 70 milligrams/deciliter  senna 2 Tablet(s) Oral at bedtime PRN Constipation    Labs:                        7.3    10.35 )-----------( 249      ( 30 May 2018 05:56 )             22.1             05-30    134<L>  |  92<L>  |  35<H>  ----------------------------<  29<LL>  4.6   |  27  |  2.3<H>    Ca    7.7<L>      30 May 2018 05:56  Phos  4.7       Mg     2.0         TPro  6.1  /  Alb  3.4<L>  /  TBili  2.1<H>  /  DBili  x   /  AST  17  /  ALT  5   /  AlkPhos  85      LIVER FUNCTIONS - ( 29 May 2018 16:24 )  Alb: 3.4 g/dL / Pro: 6.1 g/dL / ALK PHOS: 85 U/L / ALT: 5 U/L / AST: 17 U/L / GGT: x                 PT/INR - ( 29 May 2018 16:24 )   PT: 12.40 sec;   INR: 1.14 ratio         PTT - ( 29 May 2018 16:24 )  PTT:28.8 sec  CARDIAC MARKERS ( 30 May 2018 05:56 )  x     / <0.01 ng/mL / x     / x     / x      CARDIAC MARKERS ( 29 May 2018 16:24 )  x     / 0.01 ng/mL / 54 U/L / x     / 2.2 ng/mL        Urinalysis Basic - ( 29 May 2018 17:32 )    Color: Red / Appearance: Cloudy / S.020 / pH: x  Gluc: x / Ketone: 40  / Bili: Large / Urobili: 1.0 mg/dL   Blood: x / Protein: >=300 mg/dL / Nitrite: Positive   Leuk Esterase: Moderate / RBC: >50 /HPF / WBC >50 /HPF   Sq Epi: x / Non Sq Epi: Occasional /HPF / Bacteria: TNTC /HPF

## 2018-05-30 NOTE — CONSULT NOTE ADULT - ASSESSMENT
Patient is a 77F w Afib, hx of rheumatic fever s/p metallic AVR, bioprosthetic MVR treated with coumadin, multifactorial anemia, ureteral stricture s/ L ureteral stent.    A Fib  Prosthetic Atrial Valve  Prosthetic Mitral Valve  Acute on chronic Anemia   STAR    Keep Hg >7  Trops neg x3  Continue metoprolol Patient is a 77F w Afib, hx of rheumatic fever s/p metallic AVR, bioprosthetic MVR treated with coumadin, multifactorial anemia, ureteral stricture s/ L ureteral stent.    A Fib  Prosthetic Atrial Valve  Prosthetic Mitral Valve  Acute on chronic Anemia   STAR    Keep Hg >7  Trops neg x3  Continue metoprolol   Restart coumadin due to valvular pathology

## 2018-05-30 NOTE — CONSULT NOTE ADULT - ATTENDING COMMENTS
Pt was transferred to another hospital before being seen by me.Consult could not be done.
spoke with pt / her son /  and reviewed photo from recent cysto by dr. li at [facility]-it sowed bloody jet from the left orifice and se was scheduled fro ureteroscopy this week    right now ? hemorrhagic cystitis as since the recent cysto change of JJ she has had bloody drainage from the genitalia ("like a virigin"), I assume he meant menses  rec KUB, Urine analysis / C&S if positive treat uti  once otherwise stable, family would like to continue care with Dr. Li and if not septic I see no reason they cannot be d/c (If otherwise stable) and go to [facility]
oN was admitted with severe anemia 2/2 urogenital bleeding, ? source. She was also noted to be in STAR likely secondary to obstructive uropathy. She was managed with Lovenox over the past week or so 2/2 recent urological procedure. Given renal failure Lovenox will not be readily cleared and bleeding risk will remain high for the next 48-72 hours. Please obtain Urology consult. Consider Dr. Vazquez, patient is known to Dr. Pablo in [place].  Please also obtain Cardiology consult for management of AC in the setting of Metallic valve in aortic position. Patient is known to Dr. Cutler.  Transfuse at least 2 units of PRBC.  Will follow.

## 2018-05-30 NOTE — DISCHARGE NOTE ADULT - PATIENT PORTAL LINK FT
You can access the [facility] Patient Portal, offered by [facility], by registering with the following website: [email or web address]

## 2018-05-30 NOTE — DISCHARGE NOTE ADULT - MEDICATION SUMMARY - MEDICATIONS TO TAKE
I will START or STAY ON the medications listed below when I get home from the hospital:    oxyCODONE-acetaminophen 5 mg-325 mg oral tablet  -- 1 tab(s) by mouth every 4 hours, As needed, for severe Pain (4 - 6) MDD:6 tabs  -- Indication: For Pain    insulin lispro (concentrated) 200 units/mL subcutaneous solution  -- 3 unit(s) subcutaneous 3 times a day before meals  -- Indication: For Diabetes    insulin glargine  -- 9 unit(s) subcutaneous once a day (at bedtime)  -- Indication: For Diabetes    pravastatin 40 mg oral tablet  -- 1 tab(s) by mouth once a day  -- Indication: For Cholesterol    Evista 60 mg oral tablet  -- 1 tab(s) by mouth once a day  -- Indication: For Breast cancer    metoprolol tartrate 25 mg oral tablet  -- 0.5 tab(s) by mouth every 12 hours   -- It is very important that you take or use this exactly as directed.  Do not skip doses or discontinue unless directed by your doctor.  May cause drowsiness.  Alcohol may intensify this effect.  Use care when operating dangerous machinery.  Some non-prescription drugs may aggravate your condition.  Read all labels carefully.  If a warning appears, check with your doctor before taking.  Take with food or milk.  This drug may impair the ability to drive or operate machinery.  Use care until you become familiar with its effects.    -- Indication: For Heart protection    cefTRIAXone  -- 1 gram(s) intravenous once a day  -- Indication: For Positive Urinalysis     FeroSul 325 mg (65 mg elemental iron) oral tablet  -- 1 tab(s) by mouth 3 times a day   -- Check with your doctor before becoming pregnant.  Do not chew, break, or crush.  May discolor urine or feces.    -- Indication: For Iron    docusate sodium 100 mg oral capsule  -- 1 cap(s) by mouth 3 times a day, for constipation while on Iron.  Hold for loose/frequent stools.   -- Indication: For Stool Softeners    sodium chloride 0.9% injectable solution  -- 75 milliliters/hr to be started upon arrival at receiveing facility  -- Indication: For STAR (acute kidney injury) Anemia    pantoprazole 40 mg oral delayed release tablet  -- 1 tab(s) by mouth once a day (before a meal)  -- Indication: For Stomach protecton    folic acid 1 mg oral tablet  -- 1 tab(s) by mouth once a day  -- Indication: For Anemia

## 2018-05-31 DIAGNOSIS — Z95.4 PRESENCE OF OTHER HEART-VALVE REPLACEMENT: ICD-10-CM

## 2018-06-04 DIAGNOSIS — N17.9 ACUTE KIDNEY FAILURE, UNSPECIFIED: ICD-10-CM

## 2018-06-04 DIAGNOSIS — Z79.84 LONG TERM (CURRENT) USE OF ORAL HYPOGLYCEMIC DRUGS: ICD-10-CM

## 2018-06-04 DIAGNOSIS — E11.649 TYPE 2 DIABETES MELLITUS WITH HYPOGLYCEMIA WITHOUT COMA: ICD-10-CM

## 2018-06-04 DIAGNOSIS — M81.0 AGE-RELATED OSTEOPOROSIS WITHOUT CURRENT PATHOLOGICAL FRACTURE: ICD-10-CM

## 2018-06-04 DIAGNOSIS — Z86.79 PERSONAL HISTORY OF OTHER DISEASES OF THE CIRCULATORY SYSTEM: ICD-10-CM

## 2018-06-04 DIAGNOSIS — D62 ACUTE POSTHEMORRHAGIC ANEMIA: ICD-10-CM

## 2018-06-04 DIAGNOSIS — Z95.3 PRESENCE OF XENOGENIC HEART VALVE: ICD-10-CM

## 2018-06-04 DIAGNOSIS — I12.9 HYPERTENSIVE CHRONIC KIDNEY DISEASE WITH STAGE 1 THROUGH STAGE 4 CHRONIC KIDNEY DISEASE, OR UNSPECIFIED CHRONIC KIDNEY DISEASE: ICD-10-CM

## 2018-06-04 DIAGNOSIS — D56.3 THALASSEMIA MINOR: ICD-10-CM

## 2018-06-04 DIAGNOSIS — N39.0 URINARY TRACT INFECTION, SITE NOT SPECIFIED: ICD-10-CM

## 2018-06-04 DIAGNOSIS — N18.3 CHRONIC KIDNEY DISEASE, STAGE 3 (MODERATE): ICD-10-CM

## 2018-06-04 DIAGNOSIS — S36.892A CONTUSION OF OTHER INTRA-ABDOMINAL ORGANS, INITIAL ENCOUNTER: ICD-10-CM

## 2018-06-04 DIAGNOSIS — Z79.01 LONG TERM (CURRENT) USE OF ANTICOAGULANTS: ICD-10-CM

## 2018-06-04 DIAGNOSIS — R31.0 GROSS HEMATURIA: ICD-10-CM

## 2018-06-04 DIAGNOSIS — Z85.3 PERSONAL HISTORY OF MALIGNANT NEOPLASM OF BREAST: ICD-10-CM

## 2018-06-04 DIAGNOSIS — I48.91 UNSPECIFIED ATRIAL FIBRILLATION: ICD-10-CM

## 2018-06-04 DIAGNOSIS — N36.8 OTHER SPECIFIED DISORDERS OF URETHRA: ICD-10-CM

## 2018-06-04 DIAGNOSIS — D64.9 ANEMIA, UNSPECIFIED: ICD-10-CM
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
## 2018-06-04 DIAGNOSIS — E53.8 DEFICIENCY OF OTHER SPECIFIED B GROUP VITAMINS: ICD-10-CM

## 2018-06-05 ENCOUNTER — RX RENEWAL (OUTPATIENT)
Age: 78
End: 2018-06-05

## 2018-06-06 ENCOUNTER — APPOINTMENT (OUTPATIENT)
Dept: INFUSION THERAPY | Facility: CLINIC | Age: 78
End: 2018-06-06

## 2018-06-11 ENCOUNTER — APPOINTMENT (OUTPATIENT)
Dept: INFUSION THERAPY | Facility: CLINIC | Age: 78
End: 2018-06-11

## 2018-06-11 ENCOUNTER — LABORATORY RESULT (OUTPATIENT)
Age: 78
End: 2018-06-11

## 2018-06-11 RX ORDER — PREGABALIN 225 MG/1
1000 CAPSULE ORAL ONCE
Qty: 0 | Refills: 0 | Status: COMPLETED | OUTPATIENT
Start: 2018-06-11 | End: 2018-06-11

## 2018-06-11 RX ORDER — ERYTHROPOIETIN 10000 [IU]/ML
40000 INJECTION, SOLUTION INTRAVENOUS; SUBCUTANEOUS ONCE
Qty: 0 | Refills: 0 | Status: COMPLETED | OUTPATIENT
Start: 2018-06-11 | End: 2018-06-11

## 2018-06-11 RX ADMIN — PREGABALIN 1000 MICROGRAM(S): 225 CAPSULE ORAL at 14:02

## 2018-06-11 RX ADMIN — ERYTHROPOIETIN 40000 UNIT(S): 10000 INJECTION, SOLUTION INTRAVENOUS; SUBCUTANEOUS at 14:46

## 2018-06-14 ENCOUNTER — RX RENEWAL (OUTPATIENT)
Age: 78
End: 2018-06-14

## 2018-06-19 ENCOUNTER — APPOINTMENT (OUTPATIENT)
Dept: HEMATOLOGY ONCOLOGY | Facility: CLINIC | Age: 78
End: 2018-06-19

## 2018-06-19 ENCOUNTER — APPOINTMENT (OUTPATIENT)
Dept: INFUSION THERAPY | Facility: CLINIC | Age: 78
End: 2018-06-19

## 2018-06-22 ENCOUNTER — LABORATORY RESULT (OUTPATIENT)
Age: 78
End: 2018-06-22

## 2018-06-22 ENCOUNTER — APPOINTMENT (OUTPATIENT)
Dept: HEMATOLOGY ONCOLOGY | Facility: CLINIC | Age: 78
End: 2018-06-22

## 2018-06-22 ENCOUNTER — APPOINTMENT (OUTPATIENT)
Dept: INFUSION THERAPY | Facility: CLINIC | Age: 78
End: 2018-06-22

## 2018-06-22 VITALS
BODY MASS INDEX: 24.29 KG/M2 | WEIGHT: 132 LBS | DIASTOLIC BLOOD PRESSURE: 86 MMHG | HEART RATE: 106 BPM | SYSTOLIC BLOOD PRESSURE: 115 MMHG | HEIGHT: 62 IN | TEMPERATURE: 97.9 F

## 2018-06-25 ENCOUNTER — RX RENEWAL (OUTPATIENT)
Age: 78
End: 2018-06-25

## 2018-06-25 ENCOUNTER — APPOINTMENT (OUTPATIENT)
Dept: HEART AND VASCULAR | Facility: CLINIC | Age: 78
End: 2018-06-25
Payer: MEDICARE

## 2018-06-25 VITALS
HEIGHT: 61 IN | WEIGHT: 131 LBS | SYSTOLIC BLOOD PRESSURE: 130 MMHG | BODY MASS INDEX: 24.73 KG/M2 | HEART RATE: 126 BPM | RESPIRATION RATE: 15 BRPM | DIASTOLIC BLOOD PRESSURE: 80 MMHG

## 2018-06-25 DIAGNOSIS — M81.0 AGE-RELATED OSTEOPOROSIS W/OUT CURRENT PATHOLOGICAL FRACTURE: ICD-10-CM

## 2018-06-25 DIAGNOSIS — E11.9 TYPE 2 DIABETES MELLITUS W/OUT COMPLICATIONS: ICD-10-CM

## 2018-06-25 LAB
HCT VFR BLD CALC: 28.6 %
HCT VFR BLD CALC: 29 %
HGB BLD-MCNC: 8.6 G/DL
HGB BLD-MCNC: 8.8 G/DL
INR PPP: 2.2 RATIO
MCHC RBC-ENTMCNC: 24 PG
MCHC RBC-ENTMCNC: 25.2 PG
MCHC RBC-ENTMCNC: 30.1 G/DL
MCHC RBC-ENTMCNC: 30.3 G/DL
MCV RBC AUTO: 79.7 FL
MCV RBC AUTO: 83.1 FL
PLATELET # BLD AUTO: 178 K/UL
PLATELET # BLD AUTO: 250 K/UL
PMV BLD: 11.4 FL
PMV BLD: 11.6 FL
RBC # BLD: 3.49 M/UL
RBC # BLD: 3.59 M/UL
RBC # FLD: 19.8 %
RBC # FLD: 19.9 %
WBC # FLD AUTO: 4.7 K/UL
WBC # FLD AUTO: 5.38 K/UL

## 2018-06-25 PROCEDURE — 85610 PROTHROMBIN TIME: CPT | Mod: QW

## 2018-06-25 PROCEDURE — 99215 OFFICE O/P EST HI 40 MIN: CPT | Mod: 25

## 2018-06-25 PROCEDURE — 93000 ELECTROCARDIOGRAM COMPLETE: CPT

## 2018-06-25 RX ORDER — FOLIC ACID 1 MG/1
1 TABLET ORAL DAILY
Qty: 30 | Refills: 5 | Status: ACTIVE | COMMUNITY

## 2018-06-25 RX ORDER — FUROSEMIDE 40 MG/1
40 TABLET ORAL DAILY
Qty: 30 | Refills: 5 | Status: ACTIVE | COMMUNITY
Start: 1900-01-01 | End: 1900-01-01

## 2018-06-25 RX ORDER — PRAVASTATIN SODIUM 40 MG/1
40 TABLET ORAL
Qty: 30 | Refills: 5 | Status: ACTIVE | COMMUNITY

## 2018-06-25 RX ORDER — WARFARIN 5 MG/1
5 TABLET ORAL DAILY
Qty: 30 | Refills: 5 | Status: ACTIVE | COMMUNITY
Start: 1900-01-01 | End: 1900-01-01

## 2018-06-25 RX ORDER — RALOXIFENE HYDROCHLORIDE 60 MG/1
60 TABLET, FILM COATED ORAL DAILY
Qty: 30 | Refills: 5 | Status: ACTIVE | COMMUNITY
Start: 1900-01-01 | End: 1900-01-01

## 2018-06-26 ENCOUNTER — APPOINTMENT (OUTPATIENT)
Dept: INFUSION THERAPY | Facility: CLINIC | Age: 78
End: 2018-06-26

## 2018-06-26 LAB
BASOPHILS # BLD AUTO: 0.03 K/UL
BASOPHILS NFR BLD AUTO: 0.6 %
EOSINOPHIL # BLD AUTO: 0.14 K/UL
EOSINOPHIL NFR BLD AUTO: 2.9 %
HCT VFR BLD CALC: 28.9 %
HGB BLD-MCNC: 8.2 G/DL
IMM GRANULOCYTES NFR BLD AUTO: 0.2 %
LYMPHOCYTES # BLD AUTO: 0.75 K/UL
LYMPHOCYTES NFR BLD AUTO: 15.7 %
MAN DIFF?: NORMAL
MCHC RBC-ENTMCNC: 22.9 PG
MCHC RBC-ENTMCNC: 28.4 GM/DL
MCV RBC AUTO: 80.7 FL
MONOCYTES # BLD AUTO: 0.21 K/UL
MONOCYTES NFR BLD AUTO: 4.4 %
NEUTROPHILS # BLD AUTO: 3.65 K/UL
NEUTROPHILS NFR BLD AUTO: 76.2 %
PLATELET # BLD AUTO: 193 K/UL
RBC # BLD: 3.58 M/UL
RBC # FLD: 20.8 %
WBC # FLD AUTO: 4.79 K/UL

## 2018-06-26 RX ORDER — CHLORHEXIDINE GLUCONATE 4 %
325 (65 FE) LIQUID (ML) TOPICAL DAILY
Qty: 30 | Refills: 5 | Status: ACTIVE | COMMUNITY
Start: 2018-06-26 | End: 1900-01-01

## 2018-06-27 ENCOUNTER — RX RENEWAL (OUTPATIENT)
Age: 78
End: 2018-06-27

## 2018-06-27 ENCOUNTER — APPOINTMENT (OUTPATIENT)
Dept: HEMATOLOGY ONCOLOGY | Facility: CLINIC | Age: 78
End: 2018-06-27

## 2018-06-27 ENCOUNTER — LABORATORY RESULT (OUTPATIENT)
Age: 78
End: 2018-06-27

## 2018-06-27 LAB
ANION GAP SERPL CALC-SCNC: 12 MMOL/L
BUN SERPL-MCNC: 38 MG/DL
CALCIUM SERPL-MCNC: 8.2 MG/DL
CHLORIDE SERPL-SCNC: 101 MMOL/L
CO2 SERPL-SCNC: 27 MMOL/L
CREAT SERPL-MCNC: 1.91 MG/DL
GLUCOSE SERPL-MCNC: 259 MG/DL
POTASSIUM SERPL-SCNC: 4.3 MMOL/L
SODIUM SERPL-SCNC: 140 MMOL/L

## 2018-06-29 ENCOUNTER — APPOINTMENT (OUTPATIENT)
Dept: HEMATOLOGY ONCOLOGY | Facility: CLINIC | Age: 78
End: 2018-06-29

## 2018-06-29 ENCOUNTER — OUTPATIENT (OUTPATIENT)
Dept: OUTPATIENT SERVICES | Facility: HOSPITAL | Age: 78
LOS: 1 days | Discharge: HOME | End: 2018-06-29

## 2018-06-29 VITALS
DIASTOLIC BLOOD PRESSURE: 72 MMHG | WEIGHT: 132 LBS | BODY MASS INDEX: 24.92 KG/M2 | HEIGHT: 61 IN | TEMPERATURE: 98.6 F | RESPIRATION RATE: 15 BRPM | HEART RATE: 74 BPM | SYSTOLIC BLOOD PRESSURE: 140 MMHG

## 2018-06-29 DIAGNOSIS — Z96.0 PRESENCE OF UROGENITAL IMPLANTS: Chronic | ICD-10-CM

## 2018-06-29 DIAGNOSIS — D64.9 ANEMIA, UNSPECIFIED: ICD-10-CM

## 2018-06-29 DIAGNOSIS — D59.9 ACQUIRED HEMOLYTIC ANEMIA, UNSPECIFIED: ICD-10-CM

## 2018-06-29 DIAGNOSIS — R31.9 HEMATURIA, UNSPECIFIED: ICD-10-CM

## 2018-06-29 DIAGNOSIS — Z95.2 PRESENCE OF PROSTHETIC HEART VALVE: Chronic | ICD-10-CM

## 2018-06-29 DIAGNOSIS — Z98.890 OTHER SPECIFIED POSTPROCEDURAL STATES: Chronic | ICD-10-CM

## 2018-07-02 DIAGNOSIS — C64.9 MALIGNANT NEOPLASM OF UNSPECIFIED KIDNEY, EXCEPT RENAL PELVIS: ICD-10-CM

## 2018-07-03 ENCOUNTER — APPOINTMENT (OUTPATIENT)
Dept: INFUSION THERAPY | Facility: CLINIC | Age: 78
End: 2018-07-03

## 2018-07-09 ENCOUNTER — APPOINTMENT (OUTPATIENT)
Dept: HEART AND VASCULAR | Facility: CLINIC | Age: 78
End: 2018-07-09
Payer: MEDICARE

## 2018-07-09 VITALS
DIASTOLIC BLOOD PRESSURE: 70 MMHG | RESPIRATION RATE: 15 BRPM | BODY MASS INDEX: 24.55 KG/M2 | SYSTOLIC BLOOD PRESSURE: 110 MMHG | HEIGHT: 61 IN | HEART RATE: 84 BPM | WEIGHT: 130 LBS

## 2018-07-09 DIAGNOSIS — Z86.79 PERSONAL HISTORY OF OTHER DISEASES OF THE CIRCULATORY SYSTEM: ICD-10-CM

## 2018-07-09 DIAGNOSIS — Z95.2 PRESENCE OF PROSTHETIC HEART VALVE: ICD-10-CM

## 2018-07-09 LAB
HCT VFR BLD CALC: 27.3 %
HGB BLD-MCNC: 8.4 G/DL
INR PPP: 2.2 RATIO
MCHC RBC-ENTMCNC: 23.8 PG
MCHC RBC-ENTMCNC: 30.8 G/DL
MCV RBC AUTO: 77.3 FL
PLATELET # BLD AUTO: 169 K/UL
PMV BLD: 11.5 FL
RBC # BLD: 3.53 M/UL
RBC # FLD: 19.8 %
WBC # FLD AUTO: 5.73 K/UL

## 2018-07-09 PROCEDURE — 85610 PROTHROMBIN TIME: CPT | Mod: QW

## 2018-07-09 PROCEDURE — 93000 ELECTROCARDIOGRAM COMPLETE: CPT

## 2018-07-09 PROCEDURE — 99214 OFFICE O/P EST MOD 30 MIN: CPT

## 2018-07-13 ENCOUNTER — APPOINTMENT (OUTPATIENT)
Dept: HEMATOLOGY ONCOLOGY | Facility: CLINIC | Age: 78
End: 2018-07-13

## 2018-08-03 ENCOUNTER — LABORATORY RESULT (OUTPATIENT)
Age: 78
End: 2018-08-03

## 2018-08-03 ENCOUNTER — APPOINTMENT (OUTPATIENT)
Dept: HEMATOLOGY ONCOLOGY | Facility: CLINIC | Age: 78
End: 2018-08-03

## 2018-08-03 ENCOUNTER — APPOINTMENT (OUTPATIENT)
Dept: INFUSION THERAPY | Facility: CLINIC | Age: 78
End: 2018-08-03

## 2018-08-03 VITALS
BODY MASS INDEX: 25.68 KG/M2 | TEMPERATURE: 96.5 F | RESPIRATION RATE: 16 BRPM | SYSTOLIC BLOOD PRESSURE: 121 MMHG | HEART RATE: 86 BPM | WEIGHT: 136 LBS | DIASTOLIC BLOOD PRESSURE: 62 MMHG | HEIGHT: 61 IN

## 2018-08-03 PROBLEM — D64.9 ANEMIA, UNSPECIFIED: Chronic | Status: ACTIVE | Noted: 2018-03-16

## 2018-08-03 PROBLEM — M81.0 AGE-RELATED OSTEOPOROSIS WITHOUT CURRENT PATHOLOGICAL FRACTURE: Chronic | Status: ACTIVE | Noted: 2018-03-16

## 2018-08-03 PROBLEM — I63.9 CEREBRAL INFARCTION, UNSPECIFIED: Chronic | Status: ACTIVE | Noted: 2018-03-24

## 2018-08-03 PROBLEM — I48.91 UNSPECIFIED ATRIAL FIBRILLATION: Chronic | Status: ACTIVE | Noted: 2018-03-16

## 2018-08-03 PROBLEM — I00 RHEUMATIC FEVER WITHOUT HEART INVOLVEMENT: Chronic | Status: ACTIVE | Noted: 2018-03-16

## 2018-08-03 PROBLEM — I10 ESSENTIAL (PRIMARY) HYPERTENSION: Chronic | Status: ACTIVE | Noted: 2018-03-16

## 2018-08-03 PROBLEM — Z98.890 OTHER SPECIFIED POSTPROCEDURAL STATES: Chronic | Status: ACTIVE | Noted: 2018-03-16

## 2018-08-03 PROBLEM — E11.9 TYPE 2 DIABETES MELLITUS WITHOUT COMPLICATIONS: Chronic | Status: ACTIVE | Noted: 2018-03-16

## 2018-08-03 RX ORDER — PREGABALIN 225 MG/1
1000 CAPSULE ORAL ONCE
Qty: 0 | Refills: 0 | Status: COMPLETED | OUTPATIENT
Start: 2018-08-03 | End: 2018-08-03

## 2018-08-03 RX ADMIN — PREGABALIN 1000 MICROGRAM(S): 225 CAPSULE ORAL at 13:55

## 2018-08-06 ENCOUNTER — APPOINTMENT (OUTPATIENT)
Dept: INFUSION THERAPY | Facility: CLINIC | Age: 78
End: 2018-08-06

## 2018-08-13 LAB
ABO + RH PNL BLD: NORMAL
ALBUMIN SERPL ELPH-MCNC: 3.9 G/DL
ALP BLD-CCNC: 168 U/L
ALT SERPL-CCNC: 23 U/L
ANION GAP SERPL CALC-SCNC: 16 MMOL/L
AST SERPL-CCNC: 35 U/L
BILIRUB SERPL-MCNC: 1.3 MG/DL
BLD GP AB SCN SERPL QL: NORMAL
BUN SERPL-MCNC: 35 MG/DL
CALCIUM SERPL-MCNC: 8.1 MG/DL
CHLORIDE SERPL-SCNC: 98 MMOL/L
CO2 SERPL-SCNC: 27 MMOL/L
CREAT SERPL-MCNC: 2 MG/DL
FERRITIN SERPL-MCNC: 839 NG/ML
GLUCOSE SERPL-MCNC: 75 MG/DL
HCT VFR BLD CALC: 23 %
HGB BLD-MCNC: 7.1 G/DL
IRON SATN MFR SERPL: 12 %
IRON SERPL-MCNC: 26 UG/DL
LDH SERPL-CCNC: 313
MCHC RBC-ENTMCNC: 21.9 PG
MCHC RBC-ENTMCNC: 30.9 G/DL
MCV RBC AUTO: 71 FL
PLATELET # BLD AUTO: 189 K/UL
PMV BLD: 10.8 FL
POTASSIUM SERPL-SCNC: 4.6 MMOL/L
PROT SERPL-MCNC: 7 G/DL
RBC # BLD: 3.24 M/UL
RBC # FLD: 17.2 %
RETICS # AUTO: 2.2 %
RETICS AGGREG/RBC NFR: 71.5 K/UL
SODIUM SERPL-SCNC: 141 MMOL/L
TIBC SERPL-MCNC: 225 UG/DL
UIBC SERPL-MCNC: 199 UG/DL
WBC # FLD AUTO: 5.9 K/UL

## 2018-08-16 ENCOUNTER — LABORATORY RESULT (OUTPATIENT)
Age: 78
End: 2018-08-16

## 2018-08-16 ENCOUNTER — APPOINTMENT (OUTPATIENT)
Dept: HEMATOLOGY ONCOLOGY | Facility: CLINIC | Age: 78
End: 2018-08-16

## 2018-08-16 ENCOUNTER — APPOINTMENT (OUTPATIENT)
Dept: INFUSION THERAPY | Facility: CLINIC | Age: 78
End: 2018-08-16

## 2018-08-16 VITALS
SYSTOLIC BLOOD PRESSURE: 191 MMHG | BODY MASS INDEX: 26.06 KG/M2 | DIASTOLIC BLOOD PRESSURE: 84 MMHG | RESPIRATION RATE: 14 BRPM | WEIGHT: 138 LBS | HEART RATE: 89 BPM | HEIGHT: 61 IN

## 2018-08-16 RX ORDER — IRON SUCROSE 20 MG/ML
200 INJECTION, SOLUTION INTRAVENOUS ONCE
Qty: 0 | Refills: 0 | Status: COMPLETED | OUTPATIENT
Start: 2018-08-16 | End: 2018-08-16

## 2018-08-16 RX ADMIN — IRON SUCROSE 220 MILLIGRAM(S): 20 INJECTION, SOLUTION INTRAVENOUS at 16:54

## 2018-08-17 ENCOUNTER — APPOINTMENT (OUTPATIENT)
Dept: INFUSION THERAPY | Facility: CLINIC | Age: 78
End: 2018-08-17

## 2018-08-17 ENCOUNTER — RX RENEWAL (OUTPATIENT)
Age: 78
End: 2018-08-17

## 2018-08-17 RX ORDER — GLIMEPIRIDE 4 MG/1
4 TABLET ORAL TWICE DAILY
Qty: 180 | Refills: 3 | Status: ACTIVE | COMMUNITY
Start: 1900-01-01 | End: 1900-01-01

## 2018-08-20 LAB
HCT VFR BLD CALC: 28 %
HGB BLD-MCNC: 8.3 G/DL
MCHC RBC-ENTMCNC: 22.4 PG
MCHC RBC-ENTMCNC: 29.6 G/DL
MCV RBC AUTO: 75.5 FL
PLATELET # BLD AUTO: 177 K/UL
PMV BLD: 11.6 FL
RBC # BLD: 3.71 M/UL
RBC # FLD: 19.3 %
WBC # FLD AUTO: 6.93 K/UL

## 2018-08-23 ENCOUNTER — APPOINTMENT (OUTPATIENT)
Dept: INFUSION THERAPY | Facility: CLINIC | Age: 78
End: 2018-08-23

## 2018-08-23 RX ORDER — DIPHENHYDRAMINE HCL 50 MG
50 CAPSULE ORAL ONCE
Qty: 0 | Refills: 0 | Status: COMPLETED | OUTPATIENT
Start: 2018-08-23 | End: 2018-08-23

## 2018-08-23 RX ORDER — IRON SUCROSE 20 MG/ML
200 INJECTION, SOLUTION INTRAVENOUS ONCE
Qty: 0 | Refills: 0 | Status: COMPLETED | OUTPATIENT
Start: 2018-08-23 | End: 2018-08-23

## 2018-08-23 RX ADMIN — Medication 50 MILLIGRAM(S): at 10:13

## 2018-08-23 RX ADMIN — IRON SUCROSE 220 MILLIGRAM(S): 20 INJECTION, SOLUTION INTRAVENOUS at 10:13

## 2018-08-30 ENCOUNTER — APPOINTMENT (OUTPATIENT)
Dept: INFUSION THERAPY | Facility: CLINIC | Age: 78
End: 2018-08-30

## 2018-08-30 RX ORDER — HYDROCORTISONE 20 MG
100 TABLET ORAL ONCE
Qty: 0 | Refills: 0 | Status: COMPLETED | OUTPATIENT
Start: 2018-08-30 | End: 2018-08-30

## 2018-08-30 RX ORDER — IRON SUCROSE 20 MG/ML
200 INJECTION, SOLUTION INTRAVENOUS ONCE
Qty: 0 | Refills: 0 | Status: COMPLETED | OUTPATIENT
Start: 2018-08-30 | End: 2018-08-30

## 2018-08-30 RX ADMIN — IRON SUCROSE 220 MILLIGRAM(S): 20 INJECTION, SOLUTION INTRAVENOUS at 13:12

## 2018-08-30 RX ADMIN — Medication 104 MILLIGRAM(S): at 13:13

## 2018-09-04 ENCOUNTER — APPOINTMENT (OUTPATIENT)
Dept: HEART AND VASCULAR | Facility: CLINIC | Age: 78
End: 2018-09-04
Payer: MEDICARE

## 2018-09-04 VITALS
WEIGHT: 136 LBS | BODY MASS INDEX: 25.68 KG/M2 | HEIGHT: 61 IN | DIASTOLIC BLOOD PRESSURE: 70 MMHG | SYSTOLIC BLOOD PRESSURE: 120 MMHG | RESPIRATION RATE: 16 BRPM | HEART RATE: 84 BPM

## 2018-09-04 DIAGNOSIS — Z95.3 PRESENCE OF XENOGENIC HEART VALVE: ICD-10-CM

## 2018-09-04 PROCEDURE — 99214 OFFICE O/P EST MOD 30 MIN: CPT | Mod: 25

## 2018-09-04 PROCEDURE — 93000 ELECTROCARDIOGRAM COMPLETE: CPT

## 2018-09-04 RX ORDER — DIAZEPAM 5 MG/1
5 TABLET ORAL DAILY
Qty: 39 | Refills: 0 | Status: ACTIVE | COMMUNITY
Start: 1900-01-01 | End: 1900-01-01

## 2018-09-06 ENCOUNTER — APPOINTMENT (OUTPATIENT)
Dept: HEMATOLOGY ONCOLOGY | Facility: CLINIC | Age: 78
End: 2018-09-06

## 2018-09-06 ENCOUNTER — LABORATORY RESULT (OUTPATIENT)
Age: 78
End: 2018-09-06

## 2018-09-10 LAB
HCT VFR BLD CALC: 23.9 %
HGB BLD-MCNC: 7.2 G/DL
MCHC RBC-ENTMCNC: 21.7 PG
MCHC RBC-ENTMCNC: 30.1 G/DL
MCV RBC AUTO: 72 FL
PLATELET # BLD AUTO: 176 K/UL
PMV BLD: 10.9 FL
RBC # BLD: 3.32 M/UL
RBC # FLD: 17.7 %
WBC # FLD AUTO: 6.94 K/UL

## 2018-09-13 ENCOUNTER — LABORATORY RESULT (OUTPATIENT)
Age: 78
End: 2018-09-13

## 2018-09-13 ENCOUNTER — APPOINTMENT (OUTPATIENT)
Dept: HEMATOLOGY ONCOLOGY | Facility: CLINIC | Age: 78
End: 2018-09-13

## 2018-09-13 LAB
HCT VFR BLD CALC: 23.2 %
HGB BLD-MCNC: 7 G/DL
MCHC RBC-ENTMCNC: 21.3 PG
MCHC RBC-ENTMCNC: 30.2 G/DL
MCV RBC AUTO: 70.7 FL
PLATELET # BLD AUTO: 168 K/UL
PMV BLD: 10.8 FL
RBC # BLD: 3.28 M/UL
RBC # FLD: 17 %
WBC # FLD AUTO: 6.31 K/UL

## 2018-09-14 ENCOUNTER — APPOINTMENT (OUTPATIENT)
Dept: INFUSION THERAPY | Facility: CLINIC | Age: 78
End: 2018-09-14

## 2018-09-18 ENCOUNTER — APPOINTMENT (OUTPATIENT)
Dept: HEMATOLOGY ONCOLOGY | Facility: CLINIC | Age: 78
End: 2018-09-18

## 2018-09-18 ENCOUNTER — APPOINTMENT (OUTPATIENT)
Dept: INFUSION THERAPY | Facility: CLINIC | Age: 78
End: 2018-09-18

## 2018-09-18 VITALS
HEART RATE: 69 BPM | RESPIRATION RATE: 16 BRPM | WEIGHT: 136 LBS | HEIGHT: 61 IN | DIASTOLIC BLOOD PRESSURE: 69 MMHG | TEMPERATURE: 98.2 F | SYSTOLIC BLOOD PRESSURE: 126 MMHG | BODY MASS INDEX: 25.68 KG/M2

## 2018-09-18 RX ORDER — PREGABALIN 225 MG/1
1000 CAPSULE ORAL ONCE
Qty: 0 | Refills: 0 | Status: COMPLETED | OUTPATIENT
Start: 2018-09-18 | End: 2018-09-18

## 2018-09-18 RX ADMIN — PREGABALIN 1000 MICROGRAM(S): 225 CAPSULE ORAL at 09:58

## 2018-09-20 ENCOUNTER — APPOINTMENT (OUTPATIENT)
Dept: HEMATOLOGY ONCOLOGY | Facility: CLINIC | Age: 78
End: 2018-09-20

## 2018-09-20 ENCOUNTER — LABORATORY RESULT (OUTPATIENT)
Age: 78
End: 2018-09-20

## 2018-09-20 LAB
ABO + RH PNL BLD: NORMAL
BLD GP AB SCN SERPL QL: NORMAL

## 2018-09-26 ENCOUNTER — OTHER (OUTPATIENT)
Age: 78
End: 2018-09-26

## 2018-09-27 ENCOUNTER — APPOINTMENT (OUTPATIENT)
Dept: HEMATOLOGY ONCOLOGY | Facility: CLINIC | Age: 78
End: 2018-09-27

## 2018-09-27 ENCOUNTER — LABORATORY RESULT (OUTPATIENT)
Age: 78
End: 2018-09-27

## 2018-09-27 LAB
HCT VFR BLD CALC: 25.9 %
HCT VFR BLD CALC: 26.4 %
HGB BLD-MCNC: 7.8 G/DL
HGB BLD-MCNC: 7.9 G/DL
MCHC RBC-ENTMCNC: 21.1 PG
MCHC RBC-ENTMCNC: 21.5 PG
MCHC RBC-ENTMCNC: 29.9 G/DL
MCHC RBC-ENTMCNC: 30.1 G/DL
MCV RBC AUTO: 70.4 FL
MCV RBC AUTO: 71.5 FL
PLATELET # BLD AUTO: 190 K/UL
PLATELET # BLD AUTO: 212 K/UL
PMV BLD: 11 FL
PMV BLD: 11.2 FL
RBC # BLD: 3.62 M/UL
RBC # BLD: 3.75 M/UL
RBC # FLD: 17.2 %
RBC # FLD: 17.5 %
WBC # FLD AUTO: 5.63 K/UL
WBC # FLD AUTO: 7.92 K/UL

## 2018-10-03 ENCOUNTER — APPOINTMENT (OUTPATIENT)
Dept: HEART AND VASCULAR | Facility: CLINIC | Age: 78
End: 2018-10-03
Payer: MEDICARE

## 2018-10-03 VITALS
BODY MASS INDEX: 25.3 KG/M2 | RESPIRATION RATE: 14 BRPM | DIASTOLIC BLOOD PRESSURE: 78 MMHG | SYSTOLIC BLOOD PRESSURE: 110 MMHG | HEIGHT: 61 IN | HEART RATE: 90 BPM | WEIGHT: 134 LBS

## 2018-10-03 DIAGNOSIS — Z01.818 ENCOUNTER FOR OTHER PREPROCEDURAL EXAMINATION: ICD-10-CM

## 2018-10-03 PROCEDURE — 93000 ELECTROCARDIOGRAM COMPLETE: CPT

## 2018-10-03 PROCEDURE — 99214 OFFICE O/P EST MOD 30 MIN: CPT

## 2018-10-04 ENCOUNTER — APPOINTMENT (OUTPATIENT)
Dept: HEMATOLOGY ONCOLOGY | Facility: CLINIC | Age: 78
End: 2018-10-04

## 2018-10-04 ENCOUNTER — LABORATORY RESULT (OUTPATIENT)
Age: 78
End: 2018-10-04

## 2018-10-04 LAB
ALBUMIN SERPL ELPH-MCNC: 4.1 G/DL
ALP BLD-CCNC: 180 U/L
ALT SERPL-CCNC: 12 U/L
ANION GAP SERPL CALC-SCNC: 13 MMOL/L
APTT BLD: 36.8 SEC
AST SERPL-CCNC: 20 U/L
BASOPHILS # BLD AUTO: 0.06 K/UL
BASOPHILS NFR BLD AUTO: 0.9 %
BILIRUB SERPL-MCNC: 0.6 MG/DL
BUN SERPL-MCNC: 43 MG/DL
CALCIUM SERPL-MCNC: 8.5 MG/DL
CHLORIDE SERPL-SCNC: 103 MMOL/L
CO2 SERPL-SCNC: 27 MMOL/L
CREAT SERPL-MCNC: 2.01 MG/DL
EOSINOPHIL # BLD AUTO: 0.21 K/UL
EOSINOPHIL NFR BLD AUTO: 3.3 %
GLUCOSE SERPL-MCNC: 110 MG/DL
HCT VFR BLD CALC: 26.5 %
HGB BLD-MCNC: 7.8 G/DL
IMM GRANULOCYTES NFR BLD AUTO: 0.2 %
INR PPP: 2.57 RATIO
LYMPHOCYTES # BLD AUTO: 1.05 K/UL
LYMPHOCYTES NFR BLD AUTO: 16.4 %
MAN DIFF?: NORMAL
MCHC RBC-ENTMCNC: 20.8 PG
MCHC RBC-ENTMCNC: 29.4 GM/DL
MCV RBC AUTO: 70.7 FL
MONOCYTES # BLD AUTO: 0.32 K/UL
MONOCYTES NFR BLD AUTO: 5 %
NEUTROPHILS # BLD AUTO: 4.75 K/UL
NEUTROPHILS NFR BLD AUTO: 74.2 %
PLATELET # BLD AUTO: 241 K/UL
POTASSIUM SERPL-SCNC: 3.8 MMOL/L
PROT SERPL-MCNC: 7.4 G/DL
PT BLD: 29.6 SEC
RBC # BLD: 3.75 M/UL
RBC # FLD: 17.6 %
SODIUM SERPL-SCNC: 143 MMOL/L
WBC # FLD AUTO: 6.4 K/UL

## 2018-10-05 LAB
HCT VFR BLD CALC: 25.7 %
HGB BLD-MCNC: 7.6 G/DL
MCHC RBC-ENTMCNC: 20.5 PG
MCHC RBC-ENTMCNC: 29.6 G/DL
MCV RBC AUTO: 69.3 FL
PLATELET # BLD AUTO: 208 K/UL
PMV BLD: 10.7 FL
RBC # BLD: 3.71 M/UL
RBC # FLD: 16.9 %
WBC # FLD AUTO: 7.56 K/UL

## 2018-10-07 ENCOUNTER — INPATIENT (INPATIENT)
Facility: HOSPITAL | Age: 78
LOS: 1 days | Discharge: ANOTHER HOSPITAL | End: 2018-10-09
Attending: INTERNAL MEDICINE | Admitting: INTERNAL MEDICINE

## 2018-10-07 DIAGNOSIS — Z96.0 PRESENCE OF UROGENITAL IMPLANTS: Chronic | ICD-10-CM

## 2018-10-07 DIAGNOSIS — Z95.2 PRESENCE OF PROSTHETIC HEART VALVE: Chronic | ICD-10-CM

## 2018-10-07 DIAGNOSIS — Z98.890 OTHER SPECIFIED POSTPROCEDURAL STATES: Chronic | ICD-10-CM

## 2018-10-08 VITALS — WEIGHT: 134.04 LBS | HEIGHT: 62 IN

## 2018-10-08 DIAGNOSIS — R00.1 BRADYCARDIA, UNSPECIFIED: ICD-10-CM

## 2018-10-08 DIAGNOSIS — R55 SYNCOPE AND COLLAPSE: ICD-10-CM

## 2018-10-08 LAB
APPEARANCE UR: ABNORMAL
APTT BLD: 36.1 SEC — SIGNIFICANT CHANGE UP (ref 27–39.2)
BACTERIA # UR AUTO: ABNORMAL /HPF
BILIRUB UR-MCNC: NEGATIVE — SIGNIFICANT CHANGE UP
COLOR SPEC: YELLOW — SIGNIFICANT CHANGE UP
DIFF PNL FLD: ABNORMAL
EPI CELLS # UR: ABNORMAL /HPF
GLUCOSE UR QL: 500 MG/DL
INR BLD: 3.52 RATIO — HIGH (ref 0.65–1.3)
KETONES UR-MCNC: NEGATIVE — SIGNIFICANT CHANGE UP
LEUKOCYTE ESTERASE UR-ACNC: ABNORMAL
NITRITE UR-MCNC: NEGATIVE — SIGNIFICANT CHANGE UP
PH UR: 5.5 — SIGNIFICANT CHANGE UP (ref 5–8)
PROT UR-MCNC: 30 MG/DL
PROTHROM AB SERPL-ACNC: 37.5 SEC — HIGH (ref 9.95–12.87)
RBC CASTS # UR COMP ASSIST: SIGNIFICANT CHANGE UP /HPF
SP GR SPEC: 1.02 — SIGNIFICANT CHANGE UP (ref 1.01–1.03)
UROBILINOGEN FLD QL: 0.2 MG/DL — SIGNIFICANT CHANGE UP (ref 0.2–0.2)
WBC UR QL: >50 /HPF

## 2018-10-08 RX ORDER — SODIUM CHLORIDE 9 MG/ML
2000 INJECTION, SOLUTION INTRAVENOUS ONCE
Qty: 0 | Refills: 0 | Status: DISCONTINUED | OUTPATIENT
Start: 2018-10-06 | End: 2018-10-08

## 2018-10-08 RX ORDER — GLUCAGON INJECTION, SOLUTION 0.5 MG/.1ML
10 INJECTION, SOLUTION SUBCUTANEOUS ONCE
Qty: 0 | Refills: 0 | Status: COMPLETED | OUTPATIENT
Start: 2018-10-08 | End: 2018-10-08

## 2018-10-08 RX ORDER — FOLIC ACID 0.8 MG
1 TABLET ORAL DAILY
Qty: 0 | Refills: 0 | Status: DISCONTINUED | OUTPATIENT
Start: 2018-10-07 | End: 2018-10-09

## 2018-10-08 RX ORDER — WARFARIN SODIUM 2.5 MG/1
5 TABLET ORAL ONCE
Qty: 0 | Refills: 0 | Status: DISCONTINUED | OUTPATIENT
Start: 2018-10-07 | End: 2018-10-08

## 2018-10-08 RX ORDER — CALCIUM GLUCONATE 100 MG/ML
1 VIAL (ML) INTRAVENOUS ONCE
Qty: 0 | Refills: 0 | Status: COMPLETED | OUTPATIENT
Start: 2018-10-06 | End: 2018-10-08

## 2018-10-08 RX ORDER — MORPHINE SULFATE 50 MG/1
4 CAPSULE, EXTENDED RELEASE ORAL ONCE
Qty: 0 | Refills: 0 | Status: DISCONTINUED | OUTPATIENT
Start: 2018-10-06 | End: 2018-10-08

## 2018-10-08 RX ORDER — CEFTRIAXONE 500 MG/1
2 INJECTION, POWDER, FOR SOLUTION INTRAMUSCULAR; INTRAVENOUS ONCE
Qty: 0 | Refills: 0 | Status: COMPLETED | OUTPATIENT
Start: 2018-10-08 | End: 2018-10-08

## 2018-10-08 RX ORDER — LISINOPRIL 2.5 MG/1
5 TABLET ORAL DAILY
Qty: 0 | Refills: 0 | Status: DISCONTINUED | OUTPATIENT
Start: 2018-10-07 | End: 2018-10-08

## 2018-10-08 RX ORDER — ATROPINE SULFATE 0.1 MG/ML
1 SYRINGE (ML) INJECTION ONCE
Qty: 0 | Refills: 0 | Status: COMPLETED | OUTPATIENT
Start: 2018-10-06 | End: 2018-10-08

## 2018-10-08 RX ORDER — PANTOPRAZOLE SODIUM 20 MG/1
40 TABLET, DELAYED RELEASE ORAL
Qty: 0 | Refills: 0 | Status: DISCONTINUED | OUTPATIENT
Start: 2018-10-07 | End: 2018-10-09

## 2018-10-08 RX ORDER — ATORVASTATIN CALCIUM 80 MG/1
10 TABLET, FILM COATED ORAL AT BEDTIME
Qty: 0 | Refills: 0 | Status: DISCONTINUED | OUTPATIENT
Start: 2018-10-07 | End: 2018-10-09

## 2018-10-08 RX ORDER — INSULIN GLARGINE 100 [IU]/ML
9 INJECTION, SOLUTION SUBCUTANEOUS AT BEDTIME
Qty: 0 | Refills: 0 | Status: DISCONTINUED | OUTPATIENT
Start: 2018-10-08 | End: 2018-10-09

## 2018-10-08 RX ORDER — MAGNESIUM SULFATE 500 MG/ML
1 VIAL (ML) INJECTION ONCE
Qty: 0 | Refills: 0 | Status: COMPLETED | OUTPATIENT
Start: 2018-10-06 | End: 2018-10-08

## 2018-10-08 RX ORDER — LISINOPRIL 2.5 MG/1
40 TABLET ORAL DAILY
Qty: 0 | Refills: 0 | Status: DISCONTINUED | OUTPATIENT
Start: 2018-10-08 | End: 2018-10-09

## 2018-10-08 RX ORDER — CEFTRIAXONE 500 MG/1
2 INJECTION, POWDER, FOR SOLUTION INTRAMUSCULAR; INTRAVENOUS EVERY 24 HOURS
Qty: 0 | Refills: 0 | Status: DISCONTINUED | OUTPATIENT
Start: 2018-10-09 | End: 2018-10-09

## 2018-10-08 RX ORDER — CEFTRIAXONE 500 MG/1
INJECTION, POWDER, FOR SOLUTION INTRAMUSCULAR; INTRAVENOUS
Qty: 0 | Refills: 0 | Status: DISCONTINUED | OUTPATIENT
Start: 2018-10-08 | End: 2018-10-09

## 2018-10-08 RX ORDER — ONDANSETRON 8 MG/1
4 TABLET, FILM COATED ORAL ONCE
Qty: 0 | Refills: 0 | Status: COMPLETED | OUTPATIENT
Start: 2018-10-06 | End: 2018-10-08

## 2018-10-08 RX ORDER — INSULIN LISPRO 100/ML
3 VIAL (ML) SUBCUTANEOUS
Qty: 0 | Refills: 0 | Status: DISCONTINUED | OUTPATIENT
Start: 2018-10-08 | End: 2018-10-09

## 2018-10-08 RX ORDER — FUROSEMIDE 40 MG
40 TABLET ORAL DAILY
Qty: 0 | Refills: 0 | Status: DISCONTINUED | OUTPATIENT
Start: 2018-10-07 | End: 2018-10-09

## 2018-10-08 RX ORDER — METOPROLOL TARTRATE 50 MG
25 TABLET ORAL
Qty: 0 | Refills: 0 | Status: DISCONTINUED | OUTPATIENT
Start: 2018-10-08 | End: 2018-10-09

## 2018-10-08 RX ORDER — SODIUM BICARBONATE 1 MEQ/ML
0.06 SYRINGE (ML) INTRAVENOUS
Qty: 50 | Refills: 0 | Status: DISCONTINUED | OUTPATIENT
Start: 2018-10-06 | End: 2018-10-08

## 2018-10-08 RX ORDER — FERROUS SULFATE 325(65) MG
325 TABLET ORAL DAILY
Qty: 0 | Refills: 0 | Status: DISCONTINUED | OUTPATIENT
Start: 2018-10-07 | End: 2018-10-09

## 2018-10-08 RX ORDER — METOPROLOL TARTRATE 50 MG
25 TABLET ORAL
Qty: 0 | Refills: 0 | Status: DISCONTINUED | OUTPATIENT
Start: 2018-10-07 | End: 2018-10-08

## 2018-10-08 RX ADMIN — ATORVASTATIN CALCIUM 10 MILLIGRAM(S): 80 TABLET, FILM COATED ORAL at 22:56

## 2018-10-08 RX ADMIN — LISINOPRIL 40 MILLIGRAM(S): 2.5 TABLET ORAL at 17:29

## 2018-10-08 RX ADMIN — INSULIN GLARGINE 9 UNIT(S): 100 INJECTION, SOLUTION SUBCUTANEOUS at 22:56

## 2018-10-08 RX ADMIN — Medication 1 MILLIGRAM(S): at 17:28

## 2018-10-08 RX ADMIN — PANTOPRAZOLE SODIUM 40 MILLIGRAM(S): 20 TABLET, DELAYED RELEASE ORAL at 17:30

## 2018-10-08 RX ADMIN — Medication 40 MILLIGRAM(S): at 17:21

## 2018-10-08 RX ADMIN — CEFTRIAXONE 100 GRAM(S): 500 INJECTION, POWDER, FOR SOLUTION INTRAMUSCULAR; INTRAVENOUS at 16:00

## 2018-10-08 RX ADMIN — GLUCAGON INJECTION, SOLUTION 10 MILLIGRAM(S): 0.5 INJECTION, SOLUTION SUBCUTANEOUS at 11:22

## 2018-10-08 RX ADMIN — Medication 100 GRAM(S): at 17:25

## 2018-10-08 RX ADMIN — MORPHINE SULFATE 4 MILLIGRAM(S): 50 CAPSULE, EXTENDED RELEASE ORAL at 11:22

## 2018-10-08 RX ADMIN — Medication 1 MILLIGRAM(S): at 11:22

## 2018-10-08 RX ADMIN — Medication 25 MILLIGRAM(S): at 17:29

## 2018-10-08 RX ADMIN — ONDANSETRON 104 MILLIGRAM(S): 8 TABLET, FILM COATED ORAL at 11:22

## 2018-10-08 RX ADMIN — Medication 200 GRAM(S): at 11:22

## 2018-10-08 NOTE — CONSULT NOTE ADULT - ASSESSMENT
CTS ATTENDING    CASE REVIEWED WITH DR. BLUE  SHE ADVISES PPM IMPLANTATION  MUST WAIT FOR INR TO DRIFT (DO NOT TRANSFUSE OR GIVE VITAMIN K)!!    WILL WAIT UNTIL INR CLOSER TO 2.0  ANTICIPATE SURGERY THURSDAY OR FRIDAY AT THE EARLIEST    DR. BLUE REQUESTED DDD PPM ON THE EXPECTATION THAT PATIENT HAS SOME RESIDUAL ATRIAL ACTIVITY OR FLUTTER RHYTHM, ALTHOUGH WITH HER HISTORY OF MITRAL STENOSIS AND REPEATED SURGERIES, THIS IS OF LOW PROBABILITY.
ISOLATED SYNCOPE WHILE SITTING DOWN PRECEDE BY LIGHTHEADEDNESS .WITNESSED BY HER  WHO DESCRIBED  HER TO BE PALE AND WHITE  PAST HISTORY OF RHEUMATIC HEART DISEASE .  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

## 2018-10-08 NOTE — CONSULT NOTE ADULT - PROBLEM SELECTOR RECOMMENDATION 9
ISOLATED SYNCOPE WHILE SITTING DOWN MOST LIKELY DUE TO HYPOTENSION CAUSED  BY  BRADYCARDIA AS SHOWN ON ADMISSION EKG

## 2018-10-08 NOTE — CONSULT NOTE ADULT - SUBJECTIVE AND OBJECTIVE BOX
HPI:      Patient is a 77y old  Female who presents with a chief complaint of syncope (08 Oct 2018 13:37)        PAST MEDICAL & SURGICAL HISTORY:  CVA (cerebral vascular accident)  Anemia  Rheumatic fever  Diabetes  Osteoporosis  Mitral valve replaced  Atrial fibrillation: on coumadin at home  Hypertension  History of ureter stent  S/P AVR  H/O mitral valve replacement                  PREVIOUS DIAGNOSTIC TESTING:      ECHO   FINDINGS:     STRESS  FINDINGS:    CATHETERIZATION  FINDINGS:    ELECTROPHYSIOLOGY STUDY  FINDINGS:    CAROTID ULTRASOUND:  FINDINGS    VENOUS DUPLEX SCAN:  FINDINGS:    CHEST CT PULMONARY ANGIO with IV Contrast:  FINDINGS:  MEDICATIONS  (STANDING):  atorvastatin 10 milliGRAM(s) Oral at bedtime  cefTRIAXone   IVPB      cefTRIAXone   IVPB 2 Gram(s) IV Intermittent once  ferrous    sulfate 325 milliGRAM(s) Oral daily  folic acid 1 milliGRAM(s) Oral daily  furosemide    Tablet 40 milliGRAM(s) Oral daily  insulin glargine Injectable (LANTUS) 9 Unit(s) SubCutaneous at bedtime  insulin lispro Injectable (HumaLOG) 3 Unit(s) SubCutaneous three times a day with meals  lactated ringers Bolus 2000 milliLiter(s) IV Bolus once  lisinopril 40 milliGRAM(s) Oral daily  metoprolol tartrate 25 milliGRAM(s) Oral two times a day  pantoprazole    Tablet 40 milliGRAM(s) Oral before breakfast  sodium bicarbonate  Infusion 0.062 mEq/kG/Hr (75 mL/Hr) IV Continuous <Continuous>  warfarin 5 milliGRAM(s) Oral once    MEDICATIONS  (PRN):   Home Medications:  cefTRIAXone: 1 gram(s) intravenous once a day (30 May 2018 12:48)  insulin glargine: 9 unit(s) subcutaneous once a day (at bedtime) (30 May 2018 12:48)  insulin lispro (concentrated) 200 units/mL subcutaneous solution: 3 unit(s) subcutaneous 3 times a day before meals (30 May 2018 12:48)  sodium chloride 0.9% injectable solution: 75 milliliters[facility]hr [facility] (30 May 2018 19:07)      FAMILY HISTORY:  No pertinent family history in first degree relatives      SOCIAL HISTORY:    CIGARETTES:    ALCOHOL:          Vital Signs Last 24 Hrs  T(C): --  T(F): --  HR: --  BP: --  BP(mean): --  RR: --  SpO2: --          INTERPRETATION OF TELEMETRY:    ECG:        LABS:                      BNP  I&O's Detail    Daily Height in cm: 157.48 (08 Oct 2018 14:42)    Daily     RADIOLOGY & ADDITIONAL STUDIES:

## 2018-10-08 NOTE — CONSULT NOTE ADULT - PROBLEM SELECTOR RECOMMENDATION 2
BRADYCARDIA FOLLOWED BY TACHYCARDIA  AT RATE /130/MIN  UNDERLYING ATRIAL  RHYTHM  UNCERTAIN  FLUTTER VS A.F VS ATRIAL STAND STILL. RECOMMEND  DDD PACE [employer]KER [employer] NPO AFTER MID NIGHT  INR STAT TODAY  HOLD COUMADIN START ON IV HEPARIN  REPEAT INR TOMORROW IN AM IF INR  IS BELOW 2 CAN HAVE PPM TO GRAY OTHERWISE CONTINUE  HEPARIN WHILE HOLDING COUMADIN TILL INR IS BELOW 2 TO PROCEED WITH PPM IMPLANY    NPO AFTER MID NIGHT FOR POSSIBLE  IMPLANTF IF  INR  ALLOWS

## 2018-10-08 NOTE — PROGRESS NOTE ADULT - ASSESSMENT
79 y/o woman with PMH of HTN, HFpEF, chronic abdominal pain due to ovarian mass (surgery to be scheduled soon), bioprosthetic MV, mechanical AV, DM type 2, Afib presents after syncopal episode while playing cards. In ED, she was found with bradycardia and then tachycardia.     1. 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 Afib/mechanical AV - on coumadin and monitor INR daily    3. HTN - on ACE-I    4. HFpEF - chronic - pt euvolemic    5. DM type 2 - FS in low 200 range  carbohydrate consistent diet  add insulin if FS remain elevated

## 2018-10-08 NOTE — CONSULT NOTE ADULT - SUBJECTIVE AND OBJECTIVE BOX
Surgeon: /Maty/ Melani    Consult requesting by:  Dr. Rob     HISTORY OF PRESENT ILLNESS:  HPI: Patient is a 77y old  Female who presents with a chief complaint of syncope (08 Oct 2018 13:37).    PT FOUND TO HAVE BRADYCARDIA FOLLOWED BY TACHYCARDIA  AT RATE /130/MIN  UNDERLYING ATRIAL  RHYTHM  UNCERTAIN  FLUTTER VS A.F VS ATRIAL STAND STILL.      PAST MEDICAL & SURGICAL HISTORY:  CVA (cerebral vascular accident)  Anemia  Rheumatic fever  Diabetes  Osteoporosis  Mitral valve replaced  Atrial fibrillation: on coumadin at home  Hypertension  History of ureter stent  S/P AVR  H/O mitral valve replacement      MEDICATIONS  (STANDING):  atorvastatin 10 milliGRAM(s) Oral at bedtime  cefTRIAXone   IVPB      cefTRIAXone   IVPB 2 Gram(s) IV Intermittent once  ferrous    sulfate 325 milliGRAM(s) Oral daily  folic acid 1 milliGRAM(s) Oral daily  furosemide    Tablet 40 milliGRAM(s) Oral daily  insulin glargine Injectable (LANTUS) 9 Unit(s) SubCutaneous at bedtime  insulin lispro Injectable (HumaLOG) 3 Unit(s) SubCutaneous three times a day with meals  lactated ringers Bolus 2000 milliLiter(s) IV Bolus once  lisinopril 40 milliGRAM(s) Oral daily  metoprolol tartrate 25 milliGRAM(s) Oral two times a day  pantoprazole    Tablet 40 milliGRAM(s) Oral before breakfast  sodium bicarbonate  Infusion 0.062 mEq/kG/Hr (75 mL/Hr) IV Continuous <Continuous>  warfarin 5 milliGRAM(s) Oral once    MEDICATIONS  (PRN):    Antiplatelet therapy:                           Last dose/amt:    Allergies    No Known Drug Allergies  shellfish (Unknown)        LABS:      PRENDING      Urinalysis Basic - ( 08 Oct 2018 15:23 )    Color: Yellow / Appearance: Turbid / S.020 / pH: x  Gluc: x / Ketone: Negative  / Bili: Negative / Urobili: 0.2 mg/dL   Blood: x / Protein: 30 mg/dL / Nitrite: Negative   Leuk Esterase: Large / RBC: x / WBC x   Sq Epi: x / Non Sq Epi: x / Bacteria: x    A/P:    EP RECOMMENDS  DDD PACE [employer]ER [employer]   ·	NPO AFTER MID NIGHT  ·	INR STAT TODAY  ·	HOLD COUMADIN START ON IV HEPARIN    ·	REPEAT INR TOMORROW IN AM IF INR  IS BELOW 2 CAN HAVE PPM TO GRAY OTHERWISE CONTINUE  HEPARIN WHILE HOLDING COUMADIN TILL INR IS BELOW 2 TO PROCEED WITH PPM IMPLANT     ·	 NPO AFTER MID NIGHT FOR POSSIBLE  IMPLANT IF  INR  ALLOWS.   ·	REPEAT UA STAT

## 2018-10-08 NOTE — PROGRESS NOTE ADULT - SUBJECTIVE AND OBJECTIVE BOX
AUSTIN DAVIS  77y Female    INTERVAL HPI/OVERNIGHT EVENTS:    Pt with lower abdominal pain. Tachy on monitor.   Has some dizziness.   family at bedside    T(F): 99.2  HR: 120  BP: 103/63  RR: 18  SpO2: 98% on RA    PHYSICAL EXAM:  GENERAL: NAD  HEAD:  Normocephalic  EYES:  conjunctiva and sclera clear  ENMT: Moist mucous membranes  NECK: Supple, No JVD  NERVOUS SYSTEM:  Alert & Oriented X3, Good concentration  CHEST/LUNG: Clear to percussion bilaterally; No rales, rhonchi, wheezing  HEART: tachy  ABDOMEN: Soft, tender in LLQ, no guarding, Nondistended; Bowel sounds present  EXTREMITIES:   No edema    Consultant(s) Notes Reviewed:  [x ] YES  [ ] NO  Care Discussed with Consultants/Other Providers [ x] YES  [ ] NO    MEDICATIONS  (STANDING):  folic acid/furosemide 40mg daily/coumadin 5mg/protonix 40mg/metoprolol 25mg q12hr/atorvastatin/Iron/lisinopril 5mg daily    MEDICATIONS  (PRN):    EKG reviewed  Telemetry reviewed    LABS:      Case discussed with resident    Care discussed with pt/family

## 2018-10-09 VITALS
SYSTOLIC BLOOD PRESSURE: 120 MMHG | RESPIRATION RATE: 18 BRPM | TEMPERATURE: 99 F | OXYGEN SATURATION: 99 % | DIASTOLIC BLOOD PRESSURE: 67 MMHG | HEART RATE: 112 BPM

## 2018-10-09 LAB
ANION GAP SERPL CALC-SCNC: 16 MMOL/L — HIGH (ref 7–14)
APTT BLD: 37.1 SEC — SIGNIFICANT CHANGE UP (ref 27–39.2)
BASOPHILS # BLD AUTO: 0.04 K/UL — SIGNIFICANT CHANGE UP (ref 0–0.2)
BASOPHILS # BLD AUTO: 0.05 K/UL — SIGNIFICANT CHANGE UP (ref 0–0.2)
BASOPHILS NFR BLD AUTO: 0.5 % — SIGNIFICANT CHANGE UP (ref 0–1)
BASOPHILS NFR BLD AUTO: 0.6 % — SIGNIFICANT CHANGE UP (ref 0–1)
BLD GP AB SCN SERPL QL: SIGNIFICANT CHANGE UP
BUN SERPL-MCNC: 47 MG/DL — HIGH (ref 10–20)
CALCIUM SERPL-MCNC: 8.4 MG/DL — LOW (ref 8.5–10.1)
CHLORIDE SERPL-SCNC: 97 MMOL/L — LOW (ref 98–110)
CK MB CFR SERPL CALC: 1.2 NG/ML — SIGNIFICANT CHANGE UP (ref 0.6–6.3)
CK MB CFR SERPL CALC: 25 NG/ML — HIGH (ref 0.6–6.3)
CO2 SERPL-SCNC: 28 MMOL/L — SIGNIFICANT CHANGE UP (ref 17–32)
CREAT SERPL-MCNC: 2.2 MG/DL — HIGH (ref 0.7–1.5)
EOSINOPHIL # BLD AUTO: 0.15 K/UL — SIGNIFICANT CHANGE UP (ref 0–0.7)
EOSINOPHIL # BLD AUTO: 0.24 K/UL — SIGNIFICANT CHANGE UP (ref 0–0.7)
EOSINOPHIL NFR BLD AUTO: 2.1 % — SIGNIFICANT CHANGE UP (ref 0–8)
EOSINOPHIL NFR BLD AUTO: 2.6 % — SIGNIFICANT CHANGE UP (ref 0–8)
GLUCOSE BLDC GLUCOMTR-MCNC: 105 MG/DL — HIGH (ref 70–99)
GLUCOSE BLDC GLUCOMTR-MCNC: 108 MG/DL — HIGH (ref 70–99)
GLUCOSE BLDC GLUCOMTR-MCNC: 119 MG/DL — HIGH (ref 70–99)
GLUCOSE BLDC GLUCOMTR-MCNC: 125 MG/DL — HIGH (ref 70–99)
GLUCOSE BLDC GLUCOMTR-MCNC: 164 MG/DL — HIGH (ref 70–99)
GLUCOSE BLDC GLUCOMTR-MCNC: 168 MG/DL — HIGH (ref 70–99)
GLUCOSE BLDC GLUCOMTR-MCNC: 283 MG/DL — HIGH (ref 70–99)
GLUCOSE SERPL-MCNC: 150 MG/DL — HIGH (ref 70–99)
HCT VFR BLD CALC: 25.3 % — LOW (ref 37–47)
HCT VFR BLD CALC: 26.1 % — LOW (ref 37–47)
HGB BLD-MCNC: 7.4 G/DL — CRITICAL LOW (ref 12–16)
HGB BLD-MCNC: 7.7 G/DL — LOW (ref 12–16)
IMM GRANULOCYTES NFR BLD AUTO: 0.5 % — HIGH (ref 0.1–0.3)
IMM GRANULOCYTES NFR BLD AUTO: 0.7 % — HIGH (ref 0.1–0.3)
INR BLD: 2.38 RATIO — HIGH (ref 0.65–1.3)
INR BLD: 2.76 RATIO — HIGH (ref 0.65–1.3)
LYMPHOCYTES # BLD AUTO: 0.76 K/UL — LOW (ref 1.2–3.4)
LYMPHOCYTES # BLD AUTO: 1.16 K/UL — LOW (ref 1.2–3.4)
LYMPHOCYTES # BLD AUTO: 10.8 % — LOW (ref 20.5–51.1)
LYMPHOCYTES # BLD AUTO: 12.6 % — LOW (ref 20.5–51.1)
MCHC RBC-ENTMCNC: 20.2 PG — LOW (ref 27–31)
MCHC RBC-ENTMCNC: 20.4 PG — LOW (ref 27–31)
MCHC RBC-ENTMCNC: 29.2 G/DL — LOW (ref 32–37)
MCHC RBC-ENTMCNC: 29.5 G/DL — LOW (ref 32–37)
MCV RBC AUTO: 69.1 FL — LOW (ref 81–99)
MCV RBC AUTO: 69.2 FL — LOW (ref 81–99)
MONOCYTES # BLD AUTO: 0.44 K/UL — SIGNIFICANT CHANGE UP (ref 0.1–0.6)
MONOCYTES # BLD AUTO: 0.64 K/UL — HIGH (ref 0.1–0.6)
MONOCYTES NFR BLD AUTO: 6.3 % — SIGNIFICANT CHANGE UP (ref 1.7–9.3)
MONOCYTES NFR BLD AUTO: 6.9 % — SIGNIFICANT CHANGE UP (ref 1.7–9.3)
NEUTROPHILS # BLD AUTO: 5.59 K/UL — SIGNIFICANT CHANGE UP (ref 1.4–6.5)
NEUTROPHILS # BLD AUTO: 7.07 K/UL — HIGH (ref 1.4–6.5)
NEUTROPHILS NFR BLD AUTO: 76.9 % — HIGH (ref 42.2–75.2)
NEUTROPHILS NFR BLD AUTO: 79.5 % — HIGH (ref 42.2–75.2)
NRBC # BLD: 0 /100 WBCS — SIGNIFICANT CHANGE UP (ref 0–0)
NRBC # BLD: 0 /100 WBCS — SIGNIFICANT CHANGE UP (ref 0–0)
NT-PROBNP SERPL-SCNC: 1932 PG/ML — HIGH (ref 0–300)
PLATELET # BLD AUTO: 218 K/UL — SIGNIFICANT CHANGE UP (ref 130–400)
PLATELET # BLD AUTO: 225 K/UL — SIGNIFICANT CHANGE UP (ref 130–400)
POTASSIUM SERPL-MCNC: 3.6 MMOL/L — SIGNIFICANT CHANGE UP (ref 3.5–5)
POTASSIUM SERPL-SCNC: 3.6 MMOL/L — SIGNIFICANT CHANGE UP (ref 3.5–5)
PROTHROM AB SERPL-ACNC: 25.5 SEC — HIGH (ref 9.95–12.87)
PROTHROM AB SERPL-ACNC: 29.5 SEC — HIGH (ref 9.95–12.87)
RBC # BLD: 3.66 M/UL — LOW (ref 4.2–5.4)
RBC # BLD: 3.77 M/UL — LOW (ref 4.2–5.4)
RBC # FLD: 16.9 % — HIGH (ref 11.5–14.5)
RBC # FLD: 17 % — HIGH (ref 11.5–14.5)
SODIUM SERPL-SCNC: 141 MMOL/L — SIGNIFICANT CHANGE UP (ref 135–146)
TROPONIN T SERPL-MCNC: <0.01 NG/ML — SIGNIFICANT CHANGE UP
TYPE + AB SCN PNL BLD: SIGNIFICANT CHANGE UP
WBC # BLD: 7.03 K/UL — SIGNIFICANT CHANGE UP (ref 4.8–10.8)
WBC # BLD: 9.21 K/UL — SIGNIFICANT CHANGE UP (ref 4.8–10.8)
WBC # FLD AUTO: 7.03 K/UL — SIGNIFICANT CHANGE UP (ref 4.8–10.8)
WBC # FLD AUTO: 9.21 K/UL — SIGNIFICANT CHANGE UP (ref 4.8–10.8)

## 2018-10-09 RX ORDER — METOPROLOL TARTRATE 50 MG
1 TABLET ORAL
Qty: 0 | Refills: 0 | COMMUNITY

## 2018-10-09 RX ORDER — CEFTRIAXONE 500 MG/1
1 INJECTION, POWDER, FOR SOLUTION INTRAMUSCULAR; INTRAVENOUS
Qty: 0 | Refills: 0 | COMMUNITY
Start: 2018-10-09

## 2018-10-09 RX ADMIN — PANTOPRAZOLE SODIUM 40 MILLIGRAM(S): 20 TABLET, DELAYED RELEASE ORAL at 00:21

## 2018-10-09 RX ADMIN — Medication 1 MILLIGRAM(S): at 00:20

## 2018-10-09 RX ADMIN — Medication 1 MILLIGRAM(S): at 12:22

## 2018-10-09 RX ADMIN — CEFTRIAXONE 100 GRAM(S): 500 INJECTION, POWDER, FOR SOLUTION INTRAMUSCULAR; INTRAVENOUS at 15:21

## 2018-10-09 RX ADMIN — Medication 3 UNIT(S): at 18:43

## 2018-10-09 RX ADMIN — Medication 40 MILLIGRAM(S): at 00:20

## 2018-10-09 RX ADMIN — ATORVASTATIN CALCIUM 10 MILLIGRAM(S): 80 TABLET, FILM COATED ORAL at 00:20

## 2018-10-09 RX ADMIN — Medication 25 MILLIGRAM(S): at 07:47

## 2018-10-09 RX ADMIN — Medication 325 MILLIGRAM(S): at 00:20

## 2018-10-09 RX ADMIN — ATORVASTATIN CALCIUM 10 MILLIGRAM(S): 80 TABLET, FILM COATED ORAL at 21:35

## 2018-10-09 NOTE — DISCHARGE NOTE ADULT - PLAN OF CARE
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 Cardiology was consulted and had followed patient. Recommended PPM placement. EP and CTsx also evaluated patient and agreeable to do PPM placement 10/9/18 if INR <2. Patient requested transfer to [facility] to be evaluated and treated by her own cardiologist and Cardiac Care team at that facility. PPM recommended with cardiology follow up and EP follow up during hospital stay as well as upon discharge. Continued monitoring and treatment. 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 Irons studies were ordered and are currently pending. Patient is on Ferrous Sulfate at home. 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 Cardiology was consulted and had followed patient. Recommended PPM placement. EP and CTsx also evaluated patient and agreeable to do PPM placement 10/9/18 if INR <2. Patient requested transfer to [facility] to be evaluated and treated by her own cardiologist and Cardiac Care team at that facility. PPM recommended with cardiology follow up and EP follow up during hospital stay as well as upon discharge. 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 Patient will need iron studies to be completed. Advise to continue home dosage of Ferrous Sulfate and follow up with primary medical doctor within one week of discharge. Complete course of antibiotics. Please follow up with primary medical doctor within one week of discharge. 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 Cardiology was consulted and had followed patient. Recommended PPM placement. EP and CTsx also evaluated patient and agreeable to do PPM placement when INR <2. Patient requested transfer to [facility] to be evaluated and treated by her own cardiologist and Cardiac Care team at that facility. PPM recommended with cardiology follow up and EP follow up during hospital stay as well as upon discharge. 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 Cardiology was consulted and had followed patient. Recommended PPM placement. EP and CTsx also evaluated patient and agreeable to do PPM placement when INR <2. Patient requested transfer to [facility] to be evaluated and treated by her own cardiologist and Cardiac Care team at that facility. PPM recommended with cardiology follow up and EP follow up during hospital stay as well as upon discharge. We advise you to follow up with your primary medical doctor within one week of discharge. Metoprolol is currently on hold. 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 Cardiology was consulted and had followed patient. Recommended PPM placement. EP and CTsx also evaluated patient and agreeable to do PPM placement when INR <2. Patient requested transfer to [facility] to be evaluated and treated by her own cardiologist and Cardiac Care team at that facility. PPM recommended with cardiology follow up and EP follow up during hospital stay as well as upon discharge. We advise you to follow up with your primary medical doctor within one week of discharge. Metoprolol is currently on hold. Hold Coumadin if patient is planned for surgery tomorrow. 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 Cardiology was consulted and had followed patient. Recommended PPM placement. EP and CTsx also evaluated patient and agreeable to do PPM placement when INR <2. Patient requested transfer to [facility] to be evaluated and treated by her own cardiologist and Cardiac Care team at that facility. PPM recommended with cardiology follow up and EP follow up during hospital stay as well as upon discharge. We advise you to follow up with your primary medical doctor within one week of discharge. Metoprolol is currently on hold. Hold Coumadin tonight if patient is planned for surgery tomorrow.

## 2018-10-09 NOTE — PROGRESS NOTE ADULT - SUBJECTIVE AND OBJECTIVE BOX
SUBJECTIVE:Patient is a 77y old  Female who presents with a chief complaint of SYNCOPE (08 Oct 2018 17:45)  . Today is hospital day 2d     PAST MEDICAL & SURGICAL HISTORY  PAST MEDICAL & SURGICAL HISTORY:  CVA (cerebral vascular accident)  Anemia  Rheumatic fever  Diabetes  Osteoporosis  Mitral valve replaced  Atrial fibrillation: on coumadin at home  Hypertension  History of ureter stent  S/P AVR  H/O mitral valve replacement    SOCIAL HISTORY:    ALLERGIES:  No Known Drug Allergies  shellfish (Unknown)    MEDICATIONS:  STANDING MEDICATIONS  atorvastatin 10 milliGRAM(s) Oral at bedtime  cefTRIAXone   IVPB      cefTRIAXone   IVPB 2 Gram(s) IV Intermittent every 24 hours  ferrous    sulfate 325 milliGRAM(s) Oral daily  folic acid 1 milliGRAM(s) Oral daily  furosemide    Tablet 40 milliGRAM(s) Oral daily  insulin glargine Injectable (LANTUS) 9 Unit(s) SubCutaneous at bedtime  insulin lispro Injectable (HumaLOG) 3 Unit(s) SubCutaneous three times a day with meals  lisinopril 40 milliGRAM(s) Oral daily  metoprolol tartrate 25 milliGRAM(s) Oral two times a day  pantoprazole    Tablet 40 milliGRAM(s) Oral before breakfast    PRN MEDICATIONS    VITALS:   T(F): 97.8  HR: 126  BP: 91/71  RR: 18  SpO2: 97%    LABS:          PT/INR - ( 08 Oct 2018 21:04 )   PT: 37.50 sec;   INR: 3.52 ratio         PTT - ( 08 Oct 2018 21:04 )  PTT:36.1 sec  Urinalysis Basic - ( 08 Oct 2018 15:23 )    Color: Yellow / Appearance: Turbid / S.020 / pH: x  Gluc: x / Ketone: Negative  / Bili: Negative / Urobili: 0.2 mg/dL   Blood: x / Protein: 30 mg/dL / Nitrite: Negative   Leuk Esterase: Large / RBC: 1-2 /HPF / WBC >50 /HPF   Sq Epi: x / Non Sq Epi: Moderate /HPF / Bacteria: Many /HPF                RADIOLOGY:    PHYSICAL EXAM:  GEN:   LUNGS: Clear to auscultation bilaterally   HEART: S1/S2 present. RRR.   ABD: Soft, non-tender, non-distended. Bowel sounds present  EXT: SUBJECTIVE:Patient is a 77y old  Female who presents with a chief complaint of SYNCOPE (08 Oct 2018 17:45)  . Today is hospital day 2d     PAST MEDICAL & SURGICAL HISTORY  PAST MEDICAL & SURGICAL HISTORY:  CVA (cerebral vascular accident)  Anemia  Rheumatic fever  Diabetes  Osteoporosis  Mitral valve replaced  Atrial fibrillation: on coumadin at home  Hypertension  History of ureter stent  S/P AVR  H/O mitral valve replacement    SOCIAL HISTORY:    ALLERGIES:  No Known Drug Allergies  shellfish (Unknown)    MEDICATIONS:  STANDING MEDICATIONS  atorvastatin 10 milliGRAM(s) Oral at bedtime  cefTRIAXone   IVPB      cefTRIAXone   IVPB 2 Gram(s) IV Intermittent every 24 hours  ferrous    sulfate 325 milliGRAM(s) Oral daily  folic acid 1 milliGRAM(s) Oral daily  furosemide    Tablet 40 milliGRAM(s) Oral daily  insulin glargine Injectable (LANTUS) 9 Unit(s) SubCutaneous at bedtime  insulin lispro Injectable (HumaLOG) 3 Unit(s) SubCutaneous three times a day with meals  lisinopril 40 milliGRAM(s) Oral daily  metoprolol tartrate 25 milliGRAM(s) Oral two times a day  pantoprazole    Tablet 40 milliGRAM(s) Oral before breakfast    PRN MEDICATIONS    VITALS:   T(F): 97.8  HR: 126  BP: 91/71  RR: 18  SpO2: 97%    LABS:    PT/INR - ( 08 Oct 2018 21:04 )   PT: 37.50 sec;   INR: 3.52 ratio      PTT - ( 08 Oct 2018 21:04 )  PTT:36.1 sec  Urinalysis Basic - ( 08 Oct 2018 15:23 )    Color: Yellow / Appearance: Turbid / S.020 / pH: x  Gluc: x / Ketone: Negative  / Bili: Negative / Urobili: 0.2 mg/dL   Blood: x / Protein: 30 mg/dL / Nitrite: Negative   Leuk Esterase: Large / RBC: 1-2 /HPF / WBC >50 /HPF   Sq Epi: x / Non Sq Epi: Moderate /HPF / Bacteria: Many /HPF    RADIOLOGY:    PHYSICAL EXAM:  GEN:   LUNGS: Clear to auscultation bilaterally   HEART: S1/S2 present. RRR.   ABD: Soft, non-tender, non-distended. Bowel sounds present  EXT: SUBJECTIVE:Patient is a 77y old  Female who presents with a chief complaint of SYNCOPE (08 Oct 2018 17:45)  . Today is hospital day 2d. Patient is NAD. Resting comfortably in bed. Awaiting transfer to [facility].     PAST MEDICAL & SURGICAL HISTORY  PAST MEDICAL & SURGICAL HISTORY:  CVA (cerebral vascular accident)  Anemia  Rheumatic fever  Diabetes  Osteoporosis  Mitral valve replaced  Atrial fibrillation: on coumadin at home  Hypertension  History of ureter stent  S/P AVR  H/O mitral valve replacement    SOCIAL HISTORY:    ALLERGIES:  No Known Drug Allergies  shellfish (Unknown)    MEDICATIONS:  STANDING MEDICATIONS  atorvastatin 10 milliGRAM(s) Oral at bedtime  cefTRIAXone   IVPB      cefTRIAXone   IVPB 2 Gram(s) IV Intermittent every 24 hours  ferrous    sulfate 325 milliGRAM(s) Oral daily  folic acid 1 milliGRAM(s) Oral daily  furosemide    Tablet 40 milliGRAM(s) Oral daily  insulin glargine Injectable (LANTUS) 9 Unit(s) SubCutaneous at bedtime  insulin lispro Injectable (HumaLOG) 3 Unit(s) SubCutaneous three times a day with meals  lisinopril 40 milliGRAM(s) Oral daily  metoprolol tartrate 25 milliGRAM(s) Oral two times a day  pantoprazole    Tablet 40 milliGRAM(s) Oral before breakfast    PRN MEDICATIONS    VITALS:   T(F): 97.8  HR: 126  BP: 91/71  RR: 18  SpO2: 97%    LABS:    PT/INR - ( 08 Oct 2018 21:04 )   PT: 37.50 sec;   INR: 3.52 ratio      PTT - ( 08 Oct 2018 21:04 )  PTT:36.1 sec  Urinalysis Basic - ( 08 Oct 2018 15:23 )    Color: Yellow / Appearance: Turbid / S.020 / pH: x  Gluc: x / Ketone: Negative  / Bili: Negative / Urobili: 0.2 mg/dL   Blood: x / Protein: 30 mg/dL / Nitrite: Negative   Leuk Esterase: Large / RBC: 1-2 /HPF / WBC >50 /HPF   Sq Epi: x / Non Sq Epi: Moderate /HPF / Bacteria: Many /HPF    RADIOLOGY:    PHYSICAL EXAM:  GEN: NAD, laying comfortably in bed  LUNGS: Clear to auscultation bilaterally   HEART: S1/S2 present. RRR.   ABD: Soft, non-tender, non-distended.  EXT: no b/l le edema

## 2018-10-09 NOTE — PROGRESS NOTE ADULT - SUBJECTIVE AND OBJECTIVE BOX
SUBJECTIVE:    Patient is a 77y old Female who presents with a chief complaint of Syncope and left flank pain (09 Oct 2018 10:58)    Currently admitted to medicine with the primary diagnosis of Syncope, unspecified syncope type     Today is hospital day 2d. This morning she is resting comfortably in bed and reports no new issues or overnight events.     PAST MEDICAL & SURGICAL HISTORY  CVA (cerebral vascular accident)  Anemia  Rheumatic fever  Diabetes  Osteoporosis  Mitral valve replaced  Atrial fibrillation: on coumadin at home  Hypertension  History of ureter stent  S/P AVR  H/O mitral valve replacement    SOCIAL HISTORY:  Negative for smoking/alcohol/drug use.     ALLERGIES:  No Known Drug Allergies  shellfish (Unknown)    MEDICATIONS:  STANDING MEDICATIONS  atorvastatin 10 milliGRAM(s) Oral at bedtime  cefTRIAXone   IVPB      cefTRIAXone   IVPB 2 Gram(s) IV Intermittent every 24 hours  ferrous    sulfate 325 milliGRAM(s) Oral daily  folic acid 1 milliGRAM(s) Oral daily  furosemide    Tablet 40 milliGRAM(s) Oral daily  insulin glargine Injectable (LANTUS) 9 Unit(s) SubCutaneous at bedtime  insulin lispro Injectable (HumaLOG) 3 Unit(s) SubCutaneous three times a day with meals  lisinopril 40 milliGRAM(s) Oral daily  metoprolol tartrate 25 milliGRAM(s) Oral two times a day  pantoprazole    Tablet 40 milliGRAM(s) Oral before breakfast    PRN MEDICATIONS    VITALS:   T(F): 97.8  HR: 126  BP: 91/71  RR: 18  SpO2: 97%    LABS:                        7.4    7.03  )-----------( 218      ( 09 Oct 2018 09:22 )             25.3           PT/INR - ( 09 Oct 2018 09:22 )   PT: 29.50 sec;   INR: 2.76 ratio         PTT - ( 08 Oct 2018 21:04 )  PTT:36.1 sec  Urinalysis Basic - ( 08 Oct 2018 15:23 )    Color: Yellow / Appearance: Turbid / S.020 / pH: x  Gluc: x / Ketone: Negative  / Bili: Negative / Urobili: 0.2 mg/dL   Blood: x / Protein: 30 mg/dL / Nitrite: Negative   Leuk Esterase: Large / RBC: 1-2 /HPF / WBC >50 /HPF   Sq Epi: x / Non Sq Epi: Moderate /HPF / Bacteria: Many /HPF                RADIOLOGY:    PHYSICAL EXAM:  GEN: No acute distress  LUNGS: Clear to auscultation bilaterally   HEART: S1/S2 present. RRR.   ABD/ GI: Soft, non-tender, non-distended. Bowel sounds present  EXT: NC/NC/NE/2+PP/MARTELL  NEURO: AAOX3

## 2018-10-09 NOTE — PROGRESS NOTE ADULT - ASSESSMENT
79 y/o woman with PMH of HTN, HFpEF, chronic abdominal pain due to ovarian mass (surgery to be scheduled soon), bioprosthetic MV, mechanical AV, DM type 2, Afib presents after syncopal episode while playing cards. In ED, she was found with bradycardia and then tachycardia.     1. 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 Afib/mechanical AV - on coumadin and monitor INR daily    3. HTN - on ACE-I    4. HFpEF - chronic - pt euvolemic    5. DM type 2 - FS in low 200 range  carbohydrate consistent diet    6. Ovarian tumor and she is awaiting surgery next week.  7. UTI on ceftriaxone, urine culture is in lab.   family wants transfer to [facility] [place] transfer center- [phone]-- I spoke with them and now she needs insurance approval.  Dr Benjamin Ashley [phone]   We have sent papers and we are working on the transfer. 77 y/o woman with PMH of HTN, HFpEF, chronic abdominal pain due to ovarian mass (surgery to be scheduled soon), bioprosthetic MV, mechanical AV, DM type 2, Afib presents after syncopal episode while playing cards. In ED, she was found with bradycardia and then tachycardia.     1. 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 Afib/mechanical AV - on coumadin and monitor INR daily    3. HTN - on ACE-I    4. HFpEF - chronic - pt euvolemic    5. DM type 2 - FS in low 200 range  carbohydrate consistent diet    6. Ovarian tumor and she is awaiting surgery next week.  7. UTI on ceftriaxone, urine culture is in lab.   family wants transfer to [facility] transfer center- [phone]-- I spoke with them and now she needs insurance approval.  Dr Benjamin Ashley [phone]   We have sent papers and we are working on the transfer. spent more than 30mins

## 2018-10-09 NOTE — DISCHARGE NOTE ADULT - PATIENT PORTAL LINK FT
You can access the [facility] Patient Portal, offered by [facility], by registering with the following website: [email or web address]

## 2018-10-09 NOTE — PROGRESS NOTE ADULT - ASSESSMENT
77 yo F with a PMH of Bioprothestic Aortic Valve replacement, Mitral valve replacement, HTN, CHF, DM, and chronic abdominal pain due to ovarian mass presented to the hospital s/p syncopal event at home. In ED patient was found to be bradycardic. One dosage of Atropine was given with no response. Patient was then given glucagon with improvement in heart rate.     # Syncopal event likely d/t bracycardia vs tachy-denise syndrome:  - Admit to telemetry  - Continue telemetry monitoring   - Cardiology consulted. Recommendations appreciated - Will increase Beta Blockers once PPM is placed by EP.   - EP consulted  - CTSx consulted  - Plan to place PPM today. 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 Coumadin on hold last night due to procedure today.    # DM: Continue to monitor FSG and begin insulin regimen if FSG > 180. Continue statin    # HTN: Continue ACEi, Furosemdide    # CHF: Continue Furosemide    # Diet?: DASH diet    DVT ppx 77 yo F with a PMH of Bioprothestic Aortic Valve replacement, Mitral valve replacement, HTN, CHF, DM, and chronic abdominal pain due to ovarian mass presented to the hospital s/p syncopal event at home. In ED patient was found to be bradycardic. One dosage of Atropine was given with no response. Patient was then given glucagon with improvement in heart rate.     # Syncopal event likely d/t bracycardia vs tachy-denise syndrome:  - Admit to telemetry  - Continue telemetry monitoring   - Cardiology consulted. Recommendations appreciated - Will increase Beta Blockers once PPM is placed by EP.   - EP consulted  - CTSx consulted  - Plan to place PPM today. Patient currently NPO.    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 Coumadin on hold last night due to procedure today.    # DM: Continue to monitor FSG and begin insulin regimen if FSG > 180. Continue statin    # HTN: Continue ACEi, Furosemdide    # CHF: Continue Furosemide    # Diet: DASH diet    DVT ppx    ** Medications reconciled as per paper chart H&P. 79 yo F with a PMH of Bioprothestic Aortic Valve replacement, Mitral valve replacement, HTN, CHF, DM, and chronic abdominal pain due to ovarian mass presented to the hospital s/p syncopal event at home. In ED patient was found to be bradycardic. One dosage of Atropine was given with no response. Patient was then given glucagon with improvement in heart rate.     # Syncopal event likely d/t bracycardia vs tachy-denise syndrome:  - Admit to telemetry  - Continue telemetry monitoring   - Cardiology consulted. Recommendations appreciated - Will increase Beta Blockers once PPM is placed by EP.   - EP consulted  - CTSx consulted  - Plan to place PPM either Thursday or Friday when INR <2.0   - Patient requesting transfer to [facility] to be treated by her own cardiologist and his cardiac team.     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 Coumadin on hold last night due to procedure today.    # DM: Continue to monitor FSG and begin insulin regimen if FSG > 180. Continue statin    # HTN: Continue ACEi, Furosemide    # CHF: Continue Furosemide    # Diet: DASH diet    DVT ppx    ** Medications reconciled as per paper chart H&P. 79 yo F with a PMH of Bioprothestic Aortic Valve replacement, Mitral valve replacement, HTN, CHF, DM, and chronic abdominal pain due to ovarian mass presented to the hospital s/p syncopal event at home. In ED patient was found to be bradycardic. One dosage of Atropine was given with no response. Patient was then given glucagon with improvement in heart rate.     # Syncopal event likely d/t bracycardia vs tachy-denise syndrome:  - Admit to telemetry  - Continue telemetry monitoring   - Cardiology consulted. Recommendations appreciated - Will increase Beta Blockers once PPM is placed by EP. Metoprolol is currently on hold.   - EP consulted  - CTSx consulted  - Plan to place PPM either Thursday or Friday when INR <2.0   - Patient requesting transfer to [facility] to be treated by her own cardiologist and his cardiac team.     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 Coumadin on hold last night due to procedure today.    # DM: Continue to monitor FSG and begin insulin regimen if FSG > 180. Continue statin    # HTN: Continue ACEi, Furosemide    # CHF: Continue Furosemide    # Diet: DASH diet    DVT ppx    ** Medications reconciled as per paper chart H&P. 77 yo F with a PMH of Bioprothestic Aortic Valve replacement, Mitral valve replacement, HTN, CHF, DM, and chronic abdominal pain due to ovarian mass presented to the hospital s/p syncopal event at home. In ED patient was found to be bradycardic. One dosage of Atropine was given with no response. Patient was then given glucagon with improvement in heart rate.     # Syncopal event likely d/t bracycardia vs tachy-denise syndrome:  - Admit to telemetry  - Continue telemetry monitoring   - Cardiology consulted. Recommendations appreciated - Will increase Beta Blockers once PPM is placed by EP. Metoprolol is currently on hold.   - EP consulted  - CTSx consulted  - Plan to place PPM either Thursday or Friday when INR <2.0   - Patient requesting transfer to [facility] to be treated by her own cardiologist and his cardiac team.     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 Coumadin on hold last night due to procedure today.    # DM: Continue to monitor FSG and begin insulin regimen if FSG > 180. Continue statin    # HTN: Continue ACEi, Furosemide    # CHF: Continue Furosemide    # Diet: DASH diet    DVT ppx    ** Medications reconciled as per paper chart H&P.    ** Patient possible transfer to [facility] if bed becomes available. D/C prepared and medications for dc reconciled with attending, Dr. Borrego 77 yo F with a PMH of Bioprothestic Aortic Valve replacement, Mitral valve replacement, HTN, CHF, DM, and chronic abdominal pain due to ovarian mass presented to the hospital s/p syncopal event at home. In ED patient was found to be bradycardic. One dosage of Atropine was given with no response. Patient was then given glucagon with improvement in heart rate.     # Syncopal event likely d/t bracycardia vs tachy-denise syndrome:  - Admit to telemetry  - Continue telemetry monitoring   - Cardiology consulted. Recommendations appreciated - Will increase Beta Blockers once PPM is placed by EP. Metoprolol is currently on hold.   - EP consulted  - CTSx consulted  - Plan to place PPM either Thursday or Friday when INR <2.0   - Patient requesting transfer to [facility] to be treated by her own cardiologist and his cardiac team.     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 Coumadin on hold last night due to procedure today.    # DM: Continue to monitor FSG and begin insulin regimen if FSG > 180. Continue statin    # HTN: Continue ACEi, Furosemide    # CHF: Continue Furosemide    # Diet: DASH diet    DVT ppx    ** Medications reconciled as per paper chart H&P.    ** Patient possible transfer to [facility] if bed becomes available. D/C prepared and medications for dc reconciled with attending, Dr. Borrego . DC paperwork and instructions reviewed with attending as well.

## 2018-10-09 NOTE — DISCHARGE NOTE ADULT - MEDICATION SUMMARY - MEDICATIONS TO TAKE
I will START or STAY ON the medications listed below when I get home from the hospital:    ramipril 10 mg oral capsule  -- 1 cap(s) by mouth once a day  -- Indication: For HTN    Coumadin 5 mg oral tablet  -- 1 tab(s) by mouth once a day  -- Indication: For MECHANICAL VALVE REPLACEMENT    glimepiride 4 mg oral tablet  -- 1 tab(s) by mouth once a day  -- Indication: For DIABETES    pravastatin 40 mg oral tablet  -- 1 tab(s) by mouth once a day  -- Indication: For HYPERLIPIDEMIA    Evista 60 mg oral tablet  -- 1 tab(s) by mouth once a day  -- Indication: For DIABETES    cefTRIAXone  -- 1 gram(s) intravenously once a day  -- Indication: For Urinary tract infection without hematuria, site unspecified    furosemide 40 mg oral tablet  -- 1 tab(s) by mouth once a day  -- Indication: For HEART FAILURE    FeroSul 325 mg (65 mg elemental iron) oral tablet  -- 1 tab(s) by mouth 3 times a day   -- Check with your doctor before becoming pregnant.  Do not chew, break, or crush.  May discolor urine or feces.    -- Indication: For Anemia, unspecified type    pantoprazole 40 mg oral delayed release tablet  -- 1 tab(s) by mouth once a day (before a meal)  -- Indication: For STOMACH PROPHYLAXIS    folic acid 1 mg oral tablet  -- 1 tab(s) by mouth once a day  -- Indication: For SUPPLEMENT

## 2018-10-09 NOTE — DISCHARGE NOTE ADULT - HOSPITAL COURSE
79 yo F with a PMH of Bioprothestic Aortic Valve replacement, Mitral valve replacement, HTN, CHF, DM, and chronic abdominal pain due to ovarian mass presented to the hospital s/p syncopal event at home. In ED patient was found to be bradycardic. One dosage of Atropine was given with no response. Patient was then given glucagon with improvement in heart rate. Syncopal event believed likely to be due to bradycardia vs tachy-denise syndrome. Cardiology was consulted and had followed patient. Recommended PPM placement EP and CTsx also evaluated placement and were to do PPM placement 10/9/18 if INR <2. Patient requested transfer to [facility] to be evaluated and treated by her own cardiologist and Cardiac Care team at that facility. 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 Irons studies were ordered and are currently pending. Patient is on Ferrous Sulfate at home. Finally patient with positive UA and symptomatic. She was started on Ceftriaxone 1g IV QD. 77 yo F with a PMH of Bioprothestic Aortic Valve replacement, Mitral valve replacement, HTN, CHF, DM, and chronic abdominal pain due to ovarian mass presented to the hospital s/p syncopal event at home. In ED patient was found to be bradycardic. One dosage of Atropine was given with no response. Patient was then given glucagon with improvement in heart rate. Syncopal event believed likely to be due to bradycardia vs tachy-denise syndrome. Cardiology was consulted and had followed patient. Recommended PPM placement EP and CTsx also evaluated placement and were to do PPM placement when INR <2. Patient requested transfer to [facility] to be evaluated and treated by her own cardiologist and Cardiac Care team at that facility. 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 Irons studies were ordered and are currently pending. Patient is on Ferrous Sulfate at home. Finally patient with positive UA and symptomatic. She was started on Ceftriaxone 1g IV QD. 79 yo F with a PMH of Bioprothestic Aortic Valve replacement, Mitral valve replacement, HTN, CHF, DM, and chronic abdominal pain due to ovarian mass presented to the hospital s/p syncopal event at home. In ED patient was found to be bradycardic. One dosage of Atropine was given with no response. Patient was then given glucagon with improvement in heart rate. Syncopal event believed likely to be due to bradycardia vs tachy-denise syndrome. Cardiology was consulted and had followed patient. Recommended PPM placement EP and CTsx also evaluated placement and were to do PPM placement when INR <2. Patient requested transfer to [facility] to be evaluated and treated by her own cardiologist and Cardiac Care team at that facility. 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 Irons studies were ordered and are currently pending. Patient is on Ferrous Sulfate at home. Finally patient with positive UA and symptomatic. She was started on Ceftriaxone 1g IV QD. One dose of metoprolol was given today.

## 2018-10-09 NOTE — PROVIDER CONTACT NOTE (OTHER) - SITUATION
Patient running tachycardiac at this time from 120s-130s on cardiac monitor. MD [phone] Yani made aware ,no interventions at this time. Will continue to monitor.

## 2018-10-09 NOTE — DISCHARGE NOTE ADULT - ADDITIONAL INSTRUCTIONS
Please take medications as prescribed. Please follow up with your primary medical doctor within one week of discharge. If symptoms worsen or reoccur, please call 911 or report to the nearest Emergency Medicine Department.

## 2018-10-09 NOTE — DISCHARGE NOTE ADULT - CARE PLAN
Principal Discharge DX:	Syncope, unspecified syncope type  Goal:	PPM placement, continued monitoring and treatment  Assessment and plan of treatment:	Syncopal event believed likely to be due to tachy-denise syndrome. Cardiology was consulted and had followed patient. Recommended PPM placement. EP and CTsx also evaluated patient and agreeable to do PPM placement 10/9/18 if INR <2. Patient requested transfer to [facility] to be evaluated and treated by her own cardiologist and Cardiac Care team at that facility. PPM recommended with cardiology follow up and EP follow up during hospital stay as well as upon discharge.  Secondary Diagnosis:	Anemia, unspecified type  Goal:	Continued monitoring and treatment.  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 Irons studies were ordered and are currently pending. Patient is on Ferrous Sulfate at home.  Secondary Diagnosis:	Urinary tract infection without hematuria, site unspecified Principal Discharge DX:	Syncope, unspecified syncope type  Goal:	PPM placement, continued monitoring and treatment  Assessment and plan of treatment:	Syncopal event believed likely to be due to tachy-denise syndrome. Cardiology was consulted and had followed patient. Recommended PPM placement. EP and CTsx also evaluated patient and agreeable to do PPM placement 10/9/18 if INR <2. Patient requested transfer to [facility] to be evaluated and treated by her own cardiologist and Cardiac Care team at that facility. PPM recommended with cardiology follow up and EP follow up during hospital stay as well as upon discharge. We advise you to follow up with your primary medical doctor within one week of discharge  Secondary Diagnosis:	Anemia, unspecified type  Goal:	Continued monitoring and treatment.  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 Patient will need iron studies to be completed. Advise to continue home dosage of Ferrous Sulfate and follow up with primary medical doctor within one week of discharge.  Secondary Diagnosis:	Urinary tract infection without hematuria, site unspecified  Goal:	Continued monitoring and treatment.  Assessment and plan of treatment:	Complete course of antibiotics. Please follow up with primary medical doctor within one week of discharge. Principal Discharge DX:	Syncope, unspecified syncope type  Goal:	PPM placement, continued monitoring and treatment  Assessment and plan of treatment:	Syncopal event believed likely to be due to tachy-denise syndrome. Cardiology was consulted and had followed patient. Recommended PPM placement. EP and CTsx also evaluated patient and agreeable to do PPM placement when INR <2. Patient requested transfer to [facility] to be evaluated and treated by her own cardiologist and Cardiac Care team at that facility. PPM recommended with cardiology follow up and EP follow up during hospital stay as well as upon discharge. We advise you to follow up with your primary medical doctor within one week of discharge  Secondary Diagnosis:	Anemia, unspecified type  Goal:	Continued monitoring and treatment.  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 Patient will need iron studies to be completed. Advise to continue home dosage of Ferrous Sulfate and follow up with primary medical doctor within one week of discharge.  Secondary Diagnosis:	Urinary tract infection without hematuria, site unspecified  Goal:	Continued monitoring and treatment.  Assessment and plan of treatment:	Complete course of antibiotics. Please follow up with primary medical doctor within one week of discharge. Principal Discharge DX:	Syncope, unspecified syncope type  Goal:	PPM placement, continued monitoring and treatment  Assessment and plan of treatment:	Syncopal event believed likely to be due to tachy-denise syndrome. Cardiology was consulted and had followed patient. Recommended PPM placement. EP and CTsx also evaluated patient and agreeable to do PPM placement when INR <2. Patient requested transfer to [facility] to be evaluated and treated by her own cardiologist and Cardiac Care team at that facility. PPM recommended with cardiology follow up and EP follow up during hospital stay as well as upon discharge. We advise you to follow up with your primary medical doctor within one week of discharge. Metoprolol is currently on hold.  Secondary Diagnosis:	Anemia, unspecified type  Goal:	Continued monitoring and treatment.  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 Patient will need iron studies to be completed. Advise to continue home dosage of Ferrous Sulfate and follow up with primary medical doctor within one week of discharge.  Secondary Diagnosis:	Urinary tract infection without hematuria, site unspecified  Goal:	Continued monitoring and treatment.  Assessment and plan of treatment:	Complete course of antibiotics. Please follow up with primary medical doctor within one week of discharge. Principal Discharge DX:	Syncope, unspecified syncope type  Goal:	PPM placement, continued monitoring and treatment  Assessment and plan of treatment:	Syncopal event believed likely to be due to tachy-denise syndrome. Cardiology was consulted and had followed patient. Recommended PPM placement. EP and CTsx also evaluated patient and agreeable to do PPM placement when INR <2. Patient requested transfer to [facility] to be evaluated and treated by her own cardiologist and Cardiac Care team at that facility. PPM recommended with cardiology follow up and EP follow up during hospital stay as well as upon discharge. We advise you to follow up with your primary medical doctor within one week of discharge. Metoprolol is currently on hold. Hold Coumadin if patient is planned for surgery tomorrow.  Secondary Diagnosis:	Anemia, unspecified type  Goal:	Continued monitoring and treatment.  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 Patient will need iron studies to be completed. Advise to continue home dosage of Ferrous Sulfate and follow up with primary medical doctor within one week of discharge.  Secondary Diagnosis:	Urinary tract infection without hematuria, site unspecified  Goal:	Continued monitoring and treatment.  Assessment and plan of treatment:	Complete course of antibiotics. Please follow up with primary medical doctor within one week of discharge. Principal Discharge DX:	Syncope, unspecified syncope type  Goal:	PPM placement, continued monitoring and treatment  Assessment and plan of treatment:	Syncopal event believed likely to be due to tachy-denise syndrome. Cardiology was consulted and had followed patient. Recommended PPM placement. EP and CTsx also evaluated patient and agreeable to do PPM placement when INR <2. Patient requested transfer to [facility] to be evaluated and treated by her own cardiologist and Cardiac Care team at that facility. PPM recommended with cardiology follow up and EP follow up during hospital stay as well as upon discharge. We advise you to follow up with your primary medical doctor within one week of discharge. Metoprolol is currently on hold. Hold Coumadin tonight if patient is planned for surgery tomorrow.  Secondary Diagnosis:	Anemia, unspecified type  Goal:	Continued monitoring and treatment.  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 Patient will need iron studies to be completed. Advise to continue home dosage of Ferrous Sulfate and follow up with primary medical doctor within one week of discharge.  Secondary Diagnosis:	Urinary tract infection without hematuria, site unspecified  Goal:	Continued monitoring and treatment.  Assessment and plan of treatment:	Complete course of antibiotics. Please follow up with primary medical doctor within one week of discharge.

## 2018-10-10 ENCOUNTER — APPOINTMENT (OUTPATIENT)
Dept: HEART AND VASCULAR | Facility: CLINIC | Age: 78
End: 2018-10-10

## 2018-10-11 ENCOUNTER — APPOINTMENT (OUTPATIENT)
Dept: HEMATOLOGY ONCOLOGY | Facility: CLINIC | Age: 78
End: 2018-10-11

## 2018-10-16 ENCOUNTER — APPOINTMENT (OUTPATIENT)
Dept: HEART AND VASCULAR | Facility: CLINIC | Age: 78
End: 2018-10-16
Payer: MEDICARE

## 2018-10-16 VITALS
DIASTOLIC BLOOD PRESSURE: 70 MMHG | HEART RATE: 127 BPM | RESPIRATION RATE: 12 BRPM | HEIGHT: 61 IN | BODY MASS INDEX: 25.3 KG/M2 | WEIGHT: 134 LBS | SYSTOLIC BLOOD PRESSURE: 120 MMHG

## 2018-10-16 DIAGNOSIS — Z95.2 PRESENCE OF PROSTHETIC HEART VALVE: ICD-10-CM

## 2018-10-16 DIAGNOSIS — Z91.013 ALLERGY TO SEAFOOD: ICD-10-CM

## 2018-10-16 DIAGNOSIS — R10.9 UNSPECIFIED ABDOMINAL PAIN: ICD-10-CM

## 2018-10-16 DIAGNOSIS — I48.91 UNSPECIFIED ATRIAL FIBRILLATION: ICD-10-CM

## 2018-10-16 DIAGNOSIS — M81.0 AGE-RELATED OSTEOPOROSIS WITHOUT CURRENT PATHOLOGICAL FRACTURE: ICD-10-CM

## 2018-10-16 DIAGNOSIS — N83.9 NONINFLAMMATORY DISORDER OF OVARY, FALLOPIAN TUBE AND BROAD LIGAMENT, UNSPECIFIED: ICD-10-CM

## 2018-10-16 DIAGNOSIS — D64.9 ANEMIA, UNSPECIFIED: ICD-10-CM

## 2018-10-16 DIAGNOSIS — Z86.73 PERSONAL HISTORY OF TRANSIENT ISCHEMIC ATTACK (TIA), AND CEREBRAL INFARCTION WITHOUT RESIDUAL DEFICITS: ICD-10-CM

## 2018-10-16 DIAGNOSIS — Z79.01 LONG TERM (CURRENT) USE OF ANTICOAGULANTS: ICD-10-CM

## 2018-10-16 DIAGNOSIS — I49.5 SICK SINUS SYNDROME: ICD-10-CM

## 2018-10-16 DIAGNOSIS — I11.0 HYPERTENSIVE HEART DISEASE WITH HEART FAILURE: ICD-10-CM

## 2018-10-16 DIAGNOSIS — E11.9 TYPE 2 DIABETES MELLITUS WITHOUT COMPLICATIONS: ICD-10-CM

## 2018-10-16 DIAGNOSIS — G89.29 OTHER CHRONIC PAIN: ICD-10-CM

## 2018-10-16 DIAGNOSIS — N39.0 URINARY TRACT INFECTION, SITE NOT SPECIFIED: ICD-10-CM

## 2018-10-16 DIAGNOSIS — I50.32 CHRONIC DIASTOLIC (CONGESTIVE) HEART FAILURE: ICD-10-CM

## 2018-10-16 DIAGNOSIS — R55 SYNCOPE AND COLLAPSE: ICD-10-CM

## 2018-10-16 DIAGNOSIS — Z79.4 LONG TERM (CURRENT) USE OF INSULIN: ICD-10-CM

## 2018-10-16 PROCEDURE — 99215 OFFICE O/P EST HI 40 MIN: CPT

## 2018-10-16 PROCEDURE — 93000 ELECTROCARDIOGRAM COMPLETE: CPT

## 2018-10-17 ENCOUNTER — APPOINTMENT (OUTPATIENT)
Dept: HEMATOLOGY ONCOLOGY | Facility: CLINIC | Age: 78
End: 2018-10-17

## 2018-10-18 ENCOUNTER — CLINICAL ADVICE (OUTPATIENT)
Age: 78
End: 2018-10-18

## 2018-10-18 RX ORDER — ADENOSINE 3 MG/ML
6 INJECTION INTRAVENOUS
Qty: 0 | Refills: 0 | Status: COMPLETED | OUTPATIENT
Start: 2018-10-16

## 2018-10-22 ENCOUNTER — APPOINTMENT (OUTPATIENT)
Dept: HEART AND VASCULAR | Facility: CLINIC | Age: 78
End: 2018-10-22
Payer: MEDICARE

## 2018-10-22 VITALS
RESPIRATION RATE: 14 BRPM | HEIGHT: 61 IN | WEIGHT: 130 LBS | SYSTOLIC BLOOD PRESSURE: 136 MMHG | BODY MASS INDEX: 24.55 KG/M2 | DIASTOLIC BLOOD PRESSURE: 86 MMHG | HEART RATE: 96 BPM

## 2018-10-22 DIAGNOSIS — R10.9 UNSPECIFIED ABDOMINAL PAIN: ICD-10-CM

## 2018-10-22 LAB
INR PPP: 2.6
POCT-PROTHROMBIN TIME: 2.6 SECS

## 2018-10-22 PROCEDURE — 85610 PROTHROMBIN TIME: CPT | Mod: QW

## 2018-10-22 PROCEDURE — 99214 OFFICE O/P EST MOD 30 MIN: CPT

## 2018-10-25 ENCOUNTER — APPOINTMENT (OUTPATIENT)
Dept: HEART AND VASCULAR | Facility: CLINIC | Age: 78
End: 2018-10-25

## 2018-10-31 ENCOUNTER — APPOINTMENT (OUTPATIENT)
Dept: HEMATOLOGY ONCOLOGY | Facility: CLINIC | Age: 78
End: 2018-10-31

## 2018-10-31 ENCOUNTER — EMERGENCY (EMERGENCY)
Facility: HOSPITAL | Age: 78
LOS: 0 days | Discharge: ANOTHER HOSPITAL | End: 2018-11-01
Attending: EMERGENCY MEDICINE | Admitting: EMERGENCY MEDICINE

## 2018-10-31 ENCOUNTER — LABORATORY RESULT (OUTPATIENT)
Age: 78
End: 2018-10-31

## 2018-10-31 ENCOUNTER — OUTPATIENT (OUTPATIENT)
Dept: OUTPATIENT SERVICES | Facility: HOSPITAL | Age: 78
LOS: 1 days | Discharge: HOME | End: 2018-10-31

## 2018-10-31 VITALS
SYSTOLIC BLOOD PRESSURE: 142 MMHG | TEMPERATURE: 97.7 F | HEART RATE: 62 BPM | DIASTOLIC BLOOD PRESSURE: 61 MMHG | RESPIRATION RATE: 16 BRPM

## 2018-10-31 VITALS
RESPIRATION RATE: 20 BRPM | HEART RATE: 62 BPM | DIASTOLIC BLOOD PRESSURE: 57 MMHG | SYSTOLIC BLOOD PRESSURE: 116 MMHG | OXYGEN SATURATION: 97 % | TEMPERATURE: 97 F

## 2018-10-31 DIAGNOSIS — Z96.0 PRESENCE OF UROGENITAL IMPLANTS: Chronic | ICD-10-CM

## 2018-10-31 DIAGNOSIS — D64.9 ANEMIA, UNSPECIFIED: ICD-10-CM

## 2018-10-31 DIAGNOSIS — Z86.73 PERSONAL HISTORY OF TRANSIENT ISCHEMIC ATTACK (TIA), AND CEREBRAL INFARCTION WITHOUT RESIDUAL DEFICITS: ICD-10-CM

## 2018-10-31 DIAGNOSIS — Z95.2 PRESENCE OF PROSTHETIC HEART VALVE: Chronic | ICD-10-CM

## 2018-10-31 DIAGNOSIS — Z98.890 OTHER SPECIFIED POSTPROCEDURAL STATES: Chronic | ICD-10-CM

## 2018-10-31 DIAGNOSIS — E11.9 TYPE 2 DIABETES MELLITUS WITHOUT COMPLICATIONS: ICD-10-CM

## 2018-10-31 DIAGNOSIS — Z79.01 LONG TERM (CURRENT) USE OF ANTICOAGULANTS: ICD-10-CM

## 2018-10-31 DIAGNOSIS — I48.91 UNSPECIFIED ATRIAL FIBRILLATION: ICD-10-CM

## 2018-10-31 DIAGNOSIS — R79.89 OTHER SPECIFIED ABNORMAL FINDINGS OF BLOOD CHEMISTRY: ICD-10-CM

## 2018-10-31 DIAGNOSIS — R10.84 GENERALIZED ABDOMINAL PAIN: ICD-10-CM

## 2018-10-31 DIAGNOSIS — Z90.721 ACQUIRED ABSENCE OF OVARIES, UNILATERAL: ICD-10-CM

## 2018-10-31 DIAGNOSIS — C64.9 MALIGNANT NEOPLASM OF UNSPECIFIED KIDNEY, EXCEPT RENAL PELVIS: ICD-10-CM

## 2018-10-31 DIAGNOSIS — Z79.899 OTHER LONG TERM (CURRENT) DRUG THERAPY: ICD-10-CM

## 2018-10-31 DIAGNOSIS — Z79.84 LONG TERM (CURRENT) USE OF ORAL HYPOGLYCEMIC DRUGS: ICD-10-CM

## 2018-10-31 DIAGNOSIS — K66.1 HEMOPERITONEUM: ICD-10-CM

## 2018-10-31 DIAGNOSIS — Z79.2 LONG TERM (CURRENT) USE OF ANTIBIOTICS: ICD-10-CM

## 2018-10-31 DIAGNOSIS — I10 ESSENTIAL (PRIMARY) HYPERTENSION: ICD-10-CM

## 2018-10-31 LAB
ALBUMIN SERPL ELPH-MCNC: 2.6 G/DL — LOW (ref 3.5–5.2)
ALP SERPL-CCNC: 81 U/L — SIGNIFICANT CHANGE UP (ref 30–115)
ALT FLD-CCNC: 7 U/L — SIGNIFICANT CHANGE UP (ref 0–41)
ANION GAP SERPL CALC-SCNC: 12 MMOL/L — SIGNIFICANT CHANGE UP (ref 7–14)
ANISOCYTOSIS BLD QL: SIGNIFICANT CHANGE UP
APTT BLD: 25.1 SEC — LOW (ref 27–39.2)
AST SERPL-CCNC: 16 U/L — SIGNIFICANT CHANGE UP (ref 0–41)
BASOPHILS # BLD AUTO: 0 K/UL — SIGNIFICANT CHANGE UP (ref 0–0.2)
BASOPHILS NFR BLD AUTO: 0 % — SIGNIFICANT CHANGE UP (ref 0–1)
BILIRUB SERPL-MCNC: 0.5 MG/DL — SIGNIFICANT CHANGE UP (ref 0.2–1.2)
BLD GP AB SCN SERPL QL: SIGNIFICANT CHANGE UP
BUN SERPL-MCNC: 43 MG/DL — HIGH (ref 10–20)
CALCIUM SERPL-MCNC: 8 MG/DL — LOW (ref 8.5–10.1)
CHLORIDE SERPL-SCNC: 97 MMOL/L — LOW (ref 98–110)
CO2 SERPL-SCNC: 24 MMOL/L — SIGNIFICANT CHANGE UP (ref 17–32)
CREAT SERPL-MCNC: 2 MG/DL — HIGH (ref 0.7–1.5)
EOSINOPHIL # BLD AUTO: 0 K/UL — SIGNIFICANT CHANGE UP (ref 0–0.7)
EOSINOPHIL NFR BLD AUTO: 0 % — SIGNIFICANT CHANGE UP (ref 0–8)
GIANT PLATELETS BLD QL SMEAR: PRESENT — SIGNIFICANT CHANGE UP
GLUCOSE SERPL-MCNC: 208 MG/DL — HIGH (ref 70–99)
HCT VFR BLD CALC: 19 % — LOW (ref 37–47)
HGB BLD-MCNC: 6.1 G/DL — CRITICAL LOW (ref 12–16)
HYPOCHROMIA BLD QL: SIGNIFICANT CHANGE UP
INR BLD: 1.08 RATIO — SIGNIFICANT CHANGE UP (ref 0.65–1.3)
LYMPHOCYTES # BLD AUTO: 0.39 K/UL — LOW (ref 1.2–3.4)
LYMPHOCYTES # BLD AUTO: 3.5 % — LOW (ref 20.5–51.1)
MANUAL SMEAR VERIFICATION: SIGNIFICANT CHANGE UP
MCHC RBC-ENTMCNC: 23.7 PG — LOW (ref 27–31)
MCHC RBC-ENTMCNC: 32.1 G/DL — SIGNIFICANT CHANGE UP (ref 32–37)
MCV RBC AUTO: 73.9 FL — LOW (ref 81–99)
METAMYELOCYTES # FLD: 0.9 % — HIGH (ref 0–0)
MICROCYTES BLD QL: SIGNIFICANT CHANGE UP
MONOCYTES # BLD AUTO: 0.29 K/UL — SIGNIFICANT CHANGE UP (ref 0.1–0.6)
MONOCYTES NFR BLD AUTO: 2.6 % — SIGNIFICANT CHANGE UP (ref 1.7–9.3)
MYELOCYTES NFR BLD: 0.9 % — HIGH (ref 0–0)
NEUTROPHILS # BLD AUTO: 10.22 K/UL — HIGH (ref 1.4–6.5)
NEUTROPHILS NFR BLD AUTO: 91.3 % — HIGH (ref 42.2–75.2)
NEUTS BAND # BLD: 0.8 % — SIGNIFICANT CHANGE UP (ref 0–6)
NRBC # BLD: 1 /100 — HIGH (ref 0–0)
NRBC # BLD: SIGNIFICANT CHANGE UP /100 WBCS (ref 0–0)
PLAT MORPH BLD: NORMAL — SIGNIFICANT CHANGE UP
PLATELET # BLD AUTO: 222 K/UL — SIGNIFICANT CHANGE UP (ref 130–400)
POIKILOCYTOSIS BLD QL AUTO: SLIGHT — SIGNIFICANT CHANGE UP
POLYCHROMASIA BLD QL SMEAR: SIGNIFICANT CHANGE UP
POTASSIUM SERPL-MCNC: 5.2 MMOL/L — HIGH (ref 3.5–5)
POTASSIUM SERPL-SCNC: 5.2 MMOL/L — HIGH (ref 3.5–5)
PROT SERPL-MCNC: 5.5 G/DL — LOW (ref 6–8)
PROTHROM AB SERPL-ACNC: 12.4 SEC — SIGNIFICANT CHANGE UP (ref 9.95–12.87)
RBC # BLD: 2.57 M/UL — LOW (ref 4.2–5.4)
RBC # FLD: 21.1 % — HIGH (ref 11.5–14.5)
RBC BLD AUTO: ABNORMAL
SODIUM SERPL-SCNC: 133 MMOL/L — LOW (ref 135–146)
TYPE + AB SCN PNL BLD: SIGNIFICANT CHANGE UP
WBC # BLD: 11.1 K/UL — HIGH (ref 4.8–10.8)
WBC # FLD AUTO: 11.1 K/UL — HIGH (ref 4.8–10.8)

## 2018-10-31 RX ORDER — METRONIDAZOLE 500 MG
500 TABLET ORAL ONCE
Qty: 0 | Refills: 0 | Status: COMPLETED | OUTPATIENT
Start: 2018-10-31 | End: 2018-10-31

## 2018-10-31 RX ORDER — CEFEPIME 1 G/1
2000 INJECTION, POWDER, FOR SOLUTION INTRAMUSCULAR; INTRAVENOUS ONCE
Qty: 0 | Refills: 0 | Status: COMPLETED | OUTPATIENT
Start: 2018-10-31 | End: 2018-10-31

## 2018-10-31 NOTE — ED ADULT TRIAGE NOTE - CHIEF COMPLAINT QUOTE
patient had recent surgery for removal of kidney - sent in from [facility] for low hgb 6.2

## 2018-10-31 NOTE — ED PROVIDER NOTE - OBJECTIVE STATEMENT
77 year old female with pmhx of CVA, anemia, DM, MVP, HTN, a fib on coumadin sent in by Dr. Alvarenga for transfusion for hmg 6.2. Pt is s/p oophorectomy 2 days ago at [facility]. + hx of left sided nephrectomy 6 months ago (reason is unclear). + abdominal pain, fatigue. No chest pain, sob, rectal bleeding, dysuria, hematuria, n/v, fever, chills.

## 2018-10-31 NOTE — ED PROVIDER NOTE - PROGRESS NOTE DETAILS
Discussed with GYN resident, will evaluate pt in ED After calling multiple surgical services at [facility], case discussed w/ patient's surgeon Dr. Pablo at [facility] given her retroperitoneal hematoma w/ severe anemia only 2d postop.  He would like her transferred to [facility] for continuity of care.  Pt is hemodynamically stable, will call transfer line to start transfer. Discussed case with ER Dr. Toth at [facility], will be accepting transfer. Pt desirous of transfer to [facility] for continuity of care with her surgeon.  She is receiving 2nd uPRBC at this time.  Abx held as it is unclear based on noncontrast CT whether this is abscess vs hematoma (given her sig blood loss, despite mild leukocytosis). Pt afebrile, hemodynamically stable, comfortably resting at this time.  [facility] to arrange transport, awaiting callback from dispatch at midnight. Pt desirous of transfer to [facility] for continuity of care with her surgeon.  She is receiving 2nd Psychiatric at this time.  Pt afebrile, hemodynamically stable, comfortably resting at this time.  [facility] to arrange transport, awaiting callback from dispatch at midnight. transport team in ED bedside to transfer patient, clerical/nursing staff is providing paper work to them. Patient is awake, alert, resting comfortably on the bed, and desires to go to [facility] to be evaluated by her doctor. Patient denies any symptoms now.

## 2018-10-31 NOTE — CONSULT NOTE ADULT - ASSESSMENT
77yo P3 postmenopausal s/p ureterectomy and removal of adnexal mass now with symptomatic anemia and pelvic collection, not infected at this time,  -[facility] blood transfusion 2u PRBCs  -[facility] urology/surgery evaluation for pelvic collection  -[facility] service contacted to determine if there was gyn involvement intraop  -gyn to follow    Dr Elias lo

## 2018-10-31 NOTE — ED PROVIDER NOTE - PHYSICAL EXAMINATION
CONSTITUTIONAL: Well-appearing; well-nourished; in no apparent distress.   NECK: Supple; non-tender; no cervical lymphadenopathy. No JVD.   CARDIOVASCULAR: Normal S1, S2; no murmurs, rubs, or gallops.   RESPIRATORY: Normal chest excursion with respiration; breath sounds clear and equal bilaterally; no wheezes, rhonchi, or rales.  GI/: + surgical site intact. + diffuse generalized abdominal ttp  MS: No evidence of trauma or deformity. Normal ROM in all four extremities; non-tender to palpation;  SKIN: + pallor. warm; dry; good turgor; no apparent lesions or exudate.   NEURO/PSYCH: A & O x 4; grossly unremarkable. mood and manner are appropriate.

## 2018-10-31 NOTE — ED ADULT NURSE NOTE - OBJECTIVE STATEMENT
Patient had an oroophorectomy 2 days ago and has had increased pain, was seen by dr. hemphill who told patient her hbg is 6.2.

## 2018-10-31 NOTE — ED ADULT NURSE NOTE - CHIEF COMPLAINT QUOTE
patient had recent surgery for removal of kidney - sent in from [facility] for low hgb 6.2

## 2018-10-31 NOTE — ED PROVIDER NOTE - NS ED ROS FT
Constitutional: no fever, chills, no recent weight loss, change in appetite or malaise  Cardiac: No chest pain, SOB or edema.  Respiratory: No cough or respiratory distress  GI: + abdominal pain. No nausea, vomiting, diarrhea   : No dysuria, frequency, urgency or hematuria  MS: no pain to back or extremities, no loss of ROM, no weakness  Neuro: No headache or weakness. No LOC.  Skin: No skin rash.  Except as documented in the HPI, all other systems are negative.

## 2018-10-31 NOTE — CONSULT NOTE ADULT - SUBJECTIVE AND OBJECTIVE BOX
AUSTIN DAVIS   78y   Female      Chief Complaint:    HPI: 77yo P3 postmenopausal sent to ED by hematology for low Hb and blood transfusion.  Pt s/p robotic ureterectomy and removal of pelvis mass (per discharge paperwork from [facility]) with urologist Dr Pablo.  Pt history obtained from son, significant for hematuria, had L nephrectomy 6 mos ago with finding of benign mass.  Since that surgery pt has been experiencing abdominal pain, was scanned and left pelvic mass found so pt taken back to OR on 10/29 for most recent procedure.  Pt stayed overnight after surgery and was discharged on 10/30 with follow up in 1 week.  Pt has not eaten since 10/28, has not had an appetite.  Pt has been tolerating liquids, +belching, - flatus.  Pt reports abdominal bloating but denies abdominal pain at this time, reports mild tenderness at incision sites.  Today son noticed pt appeared very pale, pt admitted to weakness so he took her to see her hematologist whom she sees for anemia.  Pt was found to have Hb 6.2 and sent to ED for blood transfusion.  Pt has h/o afib on coumadin, discontinued preoperatively (cannot recall for how many dose) had dose of lovenox preop on 10/28, then restarted coumadin on 10/30.   Pt denies fevers/chills, chest pain, SOB, vomiting, diarrhea.        PAST MEDICAL & SURGICAL HISTORY:  CVA (cerebral vascular accident)  Anemia  Rheumatic fever  Diabetes  Osteoporosis  Mitral valve replaced  Atrial fibrillation: on coumadin at home  Hypertension  History of ureter stent  S/P AVR  H/O mitral valve replacement  H/o pacemaker placement  H/o nephrectomy      OB/GYN HISTORY:     LMP: 15yrs ago  Gyn: denies history of abnormal pap, STI, ovarian cysts, or uterine fibroids  Obstetric:   Last Pap smear:  poor historian, possibly 5yrs ago, denies abnormal PAP history  Last mammogram: 1 year ago normal per pt  Last Colonoscopy: 5 yrs ago normal per pt    FAMILY HISTORY:  No pertinent family history in first degree relatives      SOCIAL HISTORY: Denies cigarette use, alcohol, or other drugs    ALLERGIES: No Known Drug Allergies  shellfish (Unknown)    MEDICATIONS:         Review of Systems: see HPI    Vital Signs Last 24 Hrs  T(C): 36.1 (31 Oct 2018 15:58), Max: 36.1 (31 Oct 2018 15:58)  T(F): 97 (31 Oct 2018 15:58), Max: 97 (31 Oct 2018 15:58)  HR: 62 (31 Oct 2018 15:58) (62 - 62)  BP: 116/57 (31 Oct 2018 15:58) (116/57 - 116/57)  BP(mean): --  RR: 20 (31 Oct 2018 15:58) (20 - 20)  SpO2: 97% (31 Oct 2018 15:58) (97% - 97%)    PHYSICAL EXAM:      Constitutional: AAOx3, NAD    Breasts: deferred    Respiratory: CTAB    Cardiovascular: NL S1/S2, no M/R/G    Gastrointestinal: Soft, mildly tender, moderately distended, no rebound, guarding, or rigidity, +BS x4 quadrants    Pelvic: deferred    Rectal:    LABS:                        6.1    11.10 )-----------( 222      ( 31 Oct 2018 17:06 )             19.0       Antibody Screen: NEG (17:06)    10-31    133<L>  |  97<L>  |  43<H>  ----------------------------<  208<H>  5.2<H>   |  24  |  2.0<H>    Ca    8.0<L>      31 Oct 2018 17:06    TPro  5.5<L>  /  Alb  2.6<L>  /  TBili  0.5  /  DBili  x   /  AST  16  /  ALT  7   /  AlkPhos  81  10-31    PT/INR - ( 31 Oct 2018 17:06 )   PT: 12.40 sec;   INR: 1.08 ratio         PTT - ( 31 Oct 2018 17:06 )  PTT:25.1 sec        RADIOLOGY & ADDITIONAL STUDIES:    < from: CT Abdomen and Pelvis No Cont (10.31.18 @ 18:57) >  LOWER CHEST: Small bilateral pleural effusions with atelectatic changes.   Coronary artery calcifications..  Partially imaged intracardiac device with valvular replacement  HEPATOBILIARY: Cholelithiasis.    SPLEEN: Unremarkable.    PANCREAS: Unremarkable.    ADRENAL GLANDS: Unremarkable.    KIDNEYS: Patient status post left-sided nephrectomy. No evidence of renal   calculi or hydronephrosis.    ABDOMINOPELVIC NODES: Unremarkable.    PELVIC ORGANS: There is air within the bladder.    PERITONEUM/MESENTERY/BOWEL: Status post left oophorectomy.  There is a   fluid collection with multiple foci of gas in the left pelvis measuring   6.1 x 5.3 x 6 cm on (series 3 image 65 ), adjacent to surgical site   consistent with abscess.    BONES/SOFT TISSUES: Degenerative changes of the spine. Status post   sternotomy. Minimal subcutaneous emphysema and inflammation in the left   anterior abdominal wall.    OTHER: Atherosclerotic disease of the aorta.      IMPRESSION:     Retroperitoneal abscess containing foci of air and gas measuring 6.1 x   5.3 x 6 cm    Residual free intraperitoneal air and abdominal wall emphysema, likely   postoperative. Continued follow-up recommended.    Air within the bladder, may be due to recent instrumentation versus   secondary cystitis. Correlate with urinalysis..    Small bilateral pleural effusions and basilar atelectasis.    < end of copied text > AUSTIN DAVIS   78y   Female      Chief Complaint:    HPI: 79yo P3 postmenopausal sent to ED by hematology for low Hb and blood transfusion.  Pt s/p robotic ureterectomy and removal of left pelvic mass (per discharge paperwork from [facility]) with urologist Dr Pablo.  Pt history obtained from son, significant for hematuria, had L nephrectomy 6 mos ago with finding of benign mass.  Since that surgery pt has been experiencing abdominal pain, was scanned and left pelvic mass found so pt taken back to OR on 10/29 for most recent procedure.  Pt stayed overnight after surgery and was discharged on 10/30 with follow up in 1 week.  Pt has not eaten since 10/28, has not had an appetite.  Pt has been tolerating liquids, +belching, - flatus.  Pt reports abdominal bloating but denies abdominal pain at this time, reports mild tenderness at incision sites.  Today son noticed pt appeared very pale, pt admitted to weakness so he took her to see her hematologist whom she sees for anemia.  Pt was found to have Hb 6.2 and sent to ED for blood transfusion.  Pt has h/o afib on coumadin, discontinued preoperatively (cannot recall for how many dose) had dose of lovenox preop on 10/28, then restarted coumadin on 10/30.   Pt denies fevers/chills, chest pain, SOB, vomiting, diarrhea.        PAST MEDICAL & SURGICAL HISTORY:  CVA (cerebral vascular accident)  Anemia  Rheumatic fever  Diabetes  Osteoporosis  Mitral valve replaced  Atrial fibrillation: on coumadin at home  Hypertension  History of ureter stent  S/P AVR  H/O mitral valve replacement  H/o pacemaker placement  H/o nephrectomy      OB/GYN HISTORY:     LMP: 15yrs ago  Gyn: denies history of abnormal pap, STI, ovarian cysts, or uterine fibroids  Obstetric:   Last Pap smear:  poor historian, possibly 5yrs ago, denies abnormal PAP history  Last mammogram: 1 year ago normal per pt  Last Colonoscopy: 5 yrs ago normal per pt    FAMILY HISTORY:  No pertinent family history in first degree relatives      SOCIAL HISTORY: Denies cigarette use, alcohol, or other drugs    ALLERGIES: No Known Drug Allergies  shellfish (Unknown)    MEDICATIONS:         Review of Systems: see HPI    Vital Signs Last 24 Hrs  T(C): 36.1 (31 Oct 2018 15:58), Max: 36.1 (31 Oct 2018 15:58)  T(F): 97 (31 Oct 2018 15:58), Max: 97 (31 Oct 2018 15:58)  HR: 62 (31 Oct 2018 15:58) (62 - 62)  BP: 116/57 (31 Oct 2018 15:58) (116/57 - 116/57)  BP(mean): --  RR: 20 (31 Oct 2018 15:58) (20 - 20)  SpO2: 97% (31 Oct 2018 15:58) (97% - 97%)    PHYSICAL EXAM:      Constitutional: AAOx3, NAD    Breasts: deferred    Respiratory: CTAB    Cardiovascular: NL S1/S2, no M/R/G    Gastrointestinal: Soft, mildly tender, moderately distended, no rebound, guarding, or rigidity, +BS x4 quadrants    Pelvic: deferred    Rectal:    LABS:                        6.1    11.10 )-----------( 222      ( 31 Oct 2018 17:06 )             19.0       Antibody Screen: NEG (17:06)    10-31    133<L>  |  97<L>  |  43<H>  ----------------------------<  208<H>  5.2<H>   |  24  |  2.0<H>    Ca    8.0<L>      31 Oct 2018 17:06    TPro  5.5<L>  /  Alb  2.6<L>  /  TBili  0.5  /  DBili  x   /  AST  16  /  ALT  7   /  AlkPhos  81  10-31    PT/INR - ( 31 Oct 2018 17:06 )   PT: 12.40 sec;   INR: 1.08 ratio         PTT - ( 31 Oct 2018 17:06 )  PTT:25.1 sec        RADIOLOGY & ADDITIONAL STUDIES:    < from: CT Abdomen and Pelvis No Cont (10.31.18 @ 18:57) >  LOWER CHEST: Small bilateral pleural effusions with atelectatic changes.   Coronary artery calcifications..  Partially imaged intracardiac device with valvular replacement  HEPATOBILIARY: Cholelithiasis.    SPLEEN: Unremarkable.    PANCREAS: Unremarkable.    ADRENAL GLANDS: Unremarkable.    KIDNEYS: Patient status post left-sided nephrectomy. No evidence of renal   calculi or hydronephrosis.    ABDOMINOPELVIC NODES: Unremarkable.    PELVIC ORGANS: There is air within the bladder.    PERITONEUM/MESENTERY/BOWEL: Status post left oophorectomy.  There is a   fluid collection with multiple foci of gas in the left pelvis measuring   6.1 x 5.3 x 6 cm on (series 3 image 65 ), adjacent to surgical site   consistent with abscess.    BONES/SOFT TISSUES: Degenerative changes of the spine. Status post   sternotomy. Minimal subcutaneous emphysema and inflammation in the left   anterior abdominal wall.    OTHER: Atherosclerotic disease of the aorta.      IMPRESSION:     Retroperitoneal abscess containing foci of air and gas measuring 6.1 x   5.3 x 6 cm    Residual free intraperitoneal air and abdominal wall emphysema, likely   postoperative. Continued follow-up recommended.    Air within the bladder, may be due to recent instrumentation versus   secondary cystitis. Correlate with urinalysis..    Small bilateral pleural effusions and basilar atelectasis.    < end of copied text >

## 2018-10-31 NOTE — ED ADULT NURSE NOTE - NSIMPLEMENTINTERV_GEN_ALL_ED
Implemented All Universal Safety Interventions:  [place]ey to call system. Call bell, personal items and telephone within reach. Instruct patient to call for assistance. Room bathroom lighting operational. Non-slip footwear when patient is off stretcher. Physically safe environment: no spills, clutter or unnecessary equipment. Stretcher in lowest position, wheels locked, appropriate side rails in place.

## 2018-10-31 NOTE — ED PROVIDER NOTE - ATTENDING CONTRIBUTION TO CARE
This is a 77yF who presents to the ED from o/p clinic for severe anemia (Hgb 6.2).  She had surgery at OSH 2d ago (reportedly oopherectomy, though reason is unclear) and  reports she has been very tired, cold and generally weak since then.  No hematuria, hematochezia, hematemesis or melena.  No fever, CP or SOB.  She has not had bowel movement since before surgery and  filled script for her this morning for bowel regimen, but she has not started it.  Dec PO intake w/o vomiting.  Pt has note from Dr. Alvarenga with copy of labs (Hgb 6.2) and request for 2u pRBC and CT A/P for abd pain.    VSS  CONSTITUTIONAL: well developed; thin elderly woman lying on stretcher in no acute distress  HEAD: normocephalic; atraumatic  EYES: no conjunctival injection, no scleral icterus, pale conjunctivae  ENT: no nasal discharge; airway clear.  NECK: supple; non tender. + full passive ROM in all directions  CARD: S1, S2 normal; no murmurs, gallops, or rubs. Regular rate and rhythm  RESP: no wheezes, rales or rhonchi. Good air movement bilaterally without significant accessory muscle use  ABD: soft; mildly distended, mild diffuse tenderness, helio epigastric, healing periumbilical scar w/o erythema or drainage, no rebound but some guarding, no pulsatile abdominal mass  EXT: moving all extremities spontaneously, normal ROM. No clubbing, cyanosis or edema  SKIN: warm and dry, no lesions noted, pale skin and nail beds  NEURO: alert, oriented, CN II-XII grossly intact, motor and sensory grossly intact, speech nonslurred, no focal deficits. GCS 15  PSYCH: calm, cooperative, appropriate, good eye contact, logical thought process, no apparent danger to self or others    labs  T+S  2u PRBC  CT A/P  reassess

## 2018-10-31 NOTE — ED PROVIDER NOTE - MEDICAL DECISION MAKING DETAILS
77yF with cancer presents 2d after oophorectomy at [facility] for ovarian mass.  Pt has been tired, generally weak and cold since surgery, saw her heme/onc today who janee labs with Hgb <7.  In the ED, pt well appearing, comfortably resting but with mild abdominal pain on exam beyond expected postop healing.  Labs with mild leukocytosis and CT with retroperitoneal abscess vs hematoma.  Holding abx given unclear abscess vs hematoma on noncontrast CT.  Gen surg and obgyn consulted; eventually spoke to Dr. Pablo of urology at [facility] and after discussion with patient, pt agreeable to transfer to [facility] for further care given postop complication.  She is hemodynamically stable, afebrile, comfortable, receiving 2nd uPRBC in the ED while awaiting transport to [facility].  Report given to ED at [facility]. 77yF with cancer presents 2d after oophorectomy at [facility] for ovarian mass.  Pt has been tired, generally weak and cold since surgery, saw her heme/onc today who janee labs with Hgb <7.  In the ED, pt well appearing, comfortably resting but with mild abdominal pain on exam beyond expected postop healing.  Labs with mild leukocytosis and CT with retroperitoneal abscess vs hematoma.  Gen surg and obgyn consulted; eventually spoke to Dr. Pablo of urology at [facility] and after discussion with patient, pt agreeable to transfer to [facility] for further care given postop complication.  She is hemodynamically stable, afebrile, comfortable, receiving 2nd uPRBC in the ED while awaiting transport to [facility].  Report given to ED at [facility].

## 2018-10-31 NOTE — CONSULT NOTE ADULT - ATTENDING COMMENTS
Agree with resident note. Patient s/p ureterectomy and removal of adnexal mass, with symptomatic anemia and pelvic collection. Care per primary team. GYN will follow.

## 2018-10-31 NOTE — ED PROVIDER NOTE - CARE PLAN
Principal Discharge DX:	Anemia  Secondary Diagnosis:	Abdominal pain Principal Discharge DX:	Retroperitoneal hematoma  Secondary Diagnosis:	Abdominal pain  Secondary Diagnosis:	Severe anemia

## 2018-11-01 ENCOUNTER — APPOINTMENT (OUTPATIENT)
Dept: HEMATOLOGY ONCOLOGY | Facility: CLINIC | Age: 78
End: 2018-11-01

## 2018-11-01 VITALS
HEART RATE: 64 BPM | TEMPERATURE: 97 F | SYSTOLIC BLOOD PRESSURE: 115 MMHG | OXYGEN SATURATION: 98 % | RESPIRATION RATE: 18 BRPM | DIASTOLIC BLOOD PRESSURE: 64 MMHG

## 2018-11-01 LAB
HCT VFR BLD CALC: 19.9 %
HGB BLD-MCNC: 6.2 G/DL
MCHC RBC-ENTMCNC: 23.6 PG
MCHC RBC-ENTMCNC: 31.2 G/DL
MCV RBC AUTO: 75.7 FL
PLATELET # BLD AUTO: 230 K/UL
PMV BLD: 10.7 FL
RBC # BLD: 2.63 M/UL
RBC # FLD: 21.2 %
WBC # FLD AUTO: 10.66 K/UL

## 2018-11-01 RX ADMIN — Medication 100 MILLIGRAM(S): at 00:04

## 2018-11-01 RX ADMIN — CEFEPIME 100 MILLIGRAM(S): 1 INJECTION, POWDER, FOR SOLUTION INTRAMUSCULAR; INTRAVENOUS at 01:13

## 2018-11-13 ENCOUNTER — APPOINTMENT (OUTPATIENT)
Dept: HEART AND VASCULAR | Facility: CLINIC | Age: 78
End: 2018-11-13
Payer: MEDICARE

## 2018-11-13 VITALS
HEART RATE: 67 BPM | RESPIRATION RATE: 12 BRPM | WEIGHT: 131 LBS | SYSTOLIC BLOOD PRESSURE: 140 MMHG | DIASTOLIC BLOOD PRESSURE: 68 MMHG | HEIGHT: 61 IN | BODY MASS INDEX: 24.73 KG/M2

## 2018-11-13 DIAGNOSIS — F41.9 ANXIETY DISORDER, UNSPECIFIED: ICD-10-CM

## 2018-11-13 DIAGNOSIS — Z95.4 PRESENCE OF OTHER HEART-VALVE REPLACEMENT: ICD-10-CM

## 2018-11-13 DIAGNOSIS — F32.9 MAJOR DEPRESSIVE DISORDER, SINGLE EPISODE, UNSPECIFIED: ICD-10-CM

## 2018-11-13 PROCEDURE — 93000 ELECTROCARDIOGRAM COMPLETE: CPT

## 2018-11-13 PROCEDURE — 99214 OFFICE O/P EST MOD 30 MIN: CPT

## 2018-11-13 RX ORDER — DIGOXIN 0.12 MG/1
125 TABLET ORAL DAILY
Qty: 30 | Refills: 3 | Status: DISCONTINUED | COMMUNITY
Start: 2018-10-18 | End: 2018-11-13

## 2018-11-13 RX ORDER — ENOXAPARIN SODIUM 100 MG/ML
80 INJECTION SUBCUTANEOUS
Qty: 14 | Refills: 0 | Status: DISCONTINUED | COMMUNITY
Start: 2018-10-22 | End: 2018-11-13

## 2018-11-13 NOTE — REASON FOR VISIT
[Follow-Up - Clinic] : a clinic follow-up of [Atrial Fibrillation] : atrial fibrillation [Hypertension] : hypertension

## 2018-11-13 NOTE — DISCUSSION/SUMMARY
[___ Month(s)] : [unfilled] month(s) [With Me] : with me [FreeTextEntry1] : 1. Depression: 4% of patients on digoxin can have depression, will discontinue it and increase metoprolol to 50 mg twice daily to offset the potential tachycardia that may occur.\par 2. Metallic aortic valve: INR 2.2 advised to continue current dosing of Coumadin\par 3. Atrial fibrillation: Increase metoprolol to 50 mg twice daily discontinue digoxin and continue Coumadin.

## 2018-11-13 NOTE — PHYSICAL EXAM
[General Appearance - Well Developed] : well developed [Normal Appearance] : normal appearance [Well Groomed] : well groomed [General Appearance - Well Nourished] : well nourished [No Deformities] : no deformities [General Appearance - In No Acute Distress] : no acute distress [Normal Conjunctiva] : the conjunctiva exhibited no abnormalities [Eyelids - No Xanthelasma] : the eyelids demonstrated no xanthelasmas [Normal Oral Mucosa] : normal oral mucosa [No Oral Pallor] : no oral pallor [No Oral Cyanosis] : no oral cyanosis [Normal Jugular Venous A Waves Present] : normal jugular venous A waves present [Normal Jugular Venous V Waves Present] : normal jugular venous V waves present [No Jugular Venous Arevalo A Waves] : no jugular venous arevalo A waves [Respiration, Rhythm And Depth] : normal respiratory rhythm and effort [Exaggerated Use Of Accessory Muscles For Inspiration] : no accessory muscle use [Auscultation Breath Sounds / Voice Sounds] : lungs were clear to auscultation bilaterally [Heart Rate And Rhythm] : heart rate and rhythm were normal [Heart Sounds] : normal S1 and S2 [Arterial Pulses Normal] : the arterial pulses were normal [Abdomen Soft] : soft [Abdomen Tenderness] : non-tender [Abdomen Mass (___ Cm)] : no abdominal mass palpated [Abnormal Walk] : normal gait [Gait - Sufficient For Exercise Testing] : the gait was sufficient for exercise testing [Nail Clubbing] : no clubbing of the fingernails [Cyanosis, Localized] : no localized cyanosis [Petechial Hemorrhages (___cm)] : no petechial hemorrhages [] : no ischemic changes [Oriented To Time, Place, And Person] : oriented to person, place, and time [Affect] : the affect was normal [Mood] : the mood was normal [No Anxiety] : not feeling anxious [FreeTextEntry1] : 1+ b/l edema

## 2018-11-29 ENCOUNTER — APPOINTMENT (OUTPATIENT)
Dept: HEMATOLOGY ONCOLOGY | Facility: CLINIC | Age: 78
End: 2018-11-29

## 2018-11-29 ENCOUNTER — LABORATORY RESULT (OUTPATIENT)
Age: 78
End: 2018-11-29

## 2018-12-04 ENCOUNTER — APPOINTMENT (OUTPATIENT)
Dept: CARDIOTHORACIC SURGERY | Facility: CLINIC | Age: 78
End: 2018-12-04
Payer: MEDICARE

## 2018-12-04 ENCOUNTER — APPOINTMENT (OUTPATIENT)
Dept: CARDIOTHORACIC SURGERY | Facility: CLINIC | Age: 78
End: 2018-12-04

## 2018-12-04 VITALS
OXYGEN SATURATION: 95 % | TEMPERATURE: 98.6 F | WEIGHT: 135 LBS | DIASTOLIC BLOOD PRESSURE: 67 MMHG | HEART RATE: 105 BPM | RESPIRATION RATE: 22 BRPM | SYSTOLIC BLOOD PRESSURE: 120 MMHG | BODY MASS INDEX: 25.51 KG/M2

## 2018-12-04 PROCEDURE — 99213 OFFICE O/P EST LOW 20 MIN: CPT

## 2018-12-07 LAB
HCT VFR BLD CALC: 22.9 %
HGB BLD-MCNC: 6.9 G/DL
MCHC RBC-ENTMCNC: 21.7 PG
MCHC RBC-ENTMCNC: 30.1 G/DL
MCV RBC AUTO: 72 FL
PLATELET # BLD AUTO: 269 K/UL
PMV BLD: 11.4 FL
RBC # BLD: 3.18 M/UL
RBC # FLD: 19.6 %
WBC # FLD AUTO: 7.59 K/UL

## 2018-12-11 ENCOUNTER — RX RENEWAL (OUTPATIENT)
Age: 78
End: 2018-12-11

## 2018-12-13 ENCOUNTER — APPOINTMENT (OUTPATIENT)
Dept: HEMATOLOGY ONCOLOGY | Facility: CLINIC | Age: 78
End: 2018-12-13

## 2018-12-13 ENCOUNTER — LABORATORY RESULT (OUTPATIENT)
Age: 78
End: 2018-12-13

## 2018-12-13 LAB
ABO + RH PNL BLD: NORMAL
BLD GP AB SCN SERPL QL: NORMAL

## 2018-12-14 ENCOUNTER — INPATIENT (INPATIENT)
Facility: HOSPITAL | Age: 78
LOS: 2 days | Discharge: HOME | End: 2018-12-17
Attending: INTERNAL MEDICINE | Admitting: INTERNAL MEDICINE
Payer: MEDICARE

## 2018-12-14 ENCOUNTER — APPOINTMENT (OUTPATIENT)
Dept: INFUSION THERAPY | Facility: CLINIC | Age: 78
End: 2018-12-14

## 2018-12-14 VITALS
RESPIRATION RATE: 18 BRPM | OXYGEN SATURATION: 99 % | HEART RATE: 60 BPM | TEMPERATURE: 97 F | DIASTOLIC BLOOD PRESSURE: 57 MMHG | SYSTOLIC BLOOD PRESSURE: 127 MMHG

## 2018-12-14 DIAGNOSIS — Z96.0 PRESENCE OF UROGENITAL IMPLANTS: Chronic | ICD-10-CM

## 2018-12-14 DIAGNOSIS — Z98.890 OTHER SPECIFIED POSTPROCEDURAL STATES: Chronic | ICD-10-CM

## 2018-12-14 DIAGNOSIS — Z95.2 PRESENCE OF PROSTHETIC HEART VALVE: Chronic | ICD-10-CM

## 2018-12-14 LAB
ALBUMIN SERPL ELPH-MCNC: 3.7 G/DL — SIGNIFICANT CHANGE UP (ref 3.5–5.2)
ALP SERPL-CCNC: 115 U/L — SIGNIFICANT CHANGE UP (ref 30–115)
ALT FLD-CCNC: 6 U/L — SIGNIFICANT CHANGE UP (ref 0–41)
ANION GAP SERPL CALC-SCNC: 14 MMOL/L — SIGNIFICANT CHANGE UP (ref 7–14)
APTT BLD: 32 SEC — SIGNIFICANT CHANGE UP (ref 27–39.2)
AST SERPL-CCNC: 14 U/L — SIGNIFICANT CHANGE UP (ref 0–41)
BILIRUB SERPL-MCNC: 1.2 MG/DL — SIGNIFICANT CHANGE UP (ref 0.2–1.2)
BLD GP AB SCN SERPL QL: SIGNIFICANT CHANGE UP
BUN SERPL-MCNC: 25 MG/DL — HIGH (ref 10–20)
CALCIUM SERPL-MCNC: 8.2 MG/DL — LOW (ref 8.5–10.1)
CHLORIDE SERPL-SCNC: 97 MMOL/L — LOW (ref 98–110)
CO2 SERPL-SCNC: 31 MMOL/L — SIGNIFICANT CHANGE UP (ref 17–32)
CREAT SERPL-MCNC: 1.9 MG/DL — HIGH (ref 0.7–1.5)
GLUCOSE SERPL-MCNC: 94 MG/DL — SIGNIFICANT CHANGE UP (ref 70–99)
HCT VFR BLD CALC: 20.6 % — LOW (ref 37–47)
HGB BLD-MCNC: 6.1 G/DL — CRITICAL LOW (ref 12–16)
INR BLD: 2.16 RATIO — HIGH (ref 0.65–1.3)
LACTATE SERPL-SCNC: 0.5 MMOL/L — SIGNIFICANT CHANGE UP (ref 0.5–2.2)
MCHC RBC-ENTMCNC: 20.5 PG — LOW (ref 27–31)
MCHC RBC-ENTMCNC: 29.6 G/DL — LOW (ref 32–37)
MCV RBC AUTO: 69.4 FL — LOW (ref 81–99)
NRBC # BLD: 0 /100 WBCS — SIGNIFICANT CHANGE UP (ref 0–0)
NT-PROBNP SERPL-SCNC: 3340 PG/ML — HIGH (ref 0–300)
PLATELET # BLD AUTO: 236 K/UL — SIGNIFICANT CHANGE UP (ref 130–400)
POTASSIUM SERPL-MCNC: 3.2 MMOL/L — LOW (ref 3.5–5)
POTASSIUM SERPL-SCNC: 3.2 MMOL/L — LOW (ref 3.5–5)
PROT SERPL-MCNC: 6.9 G/DL — SIGNIFICANT CHANGE UP (ref 6–8)
PROTHROM AB SERPL-ACNC: 24.7 SEC — HIGH (ref 9.95–12.87)
RBC # BLD: 2.97 M/UL — LOW (ref 4.2–5.4)
RBC # FLD: 19.7 % — HIGH (ref 11.5–14.5)
SODIUM SERPL-SCNC: 142 MMOL/L — SIGNIFICANT CHANGE UP (ref 135–146)
TROPONIN T SERPL-MCNC: <0.01 NG/ML — SIGNIFICANT CHANGE UP
TYPE + AB SCN PNL BLD: SIGNIFICANT CHANGE UP
WBC # BLD: 6.78 K/UL — SIGNIFICANT CHANGE UP (ref 4.8–10.8)
WBC # FLD AUTO: 6.78 K/UL — SIGNIFICANT CHANGE UP (ref 4.8–10.8)

## 2018-12-14 PROCEDURE — 93970 EXTREMITY STUDY: CPT | Mod: 26

## 2018-12-14 RX ORDER — FERROUS SULFATE 325(65) MG
325 TABLET ORAL DAILY
Qty: 0 | Refills: 0 | Status: DISCONTINUED | OUTPATIENT
Start: 2018-12-14 | End: 2018-12-17

## 2018-12-14 RX ORDER — WARFARIN SODIUM 2.5 MG/1
5 TABLET ORAL ONCE
Qty: 0 | Refills: 0 | Status: COMPLETED | OUTPATIENT
Start: 2018-12-14 | End: 2018-12-14

## 2018-12-14 RX ORDER — POTASSIUM CHLORIDE 20 MEQ
20 PACKET (EA) ORAL ONCE
Qty: 0 | Refills: 0 | Status: COMPLETED | OUTPATIENT
Start: 2018-12-14 | End: 2018-12-14

## 2018-12-14 RX ORDER — POTASSIUM CHLORIDE 20 MEQ
40 PACKET (EA) ORAL ONCE
Qty: 0 | Refills: 0 | Status: COMPLETED | OUTPATIENT
Start: 2018-12-14 | End: 2018-12-14

## 2018-12-14 RX ORDER — METOPROLOL TARTRATE 50 MG
50 TABLET ORAL EVERY 12 HOURS
Qty: 0 | Refills: 0 | Status: DISCONTINUED | OUTPATIENT
Start: 2018-12-14 | End: 2018-12-15

## 2018-12-14 RX ORDER — PANTOPRAZOLE SODIUM 20 MG/1
40 TABLET, DELAYED RELEASE ORAL
Qty: 0 | Refills: 0 | Status: DISCONTINUED | OUTPATIENT
Start: 2018-12-14 | End: 2018-12-17

## 2018-12-14 RX ORDER — SIMVASTATIN 20 MG/1
20 TABLET, FILM COATED ORAL AT BEDTIME
Qty: 0 | Refills: 0 | Status: DISCONTINUED | OUTPATIENT
Start: 2018-12-14 | End: 2018-12-17

## 2018-12-14 RX ORDER — FOLIC ACID 0.8 MG
1 TABLET ORAL DAILY
Qty: 0 | Refills: 0 | Status: DISCONTINUED | OUTPATIENT
Start: 2018-12-14 | End: 2018-12-17

## 2018-12-14 RX ORDER — SODIUM CHLORIDE 9 MG/ML
3 INJECTION INTRAMUSCULAR; INTRAVENOUS; SUBCUTANEOUS EVERY 8 HOURS
Qty: 0 | Refills: 0 | Status: DISCONTINUED | OUTPATIENT
Start: 2018-12-14 | End: 2018-12-17

## 2018-12-14 RX ORDER — FUROSEMIDE 40 MG
40 TABLET ORAL DAILY
Qty: 0 | Refills: 0 | Status: DISCONTINUED | OUTPATIENT
Start: 2018-12-14 | End: 2018-12-17

## 2018-12-14 RX ORDER — RALOXIFENE HYDROCHLORIDE 60 MG/1
60 TABLET, COATED ORAL DAILY
Qty: 0 | Refills: 0 | Status: DISCONTINUED | OUTPATIENT
Start: 2018-12-14 | End: 2018-12-17

## 2018-12-14 RX ORDER — RAMIPRIL 5 MG
1 CAPSULE ORAL
Qty: 0 | Refills: 0 | COMMUNITY

## 2018-12-14 RX ORDER — PIPERACILLIN AND TAZOBACTAM 4; .5 G/20ML; G/20ML
4.5 INJECTION, POWDER, LYOPHILIZED, FOR SOLUTION INTRAVENOUS ONCE
Qty: 0 | Refills: 0 | Status: COMPLETED | OUTPATIENT
Start: 2018-12-14 | End: 2018-12-14

## 2018-12-14 RX ORDER — CHLORHEXIDINE GLUCONATE 213 G/1000ML
1 SOLUTION TOPICAL
Qty: 0 | Refills: 0 | Status: DISCONTINUED | OUTPATIENT
Start: 2018-12-14 | End: 2018-12-17

## 2018-12-14 RX ORDER — CHOLECALCIFEROL (VITAMIN D3) 125 MCG
2000 CAPSULE ORAL DAILY
Qty: 0 | Refills: 0 | Status: DISCONTINUED | OUTPATIENT
Start: 2018-12-14 | End: 2018-12-17

## 2018-12-14 RX ADMIN — PIPERACILLIN AND TAZOBACTAM 200 GRAM(S): 4; .5 INJECTION, POWDER, LYOPHILIZED, FOR SOLUTION INTRAVENOUS at 21:53

## 2018-12-14 RX ADMIN — Medication 40 MILLIEQUIVALENT(S): at 18:50

## 2018-12-14 NOTE — H&P ADULT - HISTORY OF PRESENT ILLNESS
The patient is a 78-year-old female with a PMH of HTN, type II DM, A-Fib, rheumatic heart disease s/p mitral valve replacement, s/p aortic valve replacement, HFpEF, RCC s/p left nephrectomy, left adnexal mass s/p ureterectomy, hematuria, anxiety, and osteoporosis who presented with The patient is a 78-year-old female with a PMH of HTN, type II DM, A-Fib (on Coumadin), rheumatic heart disease s/p bioprosthetic mitral valve replacement, s/p metallic aortic valve replacement, HFpEF, sick sinus syndrome s/p PPM, RLE DVT, RCC s/p left nephrectomy, left adnexal mass s/p resection, hematuria, anxiety, and osteoporosis who presented with a 2-week history of swelling in her left lower extremity.  She denied experiencing any pain in the leg, trauma, or falls.  Today, the patient noticed redness over her left leg.  She went to the [facility] to obtain blood transfusion for her chronic anemia and was noted to have a Hgb of 6.1 g/dL.  She was referred to the ED for further evaluation.  The patient denies fever, chills, N/V/D, abdominal pain, chest pain, SOB, hemoptysis, hematemesis, hematuria, melena, or hematochezia. The patient is a 78-year-old female with a PMH of HTN, type II DM, A-Fib (on Coumadin), rheumatic heart disease s/p bioprosthetic mitral valve replacement, s/p metallic aortic valve replacement, HFpEF, sick sinus syndrome s/p PPM, RLE DVT, RCC s/p left nephrectomy, left adnexal mass s/p resection, hematuria, anxiety, and osteoporosis who presented with a 2-week history of swelling in her left lower extremity.  She denied experiencing any pain in the leg, trauma, or falls.  Today, the patient noticed redness over her left leg.  She went to the [facility] to obtain blood transfusion for her chronic anemia and was noted to have a Hgb of 6.1 g/dL.  She was referred to the ED for further evaluation.  The patient denies fever, chills, N/V/D, abdominal pain, chest pain, SOB, hemoptysis, hematemesis, hematuria, melena, or hematochezia.  She denied recent travel or prolonged immobilization. The patient is a 78-year-old female with a PMH of HTN, type II DM, A-Fib (on Coumadin), rheumatic heart disease s/p bioprosthetic mitral valve replacement, s/p metallic aortic valve replacement, HFpEF, sick sinus syndrome s/p PPM, RLE DVT, RCC s/p left nephrectomy, left adnexal mass s/p resection, hematuria, anxiety, and osteoporosis who presented with a 2-week history of swelling in her left lower extremity.  She denied experiencing any pain in her legs, trauma, or falls.  Today, the patient noticed redness over her left leg.  She went to the [facility] to obtain blood transfusion for her chronic anemia and was noted to have a Hgb of 6.1 g/dL.  She was referred to the ED for further evaluation.  The patient denies fever, chills, N/V/D, abdominal pain, chest pain, SOB, hemoptysis, hematemesis, hematuria, melena, or hematochezia.  She denied recent travel or prolonged immobilization.

## 2018-12-14 NOTE — ED PROVIDER NOTE - PHYSICAL EXAMINATION
GENERAL:  well appearing, non-toxic  female in no acute distress  SKIN: skin warm, pink and dry. MMM. + swelling and erythema to LLE, + increased warmth. no crepitus. cap refill <2 sec  HEAD: NC, AT  NECK: Neck supple. No midline cervical tenderness or JVD. Trachea midline  PULM: CTAB. Normal respiratory effort. No respiratory distress. No wheezes, stridor, rales or rhonchi. No retractions  CV: RRR, no M/R/G.   ABD: Soft, non-tender, non-distended  MSK: FROM of all extremities.  No MSK tenderness. No edema, erythema, cyanosis. Distal pulses intact. doppler used for left LE.   NEURO: A+Ox3, no sensory/motor deficits

## 2018-12-14 NOTE — H&P ADULT - ATTENDING COMMENTS
The patient is a 78-year-old female with a PMH of HTN, type II DM, A-Fib (on Coumadin), rheumatic heart disease s/p bioprosthetic mitral valve replacement, s/p metallic aortic valve replacement, HFpEF, sick sinus syndrome s/p PPM, RLE DVT, RCC s/p left nephrectomy, left adnexal mass s/p resection, hematuria, anxiety, and osteoporosis who presented with a 2-week history of swelling in her left lower extremity.  She denied experiencing any pain in her legs, trauma, or falls.  Today, the patient noticed redness over her left leg.  [facility] for her chronic anemia and was noted to have a Hgb of 6.1 g/dL.  She was referred to the ED for further evaluation.  The patient denies fever, chills, N/V/D, abdominal pain, chest pain, SOB, hemoptysis, hematemesis, hematuria, melena, or hematochezia.  She denied recent travel or prolonged immobilization.    REVIEW OF SYSTEMS:    CONSTITUTIONAL: No weakness, fevers or chills  EYES/ENT: No visual changes;  No vertigo or throat pain   NECK: No pain or stiffness  RESPIRATORY: No cough, wheezing, hemoptysis; No shortness of breath  CARDIOVASCULAR: No chest pain or palpitations  GASTROINTESTINAL: No abdominal or epigastric pain. No nausea, vomiting, or hematemesis; No diarrhea or constipation. No melena or hematochezia.  GENITOURINARY: No dysuria, frequency or hematuria  NEUROLOGICAL: No numbness or weakness  SKIN: No itching, rashes    Physical Exam:  General: WN/WD NAD  Neurology: A&Ox3, nonfocal, follows commands  Eyes: PERRLA/ EOMI  ENT/Neck: Neck supple, trachea midline, No JVD  Respiratory: CTA B/L, No wheezing, rales, rhonchi  CV: Normal rate regular rhythm, S1S2, THAI and Diastolic murmurs w/ metallic click rubs or gallops  Abdominal: Soft, NT, ND +BS,   Extremities: (+) edema, (+) LLE erythema and tenderness, + peripheral pulses  Skin: No Rashes, Hematoma, Ecchymosis  Incisions: n/a  Tubes:n/a    RLE DVT  - c/w coumadin ( on Rx for valve replacements)    Distal LLE cellulitis   -IV abx  -check blood Cx    Valvular heart disease w/ MVR and AVR ( metallic) / SSS w/ PPM  -c/w coumadin   -outpatient F/u    Chronic anemia - 2/2 multiple medical problem +/- metallic AV    Chronic HFpEF  -c/w current Rx    HTN/ DM Type II  -c/w outpatient Rx    H/O Adnexal mass and RCC s/p resections   -Gyn Onc f/u

## 2018-12-14 NOTE — H&P ADULT - NSHPPHYSICALEXAM_GEN_ALL_CORE
Vital Signs Last 24 Hrs  T(C): 37.2 (14 Dec 2018 21:00), Max: 37.3 (14 Dec 2018 18:51)  T(F): 98.9 (14 Dec 2018 21:00), Max: 99.1 (14 Dec 2018 18:51)  HR: 72 (14 Dec 2018 21:00) (60 - 72)  BP: 109/62 (14 Dec 2018 21:00) (109/62 - 127/57)  BP(mean): --  RR: 18 (14 Dec 2018 21:00) (18 - 18)  SpO2: 98% (14 Dec 2018 21:00) (98% - 99%)    Physical Exam:    -     General :     -      HEENT:    -      Cardiac:    -      Pulm:    -      GI:    -      Musculoskeletal:    -      Neuro: Vital Signs Last 24 Hrs  T(C): 37.2 (14 Dec 2018 21:00), Max: 37.3 (14 Dec 2018 18:51)  T(F): 98.9 (14 Dec 2018 21:00), Max: 99.1 (14 Dec 2018 18:51)  HR: 72 (14 Dec 2018 21:00) (60 - 72)  BP: 109/62 (14 Dec 2018 21:00) (109/62 - 127/57)  BP(mean): --  RR: 18 (14 Dec 2018 21:00) (18 - 18)  SpO2: 98% (14 Dec 2018 21:00) (98% - 99%)    Physical Exam:    -     General : Alert, awake and in no acute distress    -      HEENT: normocephalic    -      Cardiac: RRR, normal S1/S2; mechanical heart sounds    -      Pulm: CTA B/L    -      GI: abdomen soft, non-tender    -      Musculoskeletal: erythema and warmth over LLE up to mid-tibia with non-pitting edema; no erythema or warmth over RLE    -      Neuro: AAO x3

## 2018-12-14 NOTE — ED PROVIDER NOTE - CRITICAL CARE PROVIDED
consultation with other physicians/interpretation of diagnostic studies/documentation/direct patient care (not related to procedure)/additional history taking/consult w/ pt's family directly relating to pts condition

## 2018-12-14 NOTE — H&P ADULT - NSHPOUTPATIENTPROVIDERS_GEN_ALL_CORE
PMD: Dr. Trevizo  Oncologist: Dr. Alvarenga and at [facility]  Cardiologist:   Urologist: PMD: Dr. Trevizo  Oncologist: Dr. Alvarenga and at [facility]  Cardiologist in [place], [place]  Urologist in [place], [place]

## 2018-12-14 NOTE — ED PROVIDER NOTE - OBJECTIVE STATEMENT
79 yo female with h/o CVA, anemia, DM, valve replacement, on coumadin, left nephrectomy sent in by her oncologist for left LE swelling 77 yo female with h/o CVA, anemia, DM, 2 valve replacement (1 mechanical, 1 pig), on coumadin, kidney cancer s/p left nephrectomy sent in by her oncologist for left LE swelling and redness x 2 weeks, recently worse. Patient also suppose to get blood transfusion for hemoglobin of 6 today for chronic anemia. Denies fever, chills, chest pain, sob, abd pain, N/V, LE weakness or paresthesias. no fall or trauma.

## 2018-12-14 NOTE — ED ADULT NURSE REASSESSMENT NOTE - NS ED NURSE REASSESS COMMENT FT1
Patient is getting blood transfusion . Patient is alert, oriented, family at bedside .VSS . Denied any reactions to blood transfusion

## 2018-12-14 NOTE — H&P ADULT - ASSESSMENT
Assessment:  A 78y Female     Plan:  1.      . Chronic blood loss microcytic anemia  - multifactorial in etiology    . HTN  -     . Type II DM  -     . A-Fib  -     . History of mitral and aortic valve replacement  -     . HFpEF  -     . RCC s/p left nephrectomy  -     . Left adnexal mass  -     . CKD stage IV  -     . DVT and GI prophylaxis  -     . Disposition  - Assessment:  A 78y Female with a PMH of HTN, type II DM, A-Fib (on Coumadin), rheumatic heart disease s/p bioprosthetic mitral valve replacement, s/p metallic aortic valve replacement, HFpEF, sick sinus syndrome s/p PPM, RLE DVT, RCC s/p left nephrectomy, left adnexal mass s/p resection, hematuria, anxiety, and osteoporosis who presented with a 2-week history of swelling in her left lower extremity.     Plan:  1. RLE lower extremity DVT  - possibly provoked in the setting of recent surgery  - LE venous duplex: Venous thrombosis right soleal vein. No evidence of deep venous thrombosis or superficial thrombophlebitis in left lower extremity. Left groin lymphadenopathy.  - patient reports having a DVT in the RLE 25 years ago which was treated with Coumadin  - continue Coumadin with goal INR of 2.5  - consider hematology/oncology and vascular surgery consults for further evaluation    2. LLE non-purulent cellulitis  -     3. Chronic blood loss microcytic anemia  - multifactorial in etiology  - baseline Hgb is ~7 g/dL  - no signs of acute blood loss  - maintain active type & screen and monitor serial CBC  - transfuse 2 units of PRBC  - continue FeSO4 PO    4. HTN  - continue Metoprolol and monitor BP    5. Type II DM  - last Hgb A1c was 5.5% from April 2018  - hold oral hypoglycemic medications  - monitor FS and start insulin if FS persistently >180    6. Paroxysmal A-Fib  - continue rate control and anticoagulation    7. History of bioprosthetic mitral valve and metallic aortic valve  - continue Coumadin and monitor INR  - f/u with cardiology as outpatient    8. Chronic HFpEF  - CXR: small right pleural effusion  - pro-BNP: 3340  - no signs of decompensation at this time  - continue Lasix 40 mg PO daily  - monitor I/O's and fluid restriction    9. RCC s/p left nephrectomy  - f/u with urology as outpatient    10. Left adnexal mass s/p resection  - f/u with uro-gyn as outpatient  - patient was reportedly offered chemotherapy and radiation; she is currently considering her options    11. CKD stage IV  - SCr at baseline of ~2  - avoid nephrotoxic medications    12. Osteoporosis  - continue Raloxifene    13. Sick sinus syndrome s/p PPM  - f/u with cardiology as outpatient    14. DVT and GI prophylaxis  - Warfarin and PPI    15. Disposition  - anticipate discharge home once medically stable Assessment:  A 78y Female with a PMH of HTN, type II DM, A-Fib (on Coumadin), rheumatic heart disease s/p bioprosthetic mitral valve replacement, s/p metallic aortic valve replacement, HFpEF, sick sinus syndrome s/p PPM, RLE DVT, RCC s/p left nephrectomy, left adnexal mass s/p resection, hematuria, anxiety, and osteoporosis who presented with a 2-week history of swelling in her left lower extremity.     Plan:  1. RLE lower extremity DVT  - possibly provoked in the setting of recent surgery  - LE venous duplex: Venous thrombosis right soleal vein. No evidence of deep venous thrombosis or superficial thrombophlebitis in left lower extremity. Left groin lymphadenopathy.  - patient reports having a DVT in the RLE 25 years ago which was treated with Coumadin  - continue Coumadin with goal INR of 2.5  - consider hematology/oncology and vascular surgery consults for further evaluation    2. LLE non-purulent cellulitis  - start Keflex 500 mg PO four times daily for 5 days  - keep affected extremity elevated    3. Chronic blood loss microcytic anemia  - multifactorial in etiology  - baseline Hgb is ~7 g/dL  - no signs of acute blood loss  - maintain active type & screen and monitor serial CBC  - transfuse 2 units of PRBC  - continue FeSO4 PO    4. HTN  - continue Metoprolol and monitor BP    5. Type II DM  - last Hgb A1c was 5.5% from April 2018  - hold oral hypoglycemic medications  - monitor FS and start insulin if FS persistently >180    6. Paroxysmal A-Fib  - continue rate control and anticoagulation    7. History of bioprosthetic mitral valve and metallic aortic valve  - continue Coumadin and monitor INR  - f/u with cardiology as outpatient    8. Chronic HFpEF  - CXR: small right pleural effusion  - pro-BNP: 3340  - no signs of decompensation at this time  - continue Lasix 40 mg PO daily  - monitor I/O's and fluid restriction    9. RCC s/p left nephrectomy  - f/u with urology as outpatient    10. Left adnexal mass s/p recent resection  - f/u with uro-gyn as outpatient  - patient was reportedly offered chemotherapy and radiation; she is currently considering her options    11. CKD stage IV  - SCr at baseline of ~2  - avoid nephrotoxic medications    12. Osteoporosis  - continue Raloxifene    13. Sick sinus syndrome s/p PPM  - f/u with cardiology as outpatient    14. DVT and GI prophylaxis  - Warfarin and PPI    15. Disposition  - anticipate discharge home once medically stable Assessment:  A 78y Female with a PMH of HTN, type II DM, A-Fib (on Coumadin), rheumatic heart disease s/p bioprosthetic mitral valve replacement, s/p metallic aortic valve replacement, HFpEF, sick sinus syndrome s/p PPM, RLE DVT, RCC s/p left nephrectomy, left adnexal mass s/p resection, hematuria, anxiety, and osteoporosis who presented with a 2-week history of swelling in her left lower extremity.     Plan:  1. RLE lower extremity DVT  - possibly provoked in the setting of recent surgery and history of malignancy  - LE venous duplex: Venous thrombosis right soleal vein. No evidence of deep venous thrombosis or superficial thrombophlebitis in left lower extremity. Left groin lymphadenopathy.  - patient reports having a DVT in the RLE 25 years ago which was treated with Coumadin  - continue Coumadin with goal INR of 2.5  - obtain hematology/oncology and vascular surgery consults for further evaluation (case discussed with nocturnist who agreed with consults)    2. LLE non-purulent cellulitis  - start Keflex 500 mg PO four times daily for 5 days  - keep affected extremity elevated    3. Chronic blood loss microcytic anemia  - multifactorial in etiology  - baseline Hgb is ~7 g/dL  - no signs of acute blood loss  - maintain active type & screen and monitor serial CBC  - transfuse 2 units of PRBC  - continue FeSO4 PO    4. HTN  - continue Metoprolol and monitor BP    5. Type II DM  - last Hgb A1c was 5.5% from April 2018  - hold oral hypoglycemic medications  - monitor FS and start insulin if FS persistently >180    6. Paroxysmal A-Fib  - continue rate control and anticoagulation    7. History of bioprosthetic mitral valve and metallic aortic valve  - continue Coumadin and monitor INR  - f/u with cardiology as outpatient    8. Chronic HFpEF  - CXR: small right pleural effusion  - pro-BNP: 3340  - no signs of decompensation at this time  - continue Lasix 40 mg PO daily  - monitor I/O's and fluid restriction    9. RCC s/p left nephrectomy  - f/u with urology as outpatient    10. Left adnexal mass s/p recent resection  - f/u with uro-gyn as outpatient  - patient was reportedly offered chemotherapy and radiation; she is currently considering her options    11. CKD stage IV  - SCr at baseline of ~2  - avoid nephrotoxic medications    12. Osteoporosis  - continue Raloxifene    13. Sick sinus syndrome s/p PPM  - f/u with cardiology as outpatient    14. DVT and GI prophylaxis  - Warfarin and PPI    15. Disposition  - anticipate discharge home once medically stable Assessment:  A 78y Female with a PMH of HTN, type II DM, A-Fib (on Coumadin), rheumatic heart disease s/p bioprosthetic mitral valve replacement, s/p metallic aortic valve replacement, HFpEF, sick sinus syndrome s/p PPM, RLE DVT, RCC s/p left nephrectomy, left adnexal mass s/p resection, hematuria, anxiety, and osteoporosis who presented with a 2-week history of swelling in her left lower extremity.     Plan:  1. RLE lower extremity DVT  - possibly provoked in the setting of recent surgery and history of malignancy  - LE venous duplex: Venous thrombosis right soleal vein. No evidence of deep venous thrombosis or superficial thrombophlebitis in left lower extremity. Left groin lymphadenopathy.  - patient reports having a DVT in the RLE 25 years ago which was treated with Coumadin  - continue Coumadin with goal INR of 2.5  - obtain hematology/oncology and vascular surgery consults for further evaluation (case discussed with nocturnist who agreed with consults)    2. LLE non-purulent cellulitis  - start Clindamycin IV  - keep affected extremity elevated    3. Chronic blood loss microcytic anemia  - multifactorial in etiology  - baseline Hgb is ~7 g/dL  - no signs of acute blood loss  - maintain active type & screen and monitor serial CBC  - transfuse 2 units of PRBC  - continue FeSO4 PO    4. HTN  - continue Metoprolol and monitor BP    5. Type II DM  - last Hgb A1c was 5.5% from April 2018  - hold oral hypoglycemic medications  - monitor FS and start insulin if FS persistently >180    6. Paroxysmal A-Fib  - continue rate control and anticoagulation    7. History of bioprosthetic mitral valve and metallic aortic valve  - continue Coumadin and monitor INR  - f/u with cardiology as outpatient    8. Chronic HFpEF  - CXR: small right pleural effusion  - pro-BNP: 3340  - no signs of decompensation at this time  - continue Lasix 40 mg PO daily  - monitor I/O's and fluid restriction    9. RCC s/p left nephrectomy  - f/u with urology as outpatient    10. Left adnexal mass s/p recent resection  - f/u with uro-gyn as outpatient  - patient was reportedly offered chemotherapy and radiation; she is currently considering her options    11. CKD stage IV  - SCr at baseline of ~2  - avoid nephrotoxic medications    12. Osteoporosis  - continue Raloxifene    13. Sick sinus syndrome s/p PPM  - f/u with cardiology as outpatient    14. DVT and GI prophylaxis  - Warfarin and PPI    15. Disposition  - anticipate discharge home once medically stable

## 2018-12-14 NOTE — ED PROVIDER NOTE - CARE PLAN
Principal Discharge DX:	Cellulitis of leg, left  Secondary Diagnosis:	Anemia  Secondary Diagnosis:	DVT (deep venous thrombosis)  Secondary Diagnosis:	Hypokalemia

## 2018-12-14 NOTE — ED PROVIDER NOTE - MEDICAL DECISION MAKING DETAILS
pt and  at bedside aware of all labs and imaging, vascualr aware of tp and following, transfusion being given, hypokalemia being treated, abx given, medical admitting team aware of pt and admission as well as discussed and agreed with pt and  at bedside.

## 2018-12-14 NOTE — ED PROVIDER NOTE - PROGRESS NOTE DETAILS
77 y/o F with PMH of CAD, CABG, GERD, high cholesterol, DM, HTN, kidney CA s/p L nephrectomy, 2 valve replacements: 1 metal mitral, 1 pig, sent in by oncologist today for blood transfusion and eval of LLE swelling and erythema x 2weeks that has been progressively worsening. Pt was suppose to get a blood transfusion( hemoglobin was 6) at the [facility] today but due to LLE swelling was sent to ED. Pt is able to ambulate. Also notes decreased appetite. Notes has been getting blood transfusions ever 1.5 months due to previous hematuria. No urinary SX at this time. No fever, chills, n/v, cp,  pleuritic cp, sob, palpitations, diaphoresis, cough, ha/lh/dizziness, numbness/tingling, neck pain/ stiffness, abd pain, diarrhea, constipation, melena/brbpr, urinary symptoms, trauma, weakness, edema, calf pain, sick contacts, recent travel or rash.On exam Vital Signs: I have reviewed the initial vital signs. Constitutional: WDWN in nad. Sitting on stretcher in nad. Integumentary: No rash. ENT: MMM NECK: Supple, non-tender, no meningeal signs. Cardiovascular: RRR, radial pulses 2/4 b/l. No JVD. Respiratory: BS present b/l, ctabl, no wheezing or crackles, good resp effort and excursion, good air exchange,  no accessory muscle use, no stridor. Gastrointestinal: BS present throughout all 4 quadrants, soft, nd, nt, no rebound tenderness or guarding, no cvat. Musculoskeletal: FROM, no edema, no calf pain. LLE: with (+) swelling much greater than on R with increased circumference. (+) erythema with weeping fluid. Able to fully range b/l LE less so on L, DP and PT pulses palpable 2/4 on R, dopplerable pulses on L, sensation intact, no crepitus, no induration (+) soft compartments Neurologic: AAOx3, motor 5/5 and sensation intact throughout UE and LE ext, CN II-XII intact, No facial droop or slurring of speech. No focal deficits.A/P: Will get KARI, CXR, labs, L tib/fib, foot XR, duplex, consent blood, possible blood transfusion and reassess. As per vascular tech + DVT right soleal vein. no dvt left leg. hgb noted, pt has consented for blood, witnessed, given to  to scan, two unites ordered, K also knoted to be low, will replete, abx being given, will reassess, plan for admisson vascular aware for consult for DVT. 77 y/o F with PMH of CAD, CABG, GERD, high cholesterol, DM, HTN, kidney CA s/p L nephrectomy, 2 valve replacements: 1 metal mitral, 1 pig, on coumadin, sent in by oncologist today for blood transfusion and eval of LLE swelling and erythema x 2weeks that has been progressively worsening. Pt was suppose to get a blood transfusion( hemoglobin was 6) at the [facility] today but due to LLE swelling was sent to ED. Pt is able to ambulate. Also notes decreased appetite. Notes has been getting blood transfusions ever 1.5 months due to previous hematuria. No urinary SX at this time. No fever, chills, n/v, cp,  pleuritic cp, sob, palpitations, diaphoresis, cough, ha/lh/dizziness, numbness/tingling, neck pain/ stiffness, abd pain, diarrhea, constipation, melena/brbpr, urinary symptoms, trauma, weakness, sick contacts, recent travel or rash. On exam Vital Signs: I have reviewed the initial vital signs. Constitutional: WDWN in nad. Sitting on stretcher in nad. Integumentary: No rash. EYES: Pale conjunctivae. ENT: MMM NECK: Supple, non-tender, no meningeal signs. Cardiovascular: RRR, radial pulses 2/4 b/l. No JVD. Respiratory: BS present b/l, ctabl, no wheezing or crackles, good resp effort and excursion, poor air exchange,  no accessory muscle use, no stridor. Gastrointestinal: BS present throughout all 4 quadrants, soft, nd, nt, no rebound tenderness or guarding, no cvat. Musculoskeletal: FROM, no edema, no calf pain. LLE: with (+) 2+ pitting edema/swelling much greater than on R with increased circumference. (+) erythema with weeping fluid. Able to fully range b/l LE less so on L, DP and PT pulses palpable 2/4 on R, dopplerable pulses on L, sensation intact, no crepitus, no induration (+) soft compartments Neurologic: AAOx3, motor 5/5 and sensation intact throughout UE and LE ext, CN II-XII intact, No facial droop or slurring of speech. No focal deficits.A/P: Will get KARI, CXR, labs, L tib/fib, foot XR, duplex, consent blood, possible blood transfusion and reassess.

## 2018-12-14 NOTE — ED PROVIDER NOTE - NS ED ROS FT
Constitutional: no fever, chills  ENT: no URI sxs, no throat pain, no change in voice, no excessive drooling,  Cardiovascular: no chest pain, no sob, no syncope , no palpitations. + left LE swelling  Respiratory: no cough, no shortness of breath  Gastrointestinal: no nausea, vomiting or abd pain  :  no urinary sxs, no flank pain.  Musculoskeletal: no fall or trauma. no msk pain or injury. no neck pain, no back pain, no joint pain.    Integumentary: + swelling and erythema to left LE  Neurological: no headache, no dizziness, no visual changes, no UE/LE weakness or paresthesias.   Psychiatric: no suicidal or homicidal ideation or attempt. no hallucinations.   Allergic/Immunologic: no lymphadenopathy, no pruritus

## 2018-12-14 NOTE — H&P ADULT - NSHPLABSRESULTS_GEN_ALL_CORE
Labs:                        6.1    6.78  )-----------( 236      ( 14 Dec 2018 16:20 )             20.6             12-14    142  |  97<L>  |  25<H>  ----------------------------<  94  3.2<L>   |  31  |  1.9<H>    Ca    8.2<L>      14 Dec 2018 16:20    TPro  6.9  /  Alb  3.7  /  TBili  1.2  /  DBili  x   /  AST  14  /  ALT  6   /  AlkPhos  115  12-14    LIVER FUNCTIONS - ( 14 Dec 2018 16:20 )  Alb: 3.7 g/dL / Pro: 6.9 g/dL / ALK PHOS: 115 U/L / ALT: 6 U/L / AST: 14 U/L / GGT: x         PT/INR - ( 14 Dec 2018 16:50 )   PT: 24.70 sec;   INR: 2.16 ratio    PTT - ( 14 Dec 2018 16:50 )  PTT:32.0 sec  CARDIAC MARKERS ( 14 Dec 2018 16:20 )  x     / <0.01 ng/mL / 31 U/L / x     / x

## 2018-12-15 LAB
ANION GAP SERPL CALC-SCNC: 14 MMOL/L — SIGNIFICANT CHANGE UP (ref 7–14)
BASOPHILS # BLD AUTO: 0.05 K/UL — SIGNIFICANT CHANGE UP (ref 0–0.2)
BASOPHILS NFR BLD AUTO: 0.6 % — SIGNIFICANT CHANGE UP (ref 0–1)
BUN SERPL-MCNC: 28 MG/DL — HIGH (ref 10–20)
CALCIUM SERPL-MCNC: 7.7 MG/DL — LOW (ref 8.5–10.1)
CHLORIDE SERPL-SCNC: 98 MMOL/L — SIGNIFICANT CHANGE UP (ref 98–110)
CO2 SERPL-SCNC: 29 MMOL/L — SIGNIFICANT CHANGE UP (ref 17–32)
CREAT SERPL-MCNC: 1.9 MG/DL — HIGH (ref 0.7–1.5)
EOSINOPHIL # BLD AUTO: 0.15 K/UL — SIGNIFICANT CHANGE UP (ref 0–0.7)
EOSINOPHIL NFR BLD AUTO: 1.9 % — SIGNIFICANT CHANGE UP (ref 0–8)
GLUCOSE BLDC GLUCOMTR-MCNC: 133 MG/DL — HIGH (ref 70–99)
GLUCOSE BLDC GLUCOMTR-MCNC: 137 MG/DL — HIGH (ref 70–99)
GLUCOSE BLDC GLUCOMTR-MCNC: 187 MG/DL — HIGH (ref 70–99)
GLUCOSE BLDC GLUCOMTR-MCNC: 188 MG/DL — HIGH (ref 70–99)
GLUCOSE BLDC GLUCOMTR-MCNC: 94 MG/DL — SIGNIFICANT CHANGE UP (ref 70–99)
GLUCOSE SERPL-MCNC: 128 MG/DL — HIGH (ref 70–99)
HCT VFR BLD CALC: 24.5 % — LOW (ref 37–47)
HGB BLD-MCNC: 7.7 G/DL — LOW (ref 12–16)
IMM GRANULOCYTES NFR BLD AUTO: 0.9 % — HIGH (ref 0.1–0.3)
INR BLD: 2.37 RATIO — HIGH (ref 0.65–1.3)
LYMPHOCYTES # BLD AUTO: 0.61 K/UL — LOW (ref 1.2–3.4)
LYMPHOCYTES # BLD AUTO: 7.6 % — LOW (ref 20.5–51.1)
MAGNESIUM SERPL-MCNC: 1.9 MG/DL — SIGNIFICANT CHANGE UP (ref 1.8–2.4)
MCHC RBC-ENTMCNC: 22.2 PG — LOW (ref 27–31)
MCHC RBC-ENTMCNC: 31.4 G/DL — LOW (ref 32–37)
MCV RBC AUTO: 70.6 FL — LOW (ref 81–99)
MONOCYTES # BLD AUTO: 0.47 K/UL — SIGNIFICANT CHANGE UP (ref 0.1–0.6)
MONOCYTES NFR BLD AUTO: 5.9 % — SIGNIFICANT CHANGE UP (ref 1.7–9.3)
NEUTROPHILS # BLD AUTO: 6.68 K/UL — HIGH (ref 1.4–6.5)
NEUTROPHILS NFR BLD AUTO: 83.1 % — HIGH (ref 42.2–75.2)
NRBC # BLD: 0 /100 WBCS — SIGNIFICANT CHANGE UP (ref 0–0)
PHOSPHATE SERPL-MCNC: 3.5 MG/DL — SIGNIFICANT CHANGE UP (ref 2.1–4.9)
PLATELET # BLD AUTO: 260 K/UL — SIGNIFICANT CHANGE UP (ref 130–400)
POTASSIUM SERPL-MCNC: 3.8 MMOL/L — SIGNIFICANT CHANGE UP (ref 3.5–5)
POTASSIUM SERPL-SCNC: 3.8 MMOL/L — SIGNIFICANT CHANGE UP (ref 3.5–5)
PROTHROM AB SERPL-ACNC: 27 SEC — HIGH (ref 9.95–12.87)
RBC # BLD: 3.47 M/UL — LOW (ref 4.2–5.4)
RBC # FLD: 19.6 % — HIGH (ref 11.5–14.5)
SODIUM SERPL-SCNC: 141 MMOL/L — SIGNIFICANT CHANGE UP (ref 135–146)
WBC # BLD: 8.03 K/UL — SIGNIFICANT CHANGE UP (ref 4.8–10.8)
WBC # FLD AUTO: 8.03 K/UL — SIGNIFICANT CHANGE UP (ref 4.8–10.8)

## 2018-12-15 RX ORDER — WARFARIN SODIUM 2.5 MG/1
5 TABLET ORAL ONCE
Qty: 0 | Refills: 0 | Status: COMPLETED | OUTPATIENT
Start: 2018-12-15 | End: 2018-12-15

## 2018-12-15 RX ORDER — METOPROLOL TARTRATE 50 MG
50 TABLET ORAL
Qty: 0 | Refills: 0 | Status: DISCONTINUED | OUTPATIENT
Start: 2018-12-15 | End: 2018-12-17

## 2018-12-15 RX ORDER — METOPROLOL TARTRATE 50 MG
100 TABLET ORAL
Qty: 0 | Refills: 0 | Status: DISCONTINUED | OUTPATIENT
Start: 2018-12-15 | End: 2018-12-15

## 2018-12-15 RX ORDER — CEPHALEXIN 500 MG
500 CAPSULE ORAL
Qty: 0 | Refills: 0 | Status: DISCONTINUED | OUTPATIENT
Start: 2018-12-15 | End: 2018-12-15

## 2018-12-15 RX ADMIN — Medication 1 MILLIGRAM(S): at 11:03

## 2018-12-15 RX ADMIN — Medication 100 MILLIGRAM(S): at 21:39

## 2018-12-15 RX ADMIN — Medication 100 MILLIGRAM(S): at 17:04

## 2018-12-15 RX ADMIN — RALOXIFENE HYDROCHLORIDE 60 MILLIGRAM(S): 60 TABLET, COATED ORAL at 11:03

## 2018-12-15 RX ADMIN — Medication 100 MILLIGRAM(S): at 06:52

## 2018-12-15 RX ADMIN — SODIUM CHLORIDE 3 MILLILITER(S): 9 INJECTION INTRAMUSCULAR; INTRAVENOUS; SUBCUTANEOUS at 13:46

## 2018-12-15 RX ADMIN — SIMVASTATIN 20 MILLIGRAM(S): 20 TABLET, FILM COATED ORAL at 22:23

## 2018-12-15 RX ADMIN — Medication 40 MILLIGRAM(S): at 06:52

## 2018-12-15 RX ADMIN — Medication 325 MILLIGRAM(S): at 11:05

## 2018-12-15 RX ADMIN — WARFARIN SODIUM 5 MILLIGRAM(S): 2.5 TABLET ORAL at 21:40

## 2018-12-15 RX ADMIN — Medication 50 MILLIGRAM(S): at 06:52

## 2018-12-15 RX ADMIN — Medication 2000 UNIT(S): at 11:05

## 2018-12-15 RX ADMIN — SODIUM CHLORIDE 3 MILLILITER(S): 9 INJECTION INTRAMUSCULAR; INTRAVENOUS; SUBCUTANEOUS at 06:58

## 2018-12-15 RX ADMIN — Medication 100 MILLIGRAM(S): at 13:50

## 2018-12-15 RX ADMIN — SODIUM CHLORIDE 3 MILLILITER(S): 9 INJECTION INTRAMUSCULAR; INTRAVENOUS; SUBCUTANEOUS at 21:40

## 2018-12-15 RX ADMIN — PANTOPRAZOLE SODIUM 40 MILLIGRAM(S): 20 TABLET, DELAYED RELEASE ORAL at 06:52

## 2018-12-15 NOTE — CONSULT NOTE ADULT - ATTENDING COMMENTS
Right soleal thrombus, likely chronic, h/o right leg DVT in the past, no right leg swelling. has left leg swelling and cellulitis with no left leg DVT. Recommend continue coumadin, IV abx and leg elevation. F/u vasc office in 2 weeks for repeat venous duplex to r/o progression.
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  Recently she was ? diagnosed with recurrence at [facility], she was offered salvage chemo/XRT, she has not started on treatment yet.   Will obtain records from [facility], as No may want to switch care.   Transfuse as necessary.   Below the knee DVT, likely at least partially 2/2 mechanical compression, continue on Coumadin, vascular consult was appreciated.   Continue on antibiotics for suspected cellulitis.

## 2018-12-15 NOTE — PROVIDER CONTACT NOTE (OTHER) - SITUATION
----- Message from Cinda Ayala sent at 6/27/2018  4:46 PM CDT -----  Contact: patient   Patient calling to speak to the Nurse. Please advise.   Call back    Thanks!    md made aware pt has hx of afib taking metoprolol. dose was given this am; when repeating bp and pulse remains high. pt asymptomatic.

## 2018-12-15 NOTE — CONSULT NOTE ADULT - SUBJECTIVE AND OBJECTIVE BOX
HPI:  The patient is a 78-year-old female with a PMH of HTN, type II DM, A-Fib (on Coumadin), rheumatic heart disease s/p bioprosthetic mitral valve replacement, s/p metallic aortic valve replacement, HFpEF, sick sinus syndrome s/p PPM, RLE DVT, RCC s/p left nephrectomy, left adnexal mass s/p resection, hematuria, anxiety, and osteoporosis who presented with a 2-week history of swelling in her left lower extremity.  She denied experiencing any pain in her legs, trauma, or falls.  Today, the patient noticed redness over her left leg.  She went to the [facility] to obtain blood transfusion for her chronic anemia and was noted to have a Hgb of 6.1 g/dL.  She was referred to the ED for further evaluation.  The patient denies fever, chills, N/V/D, abdominal pain, chest pain, SOB, hemoptysis, hematemesis, hematuria, melena, or hematochezia.  She denied recent travel or prolonged immobilization. (14 Dec 2018 22:38)    Pt was diagnosed with Transitional cell cancer of the kidney, s/p resection in [facility] by Dr. Pablo, stage unclear.  --As per oral report from Oncology NP at [facility] No was consented for Ambassador trial participation and has randomized to observation arm  --follow up CT A/P on 8/24/2018 in [facility] it revealed 3.6cm masslike soft tissue involving left adnexa and uterine fundus, 4.2cm heterogeneous mass with surrounding infiltrative change anterior to left iliac vessels increasing in size from prior PET CT, small R pleural effusion, 5mm sclerotic lesion left iliac bone. PET CT was recommended with biopsy to follow.    Pt also has Chronic multifactorial anemia, with elements of iron deficiency, chronic disease, valve related hemolysis, possible MDS.     PAST MEDICAL & SURGICAL HISTORY:  CVA (cerebral vascular accident)  Anemia  Rheumatic fever  Diabetes  Osteoporosis  Mitral valve replaced  Atrial fibrillation: on coumadin at home  Hypertension  History of ureter stent  S/P AVR  H/O mitral valve replacement    MEDICATIONS  (STANDING):  chlorhexidine 4% Liquid 1 Application(s) Topical <User Schedule>  cholecalciferol 2000 Unit(s) Oral daily  clindamycin IVPB 600 milliGRAM(s) IV Intermittent every 8 hours  ferrous    sulfate 325 milliGRAM(s) Oral daily  folic acid 1 milliGRAM(s) Oral daily  furosemide    Tablet 40 milliGRAM(s) Oral daily  metoprolol tartrate 50 milliGRAM(s) Oral every 12 hours  pantoprazole    Tablet 40 milliGRAM(s) Oral before breakfast  potassium chloride  20 mEq/100 mL IVPB 20 milliEquivalent(s) IV Intermittent once  raloxifene 60 milliGRAM(s) Oral daily  simvastatin 20 milliGRAM(s) Oral at bedtime  sodium chloride 0.9% lock flush 3 milliLiter(s) IV Push every 8 hours    Allergies    No Known Drug Allergies  shellfish (Unknown)    Intolerances    FAMILY HISTORY:  No pertinent family history in first degree relatives HPI:  The patient is a 78-year-old female with a PMH of HTN, type II DM, A-Fib (on Coumadin), rheumatic heart disease s/p bioprosthetic mitral valve replacement, s/p metallic aortic valve replacement, HFpEF, sick sinus syndrome s/p PPM, RLE DVT, RCC s/p left nephrectomy, left adnexal mass s/p resection, hematuria, anxiety, and osteoporosis who presented with a 2-week history of swelling in her left lower extremity.  She denied experiencing any pain in her legs, trauma, or falls.  Today, the patient noticed redness over her left leg.  She went to the [facility] to obtain blood transfusion for her chronic anemia and was noted to have a Hgb of 6.1 g/dL.  She was referred to the ED for further evaluation.  The patient denies fever, chills, N/V/D, abdominal pain, chest pain, SOB, hemoptysis, hematemesis, hematuria, melena, or hematochezia.  She denied recent travel or prolonged immobilization. (14 Dec 2018 22:38)    Pt was diagnosed with Transitional cell cancer of the kidney, s/p resection in [facility] by Dr. Pablo, stage unclear.  --As per oral report from Oncology NP at [facility] No was consented for Ambassador trial participation and has randomized to observation arm  --follow up CT A/P on 8/24/2018 in [facility] it revealed 3.6cm masslike soft tissue involving left adnexa and uterine fundus, 4.2cm heterogeneous mass with surrounding infiltrative change anterior to left iliac vessels increasing in size from prior PET CT, small R pleural effusion, 5mm sclerotic lesion left iliac bone. PET CT was recommended with biopsy to follow. 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     PAST MEDICAL & SURGICAL HISTORY:  CVA (cerebral vascular accident)  Anemia  Rheumatic fever  Diabetes  Osteoporosis  Mitral valve replaced  Atrial fibrillation: on coumadin at home  Hypertension  History of ureter stent  S/P AVR  H/O mitral valve replacement    MEDICATIONS  (STANDING):  chlorhexidine 4% Liquid 1 Application(s) Topical <User Schedule>  cholecalciferol 2000 Unit(s) Oral daily  clindamycin IVPB 600 milliGRAM(s) IV Intermittent every 8 hours  ferrous    sulfate 325 milliGRAM(s) Oral daily  folic acid 1 milliGRAM(s) Oral daily  furosemide    Tablet 40 milliGRAM(s) Oral daily  metoprolol tartrate 50 milliGRAM(s) Oral every 12 hours  pantoprazole    Tablet 40 milliGRAM(s) Oral before breakfast  potassium chloride  20 mEq/100 mL IVPB 20 milliEquivalent(s) IV Intermittent once  raloxifene 60 milliGRAM(s) Oral daily  simvastatin 20 milliGRAM(s) Oral at bedtime  sodium chloride 0.9% lock flush 3 milliLiter(s) IV Push every 8 hours    Allergies    No Known Drug Allergies  shellfish (Unknown)    Intolerances    FAMILY HISTORY:  No pertinent family history in first degree relatives    Labs:                        7.7    8.03  )-----------( 260      ( 15 Dec 2018 11:22 )             24.5             12-15    141  |  98  |  28<H>  ----------------------------<  128<H>  3.8   |  29  |  1.9<H>    Ca    7.7<L>      15 Dec 2018 11:22  Phos  3.5     12-15  Mg     1.9     12-15    TPro  6.9  /  Alb  3.7  /  TBili  1.2  /  DBili  x   /  AST  14  /  ALT  6   /  AlkPhos  115  12-14    LIVER FUNCTIONS - ( 14 Dec 2018 16:20 )  Alb: 3.7 g/dL / Pro: 6.9 g/dL / ALK PHOS: 115 U/L / ALT: 6 U/L / AST: 14 U/L / GGT: x                 PT/INR - ( 15 Dec 2018 11:22 )   PT: 27.00 sec;   INR: 2.37 ratio         PTT - ( 14 Dec 2018 16:50 )  PTT:32.0 sec  CARDIAC MARKERS ( 14 Dec 2018 16:20 )  x     / <0.01 ng/mL / 31 U/L / x     / x

## 2018-12-15 NOTE — PROGRESS NOTE ADULT - ASSESSMENT
A 78y Female with a PMH of HTN, type II DM, A-Fib (on Coumadin), rheumatic heart disease s/p bioprosthetic mitral valve replacement, s/p metallic aortic valve replacement, HFpEF, sick sinus syndrome s/p PPM, RLE DVT, RCC s/p left nephrectomy, left adnexal mass s/p resection, hematuria, anxiety, and osteoporosis who presented with a 2-week history of swelling in her left lower extremity.     Plan:  #RLE lower extremity DVT  - possibly provoked in the setting of recent surgery and history of malignancy  - LE venous duplex: Venous thrombosis right soleal vein. No evidence of deep venous thrombosis or superficial thrombophlebitis in left lower extremity. Left groin lymphadenopathy.  - patient reports having a DVT in the RLE 25 years ago which was treated with Coumadin  - continue Coumadin with goal INR of 2.5  - Vascular 12/15 rec to continue with Coumadin and repeat Duplex in 1-2 weeks 12/15  - Heme onc 12/15 also rec to cw Coumadin with INR goal btw 2-3 12/15    #LLE non-purulent cellulitis  - On Clindamycin  mg Q8  - keep affected extremity elevated    #Chronic microcytic anemia  - multifactorial in etiology >> Sec to blood loss vs hemolysis from mechanical valve vs CKD vs malignancy vs?MDS  - baseline Hgb is ~7 g/dL, Hb on admission (reason of admission from [facility]) was 6.1 > s/p 2 units PRBC 12/14  - no signs of acute blood loss  - maintain active type & screen and monitor serial CBC  - continue FeSO4 PO    #HTN  - continue Metoprolol and monitor BP    #Type II DM  - last Hgb A1c was 5.5% from April 2018  - held oral hypoglycemic medications  - monitor FS and start insulin if FS persistently >180  - carb consistent diet    #Paroxysmal A-Fib  - continue rate control and anticoagulation > Metoprolol an dCoumadin  - HR btw 120-140  - Increased Metoprolol tartrate to 100mg Q12 today 12/15    #History of bioprosthetic mitral valve and metallic aortic valve  - continue Coumadin and monitor INR  - f/u with cardiology as outpatient    #Chronic HFpEF  - CXR: small right pleural effusion  - pro-BNP: 3340  - no signs of decompensation at this time  - continue Lasix 40 mg PO daily  - monitor I/O's and fluid restriction    #RCC s/p left nephrectomy  - f/u with urology as outpatient    #Left adnexal mass s/p recent resection  - f/u with uro-gyn as outpatient  - patient was reportedly offered chemotherapy and radiation; she is currently considering her options    #CKD stage IV  - SCr at baseline of ~2  - avoid nephrotoxic medications    #Osteoporosis  - continue Raloxifene    #Sick sinus syndrome s/p PPM  - f/u with cardiology as outpatient          DVT and GI prophylaxis - Warfarin and PPI  Disposition - anticipate discharge home once medically stable  Activity: as tolerated

## 2018-12-15 NOTE — CONSULT NOTE ADULT - SUBJECTIVE AND OBJECTIVE BOX
79 y/o female with pmhx of dm, htn, kidney cancer s/p left nephrectomy, pacemaker, 2 heart valve replacements, chronic anemia presents c/o of LLE swelling and tenderness for 2 weeks. patient also found to have a hemoglobin of 6.1 and is getting 2 U of PRBC. Reports multiple transfusions in the past for anemia but states they where never able to tell her why is she anemic. Denies any hematuria, hematochezia, hematemesis, hemoptysis,     PAST MEDICAL & SURGICAL HISTORY:  CVA (cerebral vascular accident)  Anemia  Rheumatic fever  Diabetes  Osteoporosis  Mitral valve replaced  Atrial fibrillation: on coumadin at home  Hypertension  History of ureter stent  S/P AVR  H/O mitral valve replacement    ALLERGIES: No Known Drug Allergies  shellfish (Unknown)    CURRENT MEDICATIONS  MEDICATIONS (STANDING): cephalexin 500 milliGRAM(s) Oral four times a day  cholecalciferol 2000 Unit(s) Oral daily  ferrous    sulfate 325 milliGRAM(s) Oral daily  folic acid 1 milliGRAM(s) Oral daily  furosemide    Tablet 40 milliGRAM(s) Oral daily  metoprolol tartrate 50 milliGRAM(s) Oral every 12 hours  pantoprazole    Tablet 40 milliGRAM(s) Oral before breakfast  potassium chloride  20 mEq/100 mL IVPB 20 milliEquivalent(s) IV Intermittent once  raloxifene 60 milliGRAM(s) Oral daily  simvastatin 20 milliGRAM(s) Oral at bedtime  sodium chloride 0.9% lock flush 3 milliLiter(s) IV Push every 8 hours  warfarin 5 milliGRAM(s) Oral once    Vitals:   T(C): 37 (12-14-18 @ 23:37), Max: 37.3 (12-14-18 @ 18:51)  HR: 116 (12-14-18 @ 23:37) (60 - 116)  BP: 113/67 (12-14-18 @ 23:37) (109/62 - 127/57)  RR: 18 (12-14-18 @ 23:37) (18 - 18)  SpO2: 98% (12-14-18 @ 23:37) (98% - 99%)    PHYSICAL EXAM:  GENERAL: NAD,   EXTREMITIES:  LLE is swollen, erythematous and tender to palpation, RLE in normal in color and temperature. Dopplerable DP/PT b/l lower extremities.   PSYCH: AAOx3      LABS  CBC (12-14 @ 16:20)                              6.1<LL>                         6.78    )----------------(  236        --    % Neutrophils, --    % Lymphocytes, ANC: --                                  20.6<L>    BMP (12-14 @ 16:20)             142     |  97<L>   |  25<H> 		Ca++ --      Ca 8.2<L>             ---------------------------------( 94    		Mg --                 3.2<L>  |  31      |  1.9<H>			Ph --        LFTs (12-14 @ 16:20)      TPro 6.9 / Alb 3.7 / TBili 1.2 / DBili -- / AST 14 / ALT 6 / AlkPhos 115    Coags (12-14 @ 16:50)  aPTT 32.0 / INR 2.16<H> / PT 24.70<H>    Cardiac Markers (12-14 @ 16:20)     HSTrop: -- / CKMB: -- / CK: 31    ABG (12-14 @ 16:20)      /  /  /  /  / %     Lactate:  0.5      IMAGING:    < from: [facility] Duplex Lower Ext Vein Scan, Bilat (12.14.18 @ 16:23) >  Impression:    Venous thrombosis right soleal vein.    No evidence of deep venous thrombosis or superficial thrombophlebitis in   left lower extremity.    Left groin lymphadenopathy.    < end of copied text >

## 2018-12-15 NOTE — PROGRESS NOTE ADULT - SUBJECTIVE AND OBJECTIVE BOX
Patient is a 78y old Female who presented with a chief complaint of Left lower extremity swelling (15 Dec 2018 10:03)  Currently admitted to medicine with the primary diagnosis of Cellulitis of leg, left     Today is hospital day 1d. This morning she is resting comfortably in bed and reports no new issues or overnight events.     PAST MEDICAL & SURGICAL HISTORY  CVA (cerebral vascular accident)  Anemia  Rheumatic fever  Diabetes  Osteoporosis  Mitral valve replaced  Atrial fibrillation: on coumadin at home  Hypertension  History of ureter stent  S/P AVR  H/O mitral valve replacement    SOCIAL HISTORY:  Negative for smoking/alcohol/drug use.     ALLERGIES:  No Known Drug Allergies  shellfish (Unknown)    MEDICATIONS:  STANDING MEDICATIONS  chlorhexidine 4% Liquid 1 Application(s) Topical <User Schedule>  cholecalciferol 2000 Unit(s) Oral daily  clindamycin IVPB 600 milliGRAM(s) IV Intermittent every 8 hours  ferrous    sulfate 325 milliGRAM(s) Oral daily  folic acid 1 milliGRAM(s) Oral daily  furosemide    Tablet 40 milliGRAM(s) Oral daily  metoprolol tartrate 100 milliGRAM(s) Oral two times a day  pantoprazole    Tablet 40 milliGRAM(s) Oral before breakfast  potassium chloride  20 mEq/100 mL IVPB 20 milliEquivalent(s) IV Intermittent once  raloxifene 60 milliGRAM(s) Oral daily  simvastatin 20 milliGRAM(s) Oral at bedtime  sodium chloride 0.9% lock flush 3 milliLiter(s) IV Push every 8 hours  warfarin 5 milliGRAM(s) Oral once    PRN MEDICATIONS    Home Medications:  Coumadin 5 mg oral tablet: 1 tab(s) orally once a day (14 Dec 2018 23:37)  famotidine 20 mg oral tablet: 1 tab(s) orally 2 times a day (14 Dec 2018 23:37)  furosemide 40 mg oral tablet: 1 tab(s) orally once a day (14 Dec 2018 23:37)  glimepiride 4 mg oral tablet: 1 tab(s) orally once a day (14 Dec 2018 23:37)  metoprolol tartrate 50 mg oral tablet: 1 tab(s) orally every 12 hours (14 Dec 2018 23:37)  Vitamin D3 2000 intl units oral capsule: 1 cap(s) orally once a day (14 Dec 2018 23:37)      VITALS:   T(F): 98.6  HR: 131  BP: 113/64  RR: 18  SpO2: 98%    LABS:                        7.7    8.03  )-----------( 260      ( 15 Dec 2018 11:22 )             24.5     12-15    141  |  98  |  28<H>  ----------------------------<  128<H>  3.8   |  29  |  1.9<H>    Ca    7.7<L>      15 Dec 2018 11:22  Phos  3.5     12-15  Mg     1.9     12-15    TPro  6.9  /  Alb  3.7  /  TBili  1.2  /  DBili  x   /  AST  14  /  ALT  6   /  AlkPhos  115  12-14    PT/INR - ( 15 Dec 2018 11:22 )   PT: 27.00 sec;   INR: 2.37 ratio         PTT - ( 14 Dec 2018 16:50 )  PTT:32.0 sec      Lactate, Blood: 0.5 mmol/L (12-14-18 @ 16:20)  Troponin T, Serum: <0.01 ng/mL (12-14-18 @ 16:20)  Creatine Kinase, Serum: 31 U/L (12-14-18 @ 16:20)      CARDIAC MARKERS ( 14 Dec 2018 16:20 )  x     / <0.01 ng/mL / 31 U/L / x     / x          RADIOLOGY:  < from: VA Duplex Lower Ext Vein Scan, Bilat (12.14.18 @ 16:23) >  Impression:  Venous thrombosis right soleal vein.  No evidence of deep venous thrombosis or superficial thrombophlebitis in   left lower extremity.  Left groin lymphadenopathy.      < from: Xray Chest 2 Views PA/Lat (12.14.18 @ 16:21) >  Impression:    Small right pleural effusion. No parenchymal opacity or air leak      < from: Xray Tibia + Fibula 2 Views, Left (12.14.18 @ 16:21) >  IMPRESSION: No acute fracture or dislocation. Degenerative changes at the   knee.      < from: Xray Foot AP + Lateral + Oblique, Left (12.14.18 @ 16:19) >  IMPRESSION:   Diffuse soft tissue swelling without evidence for subcutaneous gas. No   radiographic evidence for osteomyelitis.      PHYSICAL EXAM:  GEN: No acute distress  LUNGS: Clear to auscultation bilaterally   HEART: mechanical heart sounds  ABD: Soft, non-tender, non-distended. Bowel sounds present  EXT:  erythema and warmth over LLE up to mid-tibia with non-pitting edema  NEURO: AAOX3

## 2018-12-15 NOTE — CONSULT NOTE ADULT - ASSESSMENT
79 y/o female with pmhx of dm, htn, kidney cancer s/p left nephrectomy, pacemaker, 2 heart valve replacements, chronic anemia presents c/o of LLE swelling and tenderness for 2 weeks. patient also found to have a hemoglobin of 6.1 and is getting 2 U of PRBC. Reports multiple transfusions in the past for anemia but states they where never able to tell her why is she anemic. Denies any hematuria, hematochezia, hematemesis, hemoptysis. LLE is swollen, erythematous and tender to palpation, RLE is normal in color and temperature. Venous duplex as above shows Venous thrombosis right soleal vein.    PLAN:   Continue AC  Trend Hemoglobin  repeat duplex in 1-2 weeks   IV antibiotics  follow up outpatient    Attending to see  Discussed with vascular fellow Dr Lombardi 79 y/o female with pmhx of dm, htn, kidney cancer s/p left nephrectomy, pacemaker, 2 heart valve replacements, chronic anemia presents c/o of LLE swelling and tenderness for 2 weeks. patient also found to have a hemoglobin of 6.1 and is getting 2 U of PRBC. Reports multiple transfusions in the past for anemia but states they where never able to tell her why is she anemic. Denies any hematuria, hematochezia, hematemesis, hemoptysis. LLE is swollen, erythematous and tender to palpation, RLE is normal in color and temperature. Venous duplex as above shows Venous thrombosis right soleal vein.     Thrombus in the right soleal vein in isolation is not typically reason for anticoagulation per se, but given her history of MV replacement and Cancer hx there is an increased risk of thrombus progression and continuation of anticoagulation is recommended     PLAN:   Continue AC  Trend Hemoglobin  repeat duplex in 1-2 weeks   IV antibiotics  follow up outpatient    Attending to see  Discussed with vascular fellow Dr Lombardi 79 y/o female with pmhx of dm, htn, kidney cancer s/p left nephrectomy, pacemaker, 2 heart valve replacements, chronic anemia presents c/o of LLE swelling and tenderness for 2 weeks. patient also found to have a hemoglobin of 6.1 and is getting 2 U of PRBC. Reports multiple transfusions in the past for anemia but states they where never able to tell her why is she anemic. Denies any hematuria, hematochezia, hematemesis, hemoptysis. LLE is swollen, erythematous and tender to palpation, RLE is normal in color and temperature. Venous duplex as above shows Venous thrombosis right soleal vein.     Thrombus in the right soleal vein in isolation is not typically reason for anticoagulation per se, but given her history of MV replacement and Cancer hx there is an increased risk of thrombus progression she should take 162 mg ASA daily, NSAIDs for pain, and follow up with Dr. Roper as an outpatient in 2 weeks    PLAN:    mg daily  Trend Hemoglobin  repeat duplex in 1-2 weeks   IV antibiotics  follow up outpatient    Attending to see  Discussed with vascular fellow Dr Lombardi 79 y/o female with pmhx of dm, htn, kidney cancer s/p left nephrectomy, pacemaker, 2 heart valve replacements, chronic anemia presents c/o of LLE swelling and tenderness for 2 weeks. patient also found to have a hemoglobin of 6.1 and is getting 2 U of PRBC. Reports multiple transfusions in the past for anemia but states they where never able to tell her why is she anemic. Denies any hematuria, hematochezia, hematemesis, hemoptysis. LLE is swollen, erythematous and tender to palpation, RLE is normal in color and temperature. Venous duplex as above shows Venous thrombosis right soleal vein.     Thrombus in the right soleal vein in isolation is not typically reason for anticoagulation per se, but given her history of MV replacement and Cancer hx, NSAIDs for pain, and follow up with Dr. Koch as an outpatient in 2 weeks    PLAN:   Trend Hemoglobin  repeat duplex in 1-2 weeks   IV antibiotics  follow up outpatient    Attending to see  Discussed with vascular fellow Dr Lombardi

## 2018-12-15 NOTE — CONSULT NOTE ADULT - ASSESSMENT
The patient is a 78-year-old female with a PMH of HTN, type II DM, A-Fib (on Coumadin), rheumatic heart disease s/p bioprosthetic mitral valve replacement, s/p metallic aortic valve replacement, HFpEF, sick sinus syndrome s/p PPM, RLE DVT, RCC s/p left nephrectomy, left adnexal mass s/p resection, hematuria, anxiety, and osteoporosis who presented with a 2-week history of swelling in her left lower extremity.  She went to the [facility] to obtain blood transfusion for her chronic anemia and was noted to have a Hgb of 6.1 g/dL. The patient is a 78-year-old female with a PMH of HTN, type II DM, A-Fib (on Coumadin), rheumatic heart disease s/p bioprosthetic mitral valve replacement, s/p metallic aortic valve replacement, HFpEF, sick sinus syndrome s/p PPM, RLE DVT, RCC s/p left nephrectomy, left adnexal mass s/p resection, hematuria, anxiety, and osteoporosis who presented with a 2-week history of swelling in her left lower extremity.  She went to the [facility] to obtain blood transfusion for her chronic anemia and was noted to have a Hgb of 6.1 g/dL.    #Soleal vein thrombosis: C/W coumadin for now.  --INR 2-3    #Anemia: Multifactorial. Denies having any more hematuria or bleeding from any other site  --Sec to blood loss vs ?hemolysis from mechanical valve vs CKD vs ?malignancy vs?MDS  --Iron studies, retic count, B12, FA, MMA, TSH  --Guaiac, check UA  --LDH, haptoglobin    #RCC: S/P resection  --Enrolled in [facility] trial. Obtain records from [place]    #Cellulitis: C/W abx

## 2018-12-16 LAB
ANION GAP SERPL CALC-SCNC: 17 MMOL/L — HIGH (ref 7–14)
APTT BLD: 32.6 SEC — SIGNIFICANT CHANGE UP (ref 27–39.2)
BASOPHILS # BLD AUTO: 0.07 K/UL — SIGNIFICANT CHANGE UP (ref 0–0.2)
BASOPHILS NFR BLD AUTO: 0.7 % — SIGNIFICANT CHANGE UP (ref 0–1)
BUN SERPL-MCNC: 31 MG/DL — HIGH (ref 10–20)
CALCIUM SERPL-MCNC: 8 MG/DL — LOW (ref 8.5–10.1)
CHLORIDE SERPL-SCNC: 96 MMOL/L — LOW (ref 98–110)
CO2 SERPL-SCNC: 29 MMOL/L — SIGNIFICANT CHANGE UP (ref 17–32)
CREAT SERPL-MCNC: 2.2 MG/DL — HIGH (ref 0.7–1.5)
EOSINOPHIL # BLD AUTO: 0.27 K/UL — SIGNIFICANT CHANGE UP (ref 0–0.7)
EOSINOPHIL NFR BLD AUTO: 2.7 % — SIGNIFICANT CHANGE UP (ref 0–8)
GLUCOSE BLDC GLUCOMTR-MCNC: 142 MG/DL — HIGH (ref 70–99)
GLUCOSE BLDC GLUCOMTR-MCNC: 152 MG/DL — HIGH (ref 70–99)
GLUCOSE BLDC GLUCOMTR-MCNC: 178 MG/DL — HIGH (ref 70–99)
GLUCOSE BLDC GLUCOMTR-MCNC: 95 MG/DL — SIGNIFICANT CHANGE UP (ref 70–99)
GLUCOSE SERPL-MCNC: 90 MG/DL — SIGNIFICANT CHANGE UP (ref 70–99)
HCT VFR BLD CALC: 24.9 % — LOW (ref 37–47)
HGB BLD-MCNC: 7.7 G/DL — LOW (ref 12–16)
IMM GRANULOCYTES NFR BLD AUTO: 0.7 % — HIGH (ref 0.1–0.3)
INR BLD: 2.96 RATIO — HIGH (ref 0.65–1.3)
LYMPHOCYTES # BLD AUTO: 0.85 K/UL — LOW (ref 1.2–3.4)
LYMPHOCYTES # BLD AUTO: 8.5 % — LOW (ref 20.5–51.1)
MCHC RBC-ENTMCNC: 22.1 PG — LOW (ref 27–31)
MCHC RBC-ENTMCNC: 30.9 G/DL — LOW (ref 32–37)
MCV RBC AUTO: 71.3 FL — LOW (ref 81–99)
MONOCYTES # BLD AUTO: 0.66 K/UL — HIGH (ref 0.1–0.6)
MONOCYTES NFR BLD AUTO: 6.6 % — SIGNIFICANT CHANGE UP (ref 1.7–9.3)
NEUTROPHILS # BLD AUTO: 8.07 K/UL — HIGH (ref 1.4–6.5)
NEUTROPHILS NFR BLD AUTO: 80.8 % — HIGH (ref 42.2–75.2)
NRBC # BLD: 0 /100 WBCS — SIGNIFICANT CHANGE UP (ref 0–0)
PLATELET # BLD AUTO: 256 K/UL — SIGNIFICANT CHANGE UP (ref 130–400)
POTASSIUM SERPL-MCNC: 4 MMOL/L — SIGNIFICANT CHANGE UP (ref 3.5–5)
POTASSIUM SERPL-SCNC: 4 MMOL/L — SIGNIFICANT CHANGE UP (ref 3.5–5)
PROTHROM AB SERPL-ACNC: 33.7 SEC — HIGH (ref 9.95–12.87)
RBC # BLD: 3.49 M/UL — LOW (ref 4.2–5.4)
RBC # FLD: 20.9 % — HIGH (ref 11.5–14.5)
SODIUM SERPL-SCNC: 142 MMOL/L — SIGNIFICANT CHANGE UP (ref 135–146)
WBC # BLD: 9.99 K/UL — SIGNIFICANT CHANGE UP (ref 4.8–10.8)
WBC # FLD AUTO: 9.99 K/UL — SIGNIFICANT CHANGE UP (ref 4.8–10.8)

## 2018-12-16 RX ORDER — FUROSEMIDE 40 MG
40 TABLET ORAL ONCE
Qty: 0 | Refills: 0 | Status: COMPLETED | OUTPATIENT
Start: 2018-12-16 | End: 2018-12-16

## 2018-12-16 RX ORDER — WARFARIN SODIUM 2.5 MG/1
2.5 TABLET ORAL ONCE
Qty: 0 | Refills: 0 | Status: COMPLETED | OUTPATIENT
Start: 2018-12-16 | End: 2018-12-16

## 2018-12-16 RX ADMIN — Medication 50 MILLIGRAM(S): at 20:24

## 2018-12-16 RX ADMIN — Medication 2000 UNIT(S): at 11:11

## 2018-12-16 RX ADMIN — Medication 100 MILLIGRAM(S): at 13:02

## 2018-12-16 RX ADMIN — Medication 100 MILLIGRAM(S): at 21:30

## 2018-12-16 RX ADMIN — SODIUM CHLORIDE 3 MILLILITER(S): 9 INJECTION INTRAMUSCULAR; INTRAVENOUS; SUBCUTANEOUS at 21:30

## 2018-12-16 RX ADMIN — RALOXIFENE HYDROCHLORIDE 60 MILLIGRAM(S): 60 TABLET, COATED ORAL at 11:11

## 2018-12-16 RX ADMIN — SODIUM CHLORIDE 3 MILLILITER(S): 9 INJECTION INTRAMUSCULAR; INTRAVENOUS; SUBCUTANEOUS at 13:01

## 2018-12-16 RX ADMIN — SODIUM CHLORIDE 3 MILLILITER(S): 9 INJECTION INTRAMUSCULAR; INTRAVENOUS; SUBCUTANEOUS at 06:28

## 2018-12-16 RX ADMIN — Medication 50 MILLIGRAM(S): at 06:22

## 2018-12-16 RX ADMIN — Medication 1 MILLIGRAM(S): at 11:11

## 2018-12-16 RX ADMIN — Medication 100 MILLIGRAM(S): at 06:22

## 2018-12-16 RX ADMIN — PANTOPRAZOLE SODIUM 40 MILLIGRAM(S): 20 TABLET, DELAYED RELEASE ORAL at 06:22

## 2018-12-16 RX ADMIN — Medication 325 MILLIGRAM(S): at 11:11

## 2018-12-16 RX ADMIN — SIMVASTATIN 20 MILLIGRAM(S): 20 TABLET, FILM COATED ORAL at 21:30

## 2018-12-16 RX ADMIN — WARFARIN SODIUM 2.5 MILLIGRAM(S): 2.5 TABLET ORAL at 21:30

## 2018-12-16 RX ADMIN — Medication 40 MILLIGRAM(S): at 11:36

## 2018-12-16 NOTE — PROGRESS NOTE ADULT - SUBJECTIVE AND OBJECTIVE BOX
CHERIEAUSTIN MCCLELLAN  78y Female    INTERVAL HPI/OVERNIGHT EVENTS:    Pt feels well - no bleeding or other complaints.   left LE cellulitis improving    T(F): 98.2 (12-16-18 @ 12:58), Max: 99.1 (12-15-18 @ 20:56)  HR: 51 (12-16-18 @ 12:58) (51 - 131)  BP: 119/58 (12-16-18 @ 12:58) (113/56 - 119/58)  RR: 18 (12-16-18 @ 12:58) (18 - 18)  SpO2: 97% (12-16-18 @ 02:07) (97% - 97%) on RA    I&O's Summary    CAPILLARY BLOOD GLUCOSE      POCT Blood Glucose.: 152 mg/dL (16 Dec 2018 12:34)  POCT Blood Glucose.: 95 mg/dL (16 Dec 2018 07:50)  POCT Blood Glucose.: 187 mg/dL (15 Dec 2018 21:13)  POCT Blood Glucose.: 188 mg/dL (15 Dec 2018 17:04)        PHYSICAL EXAM:  GENERAL: NAD  HEAD:  Normocephalic  EYES:  conjunctiva and sclera clear  ENMT: Moist mucous membranes  NECK: Supple  NERVOUS SYSTEM:  Alert & Oriented X3, Good concentration  CHEST/LUNG: Crackles at right base  HEART: Regular rate and rhythm, 3/6 systolic murmur and click  ABDOMEN: Soft, Nontender, Nondistended; Bowel sounds present  EXTREMITIES:   left LE with improving erythema, still with 1+ edema  Right LE no edema  SKIN: improved cellulitis of Left LE    Consultant(s) Notes Reviewed:  [x ] YES  [ ] NO  Care Discussed with Consultants/Other Providers [ x] YES  [ ] NO    MEDICATIONS  (STANDING):  chlorhexidine 4% Liquid 1 Application(s) Topical <User Schedule>  cholecalciferol 2000 Unit(s) Oral daily  clindamycin IVPB 600 milliGRAM(s) IV Intermittent every 8 hours  ferrous    sulfate 325 milliGRAM(s) Oral daily  folic acid 1 milliGRAM(s) Oral daily  furosemide    Tablet 40 milliGRAM(s) Oral daily  metoprolol tartrate 50 milliGRAM(s) Oral two times a day  pantoprazole    Tablet 40 milliGRAM(s) Oral before breakfast  potassium chloride  20 mEq/100 mL IVPB 20 milliEquivalent(s) IV Intermittent once  raloxifene 60 milliGRAM(s) Oral daily  simvastatin 20 milliGRAM(s) Oral at bedtime  sodium chloride 0.9% lock flush 3 milliLiter(s) IV Push every 8 hours    MEDICATIONS  (PRN):      LABS:                        7.7    9.99  )-----------( 256      ( 16 Dec 2018 08:47 )             24.9     12-16    142  |  96<L>  |  31<H>  ----------------------------<  90  4.0   |  29  |  2.2<H>    Ca    8.0<L>      16 Dec 2018 08:47  Phos  3.5     12-15  Mg     1.9     12-15    TPro  6.9  /  Alb  3.7  /  TBili  1.2  /  DBili  x   /  AST  14  /  ALT  6   /  AlkPhos  115  12-14    PT/INR - ( 16 Dec 2018 08:47 )   PT: 33.70 sec;   INR: 2.96 ratio         PTT - ( 16 Dec 2018 08:47 )  PTT:32.6 sec    Mean Cell Volume: 71.3 fL (12.16.18 @ 08:47)          RADIOLOGY & ADDITIONAL TESTS:    Imaging or report Personally Reviewed:  [x ] YES  [ ] NO    < from: [facility] Duplex Lower Ext Vein Scan, Ann-Marie (12.14.18 @ 16:23) >  Impression:    Venous thrombosis right soleal vein.    No evidence of deep venous thrombosis or superficial thrombophlebitis in   left lower extremity.    Left groin lymphadenopathy.    < end of copied text >        Care discussed with pt

## 2018-12-16 NOTE — PROGRESS NOTE ADULT - ASSESSMENT
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 At the [facility], she was noted to have Hgb of 6.1 and was sent to the ED.    1. Right soleal vein thrombosis - continue anticoagulation with coumadin - goal INR 2-3    2. Left LE cellulitis - improving on IV clindamycin - change to PO tomorrow    3. 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 Afib/mechanical valve - on coumadin - monitor INR    5. Transitional cell carcinoma s/p left nephrectomy - obtain records from [facility] for review    6. DM - FS acceptable    7. CKD stage 4 - stable    8. Left adnexal mass s/p resection - obtain records for review

## 2018-12-17 ENCOUNTER — APPOINTMENT (OUTPATIENT)
Dept: INFUSION THERAPY | Facility: CLINIC | Age: 78
End: 2018-12-17

## 2018-12-17 ENCOUNTER — TRANSCRIPTION ENCOUNTER (OUTPATIENT)
Age: 78
End: 2018-12-17

## 2018-12-17 VITALS — WEIGHT: 138.23 LBS

## 2018-12-17 LAB
ANION GAP SERPL CALC-SCNC: 14 MMOL/L — SIGNIFICANT CHANGE UP (ref 7–14)
APTT BLD: 34.9 SEC — SIGNIFICANT CHANGE UP (ref 27–39.2)
BASOPHILS # BLD AUTO: 0.06 K/UL — SIGNIFICANT CHANGE UP (ref 0–0.2)
BASOPHILS NFR BLD AUTO: 0.8 % — SIGNIFICANT CHANGE UP (ref 0–1)
BLD GP AB SCN SERPL QL: SIGNIFICANT CHANGE UP
BUN SERPL-MCNC: 30 MG/DL — HIGH (ref 10–20)
CALCIUM SERPL-MCNC: 7.6 MG/DL — LOW (ref 8.5–10.1)
CHLORIDE SERPL-SCNC: 99 MMOL/L — SIGNIFICANT CHANGE UP (ref 98–110)
CO2 SERPL-SCNC: 29 MMOL/L — SIGNIFICANT CHANGE UP (ref 17–32)
CREAT SERPL-MCNC: 2 MG/DL — HIGH (ref 0.7–1.5)
EOSINOPHIL # BLD AUTO: 0.21 K/UL — SIGNIFICANT CHANGE UP (ref 0–0.7)
EOSINOPHIL NFR BLD AUTO: 2.7 % — SIGNIFICANT CHANGE UP (ref 0–8)
FERRITIN SERPL-MCNC: 1639 NG/ML — HIGH (ref 15–150)
FOLATE SERPL-MCNC: >20 NG/ML — SIGNIFICANT CHANGE UP
GLUCOSE BLDC GLUCOMTR-MCNC: 120 MG/DL — HIGH (ref 70–99)
GLUCOSE BLDC GLUCOMTR-MCNC: 154 MG/DL — HIGH (ref 70–99)
GLUCOSE BLDC GLUCOMTR-MCNC: 179 MG/DL — HIGH (ref 70–99)
GLUCOSE SERPL-MCNC: 111 MG/DL — HIGH (ref 70–99)
HAPTOGLOB SERPL-MCNC: <20 MG/DL — LOW (ref 34–200)
HCT VFR BLD CALC: 23.8 % — LOW (ref 37–47)
HGB BLD-MCNC: 7.4 G/DL — CRITICAL LOW (ref 12–16)
IMM GRANULOCYTES NFR BLD AUTO: 0.9 % — HIGH (ref 0.1–0.3)
INR BLD: 3.97 RATIO — HIGH (ref 0.65–1.3)
IRON SATN MFR SERPL: 13 % — LOW (ref 15–50)
IRON SATN MFR SERPL: 22 UG/DL — LOW (ref 35–150)
LDH SERPL L TO P-CCNC: 270 — HIGH (ref 50–242)
LYMPHOCYTES # BLD AUTO: 0.81 K/UL — LOW (ref 1.2–3.4)
LYMPHOCYTES # BLD AUTO: 10.3 % — LOW (ref 20.5–51.1)
MCHC RBC-ENTMCNC: 22.2 PG — LOW (ref 27–31)
MCHC RBC-ENTMCNC: 31.1 G/DL — LOW (ref 32–37)
MCV RBC AUTO: 71.3 FL — LOW (ref 81–99)
MONOCYTES # BLD AUTO: 0.52 K/UL — SIGNIFICANT CHANGE UP (ref 0.1–0.6)
MONOCYTES NFR BLD AUTO: 6.6 % — SIGNIFICANT CHANGE UP (ref 1.7–9.3)
NEUTROPHILS # BLD AUTO: 6.18 K/UL — SIGNIFICANT CHANGE UP (ref 1.4–6.5)
NEUTROPHILS NFR BLD AUTO: 78.7 % — HIGH (ref 42.2–75.2)
NRBC # BLD: 0 /100 WBCS — SIGNIFICANT CHANGE UP (ref 0–0)
PLATELET # BLD AUTO: 208 K/UL — SIGNIFICANT CHANGE UP (ref 130–400)
POTASSIUM SERPL-MCNC: 3.7 MMOL/L — SIGNIFICANT CHANGE UP (ref 3.5–5)
POTASSIUM SERPL-SCNC: 3.7 MMOL/L — SIGNIFICANT CHANGE UP (ref 3.5–5)
PROTHROM AB SERPL-ACNC: >40 SEC — HIGH (ref 9.95–12.87)
RBC # BLD: 3.34 M/UL — LOW (ref 4.2–5.4)
RBC # BLD: 3.34 M/UL — LOW (ref 4.2–5.4)
RBC # FLD: 21.7 % — HIGH (ref 11.5–14.5)
RETICS #: 49.6 K/UL — SIGNIFICANT CHANGE UP (ref 25–125)
RETICS/RBC NFR: 1.5 % — SIGNIFICANT CHANGE UP (ref 0.5–1.5)
SODIUM SERPL-SCNC: 142 MMOL/L — SIGNIFICANT CHANGE UP (ref 135–146)
TIBC SERPL-MCNC: 163 UG/DL — LOW (ref 220–430)
TYPE + AB SCN PNL BLD: SIGNIFICANT CHANGE UP
UIBC SERPL-MCNC: 141 UG/DL — SIGNIFICANT CHANGE UP (ref 110–370)
VIT B12 SERPL-MCNC: 493 PG/ML — SIGNIFICANT CHANGE UP (ref 232–1245)
WBC # BLD: 7.85 K/UL — SIGNIFICANT CHANGE UP (ref 4.8–10.8)
WBC # FLD AUTO: 7.85 K/UL — SIGNIFICANT CHANGE UP (ref 4.8–10.8)

## 2018-12-17 PROCEDURE — 99222 1ST HOSP IP/OBS MODERATE 55: CPT

## 2018-12-17 RX ORDER — WARFARIN SODIUM 2.5 MG/1
1 TABLET ORAL
Qty: 0 | Refills: 0 | COMMUNITY

## 2018-12-17 RX ADMIN — Medication 50 MILLIGRAM(S): at 05:57

## 2018-12-17 RX ADMIN — Medication 50 MILLIGRAM(S): at 17:01

## 2018-12-17 RX ADMIN — RALOXIFENE HYDROCHLORIDE 60 MILLIGRAM(S): 60 TABLET, COATED ORAL at 11:16

## 2018-12-17 RX ADMIN — Medication 325 MILLIGRAM(S): at 11:16

## 2018-12-17 RX ADMIN — Medication 300 MILLIGRAM(S): at 15:05

## 2018-12-17 RX ADMIN — SODIUM CHLORIDE 3 MILLILITER(S): 9 INJECTION INTRAMUSCULAR; INTRAVENOUS; SUBCUTANEOUS at 05:57

## 2018-12-17 RX ADMIN — PANTOPRAZOLE SODIUM 40 MILLIGRAM(S): 20 TABLET, DELAYED RELEASE ORAL at 05:57

## 2018-12-17 RX ADMIN — Medication 40 MILLIGRAM(S): at 05:56

## 2018-12-17 RX ADMIN — Medication 1 MILLIGRAM(S): at 11:16

## 2018-12-17 RX ADMIN — SODIUM CHLORIDE 3 MILLILITER(S): 9 INJECTION INTRAMUSCULAR; INTRAVENOUS; SUBCUTANEOUS at 14:05

## 2018-12-17 RX ADMIN — Medication 2000 UNIT(S): at 11:16

## 2018-12-17 RX ADMIN — Medication 100 MILLIGRAM(S): at 05:56

## 2018-12-17 NOTE — CHART NOTE - NSCHARTNOTEFT_GEN_A_CORE
<<<RESIDENT DISCHARGE NOTE>>>     AUSTIN DAVIS  MRN-[id number]    VITAL SIGNS:  T(F): 98.4 (12-17-18 @ 13:17), Max: 98.9 (12-16-18 @ 20:54)  HR: 130 (12-17-18 @ 13:17)  BP: 109/67 (12-17-18 @ 13:17)  SpO2: 99% (12-17-18 @ 15:59)      PHYSICAL EXAMINATION:  General: NAD, sitting comfortably in bed  Head & Neck: NCAT, moist mucus membranes,   Pulmonary: CTA b/l  Cardiovascular: S1/S2, regular rate and rhythm, 3/6 systolic murmur  Gastrointestinal/Abdomen & Pelvis: soft, nontender, non distended, + BS  Neurologic/Motor: non-focal  Extremities: Left LE erythema, edema on foot up to mid shin, non tender  Rectal exam: small stool noted, was brown in color, Guaiac test positive        TEST RESULTS:                        7.4    7.85  )-----------( 208      ( 17 Dec 2018 07:39 )             23.8       12-17    142  |  99  |  30<H>  ----------------------------<  111<H>  3.7   |  29  |  2.0<H>    Ca    7.6<L>      17 Dec 2018 07:39        FINAL DISCHARGE INTERVIEW:  Resident(s) Present: (Name:_ Deanna Osuna MD, RN Present: (Name: GRIS Meadows    DISCHARGE MEDICATION RECONCILIATION  reviewed with Attending (Name:Dr. French    DISPOSITION:   [ x ] Home,    [  ] Home with Visiting Nursing Services,   [    ]  SNF/ NH,    [   ] Acute Rehab (4A),   [   ] Other (Specify:_________)                 Of note, spoke with patient's son extensively, who explained that his mother missed her colonoscopy appointment last week but he is planning on rescheduling her a colonoscopy appointment after discharge. It was described in detail that this exam is very important for work up of patient's anemia.   She should also follow up with Dr. Alvarenga/ [facility] within 1-2 days after discharge to follow up CBC and INR. Patient's family aware and on board with plan

## 2018-12-17 NOTE — DISCHARGE NOTE ADULT - PATIENT PORTAL LINK FT
You can access the [facility] Portal, offered by [facility], by registering with the following website: [email or web address]

## 2018-12-17 NOTE — DISCHARGE NOTE ADULT - MEDICATION SUMMARY - MEDICATIONS TO TAKE
I will START or STAY ON the medications listed below when I get home from the hospital:    Coumadin 5 mg oral tablet  -- 1 tab(s) by mouth once a day,   please hold tonight (12/17), can resume tomorrow 12/18 or after visiting [facility]  -- Indication: For A Fib    glimepiride 4 mg oral tablet  -- 1 tab(s) by mouth once a day  -- Indication: For Diabetes    pravastatin 40 mg oral tablet  -- 1 tab(s) by mouth once a day  -- Indication: For HLD    Evista 60 mg oral tablet  -- 1 tab(s) by mouth once a day  -- Indication: For osetoporosis    metoprolol tartrate 50 mg oral tablet  -- 1 tab(s) by mouth every 12 hours  -- Indication: For A fib    furosemide 40 mg oral tablet  -- 1 tab(s) by mouth once a day  -- Indication: For HTN    famotidine 20 mg oral tablet  -- 1 tab(s) by mouth 2 times a day  -- Indication: For GI ppx    FeroSul 325 mg (65 mg elemental iron) oral tablet  -- 1 tab(s) by mouth 3 times a day   -- Check with your doctor before becoming pregnant.  Do not chew, break, or crush.  May discolor urine or feces.    -- Indication: For Iron supplement    clindamycin 300 mg oral capsule  -- 1 cap(s) by mouth every 8 hours  -- Indication: For Cellulitis of leg, left    pantoprazole 40 mg oral delayed release tablet  -- 1 tab(s) by mouth once a day (before a meal)  -- Indication: For GI ppx    folic acid 1 mg oral tablet  -- 1 tab(s) by mouth once a day  -- Indication: For supplement    Vitamin D3 2000 intl units oral capsule  -- 1 cap(s) by mouth once a day  -- Indication: For supplement

## 2018-12-17 NOTE — PROGRESS NOTE ADULT - ATTENDING COMMENTS
LLE swelling and erythema is improving,   As per patient her outpatient records were delived to my office for review.   set up follow up appointment at [facility],   Continue with Coumadin.

## 2018-12-17 NOTE — PROGRESS NOTE ADULT - SUBJECTIVE AND OBJECTIVE BOX
Patient is a 78y old  Female who presents with a chief complaint of Left lower extremity swelling (16 Dec 2018 14:25)      Subjective:      Vital Signs Last 24 Hrs  T(C): 36.7 (17 Dec 2018 05:51), Max: 37.2 (16 Dec 2018 20:54)  T(F): 98.1 (17 Dec 2018 05:51), Max: 98.9 (16 Dec 2018 20:54)  HR: 93 (17 Dec 2018 05:51) (51 - 136)  BP: 118/58 (17 Dec 2018 05:51) (110/64 - 120/64)  BP(mean): --  RR: 18 (17 Dec 2018 05:51) (18 - 18)  SpO2: 95% (16 Dec 2018 20:15) (95% - 95%)    PHYSICAL EXAM  General: adult in NAD  HEENT: clear oropharynx, anicteric sclera, pink conjunctiva  Neck: supple  CV: normal S1/S2 with no murmur rubs or gallops  Lungs: positive air movement b/l ant lungs,clear to auscultation, no wheezes, no rales  Abdomen: soft non-tender non-distended, no hepatosplenomegaly  Ext: no clubbing cyanosis or edema  Skin: no rashes and no petechiae  Neuro: alert and oriented X 4, no focal deficits    MEDICATIONS  (STANDING):  chlorhexidine 4% Liquid 1 Application(s) Topical <User Schedule>  cholecalciferol 2000 Unit(s) Oral daily  clindamycin IVPB 600 milliGRAM(s) IV Intermittent every 8 hours  ferrous    sulfate 325 milliGRAM(s) Oral daily  folic acid 1 milliGRAM(s) Oral daily  furosemide    Tablet 40 milliGRAM(s) Oral daily  metoprolol tartrate 50 milliGRAM(s) Oral two times a day  pantoprazole    Tablet 40 milliGRAM(s) Oral before breakfast  raloxifene 60 milliGRAM(s) Oral daily  simvastatin 20 milliGRAM(s) Oral at bedtime  sodium chloride 0.9% lock flush 3 milliLiter(s) IV Push every 8 hours    MEDICATIONS  (PRN):      LABS:                          7.7    9.99  )-----------( 256      ( 16 Dec 2018 08:47 )             24.9         Mean Cell Volume : 71.3 fL  Mean Cell Hemoglobin : 22.1 pg  Mean Cell Hemoglobin Concentration : 30.9 g/dL  Auto Neutrophil # : 8.07 K/uL  Auto Lymphocyte # : 0.85 K/uL  Auto Monocyte # : 0.66 K/uL  Auto Eosinophil # : 0.27 K/uL  Auto Basophil # : 0.07 K/uL  Auto Neutrophil % : 80.8 %  Auto Lymphocyte % : 8.5 %  Auto Monocyte % : 6.6 %  Auto Eosinophil % : 2.7 %  Auto Basophil % : 0.7 %      Serial CBC's  12-16 @ 08:47  Hct-24.9 / Hgb-7.7 / Plat-256 / RBC-3.49 / WBC-9.99  Serial CBC's  12-15 @ 11:22  Hct-24.5 / Hgb-7.7 / Plat-260 / RBC-3.47 / WBC-8.03  Serial CBC's  12-14 @ 16:20  Hct-20.6 / Hgb-6.1 / Plat-236 / RBC-2.97 / WBC-6.78      12-16    142  |  96<L>  |  31<H>  ----------------------------<  90  4.0   |  29  |  2.2<H>    Ca    8.0<L>      16 Dec 2018 08:47  Phos  3.5     12-15  Mg     1.9     12-15        PT/INR - ( 16 Dec 2018 08:47 )   PT: 33.70 sec;   INR: 2.96 ratio       PTT - ( 16 Dec 2018 08:47 )  PTT:32.6 sec Patient is a 78y old  Female who presents with a chief complaint of Left lower extremity swelling (16 Dec 2018 14:25)    Subjective: Patient states that she feels fine and wants to go home.      Vital Signs Last 24 Hrs  T(C): 36.7 (17 Dec 2018 05:51), Max: 37.2 (16 Dec 2018 20:54)  T(F): 98.1 (17 Dec 2018 05:51), Max: 98.9 (16 Dec 2018 20:54)  HR: 93 (17 Dec 2018 05:51) (51 - 136)  BP: 118/58 (17 Dec 2018 05:51) (110/64 - 120/64)  BP(mean): --  RR: 18 (17 Dec 2018 05:51) (18 - 18)  SpO2: 95% (16 Dec 2018 20:15) (95% - 95%)    PHYSICAL EXAM  General: adult in NAD, resting comfortably in bed  HEENT: NC/AT  Neck: supple  CV: +S1, +S2, known murmur  Lungs: positive air movement b/l ant lungs  Abdomen: soft  Ext: LLE edema and erythema (Patient states improved from prior day.)  Neuro: alert and oriented X 4, no focal deficits    MEDICATIONS  (STANDING):  chlorhexidine 4% Liquid 1 Application(s) Topical <User Schedule>  cholecalciferol 2000 Unit(s) Oral daily  clindamycin IVPB 600 milliGRAM(s) IV Intermittent every 8 hours  ferrous    sulfate 325 milliGRAM(s) Oral daily  folic acid 1 milliGRAM(s) Oral daily  furosemide    Tablet 40 milliGRAM(s) Oral daily  metoprolol tartrate 50 milliGRAM(s) Oral two times a day  pantoprazole    Tablet 40 milliGRAM(s) Oral before breakfast  raloxifene 60 milliGRAM(s) Oral daily  simvastatin 20 milliGRAM(s) Oral at bedtime  sodium chloride 0.9% lock flush 3 milliLiter(s) IV Push every 8 hours    MEDICATIONS  (PRN):      LABS:                          7.7    9.99  )-----------( 256      ( 16 Dec 2018 08:47 )             24.9         Mean Cell Volume : 71.3 fL  Mean Cell Hemoglobin : 22.1 pg  Mean Cell Hemoglobin Concentration : 30.9 g/dL  Auto Neutrophil # : 8.07 K/uL  Auto Lymphocyte # : 0.85 K/uL  Auto Monocyte # : 0.66 K/uL  Auto Eosinophil # : 0.27 K/uL  Auto Basophil # : 0.07 K/uL  Auto Neutrophil % : 80.8 %  Auto Lymphocyte % : 8.5 %  Auto Monocyte % : 6.6 %  Auto Eosinophil % : 2.7 %  Auto Basophil % : 0.7 %      Serial CBC's  12-16 @ 08:47  Hct-24.9 / Hgb-7.7 / Plat-256 / RBC-3.49 / WBC-9.99  Serial CBC's  12-15 @ 11:22  Hct-24.5 / Hgb-7.7 / Plat-260 / RBC-3.47 / WBC-8.03  Serial CBC's  12-14 @ 16:20  Hct-20.6 / Hgb-6.1 / Plat-236 / RBC-2.97 / WBC-6.78      12-16    142  |  96<L>  |  31<H>  ----------------------------<  90  4.0   |  29  |  2.2<H>    Ca    8.0<L>      16 Dec 2018 08:47  Phos  3.5     12-15  Mg     1.9     12-15        PT/INR - ( 16 Dec 2018 08:47 )   PT: 33.70 sec;   INR: 2.96 ratio       PTT - ( 16 Dec 2018 08:47 )  PTT:32.6 sec

## 2018-12-17 NOTE — PROGRESS NOTE ADULT - REASON FOR ADMISSION
Left lower extremity swelling

## 2018-12-17 NOTE — DISCHARGE NOTE ADULT - PLAN OF CARE
complete antibiotic course You were admitted to the hospital for treatment of your cellulitis  Your symptoms were greatly improved on IV antibiotics and you will be discharged on PO antibiotics.   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 Alvarenga outpatient to monitor your hemoglobin and to complete your work up  - You should also follow up and reschedule your previously missed colonoscopy. This is a very important part of your work up for anemia   Continue to take iron supplements as prescribed medical management You were found to have a DVT in your right lower leg  You were evaluated by the vascular surgeons who did not recommend any intervention at this time except, except that you should continue to take coumadin for your DVT  You can follow up with Dr. Roper outpatient in 2 weeks, and you should repeat the lower extremity duplex within 1-2 weeks  Please note, that you should hold your coumadin dose for tonight, 12/17, and resume tomorrow 12/18 or after follow up with [facility], please follow up within the next 1-2 days Hold coumadin tonight (12/17), can resume tomorrow Your INR was elevated on admission, likely due to medication interaction.   There were no signs of acute active bleeding.   You should hold your coumadin dose for tonight, 12/17, and resume tomorrow 12/18 or after follow up with [facility], please follow up within the next 1-2 days  Please also follow up with your outpatient cardiologist for routine follow up

## 2018-12-17 NOTE — PROGRESS NOTE ADULT - SUBJECTIVE AND OBJECTIVE BOX
AUSTIN DAVIS  78y Female    INTERVAL HPI/OVERNIGHT EVENTS:    Pt feels better and wants to go home. She has multiple doctor appointments this week.   No bleeding. She checks her own INR at home. She states she was told to keep INR between 1.9 to 2.5  Case discussed with vascular attending who agrees with continuing coumadin for calf vein thrombus.    T(F): 98.1 (12-17-18 @ 05:51), Max: 98.9 (12-16-18 @ 20:54)  HR: 93 (12-17-18 @ 05:51) (51 - 136)  BP: 118/58 (12-17-18 @ 05:51) (110/64 - 120/64)  RR: 18 (12-17-18 @ 05:51) (18 - 18)  SpO2: 95% (12-16-18 @ 20:15) (95% - 95%)  I&O's Summary    16 Dec 2018 07:01  -  17 Dec 2018 07:00  --------------------------------------------------------  IN: 100 mL / OUT: 0 mL / NET: 100 mL      CAPILLARY BLOOD GLUCOSE      POCT Blood Glucose.: 154 mg/dL (17 Dec 2018 12:15)  POCT Blood Glucose.: 120 mg/dL (17 Dec 2018 07:57)  POCT Blood Glucose.: 178 mg/dL (16 Dec 2018 21:24)  POCT Blood Glucose.: 142 mg/dL (16 Dec 2018 17:29)  POCT Blood Glucose.: 152 mg/dL (16 Dec 2018 12:34)        PHYSICAL EXAM:  GENERAL: NAD  HEAD:  Normocephalic  EYES:  conjunctiva and sclera clear  ENMT: Moist mucous membranes  NECK: Supple  NERVOUS SYSTEM:  Alert & Oriented X3, Good concentration  CHEST/LUNG: Clear to percussion bilaterally; No rales, rhonchi, wheezing  HEART: Regular rate and rhythm; 3/6 systolic murmur, click  ABDOMEN: Soft, Nontender, Nondistended; Bowel sounds present  EXTREMITIES:  Left LE with improved erythema, still with edema  SKIN: improved erythema  small hematoma of right LE - stable    Consultant(s) Notes Reviewed:  [x ] YES  [ ] NO  Care Discussed with Consultants/Other Providers [ x] YES  [ ] NO    MEDICATIONS  (STANDING):  chlorhexidine 4% Liquid 1 Application(s) Topical <User Schedule>  cholecalciferol 2000 Unit(s) Oral daily  clindamycin IVPB 600 milliGRAM(s) IV Intermittent every 8 hours  ferrous    sulfate 325 milliGRAM(s) Oral daily  folic acid 1 milliGRAM(s) Oral daily  furosemide    Tablet 40 milliGRAM(s) Oral daily  metoprolol tartrate 50 milliGRAM(s) Oral two times a day  pantoprazole    Tablet 40 milliGRAM(s) Oral before breakfast  raloxifene 60 milliGRAM(s) Oral daily  simvastatin 20 milliGRAM(s) Oral at bedtime  sodium chloride 0.9% lock flush 3 milliLiter(s) IV Push every 8 hours    MEDICATIONS  (PRN):      LABS:                        7.4    7.85  )-----------( 208      ( 17 Dec 2018 07:39 )             23.8     12-17    142  |  99  |  30<H>  ----------------------------<  111<H>  3.7   |  29  |  2.0<H>    Ca    7.6<L>      17 Dec 2018 07:39      PT/INR - ( 17 Dec 2018 07:39 )   PT: >40.00 sec;   INR: 3.97 ratio         PTT - ( 17 Dec 2018 07:39 )  PTT:34.9 sec      Case discussed with resident    Care discussed with pt

## 2018-12-17 NOTE — DISCHARGE NOTE ADULT - CARE PROVIDER_API CALL
Denice Alvarenga), HematologyOncology; Internal Medicine; Medical Oncology  [address] [place] [address]  Phone: [phone]  Fax: [phone]    Dr. Trevizo,   Phone: [phone]  Fax: (       -

## 2018-12-17 NOTE — DISCHARGE NOTE ADULT - CARE PLAN
Principal Discharge DX:	Cellulitis of leg, left  Goal:	complete antibiotic course  Assessment and plan of treatment:	You were admitted to the hospital for treatment of your cellulitis  Your symptoms were greatly improved on IV antibiotics and you will be discharged on PO antibiotics.   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 Alvarenga outpatient to monitor your hemoglobin and to complete your work up  - You should also follow up and reschedule your previously missed colonoscopy. This is a very important part of your work up for anemia   Continue to take iron supplements as prescribed  Secondary Diagnosis:	DVT (deep venous thrombosis)  Goal:	medical management  Assessment and plan of treatment:	You were found to have a DVT in your right lower leg  You were evaluated by the vascular surgeons who did not recommend any intervention at this time except, except that you should continue to take coumadin for your DVT  You can follow up with Dr. Roper outpatient in 2 weeks, and you should repeat the lower extremity duplex within 1-2 weeks  Please note, that you should hold your coumadin dose for tonight, 12/17, and resume tomorrow 12/18 or after follow up with [facility], please follow up within the next 1-2 days  Secondary Diagnosis:	S/P AVR  Goal:	Hold coumadin tonight (12/17), can resume tomorrow  Assessment and plan of treatment:	Your INR was elevated on admission, likely due to medication interaction.   There were no signs of acute active bleeding.   You should hold your coumadin dose for tonight, 12/17, and resume tomorrow 12/18 or after follow up with [facility], please follow up within the next 1-2 days  Please also follow up with your outpatient cardiologist for routine follow up

## 2018-12-17 NOTE — DISCHARGE NOTE ADULT - HOSPITAL COURSE
78 year old female with PMH of HTN, type II DM, A-fib on coumadin, rheumatic heart disease s/p bioprosthetic mitral valve replacement, s/p metallic aortic valve replacement, and HFpEF, sick sinus syndrome s/p PPM, history of RLW DVT, RCC s/p Left nephrectomy, and osteoporosis who presents with a 2 week history of worsening left lower extremity edema.     #RLE lower extremity DVT- Right soleal vein thrombosis  - possibly provoked in the setting of recent surgery and history of malignancy  - LE venous duplex: Venous thrombosis right soleal vein. No evidence of deep venous thrombosis or superficial thrombophlebitis in left lower extremity. Left groin lymphadenopathy.  - patient reports having a DVT in the RLE 25 years ago which was treated with Coumadin  - Vascular 12/15 rec to continue with Coumadin and repeat Duplex in 1-2 weeks as outpatient  - Heme onc 12/15 also rec to cw Coumadin with INR goal btw 2-3   - ON discharge, patient should hold coumadin for one night because INR was elevated, likely due to medication interaction. Spoke with patient and her family that she should follow up at the [facility] within 1-2 days to repeat her CBC and INR levels    #LLE non-purulent cellulitis  - Change to PO clindamycin, will discharge on PO meds for 7 more days  - keep affected extremity elevated    #Chronic microcytic anemia  - multifactorial in etiology >> Sec to blood loss vs hemolysis from mechanical valve vs CKD vs malignancy vs?MDS (prior biopsy was indeterminant   - baseline Hgb is ~7 g/dL,   Hb on admission was 6.1 > s/p 2 units PRBC on 12/14 and improved to 7/7 on discharge  - no signs of acute blood loss  - Stool guiac was positive, as per patient's family she was scheduled for colonoscopy outpatient last week, but then cancelled because of her lower extremity swelling. They plan on rescheduling the colonoscopy ASAP after patient is discharged.   - cw Iron supplement   - FU with Dr. Alvarenga at the [facility] and with GI outpatient to schedule colonoscopy     #HTN  - continue Metoprolol and monitor BP    #Type II DM  - resume home meds    #Paroxysmal A-Fib  #History of bioprosthetic mitral valve and metallic aortic valve  - continue with metoprolol 50 BID  - Hold coumadin for one night on discharge and follow up with Dr. Alvarenga/ [facility] to repeat INR and follow up CBC, Can resume Coumadin 12/18 or after following up with Dr. Alvarenga  - f/u with cardiology as outpatient    #Chronic HFpEF  - no signs of decompensation at this time  - continue Lasix 40 mg PO daily    #RCC s/p left nephrectomy  - f/u with urology as outpatient    #Left adnexal mass s/p recent resection  - f/u with uro-gyn as outpatient  - patient was reportedly offered chemotherapy and radiation; she is currently considering her options    #CKD stage IV  - SCr at baseline of ~2    #Osteoporosis  - continue Raloxifene    #Sick sinus syndrome s/p PPM  - f/u with cardiology as outpatient    Patient is stable on discharge with no active signs of bleeding. She should follow up with outpatient providers as listed above.   Med Rec discussed with Dr. French

## 2018-12-17 NOTE — PROGRESS NOTE ADULT - ASSESSMENT
78 yof PMH of HTN, type II DM, atrial fibrillation (on Coumadin), rheumatic heart disease s/p bioprosthetic mitral valve replacement, s/p metallic aortic valve replacement, HFpEF, sick sinus syndrome s/p PPM, RLE DVT, RCC s/p left nephrectomy, left adnexal mass s/p resection, hematuria, anxiety, and osteoporosis who presented with a 2-week history of swelling in her left lower extremity.  She went to the [facility] to obtain blood transfusion for her chronic anemia and was noted to have a Hgb of 6.1 g/dL.  Also found to have new soleal vein thrombosis.    1. Soleal vein thrombosis  - Seen by vascular surgery and will continue coumadin.    2. 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 RCC: S/P resection  -Enrolled in observation arm of Ambassador trial (studying keytruda).  Please obtain records from [facility]. 78 yof PMH of HTN, type II DM, atrial fibrillation (on Coumadin), rheumatic heart disease s/p bioprosthetic mitral valve replacement, s/p metallic aortic valve replacement, HFpEF, sick sinus syndrome s/p PPM, RLE DVT, RCC s/p left nephrectomy, left adnexal mass s/p resection, hematuria, anxiety, and osteoporosis who presented with a 2-week history of swelling in her left lower extremity.  She went to the [facility] to obtain blood transfusion for her chronic anemia and was noted to have a Hgb of 6.1 g/dL.  Also found to have new soleal vein thrombosis.    1. Soleal vein thrombosis  - Seen by vascular surgery and will continue coumadin.    2. 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  - Iron studies, reticulocyte count, B12, FA, MMA, TSH, LDH, haptoglobin ordered and pending.    3. RCC: S/P resection  -Enrolled in observation arm of Ambassador trial (studying keytruda).  Please obtain records from [facility].

## 2018-12-17 NOTE — PROGRESS NOTE ADULT - ASSESSMENT
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 At the [facility], she was noted to have Hgb of 6.1 and was sent to the ED.    1. Right soleal vein thrombosis - continue anticoagulation with coumadin - keep INR 2.5    2. Left LE cellulitis - improving on IV clindamycin - change to PO today and complete course    3. Microcytic anemia - acute over chronic - baseline around 7  s/p transfusion with 2 Units PRBC - H/H improved and stable so far  likely due to multifactorial causes (hemolysis due to valves, CKD, iron deficiency)  check iron studies, LDH, haptoglobin, retic count, folate, vit B12, stool guaiac as per HemeOnc  monitor H/H    4. Afib/mechanical valve - on coumadin - hold today due to supratherapeutic INR  pt monitors INR at home and knows how to adjust coumadin    5. Transitional cell carcinoma s/p left nephrectomy - obtain records from [facility] for review    6. DM - FS acceptable    7. CKD stage 4 - stable    8. 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 At the [facility], she was noted to have Hgb of 6.1 and was sent to the ED.    1. Right soleal vein thrombosis - continue anticoagulation with coumadin - keep INR 2.5    2. Left LE cellulitis - improving on IV clindamycin - change to PO today and complete course    3. Microcytic anemia - acute over chronic - baseline around 7  s/p transfusion with 2 Units PRBC - H/H improved and stable so far  likely due to multifactorial causes (hemolysis due to valves, CKD, iron deficiency)  check iron studies, LDH, haptoglobin, retic count, folate, vit B12, stool guaiac as per HemeOnc  monitor H/H    4. Afib/mechanical valve - on coumadin - hold today due to supratherapeutic INR  pt monitors INR at home and knows how to adjust coumadin    5. Transitional cell carcinoma s/p left nephrectomy - obtain records from [facility] for review    6. DM - FS acceptable    7. CKD stage 4 - stable    8. Left adnexal mass s/p resection - obtain records for review    If stool guaiac is negative and pt can f/u with HemeOnc re: hemolytic workup, then can discharge home today on clindamycin 300mg q8hr x 7 more days  Pt will monitor INR at home and adjust coumadin to keep INR around 2.5  No coumadin tonight      Addendum: resident states she did bedside rectal exam - brown stool, guaiac positive.  She spoke to the family who states that the pt is scheduled for colonoscopy this month.   No gross bleeding - she has h/o hemorrhoids.  Pt wants to go home today and f/u with own GI for colonoscopy.  She follows with Dr. Alvarenga at [facility].     Pt being discharged home with instructions to f/u with HemeOnc and GI as outpt.    Spent 35 minutes on the discharge process of this pt

## 2018-12-18 ENCOUNTER — INBOUND DOCUMENT (OUTPATIENT)
Age: 78
End: 2018-12-18

## 2018-12-19 ENCOUNTER — APPOINTMENT (OUTPATIENT)
Dept: HEART AND VASCULAR | Facility: CLINIC | Age: 78
End: 2018-12-19
Payer: MEDICARE

## 2018-12-19 VITALS
BODY MASS INDEX: 25.49 KG/M2 | SYSTOLIC BLOOD PRESSURE: 120 MMHG | WEIGHT: 135 LBS | DIASTOLIC BLOOD PRESSURE: 70 MMHG | HEIGHT: 61 IN | RESPIRATION RATE: 16 BRPM | HEART RATE: 70 BPM

## 2018-12-19 DIAGNOSIS — R60.9 EDEMA, UNSPECIFIED: ICD-10-CM

## 2018-12-19 DIAGNOSIS — D50.9 IRON DEFICIENCY ANEMIA, UNSPECIFIED: ICD-10-CM

## 2018-12-19 DIAGNOSIS — Z95.0 PRESENCE OF CARDIAC PACEMAKER: ICD-10-CM

## 2018-12-19 DIAGNOSIS — L03.116 CELLULITIS OF LEFT LOWER LIMB: ICD-10-CM

## 2018-12-19 DIAGNOSIS — I48.91 UNSPECIFIED ATRIAL FIBRILLATION: ICD-10-CM

## 2018-12-19 LAB — INR PPP: 2.9

## 2018-12-19 PROCEDURE — 99215 OFFICE O/P EST HI 40 MIN: CPT

## 2018-12-19 PROCEDURE — 93000 ELECTROCARDIOGRAM COMPLETE: CPT

## 2018-12-19 RX ORDER — PANTOPRAZOLE 40 MG/1
40 TABLET, DELAYED RELEASE ORAL DAILY
Qty: 30 | Refills: 5 | Status: DISCONTINUED | COMMUNITY
End: 2018-12-19

## 2018-12-19 RX ORDER — METOPROLOL TARTRATE 50 MG/1
50 TABLET, FILM COATED ORAL TWICE DAILY
Qty: 90 | Refills: 5 | Status: ACTIVE | COMMUNITY
Start: 2018-12-11 | End: 1900-01-01

## 2018-12-19 RX ORDER — FAMOTIDINE 20 MG/1
20 TABLET, FILM COATED ORAL
Qty: 180 | Refills: 3 | Status: ACTIVE | COMMUNITY
Start: 1900-01-01 | End: 1900-01-01

## 2018-12-19 NOTE — HISTORY OF PRESENT ILLNESS
[FreeTextEntry1] : The patient has been hospitalized recently in [facility]. She was treated for left lower extremity cellulitis as well as anemia. She was transfused 2 units of packed red blood cells. She was given IV antibiotics followed by p.o. antibiotics. Her left lower external he still remains edematous 2 to pelvic lymph nodes. The erythema and pain in the lower extremity has dissipated somewhat. She denies any chest pain or shortness of breath. She does have intermittent palpitation. She denies associated dizziness or diaphoresis.

## 2018-12-19 NOTE — DISCUSSION/SUMMARY
[FreeTextEntry1] : I will increase her beta blocker therapy to better control her palpitation. Home maintained on her other medications. She was encouraged to seek the second opinion with the oncologist in [place]. She should continue on her antibiotic therapy. She should maintain an INR of 3.0.

## 2018-12-19 NOTE — PHYSICAL EXAM
[General Appearance - Well Developed] : well developed [Normal Appearance] : normal appearance [Well Groomed] : well groomed [General Appearance - Well Nourished] : well nourished [No Deformities] : no deformities [General Appearance - In No Acute Distress] : no acute distress [Normal Conjunctiva] : the conjunctiva exhibited no abnormalities [Eyelids - No Xanthelasma] : the eyelids demonstrated no xanthelasmas [Normal Oral Mucosa] : normal oral mucosa [No Oral Pallor] : no oral pallor [No Oral Cyanosis] : no oral cyanosis [Respiration, Rhythm And Depth] : normal respiratory rhythm and effort [Exaggerated Use Of Accessory Muscles For Inspiration] : no accessory muscle use [Auscultation Breath Sounds / Voice Sounds] : lungs were clear to auscultation bilaterally [Heart Sounds] : normal S1 and S2 [Regularly Irregular] : the rhythm was regularly irregular [Abdomen Soft] : soft [Abdomen Tenderness] : non-tender [Abdomen Mass (___ Cm)] : no abdominal mass palpated [Abnormal Walk] : normal gait [Gait - Sufficient For Exercise Testing] : the gait was sufficient for exercise testing [Nail Clubbing] : no clubbing of the fingernails [Cyanosis, Localized] : no localized cyanosis [Petechial Hemorrhages (___cm)] : no petechial hemorrhages [Skin Color & Pigmentation] : normal skin color and pigmentation [] : no rash [No Venous Stasis] : no venous stasis [Skin Lesions] : no skin lesions [No Skin Ulcers] : no skin ulcer [No Xanthoma] : no  xanthoma was observed [FreeTextEntry1] : apical systolic murmur, LLE edema & erythema

## 2018-12-19 NOTE — ASSESSMENT
[FreeTextEntry1] : EKG:  Atrial fibrillation, moderate ventricular response\par Patient continues to have palpitation despite being treated with beta blockers. She has resolving left lower extremity cellulitis and Lotrimin edema. She is getting numerous recommendations regarding treatment of her cancer and she is seeking another opinion with an oncologist in [facility]. She has no signs of congestive heart failure physical examination.

## 2018-12-20 ENCOUNTER — APPOINTMENT (OUTPATIENT)
Dept: HEMATOLOGY ONCOLOGY | Facility: CLINIC | Age: 78
End: 2018-12-20

## 2018-12-20 ENCOUNTER — LABORATORY RESULT (OUTPATIENT)
Age: 78
End: 2018-12-20

## 2018-12-20 DIAGNOSIS — D59.4 OTHER NONAUTOIMMUNE HEMOLYTIC ANEMIAS: ICD-10-CM

## 2018-12-20 DIAGNOSIS — M81.0 AGE-RELATED OSTEOPOROSIS WITHOUT CURRENT PATHOLOGICAL FRACTURE: ICD-10-CM

## 2018-12-20 DIAGNOSIS — R19.5 OTHER FECAL ABNORMALITIES: ICD-10-CM

## 2018-12-20 DIAGNOSIS — Z95.1 PRESENCE OF AORTOCORONARY BYPASS GRAFT: ICD-10-CM

## 2018-12-20 DIAGNOSIS — E11.22 TYPE 2 DIABETES MELLITUS WITH DIABETIC CHRONIC KIDNEY DISEASE: ICD-10-CM

## 2018-12-20 DIAGNOSIS — E87.6 HYPOKALEMIA: ICD-10-CM

## 2018-12-20 DIAGNOSIS — Z95.0 PRESENCE OF CARDIAC PACEMAKER: ICD-10-CM

## 2018-12-20 DIAGNOSIS — R22.42 LOCALIZED SWELLING, MASS AND LUMP, LEFT LOWER LIMB: ICD-10-CM

## 2018-12-20 DIAGNOSIS — Z95.3 PRESENCE OF XENOGENIC HEART VALVE: ICD-10-CM

## 2018-12-20 DIAGNOSIS — I13.0 HYPERTENSIVE HEART AND CHRONIC KIDNEY DISEASE WITH HEART FAILURE AND STAGE 1 THROUGH STAGE 4 CHRONIC KIDNEY DISEASE, OR UNSPECIFIED CHRONIC KIDNEY DISEASE: ICD-10-CM

## 2018-12-20 DIAGNOSIS — N18.4 CHRONIC KIDNEY DISEASE, STAGE 4 (SEVERE): ICD-10-CM

## 2018-12-20 DIAGNOSIS — Z79.01 LONG TERM (CURRENT) USE OF ANTICOAGULANTS: ICD-10-CM

## 2018-12-20 DIAGNOSIS — I82.591 CHRONIC EMBOLISM AND THROMBOSIS OF OTHER SPECIFIED DEEP VEIN OF RIGHT LOWER EXTREMITY: ICD-10-CM

## 2018-12-20 DIAGNOSIS — D63.0 ANEMIA IN NEOPLASTIC DISEASE: ICD-10-CM

## 2018-12-20 DIAGNOSIS — D46.9 MYELODYSPLASTIC SYNDROME, UNSPECIFIED: ICD-10-CM

## 2018-12-20 DIAGNOSIS — D63.1 ANEMIA IN CHRONIC KIDNEY DISEASE: ICD-10-CM

## 2018-12-20 DIAGNOSIS — I25.10 ATHEROSCLEROTIC HEART DISEASE OF NATIVE CORONARY ARTERY WITHOUT ANGINA PECTORIS: ICD-10-CM

## 2018-12-20 DIAGNOSIS — Z91.013 ALLERGY TO SEAFOOD: ICD-10-CM

## 2018-12-20 DIAGNOSIS — I48.0 PAROXYSMAL ATRIAL FIBRILLATION: ICD-10-CM

## 2018-12-20 DIAGNOSIS — I50.32 CHRONIC DIASTOLIC (CONGESTIVE) HEART FAILURE: ICD-10-CM

## 2018-12-20 DIAGNOSIS — L03.116 CELLULITIS OF LEFT LOWER LIMB: ICD-10-CM

## 2018-12-20 DIAGNOSIS — E11.9 TYPE 2 DIABETES MELLITUS WITHOUT COMPLICATIONS: ICD-10-CM

## 2018-12-20 DIAGNOSIS — K21.9 GASTRO-ESOPHAGEAL REFLUX DISEASE WITHOUT ESOPHAGITIS: ICD-10-CM

## 2018-12-20 DIAGNOSIS — C64.9 MALIGNANT NEOPLASM OF UNSPECIFIED KIDNEY, EXCEPT RENAL PELVIS: ICD-10-CM

## 2018-12-20 DIAGNOSIS — D50.9 IRON DEFICIENCY ANEMIA, UNSPECIFIED: ICD-10-CM

## 2018-12-21 LAB
ABO + RH PNL BLD: NORMAL
BLD GP AB SCN SERPL QL: NORMAL
HCT VFR BLD CALC: 25.8 %
HGB BLD-MCNC: 7.7 G/DL
MCHC RBC-ENTMCNC: 21.8 PG
MCHC RBC-ENTMCNC: 29.8 G/DL
MCV RBC AUTO: 72.9 FL
METHYLMALONATE SERPL-SCNC: 687 NMOL/L — HIGH (ref 0–378)
PLATELET # BLD AUTO: 221 K/UL
PMV BLD: 10.4 FL
RBC # BLD: 3.54 M/UL
RBC # FLD: 22.3 %
WBC # FLD AUTO: 7.88 K/UL

## 2018-12-27 ENCOUNTER — OUTPATIENT (OUTPATIENT)
Dept: OUTPATIENT SERVICES | Facility: HOSPITAL | Age: 78
LOS: 1 days | Discharge: HOME | End: 2018-12-27

## 2018-12-27 ENCOUNTER — LABORATORY RESULT (OUTPATIENT)
Age: 78
End: 2018-12-27

## 2018-12-27 ENCOUNTER — APPOINTMENT (OUTPATIENT)
Dept: HEMATOLOGY ONCOLOGY | Facility: CLINIC | Age: 78
End: 2018-12-27

## 2018-12-27 VITALS
DIASTOLIC BLOOD PRESSURE: 58 MMHG | SYSTOLIC BLOOD PRESSURE: 126 MMHG | BODY MASS INDEX: 25.68 KG/M2 | HEART RATE: 64 BPM | HEIGHT: 61 IN | TEMPERATURE: 97.8 F | RESPIRATION RATE: 16 BRPM | WEIGHT: 136 LBS

## 2018-12-27 DIAGNOSIS — D64.9 ANEMIA, UNSPECIFIED: ICD-10-CM

## 2018-12-27 DIAGNOSIS — Z00.00 ENCOUNTER FOR GENERAL ADULT MEDICAL EXAMINATION W/OUT ABNORMAL FINDINGS: ICD-10-CM

## 2018-12-27 DIAGNOSIS — Z95.2 PRESENCE OF PROSTHETIC HEART VALVE: Chronic | ICD-10-CM

## 2018-12-27 DIAGNOSIS — C64.2 MALIGNANT NEOPLASM OF LEFT KIDNEY, EXCEPT RENAL PELVIS: ICD-10-CM

## 2018-12-27 DIAGNOSIS — Z98.890 OTHER SPECIFIED POSTPROCEDURAL STATES: Chronic | ICD-10-CM

## 2018-12-27 DIAGNOSIS — Z96.0 PRESENCE OF UROGENITAL IMPLANTS: Chronic | ICD-10-CM

## 2018-12-27 LAB
HCT VFR BLD CALC: 24.7 %
HGB BLD-MCNC: 7.5 G/DL
MCHC RBC-ENTMCNC: 21.4 PG
MCHC RBC-ENTMCNC: 30.4 G/DL
MCV RBC AUTO: 70.6 FL
PLATELET # BLD AUTO: 244 K/UL
PMV BLD: 11 FL
RBC # BLD: 3.5 M/UL
RBC # FLD: 22.2 %
WBC # FLD AUTO: 8.81 K/UL

## 2018-12-27 NOTE — PHYSICAL EXAM
[Ambulatory and capable of all self care but unable to carry out any work activities] : Status 2- Ambulatory and capable of all self care but unable to carry out any work activities. Up and about more than 50% of waking hours [Normal] : affect appropriate [de-identified] : pale [de-identified] : + click, irregularly irregular [de-identified] : RLE with significant edema and some erythema as compared to left [de-identified] : BS are positive  [de-identified] : pale

## 2018-12-27 NOTE — ASSESSMENT
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 Pablo on 6/1/2018, and reresected on 10/29/2018 (suspected initial stage was IIIa [id number]) \par --Following with Oncology (Dr. Fields) at [facility], requesting second opinion \par --Was previously consented for Ambassador trial and randomized to observation arm \par --follow up CT A/P on 8/24/2018 in [facility] it revealed 3.6cm masslike soft tissue involving left adnexa and uterine fundus, 4.2cm heterogeneous mass with surrounding infiltrative change anterior to left iliac vessels increasing in size from prior PET CT, small R pleural effusion, 5mm sclerotic lesion left iliac bone. Imaging was personally reviewed by myself with IR. PET CT was recommended with biopsy to follow, she returned for her care to [facility] adter this \par --She will followed up at [facility] (Dr. Pablo) in this regard and underwent resection on 10/29/2018, path in the retained distal ureter was consistent with locoregional recurrent disease \par --PET CT to restage was ordered on 12/27/2018\par --Urgent referral for Rad Onc eval at [facility] \par \par Metallic mitral valve, replaced with tissue valve on 3/19/2018, metallic aortic valve in place\par --On chronic anticoagulation with Coumadin for A. fib and metallic AV\par \par LLE, likely 2/2 venous compression 2/2 adenopathy \par --L soleal DVT, continue with AC with Coumadin, INR closer to 3 per cardiology \par --Stopped Clindamycin short, reports no diarrhea \par \par Follow up in 2 weeks or sooner if needed \par

## 2018-12-27 NOTE — REASON FOR VISIT
[Follow-Up Visit] : a follow-up visit for [Spouse] : spouse [Family Member] : family member [FreeTextEntry2] : Anemia, urothelial carcinoma [FreeTextEntry3] : No preferred her son Skyler to translate

## 2018-12-27 NOTE — HISTORY OF PRESENT ILLNESS
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 She is of [place] descent.\par She was referred to us following hospital discharge. She was admitted on 03/25/2017 and was noted to be anemic at 8.1, with MCV of 65.\par The patient has history of rheumatic fever as a child. 13 years ago she underwent valve replacement surgery.\par As per her son, 2 valves were replaced with metallic valves, one of which was mitral, he does not recall what second valve was replaced. \par The patient has since remained on Coumadin. She has undergone upper endoscopy and colonoscopy 3 years ago without stopping Coumadin. \par She denies any vaginal bleeding. \par She has required transfusions. As per son, her most recent post-transfusion Hgb was 9.0. \par  [de-identified] : The patient had another hospital admission with elevated INR and hematuria. She was started on IV iron repletion in the hospital and competed it as outpatient. Her Hgb has improved.\par She currently denies hematuria and was seen by the urologist as outpatient. She had a cystoscopy; no bleeding source was identified. \par \par She had extensive GI work up by Dr. Kd Goldberg in 08/2013 including upper endoscopy, colonoscopy and capsule study, no overt source of bleeding was identified. \par \par She denies GI ot  bleeding today. Overall she is feeling better. Her INR has been in therapeutic range since leaving the hospital. \par Hgb today is down to 8.1 but she remains asymptomatic. The labs are consistent with mild hemolysis, [place] negative 2/2 metallic valve in place. \par \par 6/13/2017: Hgb at 8.2, she declines transfusion, states that she is feeling well, agreeable to follow up next week for CBC check. She declines bone marrow biopsy. Iron levels currently are adequate. There is evidence of mild hemolysis 2/2 valve. \par \par 7/7/2017: No is here for follow up with her , she continues to decline BM Bx. She reports having had Procrit injections in the past with good results; they subsequently were stopped. She continues to decline blood transfusions and her iron stores are currently adequate. We have discussed the risks and benefits of starting her on Procrit therapy with goal Hb of 9.5-10. She is is agreeable. We will plan to start today.  \par \par 09/29/2017\par Ms. Sarabia is here today for a follow up appointment to continue to receive Procrit injection as well as check her CBC. She denied SOB, night sweats and chills. She has been tolerating Procrit well. she offers no other complaints at this time\par \par 10/12/2017 \par No again reports small amount of hematuria as well as dysuria, which got better with antibiotics. Hb is 7.7 today, she is declining transfusions. She feels well. Will administer Procrit today. On 9/29/2017 Procrit dose was increased to 40,000 units every 2 weeks. Iron studies did not reveal iron deficiency. SPEP was again erroneously sent instead of Hb electrophoresis. Oswald will be getting a CT scan that was ordered by her primary. \par \par 11/10/2017: No reports hematuria again that stopped 4 days ago after a course of antibiotics. She is scheduled for cystoscopy and ureteroscopy with biopsy at [facility] on 12/1/2017. We will plan to transfuse her accordingly 2 days prior to procedure. She reports fatigue and denies any other complaints. She declines transfusion today. Hb is 6.8.\par \par 12/19/2017: No continues to bleed intermittently and has required transfusions. Cystoscopy and ureteroscopy did not reveal clear origin of hematuria.\par \par 1/16/2018: Currently No is not bleeding, she is tired, no CP or SOB. She is going to [place] for ureteral procedure (? stent) later this week. There is no plan to stop Coumadin for the procedure. Will administer Procrit today. Iron studies today. \par \par 3/16/2018: No in the interim was seen in second opinion by Dr. Benitez, who performed a BMBx, results are pending. I received a call yesterday from her urologist office reporting high bilirubin (increased as compared to baseline), I have asked her to come in for bloodwork yesterday. Delayed transfusion reaction and autoimmune hemolytic anemia work up was negative. Hb is continuing to decrease, there are also signs of ongoing hemolysis as well as blood in the urine in the context of a UTI. No is refusing to go to ER. I will arrange for direct admission to my service for further work up today, she is agreeable for direct admission. She received a dose of Procrit yesterday. \par \par 4/18/2018: No required a lengthy hospitalization as well as transfer to [facility]. 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 Tomer Escalante on 3/19/2018. She still has a metallic aortic valve in place and remains on Coumadin. She has a documented seizure on POD1. Stroke code called, CT head negative, EEG placed showing epileptic activity and Keppra started per Dr. Soriano. She was later extubated POD#2, and hep gtt/coumadin started for previous AVR with mech valve and h/o afib. POD3 received PRBC for anemia. Her urologist Dr. Pablo was contacted to discuss INR goals given her h/o hematuria and there is no contraindication for continuing Coumadin. She was discharged home on 4/5/17. Today she complains of some hematuria for 2-3 days. I will hold off restarting Procrit for now until she goes for her post-op visit with Dr. Escalante. She will require weekly CBCs with possible transfusion. She was advised to continue with folic acid. \par \par 5/2/18: Pt comes in for follow-up visit. No fresh complaints. She continues to take coumadin for her metallic aortic valve, although her target INR is between 1.5 - 2.5 because of ongoing hematuria. 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 She also recently saw her neurologist who performed EEG and was going to taper off keppra if EEG was normal. However, the results are unknown at this point. She has also followed-up with her CT surgeon Dr Escalante. Continues to take folic acid and has been getting her weekly procrit. Her Hb today is 7.4.\par \par 6/22/2018: No was briefly admitted to [facility] with ARF and gross hematuria with clots, CT of the abdomen reveald kidney laceration, she was then transferred to [facility], She reports she stayed there for 1 week and kidney surgery was done by Dr. Pablo, she was noted to have "kidney cancer, stage III", she then saw medical oncology in [facility] Dr. Kashmir Fields, who recommended "some IV medication that is not chemo". All of these records are nor available for my review, I have discussed case with Dr. Pablo's office and requested records. I have explained to Susan and her  that she does not need to be seen by 2 heme/onc specialists and she can chose where she will receive further care. She will think about this and let us know. She will have CT with contrast and [facility] next week. I will hold Procrit today ion light of new cancer diagnosis and missing records. Hb is 8.8. \par \par 6/29/2018: I have received some records from [facility] and have at length discussed this with No's family member in [place] it her request. I have explained to them that participation in Ambassador trial open at [facility] in my opinion will be beneficial. She will continue to have periodic blood tests in my office but will continue with her Oncological care in [facility]. \par \par 8/3/2018: No present for follow up, her Hb is again on the lower side she denies any overt bleeding. She states that she has been frustrated with [facility], multiple tests have been dose and she has not started on any therapy. She wants to know if I could provide immunotherapy here. I have explained to her that I cannot offer her immunotherapy off clinical trial at this time. i have reached out to [facility] for a call back. I will offer her a blood transfusions today. \par \par 8/16/2018: I have received a call from NP at [facility] since No's last visit. NP explained to me that No has been consented for Ambassador trial participation and randomized to observation arm. I have explained this to No, I am no quite sure she understands what this means. No also told me that her relative in [place] has recently received a follow up call from [facility] explaining the situation. She understands further follow up imaging will be done at [facility]. She does not believe that she has another scan scheduled at this time. I have not received any written records from [facility] Oncology office despite multiple requests. No was also noted to have relative iron deficiency, i will plan to replete with 3 doses of Feraheme. She continues on Coumadin and denies bleeding. \par \par 9/18/2018: No had a follow up CT A/P on 8/24/2018 in [facility] it revealed 3.6cm masslike soft tissue involving left adnexa and uterine fundus, 4.2cm heterogeneous mass with surrounding infiltrative change antrior to left iliac vessels increasing in size from prior PET CT, small R pleural effusion, 5mm sclerotic lesion left iliac bone. imaging was personally reviewed by myself with IR. PET CT was recommended with biopsy to follow. This was discussed with No. She in the meantime saw Dr. Pablo and more surgical procedures are planned. She will call him tomorrow to delineate further plan. She has received 1 unit of blood yesterday. she continues on Coumadin for metallic aortic valve. C/o on/off hematuria and some lower abdominal pain. \par \par 10/31/2018: No presents for follow up, she is not feeling well today, very weak, her abdomen is tender. She just had surgery at [facility] on 10/29/2018 by Dr. Pablo and was discharged the next day, she did not receive any transfusions, her blood level was not checked upon discharge. Today her Hb is 6.2, she is injecting Lovenox, I am concerned that she may be bleeding and I have advised her to go to ER, she is agreeable. \par \par 12/27/2018: After multiple attempts I was finally able to obtain records from [facility]. I have reviewed these at length and held an 1.5 discussion with No and her family. No wishes to switch her care over to [facility] and receive care locally. I have briefly seen her in the office the other day when she arrived for blood work, her left leg was significantly swollen, she was advised to go to ER. On 12/14/2018 Doppler of LE revealed Venous thrombosis right soleal vein.  No evidence of deep venous thrombosis or superficial thrombophlebitis in  left lower extremity.  Left groin lymphadenopathy. She was treated for cellulitis and discharged with oral Clindamycin, that she stopped taking early yesterday, her LLE remains swollen, but erythema is much improved. During her hospitalization she has received 2 units of PRBC. \par \par To summarize outside records from [facility]: No was observed for hematuria or unknown etiology by Dr. 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 Original path report is not available for my review. She was then referred to establish oncological care with Dr. Kashmir Fields, [facility] and randomized to observation arm. She had a follow up CT scan on 7/31/2018 revealed soft tissue mass along pelvic sidewall, follow up PET CT on 7/19/2018 revealed uptake in that area. On 10/29/2018 she underwent left adnexal mass and left distal ureter excision, adnexal mass was negative for malignancy, left distal ureter revealed invasive high grade urothelial carcinoma, invading full thickness of ureteral wall, involving soft tissue surrounding the ureter, morphologically similar to prior tumor in the left kidney. She was discharged the next day and presented to my office with worsening anemia and abdominal pain, this required hospitalization, CT abdomen on 11/12018 revealed a 6cm fluid collection thought to be a hematoma. \par She was then referred to [facility] and was seen by Dr. Diaz 11/23/2018, who recommended XRT with chemotherapy to the pelvis. She again was seen by Dr. Fields who recommended  combining radiation with concurrent weekly paclitaxel on 12/12/2018. She had a CT of the A/P without contrast on 12/6/2018 that revealed L adnexal ovoid mass at 6.4x6.2cm, fluid density on attenuation as well as interval development of soft tissue density somewhat confluent masslike conglomerates along left para-aortic-iliac chain, most pronounced below level of aortic bifurcation, suspicious for mets, mildly prominent b/l lymph nodes in the inguinal regions, suspicious for mets, small right pleural effusion with interval increase. \par \par All of the above was discussed and summarized for the family. No reports decrease in appetite, weigh loss.

## 2018-12-27 NOTE — REVIEW OF SYSTEMS
[Fatigue] : fatigue [SOB on Exertion] : shortness of breath during exertion [Easy Bleeding] : a tendency for easy bleeding [Easy Bruising] : a tendency for easy bruising [Negative] : Allergic/Immunologic [Recent Change In Weight] : ~T recent weight change [Fever] : no fever [Chills] : no chills [Night Sweats] : no night sweats [Dysuria] : no dysuria [Incontinence] : no incontinence [Vaginal Discharge] : no vaginal discharge [Dysmenorrhea/Abn Vaginal Bleeding] : no dysmenorrhea/abnormal vaginal bleeding [Swollen Glands] : no swollen glands [FreeTextEntry2] : weight loss, poor appetite  [FreeTextEntry8] : denies hematuria [de-identified] : LLE with significant edema and improved erythema from prior

## 2018-12-28 DIAGNOSIS — I82.811 EMBOLISM AND THROMBOSIS OF SUPERFICIAL VEINS OF RIGHT LOWER EXTREMITY: ICD-10-CM

## 2018-12-28 DIAGNOSIS — C64.9 MALIGNANT NEOPLASM OF UNSPECIFIED KIDNEY, EXCEPT RENAL PELVIS: ICD-10-CM

## 2019-01-02 ENCOUNTER — INPATIENT (INPATIENT)
Facility: HOSPITAL | Age: 79
LOS: 13 days | Discharge: ANOTHER HOSPITAL | End: 2019-01-16
Attending: INTERNAL MEDICINE | Admitting: INTERNAL MEDICINE
Payer: MEDICARE

## 2019-01-02 VITALS
RESPIRATION RATE: 18 BRPM | OXYGEN SATURATION: 96 % | DIASTOLIC BLOOD PRESSURE: 51 MMHG | TEMPERATURE: 98 F | SYSTOLIC BLOOD PRESSURE: 108 MMHG | HEART RATE: 94 BPM

## 2019-01-02 DIAGNOSIS — Z96.0 PRESENCE OF UROGENITAL IMPLANTS: Chronic | ICD-10-CM

## 2019-01-02 DIAGNOSIS — Z98.890 OTHER SPECIFIED POSTPROCEDURAL STATES: Chronic | ICD-10-CM

## 2019-01-02 DIAGNOSIS — Z95.2 PRESENCE OF PROSTHETIC HEART VALVE: Chronic | ICD-10-CM

## 2019-01-02 LAB
ALBUMIN SERPL ELPH-MCNC: 3.3 G/DL — LOW (ref 3.5–5.2)
ALP SERPL-CCNC: 109 U/L — SIGNIFICANT CHANGE UP (ref 30–115)
ALT FLD-CCNC: 7 U/L — SIGNIFICANT CHANGE UP (ref 0–41)
ANION GAP SERPL CALC-SCNC: 14 MMOL/L — SIGNIFICANT CHANGE UP (ref 7–14)
ANISOCYTOSIS BLD QL: SLIGHT — SIGNIFICANT CHANGE UP
APTT BLD: 33.3 SEC — SIGNIFICANT CHANGE UP (ref 27–39.2)
AST SERPL-CCNC: 13 U/L — SIGNIFICANT CHANGE UP (ref 0–41)
BASOPHILS # BLD AUTO: 0.08 K/UL — SIGNIFICANT CHANGE UP (ref 0–0.2)
BASOPHILS NFR BLD AUTO: 0.9 % — SIGNIFICANT CHANGE UP (ref 0–1)
BILIRUB DIRECT SERPL-MCNC: 0.2 MG/DL — SIGNIFICANT CHANGE UP (ref 0–0.2)
BILIRUB INDIRECT FLD-MCNC: 0.4 MG/DL — SIGNIFICANT CHANGE UP (ref 0.2–1.2)
BILIRUB SERPL-MCNC: 0.6 MG/DL — SIGNIFICANT CHANGE UP (ref 0.2–1.2)
BLD GP AB SCN SERPL QL: SIGNIFICANT CHANGE UP
BUN SERPL-MCNC: 33 MG/DL — HIGH (ref 10–20)
CALCIUM SERPL-MCNC: 7.7 MG/DL — LOW (ref 8.5–10.1)
CHLORIDE SERPL-SCNC: 94 MMOL/L — LOW (ref 98–110)
CO2 SERPL-SCNC: 26 MMOL/L — SIGNIFICANT CHANGE UP (ref 17–32)
CREAT SERPL-MCNC: 1.9 MG/DL — HIGH (ref 0.7–1.5)
EOSINOPHIL # BLD AUTO: 0.11 K/UL — SIGNIFICANT CHANGE UP (ref 0–0.7)
EOSINOPHIL NFR BLD AUTO: 1.3 % — SIGNIFICANT CHANGE UP (ref 0–8)
GLUCOSE SERPL-MCNC: 223 MG/DL — HIGH (ref 70–99)
HCT VFR BLD CALC: 16 % — LOW (ref 37–47)
HGB BLD-MCNC: 4.8 G/DL — CRITICAL LOW (ref 12–16)
HYPOCHROMIA BLD QL: SIGNIFICANT CHANGE UP
INR BLD: 3.29 RATIO — HIGH (ref 0.65–1.3)
LYMPHOCYTES # BLD AUTO: 0.85 K/UL — LOW (ref 1.2–3.4)
LYMPHOCYTES # BLD AUTO: 9.8 % — LOW (ref 20.5–51.1)
MANUAL SMEAR VERIFICATION: SIGNIFICANT CHANGE UP
MCHC RBC-ENTMCNC: 20.6 PG — LOW (ref 27–31)
MCHC RBC-ENTMCNC: 30 G/DL — LOW (ref 32–37)
MCV RBC AUTO: 68.7 FL — LOW (ref 81–99)
MICROCYTES BLD QL: SIGNIFICANT CHANGE UP
MONOCYTES # BLD AUTO: 0.38 K/UL — SIGNIFICANT CHANGE UP (ref 0.1–0.6)
MONOCYTES NFR BLD AUTO: 4.4 % — SIGNIFICANT CHANGE UP (ref 1.7–9.3)
NEUTROPHILS # BLD AUTO: 7.23 K/UL — HIGH (ref 1.4–6.5)
NEUTROPHILS NFR BLD AUTO: 83.6 % — HIGH (ref 42.2–75.2)
NRBC # BLD: 0 /100 WBCS — SIGNIFICANT CHANGE UP (ref 0–0)
PLAT MORPH BLD: NORMAL — SIGNIFICANT CHANGE UP
PLATELET # BLD AUTO: 220 K/UL — SIGNIFICANT CHANGE UP (ref 130–400)
POIKILOCYTOSIS BLD QL AUTO: SLIGHT — SIGNIFICANT CHANGE UP
POLYCHROMASIA BLD QL SMEAR: SLIGHT — SIGNIFICANT CHANGE UP
POTASSIUM SERPL-MCNC: 3.8 MMOL/L — SIGNIFICANT CHANGE UP (ref 3.5–5)
POTASSIUM SERPL-SCNC: 3.8 MMOL/L — SIGNIFICANT CHANGE UP (ref 3.5–5)
PROT SERPL-MCNC: 6.3 G/DL — SIGNIFICANT CHANGE UP (ref 6–8)
PROTHROM AB SERPL-ACNC: 37.4 SEC — HIGH (ref 9.95–12.87)
RBC # BLD: 2.33 M/UL — LOW (ref 4.2–5.4)
RBC # FLD: 21.6 % — HIGH (ref 11.5–14.5)
RBC BLD AUTO: ABNORMAL
SMUDGE CELLS # BLD: PRESENT — SIGNIFICANT CHANGE UP
SODIUM SERPL-SCNC: 134 MMOL/L — LOW (ref 135–146)
TYPE + AB SCN PNL BLD: SIGNIFICANT CHANGE UP
WBC # BLD: 8.65 K/UL — SIGNIFICANT CHANGE UP (ref 4.8–10.8)
WBC # FLD AUTO: 8.65 K/UL — SIGNIFICANT CHANGE UP (ref 4.8–10.8)

## 2019-01-02 RX ORDER — FUROSEMIDE 40 MG
40 TABLET ORAL DAILY
Qty: 0 | Refills: 0 | Status: DISCONTINUED | OUTPATIENT
Start: 2019-01-02 | End: 2019-01-07

## 2019-01-02 RX ORDER — FAMOTIDINE 10 MG/ML
20 INJECTION INTRAVENOUS
Qty: 0 | Refills: 0 | Status: DISCONTINUED | OUTPATIENT
Start: 2019-01-02 | End: 2019-01-03

## 2019-01-02 RX ORDER — PANTOPRAZOLE SODIUM 20 MG/1
40 TABLET, DELAYED RELEASE ORAL
Qty: 0 | Refills: 0 | Status: DISCONTINUED | OUTPATIENT
Start: 2019-01-02 | End: 2019-01-16

## 2019-01-02 RX ORDER — CHLORHEXIDINE GLUCONATE 213 G/1000ML
1 SOLUTION TOPICAL EVERY 12 HOURS
Qty: 0 | Refills: 0 | Status: COMPLETED | OUTPATIENT
Start: 2019-01-02 | End: 2019-01-03

## 2019-01-02 RX ORDER — FOLIC ACID 0.8 MG
1 TABLET ORAL DAILY
Qty: 0 | Refills: 0 | Status: DISCONTINUED | OUTPATIENT
Start: 2019-01-02 | End: 2019-01-16

## 2019-01-02 RX ORDER — ATORVASTATIN CALCIUM 80 MG/1
10 TABLET, FILM COATED ORAL AT BEDTIME
Qty: 0 | Refills: 0 | Status: DISCONTINUED | OUTPATIENT
Start: 2019-01-02 | End: 2019-01-16

## 2019-01-02 RX ORDER — METOPROLOL TARTRATE 50 MG
50 TABLET ORAL EVERY 12 HOURS
Qty: 0 | Refills: 0 | Status: DISCONTINUED | OUTPATIENT
Start: 2019-01-02 | End: 2019-01-03

## 2019-01-02 RX ORDER — SODIUM CHLORIDE 9 MG/ML
3 INJECTION INTRAMUSCULAR; INTRAVENOUS; SUBCUTANEOUS ONCE
Qty: 0 | Refills: 0 | Status: COMPLETED | OUTPATIENT
Start: 2019-01-02 | End: 2019-01-02

## 2019-01-02 RX ORDER — CHOLECALCIFEROL (VITAMIN D3) 125 MCG
2000 CAPSULE ORAL DAILY
Qty: 0 | Refills: 0 | Status: DISCONTINUED | OUTPATIENT
Start: 2019-01-02 | End: 2019-01-16

## 2019-01-02 RX ADMIN — SODIUM CHLORIDE 3 MILLILITER(S): 9 INJECTION INTRAMUSCULAR; INTRAVENOUS; SUBCUTANEOUS at 19:12

## 2019-01-02 RX ADMIN — ATORVASTATIN CALCIUM 10 MILLIGRAM(S): 80 TABLET, FILM COATED ORAL at 23:17

## 2019-01-02 NOTE — ED ADULT NURSE NOTE - OBJECTIVE STATEMENT
patient presents for evaluation of bloody stools with blood clots since Saturday, increased weakness, LLE edema. per patient was recently on po Clindamycin 300mg Q 8hrs and have bloody diarrhea, abdominal discomfort after taking antibiotics.

## 2019-01-02 NOTE — ED PROVIDER NOTE - OBJECTIVE STATEMENT
78 year old female with PMH of HTN, type II DM, A-fib on coumadin, rheumatic heart disease s/p bioprosthetic mitral valve replacement, s/p metallic aortic valve replacement, and HFpEF, history of RLW DVT, Left nephrectomy, and osteoporosis, presents to the ED for bloody stools. Pt with son and  at bedside, states has had bloody bms for past three days, bms are not painful, has not had this in the past, unsure of modifying factors. Pt reports frequent transfusions for microcytic anemia, last transfusion was three weeks ago. Denies fever, chills, co, sob, nvd, abd pain, lower extremity edema, urinary sxs, ha, numbness, tingling, dizziness, rash, visual changes. pt was recently in hospital for left leg cellulitis; reports trace edema to left knee without pain.

## 2019-01-02 NOTE — ED PROVIDER NOTE - ATTENDING CONTRIBUTION TO CARE
78f w a hx of HTN, DM, a-fib, DVT, anemia, L nephrectomy, rheumatic heart disease w bioprosthetic mitral valve and metallic aortic valve, (currently on coumadin). Pt reports 3 days of BRBPR. No similar prior. Symptoms are moderate, intermittent, no exacerbating/alleviating. Pt has required transfusions previously for anemia.    Review of Systems  Constitutional:  No fever or chills. +Generalized weakness  Eyes:  Negative.   ENMT:  No nasal congestion, discharge, or throat pain.   Cardiac:  No chest pain, syncope, or edema.  Respiratory:  No dyspnea, wheezing, or cough. No hemoptysis.  GI:  No vomiting, diarrhea, or abdominal pain. No melena.  :  No dysuria or hematuria.   Musculoskeletal:  No joint swelling, joint pain, or back pain.  Skin:  No skin rash, jaundice, or lesions.  Neuro:  No headache, loss of sensation, or focal weakness.  No change in mental status.     Physical Exam  General: Awake, alert, NAD, WDWN, non-toxic appearing, NCAT  Eyes: PERRL, EOMI, no icterus, lids are normal, conjunctivae are pale  ENT: External inspection normal, pale/moist membranes, pharynx normal  CV: S1S2, regular rate, irregular rhythm, +metallic murmur, no JVD, 2+ pulses b/l, chronic b/l LE edema L>R unchanged according to patient, warm/well-perfused  Respiratory: Normal respiratory rate/effort, no respiratory distress, normal voice, speaking full sentences, lungs clear to auscultation b/l, no wheezing/rales/rhonchi, no retractions, no stridor  Abdomen: Soft abdomen, no tender/distended/guarding/rebound, no CVA tender, bright blood on rectal exam.   Musculoskeletal: FROM all 4 extremities, N/V intact  Neck: FROM neck, supple, no meningismus, trachea midline, no JVD  Integumentary: Color pale for race, warm and dry, no rash  Neuro: Oriented x3, CN 2-12 grossly intact, normal motor, normal sensory    78f w BRBPR on coumadin likely from lower GI bleed, nontoxic appearing, n/v intact. no abd pain/tender. --CBC, CMP, EKG, T&S, coags. --Will d/w GI, transfuse as needed, admit 78f w a hx of HTN, DM, a-fib, DVT, anemia, L nephrectomy, rheumatic heart disease w bioprosthetic mitral valve and metallic aortic valve, (currently on coumadin). Pt reports 3 days of BRBPR. No similar prior. Symptoms are moderate, intermittent, no exacerbating/alleviating. Pt has required transfusions previously for anemia.    Review of Systems  Constitutional:  No fever or chills. +Generalized weakness  Eyes:  Negative.   ENMT:  No nasal congestion, discharge, or throat pain.   Cardiac:  No chest pain, syncope, or edema.  Respiratory:  No dyspnea, wheezing, or cough. No hemoptysis.  GI:  No vomiting, diarrhea, or abdominal pain. No melena. +BRBPR  :  No dysuria or hematuria.   Musculoskeletal:  No joint swelling, joint pain, or back pain.  Skin:  No skin rash, jaundice, or lesions.  Neuro:  No headache, loss of sensation, or focal weakness.  No change in mental status.     Physical Exam  General: Awake, alert, NAD, WDWN, non-toxic appearing, NCAT  Eyes: PERRL, EOMI, no icterus, lids are normal, conjunctivae are pale  ENT: External inspection normal, pale/moist membranes, pharynx normal  CV: S1S2, regular rate, irregular rhythm, +metallic murmur, no JVD, 2+ pulses b/l, chronic b/l LE edema L>R unchanged according to patient, warm/well-perfused  Respiratory: Normal respiratory rate/effort, no respiratory distress, normal voice, speaking full sentences, lungs clear to auscultation b/l, no wheezing/rales/rhonchi, no retractions, no stridor  Abdomen: Soft abdomen, no tender/distended/guarding/rebound, no CVA tender, bright blood on rectal exam.   Musculoskeletal: FROM all 4 extremities, N/V intact  Neck: FROM neck, supple, no meningismus, trachea midline, no JVD  Integumentary: Color pale for race, warm and dry, no rash  Neuro: Oriented x3, CN 2-12 grossly intact, normal motor, normal sensory    78f w BRBPR on coumadin likely from lower GI bleed, nontoxic appearing, n/v intact. no abd pain/tender. --CBC, CMP, EKG, T&S, coags. --Will d/w GI, transfuse as needed, admit

## 2019-01-02 NOTE — ED ADULT NURSE NOTE - CAS DISCH BELONGINGS RETURNED
3/13/2018        Laura Reyes   [address] [place] [id number]        Dear Laura Reyes    We have been unable to reach you by phone.  Your test results from your last visit have returned.  The included results are within acceptable limits.  Please continue your current medications. If you have any further questions, please feel free to call.     Resulted Orders   HOLTER MONITOR    Narrative    Monitor placed by Saniya Boucher on 2/28/2018.  The patient was instructed and understands monitor use.    Does the patient have a pacemaker?  No    Holter Monitor  serial #: [id number]    Time to be monitored: 48 hr       Impression    48 hour Holter Monitor Report    Provider requesting Holter monitor:  Tiffany Robertson    Primary care provider:  Tiffany Robertson    Indication:  Lightheadedness      Interpretation:   Baseline rhythm was sinus rhythm with an average heart rate of 89 bpm.  The minimum heart rate was 56 bpm and maximum heart rate was 143 bpm.  The longest RR interval was 1.3 seconds.  The patient was tachycardic with a heart rate greater than 100 bpm for 30 % of the recorded period.  There one isolated PVCs (premature ventricular contractions) and 1 isolated supraventricular ectopy. No other arrhythmias were noted. No symptoms were reported during the monitoring period.      Impression:  1. The patient's baseline rhythm was sinus rhythm with average heart rate of 89 bpm.    2. One isolated PVC and supraventricular ectopy    3.  No other sustained arrhythmias were noted during the  monitoring period.    4. No symptoms were reported during the monitoring period.    Ricky Wu MD,  Interventional Cardiology,  Pager:[phone].  3/8/2018; 10:30 AM          Please feel free to call with any further questions.    Sincerely,           Tiffany Robertson NP  [address] [facility] [address]  Phone: [phone]                
Yes

## 2019-01-02 NOTE — H&P ADULT - ATTENDING COMMENTS
78 year old female with PMH of HTN, type II DM, A-fib on coumadin, h/o bradycardia s/p pacemaker, rheumatic heart disease s/p bioprosthetic mitral valve replacement, s/p metallic aortic valve replacement, and HFpEF, history of RLE DVT 25 year sago, Left nephrectomy, and osteoporosis, presents to the ED for dark black stools since 3 days. As per patient and son at bedside she has dark black stools since 3 days, not painful, last bowel movement last night, no bright red blood, no hematemesis, no dysphagia, buit admits to weight loss of 30 pounds in last year, loss of appetite and chronic intermittent abdominal pain, diffuse, no aggravating factors and relieving factor 3-4/10 in intensity. Also has c/o feeling weak, pale, light headedness. She receives frequent transfusions for microcytic anemia, last transfusion was three weeks ago follows up with Dr. Alvarenga she went to see Dr. Alvarenga blood work showed Hb of 4.8 referred to hospital.  Denies fever, chills, nausea, vomiting, dysuria,   pt was recently in hospital for left leg cellulitis; reports trace edema to left knee without pain.  She checks her INR regularly at home goal INR as per their cardiologist is 2-3. She didn;t receive coumadin today, last dose was yesterday 2.5mg   Last endoscopy and colonoscopy was done 5 years ago as per patient was normal.    vitals in ED: 108/51, 94, 98.5, 18, 96% on room air, Labs Hb 4.8(Baseline 7-8), INR 3.29, creatinine is 1.9 (baseline is 2 from may 2018,in april it was 1.1), chest xray was fine, s/p 2 units PRBC    REVIEW OF SYSTEMS:  CONSTITUTIONAL: (+) weakness, fevers or chills  EYES/ENT: No visual changes;  No vertigo or throat pain   NECK: No pain or stiffness  RESPIRATORY: No cough, wheezing, hemoptysis; No shortness of breath  CARDIOVASCULAR: No chest pain or palpitations, (+) VHD  GASTROINTESTINAL: No abdominal or epigastric pain. No nausea, vomiting, or hematemesis; No diarrhea or constipation. (+) melena and hematochezia.  GENITOURINARY: No dysuria, frequency or hematuria  NEUROLOGICAL: No numbness or weakness  SKIN: No itching, rashes    < from: 12 Lead ECG (01.02.19 @ 17:15) >  Ventricular Rate 85 BPM  Atrial Rate 258 BPM  QRS Duration 94 ms  Q-T Interval 402 ms  QTC Calculation(Bezet) 478 ms  R Axis 90 degrees  T Axis 29 degrees  Diagnosis Line Atrial fibrillation with occasional ventricular-paced complexes  Rightward axis  Possible Anterior infarct , age undetermined  Abnormal ECG  Confirmed by Ney Hunter ([id number]) on 1/2/2019 10:44:26 PM  < end of copied text >        Physical Exam:  General: WN/WD NAD  Neurology: A&Ox3, nonfocal, follows commands  Eyes: PERRLA/ EOMI  ENT/Neck: Neck supple, trachea midline, No JVD  Respiratory: CTA B/L, No wheezing, rales, rhonchi  CV: Normal rate regular rhythm, S1S2, IV/VI systolic murmur and click, NO rubs or gallops  Abdominal: Soft, NT, ND +BS,   Extremities: No edema, + peripheral pulses  Skin: No Rashes, Hematoma, Ecchymosis  Incisions: n/a  Tubes: n/a    A/P   LGIB / severe anemia ( acute on chronic) 2/2 acute blood loss  -transfuse PRBC  -NPO and IV fluids, monitor to fluid overload  -check iron stores and FeSO4 supplementation  -GI eval   -serial CBC    HTN - stable   c/w current Rx    VHD and A. Fib w/ h/o CVA  -c/w coumadin and INR monitoring      DM type II  -f/w monitoring   -insulin SS and Lantus QHS 78 year old female with PMH of HTN, type II DM, A-fib on coumadin, h/o bradycardia s/p pacemaker, rheumatic heart disease s/p bioprosthetic mitral valve replacement, s/p metallic aortic valve replacement, and HFpEF, history of RLE DVT 25 year sago, Left nephrectomy, and osteoporosis, presents to the ED for dark black stools since 3 days. As per patient and son at bedside she has dark black stools since 3 days, not painful, last bowel movement last night, no bright red blood, no hematemesis, no dysphagia, buit admits to weight loss of 30 pounds in last year, loss of appetite and chronic intermittent abdominal pain, diffuse, no aggravating factors and relieving factor 3-4/10 in intensity. Also has c/o feeling weak, pale, light headedness. She receives frequent transfusions for microcytic anemia, last transfusion was three weeks ago follows up with Dr. Alvarenga she went to see Dr. Alvarenga blood work showed Hb of 4.8 referred to hospital.  Denies fever, chills, nausea, vomiting, dysuria,   pt was recently in hospital for left leg cellulitis; reports trace edema to left knee without pain.  She checks her INR regularly at home goal INR as per their cardiologist is 2-3. She didn;t receive coumadin today, last dose was yesterday 2.5mg   Last endoscopy and colonoscopy was done 5 years ago as per patient was normal.    vitals in ED: 108/51, 94, 98.5, 18, 96% on room air, Labs Hb 4.8(Baseline 7-8), INR 3.29, creatinine is 1.9 (baseline is 2 from may 2018,in april it was 1.1), chest xray was fine, s/p 2 units PRBC    REVIEW OF SYSTEMS:  CONSTITUTIONAL: (+) weakness, fevers or chills  EYES/ENT: No visual changes;  No vertigo or throat pain   NECK: No pain or stiffness  RESPIRATORY: No cough, wheezing, hemoptysis; No shortness of breath  CARDIOVASCULAR: No chest pain or palpitations, (+) VHD  GASTROINTESTINAL: No abdominal or epigastric pain. No nausea, vomiting, or hematemesis; No diarrhea or constipation. (+) melena and hematochezia.  GENITOURINARY: No dysuria, frequency or hematuria  NEUROLOGICAL: No numbness or weakness  SKIN: No itching, rashes    < from: 12 Lead ECG (01.02.19 @ 17:15) >  Ventricular Rate 85 BPM  Atrial Rate 258 BPM  QRS Duration 94 ms  Q-T Interval 402 ms  QTC Calculation(Bezet) 478 ms  R Axis 90 degrees  T Axis 29 degrees  Diagnosis Line Atrial fibrillation with occasional ventricular-paced complexes  Rightward axis  Possible Anterior infarct , age undetermined  Abnormal ECG  Confirmed by Ney Hunter ([id number]) on 1/2/2019 10:44:26 PM  < end of copied text >        Physical Exam:  General: WN/WD NAD  Neurology: A&Ox3, nonfocal, follows commands  Eyes: PERRLA/ EOMI  ENT/Neck: Neck supple, trachea midline, No JVD  Respiratory: CTA B/L, No wheezing, rales, rhonchi  CV: Normal rate regular rhythm, S1S2, IV/VI systolic murmur and click, NO rubs or gallops  Abdominal: Soft, NT, ND +BS,   Extremities: No edema, + peripheral pulses  Skin: No Rashes, Hematoma, Ecchymosis  Incisions: n/a  Tubes: n/a    A/P   LGIB / severe anemia ( acute on chronic) 2/2 acute blood loss  -transfuse PRBC  -NPO and IV fluids, monitor to fluid overload  -check iron stores and FeSO4 supplementation  -GI eval   -serial CBC    chronic stable HFpEF / HTN - stable   -c/w current Rx  -monitor for fluid overload ( receiving PRBC)    VHD (metallic AVR) and A. Fib w/ h/o CVA  -hold for now coumadin ( but needs to continue b/c of metallic valve)  - INR monitoring for goal 2-3     DM type II - uncontrolled/Dyslipidemia   -f/w monitoring   -insulin SS and Lantus QHS and hold oral agents for now (NPO )  -c/w statin    CKD IV - stable (baseline ~2)  -monitoring of SCr/BMP and iPhos

## 2019-01-02 NOTE — H&P ADULT - NSHPPHYSICALEXAM_GEN_ALL_CORE
ICU Vital Signs Last 24 Hrs  T(C): 36.7 (02 Jan 2019 19:31), Max: 36.9 (02 Jan 2019 14:38)  T(F): 98 (02 Jan 2019 19:31), Max: 98.5 (02 Jan 2019 14:38)  HR: 75 (02 Jan 2019 19:31) (75 - 94)  BP: 115/57 (02 Jan 2019 19:31) (108/51 - 115/57)  BP(mean): --  ABP: --  ABP(mean): --  RR: 16 (02 Jan 2019 15:44) (16 - 18)  SpO2: 99% (02 Jan 2019 15:44) (96% - 99%)    PHYSICAL EXAM:  GENERAL: NAD, speaks in full sentences, no signs of respiratory distress  HEAD:  Atraumatic, Normocephalic  EYES: EOMI, PERRLA, conjunctiva and sclera clear  NECK: Supple, No JVD  CHEST/LUNG: Clear to auscultation bilaterally; No wheeze; No crackles; No accessory muscles used  HEART: Regular rate and rhythm; No murmurs;   ABDOMEN: Soft, Nontender, Nondistended; Bowel sounds present; No guarding  EXTREMITIES:  2+ Peripheral Pulses, No cyanosis or edema  PSYCH: AAOx3  NEUROLOGY: non-focal  SKIN: No rashes or lesions ICU Vital Signs Last 24 Hrs  T(C): 36.7 (02 Jan 2019 19:31), Max: 36.9 (02 Jan 2019 14:38)  T(F): 98 (02 Jan 2019 19:31), Max: 98.5 (02 Jan 2019 14:38)  HR: 75 (02 Jan 2019 19:31) (75 - 94)  BP: 115/57 (02 Jan 2019 19:31) (108/51 - 115/57)  BP(mean): --  ABP: --  ABP(mean): --  RR: 16 (02 Jan 2019 15:44) (16 - 18)  SpO2: 99% (02 Jan 2019 15:44) (96% - 99%)    PHYSICAL EXAM:  GENERAL: NAD, speaks in full sentences, no signs of respiratory distress  CHEST/LUNG: Clear to auscultation bilaterally; No wheeze; No crackles; No accessory muscles used  HEART: Regular rate and rhythm; No murmurs;   ABDOMEN: Soft, Nontender, Nondistended; Bowel sounds present; No guarding  EXTREMITIES:  2+ Peripheral Pulses, No cyanosis or edema  PSYCH: AAOx3  NEUROLOGY: non-focal

## 2019-01-02 NOTE — ED PROVIDER NOTE - PHYSICAL EXAMINATION
Physical Exam    Vital Signs: I have reviewed the initial vital signs.  Constitutional: well-nourished, appears stated age, no acute distress  Eyes: Conjunctiva pale, Sclera clear, PERRLA, EOMI.  Cardiovascular: S1 and S2, regular rate, regular rhythm, well-perfused extremities, radial pulses equal and 2+, pedal pulses are 2+ and equal.   Respiratory: unlabored respiratory effort, clear to auscultation bilaterally no wheezing, rales and rhonchi  Gastrointestinal: soft, non-tender abdomen, no pulsatile mass, normal bowl sounds, Rectal exam chaperoned with RN, small non thrombosed, non tender hemorrhoid, brbpr on digital rectal exam.    Musculoskeletal: supple neck,trace edmea of left knee, non tender, from of extremities x 4, no midline tenderness  Integumentary: pallor complexion, no redness or warmth to left knee.   Neurologic: awake, alert, cranial nerves II-XII grossly intact, extremities’ motor and sensory functions grossly intact Physical Exam    Vital Signs: I have reviewed the initial vital signs.  Constitutional: well-nourished, appears stated age, no acute distress  Eyes: Conjunctiva pale, Sclera clear, PERRLA, EOMI.  Cardiovascular: S1 and S2, regular rate, regular rhythm, well-perfused extremities, radial pulses equal and 2+, pedal pulses are 2+ and equal.   Respiratory: unlabored respiratory effort, clear to auscultation bilaterally no wheezing, rales and rhonchi  Gastrointestinal: soft, non-tender abdomen, no pulsatile mass, normal bowl sounds, Rectal exam chaperoned with Dr Prather small non thrombosed, non tender hemorrhoid, brbpr on digital rectal exam.    Musculoskeletal: supple neck,trace edmea of left knee, non tender, from of extremities x 4, no midline tenderness  Integumentary: pallor complexion, no redness or warmth to left knee.   Neurologic: awake, alert, cranial nerves II-XII grossly intact, extremities’ motor and sensory functions grossly intact

## 2019-01-02 NOTE — H&P ADULT - ASSESSMENT
#)Diet:  #)Activity:  #)DVt ppx:  #)Gi ppx;  #)Dispo:  #)Code: 78 year old female with PMH of HTN, type II DM, A-fib on coumadin, h/o bradycardia s/p pacemaker, rheumatic heart disease s/p bioprosthetic mitral valve replacement, s/p metallic aortic valve replacement, and HFpEF, history of RLE DVT 25 year sago, Left nephrectomy, and osteoporosis, presents to the ED for dark black stools since 3 days, fatigue, pallor, light headed.     #)Symptomatic hypochromic microcytic anemia likely upper GI bleed (dark black stool), h/o weight loss  -Baseline HB is7-8, Hb at admission 4.8, last bowel movement last night.  -ALISSON done by me  and in ED showed bright red blood tinge likely due to hemorrhoids  -s/p 1 unit PRBC going on, will give another unit  -hol dcoumdin  -Likely need endoscopy and colonoscopy  -Follow up with GI  -hemodynamically stable for now (No hypotension, no tachycardia but she is on beta blocker, on room air)  -keep 2 18 gauge  -active type and screen  -Keep NPO except meds  -If no bloody bowel movement, if she responds appropriately will start on clear liquids    #)DLD  -on pravastatin 40 changed to lipitor 10    #)Osteoporosis-on raloxefin    #)DM  check FS, if FS>180 resume insulin    #)HFpEF/ h/o bioprosthetic mitral valve and metallic aortic valve replacement  -c/w lasix, toprol    #)A fib   coumadin on hold  -c/w toprol    #)HTN controlled    #)Diet: NPO except meds  #)Activity: OOBTC  #)DVt ppx: no SCD for now h/o DVT, no AC due to GI bleed  #)Gi ppx; Protonix  #)Dispo: From home  #)Code:Full 78 year old female with PMH of HTN, type II DM, A-fib on coumadin, h/o bradycardia s/p pacemaker, rheumatic heart disease s/p bioprosthetic mitral valve replacement, s/p metallic aortic valve replacement, and HFpEF, history of RLE DVT 25 year sago, Left nephrectomy, and osteoporosis, presents to the ED for dark black stools since 3 days, fatigue, pallor, light headed.     #)Symptomatic hypochromic microcytic anemia likely upper GI bleed (dark black stool), h/o weight loss  -Baseline HB is7-8, Hb at admission 4.8, last bowel movement last night.  -LAISSON done by me  and in ED showed bright red blood tinge likely due to hemorrhoids  -s/p 1 unit PRBC going on, will give another unit  -hold coumadin (last dose 2.5 mg yesterday)  -Likely need endoscopy and colonoscopy, last one was 5 years ago normal except hemorrhoids as per patient  -Follow up with GI  -hemodynamically stable for now (No hypotension, no tachycardia but she is on beta blocker, on room air)  -keep 2 18 gauge  -active type and screen  -Keep NPO except meds  -If no bloody bowel movement, if she responds appropriately will start on clear liquids    #)DLD  -on pravastatin 40 changed to lipitor 10    #)Osteoporosis-on raloxefin    #)DM  check FS, if FS>180 resume insulin    #)HFpEF/ h/o bioprosthetic mitral valve and metallic aortic valve replacement  -c/w lasix, toprol    #)A fib   coumadin on hold  -c/w toprol    #)HTN controlled  -c/w lasix, toprol    #)Diet: NPO except meds  #)Activity: OOBTC  #)DVt ppx: no SCD for now h/o DVT, no AC due to GI bleed  #)Gi ppx; Protonix  #)Dispo: From home  #)Code:Full 78 year old female with PMH of HTN, type II DM, A-fib on coumadin, h/o bradycardia s/p pacemaker, rheumatic heart disease s/p bioprosthetic mitral valve replacement, s/p metallic aortic valve replacement, and HFpEF, history of RLE DVT 25 year sago, Left nephrectomy, and osteoporosis, presents to the ED for dark black stools since 3 days, fatigue, pallor, light headed.     #)Symptomatic hypochromic microcytic anemia likely upper GI bleed (dark black stool), h/o weight loss  -Baseline HB is7-8, Hb at admission 4.8, last bowel movement last night.  -ALISSON done by me  and in ED showed bright red blood tinge likely due to hemorrhoids  -s/p 1 unit PRBC going on, will give another unit  -hold coumadin (last dose 2.5 mg yesterday)  -Likely need endoscopy and colonoscopy, last one was 5 years ago normal except hemorrhoids as per patient  -Follow up with GI  -hemodynamically stable for now (No hypotension, no tachycardia but she is on beta blocker, on room air)  -keep 2 18 gauge  -active type and screen  -Keep NPO except meds  -IV protonix BID  -If no bloody bowel movement, if she responds appropriately will start on clear liquids    #)DLD  -on pravastatin 40 changed to lipitor 10    #)Osteoporosis-on raloxefin    #)DM  check FS, if FS>180 resume insulin    #)HFpEF/ h/o bioprosthetic mitral valve and metallic aortic valve replacement  -c/w lasix, toprol    #)A fib   coumadin on hold  -c/w toprol    #)HTN controlled  -c/w lasix, toprol    #)Diet: NPO except meds  #)Activity: OOBTC  #)DVt ppx: no SCD for now h/o DVT, no AC due to GI bleed  #)Gi ppx; Protonix  #)Dispo: From home  #)Code:Full

## 2019-01-02 NOTE — ED PROVIDER NOTE - CARE PLAN
Principal Discharge DX:	Lower GI bleed  Secondary Diagnosis:	Hemorrhoids  Secondary Diagnosis:	Anemia

## 2019-01-02 NOTE — ED PROVIDER NOTE - MEDICAL DECISION MAKING DETAILS
78f w BRBPR on coumadin likely from lower GI bleed, nontoxic appearing, n/v intact. no abd pain/tender. Labs, EKG, & imaging reviewed. GI consulted. Transfusion ordered. Patient admitted for further care and management.

## 2019-01-02 NOTE — ED PROVIDER NOTE - NS ED ROS FT
Constitutional: (-) fever, (-) chills, (-) fatigue, (-) weakness  Eyes: (-)visual changes  Cardiovascular: (-) chest pain, (-) syncope  Respiratory: (-) cough, (-) shortness of breath, (-) dyspnea,   Gastrointestinal: (-) vomiting, (-) diarrhea, (-)nausea, (+0 bloody stools  Musculoskeletal: (-) neck pain, (-) back pain, (-) joint pain,  Integumentary: (-) rash,   Neurological: (-) headache, , (-) dizziness, (-) tingling, (-)numbness,   Peripheral Vascular: (-) pain while walking, (-) leg swelling, (-) color changes  : (-)frequency, (-)dysuria  Allergic/Immunologic: (-) pruritus Constitutional: (-) fever, (-) chills, (-) fatigue, (-) weakness  Eyes: (-)visual changes  Cardiovascular: (-) chest pain, (-) syncope  Respiratory: (-) cough, (-) shortness of breath, (-) dyspnea,   Gastrointestinal: (-) vomiting, (-) diarrhea, (-)nausea, (+) bloody stools  Musculoskeletal: (-) neck pain, (-) back pain, (-) joint pain,  Integumentary: (-) rash,   Neurological: (-) headache, , (-) dizziness, (-) tingling, (-)numbness,   Peripheral Vascular: (-) pain while walking, (-) leg swelling, (-) color changes  : (-)frequency, (-)dysuria  Allergic/Immunologic: (-) pruritus

## 2019-01-02 NOTE — ED PROVIDER NOTE - PROGRESS NOTE DETAILS
Spoke with gi, aware of case, will see patient and follow. Request additional prbc unit, cbc q 12hrs, cardio consult for coumadin monitoring, coag monitoring, clear liquid diet. Dr. watt and MAR aware of case. Accept admission. Aware of additional prbc and cardio consult pending. Dr. valdivia and MAR aware of case. Accept admission. Aware of additional prbc and cardio consult pending.

## 2019-01-02 NOTE — H&P ADULT - HISTORY OF PRESENT ILLNESS
78 year old female with PMH of HTN, type II DM, A-fib on coumadin, rheumatic heart disease s/p bioprosthetic mitral valve replacement, s/p metallic aortic valve replacement, and HFpEF, history of RLW DVT, Left nephrectomy, and osteoporosis, presents to the ED for bloody stools. Pt with son and  at bedside, states has had bloody bms for past three days, bms are not painful, has not had this in the past, unsure of modifying factors. Pt reports frequent transfusions for microcytic anemia, last transfusion was three weeks ago. Denies fever, chills, co, sob, nvd, abd pain, lower extremity edema, urinary sxs, ha, numbness, tingling, dizziness, rash, visual changes. pt was recently in hospital for left leg cellulitis; reports trace edema to left knee without pain.    vitals in ED: 108/51, 94, 98.5, 18, 96% on room air, Labs Hb 4.8(Baseline 7-8), INR 3.29, creatinine is 1.9 (baseline is 2 from may 2018,in april it was 1.1), chest xray was fine 78 year old female with PMH of HTN, type II DM, A-fib on coumadin, h/o bradycardia s/p pacemaker, rheumatic heart disease s/p bioprosthetic mitral valve replacement, s/p metallic aortic valve replacement, and HFpEF, history of RLE DVT 25 year sago, Left nephrectomy, and osteoporosis, presents to the ED for dark black stools since 3 days. As per patient and son at bedside she has dark black stools since 3 days, not painful, last bowel movement last night, no bright red blood, no hematemesis, no dysphagia, buit admits to weight loss of 30 pounds in last year, loss of appetite and chronic intermittent abdominal pain, diffuse, no aggravating factors and relieving factor 3-4/10 in intensity. Also has c/o feeling weak, pale, light headedness. She receives frequent transfusions for microcytic anemia, last transfusion was three weeks ago follows up with Dr. Alvarenga she went to see Dr. Alvarenga blood work showed Hb of 4.8 referred to hospital.  Denies fever, chills, nausea, vomiting, dysuria,   pt was recently in hospital for left leg cellulitis; reports trace edema to left knee without pain.  She checks her INR regularly at home goal INR as per their cardiologist is 2-3. She didn;t receive coumadin today, last dose was yesterday  Last endoscopy and colonosocpy    vitals in ED: 108/51, 94, 98.5, 18, 96% on room air, Labs Hb 4.8(Baseline 7-8), INR 3.29, creatinine is 1.9 (baseline is 2 from may 2018,in april it was 1.1), chest xray was fine, s/p 2 units PRBC 78 year old female with PMH of HTN, type II DM, A-fib on coumadin, h/o bradycardia s/p pacemaker, rheumatic heart disease s/p bioprosthetic mitral valve replacement, s/p metallic aortic valve replacement, and HFpEF, history of RLE DVT 25 year sago, Left nephrectomy, and osteoporosis, presents to the ED for dark black stools since 3 days. As per patient and son at bedside she has dark black stools since 3 days, not painful, last bowel movement last night, no bright red blood, no hematemesis, no dysphagia, buit admits to weight loss of 30 pounds in last year, loss of appetite and chronic intermittent abdominal pain, diffuse, no aggravating factors and relieving factor 3-4/10 in intensity. Also has c/o feeling weak, pale, light headedness. She receives frequent transfusions for microcytic anemia, last transfusion was three weeks ago follows up with Dr. Alvarenga she went to see Dr. Alvarenga blood work showed Hb of 4.8 referred to hospital.  Denies fever, chills, nausea, vomiting, dysuria,   pt was recently in hospital for left leg cellulitis; reports trace edema to left knee without pain.  She checks her INR regularly at home goal INR as per their cardiologist is 2-3. She didn;t receive coumadin today, last dose was yesterday 2.5mg   Last endoscopy and colonoscopy was done 5 years ago as per patient was normal.    vitals in ED: 108/51, 94, 98.5, 18, 96% on room air, Labs Hb 4.8(Baseline 7-8), INR 3.29, creatinine is 1.9 (baseline is 2 from may 2018,in april it was 1.1), chest xray was fine, s/p 2 units PRBC

## 2019-01-02 NOTE — H&P ADULT - NSHPLABSRESULTS_GEN_ALL_CORE
4.8    8.65  )-----------( 220      ( 02 Jan 2019 17:15 )             16.0       01-02    134<L>  |  94<L>  |  33<H>  ----------------------------<  223<H>  3.8   |  26  |  1.9<H>    Ca    7.7<L>      02 Jan 2019 17:15    TPro  6.3  /  Alb  3.3<L>  /  TBili  0.6  /  DBili  0.2  /  AST  13  /  ALT  7   /  AlkPhos  109  01-02    PT/INR - ( 02 Jan 2019 17:15 )   PT: 37.40 sec;   INR: 3.29 ratio         PTT - ( 02 Jan 2019 17:15 )  PTT:33.3 sec

## 2019-01-03 ENCOUNTER — APPOINTMENT (OUTPATIENT)
Dept: HEMATOLOGY ONCOLOGY | Facility: CLINIC | Age: 79
End: 2019-01-03

## 2019-01-03 LAB
ALBUMIN SERPL ELPH-MCNC: 3.2 G/DL — LOW (ref 3.5–5.2)
ALP SERPL-CCNC: 100 U/L — SIGNIFICANT CHANGE UP (ref 30–115)
ALT FLD-CCNC: 7 U/L — SIGNIFICANT CHANGE UP (ref 0–41)
ANION GAP SERPL CALC-SCNC: 15 MMOL/L — HIGH (ref 7–14)
APTT BLD: 32.9 SEC — SIGNIFICANT CHANGE UP (ref 27–39.2)
AST SERPL-CCNC: 12 U/L — SIGNIFICANT CHANGE UP (ref 0–41)
BASOPHILS # BLD AUTO: 0.03 K/UL — SIGNIFICANT CHANGE UP (ref 0–0.2)
BASOPHILS # BLD AUTO: 0.04 K/UL — SIGNIFICANT CHANGE UP (ref 0–0.2)
BASOPHILS # BLD AUTO: 0.04 K/UL — SIGNIFICANT CHANGE UP (ref 0–0.2)
BASOPHILS NFR BLD AUTO: 0.4 % — SIGNIFICANT CHANGE UP (ref 0–1)
BILIRUB SERPL-MCNC: 2.2 MG/DL — HIGH (ref 0.2–1.2)
BUN SERPL-MCNC: 30 MG/DL — HIGH (ref 10–20)
CALCIUM SERPL-MCNC: 7.6 MG/DL — LOW (ref 8.5–10.1)
CHLORIDE SERPL-SCNC: 99 MMOL/L — SIGNIFICANT CHANGE UP (ref 98–110)
CK MB CFR SERPL CALC: 1.3 NG/ML — SIGNIFICANT CHANGE UP (ref 0.6–6.3)
CK SERPL-CCNC: 36 U/L — SIGNIFICANT CHANGE UP (ref 0–225)
CO2 SERPL-SCNC: 27 MMOL/L — SIGNIFICANT CHANGE UP (ref 17–32)
CREAT SERPL-MCNC: 1.7 MG/DL — HIGH (ref 0.7–1.5)
EOSINOPHIL # BLD AUTO: 0.15 K/UL — SIGNIFICANT CHANGE UP (ref 0–0.7)
EOSINOPHIL # BLD AUTO: 0.18 K/UL — SIGNIFICANT CHANGE UP (ref 0–0.7)
EOSINOPHIL # BLD AUTO: 0.23 K/UL — SIGNIFICANT CHANGE UP (ref 0–0.7)
EOSINOPHIL NFR BLD AUTO: 1.6 % — SIGNIFICANT CHANGE UP (ref 0–8)
EOSINOPHIL NFR BLD AUTO: 2.3 % — SIGNIFICANT CHANGE UP (ref 0–8)
EOSINOPHIL NFR BLD AUTO: 2.5 % — SIGNIFICANT CHANGE UP (ref 0–8)
GLUCOSE BLDC GLUCOMTR-MCNC: 104 MG/DL — HIGH (ref 70–99)
GLUCOSE BLDC GLUCOMTR-MCNC: 107 MG/DL — HIGH (ref 70–99)
GLUCOSE BLDC GLUCOMTR-MCNC: 107 MG/DL — HIGH (ref 70–99)
GLUCOSE BLDC GLUCOMTR-MCNC: 192 MG/DL — HIGH (ref 70–99)
GLUCOSE SERPL-MCNC: 88 MG/DL — SIGNIFICANT CHANGE UP (ref 70–99)
HCT VFR BLD CALC: 20 % — LOW (ref 37–47)
HCT VFR BLD CALC: 22.6 % — LOW (ref 37–47)
HCT VFR BLD CALC: 23.6 % — LOW (ref 37–47)
HGB BLD-MCNC: 6.5 G/DL — CRITICAL LOW (ref 12–16)
HGB BLD-MCNC: 7.3 G/DL — CRITICAL LOW (ref 12–16)
HGB BLD-MCNC: 7.7 G/DL — LOW (ref 12–16)
IMM GRANULOCYTES NFR BLD AUTO: 1 % — HIGH (ref 0.1–0.3)
IMM GRANULOCYTES NFR BLD AUTO: 1 % — HIGH (ref 0.1–0.3)
IMM GRANULOCYTES NFR BLD AUTO: 1.3 % — HIGH (ref 0.1–0.3)
INR BLD: 3.25 RATIO — HIGH (ref 0.65–1.3)
LYMPHOCYTES # BLD AUTO: 0.6 K/UL — LOW (ref 1.2–3.4)
LYMPHOCYTES # BLD AUTO: 0.68 K/UL — LOW (ref 1.2–3.4)
LYMPHOCYTES # BLD AUTO: 0.8 K/UL — LOW (ref 1.2–3.4)
LYMPHOCYTES # BLD AUTO: 6.4 % — LOW (ref 20.5–51.1)
LYMPHOCYTES # BLD AUTO: 8.6 % — LOW (ref 20.5–51.1)
LYMPHOCYTES # BLD AUTO: 8.6 % — LOW (ref 20.5–51.1)
MAGNESIUM SERPL-MCNC: 2.1 MG/DL — SIGNIFICANT CHANGE UP (ref 1.8–2.4)
MCHC RBC-ENTMCNC: 22.6 PG — LOW (ref 27–31)
MCHC RBC-ENTMCNC: 23 PG — LOW (ref 27–31)
MCHC RBC-ENTMCNC: 23.3 PG — LOW (ref 27–31)
MCHC RBC-ENTMCNC: 32.3 G/DL — SIGNIFICANT CHANGE UP (ref 32–37)
MCHC RBC-ENTMCNC: 32.5 G/DL — SIGNIFICANT CHANGE UP (ref 32–37)
MCHC RBC-ENTMCNC: 32.6 G/DL — SIGNIFICANT CHANGE UP (ref 32–37)
MCV RBC AUTO: 69.7 FL — LOW (ref 81–99)
MCV RBC AUTO: 71.1 FL — LOW (ref 81–99)
MCV RBC AUTO: 71.3 FL — LOW (ref 81–99)
MONOCYTES # BLD AUTO: 0.48 K/UL — SIGNIFICANT CHANGE UP (ref 0.1–0.6)
MONOCYTES # BLD AUTO: 0.5 K/UL — SIGNIFICANT CHANGE UP (ref 0.1–0.6)
MONOCYTES # BLD AUTO: 0.62 K/UL — HIGH (ref 0.1–0.6)
MONOCYTES NFR BLD AUTO: 5.1 % — SIGNIFICANT CHANGE UP (ref 1.7–9.3)
MONOCYTES NFR BLD AUTO: 6.3 % — SIGNIFICANT CHANGE UP (ref 1.7–9.3)
MONOCYTES NFR BLD AUTO: 6.6 % — SIGNIFICANT CHANGE UP (ref 1.7–9.3)
NEUTROPHILS # BLD AUTO: 6.39 K/UL — SIGNIFICANT CHANGE UP (ref 1.4–6.5)
NEUTROPHILS # BLD AUTO: 7.56 K/UL — HIGH (ref 1.4–6.5)
NEUTROPHILS # BLD AUTO: 7.99 K/UL — HIGH (ref 1.4–6.5)
NEUTROPHILS NFR BLD AUTO: 80.9 % — HIGH (ref 42.2–75.2)
NEUTROPHILS NFR BLD AUTO: 81.1 % — HIGH (ref 42.2–75.2)
NEUTROPHILS NFR BLD AUTO: 85.5 % — HIGH (ref 42.2–75.2)
NRBC # BLD: 0 /100 WBCS — SIGNIFICANT CHANGE UP (ref 0–0)
NRBC # BLD: 0 /100 WBCS — SIGNIFICANT CHANGE UP (ref 0–0)
PLATELET # BLD AUTO: 233 K/UL — SIGNIFICANT CHANGE UP (ref 130–400)
PLATELET # BLD AUTO: 247 K/UL — SIGNIFICANT CHANGE UP (ref 130–400)
PLATELET # BLD AUTO: 270 K/UL — SIGNIFICANT CHANGE UP (ref 130–400)
POTASSIUM SERPL-MCNC: 3.4 MMOL/L — LOW (ref 3.5–5)
POTASSIUM SERPL-SCNC: 3.4 MMOL/L — LOW (ref 3.5–5)
PROT SERPL-MCNC: 6.2 G/DL — SIGNIFICANT CHANGE UP (ref 6–8)
PROTHROM AB SERPL-ACNC: 36.9 SEC — HIGH (ref 9.95–12.87)
RBC # BLD: 2.87 M/UL — LOW (ref 4.2–5.4)
RBC # BLD: 3.18 M/UL — LOW (ref 4.2–5.4)
RBC # BLD: 3.31 M/UL — LOW (ref 4.2–5.4)
RBC # FLD: 20.4 % — HIGH (ref 11.5–14.5)
SODIUM SERPL-SCNC: 141 MMOL/L — SIGNIFICANT CHANGE UP (ref 135–146)
TROPONIN T SERPL-MCNC: <0.01 NG/ML — SIGNIFICANT CHANGE UP
WBC # BLD: 7.88 K/UL — SIGNIFICANT CHANGE UP (ref 4.8–10.8)
WBC # BLD: 9.34 K/UL — SIGNIFICANT CHANGE UP (ref 4.8–10.8)
WBC # BLD: 9.35 K/UL — SIGNIFICANT CHANGE UP (ref 4.8–10.8)
WBC # FLD AUTO: 7.88 K/UL — SIGNIFICANT CHANGE UP (ref 4.8–10.8)
WBC # FLD AUTO: 9.34 K/UL — SIGNIFICANT CHANGE UP (ref 4.8–10.8)
WBC # FLD AUTO: 9.35 K/UL — SIGNIFICANT CHANGE UP (ref 4.8–10.8)

## 2019-01-03 RX ORDER — FAMOTIDINE 10 MG/ML
20 INJECTION INTRAVENOUS DAILY
Qty: 0 | Refills: 0 | Status: DISCONTINUED | OUTPATIENT
Start: 2019-01-03 | End: 2019-01-03

## 2019-01-03 RX ORDER — PHYTONADIONE (VIT K1) 5 MG
2.5 TABLET ORAL ONCE
Qty: 0 | Refills: 0 | Status: COMPLETED | OUTPATIENT
Start: 2019-01-03 | End: 2019-01-03

## 2019-01-03 RX ORDER — POTASSIUM CHLORIDE 20 MEQ
20 PACKET (EA) ORAL ONCE
Qty: 0 | Refills: 0 | Status: COMPLETED | OUTPATIENT
Start: 2019-01-03 | End: 2019-01-03

## 2019-01-03 RX ORDER — METOPROLOL TARTRATE 50 MG
75 TABLET ORAL EVERY 12 HOURS
Qty: 0 | Refills: 0 | Status: DISCONTINUED | OUTPATIENT
Start: 2019-01-03 | End: 2019-01-08

## 2019-01-03 RX ADMIN — Medication 50 MILLIGRAM(S): at 06:36

## 2019-01-03 RX ADMIN — Medication 2000 UNIT(S): at 11:48

## 2019-01-03 RX ADMIN — Medication 75 MILLIGRAM(S): at 17:49

## 2019-01-03 RX ADMIN — Medication 20 MILLIEQUIVALENT(S): at 11:47

## 2019-01-03 RX ADMIN — ATORVASTATIN CALCIUM 10 MILLIGRAM(S): 80 TABLET, FILM COATED ORAL at 21:39

## 2019-01-03 RX ADMIN — Medication 1 MILLIGRAM(S): at 11:47

## 2019-01-03 RX ADMIN — Medication 2.5 MILLIGRAM(S): at 12:42

## 2019-01-03 RX ADMIN — Medication 40 MILLIGRAM(S): at 06:37

## 2019-01-03 RX ADMIN — PANTOPRAZOLE SODIUM 40 MILLIGRAM(S): 20 TABLET, DELAYED RELEASE ORAL at 06:37

## 2019-01-03 RX ADMIN — PANTOPRAZOLE SODIUM 40 MILLIGRAM(S): 20 TABLET, DELAYED RELEASE ORAL at 17:07

## 2019-01-03 NOTE — PROGRESS NOTE ADULT - SUBJECTIVE AND OBJECTIVE BOX
SUBJECTIVE:    Patient is a 78y old Female who presents with a chief complaint of dark black stools (03 Jan 2019 13:46)    Currently admitted to medicine with the primary diagnosis of Lower GI bleed     Today is hospital day 1d. pt had dark bloody bm this am. was transfused 1 additional unit this am for hgb of 6.5 (total 3 prbc ).     PAST MEDICAL & SURGICAL HISTORY  CVA (cerebral vascular accident)  Anemia  Rheumatic fever  Diabetes  Osteoporosis  Mitral valve replaced  Atrial fibrillation: on coumadin at home  Hypertension  History of ureter stent  S/P AVR  H/O mitral valve replacement        ALLERGIES:  No Known Drug Allergies  shellfish (Unknown)    MEDICATIONS:  STANDING MEDICATIONS  atorvastatin 10 milliGRAM(s) Oral at bedtime  cholecalciferol 2000 Unit(s) Oral daily  folic acid 1 milliGRAM(s) Oral daily  furosemide    Tablet 40 milliGRAM(s) Oral daily  metoprolol tartrate 50 milliGRAM(s) Oral every 12 hours  pantoprazole  Injectable 40 milliGRAM(s) IV Push two times a day    PRN MEDICATIONS    VITALS:   T(F): 97.2  HR: 72  BP: 133/60  RR: 18  SpO2: 94%    LABS:                        6.5    7.88  )-----------( 233      ( 03 Jan 2019 06:47 )             20.0     01-03    141  |  99  |  30<H>  ----------------------------<  88  3.4<L>   |  27  |  1.7<H>    Ca    7.6<L>      03 Jan 2019 06:47  Mg     2.1     01-03    TPro  6.2  /  Alb  3.2<L>  /  TBili  2.2<H>  /  DBili  x   /  AST  12  /  ALT  7   /  AlkPhos  100  01-03    PT/INR - ( 03 Jan 2019 06:47 )   PT: 36.90 sec;   INR: 3.25 ratio         PTT - ( 03 Jan 2019 06:47 )  PTT:32.9 sec      Creatine Kinase, Serum: 36 U/L (01-03-19 @ 06:47)  Troponin T, Serum: <0.01 ng/mL (01-03-19 @ 06:47)      CARDIAC MARKERS ( 03 Jan 2019 06:47 )  x     / <0.01 ng/mL / 36 U/L / x     / 1.3 ng/mL      RADIOLOGY:    PHYSICAL EXAM:  GEN: No acute distress  LUNGS: Clear to auscultation bilaterally   HEART: S1/S2 present. RRR.   ABD: Soft, non-tender, non-distended. Bowel sounds present  EXT: Skin Intact.   NEURO: AAOX3

## 2019-01-03 NOTE — CONSULT NOTE ADULT - ASSESSMENT
78 year old lady with PMHx of HTN, type II DM, A-fib on coumadin, h/o bradycardia s/p pacemaker, rheumatic heart disease s/p bioprosthetic mitral valve replacement, s/p metallic aortic valve replacement, and HFpEF, history of RLE DVT 25 years ago, Left nephrectomy, and osteoporosis, presents to the ED for dark black stools since 3 days due to UGIB.    Impression:  HTN  DMII  Atrial fibrillation  Bradycardia s/p pacemaker  Rheumatic heart disease s/p bioprosthetic mitral valve replacement  Severe aortic stenosis s/p metallic aortic valve replacement  HFpEF  RLE DVT 25 years ago  Left nephrectomy  Osteoporosis    Plan  -RCRI 2, 6.6% of major cardiac adverse event perioperatively  -c/w metoprolol, lasix, atorvastatin 78 year old lady with PMHx of HTN, type II DM, A-fib on coumadin, h/o bradycardia s/p pacemaker, rheumatic heart disease s/p bioprosthetic mitral valve replacement, s/p metallic aortic valve replacement, and HFpEF, history of RLE DVT 25 years ago, Left nephrectomy, and osteoporosis, presents to the ED for dark black stools since 3 days due to UGIB.    Impression:  HTN  DMII  Atrial fibrillation  Thalassemia?  HUGO  Bradycardia s/p pacemaker  Rheumatic heart disease s/p bioprosthetic mitral valve replacement  Severe aortic stenosis s/p metallic aortic valve replacement  HFpEF  RLE DVT 25 years ago  Left nephrectomy  Osteoporosis    Plan  -RCRI 2, 6.6% of major cardiac adverse event perioperatively  -c/w metoprolol, lasix, atorvastatin  -monitor INR 78 year old lady with PMHx of HTN, type II DM, A-fib on coumadin, h/o bradycardia s/p pacemaker, rheumatic heart disease s/p bioprosthetic mitral valve replacement, s/p metallic aortic valve replacement, and HFpEF, history of RLE DVT 25 years ago, Left nephrectomy, and osteoporosis, presents to the ED for dark black stools since 3 days due to UGIB.    Impression:  HTN  DMII  Atrial fibrillation  Thalassemia?  HUOG  Bradycardia s/p pacemaker  Rheumatic heart disease s/p bioprosthetic mitral valve replacement  Severe aortic stenosis s/p metallic aortic valve replacement  HFpEF  RLE DVT 25 years ago  Metallic mitral valve, replaced with tissue valve on 3/19/2018  Transitional cell carcinoma s/p left nephrectomy  Osteoporosis    Plan  -RCRI 2, 6.6% of major cardiac adverse event perioperatively  -c/w metoprolol, lasix, atorvastatin  -monitor INR 78 year old lady with PMHx of HTN, type II DM, A-fib on coumadin, h/o bradycardia s/p pacemaker, rheumatic heart disease s/p bioprosthetic mitral valve replacement, s/p metallic aortic valve replacement, and HFpEF, history of RLE DVT 25 years ago, Left nephrectomy, and osteoporosis, presents to the ED for dark black stools since 3 days due to UGIB.    Impression:  HTN  DMII  Atrial fibrillation  Thalassemia?  HUGO  Bradycardia s/p pacemaker  Rheumatic heart disease s/p bioprosthetic mitral valve replacement  Severe aortic stenosis s/p metallic aortic valve replacement  HFpEF  RLE DVT 25 years ago  Metallic mitral valve, replaced with tissue valve on 3/19/2018  Transitional cell carcinoma s/p left nephrectomy  Osteoporosis    Plan  -RCRI 2, Class III, 6.6% of major cardiac adverse event perioperatively  -c/w metoprolol, lasix, atorvastatin  -monitor INR 78 year old lady with PMHx of HTN, type II DM, A-fib on coumadin, h/o bradycardia s/p pacemaker, rheumatic heart disease s/p bioprosthetic mitral valve replacement, s/p metallic aortic valve replacement, and HFpEF, history of RLE DVT 25 years ago, Left nephrectomy, and osteoporosis, presents to the ED for dark black stools since 3 days due to UGIB.    Impression:  HTN  DMII  Atrial fibrillation  Thalassemia?  HUGO  Bradycardia s/p pacemaker  Rheumatic heart disease s/p bioprosthetic mitral valve replacement  Severe aortic stenosis s/p metallic aortic valve replacement  HFpEF  RLE DVT 25 years ago  Metallic mitral valve, replaced with tissue valve on 3/19/2018  Transitional cell carcinoma s/p left nephrectomy  Osteoporosis    Plan  -RCRI 2, Class III, 6.6% of major cardiac adverse event perioperatively  -intermediate risk for low risk procedure  -c/w lasix, atorvastatin  -increase metoprolol to 75mg BID  -monitor INR. When below 2 start heparin drip. 78 year old lady with PMHx of HTN, type II DM, A-fib on coumadin, h/o bradycardia s/p pacemaker, rheumatic heart disease s/p bioprosthetic mitral valve replacement, s/p metallic aortic valve replacement, and HFpEF, history of RLE DVT 25 years ago, Left nephrectomy, and osteoporosis, presents to the ED for dark black stools since 3 days due to UGIB.    Impression:  HTN  DMII  Atrial fibrillation  Thalassemia?  HUGO  Bradycardia s/p pacemaker  Rheumatic heart disease s/p bioprosthetic mitral valve replacement  Severe aortic stenosis s/p metallic aortic valve replacement  HFpEF  RLE DVT 25 years ago  Metallic mitral valve, replaced with tissue valve on 3/19/2018  Transitional cell carcinoma s/p left nephrectomy  Osteoporosis    Plan  -RCRI 2, Class III, 6.6% of major cardiac adverse event perioperatively  -intermediate risk for low risk procedure  -c/w lasix, atorvastatin  -increase metoprolol to 75mg BID, titrate as needed for HR control.  -monitor INR. When below 2 start heparin drip, given mechanical valve.

## 2019-01-03 NOTE — CONSULT NOTE ADULT - CONSULT REASON
risk stratification for endoscopy risk stratification for EGD, colonoscopy +/- video capsule endoscopy

## 2019-01-03 NOTE — PROGRESS NOTE ADULT - ASSESSMENT
78 year old female with PMH of HTN, type II DM, A-fib on coumadin, h/o bradycardia s/p pacemaker, rheumatic heart disease s/p bioprosthetic mitral valve replacement, s/p metallic aortic valve replacement, and HFpEF, history of RLE DVT 25 year sago, Left nephrectomy, and osteoporosis, presents to the ED for dark black stools since 3 days, fatigue, pallor, light headed.     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   -f/u hemolysis workup tomorrow AM  -transfuse to keep hgb >7  -f/u repeat CBC 8pm tonight s/p 1 u prbc  -F/U cardiac consult  -F/u heme onc    # Transitional cell cancer of the kidney, s/p resection in [facility]  on 6/1/2018, and reresection on 10/29/2018 (suspected initial stage was IIIa [id number])   - Following with Oncology (Dr. Fields) at [facility]  - Follow up CT A/P on 12/6/2018  revealed L adnexal ovoid mass at 6.4x6.2cm, fluid density on attenuation as well as interval development of soft tissue density somewhat confluent masslike conglomerates along left para-aortic-iliac chain, most pronounced below level of aortic bifurcation, suspicious for mets, mildly prominent b/l lymph nodes in the inguinal regions, suspicious for mets, small right pleural effusion with interval increase.   - PET CT to restage was ordered on 12/27/2018 - not performed yet . Would hold on CT scan with IV contrast due to high creatinine  - Will call radiation oncology for evaluation ( Dr Hampton)      #)DLD  -on pravastatin 40 changed to Lipitor 10    #)Osteoporosis-on raloxefin    #)DM  check FS, if FS>180 resume insulin    #)HFpEF/ h/o bioprosthetic mitral valve and metallic aortic valve replacement  -c/w lasix, toprol    #)A fib   coumadin on hold  -c/w toprol    #)HTN controlled  -c/w lasix, toprol    #)Diet: Clear liquids  #)Activity: OOBTC  #)DVt ppx: no SCD for now h/o DVT, no AC due to GI bleed  #)Gi ppx; Protonix  #)Dispo: From home  #)Code:Full

## 2019-01-03 NOTE — CONSULT NOTE ADULT - ASSESSMENT
78 year old female with PMH of HUGO, ? thalassemia, HTN, type II DM, A-fib on coumadin, h/o bradycardia s/p pacemaker, rheumatic heart disease s/p bioprosthetic mitral valve replacement, s/p metallic aortic valve replacement, and HFpEF, history of RLE DVT 25 year sago, ? Left nephrectomy, and osteoporosis, presents to the ED for dark red stools with clots for 3 x within 5 days. She mentioned having watery diarrhea after receiving clindamycin for cellulitis c/w dark red stools with clots. The patient has chronic HUGO s/p multiple transfusions last was 3 weeks ago. She never had similar symptoms in the past, denies NSAIDs.     1- watery diarrhea c/w dark red stools with clots  r/o hemorrhoidal bleed vs others     check C Diff if diarrhea recurs   T&S, cbc q12h  transfuse as needed   s/p 3 u PRBCs   hold warfarin, consider bridging as per cardiology, daily INR     cardiology consult   Fe sat 13% in 12/2018, start FeSO4 after scope, consider IV iron and EPO  hematology fu   start clears    will discuss egd and colonoscopy +/- VCE     2- weight loss with decreased appettite     check TSH, esr, Hba1c   will discuss egd and colonoscopy +/- VCE  hematology fu for ? MDS   nutrition fu 78 year old female with PMH of HUGO, ? thalassemia, HTN, type II DM, A-fib on coumadin, h/o bradycardia s/p pacemaker, rheumatic heart disease s/p bioprosthetic mitral valve replacement, s/p metallic aortic valve replacement, and HFpEF, history of RLE DVT 25 year sago, ? Left nephrectomy, and osteoporosis, presents to the ED for dark red stools with clots for 3 x within 5 days. She mentioned having watery diarrhea after receiving clindamycin for cellulitis c/w dark red stools with clots. The patient has chronic HUGO s/p multiple transfusions last was 3 weeks ago. She never had similar symptoms in the past, denies NSAIDs.     1- watery diarrhea c/w dark red stools with clots  r/o hemorrhoidal bleed vs others     check C Diff if diarrhea recurs   T&S, cbc q12h  transfuse as needed   s/p 3 u PRBCs   hold warfarin, consider bridging as per cardiology, daily INR     cardiology consult   Fe sat 13% in 12/2018, start FeSO4 after scope, consider IV iron and EPO  hematology fu   start clears    egd and colonoscopy +/- VCE on Monday 1/7/19    2- weight loss with decreased appettite     check TSH, esr, Hba1c   will discuss egd and colonoscopy +/- VCE  hematology fu for ? MDS   nutrition fu 78 year old female with PMH of HUGO, ? thalassemia, HTN, type II DM, A-fib on coumadin, h/o bradycardia s/p pacemaker, rheumatic heart disease s/p bioprosthetic mitral valve replacement, s/p metallic aortic valve replacement, and HFpEF, history of RLE DVT 25 year sago, ? Left nephrectomy, and osteoporosis, presents to the ED for dark red stools with clots for 3 x within 5 days. She mentioned having watery diarrhea after receiving clindamycin for cellulitis c/w dark red stools with clots. The patient has chronic HUGO s/p multiple transfusions last was 3 weeks ago. She never had similar symptoms in the past, denies NSAIDs.     1- watery diarrhea c/w dark red stools with clots  r/o hemorrhoidal bleed vs others     check C Diff if diarrhea recurs   T&S, cbc q12h  transfuse as needed   s/p 3 u PRBCs   hold warfarin, consider bridging as per cardiology, daily INR     cardiology consult   Fe sat 13% in 12/2018, start FeSO4 after scope, consider IV iron and EPO  hematology fu   start diabetic diet: clear liquid diet Sunday for colonoscopy Monday.  Golytely one gallon Sunday  egd and colonoscopy +/- VCE on Monday 1/7/19    2- weight loss with decreased appettite     check TSH, esr, Hba1c   will discuss egd and colonoscopy +/- VCE  hematology fu for ? MDS   nutrition fu 78 year old female with PMH of HUGO, ? thalassemia, HTN, type II DM, A-fib on coumadin, h/o bradycardia s/p pacemaker, rheumatic heart disease s/p bioprosthetic mitral valve replacement, s/p metallic aortic valve replacement, and HFpEF, history of RLE DVT 25 year sago, ? Left nephrectomy, and osteoporosis, presents to the ED for dark red stools with clots for 3 x within 5 days. She mentioned having watery diarrhea after receiving clindamycin for cellulitis c/w dark red stools with clots. The patient has chronic HUGO s/p multiple transfusions last was 3 weeks ago. She never had similar symptoms in the past, denies NSAIDs.     1- watery diarrhea c/w dark red stools with clots  r/o hemorrhoidal bleed vs others     check C Diff if diarrhea recurs   T&S, cbc q12h  transfuse as needed   s/p 3 u PRBCs   hold warfarin, consider bridging as per cardiology, daily INR     cardiology consult   Fe sat 13% in 12/2018, start FeSO4 after scope, consider IV iron and EPO  hematology fu   start diabetic diet: clear liquid diet Sunday for colonoscopy Monday.  Golytely one gallon Sunday  egd and colonoscopy +/- VCE on Monday 1/7/19  Patient may need to be bridged with heparin , and hold 6 h prior to Monday Procedure: discuss with primary team.    2- weight loss with decreased appettite     check TSH, esr, Hba1c   will discuss egd and colonoscopy +/- VCE  hematology fu for ? MDS   nutrition fu

## 2019-01-03 NOTE — CONSULT NOTE ADULT - ASSESSMENT
# Chronic multifactorial anemia, with elements of iron deficiency, chronic disease, valve related hemolysis, possible MDS.  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 Pablo on 6/1/2018, and reresected on 10/29/2018 (suspected initial stage was IIIa [id number])   --Following with Oncology (Dr. Fields) at [facility], requesting second opinion   --Was previously consented for Ambassador trial and randomized to observation arm   --follow up CT A/P on 8/24/2018 in [facility] it revealed 3.6cm masslike soft tissue involving left adnexa and uterine fundus, 4.2cm heterogeneous mass with surrounding infiltrative change anterior to left iliac vessels increasing in size from prior PET CT, small R pleural effusion, 5mm sclerotic lesion left iliac bone. Imaging was personally reviewed by myself with IR. PET CT was recommended with biopsy to follow, she returned for her care to [facility] adter this   --She will followed up at [facility] (Dr. 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 Will resend hemolysis workup  - S/p BM Bx, results are inconclusive, mild MDS is not excluded   - Continue on folic acid  - Transfuse to keep hb >7    # Transitional cell cancer of the kidney, s/p resection in [facility] by Dr. Pablo on 6/1/2018, and reresected on 10/29/2018 (suspected initial stage was IIIa [id number])   --Following with Oncology (Dr. Fields) at [facility], requesting second opinion   --Was previously consented for Ambassador trial and randomized to observation arm   --follow up CT A/P on 8/24/2018 in [facility] it revealed 3.6cm masslike soft tissue involving left adnexa and uterine fundus, 4.2cm heterogeneous mass with surrounding infiltrative change anterior to left iliac vessels increasing in size from prior PET CT, small R pleural effusion, 5mm sclerotic lesion left iliac bone. Imaging was personally reviewed by myself with IR. PET CT was recommended with biopsy to follow, she returned for her care to [facility] adter this   --She will followed up at [facility] (Dr. Pablo) in this regard and underwent resection on 10/29/2018, path in the retained distal ureter was consistent with locoregional recurrent disease   --PET CT to restage was ordered on 12/27/2018.  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 Will resend hemolysis workup  - S/p BM Bx, results are inconclusive, mild MDS is not excluded   - Continue on folic acid  - Transfuse to keep hb >7    # Transitional cell cancer of the kidney, s/p resection in [facility]  on 6/1/2018, and reresection on 10/29/2018 (suspected initial stage was IIIa [id number])   - Following with Oncology (Dr. Fields) at [facility]  - Was previously consented for Ambassador trial and randomized to observation arm   - Follow up CT A/P on 12/6/2018  revealed L adnexal ovoid mass at 6.4x6.2cm, fluid density on attenuation as well as interval development of soft tissue density somewhat confluent masslike conglomerates along left para-aortic-iliac chain, most pronounced below level of aortic bifurcation, suspicious for mets, mildly prominent b/l lymph nodes in the inguinal regions, suspicious for mets, small right pleural effusion with interval increase.   -PET CT to restage was ordered on 12/27/2018.  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 fib and metallic AV    # LLE : improving - likely 2/2 venous compression 2/2 adenopathy   -- L soleal DVT, continue with AC with Coumadin, INR closer to 3 per cardiology       Recs:  Hemolysis panel           Radiation oncology evaluation # Acute on chronic anemia: Multifactorial anemia  - R/O GI bleed : had extensive GI workup in the past which was negative . Now with bleeding , Plan for endoscopy by GI. 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 Will resend hemolysis workup  - S/p BM Bx, results are inconclusive, mild MDS is not excluded   - Continue on folic acid  - Transfuse to keep hb >7    # Transitional cell cancer of the kidney, s/p resection in [facility]  on 6/1/2018, and reresection on 10/29/2018 (suspected initial stage was IIIa [id number])   - Following with Oncology (Dr. Fields) at [facility]  - Was previously consented for Ambassador trial and randomized to observation arm   - Follow up CT A/P on 12/6/2018  revealed L adnexal ovoid mass at 6.4x6.2cm, fluid density on attenuation as well as interval development of soft tissue density somewhat confluent masslike conglomerates along left para-aortic-iliac chain, most pronounced below level of aortic bifurcation, suspicious for mets, mildly prominent b/l lymph nodes in the inguinal regions, suspicious for mets, small right pleural effusion with interval increase.   -PET CT to restage was ordered on 12/27/2018.  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 fib and metallic AV    # LLE : improving - likely 2/2 venous compression 2/2 adenopathy   -- L soleal DVT, continue with AC with Coumadin, INR closer to 3 per cardiology       Recs:  Hemolysis panel           Radiation oncology evaluation # Acute on chronic anemia: Multifactorial anemia  - R/O GI bleed : had extensive GI workup in the past which was negative . Now with bleeding , Plan for endoscopy by GI. 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 Will resend hemolysis workup today  - S/p BM Bx, results are inconclusive, mild MDS is not excluded   - Continue on folic acid  - Transfuse to keep hb >7    # Transitional cell cancer of the kidney, s/p resection in [facility]  on 6/1/2018, and reresection on 10/29/2018 (suspected initial stage was IIIa [id number])   - Following with Oncology (Dr. Fields) at [facility]  - Was previously consented for Ambassador trial and randomized to observation arm   - Follow up CT A/P on 12/6/2018  revealed L adnexal ovoid mass at 6.4x6.2cm, fluid density on attenuation as well as interval development of soft tissue density somewhat confluent masslike conglomerates along left para-aortic-iliac chain, most pronounced below level of aortic bifurcation, suspicious for mets, mildly prominent b/l lymph nodes in the inguinal regions, suspicious for mets, small right pleural effusion with interval increase.   - PET CT to restage was ordered on 12/27/2018 - not performed yet . 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 fib and metallic AV    # LLE : improving - likely 2/2 venous compression 2/2 adenopathy   -- L soleal DVT, continue with AC once cleared by FI.    Recs:  Hemolysis panel           Radiation oncology evaluation

## 2019-01-03 NOTE — CONSULT NOTE ADULT - SUBJECTIVE AND OBJECTIVE BOX
Patient is a 78y old  Female who presents with a chief complaint of dark black stools (03 Jan 2019 12:18)    HPI:  78 year old female with PMH of HTN, type II DM, A-fib on coumadin, h/o bradycardia s/p pacemaker, rheumatic heart disease s/p bioprosthetic mitral valve replacement, s/p metallic aortic valve replacement, and HFpEF, history of RLE DVT 25 years ago, Left nephrectomy, and osteoporosis, presents to the ED for dark black stools since 3 days. As per patient and son at bedside she has dark black stools since 3 days, not painful, last bowel movement last night, no bright red blood, no hematemesis, no dysphagia, buit admits to weight loss of 30 pounds in last year, loss of appetite and chronic intermittent abdominal pain, diffuse, no aggravating factors and relieving factor 3-4/10 in intensity. Also has c/o feeling weak, pale, light headedness. She receives frequent transfusions for microcytic anemia, last transfusion was three weeks ago follows up with Dr. Alvarenga she went to see Dr. Alvarenga blood work showed Hb of 4.8 referred to hospital.  Denies fever, chills, nausea, vomiting, dysuria,   pt was recently in hospital for left leg cellulitis; reports trace edema to left knee without pain.  She checks her INR regularly at home goal INR as per their cardiologist is 2-3. She didn;t receive coumadin today, last dose was yesterday 2.5mg   Last endoscopy and colonoscopy was done 5 years ago as per patient was normal.    vitals in ED: 108/51, 94, 98.5, 18, 96% on room air, Labs Hb 4.8(Baseline 7-8), INR 3.29, creatinine is 1.9 (baseline is 2 from may 2018,in april it was 1.1), chest xray was fine, s/p 2 units PRBC (02 Jan 2019 21:11)    PAST MEDICAL & SURGICAL HISTORY:  CVA (cerebral vascular accident)  Anemia  Rheumatic fever  Diabetes  Osteoporosis  Mitral valve replaced  Atrial fibrillation: on coumadin at home  Hypertension  History of ureter stent  S/P AVR  H/O mitral valve replacement    MEDICATIONS  (STANDING):  atorvastatin 10 milliGRAM(s) Oral at bedtime  cholecalciferol 2000 Unit(s) Oral daily  folic acid 1 milliGRAM(s) Oral daily  furosemide    Tablet 40 milliGRAM(s) Oral daily  metoprolol tartrate 50 milliGRAM(s) Oral every 12 hours  pantoprazole  Injectable 40 milliGRAM(s) IV Push two times a day    FAMILY HISTORY:  No pertinent family history in first degree relatives    Review of Systems:  CONSTITUTIONAL: No fever, weight loss, or fatigue  EYES: No eye pain, visual disturbances, or discharge  ENMT:  No difficulty hearing, tinnitus, vertigo; No sinus or throat pain  NECK: No pain or stiffness  RESPIRATORY: No cough, wheezing, chills or hemoptysis; No shortness of breath  CARDIOVASCULAR: No chest pain, palpitations, dizziness, or leg swelling  GASTROINTESTINAL: No abdominal or epigastric pain. No nausea, vomiting, or hematemesis; No diarrhea or constipation. No melena or hematochezia.  GENITOURINARY: No dysuria, frequency, hematuria, or incontinence  NEUROLOGICAL: No headaches, memory loss, loss of strength, numbness, or tremors  SKIN: No itching, burning, rashes, or lesions   LYMPH NODES: No enlarged glands  ENDOCRINE: No heat or cold intolerance; No hair loss  MUSCULOSKELETAL: No joint pain or swelling; No muscle, back, or extremity pain  PSYCHIATRIC: No depression, anxiety, mood swings, or difficulty sleeping  HEME/LYMPH: No easy bruising, or bleeding gums  ALLERY AND IMMUNOLOGIC: No hives or eczema    SOCIAL HISTORY:  Denied smoking  Denied alcohol use  Denied illicit drug use    Vital Signs Last 24 Hrs  T(C): 36.2 (03 Jan 2019 13:45), Max: 36.9 (02 Jan 2019 14:38)  T(F): 97.2 (03 Jan 2019 13:45), Max: 98.5 (02 Jan 2019 14:38)  HR: 72 (03 Jan 2019 13:45) (72 - 94)  BP: 133/60 (03 Jan 2019 13:45) (108/51 - 147/63)  BP(mean): --  RR: 18 (03 Jan 2019 13:45) (16 - 18)  SpO2: 94% (03 Jan 2019 09:07) (94% - 99%)    Physical Exam:  GENERAL: NAD, well-groomed, well-developed  HEAD:  NCAT  EYES: EOMI, PERRLA, conjunctiva clear  ENMT: No tonsillar erythema, exudates, or enlargement; Moist mucous membranes, Good dentition, No lesions  NECK: Supple, No JVD, Normal thyroid  NERVOUS SYSTEM: AAOX3, Good concentration; Motor Strength 5/5 B/L upper and lower extremities  CHEST/LUNG: CTA b/l no w/r/r  HEART: +irregular. systolic ejection mechanical click   ABDOMEN: soft, NT/ND (+) bs, no HSM  EXTREMITIES:  2+ Peripheral Pulses, No c/c/e  LYMPH: No lymphadenopathy noted  SKIN: No rashes or lesions    ECG:  < from: 12 Lead ECG (01.02.19 @ 17:15) >  Diagnosis Line Atrial fibrillation with occasional ventricular-paced complexes  Rightward axis  Possible Anterior infarct , age undetermined    < end of copied text >    ECHO:  < from: Transthoracic Echocardiogram (03.17.18 @ 08:30) >  Summary:   1. Left ventricular ejection fraction, by visual estimation, is 60 to   65%.   2. Normal global left ventricular systolic function.   3. Severely enlarged left atrium.   4. Severely enlarged right atrium.   5. RV is mildly dilated, moderately hypokinetic.   6. Mechanical valve in the mitral position is not well visualized.   Increased flow velocities consistent with peak PG > 30 mmHg but MVA is   calculated > 2 cm^2 by PHT. Trace MR. Comparison with prior ECHO studies   would be helpful.   7.Moderate tricuspid regurgitation.   8. Severe aortic regurgitation.   9. Mechanical valve in aortic position is not well vusalized.  10. Estimated pulmonary artery systolic pressure is 62.6 mmHg assuming a   right atrial pressure of 15 mmHg, which isconsistent with severe   pulmonary hypertension.  11. Peak transaortic gradient equals 49.5 mmHg, mean transaortic gradient   equals 33.7 mmHg, the calculated aortic valve area equals 0.85 cm² by the   continuity equation consistent with severe aortic stenosis.    < end of copied text >    LABS:                        6.5    7.88  )-----------( 233      ( 03 Jan 2019 06:47 )             20.0     01-03    141  |  99  |  30<H>  ----------------------------<  88  3.4<L>   |  27  |  1.7<H>    Ca    7.6<L>      03 Jan 2019 06:47  Mg     2.1     01-03    TPro  6.2  /  Alb  3.2<L>  /  TBili  2.2<H>  /  DBili  x   /  AST  12  /  ALT  7   /  AlkPhos  100  01-03    CARDIAC MARKERS ( 03 Jan 2019 06:47 )  x     / <0.01 ng/mL / 36 U/L / x     / 1.3 ng/mL      PT/INR - ( 03 Jan 2019 06:47 )   PT: 36.90 sec;   INR: 3.25 ratio       PTT - ( 03 Jan 2019 06:47 )  PTT:32.9 sec    RADIOLOGY & ADDITIONAL STUDIES:  < from: Xray Chest 1 View AP/PA (01.02.19 @ 17:43) >  Impression:      Blunting of the right costophrenic angle. Cardiomegaly.      < end of copied text >

## 2019-01-03 NOTE — CONSULT NOTE ADULT - SUBJECTIVE AND OBJECTIVE BOX
Patient is a 78y old  Female who presents with a chief complaint of dark black stools (02 Jan 2019 21:11)      HPI:  78 year old female with PMH of HTN, type II DM, A-fib on coumadin, h/o bradycardia s/p pacemaker, rheumatic heart disease s/p bioprosthetic mitral valve replacement, s/p metallic aortic valve replacement, and HFpEF, history of RLE DVT 25 year sago, Left nephrectomy, and osteoporosis, presents to the ED for dark black stools since 3 days. As per patient and son at bedside she has dark black stools since 3 days, not painful, last bowel movement last night, no bright red blood, no hematemesis, no dysphagia, buit admits to weight loss of 30 pounds in last year, loss of appetite and chronic intermittent abdominal pain, diffuse, no aggravating factors and relieving factor 3-4/10 in intensity. Also has c/o feeling weak, pale, light headedness. She receives frequent transfusions for microcytic anemia, last transfusion was three weeks ago follows up with Dr. Alvarenga she went to see Dr. Alvarenga blood work showed Hb of 4.8 referred to hospital.  Denies fever, chills, nausea, vomiting, dysuria,   pt was recently in hospital for left leg cellulitis; reports trace edema to left knee without pain.  She checks her INR regularly at home goal INR as per their cardiologist is 2-3. She didn;t receive coumadin today, last dose was yesterday 2.5mg   Last endoscopy and colonoscopy was done 5 years ago as per patient was normal.    vitals in ED: 108/51, 94, 98.5, 18, 96% on room air, Labs Hb 4.8(Baseline 7-8), INR 3.29, creatinine is 1.9 (baseline is 2 from may 2018,in april it was 1.1), chest xray was fine, s/p 2 units PRBC (02 Jan 2019 21:11)    Heme/onc:  Pt was diagnosed with Transitional cell cancer of the kidney, s/p resection in [facility] by Dr. Pablo, stage unclear.  --As per oral report from Oncology NP at [facility] No was consented for [employer] trial participation and has randomized to observation arm  --follow up CT A/P on 8/24/2018 in [facility] it revealed 3.6cm masslike soft tissue involving left adnexa and uterine fundus, 4.2cm heterogeneous mass with surrounding infiltrative change anterior to left iliac vessels increasing in size from prior PET CT, small R pleural effusion, 5mm sclerotic lesion left iliac bone. PET CT was recommended with biopsy to follow. 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         PAST MEDICAL & SURGICAL HISTORY:  CVA (cerebral vascular accident)  Anemia  Rheumatic fever  Diabetes  Osteoporosis  Mitral valve replaced  Atrial fibrillation: on coumadin at home  Hypertension  History of ureter stent  S/P AVR  H/O mitral valve replacement      SOCIAL HISTORY:    FAMILY HISTORY:  No pertinent family history in first degree relatives    Allergies    No Known Drug Allergies  shellfish (Unknown)    Intolerances      ROS:  Negative except for:        Height (cm): 157.48 (01-02-19 @ 21:15)  Weight (kg): 61.9 (01-02-19 @ 21:15)  BMI (kg/m2): 25 (01-02-19 @ 21:15)  BSA (m2): 1.62 (01-02-19 @ 21:15)      HOME MEDICATIONS:  Coumadin 5 mg oral tablet: 1 tab(s) orally once a day,   please hold tonight (12/17), can resume tomorrow 12/18 or after visiting [facility] (17 Dec 2018 16:55)  famotidine 20 mg oral tablet: 1 tab(s) orally 2 times a day (14 Dec 2018 23:37)  furosemide 40 mg oral tablet: 1 tab(s) orally once a day (14 Dec 2018 23:37)  glimepiride 4 mg oral tablet: 1 tab(s) orally once a day (14 Dec 2018 23:37)  metoprolol tartrate 50 mg oral tablet: 1 tab(s) orally every 12 hours (14 Dec 2018 23:37)  Vitamin D3 2000 intl units oral capsule: 1 cap(s) orally once a day (14 Dec 2018 23:37)      Vital Signs Last 24 Hrs  T(C): 36.7 (03 Jan 2019 05:05), Max: 36.9 (02 Jan 2019 14:38)  T(F): 98 (03 Jan 2019 05:05), Max: 98.5 (02 Jan 2019 14:38)  HR: 79 (03 Jan 2019 05:05) (74 - 94)  BP: 147/63 (03 Jan 2019 05:05) (108/51 - 147/63)  BP(mean): --  RR: 18 (03 Jan 2019 05:05) (16 - 18)  SpO2: 94% (03 Jan 2019 09:07) (94% - 99%)    PHYSICAL EXAM  General: adult in NAD  HEENT: clear oropharynx, anicteric sclera, pink conjunctiva  Neck: supple  CV: normal S1/S2 with no murmur rubs or gallops  Lungs: positive air movement b/l ant lungs,clear to auscultation, no wheezes, no rales  Abdomen: soft non-tender non-distended, no hepatosplenomegaly  Ext: no clubbing cyanosis or edema  Skin: no rashes and no petechiae  Neuro: alert and oriented X 4, no focal deficits    MEDICATIONS  (STANDING):  atorvastatin 10 milliGRAM(s) Oral at bedtime  cholecalciferol 2000 Unit(s) Oral daily  famotidine    Tablet 20 milliGRAM(s) Oral daily  folic acid 1 milliGRAM(s) Oral daily  furosemide    Tablet 40 milliGRAM(s) Oral daily  metoprolol tartrate 50 milliGRAM(s) Oral every 12 hours  pantoprazole  Injectable 40 milliGRAM(s) IV Push two times a day  phytonadione   Solution 2.5 milliGRAM(s) Oral once  potassium chloride    Tablet ER 20 milliEquivalent(s) Oral once    MEDICATIONS  (PRN):      LABS:                          6.5    7.88  )-----------( 233      ( 03 Jan 2019 06:47 )             20.0         Mean Cell Volume : 69.7 fL  Mean Cell Hemoglobin : 22.6 pg  Mean Cell Hemoglobin Concentration : 32.5 g/dL  Auto Neutrophil # : 6.39 K/uL  Auto Lymphocyte # : 0.68 K/uL  Auto Monocyte # : 0.50 K/uL  Auto Eosinophil # : 0.18 K/uL  Auto Basophil # : 0.03 K/uL  Auto Neutrophil % : 81.1 %  Auto Lymphocyte % : 8.6 %  Auto Monocyte % : 6.3 %  Auto Eosinophil % : 2.3 %  Auto Basophil % : 0.4 %      Serial CBC's  01-03 @ 06:47  Hct-20.0 / Hgb-6.5 / Plat-233 / RBC-2.87 / WBC-7.88  Serial CBC's  01-02 @ 17:15  Hct-16.0 / Hgb-4.8 / Plat-220 / RBC-2.33 / WBC-8.65      01-03    141  |  99  |  30<H>  ----------------------------<  88  3.4<L>   |  27  |  1.7<H>    Ca    7.6<L>      03 Jan 2019 06:47  Mg     2.1     01-03    TPro  6.2  /  Alb  3.2<L>  /  TBili  2.2<H>  /  DBili  x   /  AST  12  /  ALT  7   /  AlkPhos  100  01-03      PT/INR - ( 03 Jan 2019 06:47 )   PT: 36.90 sec;   INR: 3.25 ratio         PTT - ( 03 Jan 2019 06:47 )  PTT:32.9 sec                            BLOOD SMEAR INTERPRETATION:       RADIOLOGY & ADDITIONAL STUDIES: Patient is a 78y old  Female who presents with a chief complaint of dark black stools (02 Jan 2019 21:11)      HPI:  78 year old female with PMH of HTN, type II DM, A-fib on coumadin, h/o bradycardia s/p pacemaker, rheumatic heart disease s/p bioprosthetic mitral valve replacement, s/p metallic aortic valve replacement, and HFpEF, history of RLE DVT 25 year sago, Left nephrectomy, and osteoporosis, presents to the ED for dark black stools since 3 days. As per patient and son at bedside she has dark black stools since 3 days, not painful, last bowel movement last night, no bright red blood, no hematemesis, no dysphagia, buit admits to weight loss of 30 pounds in last year, loss of appetite and chronic intermittent abdominal pain, diffuse, no aggravating factors and relieving factor 3-4/10 in intensity. Also has c/o feeling weak, pale, light headedness. She receives frequent transfusions for microcytic anemia, last transfusion was three weeks ago follows up with Dr. Alvarenga she went to see Dr. Alvarnega blood work showed Hb of 4.8 referred to hospital.  Denies fever, chills, nausea, vomiting, dysuria,   pt was recently in hospital for left leg cellulitis; reports trace edema to left knee without pain.  She checks her INR regularly at home goal INR as per their cardiologist is 2-3. She didn't receive coumadin today, last dose was yesterday 2.5mg   Last endoscopy and colonoscopy was done 5 years ago as per patient was normal.    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 She is of [place] descent. She was admitted on 03/25/2017 and was noted to be anemic at 8.1, with MCV of 65.  - Hx of rheumaticas a child. 13 years ago she underwent valve replacement surgery. As per her son, 2 valves were replaced with metallic valves, one of which was mitral, he does not recall what second valve was replaced. She had extensive GI work up by Dr. Kd Goldberg in 08/2013 including upper endoscopy, colonoscopy and capsule study, no overt source of bleeding was identified.       Cystoscopy and ureteroscopy did not reveal clear origin of hematuria.    1/16/2018: Currently No is not bleeding, she is tired, no CP or SOB. She is going to [place] for ureteral procedure (? stent) later this week. There is no plan to stop Coumadin for the procedure. Will administer Procrit today. Iron studies today.     3/16/2018: No in the interim was seen in second opinion by Dr. Benitez, who performed a BMBx, results are pending. I received a call yesterday from her urologist office reporting high bilirubin (increased as compared to baseline), I have asked her to come in for bloodwork yesterday. Delayed transfusion reaction and autoimmune hemolytic anemia work up was negative. Hb is continuing to decrease, there are also signs of ongoing hemolysis as well as blood in the urine in the context of a UTI. No is refusing to go to ER. I will arrange for direct admission to my service for further work up today, she is agreeable for direct admission. She received a dose of Procrit yesterday.     4/18/2018: No required a lengthy hospitalization as well as transfer to [facility]. 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 Tomer Escalante on 3/19/2018. She still has a metallic aortic valve in place and remains on Coumadin. She has a documented seizure on POD1. Stroke code called, CT head negative, EEG placed showing epileptic activity and Keppra started per Dr. Soriano. She was later extubated POD#2, and hep gtt/coumadin started for previous AVR with mech valve and h/o afib. POD3 received PRBC for anemia. Her urologist Dr. Pablo was contacted to discuss INR goals given her h/o hematuria and there is no contraindication for continuing Coumadin. She was discharged home on 4/5/17. Today she complains of some hematuria for 2-3 days. I will hold off restarting Procrit for now until she goes for her post-op visit with Dr. Escalante. She will require weekly CBCs with possible transfusion. She was advised to continue with folic acid.     5/2/18: Pt comes in for follow-up visit. No fresh complaints. She continues to take coumadin for her metallic aortic valve, although her target INR is between 1.5 - 2.5 because of ongoing hematuria. 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 She also recently saw her neurologist who performed EEG and was going to taper off keppra if EEG was normal. However, the results are unknown at this point. She has also followed-up with her CT surgeon Dr Escalante. Continues to take folic acid and has been getting her weekly procrit. Her Hb today is 7.4.    6/22/2018: No was briefly admitted to [facility] with ARF and gross hematuria with clots, CT of the abdomen reveald kidney laceration, she was then transferred to [facility], She reports she stayed there for 1 week and kidney surgery was done by Dr. Pablo, she was noted to have "kidney cancer, stage III", she then saw medical oncology in [facility] Dr. Kashmir Fields, who recommended "some IV medication that is not chemo". All of these records are nor available for my review, I have discussed case with Dr. Pablo's office and requested records. I have explained to Susan and her  that she does not need to be seen by 2 heme/onc specialists and she can chose where she will receive further care. She will think about this and let us know. She will have CT with contrast and [facility] next week. I will hold Procrit today ion light of new cancer diagnosis and missing records. Hb is 8.8.     6/29/2018: I have received some records from [facility] and have at length discussed this with No's family member in [place] it her request. I have explained to them that participation in [facility] trial open at [facility] in my opinion will be beneficial. She will continue to have periodic blood tests in my office but will continue with her Oncological care in [facility].     8/3/2018: No present for follow up, her Hb is again on the lower side she denies any overt bleeding. She states that she has been frustrated with [facility], multiple tests have been dose and she has not started on any therapy. She wants to know if I could provide immunotherapy here. I have explained to her that I cannot offer her immunotherapy off clinical trial at this time. i have reached out to [facility] for a call back. I will offer her a blood transfusions today.     8/16/2018: I have received a call from NP at [facility] since No's last visit. NP explained to me that No has been consented for [employer] trial participation and randomized to observation arm. I have explained this to No, I am no quite sure she understands what this means. No also told me that her relative in [place] has recently received a follow up call from [facility] explaining the situation. She understands further follow up imaging will be done at [facility]. She does not believe that she has another scan scheduled at this time. I have not received any written records from [facility] Oncology office despite multiple requests. No was also noted to have relative iron deficiency, i will plan to replete with 3 doses of Feraheme. She continues on Coumadin and denies bleeding.     9/18/2018: No had a follow up CT A/P on 8/24/2018 in [facility] it revealed 3.6cm masslike soft tissue involving left adnexa and uterine fundus, 4.2cm heterogeneous mass with surrounding infiltrative change antrior to left iliac vessels increasing in size from prior PET CT, small R pleural effusion, 5mm sclerotic lesion left iliac bone. imaging was personally reviewed by myself with IR. PET CT was recommended with biopsy to follow. This was discussed with No. She in the meantime saw Dr. Pablo and more surgical procedures are planned. She will call him tomorrow to delineate further plan. She has received 1 unit of blood yesterday. she continues on Coumadin for metallic aortic valve. C/o on/off hematuria and some lower abdominal pain.     10/31/2018: oN presents for follow up, she is not feeling well today, very weak, her abdomen is tender. She just had surgery at [facility] on 10/29/2018 by Dr. Pablo and was discharged the next day, she did not receive any transfusions, her blood level was not checked upon discharge. Today her Hb is 6.2, she is injecting Lovenox, I am concerned that she may be bleeding and I have advised her to go to ER, she is agreeable.     12/27/2018: After multiple attempts I was finally able to obtain records from [facility]. I have reviewed these at length and held an 1.5 discussion with No and her family. No wishes to switch her care over to [facility] and receive care locally. I have briefly seen her in the office the other day when she arrived for blood work, her left leg was significantly swollen, she was advised to go to ER. On 12/14/2018 Doppler of LE revealed Venous thrombosis right soleal vein.No evidence of deep venous thrombosis or superficial thrombophlebitis in left lower extremity.Left groin lymphadenopathy. She was treated for cellulitis and discharged with oral Clindamycin, that she stopped taking early yesterday, her LLE remains swollen, but erythema is much improved. During her hospitalization she has received 2 units of PRBC.     To summarize outside records from [facility]: No was observed for hematuria or unknown etiology by Dr. 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 Original path report is not available for my review. She was then referred to establish oncological care with Dr. Kashmir Fields, patient was offered participation in [employer] trial (adjuvant immunotherapy vs observation) and randomized to observation arm. She had a follow up CT scan on 7/31/2018 revealed soft tissue mass along pelvic sidewall, follow up PET CT on 7/19/2018 revealed uptake in that area. On 10/29/2018 she underwent left adnexal mass and left distal ureter excision, adnexal mass was negative for malignancy, left distal ureter revealed invasive high grade urothelial carcinoma, invading full thickness of ureteral wall, involving soft tissue surrounding the ureter, morphologically similar to prior tumor in the left kidney. She was discharged the next day and presented to my office with worsening anemia and abdominal pain, this required hospitalization, CT abdomen on 11/12018 revealed a 6cm fluid collection thought to be a hematoma.   She was then referred to Rad Onc naty [facility] and was seen by Dr. Diaz 11/23/2018, who recommended XRT with chemotherapy to the pelvis. She again was seen by Dr. Fields who recommended combining radiation with concurrent weekly paclitaxel on 12/12/2018. She had a CT of the A/P without contrast on 12/6/2018 that revealed L adnexal ovoid mass at 6.4x6.2cm, fluid density on attenuation as well as interval development of soft tissue density somewhat confluent masslike conglomerates along left para-aortic-iliac chain, most pronounced below level of aortic bifurcation, suspicious for mets, mildly prominent b/l lymph nodes in the inguinal regions, suspicious for mets, small right pleural effusion with interval increase.     All of the above was discussed and summarized for the family. No reports decrease in appetite, weigh loss.         PAST MEDICAL & SURGICAL HISTORY:  CVA (cerebral vascular accident)  Anemia  Rheumatic fever  Diabetes  Osteoporosis  Mitral valve replaced  Atrial fibrillation: on coumadin at home  Hypertension  History of ureter stent  S/P AVR  H/O mitral valve replacement      SOCIAL HISTORY:    FAMILY HISTORY:  No pertinent family history in first degree relatives    Allergies    No Known Drug Allergies  shellfish (Unknown)    Intolerances      ROS:  Negative except for:        Height (cm): 157.48 (01-02-19 @ 21:15)  Weight (kg): 61.9 (01-02-19 @ 21:15)  BMI (kg/m2): 25 (01-02-19 @ 21:15)  BSA (m2): 1.62 (01-02-19 @ 21:15)      HOME MEDICATIONS:  Coumadin 5 mg oral tablet: 1 tab(s) orally once a day,   please hold tonight (12/17), can resume tomorrow 12/18 or after visiting [facility] (17 Dec 2018 16:55)  famotidine 20 mg oral tablet: 1 tab(s) orally 2 times a day (14 Dec 2018 23:37)  furosemide 40 mg oral tablet: 1 tab(s) orally once a day (14 Dec 2018 23:37)  glimepiride 4 mg oral tablet: 1 tab(s) orally once a day (14 Dec 2018 23:37)  metoprolol tartrate 50 mg oral tablet: 1 tab(s) orally every 12 hours (14 Dec 2018 23:37)  Vitamin D3 2000 intl units oral capsule: 1 cap(s) orally once a day (14 Dec 2018 23:37)      Vital Signs Last 24 Hrs  T(C): 36.7 (03 Jan 2019 05:05), Max: 36.9 (02 Jan 2019 14:38)  T(F): 98 (03 Jan 2019 05:05), Max: 98.5 (02 Jan 2019 14:38)  HR: 79 (03 Jan 2019 05:05) (74 - 94)  BP: 147/63 (03 Jan 2019 05:05) (108/51 - 147/63)  BP(mean): --  RR: 18 (03 Jan 2019 05:05) (16 - 18)  SpO2: 94% (03 Jan 2019 09:07) (94% - 99%)    PHYSICAL EXAM  General: adult in NAD  HEENT: clear oropharynx, anicteric sclera, pink conjunctiva  Neck: supple  CV: normal S1/S2 with no murmur rubs or gallops  Lungs: positive air movement b/l ant lungs,clear to auscultation, no wheezes, no rales  Abdomen: soft non-tender non-distended, no hepatosplenomegaly  Ext: no clubbing cyanosis or edema  Skin: no rashes and no petechiae  Neuro: alert and oriented X 4, no focal deficits    MEDICATIONS  (STANDING):  atorvastatin 10 milliGRAM(s) Oral at bedtime  cholecalciferol 2000 Unit(s) Oral daily  famotidine    Tablet 20 milliGRAM(s) Oral daily  folic acid 1 milliGRAM(s) Oral daily  furosemide    Tablet 40 milliGRAM(s) Oral daily  metoprolol tartrate 50 milliGRAM(s) Oral every 12 hours  pantoprazole  Injectable 40 milliGRAM(s) IV Push two times a day  phytonadione   Solution 2.5 milliGRAM(s) Oral once  potassium chloride    Tablet ER 20 milliEquivalent(s) Oral once    MEDICATIONS  (PRN):      LABS:                          6.5    7.88  )-----------( 233      ( 03 Jan 2019 06:47 )             20.0         Mean Cell Volume : 69.7 fL  Mean Cell Hemoglobin : 22.6 pg  Mean Cell Hemoglobin Concentration : 32.5 g/dL  Auto Neutrophil # : 6.39 K/uL  Auto Lymphocyte # : 0.68 K/uL  Auto Monocyte # : 0.50 K/uL  Auto Eosinophil # : 0.18 K/uL  Auto Basophil # : 0.03 K/uL  Auto Neutrophil % : 81.1 %  Auto Lymphocyte % : 8.6 %  Auto Monocyte % : 6.3 %  Auto Eosinophil % : 2.3 %  Auto Basophil % : 0.4 %      Serial CBC's  01-03 @ 06:47  Hct-20.0 / Hgb-6.5 / Plat-233 / RBC-2.87 / WBC-7.88  Serial CBC's  01-02 @ 17:15  Hct-16.0 / Hgb-4.8 / Plat-220 / RBC-2.33 / WBC-8.65      01-03    141  |  99  |  30<H>  ----------------------------<  88  3.4<L>   |  27  |  1.7<H>    Ca    7.6<L>      03 Jan 2019 06:47  Mg     2.1     01-03    TPro  6.2  /  Alb  3.2<L>  /  TBili  2.2<H>  /  DBili  x   /  AST  12  /  ALT  7   /  AlkPhos  100  01-03      PT/INR - ( 03 Jan 2019 06:47 )   PT: 36.90 sec;   INR: 3.25 ratio         PTT - ( 03 Jan 2019 06:47 )  PTT:32.9 sec                            BLOOD SMEAR INTERPRETATION:       RADIOLOGY & ADDITIONAL STUDIES: Patient is a 78y old  Female who presents with a chief complaint of dark black stools (02 Jan 2019 21:11)      HPI:  78 year old female with PMH of HTN, type II DM, A-fib on coumadin, h/o bradycardia s/p pacemaker, rheumatic heart disease s/p bioprosthetic mitral valve replacement, s/p metallic aortic valve replacement, and HFpEF, history of RLE DVT 25 year sago, Left nephrectomy, and osteoporosis, presents to the ED for dark black stools since 3 days. As per patient and son at bedside she has dark black stools since 3 days, not painful, last bowel movement last night, no bright red blood, no hematemesis, no dysphagia, buit admits to weight loss of 30 pounds in last year, loss of appetite and chronic intermittent abdominal pain, diffuse, no aggravating factors and relieving factor 3-4/10 in intensity. Also has c/o feeling weak, pale, light headedness. She receives frequent transfusions for microcytic anemia, last transfusion was three weeks ago follows up with Dr. Alvarenga she went to see Dr. Alvarenga blood work showed Hb of 4.8 referred to hospital.  Denies fever, chills, nausea, vomiting, dysuria,   pt was recently in hospital for left leg cellulitis; reports trace edema to left knee without pain.  She checks her INR regularly at home goal INR as per their cardiologist is 2-3. She didn't receive coumadin today, last dose was yesterday 2.5mg   Last endoscopy and colonoscopy was done 5 years ago as per patient was normal.    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 She is of [place] descent. She was admitted on 03/25/2017 and was noted to be anemic at 8.1, with MCV of 65.  - Hx of rheumatic fever as a child. 13 years ago she underwent valve replacement surgery. As per her son, 2 valves were replaced with metallic valves, one of which was mitral, he does not recall what second valve was replaced.   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 Tomer Escalante on 3/19/2018. She still has a metallic aortic valve in place and remains on Coumadin.   -GI:  She had extensive GI work up by Dr. Kd Goldberg in 08/2013 including upper endoscopy, colonoscopy and capsule study, no overt source of bleeding was identified.   - She had a bone marrow biopsy which was inconclusive for MDS  - Hemarturia:  No was observed for hematuria or unknown etiology by Dr. Pablo for approximately 1.5 years, ultimately a stricture in the L ureter was identified, stent was placed and subsequently removed, on 5/25/2018 stent was replaced. 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 She was then referred to establish oncological care with Dr. Kashmir Fields, patient was offered participation in Ambassador trial (adjuvant immunotherapy vs observation) and randomized to observation arm. She had a follow up CT scan on 7/31/2018 revealed soft tissue mass along pelvic sidewall, follow up PET CT on 7/19/2018 revealed uptake in that area. On 10/29/2018 she underwent left adnexal mass and left distal ureter excision, adnexal mass was negative for malignancy, left distal ureter revealed invasive high grade urothelial carcinoma, invading full thickness of ureteral wall, involving soft tissue surrounding the ureter, morphologically similar to prior tumor in the left kidney. She was then referred to Rad Onc ant [facility] and was seen by Dr. Diaz 11/23/2018, who recommended XRT with chemotherapy to the pelvis. She again was seen by Dr. Fields who recommended combining radiation with concurrent weekly paclitaxel on 12/12/2018. She had a CT of the A/P without contrast on 12/6/2018 that revealed L adnexal ovoid mass at 6.4x6.2cm, fluid density on attenuation as well as interval development of soft tissue density somewhat confluent masslike conglomerates along left para-aortic-iliac chain, most pronounced below level of aortic bifurcation, suspicious for mets, mildly prominent b/l lymph nodes in the inguinal regions, suspicious for mets, small right pleural effusion with interval increase.             PAST MEDICAL & SURGICAL HISTORY:  CVA (cerebral vascular accident)  Anemia  Rheumatic fever  Diabetes  Osteoporosis  Mitral valve replaced  Atrial fibrillation: on coumadin at home  Hypertension  History of ureter stent  S/P AVR  H/O mitral valve replacement          FAMILY HISTORY:  No pertinent family history in first degree relatives    Allergies    No Known Drug Allergies  shellfish (Unknown)    Intolerances      Height (cm): 157.48 (01-02-19 @ 21:15)  Weight (kg): 61.9 (01-02-19 @ 21:15)  BMI (kg/m2): 25 (01-02-19 @ 21:15)  BSA (m2): 1.62 (01-02-19 @ 21:15)      HOME MEDICATIONS:  Coumadin 5 mg oral tablet: 1 tab(s) orally once a day,   please hold tonight (12/17), can resume tomorrow 12/18 or after visiting [facility] (17 Dec 2018 16:55)  famotidine 20 mg oral tablet: 1 tab(s) orally 2 times a day (14 Dec 2018 23:37)  furosemide 40 mg oral tablet: 1 tab(s) orally once a day (14 Dec 2018 23:37)  glimepiride 4 mg oral tablet: 1 tab(s) orally once a day (14 Dec 2018 23:37)  metoprolol tartrate 50 mg oral tablet: 1 tab(s) orally every 12 hours (14 Dec 2018 23:37)  Vitamin D3 2000 intl units oral capsule: 1 cap(s) orally once a day (14 Dec 2018 23:37)      Vital Signs Last 24 Hrs  T(C): 36.7 (03 Jan 2019 05:05), Max: 36.9 (02 Jan 2019 14:38)  T(F): 98 (03 Jan 2019 05:05), Max: 98.5 (02 Jan 2019 14:38)  HR: 79 (03 Jan 2019 05:05) (74 - 94)  BP: 147/63 (03 Jan 2019 05:05) (108/51 - 147/63)  BP(mean): --  RR: 18 (03 Jan 2019 05:05) (16 - 18)  SpO2: 94% (03 Jan 2019 09:07) (94% - 99%)    PHYSICAL EXAM  General: adult in NAD  HEENT: clear oropharynx, anicteric sclera, pale conjunctiva  Neck: supple  CV: normal S1/S2, + systolic murmur  Lungs: Decreased air entry bilaterally  Abdomen: soft non-tender non-distended  Ext:+ edema  Skin: no rashes and no petechiae  Neuro: alert and oriented X 4, no focal deficits    MEDICATIONS  (STANDING):  atorvastatin 10 milliGRAM(s) Oral at bedtime  cholecalciferol 2000 Unit(s) Oral daily  famotidine    Tablet 20 milliGRAM(s) Oral daily  folic acid 1 milliGRAM(s) Oral daily  furosemide    Tablet 40 milliGRAM(s) Oral daily  metoprolol tartrate 50 milliGRAM(s) Oral every 12 hours  pantoprazole  Injectable 40 milliGRAM(s) IV Push two times a day  phytonadione   Solution 2.5 milliGRAM(s) Oral once  potassium chloride    Tablet ER 20 milliEquivalent(s) Oral once    MEDICATIONS  (PRN):      LABS:                          6.5    7.88  )-----------( 233      ( 03 Jan 2019 06:47 )             20.0         Mean Cell Volume : 69.7 fL  Mean Cell Hemoglobin : 22.6 pg  Mean Cell Hemoglobin Concentration : 32.5 g/dL  Auto Neutrophil # : 6.39 K/uL  Auto Lymphocyte # : 0.68 K/uL  Auto Monocyte # : 0.50 K/uL  Auto Eosinophil # : 0.18 K/uL  Auto Basophil # : 0.03 K/uL  Auto Neutrophil % : 81.1 %  Auto Lymphocyte % : 8.6 %  Auto Monocyte % : 6.3 %  Auto Eosinophil % : 2.3 %  Auto Basophil % : 0.4 %      Serial CBC's  01-03 @ 06:47  Hct-20.0 / Hgb-6.5 / Plat-233 / RBC-2.87 / WBC-7.88  Serial CBC's  01-02 @ 17:15  Hct-16.0 / Hgb-4.8 / Plat-220 / RBC-2.33 / WBC-8.65      01-03    141  |  99  |  30<H>  ----------------------------<  88  3.4<L>   |  27  |  1.7<H>    Ca    7.6<L>      03 Jan 2019 06:47  Mg     2.1     01-03    TPro  6.2  /  Alb  3.2<L>  /  TBili  2.2<H>  /  DBili  x   /  AST  12  /  ALT  7   /  AlkPhos  100  01-03      PT/INR - ( 03 Jan 2019 06:47 )   PT: 36.90 sec;   INR: 3.25 ratio         PTT - ( 03 Jan 2019 06:47 )  PTT:32.9 sec    RADIOLOGY & ADDITIONAL STUDIES:  < from: Xray Chest 1 View AP/PA (01.02.19 @ 17:43) >  Impression:      Blunting of the right costophrenic angle. Cardiomegaly.    Follow-up as needed.      < end of copied text >    < from: [facility] Duplex Lower Ext Vein Scan, Bilat (12.14.18 @ 16:23) >  Impression:    Venous thrombosis right soleal vein.    No evidence of deep venous thrombosis or superficial thrombophlebitis in   left lower extremity.    Left groin lymphadenopathy.    < end of copied text > Patient is a 78y old  Female who presents with a chief complaint of dark black stools (02 Jan 2019 21:11)      HPI:  78 year old female with PMH of HTN, type II DM, A-fib on coumadin, h/o bradycardia s/p pacemaker, rheumatic heart disease s/p bioprosthetic mitral valve replacement, s/p metallic aortic valve replacement, and HFpEF, history of RLE DVT 25 year sago, Left nephrectomy, and osteoporosis, presents to the ED for dark black stools since 3 days. As per patient and son at bedside she has dark black stools since 3 days, not painful, last bowel movement last night, no bright red blood, no hematemesis, no dysphagia, buit admits to weight loss of 30 pounds in last year, loss of appetite and chronic intermittent abdominal pain, diffuse, no aggravating factors and relieving factor 3-4/10 in intensity. Also has c/o feeling weak, pale, light headedness. She receives frequent transfusions for microcytic anemia, last transfusion was three weeks ago follows up with Dr. Alvarenga she went to see Dr. Alvarenga blood work showed Hb of 4.8 referred to hospital.  Denies fever, chills, nausea, vomiting, dysuria,   pt was recently in hospital for left leg cellulitis; reports trace edema to left knee without pain.  She checks her INR regularly at home goal INR as per their cardiologist is 2-3. She didn't receive coumadin today, last dose was yesterday 2.5mg   Last endoscopy and colonoscopy was done 5 years ago as per patient was normal.    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 She is of [place] descent. She was admitted on 03/25/2017 and was noted to be anemic at 8.1, with MCV of 65.  - Hx of rheumatic fever as a child. 13 years ago she underwent valve replacement surgery. As per her son, 2 valves were replaced with metallic valves, one of which was mitral, he does not recall what second valve was replaced.   3/2018: SHe had an exacerbation of her anemia  2/2 to worsening hemolysis related to compromised metallic mitral valve for which she has required minimally invasive replacement with tissue valve at [facility] by Dr. Tomer Escalante on 3/19/2018. She still has a metallic aortic valve in place and remains on Coumadin.   -GI:  She had extensive GI work up by Dr. Kd Goldberg in 08/2013 including upper endoscopy, colonoscopy and capsule study, no overt source of bleeding was identified.   - She had a bone marrow biopsy which was inconclusive for MDS  - Hemarturia:  No was observed for hematuria or unknown etiology by Dr. Pablo for approximately 1.5 years, ultimately a stricture in the L ureter was identified, stent was placed and subsequently removed, on 5/25/2018 stent was replaced. 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 She was then referred to establish oncological care with Dr. Kashmir Fields, patient was offered participation in Ambassador trial (adjuvant immunotherapy vs observation) and randomized to observation arm. She had a follow up CT scan on 7/31/2018 revealed soft tissue mass along pelvic sidewall, follow up PET CT on 7/19/2018 revealed uptake in that area. On 10/29/2018 she underwent left adnexal mass and left distal ureter excision, adnexal mass was negative for malignancy, left distal ureter revealed invasive high grade urothelial carcinoma, invading full thickness of ureteral wall, involving soft tissue surrounding the ureter, morphologically similar to prior tumor in the left kidney. She was then referred to Rad Onc ant [facility] and was seen by Dr. Diaz 11/23/2018, who recommended XRT with chemotherapy to the pelvis. She again was seen by Dr. Fields who recommended combining radiation with concurrent weekly paclitaxel on 12/12/2018. She had a CT of the A/P without contrast on 12/6/2018 that revealed L adnexal ovoid mass at 6.4x6.2cm, fluid density on attenuation as well as interval development of soft tissue density somewhat confluent masslike conglomerates along left para-aortic-iliac chain, most pronounced below level of aortic bifurcation, suspicious for mets, mildly prominent b/l lymph nodes in the inguinal regions, suspicious for mets, small right pleural effusion with interval increase.             PAST MEDICAL & SURGICAL HISTORY:  CVA (cerebral vascular accident)  Anemia  Rheumatic fever  Diabetes  Osteoporosis  Mitral valve replaced  Atrial fibrillation: on coumadin at home  Hypertension  History of ureter stent  S/P AVR  H/O mitral valve replacement          FAMILY HISTORY:  No pertinent family history in first degree relatives    Allergies    No Known Drug Allergies  shellfish (Unknown)    Intolerances      Height (cm): 157.48 (01-02-19 @ 21:15)  Weight (kg): 61.9 (01-02-19 @ 21:15)  BMI (kg/m2): 25 (01-02-19 @ 21:15)  BSA (m2): 1.62 (01-02-19 @ 21:15)      HOME MEDICATIONS:  Coumadin 5 mg oral tablet: 1 tab(s) orally once a day,   please hold tonight (12/17), can resume tomorrow 12/18 or after visiting [facility] (17 Dec 2018 16:55)  famotidine 20 mg oral tablet: 1 tab(s) orally 2 times a day (14 Dec 2018 23:37)  furosemide 40 mg oral tablet: 1 tab(s) orally once a day (14 Dec 2018 23:37)  glimepiride 4 mg oral tablet: 1 tab(s) orally once a day (14 Dec 2018 23:37)  metoprolol tartrate 50 mg oral tablet: 1 tab(s) orally every 12 hours (14 Dec 2018 23:37)  Vitamin D3 2000 intl units oral capsule: 1 cap(s) orally once a day (14 Dec 2018 23:37)      Vital Signs Last 24 Hrs  T(C): 36.7 (03 Jan 2019 05:05), Max: 36.9 (02 Jan 2019 14:38)  T(F): 98 (03 Jan 2019 05:05), Max: 98.5 (02 Jan 2019 14:38)  HR: 79 (03 Jan 2019 05:05) (74 - 94)  BP: 147/63 (03 Jan 2019 05:05) (108/51 - 147/63)  BP(mean): --  RR: 18 (03 Jan 2019 05:05) (16 - 18)  SpO2: 94% (03 Jan 2019 09:07) (94% - 99%)    PHYSICAL EXAM  General: adult in NAD  HEENT: clear oropharynx, anicteric sclera, pale conjunctiva  Neck: supple  CV: normal S1/S2, + systolic murmur  Lungs: Decreased air entry bilaterally  Abdomen: soft non-tender non-distended  Ext:+ edema  Skin: no rashes and no petechiae  Neuro: alert and oriented X 4, no focal deficits    MEDICATIONS  (STANDING):  atorvastatin 10 milliGRAM(s) Oral at bedtime  cholecalciferol 2000 Unit(s) Oral daily  famotidine    Tablet 20 milliGRAM(s) Oral daily  folic acid 1 milliGRAM(s) Oral daily  furosemide    Tablet 40 milliGRAM(s) Oral daily  metoprolol tartrate 50 milliGRAM(s) Oral every 12 hours  pantoprazole  Injectable 40 milliGRAM(s) IV Push two times a day  phytonadione   Solution 2.5 milliGRAM(s) Oral once  potassium chloride    Tablet ER 20 milliEquivalent(s) Oral once    MEDICATIONS  (PRN):      LABS:                          6.5    7.88  )-----------( 233      ( 03 Jan 2019 06:47 )             20.0         Mean Cell Volume : 69.7 fL  Mean Cell Hemoglobin : 22.6 pg  Mean Cell Hemoglobin Concentration : 32.5 g/dL  Auto Neutrophil # : 6.39 K/uL  Auto Lymphocyte # : 0.68 K/uL  Auto Monocyte # : 0.50 K/uL  Auto Eosinophil # : 0.18 K/uL  Auto Basophil # : 0.03 K/uL  Auto Neutrophil % : 81.1 %  Auto Lymphocyte % : 8.6 %  Auto Monocyte % : 6.3 %  Auto Eosinophil % : 2.3 %  Auto Basophil % : 0.4 %      Serial CBC's  01-03 @ 06:47  Hct-20.0 / Hgb-6.5 / Plat-233 / RBC-2.87 / WBC-7.88  Serial CBC's  01-02 @ 17:15  Hct-16.0 / Hgb-4.8 / Plat-220 / RBC-2.33 / WBC-8.65      01-03    141  |  99  |  30<H>  ----------------------------<  88  3.4<L>   |  27  |  1.7<H>    Ca    7.6<L>      03 Jan 2019 06:47  Mg     2.1     01-03    TPro  6.2  /  Alb  3.2<L>  /  TBili  2.2<H>  /  DBili  x   /  AST  12  /  ALT  7   /  AlkPhos  100  01-03      PT/INR - ( 03 Jan 2019 06:47 )   PT: 36.90 sec;   INR: 3.25 ratio         PTT - ( 03 Jan 2019 06:47 )  PTT:32.9 sec    RADIOLOGY & ADDITIONAL STUDIES:  < from: Xray Chest 1 View AP/PA (01.02.19 @ 17:43) >  Impression:      Blunting of the right costophrenic angle. Cardiomegaly.    Follow-up as needed.      < end of copied text >    < from: [facility] Duplex Lower Ext Vein Scan, Bilat (12.14.18 @ 16:23) >  Impression:    Venous thrombosis right soleal vein.    No evidence of deep venous thrombosis or superficial thrombophlebitis in   left lower extremity.    Left groin lymphadenopathy.    < end of copied text >

## 2019-01-03 NOTE — CONSULT NOTE ADULT - SUBJECTIVE AND OBJECTIVE BOX
Patient is a 78y old  Female who presents with a chief complaint of dark black stools      HPI:  78 year old female with PMH of HUGO, ? thalassemia, HTN, type II DM, A-fib on coumadin, h/o bradycardia s/p pacemaker, rheumatic heart disease s/p bioprosthetic mitral valve replacement, s/p metallic aortic valve replacement, and HFpEF, history of RLE DVT 25 year sago, ? Left nephrectomy, and osteoporosis, presents to the ED for dark red stools with clots for 3 x within 5 days. She mentioned having watery diarrhea after receiving clindamycin for cellulitis c/w dark red stools with clots. The patient has chronic HUGO s/p multiple transfusions last was 3 weeks ago. She never had similar symptoms in the past, denies NSAIDs.     GI [facility] [place]   Fe%: 13%   EGd and colonoscopy 5 yrs ago wnl as per patient   5/2009 dr palmer for strep bovis bacteremia: good prep, int and ext hemorrhoids   FH non significant  usually has 1 bm q 3 days, + weight loss over 1 yr         PAST MEDICAL & SURGICAL HISTORY:  CVA (cerebral vascular accident)  Anemia  Rheumatic fever  Diabetes  Osteoporosis  Mitral valve replaced  Atrial fibrillation: on coumadin at home  Hypertension  History of ureter stent  S/P AVR  H/O mitral valve replacement      Home Medications:  Coumadin 5 mg oral tablet: 1 tab(s) orally once a day,   please hold tonight (12/17), can resume tomorrow 12/18 or after visiting [facility] (17 Dec 2018 16:55)  famotidine 20 mg oral tablet: 1 tab(s) orally 2 times a day (14 Dec 2018 23:37)  furosemide 40 mg oral tablet: 1 tab(s) orally once a day (14 Dec 2018 23:37)  glimepiride 4 mg oral tablet: 1 tab(s) orally once a day (14 Dec 2018 23:37)  metoprolol tartrate 50 mg oral tablet: 1 tab(s) orally every 12 hours (14 Dec 2018 23:37)  Vitamin D3 2000 intl units oral capsule: 1 cap(s) orally once a day (14 Dec 2018 23:37)      MEDICATIONS  (STANDING):  atorvastatin 10 milliGRAM(s) Oral at bedtime  cholecalciferol 2000 Unit(s) Oral daily  folic acid 1 milliGRAM(s) Oral daily  furosemide    Tablet 40 milliGRAM(s) Oral daily  metoprolol tartrate 50 milliGRAM(s) Oral every 12 hours  pantoprazole  Injectable 40 milliGRAM(s) IV Push two times a day  phytonadione   Solution 2.5 milliGRAM(s) Oral once    MEDICATIONS  (PRN):      Allergies    No Known Drug Allergies  shellfish (Unknown)    Intolerances        FAMILY HISTORY:  No pertinent family history in first degree relatives      SOCIAL    REVIEW OF SYSTEMS  General: mild fatigue   Skin: no rash   Ophtalmologic: no visual changes   Respiratory: no shortness of breath   Cardiovascular: no chest pain   Gastrointestinal: as per H&P   Genitourinary: no dysuria   Neurological: no weakness   otherwise as described above     Vital Signs Last 24 Hrs  T(C): 36.7 (03 Jan 2019 05:05), Max: 36.9 (02 Jan 2019 14:38)  T(F): 98 (03 Jan 2019 05:05), Max: 98.5 (02 Jan 2019 14:38)  HR: 79 (03 Jan 2019 05:05) (74 - 94)  BP: 147/63 (03 Jan 2019 05:05) (108/51 - 147/63)  BP(mean): --  RR: 18 (03 Jan 2019 05:05) (16 - 18)  SpO2: 94% (03 Jan 2019 09:07) (94% - 99%)    GENERAL:  no distress  SKIN: intact   HEENT:  NC/AT,  anicteric  CHEST:   no increased effort, breath sounds clear  HEART:  Regular rhythm  ABDOMEN:  Soft, non-tender, non-distended, normoactive bowel sounds,  no masses ,no hepato-splenomegaly, no signs of chronic liver disease  EXTREMITIES:  no cyanosis  PSYCHIATRIC: normal affect       CBC Full  -  ( 03 Jan 2019 06:47 )  WBC Count : 7.88 K/uL  Hemoglobin : 6.5 g/dL  Hematocrit : 20.0 %  Platelet Count - Automated : 233 K/uL  Mean Cell Volume : 69.7 fL  Mean Cell Hemoglobin : 22.6 pg  Mean Cell Hemoglobin Concentration : 32.5 g/dL  Auto Neutrophil # : 6.39 K/uL  Auto Lymphocyte # : 0.68 K/uL  Auto Monocyte # : 0.50 K/uL  Auto Eosinophil # : 0.18 K/uL  Auto Basophil # : 0.03 K/uL  Auto Neutrophil % : 81.1 %  Auto Lymphocyte % : 8.6 %  Auto Monocyte % : 6.3 %  Auto Eosinophil % : 2.3 %  Auto Basophil % : 0.4 %      Hemoglobin: 6.5 g/dL (01-03-19 @ 06:47)  Alanine Aminotransferase (ALT/SGPT): 7 U/L (01-03-19 @ 06:47)  Alkaline Phosphatase, Serum: 100 U/L (01-03-19 @ 06:47)  Bilirubin Total, Serum: 2.2 mg/dL (01-03-19 @ 06:47)  Aspartate Aminotransferase (AST/SGOT): 12 U/L (01-03-19 @ 06:47)  INR: 3.25 ratio (01-03-19 @ 06:47)  Alkaline Phosphatase, Serum: 109 U/L (01-02-19 @ 17:15)  Bilirubin Direct, Serum: 0.2 mg/dL (01-02-19 @ 17:15)  Aspartate Aminotransferase (AST/SGOT): 13 U/L (01-02-19 @ 17:15)  Bilirubin Total, Serum: 0.6 mg/dL (01-02-19 @ 17:15)  Alanine Aminotransferase (ALT/SGPT): 7 U/L (01-02-19 @ 17:15)  Hemoglobin: 4.8 g/dL (01-02-19 @ 17:15)  INR: 3.29 ratio (01-02-19 @ 17:15)      PT/INR - ( 03 Jan 2019 06:47 )   PT: 36.90 sec;   INR: 3.25 ratio         PTT - ( 03 Jan 2019 06:47 )  PTT:32.9 sec    01-03    141  |  99  |  30<H>  ----------------------------<  88  3.4<L>   |  27  |  1.7<H>    Ca    7.6<L>      03 Jan 2019 06:47  Mg     2.1     01-03    TPro  6.2  /  Alb  3.2<L>  /  TBili  2.2<H>  /  DBili  x   /  AST  12  /  ALT  7   /  AlkPhos  100  01-03              RADIOLOGY

## 2019-01-04 LAB
ALBUMIN SERPL ELPH-MCNC: 2.9 G/DL — LOW (ref 3.5–5.2)
ALP SERPL-CCNC: 100 U/L — SIGNIFICANT CHANGE UP (ref 30–115)
ALT FLD-CCNC: 6 U/L — SIGNIFICANT CHANGE UP (ref 0–41)
ANION GAP SERPL CALC-SCNC: 12 MMOL/L — SIGNIFICANT CHANGE UP (ref 7–14)
APTT BLD: 28.5 SEC — SIGNIFICANT CHANGE UP (ref 27–39.2)
APTT BLD: 31.1 SEC — SIGNIFICANT CHANGE UP (ref 27–39.2)
AST SERPL-CCNC: 13 U/L — SIGNIFICANT CHANGE UP (ref 0–41)
BASOPHILS # BLD AUTO: 0.03 K/UL — SIGNIFICANT CHANGE UP (ref 0–0.2)
BASOPHILS # BLD AUTO: 0.04 K/UL — SIGNIFICANT CHANGE UP (ref 0–0.2)
BASOPHILS NFR BLD AUTO: 0.4 % — SIGNIFICANT CHANGE UP (ref 0–1)
BASOPHILS NFR BLD AUTO: 0.4 % — SIGNIFICANT CHANGE UP (ref 0–1)
BILIRUB DIRECT SERPL-MCNC: 0.5 MG/DL — HIGH (ref 0–0.2)
BILIRUB INDIRECT FLD-MCNC: 1.7 MG/DL — HIGH (ref 0.2–1.2)
BILIRUB SERPL-MCNC: 2.2 MG/DL — HIGH (ref 0.2–1.2)
BLD GP AB SCN SERPL QL: SIGNIFICANT CHANGE UP
BUN SERPL-MCNC: 28 MG/DL — HIGH (ref 10–20)
CALCIUM SERPL-MCNC: 7.7 MG/DL — LOW (ref 8.5–10.1)
CHLORIDE SERPL-SCNC: 99 MMOL/L — SIGNIFICANT CHANGE UP (ref 98–110)
CO2 SERPL-SCNC: 29 MMOL/L — SIGNIFICANT CHANGE UP (ref 17–32)
CREAT SERPL-MCNC: 1.8 MG/DL — HIGH (ref 0.7–1.5)
DIR ANTIGLOB POLYSPECIFIC INTERPRETATION: SIGNIFICANT CHANGE UP
EOSINOPHIL # BLD AUTO: 0.17 K/UL — SIGNIFICANT CHANGE UP (ref 0–0.7)
EOSINOPHIL # BLD AUTO: 0.2 K/UL — SIGNIFICANT CHANGE UP (ref 0–0.7)
EOSINOPHIL NFR BLD AUTO: 1.7 % — SIGNIFICANT CHANGE UP (ref 0–8)
EOSINOPHIL NFR BLD AUTO: 2.3 % — SIGNIFICANT CHANGE UP (ref 0–8)
ERYTHROCYTE [SEDIMENTATION RATE] IN BLOOD: 87 MM/HR — HIGH (ref 0–20)
ESTIMATED AVERAGE GLUCOSE: 126 MG/DL — HIGH (ref 68–114)
GLUCOSE BLDC GLUCOMTR-MCNC: 106 MG/DL — HIGH (ref 70–99)
GLUCOSE BLDC GLUCOMTR-MCNC: 160 MG/DL — HIGH (ref 70–99)
GLUCOSE BLDC GLUCOMTR-MCNC: 239 MG/DL — HIGH (ref 70–99)
GLUCOSE BLDC GLUCOMTR-MCNC: 287 MG/DL — HIGH (ref 70–99)
GLUCOSE SERPL-MCNC: 85 MG/DL — SIGNIFICANT CHANGE UP (ref 70–99)
HAPTOGLOB SERPL-MCNC: 31 MG/DL — LOW (ref 34–200)
HBA1C BLD-MCNC: 6 % — HIGH (ref 4–5.6)
HCT VFR BLD CALC: 23.1 % — LOW (ref 37–47)
HCT VFR BLD CALC: 24.4 % — LOW (ref 37–47)
HGB BLD-MCNC: 7.7 G/DL — LOW (ref 12–16)
HGB BLD-MCNC: 7.8 G/DL — LOW (ref 12–16)
IMM GRANULOCYTES NFR BLD AUTO: 1.1 % — HIGH (ref 0.1–0.3)
IMM GRANULOCYTES NFR BLD AUTO: 1.8 % — HIGH (ref 0.1–0.3)
INR BLD: 1.53 RATIO — HIGH (ref 0.65–1.3)
INR BLD: 1.75 RATIO — HIGH (ref 0.65–1.3)
LDH SERPL L TO P-CCNC: 264 — HIGH (ref 50–242)
LYMPHOCYTES # BLD AUTO: 0.81 K/UL — LOW (ref 1.2–3.4)
LYMPHOCYTES # BLD AUTO: 0.98 K/UL — LOW (ref 1.2–3.4)
LYMPHOCYTES # BLD AUTO: 9.5 % — LOW (ref 20.5–51.1)
LYMPHOCYTES # BLD AUTO: 9.5 % — LOW (ref 20.5–51.1)
MCHC RBC-ENTMCNC: 23.1 PG — LOW (ref 27–31)
MCHC RBC-ENTMCNC: 24.1 PG — LOW (ref 27–31)
MCHC RBC-ENTMCNC: 32 G/DL — SIGNIFICANT CHANGE UP (ref 32–37)
MCHC RBC-ENTMCNC: 33.3 G/DL — SIGNIFICANT CHANGE UP (ref 32–37)
MCV RBC AUTO: 72.2 FL — LOW (ref 81–99)
MCV RBC AUTO: 72.2 FL — LOW (ref 81–99)
MONOCYTES # BLD AUTO: 0.56 K/UL — SIGNIFICANT CHANGE UP (ref 0.1–0.6)
MONOCYTES # BLD AUTO: 0.59 K/UL — SIGNIFICANT CHANGE UP (ref 0.1–0.6)
MONOCYTES NFR BLD AUTO: 5.7 % — SIGNIFICANT CHANGE UP (ref 1.7–9.3)
MONOCYTES NFR BLD AUTO: 6.6 % — SIGNIFICANT CHANGE UP (ref 1.7–9.3)
NEUTROPHILS # BLD AUTO: 6.79 K/UL — HIGH (ref 1.4–6.5)
NEUTROPHILS # BLD AUTO: 8.41 K/UL — HIGH (ref 1.4–6.5)
NEUTROPHILS NFR BLD AUTO: 79.4 % — HIGH (ref 42.2–75.2)
NEUTROPHILS NFR BLD AUTO: 81.6 % — HIGH (ref 42.2–75.2)
NRBC # BLD: 0 /100 WBCS — SIGNIFICANT CHANGE UP (ref 0–0)
PLATELET # BLD AUTO: 251 K/UL — SIGNIFICANT CHANGE UP (ref 130–400)
PLATELET # BLD AUTO: 279 K/UL — SIGNIFICANT CHANGE UP (ref 130–400)
POTASSIUM SERPL-MCNC: 3.7 MMOL/L — SIGNIFICANT CHANGE UP (ref 3.5–5)
POTASSIUM SERPL-SCNC: 3.7 MMOL/L — SIGNIFICANT CHANGE UP (ref 3.5–5)
PROT SERPL-MCNC: 5.9 G/DL — LOW (ref 6–8)
PROTHROM AB SERPL-ACNC: 17.5 SEC — HIGH (ref 9.95–12.87)
PROTHROM AB SERPL-ACNC: 20 SEC — HIGH (ref 9.95–12.87)
RBC # BLD: 3.2 M/UL — LOW (ref 4.2–5.4)
RBC # BLD: 3.2 M/UL — LOW (ref 4.2–5.4)
RBC # BLD: 3.38 M/UL — LOW (ref 4.2–5.4)
RBC # FLD: 21 % — HIGH (ref 11.5–14.5)
RBC # FLD: 21.5 % — HIGH (ref 11.5–14.5)
RETICS #: 50.2 K/UL — SIGNIFICANT CHANGE UP (ref 25–125)
RETICS/RBC NFR: 1.6 % — HIGH (ref 0.5–1.5)
SODIUM SERPL-SCNC: 140 MMOL/L — SIGNIFICANT CHANGE UP (ref 135–146)
TSH SERPL-MCNC: 2.27 UIU/ML — SIGNIFICANT CHANGE UP (ref 0.27–4.2)
TYPE + AB SCN PNL BLD: SIGNIFICANT CHANGE UP
WBC # BLD: 10.3 K/UL — SIGNIFICANT CHANGE UP (ref 4.8–10.8)
WBC # BLD: 8.54 K/UL — SIGNIFICANT CHANGE UP (ref 4.8–10.8)
WBC # FLD AUTO: 10.3 K/UL — SIGNIFICANT CHANGE UP (ref 4.8–10.8)
WBC # FLD AUTO: 8.54 K/UL — SIGNIFICANT CHANGE UP (ref 4.8–10.8)

## 2019-01-04 RX ORDER — HEPARIN SODIUM 5000 [USP'U]/ML
1000 INJECTION INTRAVENOUS; SUBCUTANEOUS
Qty: 25000 | Refills: 0 | Status: DISCONTINUED | OUTPATIENT
Start: 2019-01-04 | End: 2019-01-07

## 2019-01-04 RX ORDER — METOPROLOL TARTRATE 50 MG
5 TABLET ORAL ONCE
Qty: 0 | Refills: 0 | Status: COMPLETED | OUTPATIENT
Start: 2019-01-04 | End: 2019-01-04

## 2019-01-04 RX ORDER — ALPRAZOLAM 0.25 MG
0.25 TABLET ORAL ONCE
Qty: 0 | Refills: 0 | Status: DISCONTINUED | OUTPATIENT
Start: 2019-01-04 | End: 2019-01-04

## 2019-01-04 RX ADMIN — Medication 0.25 MILLIGRAM(S): at 21:03

## 2019-01-04 RX ADMIN — Medication 5 MILLIGRAM(S): at 19:06

## 2019-01-04 RX ADMIN — Medication 1 MILLIGRAM(S): at 12:53

## 2019-01-04 RX ADMIN — Medication 75 MILLIGRAM(S): at 05:18

## 2019-01-04 RX ADMIN — Medication 75 MILLIGRAM(S): at 18:06

## 2019-01-04 RX ADMIN — Medication 2000 UNIT(S): at 12:53

## 2019-01-04 RX ADMIN — ATORVASTATIN CALCIUM 10 MILLIGRAM(S): 80 TABLET, FILM COATED ORAL at 21:03

## 2019-01-04 RX ADMIN — PANTOPRAZOLE SODIUM 40 MILLIGRAM(S): 20 TABLET, DELAYED RELEASE ORAL at 18:07

## 2019-01-04 RX ADMIN — HEPARIN SODIUM 10 UNIT(S)/HR: 5000 INJECTION INTRAVENOUS; SUBCUTANEOUS at 10:23

## 2019-01-04 RX ADMIN — Medication 40 MILLIGRAM(S): at 05:18

## 2019-01-04 RX ADMIN — PANTOPRAZOLE SODIUM 40 MILLIGRAM(S): 20 TABLET, DELAYED RELEASE ORAL at 05:33

## 2019-01-04 NOTE — CHART NOTE - NSCHARTNOTEFT_GEN_A_CORE
Upon Nutritional Assessment by the Registered Dietitian your patient was determined to meet criteria / has evidence of the following diagnosis/diagnoses:          [ ]  Mild Protein Calorie Malnutrition        [ ]  Moderate Protein Calorie Malnutrition        [c] Severe Protein Calorie Malnutrition        [ ] Unspecified Protein Calorie Malnutrition        [ ] Underweight / BMI <19        [ ] Morbid Obesity / BMI > 40      Findings as based on:  •  Comprehensive nutrition assessment   •  Food and nutrition related history   •  Nutrition focused physical assessment     Severe Protein-Calorie Malnutrition Indicators:  1. <75% energy intake x >1month.   2. moderate muscle wasting to clavicle region.   3. moderate subcutaneous fat loss to orbital region  4. >20% wt loss x 1year.     Treatment:    The following diet has been recommended:  1. Add glucerna TID + SF Prosource Gelatin Q24H.   2. continue current diet order.   3. Consider adding daily appetite stimulant medication.    PROVIDER Section:     By signing this assessment you are acknowledging and agree with the diagnosis/diagnoses assigned by the Registered Dietitian    Comments:

## 2019-01-04 NOTE — DIETITIAN INITIAL EVALUATION ADULT. - PERTINENT LABORATORY DATA
(1/4/19) RBC 3.20, H/H 7.7/23.1, POCT glucose 160, 106, BUN 28, Cr 1.8, Ca 7.7, GFR 27, HbA1c 6.0%,

## 2019-01-04 NOTE — PROGRESS NOTE ADULT - SUBJECTIVE AND OBJECTIVE BOX
Patient is a 78y old  Female who presents with a chief complaint of dark black stools (04 Jan 2019 08:38)    HPI:  78 year old female with PMH of HTN, type II DM, A-fib on coumadin, h/o bradycardia s/p pacemaker, rheumatic heart disease s/p bioprosthetic mitral valve replacement, s/p metallic aortic valve replacement, and HFpEF, history of RLE DVT 25 year sago, Left nephrectomy, and osteoporosis, presents to the ED for dark black stools since 3 days. As per patient and son at bedside she has dark black stools since 3 days, not painful, last bowel movement last night, no bright red blood, no hematemesis, no dysphagia, buit admits to weight loss of 30 pounds in last year, loss of appetite and chronic intermittent abdominal pain, diffuse, no aggravating factors and relieving factor 3-4/10 in intensity. Also has c/o feeling weak, pale, light headedness. She receives frequent transfusions for microcytic anemia, last transfusion was three weeks ago follows up with Dr. Alvarenga she went to see Dr. Alvarenga blood work showed Hb of 4.8 referred to hospital.  Denies fever, chills, nausea, vomiting, dysuria,   pt was recently in hospital for left leg cellulitis; reports trace edema to left knee without pain.  She checks her INR regularly at home goal INR as per their cardiologist is 2-3. She didn;t receive coumadin today, last dose was yesterday 2.5mg   Last endoscopy and colonoscopy was done 5 years ago as per patient was normal.    vitals in ED: 108/51, 94, 98.5, 18, 96% on room air, Labs Hb 4.8(Baseline 7-8), INR 3.29, creatinine is 1.9 (baseline is 2 from may 2018,in april it was 1.1), chest xray was fine, s/p 2 units PRBC (02 Jan 2019 21:11)    PAST MEDICAL & SURGICAL HISTORY:  CVA (cerebral vascular accident)  Anemia  Rheumatic fever  Diabetes  Osteoporosis  Mitral valve replaced  Atrial fibrillation: on coumadin at home  Hypertension  History of ureter stent  S/P AVR  H/O mitral valve replacement    patient seen and examined independently on morning rounds, chart reviewed and discussed with the medicine resident and on interdisciplinary rounds.    no overnight events---reports having small bm this morning (uncleear of color)-  bedside- started on hep gtt for subtherapeutic inr    Vital Signs Last 24 Hrs  T(C): 36.1 (04 Jan 2019 13:32), Max: 36.9 (03 Jan 2019 21:05)  T(F): 97 (04 Jan 2019 13:32), Max: 98.4 (03 Jan 2019 21:05)  HR: 147 (04 Jan 2019 13:32) (80 - 147)  BP: 100/66 (04 Jan 2019 13:32) (100/66 - 118/75)  BP(mean): --  RR: 18 (04 Jan 2019 13:32) (18 - 18)  SpO2: 96% (04 Jan 2019 09:18) (96% - 96%)             PE:  GEN-NAD, AAOx3, +pallor, oob in chair  PULM- Clear to auscultation bilaterally, fair air entry  CVS- +s1/s2 RRR +THAI and metallic click  GI- soft NT ND +bs, no rebound, no guarding  EXT- no edema               7.7    8.54  )-----------( 251      ( 04 Jan 2019 07:36 )             23.1     01-04    140  |  99  |  28<H>  ----------------------------<  85  3.7   |  29  |  1.8<H>    Ca    7.7<L>      04 Jan 2019 07:36  Mg     2.1     01-03    TPro  5.9<L>  /  Alb  2.9<L>  /  TBili  2.2<H>  /  DBili  0.5<H>  /  AST  13  /  ALT  6   /  AlkPhos  100  01-04    CARDIAC MARKERS ( 03 Jan 2019 06:47 )  x     / <0.01 ng/mL / 36 U/L / x     / 1.3 ng/mL            MEDICATIONS  (STANDING):  atorvastatin 10 milliGRAM(s) Oral at bedtime  cholecalciferol 2000 Unit(s) Oral daily  folic acid 1 milliGRAM(s) Oral daily  furosemide    Tablet 40 milliGRAM(s) Oral daily  heparin  Infusion 1000 Unit(s)/Hr (10 mL/Hr) IV Continuous <Continuous>  metoprolol tartrate 75 milliGRAM(s) Oral every 12 hours  pantoprazole  Injectable 40 milliGRAM(s) IV Push two times a day

## 2019-01-04 NOTE — DIETITIAN INITIAL EVALUATION ADULT. - FACTORS AFF FOOD INTAKE
dark stools on admission. soft/loose BMs. LBM 1/4. denies difficulty chewing/swallowing./persistent lack of appetite

## 2019-01-04 NOTE — DIETITIAN INITIAL EVALUATION ADULT. - DIET TYPE
soft/Pt reports appetite remains suboptimal but slightly improving and PO intake improving but 50% intake at best. Pt agreeable to nutrition supplement. d/w LIP./consistent carbohydrate (no snacks)

## 2019-01-04 NOTE — PROGRESS NOTE ADULT - ASSESSMENT
a/p:  #symptomatic anemia 2/2 LGIBleed  -cont to monitor cbc q12 hr  -today hb 7.7  -transfuse another unit tonight if pm cbc hb <7.5  -GI following- planning for EGD and colonoscopy on monday  -monitor closely over weekend---upgrade to more monitored setting if any change in clinical presentation (if she becomes more symptomatic, hypotensive or any development of massive bleed)  -has received 3 u prbc so far  -npo  -iv protonix bid    #AF and bioprosthetic MV and metallic AV with h/o CVA  -needs to be on AC  -coumadin on hold  -started on hep gtt now that inr <2   -monitor ptt (goal 60-80)  -cardiology following    #TCC- with ? 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

## 2019-01-04 NOTE — DIETITIAN INITIAL EVALUATION ADULT. - MD RECOMMEND
Recommend: 1. Add glucerna TID + SF Prosource Gelatin Q24H. 2. continue current diet order. 3. Consider adding daily appetite stimulant medication.

## 2019-01-04 NOTE — DIETITIAN INITIAL EVALUATION ADULT. - ORAL INTAKE PTA
poor/Pt reports very poor appetite and PO intake <50% at meals x ~6months PTA. Poor intake a/w abd discomfort, acuteness of illness and depression as per pt. Consumes Ensure ~1x/day. Shellfish allergy concerned.

## 2019-01-04 NOTE — DIETITIAN INITIAL EVALUATION ADULT. - OTHER INFO
Pt p/w dark stools x3 days PTA with priamry dx: LGIB, hemorrhoids and anemia. Hospital course complicated by watery diarrhea c/w dark red stools with clots r/o hemorrhoidal bleed vs others. Check C.diff. Pt to undergo EGD/colon Monday. Acute on chronic anemia s/p 2u PRBC. Transitional cell cancer of the kidney, s/p resection in [facility]  on 6/1/2018, and reresection on 10/29/2018. Radiation oncology eval pending. PET/CT pending. No current treatment in place at this time. L low extremity edema likely 2/2 venous compression 2/2 adenopathy. Reason for assessment: unintentional wt loss PTA noted on RD screen. Pt meets criteria for severe PCM. See RD diagnosis.

## 2019-01-04 NOTE — PROGRESS NOTE ADULT - ASSESSMENT
C/w metoprolol for rate control.  Bridge with IV Heparin, while off Coumadin.  Awaiting GI work-up.  Work-up for metastatic disease as per primary team, consider PET.  F/u with Dr. Wolfe as outpatient.  Recall for any cardiac issues

## 2019-01-04 NOTE — DIETITIAN INITIAL EVALUATION ADULT. - ENERGY NEEDS
estimated calorie needs = ~3571-5738 kcal/day (MSJ x1.2-1.4 in pt with PCM).   estimated protein needs = 64-87 g/day (1.2-1.4 g/kg CBW d/t observed muscle wasting).  estimated fluid needs =1mL/kcal or per LIP

## 2019-01-04 NOTE — PROGRESS NOTE ADULT - SUBJECTIVE AND OBJECTIVE BOX
Patient is a 78y old  Female who presents with a chief complaint of dark black stools (03 Jan 2019 15:48)    HPI:  78 year old female with PMH of HTN, type II DM, A-fib on coumadin, h/o bradycardia s/p pacemaker, rheumatic heart disease s/p bioprosthetic mitral valve replacement, s/p metallic aortic valve replacement, and HFpEF, history of RLE DVT 25 year sago, Left nephrectomy, and osteoporosis, presents to the ED for dark black stools since 3 days. As per patient and son at bedside she has dark black stools since 3 days, not painful, last bowel movement last night, no bright red blood, no hematemesis, no dysphagia, buit admits to weight loss of 30 pounds in last year, loss of appetite and chronic intermittent abdominal pain, diffuse, no aggravating factors and relieving factor 3-4/10 in intensity. Also has c/o feeling weak, pale, light headedness. She receives frequent transfusions for microcytic anemia, last transfusion was three weeks ago follows up with Dr. Alvarenga she went to see Dr. Alvarenga blood work showed Hb of 4.8 referred to hospital.  Denies fever, chills, nausea, vomiting, dysuria,   pt was recently in hospital for left leg cellulitis; reports trace edema to left knee without pain.  She checks her INR regularly at home goal INR as per their cardiologist is 2-3. She didn;t receive coumadin today, last dose was yesterday 2.5mg   Last endoscopy and colonoscopy was done 5 years ago as per patient was normal.    vitals in ED: 108/51, 94, 98.5, 18, 96% on room air, Labs Hb 4.8(Baseline 7-8), INR 3.29, creatinine is 1.9 (baseline is 2 from may 2018,in april it was 1.1), chest xray was fine, s/p 2 units PRBC (02 Jan 2019 21:11)      SUBJ:  Patient seen and examined. No new complaints. No bleeding. No chest pain or palpitations. HR is better controlled.      MEDICATIONS  (STANDING):  atorvastatin 10 milliGRAM(s) Oral at bedtime  cholecalciferol 2000 Unit(s) Oral daily  folic acid 1 milliGRAM(s) Oral daily  furosemide    Tablet 40 milliGRAM(s) Oral daily  metoprolol tartrate 75 milliGRAM(s) Oral every 12 hours  pantoprazole  Injectable 40 milliGRAM(s) IV Push two times a day    MEDICATIONS  (PRN):            Vital Signs Last 24 Hrs  T(C): 36.6 (04 Jan 2019 04:58), Max: 36.9 (03 Jan 2019 21:05)  T(F): 97.9 (04 Jan 2019 04:58), Max: 98.4 (03 Jan 2019 21:05)  HR: 80 (04 Jan 2019 04:58) (72 - 136)  BP: 110/56 (04 Jan 2019 04:58) (103/66 - 133/60)  BP(mean): --  RR: 18 (04 Jan 2019 04:58) (18 - 18)  SpO2: 96% (03 Jan 2019 20:15) (94% - 96%)      PHYSICAL EXAM:    GEN: AAO x 3, NAD  HEENT: NC/AT  Neck: No JVD  CV: Reg, mech S1-S2, 2/6 murmur  Lungs: CTAB  Abd: Soft, non-tender  Ext: No edema      I&O's Summary  	          LABS:                        7.3    9.34  )-----------( 247      ( 03 Jan 2019 22:53 )             22.6     01-03    141  |  99  |  30<H>  ----------------------------<  88  3.4<L>   |  27  |  1.7<H>    Ca    7.6<L>      03 Jan 2019 06:47  Mg     2.1     01-03    TPro  6.2  /  Alb  3.2<L>  /  TBili  2.2<H>  /  DBili  x   /  AST  12  /  ALT  7   /  AlkPhos  100  01-03    CARDIAC MARKERS ( 03 Jan 2019 06:47 )  x     / <0.01 ng/mL / 36 U/L / x     / 1.3 ng/mL      PT/INR - ( 03 Jan 2019 06:47 )   PT: 36.90 sec;   INR: 3.25 ratio         PTT - ( 03 Jan 2019 06:47 )  PTT:32.9 sec      BNP  RADIOLOGY & ADDITIONAL STUDIES:      IMPRESSION AND PLAN:

## 2019-01-04 NOTE — PROGRESS NOTE ADULT - ASSESSMENT
# Acute on chronic anemia: Multifactorial anemia  - R/O GI bleed : had extensive GI workup in the past which was negative . Now with bleeding , Plan for endoscopy/colonoscopy by GI on Monday. Transfuse PRN.   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 Will resend hemolysis workup today  - S/p BM Bx, results are inconclusive, mild MDS is not excluded   - Continue on folic acid  - Transfuse to keep hb >7    # Transitional cell cancer of the kidney, s/p resection in [facility]  on 6/1/2018, and reresection on 10/29/2018 (suspected initial stage was IIIa [id number])   - Following with Oncology (Dr. Fields) at [facility]  - Was previously consented for Ambassador trial and randomized to observation arm   - Follow up CT A/P on 12/6/2018  revealed L adnexal ovoid mass at 6.4x6.2cm, fluid density on attenuation as well as interval development of soft tissue density somewhat confluent masslike conglomerates along left para-aortic-iliac chain, most pronounced below level of aortic bifurcation, suspicious for mets, mildly prominent b/l lymph nodes in the inguinal regions, suspicious for mets, small right pleural effusion with interval increase.   - PET CT to restage was ordered on 12/27/2018 - not performed yet . Would hold on CT scan with IV contrast due to high creatinine  - Radiation oncology input appreciated    # Metallic mitral valve, replaced with tissue valve on 3/19/2018, metallic aortic valve in place  --On chronic anticoagulation with Coumadin for A. fib and metallic AV, Heparin gtt for GI procedure    # LLE : improving - likely 2/2 venous compression 2/2 adenopathy   -- L soleal DVT, continue with AC once cleared by FI.    Recs:  Hemolysis panel           Radiation oncology evaluation

## 2019-01-04 NOTE — PROGRESS NOTE ADULT - SUBJECTIVE AND OBJECTIVE BOX
Patient is a 78y old  Female who presents with a chief complaint of dark black stools (02 Jan 2019 21:11)      HPI:  78 year old female with PMH of HTN, type II DM, A-fib on coumadin, h/o bradycardia s/p pacemaker, rheumatic heart disease s/p bioprosthetic mitral valve replacement, s/p metallic aortic valve replacement, and HFpEF, history of RLE DVT 25 year sago, Left nephrectomy, and osteoporosis, presents to the ED for dark black stools since 3 days. As per patient and son at bedside she has dark black stools since 3 days, not painful, last bowel movement last night, no bright red blood, no hematemesis, no dysphagia, buit admits to weight loss of 30 pounds in last year, loss of appetite and chronic intermittent abdominal pain, diffuse, no aggravating factors and relieving factor 3-4/10 in intensity. Also has c/o feeling weak, pale, light headedness. She receives frequent transfusions for microcytic anemia, last transfusion was three weeks ago follows up with Dr. Alvarenga she went to see Dr. Alvarenga blood work showed Hb of 4.8 referred to hospital.  Denies fever, chills, nausea, vomiting, dysuria,   pt was recently in hospital for left leg cellulitis; reports trace edema to left knee without pain.  She checks her INR regularly at home goal INR as per their cardiologist is 2-3. She didn't receive coumadin today, last dose was yesterday 2.5mg   Last endoscopy and colonoscopy was done 5 years ago as per patient was normal.    Vitals in ED: 108/51, 94, 98.5, 18, 96% on room air, Labs Hb 4.8 (Baseline 7-8), INR 3.29, creatinine is 1.9 (baseline is 2 from may 2018,in april it was 1.1), chest xray was fine, s/p 2 units PRBC (02 Jan 2019 21:11)    Heme/onc:  77 yo female with questionable history of "Mediterranean anemia". She is of [place] descent. She was admitted on 03/25/2017 and was noted to be anemic at 8.1, with MCV of 65.  - Hx of rheumatic fever as a child. 13 years ago she underwent valve replacement surgery. As per her son, 2 valves were replaced with metallic valves, one of which was mitral, he does not recall what second valve was replaced.   3/2018: SHe had an exacerbation of her anemia  2/2 to worsening hemolysis related to compromised metallic mitral valve for which she has required minimally invasive replacement with tissue valve at [facility] by Dr. Tomer Escalante on 3/19/2018. She still has a metallic aortic valve in place and remains on Coumadin.   -GI:  She had extensive GI work up by Dr. Kd Goldberg in 08/2013 including upper endoscopy, colonoscopy and capsule study, no overt source of bleeding was identified.   - She had a bone marrow biopsy which was inconclusive for MDS  - Hemarturia:  No was observed for hematuria or unknown etiology by Dr. Pablo for approximately 1.5 years, ultimately a stricture in the L ureter was identified, stent was placed and subsequently removed, on 5/25/2018 stent was replaced. 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 She was then referred to establish oncological care with Dr. Kashmir Fields, patient was offered participation in Ambassador trial (adjuvant immunotherapy vs observation) and randomized to observation arm. She had a follow up CT scan on 7/31/2018 revealed soft tissue mass along pelvic sidewall, follow up PET CT on 7/19/2018 revealed uptake in that area. On 10/29/2018 she underwent left adnexal mass and left distal ureter excision, adnexal mass was negative for malignancy, left distal ureter revealed invasive high grade urothelial carcinoma, invading full thickness of ureteral wall, involving soft tissue surrounding the ureter, morphologically similar to prior tumor in the left kidney. She was then referred to Rad Onc ant [facility] and was seen by Dr. Diaz 11/23/2018, who recommended XRT with chemotherapy to the pelvis. She again was seen by Dr. Fields who recommended combining radiation with concurrent weekly paclitaxel on 12/12/2018. She had a CT of the A/P without contrast on 12/6/2018 that revealed L adnexal ovoid mass at 6.4x6.2cm, fluid density on attenuation as well as interval development of soft tissue density somewhat confluent masslike conglomerates along left para-aortic-iliac chain, most pronounced below level of aortic bifurcation, suspicious for mets, mildly prominent b/l lymph nodes in the inguinal regions, suspicious for mets, small right pleural effusion with interval increase.       1/4/2018: No doing well, Hb is stable today, she describes melenic stool today, 1 episode.       PAST MEDICAL & SURGICAL HISTORY:  CVA (cerebral vascular accident)  Anemia  Rheumatic fever  Diabetes  Osteoporosis  Mitral valve replaced  Atrial fibrillation: on coumadin at home  Hypertension  History of ureter stent  S/P AVR  H/O mitral valve replacement          FAMILY HISTORY:  No pertinent family history in first degree relatives    Allergies    No Known Drug Allergies  shellfish (Unknown)    Intolerances      Height (cm): 157.48 (01-02-19 @ 21:15)  Weight (kg): 61.9 (01-02-19 @ 21:15)  BMI (kg/m2): 25 (01-02-19 @ 21:15)  BSA (m2): 1.62 (01-02-19 @ 21:15)      HOME MEDICATIONS:    MEDICATIONS  (STANDING):  atorvastatin 10 milliGRAM(s) Oral at bedtime  cholecalciferol 2000 Unit(s) Oral daily  folic acid 1 milliGRAM(s) Oral daily  furosemide    Tablet 40 milliGRAM(s) Oral daily  heparin  Infusion 1000 Unit(s)/Hr (12 mL/Hr) IV Continuous <Continuous>  metoprolol tartrate 75 milliGRAM(s) Oral every 12 hours  pantoprazole  Injectable 40 milliGRAM(s) IV Push two times a day    MEDICATIONS  (PRN):      Vital Signs Last 24 Hrs  T(C): 36.1 (04 Jan 2019 13:32), Max: 36.9 (03 Jan 2019 21:05)  T(F): 97 (04 Jan 2019 13:32), Max: 98.4 (03 Jan 2019 21:05)  HR: 125 (04 Jan 2019 19:31) (80 - 147)  BP: 95/56 (04 Jan 2019 19:31) (95/56 - 118/75)  BP(mean): --  RR: 18 (04 Jan 2019 13:32) (18 - 18)  SpO2: 97% (04 Jan 2019 19:31) (96% - 97%)    PHYSICAL EXAM  General: adult in NAD  HEENT: clear oropharynx, anicteric sclera, pale conjunctiva  Neck: supple  CV: normal S1/S2, + systolic murmur  Lungs: Decreased air entry bilaterally  Abdomen: soft non-tender non-distended  Ext:+ edema  Skin: no rashes and no petechiae  Neuro: alert and oriented X 4, no focal deficits    MEDICATIONS  (STANDING):  atorvastatin 10 milliGRAM(s) Oral at bedtime  cholecalciferol 2000 Unit(s) Oral daily  famotidine    Tablet 20 milliGRAM(s) Oral daily  folic acid 1 milliGRAM(s) Oral daily  furosemide    Tablet 40 milliGRAM(s) Oral daily  metoprolol tartrate 50 milliGRAM(s) Oral every 12 hours  pantoprazole  Injectable 40 milliGRAM(s) IV Push two times a day  phytonadione   Solution 2.5 milliGRAM(s) Oral once  potassium chloride    Tablet ER 20 milliEquivalent(s) Oral once    MEDICATIONS  (PRN):      LABS:                          7.8    10.30 )-----------( 279      ( 04 Jan 2019 18:03 )             24.4     01-04    140  |  99  |  28<H>  ----------------------------<  85  3.7   |  29  |  1.8<H>    Ca    7.7<L>      04 Jan 2019 07:36  Mg     2.1     01-03    TPro  5.9<L>  /  Alb  2.9<L>  /  TBili  2.2<H>  /  DBili  0.5<H>  /  AST  13  /  ALT  6   /  AlkPhos  100  01-04      RADIOLOGY & ADDITIONAL STUDIES:  < from: Xray Chest 1 View AP/PA (01.02.19 @ 17:43) >  Impression:      Blunting of the right costophrenic angle. Cardiomegaly.    Follow-up as needed.      < end of copied text >    < from: [facility] Duplex Lower Ext Vein Scan, Bilat (12.14.18 @ 16:23) >  Impression:    Venous thrombosis right soleal vein.    No evidence of deep venous thrombosis or superficial thrombophlebitis in   left lower extremity.    Left groin lymphadenopathy.    < end of copied text >

## 2019-01-05 LAB
ANION GAP SERPL CALC-SCNC: 13 MMOL/L — SIGNIFICANT CHANGE UP (ref 7–14)
APTT BLD: 36 SEC — SIGNIFICANT CHANGE UP (ref 27–39.2)
APTT BLD: 44.2 SEC — HIGH (ref 27–39.2)
APTT BLD: 45.9 SEC — HIGH (ref 27–39.2)
APTT BLD: 47.5 SEC — HIGH (ref 27–39.2)
APTT BLD: 53.2 SEC — HIGH (ref 27–39.2)
BASOPHILS # BLD AUTO: 0.05 K/UL — SIGNIFICANT CHANGE UP (ref 0–0.2)
BASOPHILS # BLD AUTO: 0.05 K/UL — SIGNIFICANT CHANGE UP (ref 0–0.2)
BASOPHILS NFR BLD AUTO: 0.5 % — SIGNIFICANT CHANGE UP (ref 0–1)
BASOPHILS NFR BLD AUTO: 0.5 % — SIGNIFICANT CHANGE UP (ref 0–1)
BUN SERPL-MCNC: 33 MG/DL — HIGH (ref 10–20)
CALCIUM SERPL-MCNC: 7.7 MG/DL — LOW (ref 8.5–10.1)
CHLORIDE SERPL-SCNC: 95 MMOL/L — LOW (ref 98–110)
CO2 SERPL-SCNC: 29 MMOL/L — SIGNIFICANT CHANGE UP (ref 17–32)
CREAT SERPL-MCNC: 1.7 MG/DL — HIGH (ref 0.7–1.5)
EOSINOPHIL # BLD AUTO: 0.25 K/UL — SIGNIFICANT CHANGE UP (ref 0–0.7)
EOSINOPHIL # BLD AUTO: 0.26 K/UL — SIGNIFICANT CHANGE UP (ref 0–0.7)
EOSINOPHIL NFR BLD AUTO: 2.6 % — SIGNIFICANT CHANGE UP (ref 0–8)
EOSINOPHIL NFR BLD AUTO: 2.8 % — SIGNIFICANT CHANGE UP (ref 0–8)
GLUCOSE BLDC GLUCOMTR-MCNC: 158 MG/DL — HIGH (ref 70–99)
GLUCOSE BLDC GLUCOMTR-MCNC: 165 MG/DL — HIGH (ref 70–99)
GLUCOSE BLDC GLUCOMTR-MCNC: 189 MG/DL — HIGH (ref 70–99)
GLUCOSE BLDC GLUCOMTR-MCNC: 222 MG/DL — HIGH (ref 70–99)
GLUCOSE SERPL-MCNC: 135 MG/DL — HIGH (ref 70–99)
HCT VFR BLD CALC: 23.2 % — LOW (ref 37–47)
HCT VFR BLD CALC: 24.2 % — LOW (ref 37–47)
HCT VFR BLD CALC: 24.2 % — LOW (ref 37–47)
HGB BLD-MCNC: 7.5 G/DL — LOW (ref 12–16)
HGB BLD-MCNC: 7.7 G/DL — LOW (ref 12–16)
HGB BLD-MCNC: 7.8 G/DL — LOW (ref 12–16)
IMM GRANULOCYTES NFR BLD AUTO: 1.1 % — HIGH (ref 0.1–0.3)
IMM GRANULOCYTES NFR BLD AUTO: 1.2 % — HIGH (ref 0.1–0.3)
INR BLD: 1.37 RATIO — HIGH (ref 0.65–1.3)
INR BLD: 1.38 RATIO — HIGH (ref 0.65–1.3)
LYMPHOCYTES # BLD AUTO: 0.86 K/UL — LOW (ref 1.2–3.4)
LYMPHOCYTES # BLD AUTO: 1.1 K/UL — LOW (ref 1.2–3.4)
LYMPHOCYTES # BLD AUTO: 12 % — LOW (ref 20.5–51.1)
LYMPHOCYTES # BLD AUTO: 9 % — LOW (ref 20.5–51.1)
MCHC RBC-ENTMCNC: 23.3 PG — LOW (ref 27–31)
MCHC RBC-ENTMCNC: 23.6 PG — LOW (ref 27–31)
MCHC RBC-ENTMCNC: 23.7 PG — LOW (ref 27–31)
MCHC RBC-ENTMCNC: 31.8 G/DL — LOW (ref 32–37)
MCHC RBC-ENTMCNC: 32.2 G/DL — SIGNIFICANT CHANGE UP (ref 32–37)
MCHC RBC-ENTMCNC: 32.3 G/DL — SIGNIFICANT CHANGE UP (ref 32–37)
MCV RBC AUTO: 73.1 FL — LOW (ref 81–99)
MCV RBC AUTO: 73.2 FL — LOW (ref 81–99)
MCV RBC AUTO: 73.3 FL — LOW (ref 81–99)
MONOCYTES # BLD AUTO: 0.64 K/UL — HIGH (ref 0.1–0.6)
MONOCYTES # BLD AUTO: 0.65 K/UL — HIGH (ref 0.1–0.6)
MONOCYTES NFR BLD AUTO: 6.7 % — SIGNIFICANT CHANGE UP (ref 1.7–9.3)
MONOCYTES NFR BLD AUTO: 7.1 % — SIGNIFICANT CHANGE UP (ref 1.7–9.3)
NEUTROPHILS # BLD AUTO: 7.01 K/UL — HIGH (ref 1.4–6.5)
NEUTROPHILS # BLD AUTO: 7.69 K/UL — HIGH (ref 1.4–6.5)
NEUTROPHILS NFR BLD AUTO: 76.4 % — HIGH (ref 42.2–75.2)
NEUTROPHILS NFR BLD AUTO: 80.1 % — HIGH (ref 42.2–75.2)
NRBC # BLD: 0 /100 WBCS — SIGNIFICANT CHANGE UP (ref 0–0)
PLATELET # BLD AUTO: 238 K/UL — SIGNIFICANT CHANGE UP (ref 130–400)
PLATELET # BLD AUTO: 239 K/UL — SIGNIFICANT CHANGE UP (ref 130–400)
PLATELET # BLD AUTO: 249 K/UL — SIGNIFICANT CHANGE UP (ref 130–400)
POTASSIUM SERPL-MCNC: 3.8 MMOL/L — SIGNIFICANT CHANGE UP (ref 3.5–5)
POTASSIUM SERPL-SCNC: 3.8 MMOL/L — SIGNIFICANT CHANGE UP (ref 3.5–5)
PROTHROM AB SERPL-ACNC: 15.7 SEC — HIGH (ref 9.95–12.87)
PROTHROM AB SERPL-ACNC: 15.8 SEC — HIGH (ref 9.95–12.87)
RBC # BLD: 3.17 M/UL — LOW (ref 4.2–5.4)
RBC # BLD: 3.3 M/UL — LOW (ref 4.2–5.4)
RBC # BLD: 3.31 M/UL — LOW (ref 4.2–5.4)
RBC # FLD: 20.9 % — HIGH (ref 11.5–14.5)
RBC # FLD: 21.2 % — HIGH (ref 11.5–14.5)
RBC # FLD: 21.4 % — HIGH (ref 11.5–14.5)
SODIUM SERPL-SCNC: 137 MMOL/L — SIGNIFICANT CHANGE UP (ref 135–146)
WBC # BLD: 9.18 K/UL — SIGNIFICANT CHANGE UP (ref 4.8–10.8)
WBC # BLD: 9.57 K/UL — SIGNIFICANT CHANGE UP (ref 4.8–10.8)
WBC # BLD: 9.6 K/UL — SIGNIFICANT CHANGE UP (ref 4.8–10.8)
WBC # FLD AUTO: 9.18 K/UL — SIGNIFICANT CHANGE UP (ref 4.8–10.8)
WBC # FLD AUTO: 9.57 K/UL — SIGNIFICANT CHANGE UP (ref 4.8–10.8)
WBC # FLD AUTO: 9.6 K/UL — SIGNIFICANT CHANGE UP (ref 4.8–10.8)

## 2019-01-05 RX ORDER — SODIUM CHLORIDE 9 MG/ML
250 INJECTION INTRAMUSCULAR; INTRAVENOUS; SUBCUTANEOUS ONCE
Qty: 0 | Refills: 0 | Status: COMPLETED | OUTPATIENT
Start: 2019-01-05 | End: 2019-01-05

## 2019-01-05 RX ORDER — SOD SULF/SODIUM/NAHCO3/KCL/PEG
1000 SOLUTION, RECONSTITUTED, ORAL ORAL ONCE
Qty: 0 | Refills: 0 | Status: DISCONTINUED | OUTPATIENT
Start: 2019-01-06 | End: 2019-01-06

## 2019-01-05 RX ADMIN — Medication 1 MILLIGRAM(S): at 12:22

## 2019-01-05 RX ADMIN — SODIUM CHLORIDE 500 MILLILITER(S): 9 INJECTION INTRAMUSCULAR; INTRAVENOUS; SUBCUTANEOUS at 20:13

## 2019-01-05 RX ADMIN — ATORVASTATIN CALCIUM 10 MILLIGRAM(S): 80 TABLET, FILM COATED ORAL at 21:19

## 2019-01-05 RX ADMIN — PANTOPRAZOLE SODIUM 40 MILLIGRAM(S): 20 TABLET, DELAYED RELEASE ORAL at 05:37

## 2019-01-05 RX ADMIN — Medication 2000 UNIT(S): at 12:22

## 2019-01-05 RX ADMIN — Medication 40 MILLIGRAM(S): at 05:37

## 2019-01-05 RX ADMIN — Medication 75 MILLIGRAM(S): at 05:37

## 2019-01-05 RX ADMIN — Medication 75 MILLIGRAM(S): at 17:25

## 2019-01-05 RX ADMIN — PANTOPRAZOLE SODIUM 40 MILLIGRAM(S): 20 TABLET, DELAYED RELEASE ORAL at 17:25

## 2019-01-05 RX ADMIN — HEPARIN SODIUM 14 UNIT(S)/HR: 5000 INJECTION INTRAVENOUS; SUBCUTANEOUS at 03:57

## 2019-01-05 NOTE — PROGRESS NOTE ADULT - SUBJECTIVE AND OBJECTIVE BOX
Patient is a 78y old  Female who presents with a chief complaint of dark black stools (04 Jan 2019 08:38)    HPI:  78 year old female with PMH of HTN, type II DM, A-fib on coumadin, h/o bradycardia s/p pacemaker, rheumatic heart disease s/p bioprosthetic mitral valve replacement, s/p metallic aortic valve replacement, and HFpEF, history of RLE DVT 25 year sago, Left nephrectomy, and osteoporosis, presents to the ED for dark black stools since 3 days. As per patient and son at bedside she has dark black stools since 3 days, not painful, last bowel movement last night, no bright red blood, no hematemesis, no dysphagia, buit admits to weight loss of 30 pounds in last year, loss of appetite and chronic intermittent abdominal pain, diffuse, no aggravating factors and relieving factor 3-4/10 in intensity. Also has c/o feeling weak, pale, light headedness. She receives frequent transfusions for microcytic anemia, last transfusion was three weeks ago follows up with Dr. Alvarenga she went to see Dr. Alvarenga blood work showed Hb of 4.8 referred to hospital.  Denies fever, chills, nausea, vomiting, dysuria,   pt was recently in hospital for left leg cellulitis; reports trace edema to left knee without pain.  She checks her INR regularly at home goal INR as per their cardiologist is 2-3. She didn;t receive coumadin today, last dose was yesterday 2.5mg   Last endoscopy and colonoscopy was done 5 years ago as per patient was normal.  C (02 Jan 2019 21:11)    PAST MEDICAL & SURGICAL HISTORY:  CVA (cerebral vascular accident)  Anemia  Rheumatic fever  Diabetes  Osteoporosis  Mitral valve replaced  Atrial fibrillation: on coumadin at home  Hypertension  History of ureter stent  S/P AVR  H/O mitral valve replacement    patient seen and examined independently on morning rounds, chart reviewed and discussed with the medicine resident on call      overnight s/p additional unit prbc for persistant anemia--had dark colored stool this morning- no nausea/vomiting or abdominal pain               PE:  GEN-NAD, AAOx3, +pallor, oob in chair-  bedside  PULM- Clear to auscultation bilaterally, fair air entry  CVS- +s1/s2 RRR +THAI and metallic click  GI- soft NT ND +bs, no rebound, no guarding  EXT- 1+ Left LE edema with chronic changes anterior shin        Labs:                        7.7    9.60  )-----------( 249      ( 05 Jan 2019 07:14 )             24.2     CBC Full  -  ( 05 Jan 2019 07:14 )  WBC Count : 9.60 K/uL  Hemoglobin : 7.7 g/dL  Hematocrit : 24.2 %  Platelet Count - Automated : 249 K/uL  Mean Cell Volume : 73.3 fL  Mean Cell Hemoglobin : 23.3 pg  Mean Cell Hemoglobin Concentration : 31.8 g/dL  Auto Neutrophil # : 7.69 K/uL  Auto Lymphocyte # : 0.86 K/uL  Auto Monocyte # : 0.64 K/uL  Auto Eosinophil # : 0.25 K/uL  Auto Basophil # : 0.05 K/uL  Auto Neutrophil % : 80.1 %  Auto Lymphocyte % : 9.0 %  Auto Monocyte % : 6.7 %  Auto Eosinophil % : 2.6 %  Auto Basophil % : 0.5 %      01-05    137  |  95<L>  |  33<H>  ----------------------------<  135<H>  3.8   |  29  |  1.7<H>    Ca    7.7<L>      05 Jan 2019 07:14    TPro  5.9<L>  /  Alb  2.9<L>  /  TBili  2.2<H>  /  DBili  0.5<H>  /  AST  13  /  ALT  6   /  AlkPhos  100  01-04      Microbiology:      Vital Signs Last 24 Hrs  T(C): 36.4 (05 Jan 2019 14:20), Max: 36.9 (04 Jan 2019 21:15)  T(F): 97.6 (05 Jan 2019 14:20), Max: 98.4 (04 Jan 2019 21:15)  HR: 141 (05 Jan 2019 14:20) (96 - 147)  BP: 103/56 (05 Jan 2019 14:20) (95/56 - 116/62)  BP(mean): --  RR: 19 (05 Jan 2019 14:20) (18 - 19)  SpO2: 97% (04 Jan 2019 21:15) (97% - 97%)    I&O's Summary            MEDICATIONS  (STANDING):  atorvastatin 10 milliGRAM(s) Oral at bedtime  cholecalciferol 2000 Unit(s) Oral daily  folic acid 1 milliGRAM(s) Oral daily  furosemide    Tablet 40 milliGRAM(s) Oral daily  heparin  Infusion 1000 Unit(s)/Hr (10 mL/Hr) IV Continuous <Continuous>  metoprolol tartrate 75 milliGRAM(s) Oral every 12 hours  pantoprazole  Injectable 40 milliGRAM(s) IV Push two times a day

## 2019-01-05 NOTE — PROGRESS NOTE ADULT - ASSESSMENT
78 year old female with PMH of HTN, type II DM, A-fib on coumadin, h/o bradycardia s/p pacemaker, rheumatic heart disease s/p bioprosthetic mitral valve replacement, s/p metallic aortic valve replacement, and HFpEF, history of RLE DVT 25 year sago, Left nephrectomy, and osteoporosis, presents to the ED for dark black stools since 3 days, fatigue, pallor, light headed.     #Acute on chronic anemia: Multifactorial anemia  -Baseline HB is 7-8, Hb at admission 4.8  -s/p 3 units prbc initially, giving one additional unit tonight.   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   -transfuse to keep hgb >7  -bridge with heparin drip PTT goal of 60-80 ; hold heparin drip 6 hrs prior to procedure monday morning    #)A fib , bioprosthetic MV and metallic AV with h/o CVA  coumadin on hold  -heparin drip PTT 60-80 goal  -increase metoprolol to 75mg BID  cardiology consult appreciated  F/u with Dr. Wolfe as outpatient.    # Transitional cell cancer of the kidney, s/p resection in [facility]  on 6/1/2018, and reresection on 10/29/2018 (suspected initial stage was IIIa [id number])   - Following with Oncology (Dr. Fields) at [facility]  - Follow up CT A/P on 12/6/2018  revealed L adnexal ovoid mass at 6.4x6.2cm, fluid density on attenuation as well as interval development of soft tissue density somewhat confluent masslike conglomerates along left para-aortic-iliac chain, most pronounced below level of aortic bifurcation, suspicious for mets, mildly prominent b/l lymph nodes in the inguinal regions, suspicious for mets, small right pleural effusion with interval increase.   - PET CT to restage was ordered on 12/27/2018 - not performed yet . Would hold on CT scan with IV contrast due to high creatinine  - Will call radiation oncology for evaluation ( Dr Hampton)      #)DLD  -on pravastatin 40 changed to Lipitor 10    #)Osteoporosis-on raloxefin    #)DM  check FS, if FS>180 resume insulin    #)HFpEF/ h/o bioprosthetic mitral valve and metallic aortic valve replacement  -c/w lasix, toprol      #)HTN controlled  -c/w lasix, toprol    #)Diet: Clear liquids  #)Activity: OOBTC  #)DVt ppx: heparin drip  #)Gi ppx; Protonix  #)Dispo: From home  #)Code:Full

## 2019-01-05 NOTE — PROGRESS NOTE ADULT - SUBJECTIVE AND OBJECTIVE BOX
SUBJECTIVE:    Patient is a 78y old Female who presents with a chief complaint of dark black stools (04 Jan 2019 20:06)    Currently admitted to medicine with the primary diagnosis of Lower GI bleed     Today is hospital day 3d. pt had bloody bm this evening. hemodynamically stable. will c/w heparin drip    PAST MEDICAL & SURGICAL HISTORY  CVA (cerebral vascular accident)  Anemia  Rheumatic fever  Diabetes  Osteoporosis  Mitral valve replaced  Atrial fibrillation: on coumadin at home  Hypertension  History of ureter stent  S/P AVR  H/O mitral valve replacement        ALLERGIES:  No Known Drug Allergies  shellfish (Unknown)    MEDICATIONS:  STANDING MEDICATIONS  atorvastatin 10 milliGRAM(s) Oral at bedtime  cholecalciferol 2000 Unit(s) Oral daily  folic acid 1 milliGRAM(s) Oral daily  furosemide    Tablet 40 milliGRAM(s) Oral daily  heparin  Infusion 1000 Unit(s)/Hr IV Continuous <Continuous>  metoprolol tartrate 75 milliGRAM(s) Oral every 12 hours  pantoprazole  Injectable 40 milliGRAM(s) IV Push two times a day    PRN MEDICATIONS    VITALS:   T(F): 98.4  HR: 100  BP: 106/50  RR: 18  SpO2: 97%    LABS:                        7.8    9.18  )-----------( 239      ( 05 Jan 2019 02:11 )             24.2     01-04    140  |  99  |  28<H>  ----------------------------<  85  3.7   |  29  |  1.8<H>    Ca    7.7<L>      04 Jan 2019 07:36  Mg     2.1     01-03    TPro  5.9<L>  /  Alb  2.9<L>  /  TBili  2.2<H>  /  DBili  0.5<H>  /  AST  13  /  ALT  6   /  AlkPhos  100  01-04    PT/INR - ( 05 Jan 2019 02:11 )   PT: 15.80 sec;   INR: 1.38 ratio         PTT - ( 05 Jan 2019 02:11 )  PTT:36.0 sec      Sedimentation Rate, Erythrocyte: 87 mm/hr <H> (01-04-19 @ 07:36)      CARDIAC MARKERS ( 03 Jan 2019 06:47 )  x     / <0.01 ng/mL / 36 U/L / x     / 1.3 ng/mL      RADIOLOGY:    PHYSICAL EXAM:  GEN: No acute distress  LUNGS: Clear to auscultation bilaterally   HEART: S1/S2 present. RRR.   ABD: Soft, non-tender, non-distended. Bowel sounds present  EXT: Skin Intact.   NEURO: AAOX3      Ricketts cathter:

## 2019-01-05 NOTE — PROGRESS NOTE ADULT - ASSESSMENT
a/p:  #symptomatic anemia 2/2 GI bleed  -cont to monitor cbc --tranfsue if Hb <7.5  -today hb 7.7  -s/p 4 prbc total transfused  -GI following- planning for EGD and colonoscopy on monday (1/7)  -monitor closely over weekend---upgrade to more monitored setting if any change in clinical presentation (if she becomes more symptomatic, hypotensive or any development of massive bleed)  -was on clear liquids and is now back npo for prep tomorrow and procedure monday  -iv protonix bid    #AF and bioprosthetic MV and metallic AV with h/o CVA  -needs to be on AC  -coumadin on hold  -cont hep gtt (inr <2)---monitor ptt (goal 60-80)  -cardiology following  -post procedure will need to restart coumadin and bridge with hep gtt until inr >2.5    #TCC- with ? 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

## 2019-01-06 LAB
APTT BLD: 58.2 SEC — HIGH (ref 27–39.2)
APTT BLD: 66.5 SEC — HIGH (ref 27–39.2)
GAS PNL BLDA: SIGNIFICANT CHANGE UP
GLUCOSE BLDC GLUCOMTR-MCNC: 184 MG/DL — HIGH (ref 70–99)
GLUCOSE BLDC GLUCOMTR-MCNC: 224 MG/DL — HIGH (ref 70–99)
HCT VFR BLD CALC: 20.6 % — LOW (ref 37–47)
HCT VFR BLD CALC: 23.1 % — LOW (ref 37–47)
HGB BLD-MCNC: 6.7 G/DL — CRITICAL LOW (ref 12–16)
HGB BLD-MCNC: 7.6 G/DL — LOW (ref 12–16)
MCHC RBC-ENTMCNC: 24 PG — LOW (ref 27–31)
MCHC RBC-ENTMCNC: 24.6 PG — LOW (ref 27–31)
MCHC RBC-ENTMCNC: 32.5 G/DL — SIGNIFICANT CHANGE UP (ref 32–37)
MCHC RBC-ENTMCNC: 32.9 G/DL — SIGNIFICANT CHANGE UP (ref 32–37)
MCV RBC AUTO: 73.8 FL — LOW (ref 81–99)
MCV RBC AUTO: 74.8 FL — LOW (ref 81–99)
NRBC # BLD: 0 /100 WBCS — SIGNIFICANT CHANGE UP (ref 0–0)
NRBC # BLD: 0 /100 WBCS — SIGNIFICANT CHANGE UP (ref 0–0)
PLATELET # BLD AUTO: 218 K/UL — SIGNIFICANT CHANGE UP (ref 130–400)
PLATELET # BLD AUTO: 221 K/UL — SIGNIFICANT CHANGE UP (ref 130–400)
RBC # BLD: 2.79 M/UL — LOW (ref 4.2–5.4)
RBC # BLD: 3.09 M/UL — LOW (ref 4.2–5.4)
RBC # FLD: 20.9 % — HIGH (ref 11.5–14.5)
RBC # FLD: 22.1 % — HIGH (ref 11.5–14.5)
WBC # BLD: 7.91 K/UL — SIGNIFICANT CHANGE UP (ref 4.8–10.8)
WBC # BLD: 9.09 K/UL — SIGNIFICANT CHANGE UP (ref 4.8–10.8)
WBC # FLD AUTO: 7.91 K/UL — SIGNIFICANT CHANGE UP (ref 4.8–10.8)
WBC # FLD AUTO: 9.09 K/UL — SIGNIFICANT CHANGE UP (ref 4.8–10.8)

## 2019-01-06 RX ORDER — SOD SULF/SODIUM/NAHCO3/KCL/PEG
4000 SOLUTION, RECONSTITUTED, ORAL ORAL ONCE
Qty: 0 | Refills: 0 | Status: DISCONTINUED | OUTPATIENT
Start: 2019-01-06 | End: 2019-01-06

## 2019-01-06 RX ORDER — ADENOSINE 3 MG/ML
6 INJECTION INTRAVENOUS ONCE
Qty: 0 | Refills: 0 | Status: COMPLETED | OUTPATIENT
Start: 2019-01-06 | End: 2019-01-06

## 2019-01-06 RX ORDER — AMIODARONE HYDROCHLORIDE 400 MG/1
150 TABLET ORAL ONCE
Qty: 0 | Refills: 0 | Status: COMPLETED | OUTPATIENT
Start: 2019-01-06 | End: 2019-01-06

## 2019-01-06 RX ORDER — SODIUM CHLORIDE 9 MG/ML
500 INJECTION INTRAMUSCULAR; INTRAVENOUS; SUBCUTANEOUS ONCE
Qty: 0 | Refills: 0 | Status: COMPLETED | OUTPATIENT
Start: 2019-01-06 | End: 2019-01-06

## 2019-01-06 RX ORDER — SODIUM CHLORIDE 9 MG/ML
250 INJECTION INTRAMUSCULAR; INTRAVENOUS; SUBCUTANEOUS ONCE
Qty: 0 | Refills: 0 | Status: COMPLETED | OUTPATIENT
Start: 2019-01-06 | End: 2019-01-06

## 2019-01-06 RX ORDER — AMIODARONE HYDROCHLORIDE 400 MG/1
1 TABLET ORAL
Qty: 900 | Refills: 0 | Status: DISCONTINUED | OUTPATIENT
Start: 2019-01-06 | End: 2019-01-06

## 2019-01-06 RX ORDER — SODIUM CHLORIDE 9 MG/ML
1000 INJECTION INTRAMUSCULAR; INTRAVENOUS; SUBCUTANEOUS ONCE
Qty: 0 | Refills: 0 | Status: COMPLETED | OUTPATIENT
Start: 2019-01-06 | End: 2019-01-06

## 2019-01-06 RX ORDER — MORPHINE SULFATE 50 MG/1
2 CAPSULE, EXTENDED RELEASE ORAL ONCE
Qty: 0 | Refills: 0 | Status: DISCONTINUED | OUTPATIENT
Start: 2019-01-06 | End: 2019-01-06

## 2019-01-06 RX ADMIN — HEPARIN SODIUM 16 UNIT(S)/HR: 5000 INJECTION INTRAVENOUS; SUBCUTANEOUS at 03:29

## 2019-01-06 RX ADMIN — Medication 75 MILLIGRAM(S): at 18:47

## 2019-01-06 RX ADMIN — SODIUM CHLORIDE 1000 MILLILITER(S): 9 INJECTION INTRAMUSCULAR; INTRAVENOUS; SUBCUTANEOUS at 21:16

## 2019-01-06 RX ADMIN — SODIUM CHLORIDE 1000 MILLILITER(S): 9 INJECTION INTRAMUSCULAR; INTRAVENOUS; SUBCUTANEOUS at 16:38

## 2019-01-06 RX ADMIN — HEPARIN SODIUM 16 UNIT(S)/HR: 5000 INJECTION INTRAVENOUS; SUBCUTANEOUS at 15:27

## 2019-01-06 RX ADMIN — AMIODARONE HYDROCHLORIDE 618 MILLIGRAM(S): 400 TABLET ORAL at 19:15

## 2019-01-06 RX ADMIN — Medication 75 MILLIGRAM(S): at 05:30

## 2019-01-06 RX ADMIN — ADENOSINE 6 MILLIGRAM(S): 3 INJECTION INTRAVENOUS at 16:47

## 2019-01-06 RX ADMIN — Medication 1 MILLIGRAM(S): at 11:05

## 2019-01-06 RX ADMIN — PANTOPRAZOLE SODIUM 40 MILLIGRAM(S): 20 TABLET, DELAYED RELEASE ORAL at 05:30

## 2019-01-06 RX ADMIN — SODIUM CHLORIDE 1000 MILLILITER(S): 9 INJECTION INTRAMUSCULAR; INTRAVENOUS; SUBCUTANEOUS at 05:30

## 2019-01-06 RX ADMIN — MORPHINE SULFATE 2 MILLIGRAM(S): 50 CAPSULE, EXTENDED RELEASE ORAL at 22:58

## 2019-01-06 RX ADMIN — ATORVASTATIN CALCIUM 10 MILLIGRAM(S): 80 TABLET, FILM COATED ORAL at 21:35

## 2019-01-06 RX ADMIN — MORPHINE SULFATE 2 MILLIGRAM(S): 50 CAPSULE, EXTENDED RELEASE ORAL at 21:09

## 2019-01-06 RX ADMIN — Medication 2000 UNIT(S): at 11:05

## 2019-01-06 RX ADMIN — PANTOPRAZOLE SODIUM 40 MILLIGRAM(S): 20 TABLET, DELAYED RELEASE ORAL at 18:47

## 2019-01-06 RX ADMIN — SODIUM CHLORIDE 1000 MILLILITER(S): 9 INJECTION INTRAMUSCULAR; INTRAVENOUS; SUBCUTANEOUS at 08:57

## 2019-01-06 NOTE — PROGRESS NOTE ADULT - ASSESSMENT
# Acute on chronic anemia: Multifactorial anemia  - R/O GI bleed : had extensive GI workup in the past which was negative . Now with bleeding , Plan for endoscopy/colonoscopy by GI on Monday. Transfuse PRN.   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 Will resend hemolysis workup today  - S/p BM Bx, results are inconclusive, mild MDS is not excluded   - Continue on folic acid  - Transfuse to keep hb >7  -Anemia worsening on 1/6/2018, patient is tachy, reports, black stools and BRBPR, transfuse 2 units of PRBC and monitor CBC at lest 2 times a day, please inform GI and cardiology (patient requires Heparin gtt for metallic valve in aortic position), consider escalating care. Case was discussed with house staff.     # Transitional cell cancer of the kidney, s/p resection in [facility]  on 6/1/2018, and reresection on 10/29/2018 (suspected initial stage was IIIa [id number])   - Following with Oncology (Dr. Fields) at [facility]  - Was previously consented for Ambassador trial and randomized to observation arm   - Follow up CT A/P on 12/6/2018  revealed L adnexal ovoid mass at 6.4x6.2cm, fluid density on attenuation as well as interval development of soft tissue density somewhat confluent masslike conglomerates along left para-aortic-iliac chain, most pronounced below level of aortic bifurcation, suspicious for mets, mildly prominent b/l lymph nodes in the inguinal regions, suspicious for mets, small right pleural effusion with interval increase.   - PET CT to restage was ordered on 12/27/2018 - not performed yet . Would hold on CT scan with IV contrast due to high creatinine  - Radiation oncology input appreciated    # Metallic mitral valve, replaced with tissue valve on 3/19/2018, metallic aortic valve in place  --On chronic anticoagulation with Coumadin for A. fib and metallic AV, Heparin gtt for GI procedure    # LLE : improving - likely 2/2 venous compression 2/2 adenopathy   -- L soleal DVT, continue with AC once cleared by FI.

## 2019-01-06 NOTE — PROGRESS NOTE ADULT - SUBJECTIVE AND OBJECTIVE BOX
Patient is a 78y old  Female who presents with a chief complaint of dark black stools (02 Jan 2019 21:11)      HPI:  78 year old female with PMH of HTN, type II DM, A-fib on coumadin, h/o bradycardia s/p pacemaker, rheumatic heart disease s/p bioprosthetic mitral valve replacement, s/p metallic aortic valve replacement, and HFpEF, history of RLE DVT 25 year sago, Left nephrectomy, and osteoporosis, presents to the ED for dark black stools since 3 days. As per patient and son at bedside she has dark black stools since 3 days, not painful, last bowel movement last night, no bright red blood, no hematemesis, no dysphagia, buit admits to weight loss of 30 pounds in last year, loss of appetite and chronic intermittent abdominal pain, diffuse, no aggravating factors and relieving factor 3-4/10 in intensity. Also has c/o feeling weak, pale, light headedness. She receives frequent transfusions for microcytic anemia, last transfusion was three weeks ago follows up with Dr. Alvarenga she went to see Dr. Alvarenga blood work showed Hb of 4.8 referred to hospital.  Denies fever, chills, nausea, vomiting, dysuria,   pt was recently in hospital for left leg cellulitis; reports trace edema to left knee without pain.  She checks her INR regularly at home goal INR as per their cardiologist is 2-3. She didn't receive coumadin today, last dose was yesterday 2.5mg   Last endoscopy and colonoscopy was done 5 years ago as per patient was normal.    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 She was admitted on 03/25/2017 and was noted to be anemic at 8.1, with MCV of 65.  - Hx of rheumatic fever as a child. 13 years ago she underwent valve replacement surgery. As per her son, 2 valves were replaced with metallic valves, one of which was mitral, he does not recall what second valve was replaced.   3/2018: SHe had an exacerbation of her anemia  2/2 to worsening hemolysis related to compromised metallic mitral valve for which she has required minimally invasive replacement with tissue valve at [facility] by Dr. Tomer Escalante on 3/19/2018. She still has a metallic aortic valve in place and remains on Coumadin.   -GI:  She had extensive GI work up by Dr. Kd Goldberg in 08/2013 including upper endoscopy, colonoscopy and capsule study, no overt source of bleeding was identified.   - She had a bone marrow biopsy which was inconclusive for MDS  - Hemarturia:  No was observed for hematuria or unknown etiology by Dr. Pablo for approximately 1.5 years, ultimately a stricture in the L ureter was identified, stent was placed and subsequently removed, on 5/25/2018 stent was replaced. 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 She was then referred to establish oncological care with Dr. Kashmir Fields, patient was offered participation in Ambassador trial (adjuvant immunotherapy vs observation) and randomized to observation arm. She had a follow up CT scan on 7/31/2018 revealed soft tissue mass along pelvic sidewall, follow up PET CT on 7/19/2018 revealed uptake in that area. On 10/29/2018 she underwent left adnexal mass and left distal ureter excision, adnexal mass was negative for malignancy, left distal ureter revealed invasive high grade urothelial carcinoma, invading full thickness of ureteral wall, involving soft tissue surrounding the ureter, morphologically similar to prior tumor in the left kidney. She was then referred to Rad Onc ant Health system and was seen by Dr. Diaz 11/23/2018, who recommended XRT with chemotherapy to the pelvis. She again was seen by Dr. Fields who recommended combining radiation with concurrent weekly paclitaxel on 12/12/2018. She had a CT of the A/P without contrast on 12/6/2018 that revealed L adnexal ovoid mass at 6.4x6.2cm, fluid density on attenuation as well as interval development of soft tissue density somewhat confluent masslike conglomerates along left para-aortic-iliac chain, most pronounced below level of aortic bifurcation, suspicious for mets, mildly prominent b/l lymph nodes in the inguinal regions, suspicious for mets, small right pleural effusion with interval increase.       1/4/2018: No doing well, Hb is stable today, she describes melenic stool today, 1 episode.   1/6/2018: No reports black stool mixed with BRBPR, tachycardia is worsening, Hb dropping today. She reporst palpitations, no SOB, no dizziness.       PAST MEDICAL & SURGICAL HISTORY:  CVA (cerebral vascular accident)  Anemia  Rheumatic fever  Diabetes  Osteoporosis  Mitral valve replaced  Atrial fibrillation: on coumadin at home  Hypertension  History of ureter stent  S/P AVR  H/O mitral valve replacement          FAMILY HISTORY:  No pertinent family history in first degree relatives    Allergies    No Known Drug Allergies  shellfish (Unknown)    Intolerances      Height (cm): 157.48 (01-02-19 @ 21:15)  Weight (kg): 61.9 (01-02-19 @ 21:15)  BMI (kg/m2): 25 (01-02-19 @ 21:15)  BSA (m2): 1.62 (01-02-19 @ 21:15)    MEDICATIONS  (STANDING):  atorvastatin 10 milliGRAM(s) Oral at bedtime  cholecalciferol 2000 Unit(s) Oral daily  folic acid 1 milliGRAM(s) Oral daily  furosemide    Tablet 40 milliGRAM(s) Oral daily  heparin  Infusion 1000 Unit(s)/Hr (16 mL/Hr) IV Continuous <Continuous>  metoprolol tartrate 75 milliGRAM(s) Oral every 12 hours  pantoprazole  Injectable 40 milliGRAM(s) IV Push two times a day  polyethylene glycol/electrolyte Solution. 1000 milliLiter(s) Oral once    MEDICATIONS  (PRN):        Vital Signs Last 24 Hrs  T(C): 37 (06 Jan 2019 12:14), Max: 37.9 (05 Jan 2019 19:50)  T(F): 98.6 (06 Jan 2019 12:14), Max: 100.2 (05 Jan 2019 19:50)  HR: 141 (06 Jan 2019 12:14) (131 - 143)  BP: 93/54 (06 Jan 2019 12:14) (92/56 - 103/56)  BP(mean): --  RR: 18 (06 Jan 2019 12:14) (18 - 19)  SpO2: 97% (06 Jan 2019 09:15) (97% - 98%)    PHYSICAL EXAM  General: adult in NAD  HEENT: clear oropharynx, anicteric sclera, pale conjunctiva  Neck: supple  CV: normal S1/S2, + systolic murmur  Lungs: Decreased air entry bilaterally  Abdomen: soft non-tender non-distended  Ext:+ edema  Skin: no rashes and no petechiae  Neuro: alert and oriented X 4, no focal deficits        LABS:     CBC Full  -  ( 06 Jan 2019 11:34 )  WBC Count : 9.09 K/uL  Hemoglobin : 7.6 g/dL  Hematocrit : 23.1 %  Platelet Count - Automated : 221 K/uL  Mean Cell Volume : 74.8 fL  Mean Cell Hemoglobin : 24.6 pg  Mean Cell Hemoglobin Concentration : 32.9 g/dL  Auto Neutrophil # : x  Auto Lymphocyte # : x  Auto Monocyte # : x  Auto Eosinophil # : x  Auto Basophil # : x  Auto Neutrophil % : x  Auto Lymphocyte % : x  Auto Monocyte % : x  Auto Eosinophil % : x  Auto Basophil % : x    01-05    137  |  95<L>  |  33<H>  ----------------------------<  135<H>  3.8   |  29  |  1.7<H>    Ca    7.7<L>      05 Jan 2019 07:14          RADIOLOGY & ADDITIONAL STUDIES:  < from: Xray Chest 1 View AP/PA (01.02.19 @ 17:43) >  Impression:      Blunting of the right costophrenic angle. Cardiomegaly.    Follow-up as needed.      < end of copied text >    < from: [facility] Duplex Lower Ext Vein Scan, Bilat (12.14.18 @ 16:23) >  Impression:    Venous thrombosis right soleal vein.    No evidence of deep venous thrombosis or superficial thrombophlebitis in   left lower extremity.    Left groin lymphadenopathy.    < end of copied text >

## 2019-01-06 NOTE — CHART NOTE - NSCHARTNOTEFT_GEN_A_CORE
Hospitalist On call :    Rapid response note:     78 year old female with PMH of HTN, type II DM, A-fib on coumadin, h/o bradycardia s/p pacemaker, rheumatic heart disease s/p bioprosthetic mitral valve replacement, s/p metallic aortic valve replacement, and HFpEF, history of RLE DVT 25 year sago, Left nephrectomy, and osteoporosis, presents to the ED for dark black stools since 3 days. Also has c/o feeling weak, pale, light headedness. She receives frequent transfusions for microcytic anemia, last transfusion was three weeks ago follows up with Dr. Alvarenga she went to see Dr. Alvarenga blood work showed Hb of 4.8 referred to hospital.     in ED: s/p 2 units PRBC    Hospital course on 3B/C:   Pt had a hb of 6.7 this am, she was receiving 2 U pRBCs and 500 cc bolus x 2. The pt was tachycardic since yesterday with a HR recorded in the 140s. An ECG showed SVT. The pt's HR did not improve with fluid resuscitation and pRBCs. The pts BP improved from 90s to 100s, however HR remained high. Residents had Discussed the case with cardiac fellow. Adenosine 6 mg IV push was given which broke her SVT momentarily, however, it immediately returned with HR in the 150s. Patient refused any further Adenosine pushes d/t the pain she had with it.    The pt was hooked up to an ECG and pads were ready at bedside. The pt c/o palpitations but was otherwise stable.  Patient was transferred  to  to start Amiodarone loading after discussion with cardiology  Amiodarone loading dose was given around 7:11 PM. Minutes later she became unresponsive, found the patient pulseless, code blue called at 7:17, pulse was back after 2 cycles of chest compression, and the patient back to SVT at 7:20 ( , RR 22, /96 ).    Patient was agitated initially, but gradually settled down. VS stable now except for the persistent tachycardia. Repeat EKG again shows narrow complex regular tachycardia with P waves (No flutter waves). Hence this is SVT. with a VR of around 155/min. Such fast heart rate cannot be secondary to hypovolemia alone. There is definitely a primary cardiac arrhythmia.     CArdio fellow is also at the bedside. Cardio feels the amio may have interrupted the pacemaker - making her pulseless.     CArdio fellow is in the middle of interrogating the PPM and then she will be moved to CCU for further monitoring.     Plan is to control her HR while maintaining a BP. Use IV fluids PRN. Monitor CBC.   Not sure if she is fit for colonoscopy tomorrow after this major event.    Will f/u.

## 2019-01-06 NOTE — CHART NOTE - NSCHARTNOTEFT_GEN_A_CORE
CCU Transfer Note    Transfer from: 3B    Transfer to:  ( X ) Telemetry      Signout given to: Dr. Smith    HPI:     Reason for Admission: dark black stools  History of Present Illness:   78 year old female with PMH of HTN, type II DM, A-fib on coumadin, h/o bradycardia s/p pacemaker, rheumatic heart disease s/p bioprosthetic mitral valve replacement, s/p metallic aortic valve replacement, and HFpEF, history of RLE DVT 25 year sago, Left nephrectomy, and osteoporosis, presents to the ED for dark black stools since 3 days. As per patient and son at bedside she has dark black stools since 3 days, not painful, last bowel movement last night, no bright red blood, no hematemesis, no dysphagia, buit admits to weight loss of 30 pounds in last year, loss of appetite and chronic intermittent abdominal pain, diffuse, no aggravating factors and relieving factor 3-4/10 in intensity. Also has c/o feeling weak, pale, light headedness. She receives frequent transfusions for microcytic anemia, last transfusion was three weeks ago follows up with Dr. Alvarenga she went to see Dr. Alvarenga blood work showed Hb of 4.8 referred to hospital.  Denies fever, chills, nausea, vomiting, dysuria,   pt was recently in hospital for left leg cellulitis; reports trace edema to left knee without pain.  She checks her INR regularly at home goal INR as per their cardiologist is 2-3. She didn;t receive coumadin today, last dose was yesterday 2.5mg   Last endoscopy and colonoscopy was done 5 years ago as per patient was normal.    vitals in ED: 108/51, 94, 98.5, 18, 96% on room air, Labs Hb 4.8(Baseline 7-8), INR 3.29, creatinine is 1.9 (baseline is 2 from may 2018,in april it was 1.1), chest xray was fine, s/p 2 units PRBC    Hospital course on 3B:   Pt had a hb of 6.7 this am, she was receiving 2 U pRBCs and 500 cc bolus x 2. The pt was tachycardic since yesterday with a HR recorded in the 140s. An ECG showed SVT. The pt's HR did not improve with fluid resuscitation and pRBCs. The pts BP improved from 90s to 100s, however HR remained high. Discussed case with cardiac fellow. Adenosine 6 mg IV push was given which broke her SVT, however, it immediately returned with HR in the 150s. The pt was hooked up to an ECG and pads were ready at bedside. The pt c/o palpitations but was otherwise stable.       Vital Signs Last 24 Hrs  T(C): 36.7 (06 Jan 2019 14:07), Max: 37.9 (05 Jan 2019 19:50)  T(F): 98.1 (06 Jan 2019 14:07), Max: 100.2 (05 Jan 2019 19:50)  HR: 147 (06 Jan 2019 15:24) (131 - 147)  BP: 112/68 (06 Jan 2019 15:24) (92/56 - 112/68)  BP(mean): --  RR: 18 (06 Jan 2019 14:07) (18 - 19)  SpO2: 97% (06 Jan 2019 09:15) (97% - 98%)      Physical Exam:   General: adult in NAD  HEENT: clear oropharynx, anicteric sclera, pale conjunctiva  Neck: supple  CV: normal S1/S2, + systolic murmur  Lungs: Decreased air entry bilaterally  Abdomen: soft non-tender non-distended  Ext:+ edema  Skin: no rashes and no petechiae  Neuro: alert and oriented X 4, no focal deficits    LABS:                               7.6    9.09  )-----------( 221      ( 06 Jan 2019 11:34 )             23.1       01-05    137  |  95<L>  |  33<H>  ----------------------------<  135<H>  3.8   |  29  |  1.7<H>    Ca    7.7<L>      05 Jan 2019 07:14        PT/INR - ( 05 Jan 2019 07:14 )   PT: 15.70 sec;   INR: 1.37 ratio         PTT - ( 06 Jan 2019 07:10 )  PTT:66.5 sec          ECG:   SVT, HR 150s    Telemetry:    Imaging:  < from: Xray Chest 1 View AP/PA (01.02.19 @ 17:43) >  Impression:      Blunting of the right costophrenic angle. Cardiomegaly.    Follow-up as needed.      < end of copied text >    < from: [facility] Duplex Lower Ext Vein Scan, Bilat (12.14.18 @ 16:23) >  Impression:    Venous thrombosis right soleal vein.    No evidence of deep venous thrombosis or superficial thrombophlebitis in   left lower extremity.    Left groin lymphadenopathy.        FOR FOLLOW UP:  [ ] CBC 20:00  [ ] CBC am  [ ] NPO P M for EGD and Colonoscopy w/ possible VCE, f/u GI in am   [ ] Cannot d/c heparin, per cards, due to mechanical heart valve, hold am dose (4:30 am) heparin for procedure  [ ] Golytely today for procedure   [ ] f/u PTT at 23:30   [ ] f/u VS and hb to ensure pt is hemodynamically stable. ICU upgrade if pt decompensates

## 2019-01-06 NOTE — PROGRESS NOTE ADULT - SUBJECTIVE AND OBJECTIVE BOX
pt has palpitation. her HR is around 140 sinus tachy. she states that the palpitation is not new and she has had it for a long time.  no chest pain.   Vital Signs Last 24 Hrs  T(C): 36.7 (06 Jan 2019 13:40), Max: 37.9 (05 Jan 2019 19:50)  T(F): 98.1 (06 Jan 2019 13:40), Max: 100.2 (05 Jan 2019 19:50)  HR: 147 (06 Jan 2019 13:40) (131 - 147)  BP: 111/67 (06 Jan 2019 13:40) (92/56 - 111/67)  BP(mean): --  RR: 19 (06 Jan 2019 13:40) (18 - 19)  SpO2: 97% (06 Jan 2019 09:15) (97% - 98%)    Physical exam:   constitutional NAD, AAOX3, Respiratory  lungs CTA, CVS heart Reg, SM, tachy, GI: abdomen Soft NT, ND, BS+, skin: intact  neuro exam non focal.                           7.6    9.09  )-----------( 221      ( 06 Jan 2019 11:34 )             23.1   01-05    137  |  95<L>  |  33<H>  ----------------------------<  135<H>  3.8   |  29  |  1.7<H>    Ca    7.7<L>      05 Jan 2019 07:14    a/p  #tachycardia, probably secondary to anemia, will give her one more unit of PRBC, since she is actively bleeding and is tachycardic   #GIB, for colonoscopy in am  #mechanical valve, on heparin drip, monitor PTT and adjust dose to keep PTT in therpaeutic range.   #Transitional cell cancer of the kidney, s/p resection in [facility]  on 6/1/2018, and reresection on 10/29/2018 : fu hemonc.   abnl creatinine, ckd ( since May) monitor.     #PMHX and comorbidities, cont meds.   CVA (cerebral vascular accident)  Anemia: acute post hemorrhagic on chroic   Rheumatic fever  Diabetes, insulin protocol   Atrial fibrillation: on coumadin at home, now on hep gtt.   Hypertension, now hypotensive, will have to hold all bp meds    pt is high risk  full code.   spoke with  at the bedside.

## 2019-01-06 NOTE — CHART NOTE - NSCHARTNOTEFT_GEN_A_CORE
CCU Transfer Note    Transfer from: 3c    Transfer to:        Signout given to:     HPI:     Reason for Admission: dark black stools  History of Present Illness:   78 year old female with PMH of HTN, type II DM, A-fib on coumadin, h/o bradycardia s/p pacemaker, rheumatic heart disease s/p bioprosthetic mitral valve replacement, s/p metallic aortic valve replacement, and HFpEF, history of RLE DVT 25 year sago, Left nephrectomy, and osteoporosis, presents to the ED for dark black stools since 3 days. As per patient and son at bedside she has dark black stools since 3 days, not painful, last bowel movement last night, no bright red blood, no hematemesis, no dysphagia, buit admits to weight loss of 30 pounds in last year, loss of appetite and chronic intermittent abdominal pain, diffuse, no aggravating factors and relieving factor 3-4/10 in intensity. Also has c/o feeling weak, pale, light headedness. She receives frequent transfusions for microcytic anemia, last transfusion was three weeks ago follows up with Dr. Alvarenga she went to see Dr. Alvarenga blood work showed Hb of 4.8 referred to hospital.  Denies fever, chills, nausea, vomiting, dysuria,   pt was recently in hospital for left leg cellulitis; reports trace edema to left knee without pain.  She checks her INR regularly at home goal INR as per their cardiologist is 2-3. She didn;t receive coumadin today, last dose was yesterday 2.5mg   Last endoscopy and colonoscopy was done 5 years ago as per patient was normal.    vitals in ED: 108/51, 94, 98.5, 18, 96% on room air, Labs Hb 4.8(Baseline 7-8), INR 3.29, creatinine is 1.9 (baseline is 2 from may 2018,in april it was 1.1), chest xray was fine, s/p 2 units PRBC    Hospital course on 3B:   Pt had a hb of 6.7 this am, she was receiving 2 U pRBCs and 500 cc bolus x 2. The pt was tachycardic since yesterday with a HR recorded in the 140s. An ECG showed SVT. The pt's HR did not improve with fluid resuscitation and pRBCs. The pts BP improved from 90s to 100s, however HR remained high. Discussed case with cardiac fellow. Adenosine 6 mg IV push was given which broke her SVT, however, it immediately returned with HR in the 150s. The pt was hooked up to an ECG and pads were ready at bedside. The pt c/o palpitations but was otherwise stable. CCU Transfer Note    Transfer from: 3c    Transfer to:        Signout given to:     HPI:     Reason for Admission: dark black stools  History of Present Illness:   78 year old female with PMH of HTN, type II DM, A-fib on coumadin, h/o bradycardia s/p pacemaker, rheumatic heart disease s/p bioprosthetic mitral valve replacement, s/p metallic aortic valve replacement, and HFpEF, history of RLE DVT 25 year sago, Left nephrectomy, and osteoporosis, presents to the ED for dark black stools since 3 days. As per patient and son at bedside she has dark black stools since 3 days, not painful, last bowel movement last night, no bright red blood, no hematemesis, no dysphagia, buit admits to weight loss of 30 pounds in last year, loss of appetite and chronic intermittent abdominal pain, diffuse, no aggravating factors and relieving factor 3-4/10 in intensity. Also has c/o feeling weak, pale, light headedness. She receives frequent transfusions for microcytic anemia, last transfusion was three weeks ago follows up with Dr. Alvarenga she went to see Dr. Alvarenga blood work showed Hb of 4.8 referred to hospital.  Denies fever, chills, nausea, vomiting, dysuria,   pt was recently in hospital for left leg cellulitis; reports trace edema to left knee without pain.  She checks her INR regularly at home goal INR as per their cardiologist is 2-3. She didn;t receive coumadin today, last dose was yesterday 2.5mg   Last endoscopy and colonoscopy was done 5 years ago as per patient was normal.    vitals in ED: 108/51, 94, 98.5, 18, 96% on room air, Labs Hb 4.8(Baseline 7-8), INR 3.29, creatinine is 1.9 (baseline is 2 from may 2018,in april it was 1.1), chest xray was fine, s/p 2 units PRBC    Hospital course on 3B/3C:   Pt had a hb of 6.7 this am, she was receiving 2 U pRBCs and 500 cc bolus x 2. The pt was tachycardic since yesterday with a HR recorded in the 140s. An ECG showed SVT. The pt's HR did not improve with fluid resuscitation and pRBCs. The pts BP improved from 90s to 100s, however HR remained high. Discussed case with cardiac fellow. Adenosine 6 mg IV push was given which broke her SVT, however, it immediately returned with HR in the 150s. The pt was hooked up to an ECG and pads were ready at bedside. The pt c/o palpitations but was otherwise stable. Patient was transferred  to  to start Amiodarone loading.     I was called by the nurse right after starting Amiodarone loading as the patient was unresponsive, found the patient pulseless, code blue called at 7:17, pulse was back after 2 cycles of chest compression, and the patient back to SVT at 7:20 ( , RR 22, /96 ), CCU Transfer Note    Transfer from: 3c    HPI:   Reason for Admission: dark black stools  History of Present Illness:   78 year old female with PMH of HTN, type II DM, A-fib on coumadin, h/o bradycardia s/p pacemaker, rheumatic heart disease s/p bioprosthetic mitral valve replacement, s/p metallic aortic valve replacement, and HFpEF, history of RLE DVT 25 year sago, Left nephrectomy, and osteoporosis, presents to the ED for dark black stools since 3 days. As per patient and son at bedside she has dark black stools since 3 days, not painful, last bowel movement last night, no bright red blood, no hematemesis, no dysphagia, buit admits to weight loss of 30 pounds in last year, loss of appetite and chronic intermittent abdominal pain, diffuse, no aggravating factors and relieving factor 3-4/10 in intensity. Also has c/o feeling weak, pale, light headedness. She receives frequent transfusions for microcytic anemia, last transfusion was three weeks ago follows up with Dr. Alvarenga she went to see Dr. Alvarenga blood work showed Hb of 4.8 referred to hospital.  Denies fever, chills, nausea, vomiting, dysuria,   pt was recently in hospital for left leg cellulitis; reports trace edema to left knee without pain.  She checks her INR regularly at home goal INR as per their cardiologist is 2-3. She didn;t receive coumadin today, last dose was yesterday 2.5mg   Last endoscopy and colonoscopy was done 5 years ago as per patient was normal.    vitals in ED: 108/51, 94, 98.5, 18, 96% on room air, Labs Hb 4.8(Baseline 7-8), INR 3.29, creatinine is 1.9 (baseline is 2 from may 2018,in april it was 1.1), chest xray was fine, s/p 2 units PRBC    Hospital course on 3B/3C:   Pt had a hb of 6.7 this am, she was receiving 2 U pRBCs and 500 cc bolus x 2. The pt was tachycardic since yesterday with a HR recorded in the 140s. An ECG showed SVT. The pt's HR did not improve with fluid resuscitation and pRBCs. The pts BP improved from 90s to 100s, however HR remained high. Discussed case with cardiac fellow. Adenosine 6 mg IV push was given which broke her SVT, however, it immediately returned with HR in the 150s. The pt was hooked up to an ECG and pads were ready at bedside. The pt c/o palpitations but was otherwise stable. Patient was transferred  to  to start Amiodarone loading.     I was called by the nurse right after starting Amiodarone loading as the patient was unresponsive, found the patient pulseless, code blue called at 7:17, pulse was back after 2 cycles of chest compression, and the patient back to SVT at 7:20 ( , RR 22, /96 ),    Per cardiac fellow increase Metroprolol to 100 Q12 once BP is tolerating. F/u EP Rec's   Sign out given to: Finkelstein

## 2019-01-07 ENCOUNTER — LABORATORY RESULT (OUTPATIENT)
Age: 79
End: 2019-01-07

## 2019-01-07 ENCOUNTER — OUTPATIENT (OUTPATIENT)
Dept: OUTPATIENT SERVICES | Facility: HOSPITAL | Age: 79
LOS: 1 days | Discharge: HOME | End: 2019-01-07

## 2019-01-07 DIAGNOSIS — Z96.0 PRESENCE OF UROGENITAL IMPLANTS: Chronic | ICD-10-CM

## 2019-01-07 DIAGNOSIS — R89.7 ABNORMAL HISTOLOGICAL FINDINGS IN SPECIMENS FROM OTHER ORGANS, SYSTEMS AND TISSUES: ICD-10-CM

## 2019-01-07 DIAGNOSIS — Z95.2 PRESENCE OF PROSTHETIC HEART VALVE: Chronic | ICD-10-CM

## 2019-01-07 DIAGNOSIS — Z98.890 OTHER SPECIFIED POSTPROCEDURAL STATES: Chronic | ICD-10-CM

## 2019-01-07 LAB
ALBUMIN SERPL ELPH-MCNC: 2.8 G/DL — LOW (ref 3.5–5.2)
ALP SERPL-CCNC: 111 U/L — SIGNIFICANT CHANGE UP (ref 30–115)
ALT FLD-CCNC: 11 U/L — SIGNIFICANT CHANGE UP (ref 0–41)
ANION GAP SERPL CALC-SCNC: 15 MMOL/L — HIGH (ref 7–14)
APTT BLD: 62.8 SEC — HIGH (ref 27–39.2)
AST SERPL-CCNC: 20 U/L — SIGNIFICANT CHANGE UP (ref 0–41)
BASOPHILS # BLD AUTO: 0.04 K/UL — SIGNIFICANT CHANGE UP (ref 0–0.2)
BASOPHILS # BLD AUTO: 0.05 K/UL — SIGNIFICANT CHANGE UP (ref 0–0.2)
BASOPHILS NFR BLD AUTO: 0.4 % — SIGNIFICANT CHANGE UP (ref 0–1)
BASOPHILS NFR BLD AUTO: 0.5 % — SIGNIFICANT CHANGE UP (ref 0–1)
BILIRUB SERPL-MCNC: 1.6 MG/DL — HIGH (ref 0.2–1.2)
BUN SERPL-MCNC: 30 MG/DL — HIGH (ref 10–20)
CALCIUM SERPL-MCNC: 7.1 MG/DL — LOW (ref 8.5–10.1)
CHLORIDE SERPL-SCNC: 99 MMOL/L — SIGNIFICANT CHANGE UP (ref 98–110)
CK MB CFR SERPL CALC: 5.1 NG/ML — SIGNIFICANT CHANGE UP (ref 0.6–6.3)
CO2 SERPL-SCNC: 25 MMOL/L — SIGNIFICANT CHANGE UP (ref 17–32)
CREAT SERPL-MCNC: 1.5 MG/DL — SIGNIFICANT CHANGE UP (ref 0.7–1.5)
EOSINOPHIL # BLD AUTO: 0.18 K/UL — SIGNIFICANT CHANGE UP (ref 0–0.7)
EOSINOPHIL # BLD AUTO: 0.21 K/UL — SIGNIFICANT CHANGE UP (ref 0–0.7)
EOSINOPHIL NFR BLD AUTO: 1.6 % — SIGNIFICANT CHANGE UP (ref 0–8)
EOSINOPHIL NFR BLD AUTO: 2 % — SIGNIFICANT CHANGE UP (ref 0–8)
GLUCOSE BLDC GLUCOMTR-MCNC: 152 MG/DL — HIGH (ref 70–99)
GLUCOSE BLDC GLUCOMTR-MCNC: 163 MG/DL — HIGH (ref 70–99)
GLUCOSE BLDC GLUCOMTR-MCNC: 194 MG/DL — HIGH (ref 70–99)
GLUCOSE BLDC GLUCOMTR-MCNC: 197 MG/DL — HIGH (ref 70–99)
GLUCOSE BLDC GLUCOMTR-MCNC: 218 MG/DL — HIGH (ref 70–99)
GLUCOSE SERPL-MCNC: 176 MG/DL — HIGH (ref 70–99)
HCT VFR BLD CALC: 22.7 % — LOW (ref 37–47)
HCT VFR BLD CALC: 22.7 % — LOW (ref 37–47)
HCT VFR BLD CALC: 24.5 % — LOW (ref 37–47)
HGB BLD-MCNC: 7.4 G/DL — CRITICAL LOW (ref 12–16)
HGB BLD-MCNC: 7.4 G/DL — CRITICAL LOW (ref 12–16)
HGB BLD-MCNC: 7.7 G/DL — LOW (ref 12–16)
IMM GRANULOCYTES NFR BLD AUTO: 1.5 % — HIGH (ref 0.1–0.3)
IMM GRANULOCYTES NFR BLD AUTO: 1.6 % — HIGH (ref 0.1–0.3)
LYMPHOCYTES # BLD AUTO: 0.67 K/UL — LOW (ref 1.2–3.4)
LYMPHOCYTES # BLD AUTO: 0.7 K/UL — LOW (ref 1.2–3.4)
LYMPHOCYTES # BLD AUTO: 5.9 % — LOW (ref 20.5–51.1)
LYMPHOCYTES # BLD AUTO: 6.8 % — LOW (ref 20.5–51.1)
MCHC RBC-ENTMCNC: 24.3 PG — LOW (ref 27–31)
MCHC RBC-ENTMCNC: 24.7 PG — LOW (ref 27–31)
MCHC RBC-ENTMCNC: 24.8 PG — LOW (ref 27–31)
MCHC RBC-ENTMCNC: 31.4 G/DL — LOW (ref 32–37)
MCHC RBC-ENTMCNC: 32.6 G/DL — SIGNIFICANT CHANGE UP (ref 32–37)
MCHC RBC-ENTMCNC: 32.6 G/DL — SIGNIFICANT CHANGE UP (ref 32–37)
MCV RBC AUTO: 75.7 FL — LOW (ref 81–99)
MCV RBC AUTO: 76.2 FL — LOW (ref 81–99)
MCV RBC AUTO: 77.3 FL — LOW (ref 81–99)
MONOCYTES # BLD AUTO: 0.59 K/UL — SIGNIFICANT CHANGE UP (ref 0.1–0.6)
MONOCYTES # BLD AUTO: 0.68 K/UL — HIGH (ref 0.1–0.6)
MONOCYTES NFR BLD AUTO: 5.7 % — SIGNIFICANT CHANGE UP (ref 1.7–9.3)
MONOCYTES NFR BLD AUTO: 6 % — SIGNIFICANT CHANGE UP (ref 1.7–9.3)
NEUTROPHILS # BLD AUTO: 8.58 K/UL — HIGH (ref 1.4–6.5)
NEUTROPHILS # BLD AUTO: 9.63 K/UL — HIGH (ref 1.4–6.5)
NEUTROPHILS NFR BLD AUTO: 83.4 % — HIGH (ref 42.2–75.2)
NEUTROPHILS NFR BLD AUTO: 84.6 % — HIGH (ref 42.2–75.2)
NRBC # BLD: 0 /100 WBCS — SIGNIFICANT CHANGE UP (ref 0–0)
PLATELET # BLD AUTO: 198 K/UL — SIGNIFICANT CHANGE UP (ref 130–400)
PLATELET # BLD AUTO: 208 K/UL — SIGNIFICANT CHANGE UP (ref 130–400)
PLATELET # BLD AUTO: 227 K/UL — SIGNIFICANT CHANGE UP (ref 130–400)
POTASSIUM SERPL-MCNC: 4.1 MMOL/L — SIGNIFICANT CHANGE UP (ref 3.5–5)
POTASSIUM SERPL-SCNC: 4.1 MMOL/L — SIGNIFICANT CHANGE UP (ref 3.5–5)
PROT SERPL-MCNC: 5.2 G/DL — LOW (ref 6–8)
RBC # BLD: 2.98 M/UL — LOW (ref 4.2–5.4)
RBC # BLD: 3 M/UL — LOW (ref 4.2–5.4)
RBC # BLD: 3.17 M/UL — LOW (ref 4.2–5.4)
RBC # FLD: 20.2 % — HIGH (ref 11.5–14.5)
RBC # FLD: 20.6 % — HIGH (ref 11.5–14.5)
RBC # FLD: 20.7 % — HIGH (ref 11.5–14.5)
SODIUM SERPL-SCNC: 139 MMOL/L — SIGNIFICANT CHANGE UP (ref 135–146)
TROPONIN T SERPL-MCNC: 0.03 NG/ML — CRITICAL HIGH
WBC # BLD: 10.29 K/UL — SIGNIFICANT CHANGE UP (ref 4.8–10.8)
WBC # BLD: 11.37 K/UL — HIGH (ref 4.8–10.8)
WBC # BLD: 12.31 K/UL — HIGH (ref 4.8–10.8)
WBC # FLD AUTO: 10.29 K/UL — SIGNIFICANT CHANGE UP (ref 4.8–10.8)
WBC # FLD AUTO: 11.37 K/UL — HIGH (ref 4.8–10.8)
WBC # FLD AUTO: 12.31 K/UL — HIGH (ref 4.8–10.8)

## 2019-01-07 PROCEDURE — 93306 TTE W/DOPPLER COMPLETE: CPT | Mod: 26

## 2019-01-07 RX ORDER — HEPARIN SODIUM 5000 [USP'U]/ML
1500 INJECTION INTRAVENOUS; SUBCUTANEOUS
Qty: 25000 | Refills: 0 | Status: DISCONTINUED | OUTPATIENT
Start: 2019-01-07 | End: 2019-01-09

## 2019-01-07 RX ORDER — MORPHINE SULFATE 50 MG/1
2 CAPSULE, EXTENDED RELEASE ORAL ONCE
Qty: 0 | Refills: 0 | Status: DISCONTINUED | OUTPATIENT
Start: 2019-01-07 | End: 2019-01-07

## 2019-01-07 RX ORDER — METOPROLOL TARTRATE 50 MG
5 TABLET ORAL ONCE
Qty: 0 | Refills: 0 | Status: COMPLETED | OUTPATIENT
Start: 2019-01-07 | End: 2019-01-07

## 2019-01-07 RX ORDER — CHLORHEXIDINE GLUCONATE 213 G/1000ML
1 SOLUTION TOPICAL
Qty: 0 | Refills: 0 | Status: DISCONTINUED | OUTPATIENT
Start: 2019-01-07 | End: 2019-01-16

## 2019-01-07 RX ORDER — MORPHINE SULFATE 50 MG/1
2 CAPSULE, EXTENDED RELEASE ORAL EVERY 6 HOURS
Qty: 0 | Refills: 0 | Status: DISCONTINUED | OUTPATIENT
Start: 2019-01-07 | End: 2019-01-14

## 2019-01-07 RX ADMIN — Medication 1 MILLIGRAM(S): at 13:17

## 2019-01-07 RX ADMIN — PANTOPRAZOLE SODIUM 40 MILLIGRAM(S): 20 TABLET, DELAYED RELEASE ORAL at 06:08

## 2019-01-07 RX ADMIN — ATORVASTATIN CALCIUM 10 MILLIGRAM(S): 80 TABLET, FILM COATED ORAL at 21:01

## 2019-01-07 RX ADMIN — Medication 2000 UNIT(S): at 13:18

## 2019-01-07 RX ADMIN — MORPHINE SULFATE 2 MILLIGRAM(S): 50 CAPSULE, EXTENDED RELEASE ORAL at 09:45

## 2019-01-07 RX ADMIN — Medication 40 MILLIGRAM(S): at 06:09

## 2019-01-07 RX ADMIN — Medication 75 MILLIGRAM(S): at 06:09

## 2019-01-07 RX ADMIN — MORPHINE SULFATE 2 MILLIGRAM(S): 50 CAPSULE, EXTENDED RELEASE ORAL at 01:24

## 2019-01-07 RX ADMIN — Medication 75 MILLIGRAM(S): at 17:26

## 2019-01-07 RX ADMIN — Medication 5 MILLIGRAM(S): at 17:13

## 2019-01-07 RX ADMIN — HEPARIN SODIUM 15 UNIT(S)/HR: 5000 INJECTION INTRAVENOUS; SUBCUTANEOUS at 09:53

## 2019-01-07 RX ADMIN — MORPHINE SULFATE 2 MILLIGRAM(S): 50 CAPSULE, EXTENDED RELEASE ORAL at 09:14

## 2019-01-07 RX ADMIN — MORPHINE SULFATE 2 MILLIGRAM(S): 50 CAPSULE, EXTENDED RELEASE ORAL at 17:45

## 2019-01-07 RX ADMIN — PANTOPRAZOLE SODIUM 40 MILLIGRAM(S): 20 TABLET, DELAYED RELEASE ORAL at 17:26

## 2019-01-07 RX ADMIN — MORPHINE SULFATE 2 MILLIGRAM(S): 50 CAPSULE, EXTENDED RELEASE ORAL at 17:13

## 2019-01-07 RX ADMIN — MORPHINE SULFATE 2 MILLIGRAM(S): 50 CAPSULE, EXTENDED RELEASE ORAL at 03:00

## 2019-01-07 NOTE — CONSULT NOTE ADULT - ASSESSMENT
IMPRESSION: S/P CP arrest few minutes?, s/p epi, admitted for low hg, GI bleed, multiple co morbidities      PLAN:    CNS: no CNS depressant    HEENT:  Oral care    PULMONARY:  HOB @ 45 degrees, aspiration precaution    CARDIOVASCULAR: b blocker, need EPS for interrogation    GI: GI prophylaxis                                          Feeding 	NPO    RENAL:  F/u  lytes.  Correct as needed. accurate I/O    INFECTIOUS DISEASE: no ABX    HEMATOLOGICAL:  DVT prophylaxis. CBC Q 6H    ENDOCRINE:  Follow up FS.  Insulin protocol if needed    CODE STATUS: FULL CODE    DISPOSITION: Pt requires continued monitoring in the MICU    POOR PROGNOSIS

## 2019-01-07 NOTE — PROGRESS NOTE ADULT - SUBJECTIVE AND OBJECTIVE BOX
Patient is a 78y old  Female who presents with a chief complaint of dark black stools (07 Jan 2019 11:30)      Subjective: patient is sitting in bed comfortably . She reported chest pain from compressions. She had melanotic stool overnight . She received 2 PRBCs yesterday      Vital Signs Last 24 Hrs  T(C): 36.5 (07 Jan 2019 12:00), Max: 36.9 (06 Jan 2019 17:30)  T(F): 97.7 (07 Jan 2019 12:00), Max: 98.5 (06 Jan 2019 17:30)  HR: 92 (07 Jan 2019 12:00) (84 - 147)  BP: 90/55 (07 Jan 2019 12:00) (79/58 - 130/70)  BP(mean): 68 (07 Jan 2019 12:00) (67 - 97)  RR: 23 (07 Jan 2019 12:00) (13 - 32)  SpO2: 100% (07 Jan 2019 12:00) (98% - 100%)    PHYSICAL EXAM  General: adult in pain  HEENT: clear oropharynx, anicteric sclera, pale conjunctiva  Neck: supple  CV: normal S1/S2 , + click and murmur  Abdomen: soft non-tender non-distended  Ext:+ edema  Neuro: alert and oriented X 4, no focal deficits    MEDICATIONS  (STANDING):  atorvastatin 10 milliGRAM(s) Oral at bedtime  cholecalciferol 2000 Unit(s) Oral daily  folic acid 1 milliGRAM(s) Oral daily  furosemide    Tablet 40 milliGRAM(s) Oral daily  heparin  Infusion 1500 Unit(s)/Hr (15 mL/Hr) IV Continuous <Continuous>  metoprolol tartrate 75 milliGRAM(s) Oral every 12 hours  pantoprazole  Injectable 40 milliGRAM(s) IV Push two times a day    MEDICATIONS  (PRN):  morphine  - Injectable 2 milliGRAM(s) IV Push every 6 hours PRN Severe Pain (7 - 10)      LABS:                          7.4    12.31 )-----------( 198      ( 07 Jan 2019 04:22 )             22.7         Mean Cell Volume : 75.7 fL  Mean Cell Hemoglobin : 24.7 pg  Mean Cell Hemoglobin Concentration : 32.6 g/dL  Auto Neutrophil # : x  Auto Lymphocyte # : x  Auto Monocyte # : x  Auto Eosinophil # : x  Auto Basophil # : x  Auto Neutrophil % : x  Auto Lymphocyte % : x  Auto Monocyte % : x  Auto Eosinophil % : x  Auto Basophil % : x      Serial CBC's  01-07 @ 04:22  Hct-22.7 / Hgb-7.4 / Plat-198 / RBC-3.00 / WBC-12.31  Serial CBC's  01-06 @ 21:35  Hct-26.0 / Hgb-8.6 / Plat-204 / RBC-3.40 / WBC-15.20  Serial CBC's  01-06 @ 11:34  Hct-23.1 / Hgb-7.6 / Plat-221 / RBC-3.09 / WBC-9.09  Serial CBC's  01-06 @ 07:10  Hct-20.6 / Hgb-6.7 / Plat-218 / RBC-2.79 / WBC-7.91  Serial CBC's  01-05 @ 17:47  Hct-23.2 / Hgb-7.5 / Plat-238 / RBC-3.17 / WBC-9.57  Serial CBC's  01-05 @ 07:14  Hct-24.2 / Hgb-7.7 / Plat-249 / RBC-3.30 / WBC-9.60  Serial CBC's  01-05 @ 02:11  Hct-24.2 / Hgb-7.8 / Plat-239 / RBC-3.31 / WBC-9.18  Serial CBC's  01-04 @ 18:03  Hct-24.4 / Hgb-7.8 / Plat-279 / RBC-3.38 / WBC-10.30  Serial CBC's  01-04 @ 07:36  Hct-23.1 / Hgb-7.7 / Plat-251 / RBC-3.20 / WBC-8.54  Serial CBC's  01-03 @ 22:53  Hct-22.6 / Hgb-7.3 / Plat-247 / RBC-3.18 / WBC-9.34  Serial CBC's  01-03 @ 19:19  Hct-23.6 / Hgb-7.7 / Plat-270 / RBC-3.31 / WBC-9.35      01-07    139  |  99  |  30<H>  ----------------------------<  176<H>  4.1   |  25  |  1.5    Ca    7.1<L>      07 Jan 2019 04:22    TPro  5.2<L>  /  Alb  2.8<L>  /  TBili  1.6<H>  /  DBili  x   /  AST  20  /  ALT  11  /  AlkPhos  111  01-07      PTT - ( 06 Jan 2019 07:10 )  PTT:66.5 sec    Reticulocyte Percent: 1.6 % (01-04 @ 07:36)    RADIOLOGY & ADDITIONAL STUDIES:  < from: Xray Chest 1 View- PORTABLE-Urgent (01.07.19 @ 08:15) >  Impression:      Cardiomegaly. CHF.    Worsening.          < end of copied text >

## 2019-01-07 NOTE — PROGRESS NOTE ADULT - SUBJECTIVE AND OBJECTIVE BOX
SUBJECTIVE:    Patient is a 78y old Female who presents with a chief complaint of dark black stools (07 Jan 2019 11:30)    Currently admitted to medicine with the primary diagnosis of new SVT, acute on chronic anemia (multifactorial)      Today is hospital day 5d. Patient upgraded from 3C for hypotensive episode s/p amiodarone infusion leading to CODE BLUE being called for absence of pulse with 2 cycles of chest compression and 1 push of epinephrine before ROSC was achieved. Patient's hemoglobin is still dropping. We will transfuse today if needed. GI notified of cardiac events - to reschedule EGD/colonoscopy. EPS to see patient and interrogate pacemaker.     Nursing staff overnight reported one bright red bowel movement. For any other evidence of GI bleed, GI to be called STAT.     PAST MEDICAL & SURGICAL HISTORY  CVA (cerebral vascular accident)  Anemia  Rheumatic fever  Diabetes  Osteoporosis  Mitral valve replaced  Atrial fibrillation: on coumadin at home  Hypertension  History of ureter stent  S/P AVR  H/O mitral valve replacement    SOCIAL HISTORY:  Patient denies alcohol, tobacco, and recreational drug use.     From ( X) home ( ) nursing home ( ) other   Ambulation status: (X ) ambulates independently ( ) ambulates with assistance ( ) ambulates with device ( ) dependent   Device: ( ) rolling walker ( ) cane     ALLERGIES:  No Known Drug Allergies  shellfish (Unknown)    MEDICATIONS:  STANDING MEDICATIONS  atorvastatin 10 milliGRAM(s) Oral at bedtime  cholecalciferol 2000 Unit(s) Oral daily  folic acid 1 milliGRAM(s) Oral daily  furosemide    Tablet 40 milliGRAM(s) Oral daily  heparin  Infusion 1500 Unit(s)/Hr IV Continuous <Continuous>  metoprolol tartrate 75 milliGRAM(s) Oral every 12 hours  pantoprazole  Injectable 40 milliGRAM(s) IV Push two times a day    PRN MEDICATIONS  morphine  - Injectable 2 milliGRAM(s) IV Push every 6 hours PRN    VITALS:   T(F): 97.7  HR: 92  BP: 90/55  RR: 23  SpO2: 100%    LABS:                        7.4    12.31 )-----------( 198      ( 07 Jan 2019 04:22 )             22.7     01-07    139  |  99  |  30<H>  ----------------------------<  176<H>  4.1   |  25  |  1.5    Ca    7.1<L>      07 Jan 2019 04:22    TPro  5.2<L>  /  Alb  2.8<L>  /  TBili  1.6<H>  /  DBili  x   /  AST  20  /  ALT  11  /  AlkPhos  111  01-07    PTT - ( 06 Jan 2019 07:10 )  PTT:66.5 sec    ABG - ( 06 Jan 2019 19:29 )  pH, Arterial: 7.36  pH, Blood: x     /  pCO2: 48    /  pO2: 258   / HCO3: 27    / Base Excess: 1.0   /  SaO2: 100       RADIOLOGY:  Echo ordered    CXR  Impression:    Cardiomegaly. CHF.  Worsening.    PHYSICAL EXAM:  GEN: No acute distress, lying comfortably in bed   LUNGS: Clear to auscultation bilaterally, no labored breathing   HEART: Normal rate, regular rhythm, no murmurs appreciated. + Chest tenderness (after chest compressions)   ABD: Soft, non-tender, non-distended. Bowel sounds present.   EXT: Noncyanotic, nonedematous, 2+ peripheral pulses, skin intact   NEURO: AAOX3, CN II-XII grossly intact     Lines/Tubes   02 via nasal cannula  Peripheral IV access

## 2019-01-07 NOTE — PROGRESS NOTE ADULT - ASSESSMENT
# Acute on chronic anemia: Multifactorial anemia  - R/O GI bleed : had extensive GI workup in the past which was negative . 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 Hemolysis panel reviewed  - S/p BM Bx, results are inconclusive, mild MDS is not excluded   - Continue on folic acid  - Repeat Hb today . If < 8, consider transfusing 2 PRBCs in light of active bleeding    # Cardiac arrythmias: no asystole on pacemaker interrogation - Medications adjusted by Cardiology    # Transitional cell cancer of the kidney, s/p resection in [facility]  on 6/1/2018, and reresection on 10/29/2018 (suspected initial stage was IIIa [id number])   - Following with Oncology (Dr. Fields) at [facility]  - Was previously consented for Ambassador trial and randomized to observation arm   - Follow up CT A/P on 12/6/2018  revealed L adnexal ovoid mass at 6.4x6.2cm, fluid density on attenuation as well as interval development of soft tissue density somewhat confluent masslike conglomerates along left para-aortic-iliac chain, most pronounced below level of aortic bifurcation, suspicious for mets, mildly prominent b/l lymph nodes in the inguinal regions, suspicious for mets, small right pleural effusion with interval increase.   - PET CT to restage was ordered on 12/27/2018 - not performed yet . Would hold on CT scan with IV contrast due to high creatinine  - Radiation oncology pending , now postponed due to acute events    # Metallic mitral valve, replaced with tissue valve on 3/19/2018, metallic aortic valve in place  --On chronic anticoagulation with Coumadin for A. fib and metallic AV, Heparin gtt for GI procedure    # LLE : improving - likely 2/2 venous compression 2/2 adenopathy   -- L soleal DVT    Case discussed with Dr Alvarenga

## 2019-01-07 NOTE — CHART NOTE - NSCHARTNOTEFT_GEN_A_CORE
Patient was a code blue on 1/6/19 @ approx 715 pm. Patient was tachycardic earlier in the day secondary to possible SVT and thus was upgraded to telemetry for cardiac monitoring. Patient was started on amiodarone bolus dosing after which she experienced cardiac arrest which lasted seconds and then patient achieved ROSC. Patient had some chest compressions in this down time.   Patient's amiodarone drip was discontinued, I personally interrogated PPM with Dr. Medel via phone and with [employer] representative as well.  Patient had no atrial spikes on EGM, ventricular tachycardia of approx 120's  patients atrial lead settings were changed to unipolar following patient started capturing, 1:1 A to V conduction suggesting possible atrial tachycardia  Patient's lead sensitivities were checked, wnl  Patient's pacing mode was changed to from VDD to DDI, Dr. Medel aware of changes, will f/u patient in am.     Patient was upgraded to CCU, amiodarone was stopped. Cardiology will follow patient.

## 2019-01-07 NOTE — CONSULT NOTE ADULT - SUBJECTIVE AND OBJECTIVE BOX
Patient is a 78y old  Female who presents with a chief complaint of dark black stools (07 Jan 2019 08:13)      HPI:  78 year old female with PMH of HTN, type II DM, A-fib on coumadin, h/o bradycardia s/p pacemaker, rheumatic heart disease s/p bioprosthetic mitral valve replacement, s/p metallic aortic valve replacement, and HFpEF, history of RLE DVT 25 year sago, Left nephrectomy, and osteoporosis, presents to the ED for dark black stools since 3 days. As per patient and son at bedside she has dark black stools since 3 days, not painful, last bowel movement last night, no bright red blood, no hematemesis, no dysphagia, buit admits to weight loss of 30 pounds in last year, loss of appetite and chronic intermittent abdominal pain, diffuse, no aggravating factors and relieving factor 3-4/10 in intensity. Also has c/o feeling weak, pale, light headedness. She receives frequent transfusions for microcytic anemia, last transfusion was three weeks ago follows up with Dr. Alvarenga she went to see Dr. Alvarenga blood work showed Hb of 4.8 referred to hospital.  Denies fever, chills, nausea, vomiting, dysuria,   pt was recently in hospital for left leg cellulitis; reports trace edema to left knee without pain.  She checks her INR regularly at home goal INR as per their cardiologist is 2-3. She didn;t receive coumadin today, last dose was yesterday 2.5mg   Last endoscopy and colonoscopy was done 5 years ago as per patient was normal.    vitals in ED: 108/51, 94, 98.5, 18, 96% on room air, Labs Hb 4.8(Baseline 7-8), INR 3.29, creatinine is 1.9 (baseline is 2 from may 2018,in april it was 1.1), chest xray was fine, s/p 2 units PRBC (02 Jan 2019 21:11), on the floor 6 unit PRBC, RECEIVED AMIODARONE PULSELESS, S/P EPI X1, HEPARIN DRIP, 1 EPISODE OF BLOODY BM      PAST MEDICAL & SURGICAL HISTORY:  CVA (cerebral vascular accident)  Anemia  Rheumatic fever  Diabetes  Osteoporosis  Mitral valve replaced  Atrial fibrillation: on coumadin at home  Hypertension  History of ureter stent  S/P AVR  H/O mitral valve replacement      SOCIAL HX:   Smoking  REVIEWED    FAMILY HISTORY:  No pertinent family history in first degree relatives      REVIEW OF SYSTEMS SEE HPI        Allergies    No Known Drug Allergies  shellfish (Unknown)    Intolerances        atorvastatin 10 milliGRAM(s) Oral at bedtime  cholecalciferol 2000 Unit(s) Oral daily  folic acid 1 milliGRAM(s) Oral daily  furosemide    Tablet 40 milliGRAM(s) Oral daily  metoprolol tartrate 75 milliGRAM(s) Oral every 12 hours  morphine  - Injectable 2 milliGRAM(s) IV Push every 6 hours PRN  pantoprazole  Injectable 40 milliGRAM(s) IV Push two times a day  : Home Meds:      PHYSICAL EXAM    ICU Vital Signs Last 24 Hrs  T(C): 36.7 (07 Jan 2019 08:00), Max: 37.1 (06 Jan 2019 09:15)  T(F): 98 (07 Jan 2019 08:00), Max: 98.8 (06 Jan 2019 09:15)  HR: 106 (07 Jan 2019 08:00) (84 - 147)  BP: 101/61 (07 Jan 2019 08:00) (90/59 - 130/70)  BP(mean): 78 (07 Jan 2019 08:00) (67 - 97)  RR: 24 (07 Jan 2019 08:00) (15 - 32)  SpO2: 100% (07 Jan 2019 08:00) (97% - 100%)      General:  HEENT: axox3, ill looking          Lymph Nodes: No cervical LN   Lungs: Bilateral BS, bibasilar crackles, pain on palapation  Cardiovascular: Regular  Abdomen: Soft, Positive BS  Extremities: No clubbing  Skin: Warm  Neurological: Non focal       01-06-19 @ 07:01  -  01-07-19 @ 07:00  --------------------------------------------------------  IN:    heparin Infusion: 192 mL    Sodium Chloride 0.9% IV Bolus: 1000 mL  Total IN: 1192 mL    OUT:  Total OUT: 0 mL    Total NET: 1192 mL          LABS:                          7.4    12.31 )-----------( 198      ( 07 Jan 2019 04:22 )             22.7                                               01-07    139  |  99  |  30<H>  ----------------------------<  176<H>  4.1   |  25  |  1.5    Ca    7.1<L>      07 Jan 2019 04:22    TPro  5.2<L>  /  Alb  2.8<L>  /  TBili  1.6<H>  /  DBili  x   /  AST  20  /  ALT  11  /  AlkPhos  111  01-07      PTT - ( 06 Jan 2019 07:10 )  PTT:66.5 sec                                                                                     LIVER FUNCTIONS - ( 07 Jan 2019 04:22 )  Alb: 2.8 g/dL / Pro: 5.2 g/dL / ALK PHOS: 111 U/L / ALT: 11 U/L / AST: 20 U/L / GGT: x                                                                                                                                   ABG - ( 06 Jan 2019 19:29 )  pH, Arterial: 7.36  pH, Blood: x     /  pCO2: 48    /  pO2: 258   / HCO3: 27    / Base Excess: 1.0   /  SaO2: 100                 X-Rays   reviewed    MEDICATIONS  (STANDING):  atorvastatin 10 milliGRAM(s) Oral at bedtime  cholecalciferol 2000 Unit(s) Oral daily  folic acid 1 milliGRAM(s) Oral daily  furosemide    Tablet 40 milliGRAM(s) Oral daily  metoprolol tartrate 75 milliGRAM(s) Oral every 12 hours  pantoprazole  Injectable 40 milliGRAM(s) IV Push two times a day    MEDICATIONS  (PRN):  morphine  - Injectable 2 milliGRAM(s) IV Push every 6 hours PRN Severe Pain (7 - 10)

## 2019-01-07 NOTE — PROGRESS NOTE ADULT - SUBJECTIVE AND OBJECTIVE BOX
SUBJECTIVE:    Patient is a 78y old Female who presents with a chief complaint of dark black stools (07 Jan 2019 11:30)    Currently admitted to medicine with the primary diagnosis of Lower GI bleed     Today is hospital day 5d.     PAST MEDICAL & SURGICAL HISTORY  CVA (cerebral vascular accident)  Anemia  Rheumatic fever  Diabetes  Osteoporosis  Mitral valve replaced  Atrial fibrillation: on coumadin at home  Hypertension  History of ureter stent  S/P AVR  H/O mitral valve replacement    ALLERGIES:  No Known Drug Allergies  shellfish (Unknown)    MEDICATIONS:  STANDING MEDICATIONS  atorvastatin 10 milliGRAM(s) Oral at bedtime  cholecalciferol 2000 Unit(s) Oral daily  folic acid 1 milliGRAM(s) Oral daily  furosemide    Tablet 40 milliGRAM(s) Oral daily  heparin  Infusion 1500 Unit(s)/Hr IV Continuous <Continuous>  metoprolol tartrate 75 milliGRAM(s) Oral every 12 hours  pantoprazole  Injectable 40 milliGRAM(s) IV Push two times a day    PRN MEDICATIONS  morphine  - Injectable 2 milliGRAM(s) IV Push every 6 hours PRN    VITALS:   T(F): 97.7  HR: 92  BP: 90/55  RR: 23  SpO2: 100%    LABS:                        7.4    12.31 )-----------( 198      ( 07 Jan 2019 04:22 )             22.7     01-07    139  |  99  |  30<H>  ----------------------------<  176<H>  4.1   |  25  |  1.5    Ca    7.1<L>      07 Jan 2019 04:22    TPro  5.2<L>  /  Alb  2.8<L>  /  TBili  1.6<H>  /  DBili  x   /  AST  20  /  ALT  11  /  AlkPhos  111  01-07    PTT - ( 06 Jan 2019 07:10 )  PTT:66.5 sec    ABG - ( 06 Jan 2019 19:29 )  pH, Arterial: 7.36  pH, Blood: x     /  pCO2: 48    /  pO2: 258   / HCO3: 27    / Base Excess: 1.0   /  SaO2: 100                       RADIOLOGY:    PHYSICAL EXAM:  GEN: No acute distress  LUNGS: Clear to auscultation bilaterally   HEART: S1/S2 present. RRR.   ABD/ GI: Soft, non-tender, non-distended. Bowel sounds present  EXT: NC/NC/NE/2+PP/MARTELL  NEURO: AAOX3

## 2019-01-07 NOTE — PROGRESS NOTE ADULT - ASSESSMENT
Doubt cardiac arrest/asystole, as the patient has a functioning pacemaker. Likely hypotensive episode in the setting of a bolus of amiodarone.  EP will see her today.  No further amiodarone. C/w b-blocker. Would hold diuretic for now.  Re-start Heparin - the patient has a metallic valve in the aortic position. D/w CCU team.  She was not preped for colonoscopy, and this will be postponed until she is seen by EP and any necessary adjustments to the PPM programming are made.  C/w protonix.  Likely EGD/colonoscopy tomorrow, if stable.  Will follow with you.

## 2019-01-07 NOTE — CONSULT NOTE ADULT - ASSESSMENT
78 year old female with PMH of HTN, type II DM, A-fib on coumadin, h/o bradycardia s/p pacemaker, rheumatic heart disease s/p bioprosthetic mitral valve replacement, s/p metallic aortic valve replacement, and HFpEF, history of RLE DVT 25 year sago, Left nephrectomy, and osteoporosis a/w melena.   She was started on amiodarone bolus IV yesterday which resulted in a drop on BP and cardiac arrest.    1.  ? cardiac arrest   - I am unable to locate any telemetry strips.  Per chart, pt had a few rounds of chest compressions and then achieved ROSC    2.  SVT/AFib with RVR  - Increase Metoprolol to 100 q 12 hours if BP allows  - PPM was interrogated and changes made as above.  Will re-evaluate PPM today  - Pt is on heparin gtt for history of mechanical valve 78 year old female with PMH of HTN, type II DM, A-fib on coumadin, h/o bradycardia s/p pacemaker, rheumatic heart disease s/p bioprosthetic mitral valve replacement, s/p metallic aortic valve replacement, and HFpEF, history of RLE DVT 25 year sago, Left nephrectomy, and osteoporosis a/w melena.   She was started on amiodarone bolus IV yesterday which resulted in a drop on BP and cardiac arrest.    1.  ? cardiac arrest   - I am unable to locate any telemetry strips.  Per chart, pt had a few rounds of chest compressions and then achieved ROSC  - The interrogation of the device showed the normal pacemaker function. The questionable sudden cardiac arrest was not due to bradycardia but most likely from hypotension.    - Currently patient has accelerated the junctional rhythm with sinus bradycardia. Questionable cause of the junctional rhythm.  - If patient can tolerate beta blockers, may cont metoprolol.  - Patient device was changed back to DDI

## 2019-01-07 NOTE — CONSULT NOTE ADULT - SUBJECTIVE AND OBJECTIVE BOX
78 year old female with PMH of HTN, type II DM, A-fib on coumadin, h/o bradycardia s/p pacemaker, rheumatic heart disease s/p bioprosthetic mitral valve replacement, s/p metallic aortic valve replacement, and HFpEF, history of RLE DVT 25 year sago, Left nephrectomy, and osteoporosis, presents to the ED for dark black stools     While on 3b, An ECG showed SVT. The pt's HR did not improve with fluid resuscitation and pRBCs. The pts BP improved from 90s to 100s, however HR remained high. Pt was given denosine 6 mg IV push was given which broke her SVT, however, it immediately returned with HR in the 150s.  She was then started on IV amio load.  After load finished,  the patient was found unresponsive,and pulseless. code blue called at 7:17 on 1/6, pulse was back after 2 cycles of chest compression, and the patient back to SVT at 7:20 ( , RR 22, /96 ).  Pt has not received any additional amiodarone.    Pt's PPM was interrogated - Patient had no atrial spikes on EGM, ventricular tachycardia of approx 120's  patients atrial lead settings were changed to unipolar following patient started capturing, 1:1 A to V conduction suggesting possible atrial tachycardia  Patient's lead sensitivities were checked, wnl  Patient's pacing mode was changed to from VDD to DDI    PAST MEDICAL & SURGICAL HISTORY:  CVA (cerebral vascular accident)  Anemia  Rheumatic fever  Diabetes  Osteoporosis  Mitral valve replaced  Atrial fibrillation: on coumadin at home  Hypertension  History of ureter stent  S/P AVR  H/O mitral valve replacement    PREVIOUS DIAGNOSTIC TESTING:          MEDICATIONS  (STANDING):  atorvastatin 10 milliGRAM(s) Oral at bedtime  cholecalciferol 2000 Unit(s) Oral daily  folic acid 1 milliGRAM(s) Oral daily  furosemide    Tablet 40 milliGRAM(s) Oral daily  heparin  Infusion 1500 Unit(s)/Hr (15 mL/Hr) IV Continuous <Continuous>  metoprolol tartrate 75 milliGRAM(s) Oral every 12 hours  pantoprazole  Injectable 40 milliGRAM(s) IV Push two times a day    MEDICATIONS  (PRN):  morphine  - Injectable 2 milliGRAM(s) IV Push every 6 hours PRN Severe Pain (7 - 10)      FAMILY HISTORY:  No pertinent family history in first degree relatives      SOCIAL HISTORY:    CIGARETTES:none    ALCOHOL:none    Past Surgical History:PPM    Allergies:    No Known Drug Allergies  shellfish (Unknown)      REVIEW OF SYSTEMS:    CONSTITUTIONAL: No fever, weight loss, chills, shakes, or fatigue  EYES: No eye pain, visual disturbances, or discharge  ENMT:  No difficulty hearing, tinnitus, vertigo; No sinus or throat pain  NECK: No pain or stiffness  BREASTS: No pain, masses, or nipple discharge  RESPIRATORY: No cough, wheezing, hemoptysis, or shortness of breath  CARDIOVASCULAR: No chest pain, dyspnea, palpitations, dizziness, syncope, paroxysmal nocturnal dyspnea, orthopnea, or arm or leg swelling  GASTROINTESTINAL: No abdominal  or epigastric pain, nausea, vomiting, hematemesis, diarrhea, constipation, melena or bright red blood.  GENITOURINARY: No dysuria, nocturia, hematuria, or urinary incontinence  NEUROLOGICAL: No headaches, memory loss, slurred speech, limb weakness, loss of strength, numbness, or tremors  SKIN: No itching, burning, rashes, or lesions   LYMPH NODES: No enlarged glands  ENDOCRINE: No heat or cold intolerance, or hair loss  MUSCULOSKELETAL: No joint pain or swelling, muscle, back, or extremity pain  PSYCHIATRIC: No depression, anxiety, or difficulty sleeping  HEME/LYMPH: No easy bruising or bleeding gums  ALLERY AND IMMUNOLOGIC: No hives or rash.      Vital Signs Last 24 Hrs  T(C): 36.7 (07 Jan 2019 08:00), Max: 37 (06 Jan 2019 12:14)  T(F): 98 (07 Jan 2019 08:00), Max: 98.6 (06 Jan 2019 12:14)  HR: 92 (07 Jan 2019 10:00) (84 - 147)  BP: 85/55 (07 Jan 2019 10:00) (85/55 - 130/70)  BP(mean): 67 (07 Jan 2019 10:00) (67 - 97)  RR: 13 (07 Jan 2019 10:00) (13 - 32)  SpO2: 100% (07 Jan 2019 10:00) (98% - 100%)    PHYSICAL EXAM:        GENERAL: In no apparent distress, well nourished, and hydrated.  HEAD:  Atraumatic, Normocephalic  EYES: EOMI, PERRLA, conjunctiva and sclera clear  ENMT: No tonsillar erythema, exudates, or enlargements; ist mucous membranes, Good dentition, No lesions  NECK: Supple and normal thyroid.  No JVD or carotid bruit.  Carotid pulse is 2+ bilaterally.  HEART: Regular rate and rhythm; No murmurs, rubs, or gallops.  PULMONARY: Clear to auscultation and perfusion.  No rales, wheezing, or rhonchi bilaterally.  ABDOMEN: Soft, Nontender, Nondistended; Bowel sounds present  EXTREMITIES:  2+ Peripheral Pulses, No clubbing, cyanosis, or edema  LYMPH: No lymphadenopathy noted  NEUROLOGICAL: Grossly nonfocal      INTERPRETATION OF TELEMETRY:    ECG:    I&O's Detail    06 Jan 2019 07:01  -  07 Jan 2019 07:00  --------------------------------------------------------  IN:    heparin Infusion: 192 mL    Sodium Chloride 0.9% IV Bolus: 1000 mL  Total IN: 1192 mL    OUT:  Total OUT: 0 mL    Total NET: 1192 mL      07 Jan 2019 07:01  -  07 Jan 2019 11:31  --------------------------------------------------------  IN:    heparin Infusion: 30 mL  Total IN: 30 mL    OUT:    Voided: 200 mL  Total OUT: 200 mL    Total NET: -170 mL          LABS:                        7.4    12.31 )-----------( 198      ( 07 Jan 2019 04:22 )             22.7     01-07    139  |  99  |  30<H>  ----------------------------<  176<H>  4.1   |  25  |  1.5    Ca    7.1<L>      07 Jan 2019 04:22    TPro  5.2<L>  /  Alb  2.8<L>  /  TBili  1.6<H>  /  DBili  x   /  AST  20  /  ALT  11  /  AlkPhos  111  01-07        PTT - ( 06 Jan 2019 07:10 )  PTT:66.5 sec    BNP  I&O's Detail    06 Jan 2019 07:01  -  07 Jan 2019 07:00  --------------------------------------------------------  IN:    heparin Infusion: 192 mL    Sodium Chloride 0.9% IV Bolus: 1000 mL  Total IN: 1192 mL    OUT:  Total OUT: 0 mL    Total NET: 1192 mL      07 Jan 2019 07:01  -  07 Jan 2019 11:31  --------------------------------------------------------  IN:    heparin Infusion: 30 mL  Total IN: 30 mL    OUT:    Voided: 200 mL  Total OUT: 200 mL    Total NET: -170 mL        Daily Height in cm: 154.94 (07 Jan 2019 08:13)    Daily     RADIOLOGY & ADDITIONAL STUDIES:   Xray Chest 1 View- PORTABLE-Urgent (01.07.19 @ 08:15) >  Impression:      Cardiomegaly. CHF.    Worsening. 78 year old female with PMH of HTN, type II DM, A-fib on coumadin, h/o bradycardia s/p pacemaker, rheumatic heart disease s/p bioprosthetic mitral valve replacement, s/p metallic aortic valve replacement, and HFpEF, history of RLE DVT 25 year sago, Left nephrectomy, and osteoporosis, presents to the ED for dark black stools     While on 3b, An ECG showed SVT. The pt's HR did not improve with fluid resuscitation and pRBCs. The pts BP improved from 90s to 100s, however HR remained high. Pt was given denosine 6 mg IV push was given which broke her SVT, however, it immediately returned with HR in the 150s.  She was then started on IV amio load.  After load finished,  the patient was found unresponsive,and pulseless. code blue called at 7:17 on 1/6, pulse was back after 2 cycles of chest compression, and the patient back to SVT at 7:20 ( , RR 22, /96 ).  Pt has not received any additional amiodarone.    Pt's PPM was interrogated - Patient had no atrial spikes on EGM, ventricular tachycardia of approx 120's  patients atrial lead settings were changed to unipolar following patient started capturing, 1:1 A to V conduction suggesting possible atrial tachycardia  Patient's lead sensitivities were checked, wnl  Patient's pacing mode was changed to from VDD to DDI        PAST MEDICAL & SURGICAL HISTORY:  CVA (cerebral vascular accident)  Anemia  Rheumatic fever  Diabetes  Osteoporosis  Mitral valve replaced  Atrial fibrillation: on coumadin at home  Hypertension  History of ureter stent  S/P AVR  H/O mitral valve replacement    PREVIOUS DIAGNOSTIC TESTING:          MEDICATIONS  (STANDING):  atorvastatin 10 milliGRAM(s) Oral at bedtime  cholecalciferol 2000 Unit(s) Oral daily  folic acid 1 milliGRAM(s) Oral daily  furosemide    Tablet 40 milliGRAM(s) Oral daily  heparin  Infusion 1500 Unit(s)/Hr (15 mL/Hr) IV Continuous <Continuous>  metoprolol tartrate 75 milliGRAM(s) Oral every 12 hours  pantoprazole  Injectable 40 milliGRAM(s) IV Push two times a day    MEDICATIONS  (PRN):  morphine  - Injectable 2 milliGRAM(s) IV Push every 6 hours PRN Severe Pain (7 - 10)      FAMILY HISTORY:  No pertinent family history in first degree relatives      SOCIAL HISTORY:    CIGARETTES:none    ALCOHOL:none    Past Surgical History:PPM    Allergies:    No Known Drug Allergies  shellfish (Unknown)      REVIEW OF SYSTEMS:    CONSTITUTIONAL: No fever, weight loss, chills, shakes, or fatigue  EYES: No eye pain, visual disturbances, or discharge  ENMT:  No difficulty hearing, tinnitus, vertigo; No sinus or throat pain  NECK: No pain or stiffness  BREASTS: No pain, masses, or nipple discharge  RESPIRATORY: No cough, wheezing, hemoptysis, or shortness of breath  CARDIOVASCULAR: No chest pain, dyspnea, palpitations, dizziness, syncope, paroxysmal nocturnal dyspnea, orthopnea, or arm or leg swelling  GASTROINTESTINAL: No abdominal  or epigastric pain, nausea, vomiting, hematemesis, diarrhea, constipation, melena or bright red blood.  GENITOURINARY: No dysuria, nocturia, hematuria, or urinary incontinence  NEUROLOGICAL: No headaches, memory loss, slurred speech, limb weakness, loss of strength, numbness, or tremors  SKIN: No itching, burning, rashes, or lesions   LYMPH NODES: No enlarged glands  ENDOCRINE: No heat or cold intolerance, or hair loss  MUSCULOSKELETAL: No joint pain or swelling, muscle, back, or extremity pain  PSYCHIATRIC: No depression, anxiety, or difficulty sleeping  HEME/LYMPH: No easy bruising or bleeding gums  ALLERY AND IMMUNOLOGIC: No hives or rash.      Vital Signs Last 24 Hrs  T(C): 36.7 (07 Jan 2019 08:00), Max: 37 (06 Jan 2019 12:14)  T(F): 98 (07 Jan 2019 08:00), Max: 98.6 (06 Jan 2019 12:14)  HR: 92 (07 Jan 2019 10:00) (84 - 147)  BP: 85/55 (07 Jan 2019 10:00) (85/55 - 130/70)  BP(mean): 67 (07 Jan 2019 10:00) (67 - 97)  RR: 13 (07 Jan 2019 10:00) (13 - 32)  SpO2: 100% (07 Jan 2019 10:00) (98% - 100%)    PHYSICAL EXAM:        GENERAL: In no apparent distress, well nourished, and hydrated.  HEAD:  Atraumatic, Normocephalic  EYES: EOMI, PERRLA, conjunctiva and sclera clear  ENMT: No tonsillar erythema, exudates, or enlargements; ist mucous membranes, Good dentition, No lesions  NECK: Supple and normal thyroid.  No JVD or carotid bruit.  Carotid pulse is 2+ bilaterally.  HEART: Regular rate and rhythm; No murmurs, rubs, or gallops.  PULMONARY: Clear to auscultation and perfusion.  No rales, wheezing, or rhonchi bilaterally.  ABDOMEN: Soft, Nontender, Nondistended; Bowel sounds present  EXTREMITIES:  2+ Peripheral Pulses, No clubbing, cyanosis, or edema  LYMPH: No lymphadenopathy noted  NEUROLOGICAL: Grossly nonfocal      INTERPRETATION OF TELEMETRY:    ECG:    I&O's Detail    06 Jan 2019 07:01  -  07 Jan 2019 07:00  --------------------------------------------------------  IN:    heparin Infusion: 192 mL    Sodium Chloride 0.9% IV Bolus: 1000 mL  Total IN: 1192 mL    OUT:  Total OUT: 0 mL    Total NET: 1192 mL      07 Jan 2019 07:01  -  07 Jan 2019 11:31  --------------------------------------------------------  IN:    heparin Infusion: 30 mL  Total IN: 30 mL    OUT:    Voided: 200 mL  Total OUT: 200 mL    Total NET: -170 mL          LABS:                        7.4    12.31 )-----------( 198      ( 07 Jan 2019 04:22 )             22.7     01-07    139  |  99  |  30<H>  ----------------------------<  176<H>  4.1   |  25  |  1.5    Ca    7.1<L>      07 Jan 2019 04:22    TPro  5.2<L>  /  Alb  2.8<L>  /  TBili  1.6<H>  /  DBili  x   /  AST  20  /  ALT  11  /  AlkPhos  111  01-07        PTT - ( 06 Jan 2019 07:10 )  PTT:66.5 sec    BNP  I&O's Detail    06 Jan 2019 07:01  -  07 Jan 2019 07:00  --------------------------------------------------------  IN:    heparin Infusion: 192 mL    Sodium Chloride 0.9% IV Bolus: 1000 mL  Total IN: 1192 mL    OUT:  Total OUT: 0 mL    Total NET: 1192 mL      07 Jan 2019 07:01  -  07 Jan 2019 11:31  --------------------------------------------------------  IN:    heparin Infusion: 30 mL  Total IN: 30 mL    OUT:    Voided: 200 mL  Total OUT: 200 mL    Total NET: -170 mL        Daily Height in cm: 154.94 (07 Jan 2019 08:13)    Daily     RADIOLOGY & ADDITIONAL STUDIES:   Xray Chest 1 View- PORTABLE-Urgent (01.07.19 @ 08:15) >  Impression:      Cardiomegaly. CHF.    Worsening.

## 2019-01-07 NOTE — PROGRESS NOTE ADULT - ASSESSMENT
78 year old female with PMH of HTN, type II DM, A-fib on coumadin, h/o bradycardia s/p pacemaker, rheumatic heart disease s/p bioprosthetic mitral valve replacement, s/p metallic aortic valve replacement, and HFpEF, history of RLE DVT 25 year sago, Left nephrectomy, and osteoporosis, presents to the ED for dark black stools since 3 days. As per patient and son at bedside she has dark black stools since 3 days at presentation.      # Brief cardiac arrest/asystole,---as per cardiology-- the patient has a functioning pacemaker. Likely hypotensive episode in the setting of a bolus of amiodarone.  EP will see her today.  No further amiodarone. C/w b-blocker. Would hold diuretic for now.  Re-start Heparin - the patient has a metallic valve in the aortic position.       #symptomatic anemia 2/2 GI bleed  -cont to monitor cbc --tranfsue if Hb <7.5  -today hb 7.4  -s/p 4 prbc total transfused  -She was not prepped for colonoscopy, and this will be postponed until she is seen by EP and any necessary adjustments to the PPM programming are made.  C/w protonix.  Likely EGD/colonoscopy tomorrow, if stable.      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

## 2019-01-07 NOTE — PROGRESS NOTE ADULT - SUBJECTIVE AND OBJECTIVE BOX
Patient is a 78y old  Female who presents with a chief complaint of dark black stools (06 Jan 2019 14:04)          SUBJ:  Patient seen and examined. Events noted. As per records patient had a cardiac arrest, however no telemetry strips. Had brief episodes of being pulseless after a bolus of Amiodarone, responded to chest compressions. PPM was interrogated last night by the fellow.       MEDICATIONS  (STANDING):  atorvastatin 10 milliGRAM(s) Oral at bedtime  cholecalciferol 2000 Unit(s) Oral daily  folic acid 1 milliGRAM(s) Oral daily  furosemide    Tablet 40 milliGRAM(s) Oral daily  metoprolol tartrate 75 milliGRAM(s) Oral every 12 hours  pantoprazole  Injectable 40 milliGRAM(s) IV Push two times a day    MEDICATIONS  (PRN):  morphine  - Injectable 2 milliGRAM(s) IV Push every 6 hours PRN Severe Pain (7 - 10)            Vital Signs Last 24 Hrs  T(C): 36.7 (07 Jan 2019 08:00), Max: 37.1 (06 Jan 2019 09:15)  T(F): 98 (07 Jan 2019 08:00), Max: 98.8 (06 Jan 2019 09:15)  HR: 106 (07 Jan 2019 08:00) (84 - 147)  BP: 101/61 (07 Jan 2019 08:00) (90/59 - 130/70)  BP(mean): 78 (07 Jan 2019 08:00) (67 - 97)  RR: 24 (07 Jan 2019 08:00) (15 - 32)  SpO2: 100% (07 Jan 2019 08:00) (97% - 100%)      PHYSICAL EXAM:    GEN: AAO x 3, NAD  HEENT: NC/AT, PERRL  Neck: No JVD, no bruits  CV: irreg, S1-S2, 2/6 murmur  Lungs: CTAB  Abd: Soft, non-tender  Ext: No edema      I&O's Summary    06 Jan 2019 07:01  -  07 Jan 2019 07:00  --------------------------------------------------------  IN: 1192 mL / OUT: 0 mL / NET: 1192 mL    	    TTE:  < from: Transthoracic Echocardiogram (03.17.18 @ 08:30) >  Summary:   1. Left ventricular ejection fraction, by visual estimation, is 60 to   65%.   2. Normal global left ventricular systolic function.   3. Severely enlarged left atrium.   4. Severely enlarged right atrium.   5. RV is mildly dilated, moderately hypokinetic.   6. Mechanical valve in the mitral position is not well visualized.   Increased flow velocities consistent with peak PG > 30 mmHg but MVA is   calculated > 2 cm^2 by PHT. Trace MR. Comparison with prior ECHO studies   would be helpful.   7.Moderate tricuspid regurgitation.   8. Severe aortic regurgitation.   9. Mechanical valve in aortic position is not well vusalized.  10. Estimated pulmonary artery systolic pressure is 62.6 mmHg assuming a   right atrial pressure of 15 mmHg, which isconsistent with severe   pulmonary hypertension.  11. Peak transaortic gradient equals 49.5 mmHg, mean transaortic gradient   equals 33.7 mmHg, the calculated aortic valve area equals 0.85 cm² by the   continuity equation consistent with severe aortic stenosis.    < end of copied text >      LABS:                        7.4    12.31 )-----------( 198      ( 07 Jan 2019 04:22 )             22.7     01-07    139  |  99  |  30<H>  ----------------------------<  176<H>  4.1   |  25  |  1.5    Ca    7.1<L>      07 Jan 2019 04:22    TPro  5.2<L>  /  Alb  2.8<L>  /  TBili  1.6<H>  /  DBili  x   /  AST  20  /  ALT  11  /  AlkPhos  111  01-07        PTT - ( 06 Jan 2019 07:10 )  PTT:66.5 sec      BNP  RADIOLOGY & ADDITIONAL STUDIES:      IMPRESSION AND PLAN:

## 2019-01-07 NOTE — PROGRESS NOTE ADULT - ASSESSMENT
78 year old female with PMH of HTN, type II DM, A-fib on coumadin, h/o bradycardia s/p pacemaker, rheumatic heart disease s/p bioprosthetic mitral valve replacement, s/p metallic aortic valve replacement, microcytic anemia w/ frequent transfusions, HFpEF, R LE DVT (25 years ago), TCC s/p left nephrectomy and osteoporosis, presents to the ED for 3 days of melena, fatigue, pallor, light headedness after referral from Dr. Alvarenga for hemoglobin of 4.8 on blood work. While in 3C, patient developed SVT in 140s -> adenosine push -> refractory, patient received amiodarone dose and became hypotensive and "pulseless" leading to CODE BLUE and 2 cycles of chest compressions. Patient upgraded to CCU after return of pulse.     #Acute on chronic anemia: Multifactorial (metallic valve, GI bleed, etc)   -Baseline HB is 7-8  -Hb at admission 4.8 ->> now 7.4   -Total 6 units PRBCs received, s/p 2.5mg PO vitamin K   -GI: Colonoscopy scheduled for 1/7/19 cancelled due to cardiac events. Will reschedule. Monitor CBC and call GI for any acute drop in hemoglobin or evidence of GI bleed   -IV Protonix BID  -Goal hemoglobin > 7     #Acute SVT   -Rate control with BB for now, cardioversion when stable     #PMH of Atrial Fibrillation, bioprosthetic MV and metallic AV, CVA, HFrEF  -Home Coumadin on hold  -Heparin drip, monitor PTT (goal ~ 60)   -Metoprolol 75mg BID   -Hold Lasix   -Follow up 2D Echo   -F/u with Dr. Wolfe as outpatient    #Transitional cell cancer of the kidney, s/p resection in [facility] on 6/1/2018, and reresection on 10/29/2018 (suspected initial stage was IIIa [id number])   - Following with Oncology (Dr. Fields) at [facility]  - Heme/onc consult appreciated  - Hold off on PET CT for re-staging due to need for IV contrast   - Rad onc consulted     #Dyslipidemia   -Lipitor 10mg qhs     #Osteoporosis  -Raloxifene     #DM  -A1C 6.0   -Monitor fingersticks     #HTN  -Lasix on hold  -Continue Metoprolol     Diet: Clear liquids  Activity: OOBTC  DVt ppx: heparin drip  Gi ppx; Protonix  Dispo: From home  Code:Full

## 2019-01-08 LAB
ANION GAP SERPL CALC-SCNC: 15 MMOL/L — HIGH (ref 7–14)
ANION GAP SERPL CALC-SCNC: 18 MMOL/L — HIGH (ref 7–14)
APTT BLD: 71.5 SEC — CRITICAL HIGH (ref 27–39.2)
APTT BLD: 75.2 SEC — CRITICAL HIGH (ref 27–39.2)
BASOPHILS # BLD AUTO: 0.05 K/UL — SIGNIFICANT CHANGE UP (ref 0–0.2)
BASOPHILS # BLD AUTO: 0.05 K/UL — SIGNIFICANT CHANGE UP (ref 0–0.2)
BASOPHILS NFR BLD AUTO: 0.4 % — SIGNIFICANT CHANGE UP (ref 0–1)
BASOPHILS NFR BLD AUTO: 0.5 % — SIGNIFICANT CHANGE UP (ref 0–1)
BLD GP AB SCN SERPL QL: SIGNIFICANT CHANGE UP
BUN SERPL-MCNC: 34 MG/DL — HIGH (ref 10–20)
BUN SERPL-MCNC: 35 MG/DL — HIGH (ref 10–20)
CALCIUM SERPL-MCNC: 7.2 MG/DL — LOW (ref 8.5–10.1)
CALCIUM SERPL-MCNC: 7.5 MG/DL — LOW (ref 8.5–10.1)
CHLORIDE SERPL-SCNC: 96 MMOL/L — LOW (ref 98–110)
CHLORIDE SERPL-SCNC: 99 MMOL/L — SIGNIFICANT CHANGE UP (ref 98–110)
CK MB CFR SERPL CALC: 2.3 NG/ML — SIGNIFICANT CHANGE UP (ref 0.6–6.3)
CO2 SERPL-SCNC: 23 MMOL/L — SIGNIFICANT CHANGE UP (ref 17–32)
CO2 SERPL-SCNC: 25 MMOL/L — SIGNIFICANT CHANGE UP (ref 17–32)
CREAT SERPL-MCNC: 1.6 MG/DL — HIGH (ref 0.7–1.5)
CREAT SERPL-MCNC: 1.7 MG/DL — HIGH (ref 0.7–1.5)
EOSINOPHIL # BLD AUTO: 0.17 K/UL — SIGNIFICANT CHANGE UP (ref 0–0.7)
EOSINOPHIL # BLD AUTO: 0.23 K/UL — SIGNIFICANT CHANGE UP (ref 0–0.7)
EOSINOPHIL NFR BLD AUTO: 1.4 % — SIGNIFICANT CHANGE UP (ref 0–8)
EOSINOPHIL NFR BLD AUTO: 2.2 % — SIGNIFICANT CHANGE UP (ref 0–8)
GLUCOSE BLDC GLUCOMTR-MCNC: 176 MG/DL — HIGH (ref 70–99)
GLUCOSE BLDC GLUCOMTR-MCNC: 199 MG/DL — HIGH (ref 70–99)
GLUCOSE BLDC GLUCOMTR-MCNC: 216 MG/DL — HIGH (ref 70–99)
GLUCOSE BLDC GLUCOMTR-MCNC: 229 MG/DL — HIGH (ref 70–99)
GLUCOSE SERPL-MCNC: 186 MG/DL — HIGH (ref 70–99)
GLUCOSE SERPL-MCNC: 186 MG/DL — HIGH (ref 70–99)
HCT VFR BLD CALC: 22.4 % — LOW (ref 37–47)
HCT VFR BLD CALC: 23.1 % — LOW (ref 37–47)
HGB BLD-MCNC: 7.1 G/DL — CRITICAL LOW (ref 12–16)
HGB BLD-MCNC: 7.4 G/DL — CRITICAL LOW (ref 12–16)
IMM GRANULOCYTES NFR BLD AUTO: 1.2 % — HIGH (ref 0.1–0.3)
IMM GRANULOCYTES NFR BLD AUTO: 1.4 % — HIGH (ref 0.1–0.3)
LYMPHOCYTES # BLD AUTO: 0.59 K/UL — LOW (ref 1.2–3.4)
LYMPHOCYTES # BLD AUTO: 1.07 K/UL — LOW (ref 1.2–3.4)
LYMPHOCYTES # BLD AUTO: 10.2 % — LOW (ref 20.5–51.1)
LYMPHOCYTES # BLD AUTO: 4.9 % — LOW (ref 20.5–51.1)
MAGNESIUM SERPL-MCNC: 1.8 MG/DL — SIGNIFICANT CHANGE UP (ref 1.8–2.4)
MAGNESIUM SERPL-MCNC: 1.8 MG/DL — SIGNIFICANT CHANGE UP (ref 1.8–2.4)
MCHC RBC-ENTMCNC: 24.4 PG — LOW (ref 27–31)
MCHC RBC-ENTMCNC: 24.5 PG — LOW (ref 27–31)
MCHC RBC-ENTMCNC: 31.7 G/DL — LOW (ref 32–37)
MCHC RBC-ENTMCNC: 32 G/DL — SIGNIFICANT CHANGE UP (ref 32–37)
MCV RBC AUTO: 76.2 FL — LOW (ref 81–99)
MCV RBC AUTO: 77.2 FL — LOW (ref 81–99)
MONOCYTES # BLD AUTO: 0.66 K/UL — HIGH (ref 0.1–0.6)
MONOCYTES # BLD AUTO: 0.69 K/UL — HIGH (ref 0.1–0.6)
MONOCYTES NFR BLD AUTO: 5.5 % — SIGNIFICANT CHANGE UP (ref 1.7–9.3)
MONOCYTES NFR BLD AUTO: 6.6 % — SIGNIFICANT CHANGE UP (ref 1.7–9.3)
NEUTROPHILS # BLD AUTO: 10.46 K/UL — HIGH (ref 1.4–6.5)
NEUTROPHILS # BLD AUTO: 8.35 K/UL — HIGH (ref 1.4–6.5)
NEUTROPHILS NFR BLD AUTO: 79.3 % — HIGH (ref 42.2–75.2)
NEUTROPHILS NFR BLD AUTO: 86.4 % — HIGH (ref 42.2–75.2)
NRBC # BLD: 0 /100 WBCS — SIGNIFICANT CHANGE UP (ref 0–0)
PLATELET # BLD AUTO: 203 K/UL — SIGNIFICANT CHANGE UP (ref 130–400)
PLATELET # BLD AUTO: 216 K/UL — SIGNIFICANT CHANGE UP (ref 130–400)
POTASSIUM SERPL-MCNC: 4.7 MMOL/L — SIGNIFICANT CHANGE UP (ref 3.5–5)
POTASSIUM SERPL-MCNC: 5.1 MMOL/L — HIGH (ref 3.5–5)
POTASSIUM SERPL-SCNC: 4.7 MMOL/L — SIGNIFICANT CHANGE UP (ref 3.5–5)
POTASSIUM SERPL-SCNC: 5.1 MMOL/L — HIGH (ref 3.5–5)
RBC # BLD: 2.9 M/UL — LOW (ref 4.2–5.4)
RBC # BLD: 3.03 M/UL — LOW (ref 4.2–5.4)
RBC # FLD: 20.9 % — HIGH (ref 11.5–14.5)
RBC # FLD: 21.1 % — HIGH (ref 11.5–14.5)
SODIUM SERPL-SCNC: 137 MMOL/L — SIGNIFICANT CHANGE UP (ref 135–146)
SODIUM SERPL-SCNC: 139 MMOL/L — SIGNIFICANT CHANGE UP (ref 135–146)
TROPONIN T SERPL-MCNC: 0.03 NG/ML — CRITICAL HIGH
TYPE + AB SCN PNL BLD: SIGNIFICANT CHANGE UP
WBC # BLD: 10.52 K/UL — SIGNIFICANT CHANGE UP (ref 4.8–10.8)
WBC # BLD: 12.1 K/UL — HIGH (ref 4.8–10.8)
WBC # FLD AUTO: 10.52 K/UL — SIGNIFICANT CHANGE UP (ref 4.8–10.8)
WBC # FLD AUTO: 12.1 K/UL — HIGH (ref 4.8–10.8)

## 2019-01-08 RX ORDER — SODIUM CHLORIDE 9 MG/ML
1000 INJECTION, SOLUTION INTRAVENOUS
Qty: 0 | Refills: 0 | Status: DISCONTINUED | OUTPATIENT
Start: 2019-01-08 | End: 2019-01-10

## 2019-01-08 RX ORDER — SODIUM CHLORIDE 9 MG/ML
1000 INJECTION INTRAMUSCULAR; INTRAVENOUS; SUBCUTANEOUS ONCE
Qty: 0 | Refills: 0 | Status: COMPLETED | OUTPATIENT
Start: 2019-01-08 | End: 2019-01-08

## 2019-01-08 RX ORDER — METOPROLOL TARTRATE 50 MG
100 TABLET ORAL EVERY 12 HOURS
Qty: 0 | Refills: 0 | Status: DISCONTINUED | OUTPATIENT
Start: 2019-01-08 | End: 2019-01-16

## 2019-01-08 RX ORDER — ADENOSINE 3 MG/ML
6 INJECTION INTRAVENOUS ONCE
Qty: 0 | Refills: 0 | Status: COMPLETED | OUTPATIENT
Start: 2019-01-08 | End: 2019-01-08

## 2019-01-08 RX ORDER — DEXTROSE 50 % IN WATER 50 %
15 SYRINGE (ML) INTRAVENOUS ONCE
Qty: 0 | Refills: 0 | Status: DISCONTINUED | OUTPATIENT
Start: 2019-01-08 | End: 2019-01-09

## 2019-01-08 RX ORDER — VERAPAMIL HCL 240 MG
5 CAPSULE, EXTENDED RELEASE PELLETS 24 HR ORAL ONCE
Qty: 0 | Refills: 0 | Status: COMPLETED | OUTPATIENT
Start: 2019-01-08 | End: 2019-01-08

## 2019-01-08 RX ORDER — METOPROLOL TARTRATE 50 MG
100 TABLET ORAL EVERY 12 HOURS
Qty: 0 | Refills: 0 | Status: DISCONTINUED | OUTPATIENT
Start: 2019-01-08 | End: 2019-01-08

## 2019-01-08 RX ORDER — INSULIN LISPRO 100/ML
VIAL (ML) SUBCUTANEOUS
Qty: 0 | Refills: 0 | Status: DISCONTINUED | OUTPATIENT
Start: 2019-01-08 | End: 2019-01-09

## 2019-01-08 RX ORDER — DIAZEPAM 5 MG
2.5 TABLET ORAL ONCE
Qty: 0 | Refills: 0 | Status: DISCONTINUED | OUTPATIENT
Start: 2019-01-08 | End: 2019-01-08

## 2019-01-08 RX ORDER — DEXTROSE 50 % IN WATER 50 %
25 SYRINGE (ML) INTRAVENOUS ONCE
Qty: 0 | Refills: 0 | Status: DISCONTINUED | OUTPATIENT
Start: 2019-01-08 | End: 2019-01-09

## 2019-01-08 RX ORDER — VERAPAMIL HCL 240 MG
15 CAPSULE, EXTENDED RELEASE PELLETS 24 HR ORAL ONCE
Qty: 0 | Refills: 0 | Status: COMPLETED | OUTPATIENT
Start: 2019-01-08 | End: 2019-01-08

## 2019-01-08 RX ORDER — INSULIN LISPRO 100/ML
3 VIAL (ML) SUBCUTANEOUS
Qty: 0 | Refills: 0 | Status: DISCONTINUED | OUTPATIENT
Start: 2019-01-08 | End: 2019-01-09

## 2019-01-08 RX ORDER — SODIUM CHLORIDE 9 MG/ML
500 INJECTION INTRAMUSCULAR; INTRAVENOUS; SUBCUTANEOUS ONCE
Qty: 0 | Refills: 0 | Status: COMPLETED | OUTPATIENT
Start: 2019-01-08 | End: 2019-01-08

## 2019-01-08 RX ORDER — INSULIN GLARGINE 100 [IU]/ML
9 INJECTION, SOLUTION SUBCUTANEOUS AT BEDTIME
Qty: 0 | Refills: 0 | Status: DISCONTINUED | OUTPATIENT
Start: 2019-01-08 | End: 2019-01-09

## 2019-01-08 RX ORDER — DEXTROSE 50 % IN WATER 50 %
12.5 SYRINGE (ML) INTRAVENOUS ONCE
Qty: 0 | Refills: 0 | Status: DISCONTINUED | OUTPATIENT
Start: 2019-01-08 | End: 2019-01-09

## 2019-01-08 RX ORDER — GLUCAGON INJECTION, SOLUTION 0.5 MG/.1ML
1 INJECTION, SOLUTION SUBCUTANEOUS ONCE
Qty: 0 | Refills: 0 | Status: DISCONTINUED | OUTPATIENT
Start: 2019-01-08 | End: 2019-01-09

## 2019-01-08 RX ORDER — MAGNESIUM SULFATE 500 MG/ML
2 VIAL (ML) INJECTION DAILY
Qty: 0 | Refills: 0 | Status: DISCONTINUED | OUTPATIENT
Start: 2019-01-08 | End: 2019-01-09

## 2019-01-08 RX ADMIN — PANTOPRAZOLE SODIUM 40 MILLIGRAM(S): 20 TABLET, DELAYED RELEASE ORAL at 05:04

## 2019-01-08 RX ADMIN — SODIUM CHLORIDE 500 MILLILITER(S): 9 INJECTION INTRAMUSCULAR; INTRAVENOUS; SUBCUTANEOUS at 22:38

## 2019-01-08 RX ADMIN — ATORVASTATIN CALCIUM 10 MILLIGRAM(S): 80 TABLET, FILM COATED ORAL at 21:35

## 2019-01-08 RX ADMIN — HEPARIN SODIUM 15 UNIT(S)/HR: 5000 INJECTION INTRAVENOUS; SUBCUTANEOUS at 00:37

## 2019-01-08 RX ADMIN — MORPHINE SULFATE 2 MILLIGRAM(S): 50 CAPSULE, EXTENDED RELEASE ORAL at 04:28

## 2019-01-08 RX ADMIN — PANTOPRAZOLE SODIUM 40 MILLIGRAM(S): 20 TABLET, DELAYED RELEASE ORAL at 17:20

## 2019-01-08 RX ADMIN — Medication 1 MILLIGRAM(S): at 11:12

## 2019-01-08 RX ADMIN — ADENOSINE 6 MILLIGRAM(S): 3 INJECTION INTRAVENOUS at 02:35

## 2019-01-08 RX ADMIN — Medication 50 GRAM(S): at 05:03

## 2019-01-08 RX ADMIN — MORPHINE SULFATE 2 MILLIGRAM(S): 50 CAPSULE, EXTENDED RELEASE ORAL at 11:59

## 2019-01-08 RX ADMIN — Medication 15 MILLIGRAM(S): at 02:35

## 2019-01-08 RX ADMIN — SODIUM CHLORIDE 2000 MILLILITER(S): 9 INJECTION INTRAMUSCULAR; INTRAVENOUS; SUBCUTANEOUS at 03:02

## 2019-01-08 RX ADMIN — MORPHINE SULFATE 2 MILLIGRAM(S): 50 CAPSULE, EXTENDED RELEASE ORAL at 04:40

## 2019-01-08 RX ADMIN — Medication 5 MILLIGRAM(S): at 22:32

## 2019-01-08 RX ADMIN — SODIUM CHLORIDE 1000 MILLILITER(S): 9 INJECTION INTRAMUSCULAR; INTRAVENOUS; SUBCUTANEOUS at 18:44

## 2019-01-08 RX ADMIN — Medication 100 MILLIGRAM(S): at 05:04

## 2019-01-08 RX ADMIN — Medication 50 GRAM(S): at 11:12

## 2019-01-08 RX ADMIN — INSULIN GLARGINE 9 UNIT(S): 100 INJECTION, SOLUTION SUBCUTANEOUS at 23:04

## 2019-01-08 RX ADMIN — Medication 2000 UNIT(S): at 11:13

## 2019-01-08 RX ADMIN — Medication 2.5 MILLIGRAM(S): at 04:39

## 2019-01-08 RX ADMIN — MORPHINE SULFATE 2 MILLIGRAM(S): 50 CAPSULE, EXTENDED RELEASE ORAL at 12:30

## 2019-01-08 RX ADMIN — Medication 100 MILLIGRAM(S): at 17:18

## 2019-01-08 NOTE — PROVIDER CONTACT NOTE (OTHER) - BACKGROUND
Pt resting comfortably, had hemodialysis today, was a code blue earlier in admission, pt is npo for trach placement tomorrow.

## 2019-01-08 NOTE — PROGRESS NOTE ADULT - SUBJECTIVE AND OBJECTIVE BOX
Patient is a 78y old  Female who presents with a chief complaint of dark black stools (08 Jan 2019 15:39)      Subjective: Patient had BRBPR. She is getting 2 PRBCs transfusion today      Vital Signs Last 24 Hrs  T(C): 36.8 (08 Jan 2019 12:00), Max: 37.1 (07 Jan 2019 20:00)  T(F): 98.2 (08 Jan 2019 12:00), Max: 98.8 (07 Jan 2019 20:00)  HR: 112 (08 Jan 2019 15:00) (74 - 164)  BP: 94/59 (08 Jan 2019 15:00) (79/67 - 118/57)  BP(mean): 69 (08 Jan 2019 15:00) (55 - 93)  RR: 20 (08 Jan 2019 15:00) (14 - 37)  SpO2: 100% (08 Jan 2019 15:00) (93% - 100%)    PHYSICAL EXAM  General: adult in NAD - weak  HEENT: clear oropharynx, anicteric sclera, paleconjunctiva  Neck: supple  CV: IRRR, murmur , + click  Lungs: poor inspiratory effort  Abdomen: soft non-tender non-distended  Ext: + edema  Skin: no rashes and no petechiae  Neuro: alert and oriented X 4, no focal deficits    MEDICATIONS  (STANDING):  atorvastatin 10 milliGRAM(s) Oral at bedtime  chlorhexidine 4% Liquid 1 Application(s) Topical <User Schedule>  cholecalciferol 2000 Unit(s) Oral daily  dextrose 5%. 1000 milliLiter(s) (50 mL/Hr) IV Continuous <Continuous>  dextrose 50% Injectable 12.5 Gram(s) IV Push once  dextrose 50% Injectable 25 Gram(s) IV Push once  dextrose 50% Injectable 25 Gram(s) IV Push once  folic acid 1 milliGRAM(s) Oral daily  heparin  Infusion 1500 Unit(s)/Hr (15 mL/Hr) IV Continuous <Continuous>  insulin glargine Injectable (LANTUS) 9 Unit(s) SubCutaneous at bedtime  insulin lispro (HumaLOG) corrective regimen sliding scale   SubCutaneous three times a day before meals  insulin lispro Injectable (HumaLOG) 3 Unit(s) SubCutaneous before breakfast  insulin lispro Injectable (HumaLOG) 3 Unit(s) SubCutaneous before lunch  insulin lispro Injectable (HumaLOG) 3 Unit(s) SubCutaneous before dinner  magnesium sulfate  IVPB 2 Gram(s) IV Intermittent daily  metoprolol tartrate 100 milliGRAM(s) Oral every 12 hours  pantoprazole  Injectable 40 milliGRAM(s) IV Push two times a day    MEDICATIONS  (PRN):  dextrose 40% Gel 15 Gram(s) Oral once PRN Blood Glucose LESS THAN 70 milliGRAM(s)/deciliter  glucagon  Injectable 1 milliGRAM(s) IntraMuscular once PRN Glucose LESS THAN 70 milligrams/deciliter  morphine  - Injectable 2 milliGRAM(s) IV Push every 6 hours PRN Severe Pain (7 - 10)      LABS:                          7.1    12.10 )-----------( 203      ( 08 Jan 2019 04:19 )             22.4         Mean Cell Volume : 77.2 fL  Mean Cell Hemoglobin : 24.5 pg  Mean Cell Hemoglobin Concentration : 31.7 g/dL  Auto Neutrophil # : 10.46 K/uL  Auto Lymphocyte # : 0.59 K/uL  Auto Monocyte # : 0.66 K/uL  Auto Eosinophil # : 0.17 K/uL  Auto Basophil # : 0.05 K/uL  Auto Neutrophil % : 86.4 %  Auto Lymphocyte % : 4.9 %  Auto Monocyte % : 5.5 %  Auto Eosinophil % : 1.4 %  Auto Basophil % : 0.4 %      Serial CBC's  01-08 @ 04:19  Hct-22.4 / Hgb-7.1 / Plat-203 / RBC-2.90 / WBC-12.10  Serial CBC's  01-08 @ 02:26  Hct-23.1 / Hgb-7.4 / Plat-216 / RBC-3.03 / WBC-10.52  Serial CBC's  01-07 @ 21:26  Hct-22.7 / Hgb-7.4 / Plat-208 / RBC-2.98 / WBC-10.29  Serial CBC's  01-07 @ 11:43  Hct-24.5 / Hgb-7.7 / Plat-227 / RBC-3.17 / WBC-11.37  Serial CBC's  01-07 @ 04:22  Hct-22.7 / Hgb-7.4 / Plat-198 / RBC-3.00 / WBC-12.31  Serial CBC's  01-06 @ 21:35  Hct-26.0 / Hgb-8.6 / Plat-204 / RBC-3.40 / WBC-15.20  Serial CBC's  01-06 @ 11:34  Hct-23.1 / Hgb-7.6 / Plat-221 / RBC-3.09 / WBC-9.09  Serial CBC's  01-06 @ 07:10  Hct-20.6 / Hgb-6.7 / Plat-218 / RBC-2.79 / WBC-7.91  Serial CBC's  01-05 @ 17:47  Hct-23.2 / Hgb-7.5 / Plat-238 / RBC-3.17 / WBC-9.57  Serial CBC's  01-05 @ 07:14  Hct-24.2 / Hgb-7.7 / Plat-249 / RBC-3.30 / WBC-9.60  Serial CBC's  01-05 @ 02:11  Hct-24.2 / Hgb-7.8 / Plat-239 / RBC-3.31 / WBC-9.18  Serial CBC's  01-04 @ 18:03  Hct-24.4 / Hgb-7.8 / Plat-279 / RBC-3.38 / WBC-10.30      01-08    139  |  99  |  34<H>  ----------------------------<  186<H>  5.1<H>   |  25  |  1.6<H>    Ca    7.2<L>      08 Jan 2019 04:19  Mg     1.8     01-08    TPro  5.2<L>  /  Alb  2.8<L>  /  TBili  1.6<H>  /  DBili  x   /  AST  20  /  ALT  11  /  AlkPhos  111  01-07      PTT - ( 08 Jan 2019 04:19 )  PTT:75.2 sec    Reticulocyte Percent: 1.6 % (01-04 @ 07:36)                    Culture - Blood (collected 06 Jan 2019 21:35)  Source: .Blood None  Preliminary Report (08 Jan 2019 06:01):    No growth to date.      RADIOLOGY & ADDITIONAL STUDIES:  no new imaging

## 2019-01-08 NOTE — PROVIDER CONTACT NOTE (OTHER) - RECOMMENDATIONS
,d ordering another dose of hydralazine. Med was administered earlier for earlier hypertensive episode and pt responded well. will continue to monitor.

## 2019-01-08 NOTE — PROGRESS NOTE ADULT - SUBJECTIVE AND OBJECTIVE BOX
INTERVAL HPI/OVERNIGHT EVENTS:    78 year old female with PMH of HUGO, ? thalassemia, HTN, type II DM, A-fib on coumadin, h/o bradycardia s/p pacemaker, rheumatic heart disease s/p bioprosthetic mitral valve replacement, s/p metallic aortic valve replacement, and HFpEF, history of RLE DVT 25 year sago, ? Left nephrectomy, and osteoporosis, presents to the ED for dark red stools with clots for 3 x within 5 days. She mentioned having watery diarrhea after receiving clindamycin for cellulitis c/w dark red stools with clots. The patient has chronic HUGO s/p multiple transfusions last was 3 weeks ago. She never had similar symptoms in the past, denies NSAIDs.     GI [facility] [place]   Fe%: 13%   EGd and colonoscopy 5 yrs ago wnl as per patient   5/2009 dr palmer for strep bovis bacteremia: good prep, int and ext hemorrhoids   FH non significant  usually has 1 bm q 3 days, + weight loss over 1 yr     PAST MEDICAL & SURGICAL HISTORY:  CVA (cerebral vascular accident)  Anemia  Rheumatic fever  Diabetes  Osteoporosis  Mitral valve replaced  Atrial fibrillation: on coumadin at home  Hypertension  History of ureter stent  S/P AVR  H/O mitral valve replacement      Home Medications:  Coumadin 5 mg oral tablet: 1 tab(s) orally once a day,   please hold tonight (12/17), can resume tomorrow 12/18 or after visiting [facility] (17 Dec 2018 16:55)  famotidine 20 mg oral tablet: 1 tab(s) orally 2 times a day (14 Dec 2018 23:37)  furosemide 40 mg oral tablet: 1 tab(s) orally once a day (14 Dec 2018 23:37)  glimepiride 4 mg oral tablet: 1 tab(s) orally once a day (14 Dec 2018 23:37)  metoprolol tartrate 50 mg oral tablet: 1 tab(s) orally every 12 hours (14 Dec 2018 23:37)  Vitamin D3 2000 intl units oral capsule: 1 cap(s) orally once a day (14 Dec 2018 23:37)      MEDICATIONS  (STANDING):  atorvastatin 10 milliGRAM(s) Oral at bedtime  chlorhexidine 4% Liquid 1 Application(s) Topical <User Schedule>  cholecalciferol 2000 Unit(s) Oral daily  dextrose 5%. 1000 milliLiter(s) (50 mL/Hr) IV Continuous <Continuous>  dextrose 50% Injectable 12.5 Gram(s) IV Push once  dextrose 50% Injectable 25 Gram(s) IV Push once  dextrose 50% Injectable 25 Gram(s) IV Push once  folic acid 1 milliGRAM(s) Oral daily  heparin  Infusion 1500 Unit(s)/Hr (15 mL/Hr) IV Continuous <Continuous>  insulin glargine Injectable (LANTUS) 9 Unit(s) SubCutaneous at bedtime  insulin lispro (HumaLOG) corrective regimen sliding scale   SubCutaneous three times a day before meals  insulin lispro Injectable (HumaLOG) 3 Unit(s) SubCutaneous before breakfast  insulin lispro Injectable (HumaLOG) 3 Unit(s) SubCutaneous before lunch  insulin lispro Injectable (HumaLOG) 3 Unit(s) SubCutaneous before dinner  magnesium sulfate  IVPB 2 Gram(s) IV Intermittent daily  metoprolol tartrate 100 milliGRAM(s) Oral every 12 hours  pantoprazole  Injectable 40 milliGRAM(s) IV Push two times a day    MEDICATIONS  (PRN):  dextrose 40% Gel 15 Gram(s) Oral once PRN Blood Glucose LESS THAN 70 milliGRAM(s)/deciliter  glucagon  Injectable 1 milliGRAM(s) IntraMuscular once PRN Glucose LESS THAN 70 milligrams/deciliter  morphine  - Injectable 2 milliGRAM(s) IV Push every 6 hours PRN Severe Pain (7 - 10)      Allergies    No Known Drug Allergies  shellfish (Unknown)    Intolerances        Review of systems  General: mild fatigue   Skin: no rash   Ophtalmologic: no visual changes   Respiratory: no shortness of breath   Cardiovascular: no chest pain   Gastrointestinal: as per H&P   Genitourinary: no dysuria   Neurological: no weakness   otherwise as described above     PHYSICAL EXAM:   Vital Signs:  Vital Signs Last 24 Hrs  T(C): 36.8 (08 Jan 2019 12:00), Max: 37.1 (07 Jan 2019 16:00)  T(F): 98.2 (08 Jan 2019 12:00), Max: 98.8 (07 Jan 2019 16:00)  HR: 112 (08 Jan 2019 15:00) (74 - 164)  BP: 94/59 (08 Jan 2019 15:00) (79/67 - 118/57)  BP(mean): 69 (08 Jan 2019 15:00) (55 - 93)  RR: 20 (08 Jan 2019 15:00) (14 - 37)  SpO2: 100% (08 Jan 2019 15:00) (93% - 100%)  Daily Height in cm: 154.94 (08 Jan 2019 08:16)    Daily     GENERAL:  no distress  SKIN: intact  HEENT:  NC/AT,  anicteric  CHEST:   no increased effort, breath sounds clear  HEART:  Regular rhythm  ABDOMEN:  Soft, non-tender, non-distended, normoactive bowel sounds,  no masses ,no hepato-splenomegaly, no signs of chronic liver disease  EXTREMITIES:  no cyanosis  PSYCHIATRIC: normal affect      LABS:                        7.1    12.10 )-----------( 203      ( 08 Jan 2019 04:19 )             22.4       Hemoglobin: 7.1 g/dL (01-08-19 @ 04:19)  Hemoglobin: 7.4 g/dL (01-08-19 @ 02:26)  Hemoglobin: 7.4 g/dL (01-07-19 @ 21:26)  Hemoglobin: 7.7 g/dL (01-07-19 @ 11:43)  Hemoglobin: 7.4 g/dL (01-07-19 @ 04:22)  Hemoglobin: 8.6 g/dL (01-06-19 @ 21:35)  Hemoglobin: 7.6 g/dL (01-06-19 @ 11:34)  Hemoglobin: 6.7 g/dL (01-06-19 @ 07:10)  Hemoglobin: 7.5 g/dL (01-05-19 @ 17:47)      01-08    139  |  99  |  34<H>  ----------------------------<  186<H>  5.1<H>   |  25  |  1.6<H>    Ca    7.2<L>      08 Jan 2019 04:19  Mg     1.8     01-08    TPro  5.2<L>  /  Alb  2.8<L>  /  TBili  1.6<H>  /  DBili  x   /  AST  20  /  ALT  11  /  AlkPhos  111  01-07          Alkaline Phosphatase, Serum: 111 U/L (01-07-19 @ 04:22)  Aspartate Aminotransferase (AST/SGOT): 20 U/L (01-07-19 @ 04:22)  Alanine Aminotransferase (ALT/SGPT): 11 U/L (01-07-19 @ 04:22)            RADIOLOGY & ADDITIONAL TESTS: INTERVAL HPI/OVERNIGHT EVENTS:    78 year old female with PMH of HUGO, ? thalassemia, HTN, type II DM, A-fib on coumadin, h/o bradycardia s/p pacemaker, rheumatic heart disease s/p bioprosthetic mitral valve replacement, s/p metallic aortic valve replacement, and HFpEF, history of RLE DVT 25 year sago, ? Left nephrectomy, and osteoporosis, presents to the ED for dark red stools with clots for 3 x within 5 days. She mentioned having watery diarrhea after receiving clindamycin for cellulitis c/w dark red stools with clots. The patient has chronic HUGO s/p multiple transfusions last was 3 weeks ago. She never had similar symptoms in the past, denies NSAIDs.     GI [facility] [place]   Fe%: 13%   EGd and colonoscopy 5 yrs ago wnl as per patient   5/2009 dr palmer for strep bovis bacteremia: good prep, int and ext hemorrhoids   FH non significant  usually has 1 bm q 3 days, + weight loss over 1 yr     1/8 had 1 x brbpr today    PAST MEDICAL & SURGICAL HISTORY:  CVA (cerebral vascular accident)  Anemia  Rheumatic fever  Diabetes  Osteoporosis  Mitral valve replaced  Atrial fibrillation: on coumadin at home  Hypertension  History of ureter stent  S/P AVR  H/O mitral valve replacement      Home Medications:  Coumadin 5 mg oral tablet: 1 tab(s) orally once a day,   please hold tonight (12/17), can resume tomorrow 12/18 or after visiting [facility] (17 Dec 2018 16:55)  famotidine 20 mg oral tablet: 1 tab(s) orally 2 times a day (14 Dec 2018 23:37)  furosemide 40 mg oral tablet: 1 tab(s) orally once a day (14 Dec 2018 23:37)  glimepiride 4 mg oral tablet: 1 tab(s) orally once a day (14 Dec 2018 23:37)  metoprolol tartrate 50 mg oral tablet: 1 tab(s) orally every 12 hours (14 Dec 2018 23:37)  Vitamin D3 2000 intl units oral capsule: 1 cap(s) orally once a day (14 Dec 2018 23:37)      MEDICATIONS  (STANDING):  atorvastatin 10 milliGRAM(s) Oral at bedtime  chlorhexidine 4% Liquid 1 Application(s) Topical <User Schedule>  cholecalciferol 2000 Unit(s) Oral daily  dextrose 5%. 1000 milliLiter(s) (50 mL/Hr) IV Continuous <Continuous>  dextrose 50% Injectable 12.5 Gram(s) IV Push once  dextrose 50% Injectable 25 Gram(s) IV Push once  dextrose 50% Injectable 25 Gram(s) IV Push once  folic acid 1 milliGRAM(s) Oral daily  heparin  Infusion 1500 Unit(s)/Hr (15 mL/Hr) IV Continuous <Continuous>  insulin glargine Injectable (LANTUS) 9 Unit(s) SubCutaneous at bedtime  insulin lispro (HumaLOG) corrective regimen sliding scale   SubCutaneous three times a day before meals  insulin lispro Injectable (HumaLOG) 3 Unit(s) SubCutaneous before breakfast  insulin lispro Injectable (HumaLOG) 3 Unit(s) SubCutaneous before lunch  insulin lispro Injectable (HumaLOG) 3 Unit(s) SubCutaneous before dinner  magnesium sulfate  IVPB 2 Gram(s) IV Intermittent daily  metoprolol tartrate 100 milliGRAM(s) Oral every 12 hours  pantoprazole  Injectable 40 milliGRAM(s) IV Push two times a day    MEDICATIONS  (PRN):  dextrose 40% Gel 15 Gram(s) Oral once PRN Blood Glucose LESS THAN 70 milliGRAM(s)/deciliter  glucagon  Injectable 1 milliGRAM(s) IntraMuscular once PRN Glucose LESS THAN 70 milligrams/deciliter  morphine  - Injectable 2 milliGRAM(s) IV Push every 6 hours PRN Severe Pain (7 - 10)      Allergies    No Known Drug Allergies  shellfish (Unknown)    Intolerances        Review of systems  General: mild fatigue   Skin: no rash   Ophtalmologic: no visual changes   Respiratory: no shortness of breath   Cardiovascular: no chest pain   Gastrointestinal: as per H&P   Genitourinary: no dysuria   Neurological: no weakness   otherwise as described above     PHYSICAL EXAM:   Vital Signs:  Vital Signs Last 24 Hrs  T(C): 36.8 (08 Jan 2019 12:00), Max: 37.1 (07 Jan 2019 16:00)  T(F): 98.2 (08 Jan 2019 12:00), Max: 98.8 (07 Jan 2019 16:00)  HR: 112 (08 Jan 2019 15:00) (74 - 164)  BP: 94/59 (08 Jan 2019 15:00) (79/67 - 118/57)  BP(mean): 69 (08 Jan 2019 15:00) (55 - 93)  RR: 20 (08 Jan 2019 15:00) (14 - 37)  SpO2: 100% (08 Jan 2019 15:00) (93% - 100%)  Daily Height in cm: 154.94 (08 Jan 2019 08:16)    Daily     GENERAL:  no distress  SKIN: intact  HEENT:  NC/AT,  anicteric  CHEST:   no increased effort, breath sounds clear  HEART:  Regular rhythm  ABDOMEN:  Soft, non-tender, non-distended, normoactive bowel sounds,  no masses ,no hepato-splenomegaly, no signs of chronic liver disease  EXTREMITIES:  no cyanosis  PSYCHIATRIC: normal affect      LABS:                        7.1    12.10 )-----------( 203      ( 08 Jan 2019 04:19 )             22.4       Hemoglobin: 7.1 g/dL (01-08-19 @ 04:19)  Hemoglobin: 7.4 g/dL (01-08-19 @ 02:26)  Hemoglobin: 7.4 g/dL (01-07-19 @ 21:26)  Hemoglobin: 7.7 g/dL (01-07-19 @ 11:43)  Hemoglobin: 7.4 g/dL (01-07-19 @ 04:22)  Hemoglobin: 8.6 g/dL (01-06-19 @ 21:35)  Hemoglobin: 7.6 g/dL (01-06-19 @ 11:34)  Hemoglobin: 6.7 g/dL (01-06-19 @ 07:10)  Hemoglobin: 7.5 g/dL (01-05-19 @ 17:47)      01-08    139  |  99  |  34<H>  ----------------------------<  186<H>  5.1<H>   |  25  |  1.6<H>    Ca    7.2<L>      08 Jan 2019 04:19  Mg     1.8     01-08    TPro  5.2<L>  /  Alb  2.8<L>  /  TBili  1.6<H>  /  DBili  x   /  AST  20  /  ALT  11  /  AlkPhos  111  01-07          Alkaline Phosphatase, Serum: 111 U/L (01-07-19 @ 04:22)  Aspartate Aminotransferase (AST/SGOT): 20 U/L (01-07-19 @ 04:22)  Alanine Aminotransferase (ALT/SGPT): 11 U/L (01-07-19 @ 04:22)            RADIOLOGY & ADDITIONAL TESTS:

## 2019-01-08 NOTE — PROGRESS NOTE ADULT - ASSESSMENT
IMPRESSION: S/P CP arrest few minutes?, s/p epi, admitted for low hg, GI bleed, multiple co morbidities, NO ACTIVE BLEED, STILL ON IV HEPARIN FOR AVR      PLAN:    CNS: no CNS depressant    HEENT:  Oral care    PULMONARY:  HOB @ 45 degrees, aspiration precaution    CARDIOVASCULAR: b blocker, EPS f/UP    GI: GI prophylaxis                                          Feeding CLEAR    RENAL:  F/u  lytes.  Correct as needed. accurate I/O    INFECTIOUS DISEASE: no ABX    HEMATOLOGICAL:  DVT prophylaxis. CBC Q 12H    ENDOCRINE:  Follow up FS.  Insulin protocol if needed    CODE STATUS: FULL CODE    DISPOSITION: Pt requires continued monitoring in the MICU    POOR PROGNOSIS

## 2019-01-08 NOTE — PROGRESS NOTE ADULT - SUBJECTIVE AND OBJECTIVE BOX
Patient is a 78y old  Female who presents with a chief complaint of dark black stools (07 Jan 2019 12:40)    HPI:  78 year old female with PMH of HTN, type II DM, A-fib on coumadin, h/o bradycardia s/p pacemaker, rheumatic heart disease s/p bioprosthetic mitral valve replacement, s/p metallic aortic valve replacement, and HFpEF, history of RLE DVT 25 year sago, Left nephrectomy, and osteoporosis, presents to the ED for dark black stools since 3 days. As per patient and son at bedside she has dark black stools since 3 days, not painful, last bowel movement last night, no bright red blood, no hematemesis, no dysphagia, buit admits to weight loss of 30 pounds in last year, loss of appetite and chronic intermittent abdominal pain, diffuse, no aggravating factors and relieving factor 3-4/10 in intensity. Also has c/o feeling weak, pale, light headedness. She receives frequent transfusions for microcytic anemia, last transfusion was three weeks ago follows up with Dr. Alvarenga she went to see Dr. Alvarenga blood work showed Hb of 4.8 referred to hospital.  Denies fever, chills, nausea, vomiting, dysuria,   pt was recently in hospital for left leg cellulitis; reports trace edema to left knee without pain.  Last endoscopy and colonoscopy was done 5 years ago as per patient was normal.      SUBJ:  Patient seen and examined. Had another episode of atrial tachycardia last night, broke with verapamil. BB was increased to 100 mg. PPM was reprogrammed by EP.  C/o chest pain after CPR. No BM, no bleeding.      MEDICATIONS  (STANDING):  atorvastatin 10 milliGRAM(s) Oral at bedtime  chlorhexidine 4% Liquid 1 Application(s) Topical <User Schedule>  cholecalciferol 2000 Unit(s) Oral daily  folic acid 1 milliGRAM(s) Oral daily  heparin  Infusion 1500 Unit(s)/Hr (15 mL/Hr) IV Continuous <Continuous>  magnesium sulfate  IVPB 2 Gram(s) IV Intermittent daily  metoprolol tartrate 100 milliGRAM(s) Oral every 12 hours  pantoprazole  Injectable 40 milliGRAM(s) IV Push two times a day    MEDICATIONS  (PRN):  morphine  - Injectable 2 milliGRAM(s) IV Push every 6 hours PRN Severe Pain (7 - 10)            Vital Signs Last 24 Hrs  T(C): 37.1 (07 Jan 2019 20:00), Max: 37.1 (07 Jan 2019 16:00)  T(F): 98.8 (07 Jan 2019 20:00), Max: 98.8 (07 Jan 2019 16:00)  HR: 74 (08 Jan 2019 06:30) (74 - 164)  BP: 87/50 (08 Jan 2019 06:30) (79/58 - 118/57)  BP(mean): 61 (08 Jan 2019 06:30) (55 - 93)  RR: 15 (08 Jan 2019 06:30) (13 - 36)  SpO2: 100% (08 Jan 2019 06:30) (98% - 100%)      PHYSICAL EXAM:    GEN: AAO x 3, NAD  HEENT: NC/AT  Neck: No JVD  CV: irreg, S1- mech S2, 2/6 syst murmur  Lungs: decreased BS  Abd: Soft, non-tender  Ext: No edema      I&O's Summary    07 Jan 2019 07:01  -  08 Jan 2019 07:00  --------------------------------------------------------  IN: 2260 mL / OUT: 1051 mL / NET: 1209 mL    	        LABS:                        7.1    12.10 )-----------( 203      ( 08 Jan 2019 04:19 )             22.4     01-08    139  |  99  |  34<H>  ----------------------------<  186<H>  5.1<H>   |  25  |  1.6<H>    Ca    7.2<L>      08 Jan 2019 04:19  Mg     1.8     01-08    TPro  5.2<L>  /  Alb  2.8<L>  /  TBili  1.6<H>  /  DBili  x   /  AST  20  /  ALT  11  /  AlkPhos  111  01-07    CARDIAC MARKERS ( 08 Jan 2019 04:19 )  x     / 0.03 ng/mL / x     / x     / 2.3 ng/mL  CARDIAC MARKERS ( 07 Jan 2019 11:43 )  x     / 0.03 ng/mL / x     / x     / 5.1 ng/mL      PTT - ( 08 Jan 2019 04:19 )  PTT:75.2 sec      BNP  RADIOLOGY & ADDITIONAL STUDIES:      IMPRESSION AND PLAN:

## 2019-01-08 NOTE — PROGRESS NOTE ADULT - ASSESSMENT
IMPRESSION  Acute on Chronic anemia (multifactorial) r/o GI bleed  Paroxysmal SVT   Atrial fibrillation    PLAN    CNS: AAOx3, no CNS depressants     HEENT: Oral care     PULMONARY: HOB 45 degrees, aspiration precautions, wean off 02     CARDIOVASCULAR: Troponin stable, echo with EF 69% and severe pulmonary HTN. Follow up EPS recommendations. Continue metoprolol and heparin gtt. Monitor PTT.     GI: GI ppx. Clear liquid diet. Colonoscopy after EPS/cardio clearance.     RENAL: Monitor electrolytes and I&O     INFECTIOUS DISEASE: No active infection     HEMATOLOGICAL: Hemoglobin 7.1, transfusing 1 unit PRBCs and repeat CBC q12h. Heme/onc following for transitional cell carcinoma. On heparin gtt no need for DVT ppx.     ENDOCRINE: Elevated blood glucose levels, start insulin     CODE STATUS: FULL CODE    DISPOSITION: MICU monitoring     POOR PROGNOSIS

## 2019-01-08 NOTE — PROGRESS NOTE ADULT - SUBJECTIVE AND OBJECTIVE BOX
SUBJECTIVE:    Patient is a 78y old Female who presents with a chief complaint of dark black stools (08 Jan 2019 08:46)    Currently admitted to medicine with the primary diagnosis of Lower GI bleed     Today is hospital day 6d. Overnight, patient went into SVT which was not controlled with adenosine 6mg followed by 12mg. SVT broke after verapamil administration and converted to NSR. Hemoglobin 7.1 this AM, will transfuse 1 unit of PRBCs and repeat CBC in the afternoon. Patient will need cardiology and EPS clearance before colonoscopy scheduled.     PAST MEDICAL & SURGICAL HISTORY  CVA (cerebral vascular accident)  Anemia  Rheumatic fever  Diabetes  Osteoporosis  Mitral valve replaced  Atrial fibrillation: on coumadin at home  Hypertension  History of ureter stent  S/P AVR  H/O mitral valve replacement    SOCIAL HISTORY:  Patient denies alcohol, tobacco, and recreational drug use.     From ( X) home ( ) nursing home ( ) other   Ambulation status: (X ) ambulates independently ( ) ambulates with assistance ( ) ambulates with device ( ) dependent   Device: ( ) rolling walker ( ) cane     ALLERGIES:  No Known Drug Allergies  shellfish (Unknown)    MEDICATIONS:  STANDING MEDICATIONS  atorvastatin 10 milliGRAM(s) Oral at bedtime  chlorhexidine 4% Liquid 1 Application(s) Topical <User Schedule>  cholecalciferol 2000 Unit(s) Oral daily  folic acid 1 milliGRAM(s) Oral daily  heparin  Infusion 1500 Unit(s)/Hr IV Continuous <Continuous>  magnesium sulfate  IVPB 2 Gram(s) IV Intermittent daily  metoprolol tartrate 100 milliGRAM(s) Oral every 12 hours  pantoprazole  Injectable 40 milliGRAM(s) IV Push two times a day    PRN MEDICATIONS  morphine  - Injectable 2 milliGRAM(s) IV Push every 6 hours PRN    VITALS:   T(F): 98.1  HR: 130  BP: 95/61  RR: 19  SpO2: 100%    LABS:                        7.1    12.10 )-----------( 203      ( 08 Jan 2019 04:19 )             22.4     01-08    139  |  99  |  34<H>  ----------------------------<  186<H>  5.1<H>   |  25  |  1.6<H>    Ca    7.2<L>      08 Jan 2019 04:19  Mg     1.8     01-08    TPro  5.2<L>  /  Alb  2.8<L>  /  TBili  1.6<H>  /  DBili  x   /  AST  20  /  ALT  11  /  AlkPhos  111  01-07    PTT - ( 08 Jan 2019 04:19 )  PTT:75.2 sec    ABG - ( 06 Jan 2019 19:29 )  pH, Arterial: 7.36  pH, Blood: x     /  pCO2: 48    /  pO2: 258   / HCO3: 27    / Base Excess: 1.0   /  SaO2: 100       Troponin T, Serum: 0.03 ng/mL <HH> (01-08-19 @ 04:19)  Troponin T, Serum: 0.03 ng/mL <HH> (01-07-19 @ 11:43)    Culture - Blood (collected 06 Jan 2019 21:35)  Source: .Blood None  Preliminary Report (08 Jan 2019 06:01):    No growth to date.    CARDIAC MARKERS ( 08 Jan 2019 04:19 )  x     / 0.03 ng/mL / x     / x     / 2.3 ng/mL  CARDIAC MARKERS ( 07 Jan 2019 11:43 )  x     / 0.03 ng/mL / x     / x     / 5.1 ng/mL    RADIOLOGY:  No new imaging studies     PHYSICAL EXAM:  GEN: No acute distress, lying comfortably in bed   LUNGS: Clear to auscultation bilaterally, no labored breathing   HEART: Mechanical valve appreciated, normal rate and rhythm. Chest wall TTP.   ABD: Soft, non-tender, non-distended. Bowel sounds present.   EXT: Noncyanotic, L LE 1+edema, 2+ peripheral pulses, skin intact   NEURO: AAOX3, CN II-XII grossly intact     Lines/Tubes   Peripheral IV access  0.5L 02 via NC

## 2019-01-08 NOTE — PROVIDER CONTACT NOTE (EICU) - SITUATION
Pt  was at bedside and notified staff. RN was off unit at this time covering staff attended to patient.

## 2019-01-08 NOTE — CONSULT NOTE ADULT - ASSESSMENT
IMPRESSION: The patient is a 78 year old woman with a complicated history of urothelial carcinoma.  She has disease progression after 2 surgeries.  She has unexplained anemia.  GI work up is pending.     RECOMMENDATIONS:  Given recent events, GI work up with EGD/Colonoscopy would be a priority at this time (Scheduled 1/7/18).  Given her recent disease progression, She would need PET scan to determine if she has distant spread.  If her disease is still localized to the pelvis, then we can discuss pelvic radiation with a curative intent.  If she has mets, then her mainstay of treatment would be systemic chemotherapy although there could be some benefit to palliative pelvic radiation given the pelvic mass and her left lower extremity edema. All of this will really depend on how she does globally and the findings of the EGD/colonoscopy. She has my card and knows to call with questions.   Thank you for allowing us to participate in the care of this patient.      I spent a total of 40 minutes on this consultation, greater than 50% of which was spent counseling and/or coordinating care face to face.     Sincerely,  Christen Hampton M.D.     Electronically proofread and signed by:  Christen Hampton MD  Attending, Department of Radiation Medicine  [facility]

## 2019-01-08 NOTE — PROGRESS NOTE ADULT - ASSESSMENT
78 year old female with PMH of HUGO, ? thalassemia, HTN, type II DM, A-fib on coumadin, h/o bradycardia s/p pacemaker, rheumatic heart disease s/p bioprosthetic mitral valve replacement, s/p metallic aortic valve replacement, and HFpEF, history of RLE DVT 25 year sago, ? Left nephrectomy, and osteoporosis, presents to the ED for dark red stools with clots for 3 x within 5 days. She mentioned having watery diarrhea after receiving clindamycin for cellulitis c/w dark red stools with clots. The patient has chronic HUGO s/p multiple transfusions last was 3 weeks ago. She never had similar symptoms in the past, denies NSAIDs.     1- watery diarrhea c/w dark red stools with clots  r/o hemorrhoidal bleed vs others     check C Diff if diarrhea recurs   T&S, cbc q12h  transfuse as needed   s/p 3 u PRBCs   hold warfarin, consider bridging as per cardiology, daily INR     cardiology consult   Fe sat 13% in 12/2018, start FeSO4 after scope, consider IV iron and EPO  hematology fu   start diabetic diet: clear liquid diet Sunday for colonoscopy Monday.  Golytely one gallon Sunday  egd and colonoscopy +/- VCE on Monday 1/7/19  Patient may need to be bridged with heparin , and hold 6 h prior to Monday Procedure: discuss with primary team.    2- weight loss with decreased appettite     check TSH, esr, Hba1c   will discuss egd and colonoscopy +/- VCE  hematology fu for ? MDS   nutrition fu 78 year old female with PMH of HUGO, ? thalassemia, HTN, type II DM, A-fib on coumadin, h/o bradycardia s/p pacemaker, rheumatic heart disease s/p bioprosthetic mitral valve replacement, s/p metallic aortic valve replacement, and HFpEF, history of RLE DVT 25 year sago, ? Left nephrectomy, and osteoporosis, presents to the ED for dark red stools with clots for 3 x within 5 days. She mentioned having watery diarrhea after receiving clindamycin for cellulitis c/w dark red stools with clots. The patient has chronic HUGO s/p multiple transfusions last was 3 weeks ago. She never had similar symptoms in the past, denies NSAIDs.     1- watery diarrhea c/w dark red stools with clots  r/o hemorrhoidal bleed vs others     T&S, cbc q12h  2 x 18 gauge iv   transfuse as needed   hematology fu   Patient bridged with heparin   needs hemodynamic and cardiac stabilization   will follow

## 2019-01-08 NOTE — PROGRESS NOTE ADULT - SUBJECTIVE AND OBJECTIVE BOX
OVERNIGHT EVENTS: s/p atrial tachycardia, EPS, s/p verapamil, for 1 unit PRBC, NO PROCEDURE NOW    Vital Signs Last 24 Hrs  T(C): 37.1 (07 Jan 2019 20:00), Max: 37.1 (07 Jan 2019 16:00)  T(F): 98.8 (07 Jan 2019 20:00), Max: 98.8 (07 Jan 2019 16:00)  HR: 74 (08 Jan 2019 06:30) (74 - 164)  BP: 87/50 (08 Jan 2019 06:30) (79/58 - 118/57)  BP(mean): 61 (08 Jan 2019 06:30) (55 - 93)  RR: 15 (08 Jan 2019 06:30) (13 - 36)  SpO2: 100% (08 Jan 2019 06:30) (98% - 100%)    PHYSICAL EXAMINATION:    GENERAL: AXOX3 LOOKS COMFORTABLE    HEENT: Head is normocephalic and atraumatic. Extraocular muscles are intact. Mucous membranes are moist.    NECK: Supple.    LUNGS: DEC BS BOTH BASES    HEART: Regular rate and rhythm without murmur.    ABDOMEN: Soft, nontender, and nondistended.      EXTREMITIES: SWELLING +    NEUROLOGIC: Grossly intact.    SKIN: No ulceration or induration present.      LABS:                        7.1    12.10 )-----------( 203      ( 08 Jan 2019 04:19 )             22.4     01-08    139  |  99  |  34<H>  ----------------------------<  186<H>  5.1<H>   |  25  |  1.6<H>    Ca    7.2<L>      08 Jan 2019 04:19  Mg     1.8     01-08    TPro  5.2<L>  /  Alb  2.8<L>  /  TBili  1.6<H>  /  DBili  x   /  AST  20  /  ALT  11  /  AlkPhos  111  01-07    PTT - ( 08 Jan 2019 04:19 )  PTT:75.2 sec    ABG - ( 06 Jan 2019 19:29 )  pH, Arterial: 7.36  pH, Blood: x     /  pCO2: 48    /  pO2: 258   / HCO3: 27    / Base Excess: 1.0   /  SaO2: 100               CARDIAC MARKERS ( 08 Jan 2019 04:19 )  x     / 0.03 ng/mL / x     / x     / 2.3 ng/mL  CARDIAC MARKERS ( 07 Jan 2019 11:43 )  x     / 0.03 ng/mL / x     / x     / 5.1 ng/mL                  01-07-19 @ 07:01  -  01-08-19 @ 07:00  --------------------------------------------------------  IN: 2260 mL / OUT: 1051 mL / NET: 1209 mL        MICROBIOLOGY:  Culture Results:   No growth to date. (01-06 @ 21:35)      MEDICATIONS  (STANDING):  atorvastatin 10 milliGRAM(s) Oral at bedtime  chlorhexidine 4% Liquid 1 Application(s) Topical <User Schedule>  cholecalciferol 2000 Unit(s) Oral daily  folic acid 1 milliGRAM(s) Oral daily  heparin  Infusion 1500 Unit(s)/Hr (15 mL/Hr) IV Continuous <Continuous>  magnesium sulfate  IVPB 2 Gram(s) IV Intermittent daily  metoprolol tartrate 100 milliGRAM(s) Oral every 12 hours  pantoprazole  Injectable 40 milliGRAM(s) IV Push two times a day    MEDICATIONS  (PRN):  morphine  - Injectable 2 milliGRAM(s) IV Push every 6 hours PRN Severe Pain (7 - 10)      RADIOLOGY & ADDITIONAL STUDIES:

## 2019-01-08 NOTE — PROVIDER CONTACT NOTE (EICU) - BACKGROUND
Patient has history of tachycardia. Was a code blue on this admission when was visiting  in hospital.

## 2019-01-08 NOTE — CONSULT NOTE ADULT - SUBJECTIVE AND OBJECTIVE BOX
INTERVENTIONAL RADIOLOGY CONSULT:     Procedure Requested: Mesenteric angiogram    HPI:  78 year old female with PMH of HTN, type II DM, A-fib on coumadin, h/o bradycardia s/p pacemaker, rheumatic heart disease s/p bioprosthetic mitral valve replacement, s/p metallic aortic valve replacement, and HFpEF, history of RLE DVT 25 year sago, Left nephrectomy, and osteoporosis, presents to the ED for dark black stools since 3 days. As per patient and son at bedside she has dark black stools since 3 days, not painful, last bowel movement last night, no bright red blood, no hematemesis, no dysphagia, buit admits to weight loss of 30 pounds in last year, loss of appetite and chronic intermittent abdominal pain, diffuse, no aggravating factors and relieving factor 3-4/10 in intensity. Also has c/o feeling weak, pale, light headedness. She receives frequent transfusions for microcytic anemia, last transfusion was three weeks ago follows up with Dr. Alvarenga she went to see Dr. Alvarenga blood work showed Hb of 4.8 referred to hospital.  Denies fever, chills, nausea, vomiting, dysuria,   pt was recently in hospital for left leg cellulitis; reports trace edema to left knee without pain.  She checks her INR regularly at home goal INR as per their cardiologist is 2-3. She didn;t receive coumadin today, last dose was yesterday 2.5mg   Last endoscopy and colonoscopy was done 5 years ago as per patient was normal.    vitals in ED: 108/51, 94, 98.5, 18, 96% on room air, Labs Hb 4.8(Baseline 7-8), INR 3.29, creatinine is 1.9 (baseline is 2 from may 2018,in april it was 1.1), chest xray was fine, s/p 2 units PRBC (02 Jan 2019 21:11)      PAST MEDICAL & SURGICAL HISTORY:  CVA (cerebral vascular accident)  Anemia  Rheumatic fever  Diabetes  Osteoporosis  Mitral valve replaced  Atrial fibrillation: on coumadin at home  Hypertension  History of ureter stent  S/P AVR  H/O mitral valve replacement      MEDICATIONS  (STANDING):  atorvastatin 10 milliGRAM(s) Oral at bedtime  chlorhexidine 4% Liquid 1 Application(s) Topical <User Schedule>  cholecalciferol 2000 Unit(s) Oral daily  dextrose 5%. 1000 milliLiter(s) (50 mL/Hr) IV Continuous <Continuous>  dextrose 50% Injectable 12.5 Gram(s) IV Push once  dextrose 50% Injectable 25 Gram(s) IV Push once  dextrose 50% Injectable 25 Gram(s) IV Push once  folic acid 1 milliGRAM(s) Oral daily  heparin  Infusion 1500 Unit(s)/Hr (15 mL/Hr) IV Continuous <Continuous>  insulin glargine Injectable (LANTUS) 9 Unit(s) SubCutaneous at bedtime  insulin lispro (HumaLOG) corrective regimen sliding scale   SubCutaneous three times a day before meals  insulin lispro Injectable (HumaLOG) 3 Unit(s) SubCutaneous before breakfast  insulin lispro Injectable (HumaLOG) 3 Unit(s) SubCutaneous before lunch  insulin lispro Injectable (HumaLOG) 3 Unit(s) SubCutaneous before dinner  magnesium sulfate  IVPB 2 Gram(s) IV Intermittent daily  metoprolol tartrate 100 milliGRAM(s) Oral every 12 hours  pantoprazole  Injectable 40 milliGRAM(s) IV Push two times a day  sodium chloride 0.9% Bolus 500 milliLiter(s) IV Bolus once    MEDICATIONS  (PRN):  dextrose 40% Gel 15 Gram(s) Oral once PRN Blood Glucose LESS THAN 70 milliGRAM(s)/deciliter  glucagon  Injectable 1 milliGRAM(s) IntraMuscular once PRN Glucose LESS THAN 70 milligrams/deciliter  morphine  - Injectable 2 milliGRAM(s) IV Push every 6 hours PRN Severe Pain (7 - 10)      Allergies    No Known Drug Allergies  shellfish (Unknown)    FAMILY HISTORY:  No pertinent family history in first degree relatives    Physical Exam:   Vital Signs Last 24 Hrs  T(C): 36.8 (08 Jan 2019 16:00), Max: 37.1 (07 Jan 2019 20:00)  T(F): 98.3 (08 Jan 2019 16:00), Max: 98.8 (07 Jan 2019 20:00)  HR: 134 (08 Jan 2019 17:30) (74 - 164)  BP: 106/63 (08 Jan 2019 17:30) (79/67 - 118/57)  BP(mean): 75 (08 Jan 2019 17:30) (55 - 93)  RR: 23 (08 Jan 2019 17:30) (14 - 37)  SpO2: 100% (08 Jan 2019 17:30) (93% - 100%)    General: sitting up in bed, NAD    Lungs: good inspiratory effort   Cardiovascular: tachycardic    Abdomen:  soft, non-tender  Neuro/Psych: AAO x 3        Labs:                         7.1    12.10 )-----------( 203      ( 08 Jan 2019 04:19 )             22.4     01-08    139  |  99  |  34<H>  ----------------------------<  186<H>  5.1<H>   |  25  |  1.6<H>    Ca    7.2<L>      08 Jan 2019 04:19  Mg     1.8     01-08    TPro  5.2<L>  /  Alb  2.8<L>  /  TBili  1.6<H>  /  DBili  x   /  AST  20  /  ALT  11  /  AlkPhos  111  01-07    PTT - ( 08 Jan 2019 04:19 )  PTT:75.2 sec    Pertinent labs:                      7.1    12.10 )-----------( 203      ( 08 Jan 2019 04:19 )             22.4       01-08    139  |  99  |  34<H>  ----------------------------<  186<H>  5.1<H>   |  25  |  1.6<H>    Ca    7.2<L>      08 Jan 2019 04:19  Mg     1.8     01-08    TPro  5.2<L>  /  Alb  2.8<L>  /  TBili  1.6<H>  /  DBili  x   /  AST  20  /  ALT  11  /  AlkPhos  111  01-07      PTT - ( 08 Jan 2019 04:19 )  PTT:75.2 sec      ASSESSMENT/ PLAN:     78 year old female admitted with anemia secondary to LGIB on therapeutic anticoagulation for heart valve. Patient seen and examined bedside. Hemodynamics significant for SVT with stable blood pressure. Per nurse, had one large BM today.    - discussed angiogram with patient and family including risks for bleeding while on AC  - would need a CTA prior to angiogram in a patient already with STAR on CKD  - risk/benefit of AC must be discussed with cardiology as unlikely to control bleeding while on therapeutic heparin  - would defer to GI for colonoscopy rather than performing arterial intervention on a patient currently receiving therapeutic heparin    Risks, benefits, and alternatives to treatment discussed. All questions answered with understanding.    Thank you for the courtesy of this consult, please call [phone] with any further questions.

## 2019-01-08 NOTE — PROGRESS NOTE ADULT - ASSESSMENT
# Acute on chronic anemia: Multifactorial anemia  - R/O GI bleed : had extensive GI workup in the past which was negative . Now with red blood per rectum    GI are deferring  endoscopy/colonoscopy . 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 Hemolysis panel reviewed  - S/p BM Bx, results are inconclusive, mild MDS is not excluded   - Continue on folic acid      # Cardiac arrythmias: no asystole on pacemaker interrogation - Medications adjusted by Cardiology    # Transitional cell cancer of the kidney, s/p resection in [facility]  on 6/1/2018, and reresection on 10/29/2018 (suspected initial stage was IIIa [id number])   - Following with Oncology (Dr. Fields) at [facility]  - Was previously consented for Ambassador trial and randomized to observation arm   - Follow up CT A/P on 12/6/2018  revealed L adnexal ovoid mass at 6.4x6.2cm, fluid density on attenuation as well as interval development of soft tissue density somewhat confluent masslike conglomerates along left para-aortic-iliac chain, most pronounced below level of aortic bifurcation, suspicious for mets, mildly prominent b/l lymph nodes in the inguinal regions, suspicious for mets, small right pleural effusion with interval increase.   - PET CT to restage was ordered on 12/27/2018 - not performed yet . Would hold on CT scan with IV contrast due to high creatinine  - Radiation oncology on board for pelvic radiation with curative intent once patient is stable    # Metallic mitral valve, replaced with tissue valve on 3/19/2018, metallic aortic valve in place  --On chronic anticoagulation with Coumadin for A. fib and metallic AV, Heparin gtt for GI procedure    # LLE : improving - likely 2/2 venous compression 2/2 adenopathy   -- L soleal DVT    Case discussed with Dr Alvarenga

## 2019-01-08 NOTE — CONSULT NOTE ADULT - SUBJECTIVE AND OBJECTIVE BOX
Denice Alvarenga M.D.   [address] [place] [address]    Re:  AUSTIN DAVIS (:  1940) 	    RADIATION MEDICINE EVALUATION    Dear Dr. Alvarenga:    I saw your patient, AUSTIN DAVIS, in inpatient consultation on 19.  As you know, she has a diagnosis of urothelial cancer.  Please allow me to summarize her history below, chiefly for my records.     CHIEF COMPLAINT: Urothelial cancer, anemia    PRINCIPAL DIAGNOSIS: Ureteral carcinoma, T3N2M0, Stage IV    HISTORY OF PRESENT ILLNESS: The patient is a 78 year old woman with a known history of urothelial carcinoma. She presented with gross hematuria about 15 months ago.  She was noted to have a left ureteral stricture on further work up.  Stent was placed but her symptoms persisted.  She was eventually noted to have laceration of the left renal pelvis.  She had a left nephrectomy on 18 and pathology showed pT3 grade 3 urothelial carcinoma in the left renal pelvis.  CIS was noted in a separate distal ureter segment. She was later randomized to the observation arm of the Ambassador trial at [facility]. She later developed LLQ pain and PET imaging showed an FDG avid left pelvic mass.  She underwent resection on 10/29/18 which showed invasive high grade urothelial carcinoma invading full thickness ureteral wall and involving surrounding ureter.  LVI was present.  Distal margin was negative.  She was supposed to have started chemo/RT at [facility] but now is admitted with significant anemia, Hgb <5 and left leg edema.  She notes dark stools prior to admission.  CT imaging here shows large left pelvic mass.  She has been transfused and awaiting EGD/Colonoscopy on 18.  PET is pending.  We are asked to see her regarding pelvic radiation.       PAST MEDICAL HISTORY: - AFIB,  - CVA,  - heart disease,  - hypertension,  - osteoporosis,  - RLE DVT,  - type II diabetes.     PAST SURGICAL HISTORY: Aortic valve replacement, left nephrectomy, mitral valve replacement and pacemaker placement.     MEDICATIONS: Atorvastatin Calcium  Cholecalciferol  Folic Acid  Furosemide  Metoprolol Tartrate  Protonix    ALLERGIES: shellfish.     SOCIAL HISTORY:       FAMILY HISTORY:        ROS:     The patient reports left leg edema, black stools, fatigue.     KPS: 70     EXAMINATION:      General:  No acute distress. Alert and oriented x  3. In hospital bed, comfortable  HEENT:  Normocephalic, atraumatic..Oral mucosa moist and pink, no oral ulcers, lesions, thrush.    Abdomen:  Soft, non-tender, non-distended. Positive bowel sounds.   Lymphatics:  No cervical, axillary, or supraclavicular lymphadenopathy palpable bilaterally.    Musculoskeletal:  No bony tenderness.    Extremities:  Normal range of motion, 1-2+ LLE edema    Data Reviewed: Pathology, Radiology, Lab Results, Physician correspondence.

## 2019-01-08 NOTE — PROGRESS NOTE ADULT - ASSESSMENT
Diastolic CHF  A. fib  Atrial tachycardia  PPM  Valvular heart disease, s/p MVR, AVR  Transitional CA, possible mets.  Anemia - multifactorial  GIB    Plan:    C/w Metoprolol 100 mg q12  EP evaluation appreciated  C/w Heparin gtt for now  Transfuse PRBC  F/u with GI for colonoscopy/EGD.

## 2019-01-09 LAB
ANION GAP SERPL CALC-SCNC: 15 MMOL/L — HIGH (ref 7–14)
APTT BLD: 82.4 SEC — CRITICAL HIGH (ref 27–39.2)
APTT BLD: 86.3 SEC — CRITICAL HIGH (ref 27–39.2)
APTT BLD: 88 SEC — CRITICAL HIGH (ref 27–39.2)
BASOPHILS # BLD AUTO: 0.04 K/UL — SIGNIFICANT CHANGE UP (ref 0–0.2)
BASOPHILS # BLD AUTO: 0.04 K/UL — SIGNIFICANT CHANGE UP (ref 0–0.2)
BASOPHILS # BLD AUTO: 0.06 K/UL — SIGNIFICANT CHANGE UP (ref 0–0.2)
BASOPHILS NFR BLD AUTO: 0.4 % — SIGNIFICANT CHANGE UP (ref 0–1)
BASOPHILS NFR BLD AUTO: 0.4 % — SIGNIFICANT CHANGE UP (ref 0–1)
BASOPHILS NFR BLD AUTO: 0.6 % — SIGNIFICANT CHANGE UP (ref 0–1)
BUN SERPL-MCNC: 34 MG/DL — HIGH (ref 10–20)
CALCIUM SERPL-MCNC: 7.7 MG/DL — LOW (ref 8.5–10.1)
CHLORIDE SERPL-SCNC: 100 MMOL/L — SIGNIFICANT CHANGE UP (ref 98–110)
CO2 SERPL-SCNC: 22 MMOL/L — SIGNIFICANT CHANGE UP (ref 17–32)
CREAT SERPL-MCNC: 1.7 MG/DL — HIGH (ref 0.7–1.5)
EOSINOPHIL # BLD AUTO: 0.2 K/UL — SIGNIFICANT CHANGE UP (ref 0–0.7)
EOSINOPHIL # BLD AUTO: 0.2 K/UL — SIGNIFICANT CHANGE UP (ref 0–0.7)
EOSINOPHIL # BLD AUTO: 0.21 K/UL — SIGNIFICANT CHANGE UP (ref 0–0.7)
EOSINOPHIL NFR BLD AUTO: 2.1 % — SIGNIFICANT CHANGE UP (ref 0–8)
EOSINOPHIL NFR BLD AUTO: 2.1 % — SIGNIFICANT CHANGE UP (ref 0–8)
EOSINOPHIL NFR BLD AUTO: 2.2 % — SIGNIFICANT CHANGE UP (ref 0–8)
GLUCOSE BLDC GLUCOMTR-MCNC: 128 MG/DL — HIGH (ref 70–99)
GLUCOSE BLDC GLUCOMTR-MCNC: 144 MG/DL — HIGH (ref 70–99)
GLUCOSE SERPL-MCNC: 155 MG/DL — HIGH (ref 70–99)
HCT VFR BLD CALC: 25.2 % — LOW (ref 37–47)
HCT VFR BLD CALC: 25.3 % — LOW (ref 37–47)
HCT VFR BLD CALC: 25.5 % — LOW (ref 37–47)
HGB BLD-MCNC: 8.2 G/DL — LOW (ref 12–16)
HGB BLD-MCNC: 8.2 G/DL — LOW (ref 12–16)
HGB BLD-MCNC: 8.3 G/DL — LOW (ref 12–16)
IMM GRANULOCYTES NFR BLD AUTO: 1.5 % — HIGH (ref 0.1–0.3)
IMM GRANULOCYTES NFR BLD AUTO: 1.8 % — HIGH (ref 0.1–0.3)
IMM GRANULOCYTES NFR BLD AUTO: 1.9 % — HIGH (ref 0.1–0.3)
LYMPHOCYTES # BLD AUTO: 0.6 K/UL — LOW (ref 1.2–3.4)
LYMPHOCYTES # BLD AUTO: 0.62 K/UL — LOW (ref 1.2–3.4)
LYMPHOCYTES # BLD AUTO: 0.77 K/UL — LOW (ref 1.2–3.4)
LYMPHOCYTES # BLD AUTO: 6.2 % — LOW (ref 20.5–51.1)
LYMPHOCYTES # BLD AUTO: 6.6 % — LOW (ref 20.5–51.1)
LYMPHOCYTES # BLD AUTO: 8.1 % — LOW (ref 20.5–51.1)
MAGNESIUM SERPL-MCNC: 2.7 MG/DL — HIGH (ref 1.8–2.4)
MCHC RBC-ENTMCNC: 25.7 PG — LOW (ref 27–31)
MCHC RBC-ENTMCNC: 25.9 PG — LOW (ref 27–31)
MCHC RBC-ENTMCNC: 26.2 PG — LOW (ref 27–31)
MCHC RBC-ENTMCNC: 32.2 G/DL — SIGNIFICANT CHANGE UP (ref 32–37)
MCHC RBC-ENTMCNC: 32.4 G/DL — SIGNIFICANT CHANGE UP (ref 32–37)
MCHC RBC-ENTMCNC: 32.9 G/DL — SIGNIFICANT CHANGE UP (ref 32–37)
MCV RBC AUTO: 79.5 FL — LOW (ref 81–99)
MCV RBC AUTO: 79.9 FL — LOW (ref 81–99)
MCV RBC AUTO: 80.1 FL — LOW (ref 81–99)
MONOCYTES # BLD AUTO: 0.6 K/UL — SIGNIFICANT CHANGE UP (ref 0.1–0.6)
MONOCYTES # BLD AUTO: 0.63 K/UL — HIGH (ref 0.1–0.6)
MONOCYTES # BLD AUTO: 0.65 K/UL — HIGH (ref 0.1–0.6)
MONOCYTES NFR BLD AUTO: 6.3 % — SIGNIFICANT CHANGE UP (ref 1.7–9.3)
MONOCYTES NFR BLD AUTO: 6.5 % — SIGNIFICANT CHANGE UP (ref 1.7–9.3)
MONOCYTES NFR BLD AUTO: 6.8 % — SIGNIFICANT CHANGE UP (ref 1.7–9.3)
NEUTROPHILS # BLD AUTO: 7.67 K/UL — HIGH (ref 1.4–6.5)
NEUTROPHILS # BLD AUTO: 7.82 K/UL — HIGH (ref 1.4–6.5)
NEUTROPHILS # BLD AUTO: 8.04 K/UL — HIGH (ref 1.4–6.5)
NEUTROPHILS NFR BLD AUTO: 80.5 % — HIGH (ref 42.2–75.2)
NEUTROPHILS NFR BLD AUTO: 82.7 % — HIGH (ref 42.2–75.2)
NEUTROPHILS NFR BLD AUTO: 83.3 % — HIGH (ref 42.2–75.2)
NRBC # BLD: 0 /100 WBCS — SIGNIFICANT CHANGE UP (ref 0–0)
PLATELET # BLD AUTO: 189 K/UL — SIGNIFICANT CHANGE UP (ref 130–400)
PLATELET # BLD AUTO: 193 K/UL — SIGNIFICANT CHANGE UP (ref 130–400)
PLATELET # BLD AUTO: 195 K/UL — SIGNIFICANT CHANGE UP (ref 130–400)
POTASSIUM SERPL-MCNC: 4.6 MMOL/L — SIGNIFICANT CHANGE UP (ref 3.5–5)
POTASSIUM SERPL-SCNC: 4.6 MMOL/L — SIGNIFICANT CHANGE UP (ref 3.5–5)
RBC # BLD: 3.16 M/UL — LOW (ref 4.2–5.4)
RBC # BLD: 3.17 M/UL — LOW (ref 4.2–5.4)
RBC # BLD: 3.19 M/UL — LOW (ref 4.2–5.4)
RBC # FLD: 19.6 % — HIGH (ref 11.5–14.5)
RBC # FLD: 19.8 % — HIGH (ref 11.5–14.5)
RBC # FLD: 19.9 % — HIGH (ref 11.5–14.5)
SODIUM SERPL-SCNC: 137 MMOL/L — SIGNIFICANT CHANGE UP (ref 135–146)
WBC # BLD: 9.46 K/UL — SIGNIFICANT CHANGE UP (ref 4.8–10.8)
WBC # BLD: 9.53 K/UL — SIGNIFICANT CHANGE UP (ref 4.8–10.8)
WBC # BLD: 9.65 K/UL — SIGNIFICANT CHANGE UP (ref 4.8–10.8)
WBC # FLD AUTO: 9.46 K/UL — SIGNIFICANT CHANGE UP (ref 4.8–10.8)
WBC # FLD AUTO: 9.53 K/UL — SIGNIFICANT CHANGE UP (ref 4.8–10.8)
WBC # FLD AUTO: 9.65 K/UL — SIGNIFICANT CHANGE UP (ref 4.8–10.8)

## 2019-01-09 RX ORDER — SOD SULF/SODIUM/NAHCO3/KCL/PEG
4000 SOLUTION, RECONSTITUTED, ORAL ORAL ONCE
Qty: 0 | Refills: 0 | Status: COMPLETED | OUTPATIENT
Start: 2019-01-10 | End: 2019-01-10

## 2019-01-09 RX ORDER — SODIUM CHLORIDE 9 MG/ML
500 INJECTION INTRAMUSCULAR; INTRAVENOUS; SUBCUTANEOUS ONCE
Qty: 0 | Refills: 0 | Status: COMPLETED | OUTPATIENT
Start: 2019-01-09 | End: 2019-01-09

## 2019-01-09 RX ORDER — HEPARIN SODIUM 5000 [USP'U]/ML
1300 INJECTION INTRAVENOUS; SUBCUTANEOUS
Qty: 25000 | Refills: 0 | Status: DISCONTINUED | OUTPATIENT
Start: 2019-01-09 | End: 2019-01-09

## 2019-01-09 RX ORDER — METOPROLOL TARTRATE 50 MG
50 TABLET ORAL ONCE
Qty: 0 | Refills: 0 | Status: COMPLETED | OUTPATIENT
Start: 2019-01-09 | End: 2019-01-09

## 2019-01-09 RX ORDER — HEPARIN SODIUM 5000 [USP'U]/ML
1400 INJECTION INTRAVENOUS; SUBCUTANEOUS
Qty: 25000 | Refills: 0 | Status: DISCONTINUED | OUTPATIENT
Start: 2019-01-09 | End: 2019-01-09

## 2019-01-09 RX ADMIN — SODIUM CHLORIDE 500 MILLILITER(S): 9 INJECTION INTRAMUSCULAR; INTRAVENOUS; SUBCUTANEOUS at 06:45

## 2019-01-09 RX ADMIN — HEPARIN SODIUM 14 UNIT(S)/HR: 5000 INJECTION INTRAVENOUS; SUBCUTANEOUS at 06:50

## 2019-01-09 RX ADMIN — PANTOPRAZOLE SODIUM 40 MILLIGRAM(S): 20 TABLET, DELAYED RELEASE ORAL at 06:02

## 2019-01-09 RX ADMIN — Medication 100 MILLIGRAM(S): at 06:30

## 2019-01-09 RX ADMIN — Medication 2000 UNIT(S): at 11:39

## 2019-01-09 RX ADMIN — Medication 50 GRAM(S): at 11:39

## 2019-01-09 RX ADMIN — MORPHINE SULFATE 2 MILLIGRAM(S): 50 CAPSULE, EXTENDED RELEASE ORAL at 01:11

## 2019-01-09 RX ADMIN — MORPHINE SULFATE 2 MILLIGRAM(S): 50 CAPSULE, EXTENDED RELEASE ORAL at 16:00

## 2019-01-09 RX ADMIN — MORPHINE SULFATE 2 MILLIGRAM(S): 50 CAPSULE, EXTENDED RELEASE ORAL at 01:40

## 2019-01-09 RX ADMIN — PANTOPRAZOLE SODIUM 40 MILLIGRAM(S): 20 TABLET, DELAYED RELEASE ORAL at 17:38

## 2019-01-09 RX ADMIN — ATORVASTATIN CALCIUM 10 MILLIGRAM(S): 80 TABLET, FILM COATED ORAL at 22:28

## 2019-01-09 RX ADMIN — Medication 50 MILLIGRAM(S): at 18:48

## 2019-01-09 RX ADMIN — Medication 1 MILLIGRAM(S): at 11:39

## 2019-01-09 RX ADMIN — MORPHINE SULFATE 2 MILLIGRAM(S): 50 CAPSULE, EXTENDED RELEASE ORAL at 14:51

## 2019-01-09 NOTE — PROGRESS NOTE ADULT - SUBJECTIVE AND OBJECTIVE BOX
INTERVAL HPI/OVERNIGHT EVENTS:    78 year old female with PMH of HUGO, ? thalassemia, HTN, type II DM, A-fib on coumadin, h/o bradycardia s/p pacemaker, rheumatic heart disease s/p bioprosthetic mitral valve replacement, s/p metallic aortic valve replacement, and HFpEF, history of RLE DVT 25 year sago, ? Left nephrectomy, and osteoporosis, presents to the ED for dark red stools with clots for 3 x within 5 days. She mentioned having watery diarrhea after receiving clindamycin for cellulitis c/w dark red stools with clots. The patient has chronic HUGO s/p multiple transfusions last was 3 weeks ago. She never had similar symptoms in the past, denies NSAIDs.     [facility] [place]   Fe%: 13%   EGd and colonoscopy 5 yrs ago wnl as per patient   5/2009 dr palmer for strep bovis bacteremia: good prep, int and ext hemorrhoids   FH non significant  usually has 1 bm q 3 days, + weight loss over 1 yr     1/8 had 1 x brbpr today    PAST MEDICAL & SURGICAL HISTORY:  CVA (cerebral vascular accident)  Anemia  Rheumatic fever  Diabetes  Osteoporosis  Mitral valve replaced  Atrial fibrillation: on coumadin at home  Hypertension  History of ureter stent  S/P AVR  H/O mitral valve replacement      Home Medications:  Coumadin 5 mg oral tablet: 1 tab(s) orally once a day,   please hold tonight (12/17), can resume tomorrow 12/18 or after visiting [facility] (17 Dec 2018 16:55)  famotidine 20 mg oral tablet: 1 tab(s) orally 2 times a day (14 Dec 2018 23:37)  furosemide 40 mg oral tablet: 1 tab(s) orally once a day (14 Dec 2018 23:37)  glimepiride 4 mg oral tablet: 1 tab(s) orally once a day (14 Dec 2018 23:37)  metoprolol tartrate 50 mg oral tablet: 1 tab(s) orally every 12 hours (14 Dec 2018 23:37)  Vitamin D3 2000 intl units oral capsule: 1 cap(s) orally once a day (14 Dec 2018 23:37)      MEDICATIONS  (STANDING):  atorvastatin 10 milliGRAM(s) Oral at bedtime  chlorhexidine 4% Liquid 1 Application(s) Topical <User Schedule>  cholecalciferol 2000 Unit(s) Oral daily  dextrose 5%. 1000 milliLiter(s) (50 mL/Hr) IV Continuous <Continuous>  dextrose 50% Injectable 12.5 Gram(s) IV Push once  dextrose 50% Injectable 25 Gram(s) IV Push once  dextrose 50% Injectable 25 Gram(s) IV Push once  folic acid 1 milliGRAM(s) Oral daily  heparin  Infusion 1400 Unit(s)/Hr (14 mL/Hr) IV Continuous <Continuous>  insulin glargine Injectable (LANTUS) 9 Unit(s) SubCutaneous at bedtime  insulin lispro (HumaLOG) corrective regimen sliding scale   SubCutaneous three times a day before meals  insulin lispro Injectable (HumaLOG) 3 Unit(s) SubCutaneous before breakfast  insulin lispro Injectable (HumaLOG) 3 Unit(s) SubCutaneous before lunch  insulin lispro Injectable (HumaLOG) 3 Unit(s) SubCutaneous before dinner  magnesium sulfate  IVPB 2 Gram(s) IV Intermittent daily  metoprolol tartrate 100 milliGRAM(s) Oral every 12 hours  pantoprazole  Injectable 40 milliGRAM(s) IV Push two times a day    MEDICATIONS  (PRN):  dextrose 40% Gel 15 Gram(s) Oral once PRN Blood Glucose LESS THAN 70 milliGRAM(s)/deciliter  glucagon  Injectable 1 milliGRAM(s) IntraMuscular once PRN Glucose LESS THAN 70 milligrams/deciliter  morphine  - Injectable 2 milliGRAM(s) IV Push every 6 hours PRN Severe Pain (7 - 10)      Allergies    No Known Drug Allergies  shellfish (Unknown)    Intolerances        Review of systems  General: mild fatigue   Skin: no rash   Ophtalmologic: no visual changes   Respiratory: no shortness of breath   Cardiovascular: no chest pain   Gastrointestinal: as per H&P   Genitourinary: no dysuria   Neurological: no weakness   otherwise as described above     PHYSICAL EXAM:   Vital Signs:  Vital Signs Last 24 Hrs  T(C): 36.9 (09 Jan 2019 04:00), Max: 36.9 (09 Jan 2019 04:00)  T(F): 98.4 (09 Jan 2019 04:00), Max: 98.4 (09 Jan 2019 04:00)  HR: 106 (09 Jan 2019 06:25) (92 - 140)  BP: 101/61 (09 Jan 2019 06:25) (79/67 - 106/73)  BP(mean): 74 (09 Jan 2019 06:25) (55 - 84)  RR: 16 (09 Jan 2019 06:25) (15 - 37)  SpO2: 100% (09 Jan 2019 06:25) (93% - 100%)  Daily Height in cm: 154.94 (09 Jan 2019 07:24)    Daily     GENERAL:  no distress  SKIN: intact  HEENT:  NC/AT,  anicteric  CHEST:   no increased effort, breath sounds clear  HEART:  Regular rhythm  ABDOMEN:  Soft, non-tender, non-distended, normoactive bowel sounds,  no masses ,no hepato-splenomegaly, no signs of chronic liver disease  EXTREMITIES:  no cyanosis  PSYCHIATRIC: normal affect      LABS:                        8.2    9.53  )-----------( 193      ( 09 Jan 2019 04:49 )             25.3       Hemoglobin: 8.2 g/dL (01-09-19 @ 04:49)  Hemoglobin: 7.1 g/dL (01-08-19 @ 04:19)  Hemoglobin: 7.4 g/dL (01-08-19 @ 02:26)  Hemoglobin: 7.4 g/dL (01-07-19 @ 21:26)  Hemoglobin: 7.7 g/dL (01-07-19 @ 11:43)  Hemoglobin: 7.4 g/dL (01-07-19 @ 04:22)  Hemoglobin: 8.6 g/dL (01-06-19 @ 21:35)  Hemoglobin: 7.6 g/dL (01-06-19 @ 11:34)      01-09    137  |  100  |  34<H>  ----------------------------<  155<H>  4.6   |  22  |  1.7<H>    Ca    7.7<L>      09 Jan 2019 04:49  Mg     2.7     01-09            Alkaline Phosphatase, Serum: 111 U/L (01-07-19 @ 04:22)  Aspartate Aminotransferase (AST/SGOT): 20 U/L (01-07-19 @ 04:22)  Alanine Aminotransferase (ALT/SGPT): 11 U/L (01-07-19 @ 04:22)            RADIOLOGY & ADDITIONAL TESTS: INTERVAL HPI/OVERNIGHT EVENTS:    78 year old female with PMH of HUGO, ? thalassemia, HTN, type II DM, A-fib on coumadin, h/o bradycardia s/p pacemaker, rheumatic heart disease s/p bioprosthetic mitral valve replacement, s/p metallic aortic valve replacement, and HFpEF, history of RLE DVT 25 year sago, ? Left nephrectomy, and osteoporosis, presents to the ED for dark red stools with clots for 3 x within 5 days. She mentioned having watery diarrhea after receiving clindamycin for cellulitis c/w dark red stools with clots. The patient has chronic HUGO s/p multiple transfusions last was 3 weeks ago. She never had similar symptoms in the past, denies NSAIDs.     [facility] [place]   Fe%: 13%   EGd and colonoscopy 5 yrs ago wnl as per patient   5/2009 dr palmer for strep bovis bacteremia: good prep, int and ext hemorrhoids   FH non significant  usually has 1 bm q 3 days, + weight loss over 1 yr     1/8 had 1 x brbpr today  1/9 no bms today, 2 bloody bms yesterday     PAST MEDICAL & SURGICAL HISTORY:  CVA (cerebral vascular accident)  Anemia  Rheumatic fever  Diabetes  Osteoporosis  Mitral valve replaced  Atrial fibrillation: on coumadin at home  Hypertension  History of ureter stent  S/P AVR  H/O mitral valve replacement      Home Medications:  Coumadin 5 mg oral tablet: 1 tab(s) orally once a day,   please hold tonight (12/17), can resume tomorrow 12/18 or after visiting [facility] (17 Dec 2018 16:55)  famotidine 20 mg oral tablet: 1 tab(s) orally 2 times a day (14 Dec 2018 23:37)  furosemide 40 mg oral tablet: 1 tab(s) orally once a day (14 Dec 2018 23:37)  glimepiride 4 mg oral tablet: 1 tab(s) orally once a day (14 Dec 2018 23:37)  metoprolol tartrate 50 mg oral tablet: 1 tab(s) orally every 12 hours (14 Dec 2018 23:37)  Vitamin D3 2000 intl units oral capsule: 1 cap(s) orally once a day (14 Dec 2018 23:37)      MEDICATIONS  (STANDING):  atorvastatin 10 milliGRAM(s) Oral at bedtime  chlorhexidine 4% Liquid 1 Application(s) Topical <User Schedule>  cholecalciferol 2000 Unit(s) Oral daily  dextrose 5%. 1000 milliLiter(s) (50 mL/Hr) IV Continuous <Continuous>  dextrose 50% Injectable 12.5 Gram(s) IV Push once  dextrose 50% Injectable 25 Gram(s) IV Push once  dextrose 50% Injectable 25 Gram(s) IV Push once  folic acid 1 milliGRAM(s) Oral daily  heparin  Infusion 1400 Unit(s)/Hr (14 mL/Hr) IV Continuous <Continuous>  insulin glargine Injectable (LANTUS) 9 Unit(s) SubCutaneous at bedtime  insulin lispro (HumaLOG) corrective regimen sliding scale   SubCutaneous three times a day before meals  insulin lispro Injectable (HumaLOG) 3 Unit(s) SubCutaneous before breakfast  insulin lispro Injectable (HumaLOG) 3 Unit(s) SubCutaneous before lunch  insulin lispro Injectable (HumaLOG) 3 Unit(s) SubCutaneous before dinner  magnesium sulfate  IVPB 2 Gram(s) IV Intermittent daily  metoprolol tartrate 100 milliGRAM(s) Oral every 12 hours  pantoprazole  Injectable 40 milliGRAM(s) IV Push two times a day    MEDICATIONS  (PRN):  dextrose 40% Gel 15 Gram(s) Oral once PRN Blood Glucose LESS THAN 70 milliGRAM(s)/deciliter  glucagon  Injectable 1 milliGRAM(s) IntraMuscular once PRN Glucose LESS THAN 70 milligrams/deciliter  morphine  - Injectable 2 milliGRAM(s) IV Push every 6 hours PRN Severe Pain (7 - 10)      Allergies    No Known Drug Allergies  shellfish (Unknown)    Intolerances        Review of systems  General: mild fatigue   Skin: no rash   Ophtalmologic: no visual changes   Respiratory: no shortness of breath   Cardiovascular: no chest pain   Gastrointestinal: as per H&P   Genitourinary: no dysuria   Neurological: no weakness   otherwise as described above     PHYSICAL EXAM:   Vital Signs:  Vital Signs Last 24 Hrs  T(C): 36.9 (09 Jan 2019 04:00), Max: 36.9 (09 Jan 2019 04:00)  T(F): 98.4 (09 Jan 2019 04:00), Max: 98.4 (09 Jan 2019 04:00)  HR: 106 (09 Jan 2019 06:25) (92 - 140)  BP: 101/61 (09 Jan 2019 06:25) (79/67 - 106/73)  BP(mean): 74 (09 Jan 2019 06:25) (55 - 84)  RR: 16 (09 Jan 2019 06:25) (15 - 37)  SpO2: 100% (09 Jan 2019 06:25) (93% - 100%)  Daily Height in cm: 154.94 (09 Jan 2019 07:24)    Daily     GENERAL:  no distress  SKIN: intact  HEENT:  NC/AT,  anicteric  CHEST:   no increased effort, breath sounds clear  HEART:  Regular rhythm  ABDOMEN:  Soft, non-tender, non-distended, normoactive bowel sounds,  no masses ,no hepato-splenomegaly, no signs of chronic liver disease  EXTREMITIES:  no cyanosis  PSYCHIATRIC: normal affect      LABS:                        8.2    9.53  )-----------( 193      ( 09 Jan 2019 04:49 )             25.3       Hemoglobin: 8.2 g/dL (01-09-19 @ 04:49)  Hemoglobin: 7.1 g/dL (01-08-19 @ 04:19)  Hemoglobin: 7.4 g/dL (01-08-19 @ 02:26)  Hemoglobin: 7.4 g/dL (01-07-19 @ 21:26)  Hemoglobin: 7.7 g/dL (01-07-19 @ 11:43)  Hemoglobin: 7.4 g/dL (01-07-19 @ 04:22)  Hemoglobin: 8.6 g/dL (01-06-19 @ 21:35)  Hemoglobin: 7.6 g/dL (01-06-19 @ 11:34)      01-09    137  |  100  |  34<H>  ----------------------------<  155<H>  4.6   |  22  |  1.7<H>    Ca    7.7<L>      09 Jan 2019 04:49  Mg     2.7     01-09            Alkaline Phosphatase, Serum: 111 U/L (01-07-19 @ 04:22)  Aspartate Aminotransferase (AST/SGOT): 20 U/L (01-07-19 @ 04:22)  Alanine Aminotransferase (ALT/SGPT): 11 U/L (01-07-19 @ 04:22)            RADIOLOGY & ADDITIONAL TESTS:

## 2019-01-09 NOTE — CONSULT NOTE ADULT - SUBJECTIVE AND OBJECTIVE BOX
Chief Complaint: Patient is a 78y old  Female who presents with a chief complaint of dark black stools (09 Jan 2019 11:29)      HPI:  Pt with AV replacemnet on Coumadin who developed rectal bleed (melena at times as well as BRB.  On admission she had a hgb of 4 and has been transfused multiple units of blood.  Cardiologist feel that blod thinners ( now on heparin) cannot be completely stopped except for 2 hours prior to the procedure  She has been on clear fluids for several days     Medications:  atorvastatin 10 milliGRAM(s) Oral at bedtime  chlorhexidine 4% Liquid 1 Application(s) Topical <User Schedule>  cholecalciferol 2000 Unit(s) Oral daily  dextrose 5%. 1000 milliLiter(s) IV Continuous <Continuous>  folic acid 1 milliGRAM(s) Oral daily  heparin  Infusion 1300 Unit(s)/Hr IV Continuous <Continuous>  magnesium sulfate  IVPB 2 Gram(s) IV Intermittent daily  metoprolol tartrate 100 milliGRAM(s) Oral every 12 hours  morphine  - Injectable 2 milliGRAM(s) IV Push every 6 hours PRN  pantoprazole  Injectable 40 milliGRAM(s) IV Push two times a day      PMHX/PSHX:  CVA (cerebral vascular accident)  Anemia  Rheumatic fever  Diabetes  Osteoporosis  Mitral valve replaced  Atrial fibrillation  Hypertension  History of ureter stent  S/P AVR  H/O mitral valve replacement      Family history:  No pertinent family history in first degree relatives      Social History:     Allergies:  No Known Drug Allergies  shellfish (Unknown)        Review of Systems:  General:  No wt loss, fevers, chills, night sweats, fatigue or pruritis.  Eyes:  Good vision, no reported pain or redness.  ENT:  No sore throat, pain, runny nose, or difficulty swallowing  CV:  No pain, palpitations, hypo/hypertension  Resp:  No dyspnea, cough, tachypnea, wheezing  GI:  No pain, nausea, vomiting, dysphagia, heartburn, diarrhea, constipation, or weight loss. , No rectal bleeding, tarry stools, or hematemesis.  :  No pain, bleeding/discharges, incontinence, nocturia  Musculoskeletal:  No pain, weakness or fasciculations.  Neuro:  No weakness, tingling, memory problems or paresthesias  Psych:  No fatigue, insomnia, mood problems, depression  Endocrine:  No polyuria, polydipsia, cold/heat intolerance  Heme:  No petechiae, ecchymosis, easy bruisability  Skin:  No rash, pruritis, tattoos, scars, or edema      PHYSICAL EXAM:   Vital Signs:  Vital Signs Last 24 Hrs  T(C): 36.7 (09 Jan 2019 16:00), Max: 37.1 (09 Jan 2019 08:00)  T(F): 98.1 (09 Jan 2019 16:00), Max: 98.7 (09 Jan 2019 08:00)  HR: 102 (09 Jan 2019 16:00) (76 - 140)  BP: 106/66 (09 Jan 2019 16:00) (81/50 - 112/60)  BP(mean): 89 (09 Jan 2019 16:00) (55 - 89)  RR: 33 (09 Jan 2019 16:00) (16 - 36)  SpO2: 100% (09 Jan 2019 16:00) (91% - 100%)  Daily Height in cm: 154.94 (09 Jan 2019 07:24)    Daily     T(C): 36.7 (01-09-19 @ 16:00), Max: 37.1 (01-09-19 @ 08:00)  HR: 102 (01-09-19 @ 16:00) (76 - 140)  BP: 106/66 (01-09-19 @ 16:00) (81/50 - 112/60)  RR: 33 (01-09-19 @ 16:00) (16 - 36)  SpO2: 100% (01-09-19 @ 16:00) (91% - 100%)    GENERAL:  Appears stated age, well-groomed, well-nourished, no distress  HEENT:  Conjunctivae clear and pink, no thyromegaly, nodules, adenopathy, no JVD, sclera -anicteric  CHEST:  Full & symmetric excursion, no increased effort, breath sounds clear  HEART:  Regular rhythm, S1, S2, no murmur/rub/S3/S4, no abdominal bruit, no edema  ABDOMEN:  Soft, non-tender, non-distended, normoactive bowel sounds,  no masses ,no hepato-splenomegaly, no signs of chronic liver disease  EXTEREMITIES:  no cyanosis,clubbing or edema  SKIN:  No rash/erythema/ecchymoses/petechiae/wounds/abscess/warm/dry  NEURO:  Alert, oriented, no asterixis, no tremor, no encephalopathy    LABS:                        8.3    9.46  )-----------( 189      ( 09 Jan 2019 11:12 )             25.2     01-09    137  |  100  |  34<H>  ----------------------------<  155<H>  4.6   |  22  |  1.7<H>    Ca    7.7<L>      09 Jan 2019 04:49  Mg     2.7     01-09        PTT - ( 09 Jan 2019 11:12 )  PTT:86.3 sec        Imaging:      Assessment and Plan:  GI Bleed - ? etiology but worsened by anticoagulation

## 2019-01-09 NOTE — PROGRESS NOTE ADULT - SUBJECTIVE AND OBJECTIVE BOX
Patient is a 78y old  Female who presents with a chief complaint of dark black stools (08 Jan 2019 18:15)    HPI:  78 year old female with PMH of HTN, type II DM, A-fib on coumadin, h/o bradycardia s/p pacemaker, rheumatic heart disease s/p bioprosthetic mitral valve replacement, s/p metallic aortic valve replacement, and HFpEF, history of RLE DVT 25 year sago, Left nephrectomy, and osteoporosis, presents to the ED for dark black stools since 3 days. As per patient and son at bedside she has dark black stools since 3 days, not painful, last bowel movement last night, no bright red blood, no hematemesis, no dysphagia, buit admits to weight loss of 30 pounds in last year, loss of appetite and chronic intermittent abdominal pain, diffuse, no aggravating factors and relieving factor 3-4/10 in intensity. Also has c/o feeling weak, pale, light headedness. She receives frequent transfusions for microcytic anemia, last transfusion was three weeks ago follows up with Dr. Alvarenga she went to see Dr. Alvarenga blood work showed Hb of 4.8 referred to hospital.  Denies fever, chills, nausea, vomiting, dysuria,   pt was recently in hospital for left leg cellulitis; reports trace edema to left knee without pain.  She checks her INR regularly at home goal INR as per their cardiologist is 2-3. She didn;t receive coumadin today, last dose was yesterday 2.5mg   Last endoscopy and colonoscopy was done 5 years ago as per patient was normal.    vitals in ED: 108/51, 94, 98.5, 18, 96% on room air, Labs Hb 4.8(Baseline 7-8), INR 3.29, creatinine is 1.9 (baseline is 2 from may 2018,in april it was 1.1), chest xray was fine, s/p 2 units PRBC (02 Jan 2019 21:11)      SUBJ:  Patient seen and examined. Events noted. Episode of bleeding yesterday, heparin was temporarily held. She had episodes of tachycardia last night, but hemodynamically improved today.      MEDICATIONS  (STANDING):  atorvastatin 10 milliGRAM(s) Oral at bedtime  chlorhexidine 4% Liquid 1 Application(s) Topical <User Schedule>  cholecalciferol 2000 Unit(s) Oral daily  dextrose 5%. 1000 milliLiter(s) (50 mL/Hr) IV Continuous <Continuous>  dextrose 50% Injectable 12.5 Gram(s) IV Push once  dextrose 50% Injectable 25 Gram(s) IV Push once  dextrose 50% Injectable 25 Gram(s) IV Push once  folic acid 1 milliGRAM(s) Oral daily  heparin  Infusion 1400 Unit(s)/Hr (14 mL/Hr) IV Continuous <Continuous>  insulin glargine Injectable (LANTUS) 9 Unit(s) SubCutaneous at bedtime  insulin lispro (HumaLOG) corrective regimen sliding scale   SubCutaneous three times a day before meals  insulin lispro Injectable (HumaLOG) 3 Unit(s) SubCutaneous before breakfast  insulin lispro Injectable (HumaLOG) 3 Unit(s) SubCutaneous before lunch  insulin lispro Injectable (HumaLOG) 3 Unit(s) SubCutaneous before dinner  magnesium sulfate  IVPB 2 Gram(s) IV Intermittent daily  metoprolol tartrate 100 milliGRAM(s) Oral every 12 hours  pantoprazole  Injectable 40 milliGRAM(s) IV Push two times a day    MEDICATIONS  (PRN):  dextrose 40% Gel 15 Gram(s) Oral once PRN Blood Glucose LESS THAN 70 milliGRAM(s)/deciliter  glucagon  Injectable 1 milliGRAM(s) IntraMuscular once PRN Glucose LESS THAN 70 milligrams/deciliter  morphine  - Injectable 2 milliGRAM(s) IV Push every 6 hours PRN Severe Pain (7 - 10)            Vital Signs Last 24 Hrs  T(C): 36.9 (09 Jan 2019 04:00), Max: 36.9 (09 Jan 2019 04:00)  T(F): 98.4 (09 Jan 2019 04:00), Max: 98.4 (09 Jan 2019 04:00)  HR: 106 (09 Jan 2019 06:25) (74 - 140)  BP: 101/61 (09 Jan 2019 06:25) (79/67 - 106/73)  BP(mean): 74 (09 Jan 2019 06:25) (55 - 84)  RR: 16 (09 Jan 2019 06:25) (14 - 37)  SpO2: 100% (09 Jan 2019 06:25) (93% - 100%)      PHYSICAL EXAM:    GEN:  NAD  HEENT: NC/AT  Neck: No JVD  CV: irreg, S1- mech S2, 2/6 syst. murmur  Lungs: CTAB  Abd: Soft, non-tender  Ext: No edema      I&O's Summary    08 Jan 2019 07:01  -  09 Jan 2019 07:00  --------------------------------------------------------  IN: 4034 mL / OUT: 1000 mL / NET: 3034 mL    	        LABS:                        8.2    9.53  )-----------( 193      ( 09 Jan 2019 04:49 )             25.3     01-09    137  |  100  |  34<H>  ----------------------------<  155<H>  4.6   |  22  |  1.7<H>    Ca    7.7<L>      09 Jan 2019 04:49  Mg     2.7     01-09      CARDIAC MARKERS ( 08 Jan 2019 04:19 )  x     / 0.03 ng/mL / x     / x     / 2.3 ng/mL  CARDIAC MARKERS ( 07 Jan 2019 11:43 )  x     / 0.03 ng/mL / x     / x     / 5.1 ng/mL      PTT - ( 09 Jan 2019 04:49 )  PTT:88.0 sec      BNP  RADIOLOGY & ADDITIONAL STUDIES:      IMPRESSION AND PLAN:

## 2019-01-09 NOTE — PROGRESS NOTE ADULT - ASSESSMENT
IMPRESSION: S/P CP arrest few minutes?, s/p epi, admitted for low hg, GI bleed, multiple co morbidities, NO ACTIVE BLEED, STILL ON IV HEPARIN FOR AVR      PLAN:    CNS: no CNS depressant    HEENT:  Oral care    PULMONARY:  HOB @ 45 degrees, aspiration precaution, oxygen as need keeppulse ox>92%    CARDIOVASCULAR: b blocker, cardio following    GI: GI prophylaxis                                          Feeding CLEAR liquids    RENAL:  F/u  lytes.  Correct as needed. accurate I/O    INFECTIOUS DISEASE: no ABX    HEMATOLOGICAL:  DVT prophylaxis. CBC Q 8H, if melanic or hematochezia hold heparin drip, transfuse if h/h drops or active bleeding    ENDOCRINE:  Follow up FS.  Insulin protocol if needed    CODE STATUS: FULL CODE    DISPOSITION: Pt requires continued monitoring in the MICU    POOR PROGNOSIS IMPRESSION: S/P CP arrest few minutes?, s/p epi, admitted for low hg, GI bleed, multiple co morbidities, NO ACTIVE BLEED, STILL ON IV HEPARIN FOR AVR, S/P GI EVAL      PLAN:    CNS: no CNS depressant    HEENT:  Oral care    PULMONARY:  HOB @ 45 degrees, aspiration precaution, oxygen as need keep pulse ox>92%    CARDIOVASCULAR: b blocker, cardio following    GI: GI prophylaxis   GI F/UP                                       Feeding CLEAR liquids    RENAL:  F/u  lytes.  Correct as needed. accurate I/O    INFECTIOUS DISEASE: no ABX    HEMATOLOGICAL:  DVT prophylaxis. CBC Q 8H, if melanic or hematochezia hold heparin drip, transfuse if h/h drops or active bleeding    ENDOCRINE:  Follow up FS.  Insulin protocol if needed    CODE STATUS: FULL CODE    DISPOSITION: Pt requires continued monitoring in the MICU    POOR PROGNOSIS

## 2019-01-09 NOTE — PROGRESS NOTE ADULT - ASSESSMENT
78 year old female with PMH of HUGO, ? thalassemia, HTN, type II DM, A-fib on coumadin, h/o bradycardia s/p pacemaker, rheumatic heart disease s/p bioprosthetic mitral valve replacement, s/p metallic aortic valve replacement, and HFpEF, history of RLE DVT 25 year sago, ? Left nephrectomy, and osteoporosis, presents to the ED for dark red stools with clots for 3 x within 5 days. She mentioned having watery diarrhea after receiving clindamycin for cellulitis c/w dark red stools with clots. The patient has chronic HUGO s/p multiple transfusions last was 3 weeks ago. She never had similar symptoms in the past, denies NSAIDs.     1- watery diarrhea c/w dark red stools with clots  r/o hemorrhoidal bleed vs others     T&S, cbc q12h  2 x 18 gauge iv   transfuse as needed   hematology fu   Patient bridged with heparin   needs hemodynamic and cardiac stabilization   will follow 78 year old female with PMH of HUGO, ? thalassemia, HTN, type II DM, A-fib on coumadin, h/o bradycardia s/p pacemaker, rheumatic heart disease s/p bioprosthetic mitral valve replacement, s/p metallic aortic valve replacement, and HFpEF, history of RLE DVT 25 year sago, ? Left nephrectomy, and osteoporosis, presents to the ED for dark red stools with clots for 3 x within 5 days. She mentioned having watery diarrhea after receiving clindamycin for cellulitis c/w dark red stools with clots. The patient has chronic HUGO s/p multiple transfusions last was 3 weeks ago. She never had similar symptoms in the past, denies NSAIDs.     1- dark red stools with clots  r/o hemorrhoidal bleed vs others     T&S, cbc q12h  2 x 18 gauge iv   transfuse as needed   hematology fu   Patient bridged with heparin   needs hemodynamic and cardiac stabilization   will follow

## 2019-01-09 NOTE — PROGRESS NOTE ADULT - SUBJECTIVE AND OBJECTIVE BOX
Patient is a 78y old  Female who presents with a chief complaint of dark black stools (09 Jan 2019 08:54)        Over Night Events:    hypotensive and tachycardiac s/p verapamil and 500cc bolus  no bloody bowel movements overnight  on heparin drip    ROS:  See HPI    PHYSICAL EXAM    ICU Vital Signs Last 24 Hrs  T(C): 37.1 (09 Jan 2019 08:00), Max: 37.1 (09 Jan 2019 08:00)  T(F): 98.7 (09 Jan 2019 08:00), Max: 98.7 (09 Jan 2019 08:00)  HR: 88 (09 Jan 2019 09:00) (76 - 140)  BP: 108/54 (09 Jan 2019 09:00) (79/67 - 108/54)  BP(mean): 80 (09 Jan 2019 09:00) (55 - 80)  ABP: --  ABP(mean): --  RR: 22 (09 Jan 2019 09:00) (15 - 24)  SpO2: 100% (09 Jan 2019 09:00) (93% - 100%)      General: NAD  HEENT: MANUELA             Lymphatic system: No cervical LN   Lungs: Bilateral BS, crackels bibasilar  Cardiovascular: Regular   Gastrointestinal: Soft, Positive BS  Extremities: No clubbing.  Moves extremities.  Full Range of motion   Skin: Warm, intact  Neurological: No motor or sensory deficit       01-08-19 @ 07:01  -  01-09-19 @ 07:00  --------------------------------------------------------  IN:    heparin Infusion: 14 mL    heparin Infusion: 315 mL    IV PiggyBack: 550 mL    Oral Fluid: 960 mL    Packed Red Blood Cells: 695 mL    Sodium Chloride 0.9% IV Bolus: 1500 mL  Total IN: 4034 mL    OUT:    Voided: 1000 mL  Total OUT: 1000 mL    Total NET: 3034 mL      01-09-19 @ 07:01  -  01-09-19 @ 09:46  --------------------------------------------------------  IN:    heparin Infusion: 28 mL  Total IN: 28 mL    OUT:    Voided: 275 mL  Total OUT: 275 mL    Total NET: -247 mL          LABS:                            8.2    9.53  )-----------( 193      ( 09 Jan 2019 04:49 )             25.3                                               01-09    137  |  100  |  34<H>  ----------------------------<  155<H>  4.6   |  22  |  1.7<H>    Ca    7.7<L>      09 Jan 2019 04:49  Mg     2.7     01-09        PTT - ( 09 Jan 2019 04:49 )  PTT:88.0 sec                                           CARDIAC MARKERS ( 08 Jan 2019 04:19 )  x     / 0.03 ng/mL / x     / x     / 2.3 ng/mL  CARDIAC MARKERS ( 07 Jan 2019 11:43 )  x     / 0.03 ng/mL / x     / x     / 5.1 ng/mL                                                                                     Culture - Blood (collected 06 Jan 2019 21:35)  Source: .Blood None  Preliminary Report (08 Jan 2019 06:01):    No growth to date.                                                                                           MEDICATIONS  (STANDING):  atorvastatin 10 milliGRAM(s) Oral at bedtime  chlorhexidine 4% Liquid 1 Application(s) Topical <User Schedule>  cholecalciferol 2000 Unit(s) Oral daily  dextrose 5%. 1000 milliLiter(s) (50 mL/Hr) IV Continuous <Continuous>  dextrose 50% Injectable 12.5 Gram(s) IV Push once  dextrose 50% Injectable 25 Gram(s) IV Push once  dextrose 50% Injectable 25 Gram(s) IV Push once  folic acid 1 milliGRAM(s) Oral daily  heparin  Infusion 1400 Unit(s)/Hr (14 mL/Hr) IV Continuous <Continuous>  insulin glargine Injectable (LANTUS) 9 Unit(s) SubCutaneous at bedtime  insulin lispro (HumaLOG) corrective regimen sliding scale   SubCutaneous three times a day before meals  insulin lispro Injectable (HumaLOG) 3 Unit(s) SubCutaneous before breakfast  insulin lispro Injectable (HumaLOG) 3 Unit(s) SubCutaneous before lunch  insulin lispro Injectable (HumaLOG) 3 Unit(s) SubCutaneous before dinner  magnesium sulfate  IVPB 2 Gram(s) IV Intermittent daily  metoprolol tartrate 100 milliGRAM(s) Oral every 12 hours  pantoprazole  Injectable 40 milliGRAM(s) IV Push two times a day    MEDICATIONS  (PRN):  dextrose 40% Gel 15 Gram(s) Oral once PRN Blood Glucose LESS THAN 70 milliGRAM(s)/deciliter  glucagon  Injectable 1 milliGRAM(s) IntraMuscular once PRN Glucose LESS THAN 70 milligrams/deciliter  morphine  - Injectable 2 milliGRAM(s) IV Push every 6 hours PRN Severe Pain (7 - 10)      Xrays:       bibasilar infiltrates                                                                                 < from: Transthoracic Echocardiogram (01.07.19 @ 14:04) >  Summary:   1. Normal global left ventricular systolic function.   2. LV Ejection Fraction by Pineda's Method with a biplane EF of 69 %.   3. Moderately increased LV wall thickness.   4. There is moderate concentric left ventricular hypertrophy.   5. Moderately reduced RV systolic function.   6. Mitral prosthesis is functioning normally.   7. Mild-moderate tricuspid regurgitation.   8. Abnormal stenosis of the aortic prosthesis.   9. Bioprosthesis in the aortic position.  10. Pulmonic valve regurgitation.  11. Estimated pulmonary artery systolic pressure is 62.9 mmHg assuming a   right atrial pressure of 8 mmHg, which is consistent with severe   pulmonary hypertension.  12. Pulmonary hypertension is present.    < end of copied text > Patient is a 78y old  Female who presents with a chief complaint of dark black stools (09 Jan 2019 08:54)        Over Night Events:    hypotensive and tachycardiac s/p verapamil and 500cc bolus  no bloody bowel movements overnight  on heparin drip  s/p transfusion    ROS:  See HPI    PHYSICAL EXAM    ICU Vital Signs Last 24 Hrs  T(C): 37.1 (09 Jan 2019 08:00), Max: 37.1 (09 Jan 2019 08:00)  T(F): 98.7 (09 Jan 2019 08:00), Max: 98.7 (09 Jan 2019 08:00)  HR: 88 (09 Jan 2019 09:00) (76 - 140)  BP: 108/54 (09 Jan 2019 09:00) (79/67 - 108/54)  BP(mean): 80 (09 Jan 2019 09:00) (55 - 80)  RR: 22 (09 Jan 2019 09:00) (15 - 24)  SpO2: 100% (09 Jan 2019 09:00) (93% - 100%)      General: NAD  HEENT: MANUELA             Lymphatic system: No cervical LN   Lungs: Bilateral BS, crackles bibasilar  Cardiovascular: Regular   Gastrointestinal: Soft, Positive BS  Extremities: No clubbing.  Moves extremities.  Full Range of motion   Skin: Warm, intact  Neurological: No motor or sensory deficit       01-08-19 @ 07:01  -  01-09-19 @ 07:00  --------------------------------------------------------  IN:    heparin Infusion: 14 mL    heparin Infusion: 315 mL    IV PiggyBack: 550 mL    Oral Fluid: 960 mL    Packed Red Blood Cells: 695 mL    Sodium Chloride 0.9% IV Bolus: 1500 mL  Total IN: 4034 mL    OUT:    Voided: 1000 mL  Total OUT: 1000 mL    Total NET: 3034 mL      01-09-19 @ 07:01  -  01-09-19 @ 09:46  --------------------------------------------------------  IN:    heparin Infusion: 28 mL  Total IN: 28 mL    OUT:    Voided: 275 mL  Total OUT: 275 mL    Total NET: -247 mL          LABS:                            8.2    9.53  )-----------( 193      ( 09 Jan 2019 04:49 )             25.3                                               01-09    137  |  100  |  34<H>  ----------------------------<  155<H>  4.6   |  22  |  1.7<H>    Ca    7.7<L>      09 Jan 2019 04:49  Mg     2.7     01-09        PTT - ( 09 Jan 2019 04:49 )  PTT:88.0 sec                                           CARDIAC MARKERS ( 08 Jan 2019 04:19 )  x     / 0.03 ng/mL / x     / x     / 2.3 ng/mL  CARDIAC MARKERS ( 07 Jan 2019 11:43 )  x     / 0.03 ng/mL / x     / x     / 5.1 ng/mL                                                                                     Culture - Blood (collected 06 Jan 2019 21:35)  Source: .Blood None  Preliminary Report (08 Jan 2019 06:01):    No growth to date.                                                                                           MEDICATIONS  (STANDING):  atorvastatin 10 milliGRAM(s) Oral at bedtime  chlorhexidine 4% Liquid 1 Application(s) Topical <User Schedule>  cholecalciferol 2000 Unit(s) Oral daily  dextrose 5%. 1000 milliLiter(s) (50 mL/Hr) IV Continuous <Continuous>  dextrose 50% Injectable 12.5 Gram(s) IV Push once  dextrose 50% Injectable 25 Gram(s) IV Push once  dextrose 50% Injectable 25 Gram(s) IV Push once  folic acid 1 milliGRAM(s) Oral daily  heparin  Infusion 1400 Unit(s)/Hr (14 mL/Hr) IV Continuous <Continuous>  insulin glargine Injectable (LANTUS) 9 Unit(s) SubCutaneous at bedtime  insulin lispro (HumaLOG) corrective regimen sliding scale   SubCutaneous three times a day before meals  insulin lispro Injectable (HumaLOG) 3 Unit(s) SubCutaneous before breakfast  insulin lispro Injectable (HumaLOG) 3 Unit(s) SubCutaneous before lunch  insulin lispro Injectable (HumaLOG) 3 Unit(s) SubCutaneous before dinner  magnesium sulfate  IVPB 2 Gram(s) IV Intermittent daily  metoprolol tartrate 100 milliGRAM(s) Oral every 12 hours  pantoprazole  Injectable 40 milliGRAM(s) IV Push two times a day    MEDICATIONS  (PRN):  dextrose 40% Gel 15 Gram(s) Oral once PRN Blood Glucose LESS THAN 70 milliGRAM(s)/deciliter  glucagon  Injectable 1 milliGRAM(s) IntraMuscular once PRN Glucose LESS THAN 70 milligrams/deciliter  morphine  - Injectable 2 milliGRAM(s) IV Push every 6 hours PRN Severe Pain (7 - 10)      Xrays:       bibasilar infiltrates                                                                                 < from: Transthoracic Echocardiogram (01.07.19 @ 14:04) >  Summary:   1. Normal global left ventricular systolic function.   2. LV Ejection Fraction by Pineda's Method with a biplane EF of 69 %.   3. Moderately increased LV wall thickness.   4. There is moderate concentric left ventricular hypertrophy.   5. Moderately reduced RV systolic function.   6. Mitral prosthesis is functioning normally.   7. Mild-moderate tricuspid regurgitation.   8. Abnormal stenosis of the aortic prosthesis.   9. Bioprosthesis in the aortic position.  10. Pulmonic valve regurgitation.  11. Estimated pulmonary artery systolic pressure is 62.9 mmHg assuming a   right atrial pressure of 8 mmHg, which is consistent with severe   pulmonary hypertension.  12. Pulmonary hypertension is present.    < end of copied text >

## 2019-01-09 NOTE — PROGRESS NOTE ADULT - SUBJECTIVE AND OBJECTIVE BOX
SUBJECTIVE:    Patient is a 78y old Female who presents with a chief complaint of dark black stools (09 Jan 2019 16:35)    Currently admitted to medicine with the primary diagnosis of Lower GI bleed     Today is hospital day 7d.     PAST MEDICAL & SURGICAL HISTORY  CVA (cerebral vascular accident)  Anemia  Rheumatic fever  Diabetes  Osteoporosis  Mitral valve replaced  Atrial fibrillation: on coumadin at home  Hypertension  History of ureter stent  S/P AVR  H/O mitral valve replacement    ALLERGIES:  No Known Drug Allergies  shellfish (Unknown)    MEDICATIONS:  STANDING MEDICATIONS  atorvastatin 10 milliGRAM(s) Oral at bedtime  chlorhexidine 4% Liquid 1 Application(s) Topical <User Schedule>  cholecalciferol 2000 Unit(s) Oral daily  dextrose 5%. 1000 milliLiter(s) IV Continuous <Continuous>  folic acid 1 milliGRAM(s) Oral daily  heparin  Infusion 1300 Unit(s)/Hr IV Continuous <Continuous>  magnesium sulfate  IVPB 2 Gram(s) IV Intermittent daily  metoprolol tartrate 100 milliGRAM(s) Oral every 12 hours  pantoprazole  Injectable 40 milliGRAM(s) IV Push two times a day    PRN MEDICATIONS  morphine  - Injectable 2 milliGRAM(s) IV Push every 6 hours PRN    VITALS:   T(F): 98.1  HR: 102  BP: 106/66  RR: 33  SpO2: 100%    LABS:                        8.3    9.46  )-----------( 189      ( 09 Jan 2019 11:12 )             25.2     01-09    137  |  100  |  34<H>  ----------------------------<  155<H>  4.6   |  22  |  1.7<H>    Ca    7.7<L>      09 Jan 2019 04:49  Mg     2.7     01-09      PTT - ( 09 Jan 2019 11:12 )  PTT:86.3 sec          Culture - Blood (collected 06 Jan 2019 21:35)  Source: .Blood None  Preliminary Report (08 Jan 2019 06:01):    No growth to date.      CARDIAC MARKERS ( 08 Jan 2019 04:19 )  x     / 0.03 ng/mL / x     / x     / 2.3 ng/mL      RADIOLOGY:    PHYSICAL EXAM:  GEN: No acute distress  LUNGS: Clear to auscultation bilaterally   HEART: S1/S2 present. RRR.   ABD/ GI: Soft, non-tender, non-distended. Bowel sounds present  EXT: NC/NC/NE/2+PP/MARTELL  NEURO: AAOX3-- she is laughing and looks cheerful

## 2019-01-09 NOTE — PROGRESS NOTE ADULT - ASSESSMENT
#  Atrila tachyarrythmia  s/p verapamil.  and periods of hypotension.--- for which she was given iv fluids   echo with EF 69% and severe pulmonary HTN. EPS following. Records from [facility] to be faxed. Continue metoprolol and heparin    #symptomatic anemia 2/2 GI bleed s/p recent transfusion of 2 more units  bleeding is diverticular 80% of which stop spontaneously and I would not perform colonoscopy in the current setting since it can worsen her cardiac picture-- as per GI   C/w protonix.    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 #symptomatic anemia 2/2 GI bleed s/p recent transfusion of 2 more units  GI performed colonoscopy-< from: Colonoscopy (01.10.19 @ 10:00) >  excavated lesions Multiple non-bleeding diverticula with medium openings were  seen in the sigmoid colon.   Protruding lesions Medium non-bleeding internal hemorrhoids were noted.   An ulcerated and fungating bleeding 5 cm mass of malignant appearance was found  in the mid-descending colon. The mass caused a partial obstruction. The scope  traversed the lesion.   Additional findings Two cc of 1:10,000 epinephrine was injected. An attempt to  place a endo-loop was unsuccessful. A clip was placed proximal and distal to the  lesion. Claudia ink was injected distal to the lesion (7cc)..     < end of copied text >    C/w protonix.  patient was seen by surgery and CT scan was ordered --pending presently    #  Atrila tachyarrythmia  s/p verapamil.  and periods of hypotension.--- for which she was given iv fluids   echo with EF 69% and severe pulmonary HTN. EPS following. Records from [facility] to be faxed. 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

## 2019-01-09 NOTE — CONSULT NOTE ADULT - ASSESSMENT
IMP: GI Bleed      PLAN:  Procedure are high risk in this pt due to valvular ht ds and need for anticoagulation.  Will do EGD/[place] tomorrow and stop heparin for 2 hours prior to the procedure;  Family made aware of the risks - including CVA, acute MI, CHF, emboli  1. D/C heparin at 2PM tomorrow  2. Clear fluid diet until 6AM then start Golytely 8oz po q10 m until 1 gal is consumed  3.  NPO after 1PM tomorrow

## 2019-01-09 NOTE — PROGRESS NOTE ADULT - SUBJECTIVE AND OBJECTIVE BOX
Interventional Radiology Follow- Up Note      78y Female with BRBPR.      O/N:     SVT controlled on medication. No longer hypotensive. Last bloody bowel movement yesterday. Heparin gtt for mechanical heart valve.    Vitals: T(F): 98.7 (01-09-19 @ 08:00), Max: 98.7 (01-09-19 @ 08:00)  HR: 84 (01-09-19 @ 10:00) (76 - 140)  BP: 109/60 (01-09-19 @ 10:00) (79/67 - 109/60)  RR: 27 (01-09-19 @ 10:00) (15 - 27)  SpO2: 100% (01-09-19 @ 10:00) (93% - 100%)  Wt(kg): --    LABS:                        8.2    9.53  )-----------( 193      ( 09 Jan 2019 04:49 )             25.3     01-09    137  |  100  |  34<H>  ----------------------------<  155<H>  4.6   |  22  |  1.7<H>    Ca    7.7<L>      09 Jan 2019 04:49  Mg     2.7     01-09      PTT - ( 09 Jan 2019 04:49 )  PTT:88.0 sec          Impression: 78y Female admitted with LOWER GI BLEED HEMORRHOIDS ANEMIA      Plan:    Lower GI bleed. Hemodynamic stable. Recommend colonoscopy to diagnose etiology of bleeding.   No IR intervention indicated.        Please call Interventional Radiology [phone] with any questions, concerns, or issues regarding above.

## 2019-01-09 NOTE — PROGRESS NOTE ADULT - SUBJECTIVE AND OBJECTIVE BOX
SUBJECTIVE:    Patient is a 78y old Female who presents with a chief complaint of dark black stools (09 Jan 2019 09:46)    Currently admitted to medicine with the primary diagnosis of Lower GI bleed     Today is hospital day 7d. Overnight patient became hypotensive and tachycardic with atrial rhythm on cardiac monitor. The rhythm broke after administration of verapamil and 1.5L NS fluid bolus. We will continue to monitor her for signs of BRBRP/melena, drop in hemoglobin, or hemodynamic instability. GI holding off on scope at this time until cardiac stabilization. IR following. Patient to remain on heparin drip per Dr. Hunter unless acute bleeding or plan for procedure.     PAST MEDICAL & SURGICAL HISTORY  CVA (cerebral vascular accident)  Anemia  Rheumatic fever  Diabetes  Osteoporosis  Mitral valve replaced  Atrial fibrillation: on coumadin at home  Hypertension  History of ureter stent  S/P AVR  H/O mitral valve replacement    SOCIAL HISTORY:  Patient denies alcohol, tobacco, and recreational drug use.     From ( X) home ( ) nursing home ( ) other   Ambulation status: (X ) ambulates independently ( ) ambulates with assistance ( ) ambulates with device ( ) dependent   Device: ( ) rolling walker ( ) cane     ALLERGIES:  No Known Drug Allergies  shellfish (Unknown)    MEDICATIONS:  STANDING MEDICATIONS  atorvastatin 10 milliGRAM(s) Oral at bedtime  chlorhexidine 4% Liquid 1 Application(s) Topical <User Schedule>  cholecalciferol 2000 Unit(s) Oral daily  dextrose 5%. 1000 milliLiter(s) IV Continuous <Continuous>  folic acid 1 milliGRAM(s) Oral daily  heparin  Infusion 1400 Unit(s)/Hr IV Continuous <Continuous>  magnesium sulfate  IVPB 2 Gram(s) IV Intermittent daily  metoprolol tartrate 100 milliGRAM(s) Oral every 12 hours  pantoprazole  Injectable 40 milliGRAM(s) IV Push two times a day    PRN MEDICATIONS  morphine  - Injectable 2 milliGRAM(s) IV Push every 6 hours PRN    VITALS:   T(F): 98.7  HR: 84  BP: 109/60  RR: 27  SpO2: 100%    LABS:                        8.2    9.53  )-----------( 193      ( 09 Jan 2019 04:49 )             25.3     01-09    137  |  100  |  34<H>  ----------------------------<  155<H>  4.6   |  22  |  1.7<H>    Ca    7.7<L>      09 Jan 2019 04:49  Mg     2.7     01-09    PTT - ( 09 Jan 2019 04:49 )  PTT:88.0 sec    Culture - Blood (collected 06 Jan 2019 21:35)  Source: .Blood None  Preliminary Report (08 Jan 2019 06:01):    No growth to date.    CARDIAC MARKERS ( 08 Jan 2019 04:19 )  x     / 0.03 ng/mL / x     / x     / 2.3 ng/mL  CARDIAC MARKERS ( 07 Jan 2019 11:43 )  x     / 0.03 ng/mL / x     / x     / 5.1 ng/mL    RADIOLOGY:  CXR  Impression:    Prominent pulmonary vasculature and small left basilar opacity/effusion,   essentially unchanged.    PHYSICAL EXAM:  GEN: No acute distress, lying comfortably in bed   LUNGS: Clear to auscultation bilaterally, no labored breathing   HEART: Mechanical valve appreciated, normal rate and rhythm. Chest wall TTP.   ABD: Soft, non-tender, non-distended. Bowel sounds present.   EXT: Noncyanotic, L LE 1+edema, 2+ peripheral pulses, skin intact   NEURO: AAOX3, CN II-XII grossly intact     Lines/Tubes   Peripheral IV access  0.5L 02 via NC

## 2019-01-09 NOTE — PROGRESS NOTE ADULT - ASSESSMENT
IMPRESSION  Acute on Chronic anemia (multifactorial) r/o GI bleed  Paroxysmal SVT   Atrial fibrillation    PLAN    CNS: AAOx3, no CNS depressants     HEENT: Oral care     PULMONARY: HOB 45 degrees, aspiration precautions, wean off 02     CARDIOVASCULAR: Troponin stable, echo with EF 69% and severe pulmonary HTN. EPS following. [facility] from [facility] to be faxed. Continue metoprolol and heparin gtt. Monitor PTT.     GI: GI ppx. Clear liquid diet. Colonoscopy after EPS/cardio clearance.     RENAL: Monitor electrolytes and I&O     INFECTIOUS DISEASE: No active infection     HEMATOLOGICAL: Hemoglobin 8.2 s/p 2 units PRBCS, monitor CBC q8h and transfuse PRN. Heme/onc following for transitional cell carcinoma. On heparin gtt no need for DVT ppx.     ENDOCRINE: Elevated blood glucose levels, on insulin regimen     CODE STATUS: FULL CODE    DISPOSITION: MICU monitoring

## 2019-01-09 NOTE — PROGRESS NOTE ADULT - ASSESSMENT
Diastolic CHF  A. fib  Atrial tachycardia  PPM  Valvular heart disease, s/p MVR, AVR  Transitional CA, possible mets.  Anemia - multifactorial  GIB    Plan:    C/w Metoprolol 100 mg q12  EP evaluation appreciated - PPM adjusted as per EP recommendations  D/w Dr. Medel. Episodes of AT - resolve with PRN Cardizem.  C/w Heparin gtt for now, but as discussed yesterday, can be held for acute bleeding or briefly for procedures (mech. aortic valve)  Transfuse PRBC as needed.  GI f/u for colonoscopy/EGD.

## 2019-01-09 NOTE — CHART NOTE - NSCHARTNOTEFT_GEN_A_CORE
Registered Dietitian Follow-Up    ***Scroll to the bottom for RD recommendation***    Patient Profile Reviewed                           Yes [x]   No []  Nutrition History Previously Obtained        Yes [x]  No []  -  at bedside and today pt is NPO status.  is agitated not knowing why pt is on NPO nor knowing what the plan is. RD has spoken with LIP. Pt is NPO but will start CLEAR LIQUID today 1/9 because GI has seen patient but no scope at this time for LOWER GIB, due to pt being hypotensive and tachycardiac. RD suggesting Clear liquid with Gelatin ProSource in the meantime until further notice.        PERTINENT MEDICAL INFORMATIONS:  (1) p/w dark black stools. pt hx of 30# weight loss in last year and loss of appetite d/t abd pain ---- malnutrition was diagnosed  (2) cardiology following planning to c/w metoprolol.  (3) Transfuse PRBC as needed s/p 2u PRBC (monitoring h/h)  (4) heme/onc following for acute on chronic anemia dn HX of transitional cell cancer of kidney s/p resection 6/2018 and re-resection on 10/29/18. - Follow up with martin  (5) LLE improving.      PERTINENT SUBJECTIVE INFORMATION:  (1) see above      DIET ORDER:  NPO - changing to clear today        ANTHROPOMETRICS:  - Ht. 154.94cm  - Wt.  (admitted): 61.9kg  (1/9): 62.7kg - possible weight gain vs edema?  - BMI. 26.1  - IBW.        PERTINENT LAB DATA:  PERTINENT MEDS:       PHYSICAL FINDINGS  - APPEARANCE:        pt is alert and oriented. 1+ R leg and L arm edema  - GI FUNCTION:       LBM 1/8 (BrBPR noted on EMR)  - TUBES:                       - ORAL/MOUTH:      none reported  - SKIN:                        ecchymosis        NUTRITION REQUIREMENTS  WEIGHT USED:                          admitted 61.9kg  ESTIMATED ENERGY NEEDS:       CONTINUE [x  ]      ADJUST [  ] - admitted note    ESTIMATED ENERGY NEEDS:          ~5014-7223 kcal/day (MSJ x1.2-1.4 in pt with PCM).   ESTIMATED PROTEIN NEEDS:        64-87 g/day (1.2-1.4 g/kg CBW d/t observed muscle wasting)- mildly poor kidney function noted  ESTIMATED FLUID NEEDS:             1mL/kcal or per LIP    CURRENT NUTRIENT NEEDS:    NPO          [x  ] PREVIOUS NUTRITION DIAGNOSIS:   (1) Malnutrition (2) Inadequate protein-energy intake - REMAINS            [ x ] ONGOING        [  ] RESOLVED          PATIENT INTERVENTION:    [ x ] ORAL        [ ] EN/TF     GOAL/EXPECTED OUTCOME:     pt to advance diet, consume and tolerate >50% of all meals and snacks and rec'd supplements upon f/u.   INDICATOR/MONITORING:       RD to monitor diet order, energy intake, body composition, nutrition focused physical findings (Hemoglobin labs, edema, skin). Electrolytes (mag), renal profile.  NUTRITION INTERVENTION:        Meals and snacks. Medical food supplements    RECS: (1) If patient to be on clear, please order ProSource Gelatin SF (sugar-free) q12hr to go along with it.

## 2019-01-10 ENCOUNTER — TRANSCRIPTION ENCOUNTER (OUTPATIENT)
Age: 79
End: 2019-01-10

## 2019-01-10 LAB
ALBUMIN SERPL ELPH-MCNC: 2.5 G/DL — LOW (ref 3.5–5.2)
ALP SERPL-CCNC: 77 U/L — SIGNIFICANT CHANGE UP (ref 30–115)
ALT FLD-CCNC: 7 U/L — SIGNIFICANT CHANGE UP (ref 0–41)
ANION GAP SERPL CALC-SCNC: 12 MMOL/L — SIGNIFICANT CHANGE UP (ref 7–14)
ANION GAP SERPL CALC-SCNC: 15 MMOL/L — HIGH (ref 7–14)
APTT BLD: 29.4 SEC — SIGNIFICANT CHANGE UP (ref 27–39.2)
APTT BLD: 32 SEC — SIGNIFICANT CHANGE UP (ref 27–39.2)
AST SERPL-CCNC: 12 U/L — SIGNIFICANT CHANGE UP (ref 0–41)
BASOPHILS # BLD AUTO: 0.05 K/UL — SIGNIFICANT CHANGE UP (ref 0–0.2)
BASOPHILS # BLD AUTO: 0.07 K/UL — SIGNIFICANT CHANGE UP (ref 0–0.2)
BASOPHILS NFR BLD AUTO: 0.4 % — SIGNIFICANT CHANGE UP (ref 0–1)
BASOPHILS NFR BLD AUTO: 0.6 % — SIGNIFICANT CHANGE UP (ref 0–1)
BILIRUB SERPL-MCNC: 1.2 MG/DL — SIGNIFICANT CHANGE UP (ref 0.2–1.2)
BLD GP AB SCN SERPL QL: SIGNIFICANT CHANGE UP
BUN SERPL-MCNC: 31 MG/DL — HIGH (ref 10–20)
BUN SERPL-MCNC: 33 MG/DL — HIGH (ref 10–20)
CALCIUM SERPL-MCNC: 7.9 MG/DL — LOW (ref 8.5–10.1)
CALCIUM SERPL-MCNC: 8.4 MG/DL — LOW (ref 8.5–10.1)
CHLORIDE SERPL-SCNC: 101 MMOL/L — SIGNIFICANT CHANGE UP (ref 98–110)
CHLORIDE SERPL-SCNC: 98 MMOL/L — SIGNIFICANT CHANGE UP (ref 98–110)
CO2 SERPL-SCNC: 24 MMOL/L — SIGNIFICANT CHANGE UP (ref 17–32)
CO2 SERPL-SCNC: 25 MMOL/L — SIGNIFICANT CHANGE UP (ref 17–32)
CREAT SERPL-MCNC: 1.5 MG/DL — SIGNIFICANT CHANGE UP (ref 0.7–1.5)
CREAT SERPL-MCNC: 1.5 MG/DL — SIGNIFICANT CHANGE UP (ref 0.7–1.5)
EOSINOPHIL # BLD AUTO: 0.06 K/UL — SIGNIFICANT CHANGE UP (ref 0–0.7)
EOSINOPHIL # BLD AUTO: 0.14 K/UL — SIGNIFICANT CHANGE UP (ref 0–0.7)
EOSINOPHIL NFR BLD AUTO: 0.4 % — SIGNIFICANT CHANGE UP (ref 0–8)
EOSINOPHIL NFR BLD AUTO: 1.1 % — SIGNIFICANT CHANGE UP (ref 0–8)
GLUCOSE SERPL-MCNC: 120 MG/DL — HIGH (ref 70–99)
GLUCOSE SERPL-MCNC: 123 MG/DL — HIGH (ref 70–99)
HCT VFR BLD CALC: 22.3 % — LOW (ref 37–47)
HCT VFR BLD CALC: 27.8 % — LOW (ref 37–47)
HCT VFR BLD CALC: 30.9 % — LOW (ref 37–47)
HGB BLD-MCNC: 10 G/DL — LOW (ref 12–16)
HGB BLD-MCNC: 7.2 G/DL — CRITICAL LOW (ref 12–16)
HGB BLD-MCNC: 9 G/DL — LOW (ref 12–16)
IMM GRANULOCYTES NFR BLD AUTO: 1.3 % — HIGH (ref 0.1–0.3)
IMM GRANULOCYTES NFR BLD AUTO: 1.4 % — HIGH (ref 0.1–0.3)
LYMPHOCYTES # BLD AUTO: 0.54 K/UL — LOW (ref 1.2–3.4)
LYMPHOCYTES # BLD AUTO: 0.58 K/UL — LOW (ref 1.2–3.4)
LYMPHOCYTES # BLD AUTO: 4.2 % — LOW (ref 20.5–51.1)
LYMPHOCYTES # BLD AUTO: 4.3 % — LOW (ref 20.5–51.1)
MAGNESIUM SERPL-MCNC: 2.6 MG/DL — HIGH (ref 1.8–2.4)
MAGNESIUM SERPL-MCNC: 2.7 MG/DL — HIGH (ref 1.8–2.4)
MCHC RBC-ENTMCNC: 25.9 PG — LOW (ref 27–31)
MCHC RBC-ENTMCNC: 26.1 PG — LOW (ref 27–31)
MCHC RBC-ENTMCNC: 26.5 PG — LOW (ref 27–31)
MCHC RBC-ENTMCNC: 32.3 G/DL — SIGNIFICANT CHANGE UP (ref 32–37)
MCHC RBC-ENTMCNC: 32.4 G/DL — SIGNIFICANT CHANGE UP (ref 32–37)
MCHC RBC-ENTMCNC: 32.4 G/DL — SIGNIFICANT CHANGE UP (ref 32–37)
MCV RBC AUTO: 80.2 FL — LOW (ref 81–99)
MCV RBC AUTO: 80.6 FL — LOW (ref 81–99)
MCV RBC AUTO: 82 FL — SIGNIFICANT CHANGE UP (ref 81–99)
MONOCYTES # BLD AUTO: 0.68 K/UL — HIGH (ref 0.1–0.6)
MONOCYTES # BLD AUTO: 1.16 K/UL — HIGH (ref 0.1–0.6)
MONOCYTES NFR BLD AUTO: 5.4 % — SIGNIFICANT CHANGE UP (ref 1.7–9.3)
MONOCYTES NFR BLD AUTO: 8.5 % — SIGNIFICANT CHANGE UP (ref 1.7–9.3)
NEUTROPHILS # BLD AUTO: 11.06 K/UL — HIGH (ref 1.4–6.5)
NEUTROPHILS # BLD AUTO: 11.64 K/UL — HIGH (ref 1.4–6.5)
NEUTROPHILS NFR BLD AUTO: 85.1 % — HIGH (ref 42.2–75.2)
NEUTROPHILS NFR BLD AUTO: 87.3 % — HIGH (ref 42.2–75.2)
NRBC # BLD: 0 /100 WBCS — SIGNIFICANT CHANGE UP (ref 0–0)
PLATELET # BLD AUTO: 170 K/UL — SIGNIFICANT CHANGE UP (ref 130–400)
PLATELET # BLD AUTO: 174 K/UL — SIGNIFICANT CHANGE UP (ref 130–400)
PLATELET # BLD AUTO: 198 K/UL — SIGNIFICANT CHANGE UP (ref 130–400)
POTASSIUM SERPL-MCNC: 4.5 MMOL/L — SIGNIFICANT CHANGE UP (ref 3.5–5)
POTASSIUM SERPL-MCNC: 4.7 MMOL/L — SIGNIFICANT CHANGE UP (ref 3.5–5)
POTASSIUM SERPL-SCNC: 4.5 MMOL/L — SIGNIFICANT CHANGE UP (ref 3.5–5)
POTASSIUM SERPL-SCNC: 4.7 MMOL/L — SIGNIFICANT CHANGE UP (ref 3.5–5)
PROT SERPL-MCNC: 4.9 G/DL — LOW (ref 6–8)
RBC # BLD: 2.78 M/UL — LOW (ref 4.2–5.4)
RBC # BLD: 3.45 M/UL — LOW (ref 4.2–5.4)
RBC # BLD: 3.77 M/UL — LOW (ref 4.2–5.4)
RBC # FLD: 19.2 % — HIGH (ref 11.5–14.5)
RBC # FLD: 19.4 % — HIGH (ref 11.5–14.5)
RBC # FLD: 20 % — HIGH (ref 11.5–14.5)
SODIUM SERPL-SCNC: 137 MMOL/L — SIGNIFICANT CHANGE UP (ref 135–146)
SODIUM SERPL-SCNC: 138 MMOL/L — SIGNIFICANT CHANGE UP (ref 135–146)
TYPE + AB SCN PNL BLD: SIGNIFICANT CHANGE UP
WBC # BLD: 12.66 K/UL — HIGH (ref 4.8–10.8)
WBC # BLD: 13.68 K/UL — HIGH (ref 4.8–10.8)
WBC # BLD: 8.99 K/UL — SIGNIFICANT CHANGE UP (ref 4.8–10.8)
WBC # FLD AUTO: 12.66 K/UL — HIGH (ref 4.8–10.8)
WBC # FLD AUTO: 13.68 K/UL — HIGH (ref 4.8–10.8)
WBC # FLD AUTO: 8.99 K/UL — SIGNIFICANT CHANGE UP (ref 4.8–10.8)

## 2019-01-10 PROCEDURE — 99223 1ST HOSP IP/OBS HIGH 75: CPT

## 2019-01-10 RX ORDER — FUROSEMIDE 40 MG
20 TABLET ORAL ONCE
Qty: 0 | Refills: 0 | Status: COMPLETED | OUTPATIENT
Start: 2019-01-10 | End: 2019-01-10

## 2019-01-10 RX ORDER — HEPARIN SODIUM 5000 [USP'U]/ML
1200 INJECTION INTRAVENOUS; SUBCUTANEOUS
Qty: 25000 | Refills: 0 | Status: DISCONTINUED | OUTPATIENT
Start: 2019-01-10 | End: 2019-01-10

## 2019-01-10 RX ORDER — SODIUM CHLORIDE 9 MG/ML
1000 INJECTION, SOLUTION INTRAVENOUS
Qty: 0 | Refills: 0 | Status: DISCONTINUED | OUTPATIENT
Start: 2019-01-10 | End: 2019-01-11

## 2019-01-10 RX ORDER — METOPROLOL TARTRATE 50 MG
50 TABLET ORAL ONCE
Qty: 0 | Refills: 0 | Status: COMPLETED | OUTPATIENT
Start: 2019-01-10 | End: 2019-01-10

## 2019-01-10 RX ADMIN — PANTOPRAZOLE SODIUM 40 MILLIGRAM(S): 20 TABLET, DELAYED RELEASE ORAL at 06:03

## 2019-01-10 RX ADMIN — PANTOPRAZOLE SODIUM 40 MILLIGRAM(S): 20 TABLET, DELAYED RELEASE ORAL at 18:45

## 2019-01-10 RX ADMIN — Medication 50 MILLIGRAM(S): at 02:05

## 2019-01-10 RX ADMIN — Medication 2000 UNIT(S): at 12:20

## 2019-01-10 RX ADMIN — Medication 1 MILLIGRAM(S): at 12:16

## 2019-01-10 RX ADMIN — Medication 100 MILLIGRAM(S): at 06:03

## 2019-01-10 RX ADMIN — SODIUM CHLORIDE 50 MILLILITER(S): 9 INJECTION, SOLUTION INTRAVENOUS at 20:55

## 2019-01-10 RX ADMIN — MORPHINE SULFATE 2 MILLIGRAM(S): 50 CAPSULE, EXTENDED RELEASE ORAL at 01:27

## 2019-01-10 RX ADMIN — Medication 4000 MILLILITER(S): at 06:04

## 2019-01-10 RX ADMIN — MORPHINE SULFATE 2 MILLIGRAM(S): 50 CAPSULE, EXTENDED RELEASE ORAL at 08:15

## 2019-01-10 RX ADMIN — MORPHINE SULFATE 2 MILLIGRAM(S): 50 CAPSULE, EXTENDED RELEASE ORAL at 07:45

## 2019-01-10 RX ADMIN — ATORVASTATIN CALCIUM 10 MILLIGRAM(S): 80 TABLET, FILM COATED ORAL at 21:13

## 2019-01-10 RX ADMIN — CHLORHEXIDINE GLUCONATE 1 APPLICATION(S): 213 SOLUTION TOPICAL at 12:15

## 2019-01-10 RX ADMIN — Medication 20 MILLIGRAM(S): at 06:28

## 2019-01-10 RX ADMIN — Medication 100 MILLIGRAM(S): at 18:45

## 2019-01-10 RX ADMIN — MORPHINE SULFATE 2 MILLIGRAM(S): 50 CAPSULE, EXTENDED RELEASE ORAL at 02:00

## 2019-01-10 NOTE — CONSULT NOTE ADULT - ATTENDING COMMENTS
As above stated, multiple co-morbdities.   Anemia secondary to GI bleed.  melena with RGD unremarkable.  Colonoscopy with a partially obstructing mass in the descending colon. No biopsy taken.    No recent imaging in this hospitalization.    A/P:  Colonic mass, most likely metastatic renal cell carcinoma.  Needs palliation.  Tomorrow I will d/w patient and family options.  Consider palliative care due her high risk for any procedure.
Agree with GI work up in the setting of very low Hb on admission as well as ongoing abdominal pain. No reports small amount of black stool this AM.   Obtain Rad Onc consult for consideration of palliative XRT.  PET for restaging was not done as outpatient, will need to obtain upon discharge.

## 2019-01-10 NOTE — PROGRESS NOTE ADULT - ASSESSMENT
IMPRESSION:   S/P CP arrest few minutes?, s/p epi, admitted for low hg, LGI bleed, multiple co morbidities  Acitve bleed  metallic Aortic valve      PLAN:    CNS: no CNS depressant    HEENT:  Oral care    PULMONARY:  HOB @ 45 degrees, aspiration precaution, oxygen as need keep pulse ox>92%    CARDIOVASCULAR: b blocker, cardio following    GI: GI prophylaxis   GI F/UP          npo, GoLYTELY    RENAL:  F/u  lytes.  Correct as needed. accurate I/O    INFECTIOUS DISEASE: no ABX    HEMATOLOGICAL:  DVT prophylaxis. CBC Q 8H, hold heparin, transfuse if <7 or active bleed    ENDOCRINE:  Follow up FS.  Insulin protocol if needed    CODE STATUS: FULL CODE    DISPOSITION: Pt requires continued monitoring in the MICU    POOR PROGNOSIS IMPRESSION:     CP arrest s/p amio bolus  LGIB - active bleeding  Metallic AV / afib  AFIB RVR controlled    PLAN:    CNS: no CNS depressant    HEENT:  Oral care    PULMONARY:  HOB @ 45 degrees, aspiration precaution, oxygen as need keep pulse ox>92%    CARDIOVASCULAR: b blocker, cardio following    GI: PPI q12h, endoscopy today, bowel prep, GI followign    RENAL:  F/u  lytes.  Correct as needed. accurate I/O    INFECTIOUS DISEASE: no ABX    HEMATOLOGICAL:  DVT prophylaxis. CBC Q 8H, hold heparin, transfuse if <7 or active bleed    ENDOCRINE:  Follow up FS.  Insulin protocol if needed    CODE STATUS: FULL CODE    DISPOSITION: Pt requires continued monitoring in the MICU    POOR PROGNOSIS IMPRESSION:     CP arrest? s/p amio bolus  LGIB - active bleeding  Metallic AV / afib  AFIB RVR controlled    PLAN:    CNS: no CNS depressant    HEENT:  Oral care    PULMONARY:  HOB @ 45 degrees, aspiration precaution, oxygen as need keep pulse ox>92%    CARDIOVASCULAR: b blocker, cardio following    GI: PPI q12h, endoscopy today, bowel prep, GI followign    RENAL:  F/u  lytes.  Correct as needed. accurate I/O    INFECTIOUS DISEASE: no ABX    HEMATOLOGICAL:  DVT prophylaxis. CBC Q 8H, hold heparin, transfuse if <7 or active bleed    ENDOCRINE:  Follow up FS.  Insulin protocol if needed    CODE STATUS: FULL CODE    DISPOSITION: Pt requires continued monitoring in the MICU    POOR PROGNOSIS

## 2019-01-10 NOTE — CONSULT NOTE ADULT - CONSULT REQUESTED DATE/TIME
07-Jan-2019
03-Jan-2019 10:20
03-Jan-2019 12:18
03-Jan-2019 13:47
04-Jan-2019 17:39
07-Jan-2019 08:52
08-Jan-2019 18:15
09-Jan-2019 16:52
10-Jim-2019 18:49

## 2019-01-10 NOTE — PROGRESS NOTE ADULT - SUBJECTIVE AND OBJECTIVE BOX
Patient is a 78y old  Female who presents with a chief complaint of dark black stools (09 Jan 2019 17:05)      Subjective: Patient received 2 units of PRBCs ovenight for a hb level of 7.2.       Vital Signs Last 24 Hrs  T(C): 36.7 (10 Jim 2019 04:00), Max: 36.7 (09 Jan 2019 16:00)  T(F): 98.1 (10 Jim 2019 04:00), Max: 98.1 (09 Jan 2019 16:00)  HR: 102 (10 Jim 2019 06:00) (72 - 128)  BP: 109/66 (10 Jim 2019 06:00) (87/44 - 127/63)  BP(mean): 88 (10 Jim 2019 06:00) (57 - 95)  RR: 21 (10 Jim 2019 06:00) (16 - 36)  SpO2: 100% (10 Jim 2019 06:00) (91% - 100%)    PHYSICAL EXAM  General: adult in NAD  HEENT: clear oropharynx, anicteric sclera, pale conjunctiva  Neck: supple  CV: IRRR, + murmur and click  Lungs: poor inspiratory effort  Abdomen: soft non-tender non-distended  Ext: + edema  Skin: no rashes and no petechiae  Neuro: alert and oriented X 4, no focal deficits    MEDICATIONS  (STANDING):  atorvastatin 10 milliGRAM(s) Oral at bedtime  chlorhexidine 4% Liquid 1 Application(s) Topical <User Schedule>  cholecalciferol 2000 Unit(s) Oral daily  dextrose 5%. 1000 milliLiter(s) (50 mL/Hr) IV Continuous <Continuous>  folic acid 1 milliGRAM(s) Oral daily  heparin  Infusion 1200 Unit(s)/Hr (12 mL/Hr) IV Continuous <Continuous>  metoprolol tartrate 100 milliGRAM(s) Oral every 12 hours  pantoprazole  Injectable 40 milliGRAM(s) IV Push two times a day    MEDICATIONS  (PRN):  morphine  - Injectable 2 milliGRAM(s) IV Push every 6 hours PRN Severe Pain (7 - 10)      LABS:                          7.2    8.99  )-----------( 170      ( 10 Jim 2019 00:35 )             22.3         Mean Cell Volume : 80.2 fL  Mean Cell Hemoglobin : 25.9 pg  Mean Cell Hemoglobin Concentration : 32.3 g/dL  Auto Neutrophil # : x  Auto Lymphocyte # : x  Auto Monocyte # : x  Auto Eosinophil # : x  Auto Basophil # : x  Auto Neutrophil % : x  Auto Lymphocyte % : x  Auto Monocyte % : x  Auto Eosinophil % : x  Auto Basophil % : x      Serial CBC's  01-10 @ 00:35  Hct-22.3 / Hgb-7.2 / Plat-170 / RBC-2.78 / WBC-8.99  Serial CBC's  01-09 @ 16:42  Hct-25.5 / Hgb-8.2 / Plat-195 / RBC-3.19 / WBC-9.65  Serial CBC's  01-09 @ 11:12  Hct-25.2 / Hgb-8.3 / Plat-189 / RBC-3.17 / WBC-9.46  Serial CBC's  01-09 @ 04:49  Hct-25.3 / Hgb-8.2 / Plat-193 / RBC-3.16 / WBC-9.53  Serial CBC's  01-08 @ 04:19  Hct-22.4 / Hgb-7.1 / Plat-203 / RBC-2.90 / WBC-12.10  Serial CBC's  01-08 @ 02:26  Hct-23.1 / Hgb-7.4 / Plat-216 / RBC-3.03 / WBC-10.52  Serial CBC's  01-07 @ 21:26  Hct-22.7 / Hgb-7.4 / Plat-208 / RBC-2.98 / WBC-10.29  Serial CBC's  01-07 @ 11:43  Hct-24.5 / Hgb-7.7 / Plat-227 / RBC-3.17 / WBC-11.37  Serial CBC's  01-07 @ 04:22  Hct-22.7 / Hgb-7.4 / Plat-198 / RBC-3.00 / WBC-12.31  Serial CBC's  01-06 @ 21:35  Hct-26.0 / Hgb-8.6 / Plat-204 / RBC-3.40 / WBC-15.20  Serial CBC's  01-06 @ 11:34  Hct-23.1 / Hgb-7.6 / Plat-221 / RBC-3.09 / WBC-9.09      01-10    137  |  101  |  31<H>  ----------------------------<  120<H>  4.5   |  24  |  1.5    Ca    7.9<L>      10 Jim 2019 00:35  Mg     2.7     01-10    TPro  4.9<L>  /  Alb  2.5<L>  /  TBili  1.2  /  DBili  x   /  AST  12  /  ALT  7   /  AlkPhos  77  01-10      PTT - ( 09 Jan 2019 23:30 )  PTT:29.4 sec    Reticulocyte Percent: 1.6 % (01-04 @ 07:36)      RADIOLOGY & ADDITIONAL STUDIES:  < from: Xray Chest 1 View- PORTABLE-Routine (01.09.19 @ 06:05) >  Impression:      Prominent pulmonary vasculature and small left basilar opacity/effusion,   essentially unchanged.          < end of copied text >

## 2019-01-10 NOTE — PROGRESS NOTE ADULT - ASSESSMENT
# Acute on chronic anemia: Multifactorial anemia  - R/O GI bleed : had extensive GI workup in the past which was negative . Now with red blood per rectum    Dr Matias was consulted as a second opinion . 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 Hemolysis panel reviewed  - S/p BM Bx, results are inconclusive, mild MDS is not excluded   - Continue with folic acid      # Cardiac arrythmias: no asystole on pacemaker interrogation - Medications adjusted by Cardiology    # Transitional cell cancer of the kidney, s/p resection in [facility]  on 6/1/2018, and reresection on 10/29/2018 (suspected initial stage was IIIa [id number])   - Following with Oncology (Dr. Fields) at [facility]  - Was previously consented for Ambassador trial and randomized to observation arm   - Follow up CT A/P on 12/6/2018  revealed L adnexal ovoid mass at 6.4x6.2cm, fluid density on attenuation as well as interval development of soft tissue density somewhat confluent masslike conglomerates along left para-aortic-iliac chain, most pronounced below level of aortic bifurcation, suspicious for mets, mildly prominent b/l lymph nodes in the inguinal regions, suspicious for mets, small right pleural effusion with interval increase.   - PET CT to restage was ordered on 12/27/2018 - not performed yet . Would hold on CT scan with IV contrast due to high creatinine  - Radiation oncology on board for pelvic radiation with curative intent once patient is stable    # Metallic mitral valve, replaced with tissue valve on 3/19/2018, metallic aortic valve in place  --On chronic anticoagulation with Coumadin for A. fib and metallic AV, Heparin gtt for GI procedure    # LLE : improving - likely 2/2 venous compression 2/2 adenopathy   -- L soleal DVT    Will follow

## 2019-01-10 NOTE — PROGRESS NOTE ADULT - ASSESSMENT
Hear rate is controlled.  C/w BB  Heparin on hold for procedure today.  F/u colonoscopy/EGD results.  C/w PPI  Will follow with you.

## 2019-01-10 NOTE — PROGRESS NOTE ADULT - ASSESSMENT
IMPRESSION  Acute on Chronic anemia (multifactorial), LGIB   Paroxysmal SVT   Atrial fibrillation    PLAN    CNS: AAOx3, no CNS depressants     HEENT: Oral care     PULMONARY: HOB 45 degrees, aspiration precautions, 02 to maintain 02 sat > 92%     CARDIOVASCULAR: Troponin stable, echo with EF 69% and severe pulmonary HTN. EPS following. Records from [facility] to be faxed. Continue metoprolol. Heparin drip discontinued due to active LGIB.     GI:  Protonix IV BID, NPO, EGD/colonoscopy today with GI     RENAL: Monitor electrolytes and I&O     INFECTIOUS DISEASE: No active infection     HEMATOLOGICAL: Monitor CBC and transfuse PRN for goal > 8. Heme/onc following for transitional cell carcinoma.     ENDOCRINE: Monitor fingersticks, on insulin regimen     MICU monitoring  FULL CODE

## 2019-01-10 NOTE — PROGRESS NOTE ADULT - SUBJECTIVE AND OBJECTIVE BOX
Patient is a 78y old  Female who presents with a chief complaint of Melena (10 Jim 2019 13:00)    HPI:  78 year old female with PMH of HTN, type II DM, A-fib on coumadin, h/o bradycardia s/p pacemaker, rheumatic heart disease s/p bioprosthetic mitral valve replacement, s/p metallic aortic valve replacement, and HFpEF, history of RLE DVT 25 year sago, Left nephrectomy, and osteoporosis, presents to the ED for dark black stools since 3 days. As per patient and son at bedside she has dark black stools since 3 days, not painful, last bowel movement last night, no bright red blood, no hematemesis, no dysphagia, buit admits to weight loss of 30 pounds in last year, loss of appetite and chronic intermittent abdominal pain, diffuse, no aggravating factors and relieving factor 3-4/10 in intensity. Also has c/o feeling weak, pale, light headedness. She receives frequent transfusions for microcytic anemia, last transfusion was three weeks ago follows up with Dr. Alvarenga she went to see Dr. Alvarenga blood work showed Hb of 4.8 referred to hospital.  Denies fever, chills, nausea, vomiting, dysuria,   pt was recently in hospital for left leg cellulitis; reports trace edema to left knee without pain.  She checks her INR regularly at home goal INR as per their cardiologist is 2-3. She didn;t receive coumadin today, last dose was yesterday 2.5mg   Last endoscopy and colonoscopy was done 5 years ago as per patient was normal.    vitals in ED: 108/51, 94, 98.5, 18, 96% on room air, Labs Hb 4.8(Baseline 7-8), INR 3.29, creatinine is 1.9 (baseline is 2 from may 2018,in april it was 1.1), chest xray was fine, s/p 2 units PRBC (02 Jan 2019 21:11)      SUBJ:  Patient seen and examined. Another episode of rectal bleeding today. Off Heparin. Plan for EGD/colonoscopy.      MEDICATIONS  (STANDING):  atorvastatin 10 milliGRAM(s) Oral at bedtime  chlorhexidine 4% Liquid 1 Application(s) Topical <User Schedule>  cholecalciferol 2000 Unit(s) Oral daily  folic acid 1 milliGRAM(s) Oral daily  metoprolol tartrate 100 milliGRAM(s) Oral every 12 hours  pantoprazole  Injectable 40 milliGRAM(s) IV Push two times a day    MEDICATIONS  (PRN):  morphine  - Injectable 2 milliGRAM(s) IV Push every 6 hours PRN Severe Pain (7 - 10)            Vital Signs Last 24 Hrs  T(C): 36.1 (10 Jim 2019 12:00), Max: 36.7 (09 Jan 2019 16:00)  T(F): 96.9 (10 Jim 2019 12:00), Max: 98.1 (09 Jan 2019 16:00)  HR: 103 (10 Jim 2019 12:00) (72 - 128)  BP: 104/62 (10 Jim 2019 12:00) (87/44 - 127/63)  BP(mean): 83 (10 Jim 2019 12:00) (57 - 95)  RR: 22 (10 Jim 2019 12:00) (16 - 33)  SpO2: 98% (10 Jim 2019 12:00) (91% - 100%)      PHYSICAL EXAM:    GEN: AAO x 3, NAD  HEENT: NC/AT  Neck: No JVD  CV: Reg, S1- mech S2, 2/6 murmur  Lungs: CTAB  Abd: Soft, non-tender  Ext: 2+ edema left > right      I&O's Summary    09 Jan 2019 07:01  -  10 Jim 2019 07:00  --------------------------------------------------------  IN: 779 mL / OUT: 975 mL / NET: -196 mL    10 Jim 2019 07:01  -  10 Jim 2019 13:40  --------------------------------------------------------  IN: 3300 mL / OUT: 660 mL / NET: 2640 mL    	    TELEMETRY:    ECG:    TTE:      LABS:                        10.0   12.66 )-----------( 198      ( 10 Jim 2019 11:42 )             30.9     01-10    138  |  98  |  33<H>  ----------------------------<  123<H>  4.7   |  25  |  1.5    Ca    8.4<L>      10 Jim 2019 11:42  Mg     2.6     01-10    TPro  4.9<L>  /  Alb  2.5<L>  /  TBili  1.2  /  DBili  x   /  AST  12  /  ALT  7   /  AlkPhos  77  01-10        PTT - ( 10 Jim 2019 11:42 )  PTT:32.0 sec      BNP  RADIOLOGY & ADDITIONAL STUDIES:      IMPRESSION AND PLAN:

## 2019-01-10 NOTE — CONSULT NOTE ADULT - ASSESSMENT
78F with extensive PMH listed above presented to the hospital 1/02 with gi bleed. Patient is s/p colonoscopy today where a 5cm mass was appreciated in med descending colon    Plan:  NPO, IVF  Tumor markers: CEA, CA 19-9  CT-CAP  Hold Antiplatelet therapy if possible in anticipation for possible surgical intervetion    Dr. Talbert to discuss options with family in the morning

## 2019-01-10 NOTE — CONSULT NOTE ADULT - REASON FOR ADMISSION
dark black stools

## 2019-01-10 NOTE — PROGRESS NOTE ADULT - SUBJECTIVE AND OBJECTIVE BOX
Patient is a 78y old  Female who presents with a chief complaint of dark black stools (10 Jim 2019 08:05)        Over Night Events:    BRBPR overnight  heparin drip stopped  drop in Hg s/p 2 units overnight  c/o chest pain    ROS:  See HPI    PHYSICAL EXAM    ICU Vital Signs Last 24 Hrs  T(C): 36 (10 Jim 2019 08:00), Max: 36.7 (09 Jan 2019 16:00)  T(F): 96.8 (10 Jim 2019 08:00), Max: 98.1 (09 Jan 2019 16:00)  HR: 94 (10 Jim 2019 08:00) (72 - 128)  BP: 119/69 (10 Jim 2019 08:00) (87/44 - 127/63)  BP(mean): 95 (10 Jim 2019 08:00) (57 - 95)  ABP: --  ABP(mean): --  RR: 20 (10 Jim 2019 08:00) (16 - 36)  SpO2: 100% (10 Jim 2019 08:00) (91% - 100%)      General: nad  HEENT: MANUELA             Lymphatic system: No cervical LN   Lungs: Bilateral BS redcued breath sounds b/l  Cardiovascular: Regular   Gastrointestinal: Soft, Positive BS  Extremities: No clubbing.  Moves extremities.  Full Range of motion   Skin: Warm, intact  Neurological: No motor or sensory deficit       01-09-19 @ 07:01  -  01-10-19 @ 07:00  --------------------------------------------------------  IN:    heparin Infusion: 111 mL    heparin Infusion: 78 mL    Packed Red Blood Cells: 590 mL  Total IN: 779 mL    OUT:    Voided: 975 mL  Total OUT: 975 mL    Total NET: -196 mL      01-10-19 @ 07:01  -  01-10-19 @ 09:52  --------------------------------------------------------  IN:    Oral Fluid: 215 mL  Total IN: 215 mL    OUT:    Voided: 350 mL  Total OUT: 350 mL    Total NET: -135 mL          LABS:                            7.2    8.99  )-----------( 170      ( 10 Jim 2019 00:35 )             22.3                                               01-10    137  |  101  |  31<H>  ----------------------------<  120<H>  4.5   |  24  |  1.5    Ca    7.9<L>      10 Jim 2019 00:35  Mg     2.7     01-10    TPro  4.9<L>  /  Alb  2.5<L>  /  TBili  1.2  /  DBili  x   /  AST  12  /  ALT  7   /  AlkPhos  77  01-10      PTT - ( 09 Jan 2019 23:30 )  PTT:29.4 sec                                                                                     LIVER FUNCTIONS - ( 10 Jim 2019 00:35 )  Alb: 2.5 g/dL / Pro: 4.9 g/dL / ALK PHOS: 77 U/L / ALT: 7 U/L / AST: 12 U/L / GGT: x                                                                                                                                       MEDICATIONS  (STANDING):  atorvastatin 10 milliGRAM(s) Oral at bedtime  chlorhexidine 4% Liquid 1 Application(s) Topical <User Schedule>  cholecalciferol 2000 Unit(s) Oral daily  dextrose 5%. 1000 milliLiter(s) (50 mL/Hr) IV Continuous <Continuous>  folic acid 1 milliGRAM(s) Oral daily  heparin  Infusion 1200 Unit(s)/Hr (12 mL/Hr) IV Continuous <Continuous>  metoprolol tartrate 100 milliGRAM(s) Oral every 12 hours  pantoprazole  Injectable 40 milliGRAM(s) IV Push two times a day    MEDICATIONS  (PRN):  morphine  - Injectable 2 milliGRAM(s) IV Push every 6 hours PRN Severe Pain (7 - 10)      Xrays:  bibasilar infiltrates Patient is a 78y old  Female who presents with a chief complaint of dark black stools (10 Jim 2019 08:05)        Over Night Events:    2 BRBPR overnight  heparin drip stopped  drop in Hg s/p 2 units overnight  c/o chest pain    ROS:  See HPI    PHYSICAL EXAM    ICU Vital Signs Last 24 Hrs  T(C): 36 (10 Jim 2019 08:00), Max: 36.7 (09 Jan 2019 16:00)  T(F): 96.8 (10 Jim 2019 08:00), Max: 98.1 (09 Jan 2019 16:00)  HR: 94 (10 Jim 2019 08:00) (72 - 128)  BP: 119/69 (10 Jim 2019 08:00) (87/44 - 127/63)  BP(mean): 95 (10 Jim 2019 08:00) (57 - 95)  ABP: --  ABP(mean): --  RR: 20 (10 Jim 2019 08:00) (16 - 36)  SpO2: 100% (10 Jim 2019 08:00) (91% - 100%)      General: nad  HEENT: MANUELA             Lymphatic system: No cervical LN   Lungs: Bilateral BS redcued breath sounds b/l  Cardiovascular: Regular   Gastrointestinal: Soft, Positive BS  Extremities: No clubbing.  Moves extremities.  Full Range of motion   Skin: Warm, intact  Neurological: No motor or sensory deficit awake alert a/ox3      01-09-19 @ 07:01  -  01-10-19 @ 07:00  --------------------------------------------------------  IN:    heparin Infusion: 111 mL    heparin Infusion: 78 mL    Packed Red Blood Cells: 590 mL  Total IN: 779 mL    OUT:    Voided: 975 mL  Total OUT: 975 mL    Total NET: -196 mL      01-10-19 @ 07:01  -  01-10-19 @ 09:52  --------------------------------------------------------  IN:    Oral Fluid: 215 mL  Total IN: 215 mL    OUT:    Voided: 350 mL  Total OUT: 350 mL    Total NET: -135 mL          LABS:                            7.2    8.99  )-----------( 170      ( 10 Jim 2019 00:35 )             22.3                                               01-10    137  |  101  |  31<H>  ----------------------------<  120<H>  4.5   |  24  |  1.5    Ca    7.9<L>      10 Jim 2019 00:35  Mg     2.7     01-10    TPro  4.9<L>  /  Alb  2.5<L>  /  TBili  1.2  /  DBili  x   /  AST  12  /  ALT  7   /  AlkPhos  77  01-10      PTT - ( 09 Jan 2019 23:30 )  PTT:29.4 sec                                                                                     LIVER FUNCTIONS - ( 10 Jim 2019 00:35 )  Alb: 2.5 g/dL / Pro: 4.9 g/dL / ALK PHOS: 77 U/L / ALT: 7 U/L / AST: 12 U/L / GGT: x                                                                                                                                       MEDICATIONS  (STANDING):  atorvastatin 10 milliGRAM(s) Oral at bedtime  chlorhexidine 4% Liquid 1 Application(s) Topical <User Schedule>  cholecalciferol 2000 Unit(s) Oral daily  dextrose 5%. 1000 milliLiter(s) (50 mL/Hr) IV Continuous <Continuous>  folic acid 1 milliGRAM(s) Oral daily  heparin  Infusion 1200 Unit(s)/Hr (12 mL/Hr) IV Continuous <Continuous>  metoprolol tartrate 100 milliGRAM(s) Oral every 12 hours  pantoprazole  Injectable 40 milliGRAM(s) IV Push two times a day    MEDICATIONS  (PRN):  morphine  - Injectable 2 milliGRAM(s) IV Push every 6 hours PRN Severe Pain (7 - 10)      Xrays:  bibasilar infiltrates Patient is a 78y old  Female who presents with a chief complaint of dark black stools (10 Jim 2019 08:05)        Over Night Events:    2 BRBPR overnight  heparin drip stopped  drop in Hg s/p 2 units overnight  c/o chest pain    ROS:  See HPI    PHYSICAL EXAM    ICU Vital Signs Last 24 Hrs  T(C): 36 (10 Jim 2019 08:00), Max: 36.7 (09 Jan 2019 16:00)  T(F): 96.8 (10 Jim 2019 08:00), Max: 98.1 (09 Jan 2019 16:00)  HR: 94 (10 Jim 2019 08:00) (72 - 128)  BP: 119/69 (10 Jim 2019 08:00) (87/44 - 127/63)  BP(mean): 95 (10 Jim 2019 08:00) (57 - 95)  RR: 20 (10 Jim 2019 08:00) (16 - 36)  SpO2: 100% (10 Jim 2019 08:00) (91% - 100%)      General: nad  HEENT: MANUELA, pale             Lymphatic system: No cervical LN   Lungs: Bilateral BS redcued breath sounds b/l  Cardiovascular: Regular   Gastrointestinal: Soft, Positive BS  Extremities: No clubbing.  Moves extremities.  Full Range of motion   Skin: Warm, intact  Neurological: No motor or sensory deficit awake alert a/ox3      01-09-19 @ 07:01  -  01-10-19 @ 07:00  --------------------------------------------------------  IN:    heparin Infusion: 111 mL    heparin Infusion: 78 mL    Packed Red Blood Cells: 590 mL  Total IN: 779 mL    OUT:    Voided: 975 mL  Total OUT: 975 mL    Total NET: -196 mL      01-10-19 @ 07:01  -  01-10-19 @ 09:52  --------------------------------------------------------  IN:    Oral Fluid: 215 mL  Total IN: 215 mL    OUT:    Voided: 350 mL  Total OUT: 350 mL    Total NET: -135 mL          LABS:                            7.2    8.99  )-----------( 170      ( 10 Jim 2019 00:35 )             22.3                                               01-10    137  |  101  |  31<H>  ----------------------------<  120<H>  4.5   |  24  |  1.5    Ca    7.9<L>      10 Jim 2019 00:35  Mg     2.7     01-10    TPro  4.9<L>  /  Alb  2.5<L>  /  TBili  1.2  /  DBili  x   /  AST  12  /  ALT  7   /  AlkPhos  77  01-10      PTT - ( 09 Jan 2019 23:30 )  PTT:29.4 sec                                                                                     LIVER FUNCTIONS - ( 10 Jim 2019 00:35 )  Alb: 2.5 g/dL / Pro: 4.9 g/dL / ALK PHOS: 77 U/L / ALT: 7 U/L / AST: 12 U/L / GGT: x                                                                                                                                       MEDICATIONS  (STANDING):  atorvastatin 10 milliGRAM(s) Oral at bedtime  chlorhexidine 4% Liquid 1 Application(s) Topical <User Schedule>  cholecalciferol 2000 Unit(s) Oral daily  dextrose 5%. 1000 milliLiter(s) (50 mL/Hr) IV Continuous <Continuous>  folic acid 1 milliGRAM(s) Oral daily  heparin  Infusion 1200 Unit(s)/Hr (12 mL/Hr) IV Continuous <Continuous>  metoprolol tartrate 100 milliGRAM(s) Oral every 12 hours  pantoprazole  Injectable 40 milliGRAM(s) IV Push two times a day    MEDICATIONS  (PRN):  morphine  - Injectable 2 milliGRAM(s) IV Push every 6 hours PRN Severe Pain (7 - 10)      Xrays:  bibasilar infiltrates

## 2019-01-10 NOTE — CONSULT NOTE ADULT - SUBJECTIVE AND OBJECTIVE BOX
AUSTIN DAVIS [id number]  78y Female currently admitted to medical service since 1/02 for melena. Patient has PMH of HTN, type II DM, A-fib on coumadin, h/o bradycardia s/p pacemaker, rheumatic heart disease s/p bioprosthetic mitral valve replacement, s/p metallic aortic valve replacement, and HFpEF, history of RLE DVT 25 year sago, Left nephrectomy, and osteoporosis, presents to the ED for dark black stools since 3 days. As per patient and son at bedside she has dark black stools since 3 days prior to admission, not painful, last bowel movement last night, no bright red blood, no hematemesis, no dysphagia, but admits to weight loss of 30 pounds in last year, loss of appetite and chronic intermittent abdominal pain, diffuse, no aggravating factors and relieving factor 3-4/10 in intensity. Also has c/o feeling weak, pale, light headedness. She receives frequent transfusions for microcytic anemia, last transfusion was three weeks ago follows up with Dr. Alvarenga she went to see Dr. Alvarenga blood work showed Hb of 4.8 referred to hospital.  Denies fever, chills, nausea, vomiting, dysuria,  pt was recently in hospital for left leg cellulitis; reports trace edema to left knee without pain. She checks her INR regularly at home goal INR as per their cardiologist is 2-3. Last endoscopy and colonoscopy prior to admission was done 5 years ago as per patient was normal. Patient received colonoscopy and endoscopy today, notable for colonic mass with full report below. Patient denies any current abdominal symptoms such as pain or nausea, is continuing to pass gas and have bowel movements despite partial obstruction appreciated on colonoscopy.        PAST MEDICAL & SURGICAL HISTORY:  CVA (cerebral vascular accident)  Anemia  Rheumatic fever  Diabetes  Osteoporosis  Mitral valve replaced  Atrial fibrillation: on coumadin at home  Hypertension  History of ureter stent  S/P AVR  H/O mitral valve replacement        MEDICATIONS  (STANDING):  atorvastatin 10 milliGRAM(s) Oral at bedtime  chlorhexidine 4% Liquid 1 Application(s) Topical <User Schedule>  cholecalciferol 2000 Unit(s) Oral daily  folic acid 1 milliGRAM(s) Oral daily  metoprolol tartrate 100 milliGRAM(s) Oral every 12 hours  pantoprazole  Injectable 40 milliGRAM(s) IV Push two times a day    MEDICATIONS  (PRN):  morphine  - Injectable 2 milliGRAM(s) IV Push every 6 hours PRN Severe Pain (7 - 10)      Allergies    No Known Drug Allergies  shellfish (Unknown)    Intolerances        REVIEW OF SYSTEMS    [X] A ten-point review of systems was otherwise negative except as noted.  [ ] Due to altered mental status/intubation, subjective information were not able to be obtained from the patient. History was obtained, to the extent possible, from review of the chart and collateral sources of information.      Vital Signs Last 24 Hrs  T(C): 36.7 (10 Jim 2019 16:03), Max: 36.7 (10 Jim 2019 04:00)  T(F): 98 (10 Jim 2019 16:03), Max: 98.1 (10 Jim 2019 04:00)  HR: 104 (10 Jim 2019 17:55) (72 - 128)  BP: 133/71 (10 Jim 2019 17:55) (90/51 - 146/84)  BP(mean): 93 (10 Jim 2019 15:48) (64 - 95)  RR: 16 (10 Jim 2019 17:55) (16 - 22)  SpO2: 100% (10 Jim 2019 17:55) (93% - 100%)    PHYSICAL EXAM:  GENERAL: NAD  CHEST/LUNG: Clear to auscultation bilaterally  HEART: Regular rate and rhythm  ABDOMEN: Soft, Nontender, Nondistended, multiple well healed surgical incisions in play       LABS:  Labs:  CAPILLARY BLOOD GLUCOSE      POCT Blood Glucose.: 128 mg/dL (09 Jan 2019 21:49)                          10.0   12.66 )-----------( 198      ( 10 Jim 2019 11:42 )             30.9       Auto Neutrophil %: 87.3 % (01-10-19 @ 11:42)  Auto Immature Granulocyte %: 1.3 % (01-10-19 @ 11:42)    01-10    138  |  98  |  33<H>  ----------------------------<  123<H>  4.7   |  25  |  1.5      Calcium, Total Serum: 8.4 mg/dL (01-10-19 @ 11:42)      LFTs:             4.9  | 1.2  | 12       ------------------[77      ( 10 Jim 2019 00:35 )  2.5  | x    | 7           Lipase:x      Amylase:x           ABG - ( 06 Jan 2019 19:29 )  pH: 7.36  /  pCO2: 48    /  pO2: 258   / HCO3: 27    / Base Excess: 1.0   /  SaO2: 100               Coags:     x      ----< x       ( 10 Ijm 2019 11:42 )     32.0            RADIOLOGY & ADDITIONAL STUDIES:  < from: Xray Chest 1 View- PORTABLE-Routine (01.10.19 @ 04:45) >    No endotracheal tube is identified.    Stable cardiomegaly.    < end of copied text >    < from: Colonoscopy (01.10.19 @ 10:00) >   Diverticulosis of the sigmoid colon.    Internal hemorrhoids.    Mass (5 cm) in the mid-descending colon.    Two cc of 1:10,000 epinephrine was injected. An attempt to place a endo-loop  was unsuccessful. A clip was placed proximal and distal to the lesion. Claudia ink  was injected distal to the lesion (7cc). .     < end of copied text >

## 2019-01-10 NOTE — PROGRESS NOTE ADULT - SUBJECTIVE AND OBJECTIVE BOX
SUBJECTIVE:    Patient is a 78y old Female who presents with a chief complaint of dark black stools (10 Jim 2019 09:51)    Currently admitted to medicine with the primary diagnosis of Lower GI bleed     Today is hospital day 8d. Patient had one episode of bright red bowel movement this AM. Heparin gtt remains off.     PAST MEDICAL & SURGICAL HISTORY  CVA (cerebral vascular accident)  Anemia  Rheumatic fever  Diabetes  Osteoporosis  Mitral valve replaced  Atrial fibrillation: on coumadin at home  Hypertension  History of ureter stent  S/P AVR  H/O mitral valve replacement    SOCIAL HISTORY:  Patient denies alcohol, tobacco, and recreational drug use.     From ( ) home ( ) nursing home ( ) other   Ambulation status: ( ) ambulates independently ( ) ambulates with assistance ( ) ambulates with device ( ) dependent   Device: ( ) rolling walker ( ) cane     ALLERGIES:  No Known Drug Allergies  shellfish (Unknown)    MEDICATIONS:  STANDING MEDICATIONS  atorvastatin 10 milliGRAM(s) Oral at bedtime  chlorhexidine 4% Liquid 1 Application(s) Topical <User Schedule>  cholecalciferol 2000 Unit(s) Oral daily  folic acid 1 milliGRAM(s) Oral daily  metoprolol tartrate 100 milliGRAM(s) Oral every 12 hours  pantoprazole  Injectable 40 milliGRAM(s) IV Push two times a day    PRN MEDICATIONS  morphine  - Injectable 2 milliGRAM(s) IV Push every 6 hours PRN    VITALS:   T(F): 96.8  HR: 92  BP: 97/63  RR: 18  SpO2: 100%    LABS:                        7.2    8.99  )-----------( 170      ( 10 Jim 2019 00:35 )             22.3     01-10    137  |  101  |  31<H>  ----------------------------<  120<H>  4.5   |  24  |  1.5    Ca    7.9<L>      10 Jim 2019 00:35  Mg     2.7     01-10    TPro  4.9<L>  /  Alb  2.5<L>  /  TBili  1.2  /  DBili  x   /  AST  12  /  ALT  7   /  AlkPhos  77  01-10    PTT - ( 09 Jan 2019 23:30 )  PTT:29.4 sec              RADIOLOGY:    PHYSICAL EXAM:  GEN: No acute distress, lying comfortably in bed   LUNGS: Clear to auscultation bilaterally, no labored breathing   HEART: S1/S2 present. RRR.   ABD: Soft, non-tender, non-distended. Bowel sounds present.   EXT: Noncyanotic, nonedematous, 2+ peripheral pulses, skin intact   NEURO: AAOX3, CN II-XII grossly intact     Lines/Tubes </u  Ricketts catheter: SUBJECTIVE:    Patient is a 78y old Female who presents with a chief complaint of dark black stools (10 Jim 2019 09:51)    Currently admitted to medicine with the primary diagnosis of Lower GI bleed     Today is hospital day 8d. Patient had one episode of bright red bowel movement this AM. Heparin gtt remains off. We are monitoring her CBC and hemodynamic status. Plan is for EGD/colonoscopy this afternoon. Patient currently drinking GoLytley.     PAST MEDICAL & SURGICAL HISTORY  CVA (cerebral vascular accident)  Anemia  Rheumatic fever  Diabetes  Osteoporosis  Mitral valve replaced  Atrial fibrillation: on coumadin at home  Hypertension  History of ureter stent  S/P AVR  H/O mitral valve replacement    SOCIAL HISTORY:  Patient denies alcohol, tobacco, and recreational drug use.     From ( X) home ( ) nursing home ( ) other   Ambulation status: (X ) ambulates independently ( ) ambulates with assistance ( ) ambulates with device ( ) dependent   Device: ( ) rolling walker ( ) cane     ALLERGIES:  No Known Drug Allergies  shellfish (Unknown)    MEDICATIONS:  STANDING MEDICATIONS  atorvastatin 10 milliGRAM(s) Oral at bedtime  chlorhexidine 4% Liquid 1 Application(s) Topical <User Schedule>  cholecalciferol 2000 Unit(s) Oral daily  folic acid 1 milliGRAM(s) Oral daily  metoprolol tartrate 100 milliGRAM(s) Oral every 12 hours  pantoprazole  Injectable 40 milliGRAM(s) IV Push two times a day    PRN MEDICATIONS  morphine  - Injectable 2 milliGRAM(s) IV Push every 6 hours PRN    VITALS:   T(F): 96.8  HR: 92  BP: 97/63  RR: 18  SpO2: 100%    LABS:                        7.2    8.99  )-----------( 170      ( 10 Jim 2019 00:35 )             22.3     01-10    137  |  101  |  31<H>  ----------------------------<  120<H>  4.5   |  24  |  1.5    Ca    7.9<L>      10 Jim 2019 00:35  Mg     2.7     01-10    TPro  4.9<L>  /  Alb  2.5<L>  /  TBili  1.2  /  DBili  x   /  AST  12  /  ALT  7   /  AlkPhos  77  01-10    PTT - ( 09 Jan 2019 23:30 )  PTT:29.4 sec    RADIOLOGY:  CXR  Impression:    No endotracheal tube is identified.  Stable cardiomegaly.    PHYSICAL EXAM:  GEN: No acute distress, lying comfortably in bed   LUNGS: Clear to auscultation bilaterally, no labored breathing   HEART: Mechanical valve appreciated, normal rate and rhythm. Chest wall TTP.   ABD: Soft, non-tender, non-distended. Bowel sounds present.   EXT: Noncyanotic, L LE 1+edema, 2+ peripheral pulses, skin intact   NEURO: AAOX3, CN II-XII grossly intact     Lines/Tubes   Peripheral IV access  0.5L 02 via NC

## 2019-01-11 LAB
ANION GAP SERPL CALC-SCNC: 16 MMOL/L — HIGH (ref 7–14)
APTT BLD: 32.1 SEC — SIGNIFICANT CHANGE UP (ref 27–39.2)
BASOPHILS # BLD AUTO: 0.04 K/UL — SIGNIFICANT CHANGE UP (ref 0–0.2)
BASOPHILS NFR BLD AUTO: 0.3 % — SIGNIFICANT CHANGE UP (ref 0–1)
BUN SERPL-MCNC: 28 MG/DL — HIGH (ref 10–20)
CALCIUM SERPL-MCNC: 8.5 MG/DL — SIGNIFICANT CHANGE UP (ref 8.5–10.1)
CHLORIDE SERPL-SCNC: 100 MMOL/L — SIGNIFICANT CHANGE UP (ref 98–110)
CO2 SERPL-SCNC: 24 MMOL/L — SIGNIFICANT CHANGE UP (ref 17–32)
CREAT SERPL-MCNC: 1.5 MG/DL — SIGNIFICANT CHANGE UP (ref 0.7–1.5)
EOSINOPHIL # BLD AUTO: 0.2 K/UL — SIGNIFICANT CHANGE UP (ref 0–0.7)
EOSINOPHIL NFR BLD AUTO: 1.6 % — SIGNIFICANT CHANGE UP (ref 0–8)
GLUCOSE BLDC GLUCOMTR-MCNC: 89 MG/DL — SIGNIFICANT CHANGE UP (ref 70–99)
GLUCOSE BLDC GLUCOMTR-MCNC: 99 MG/DL — SIGNIFICANT CHANGE UP (ref 70–99)
GLUCOSE SERPL-MCNC: 87 MG/DL — SIGNIFICANT CHANGE UP (ref 70–99)
HCT VFR BLD CALC: 28.1 % — LOW (ref 37–47)
HCT VFR BLD CALC: 28.8 % — LOW (ref 37–47)
HCT VFR BLD CALC: 29.1 % — LOW (ref 37–47)
HGB BLD-MCNC: 9.1 G/DL — LOW (ref 12–16)
HGB BLD-MCNC: 9.4 G/DL — LOW (ref 12–16)
HGB BLD-MCNC: 9.5 G/DL — LOW (ref 12–16)
IMM GRANULOCYTES NFR BLD AUTO: 1.2 % — HIGH (ref 0.1–0.3)
LYMPHOCYTES # BLD AUTO: 0.62 K/UL — LOW (ref 1.2–3.4)
LYMPHOCYTES # BLD AUTO: 5 % — LOW (ref 20.5–51.1)
MAGNESIUM SERPL-MCNC: 2.3 MG/DL — SIGNIFICANT CHANGE UP (ref 1.8–2.4)
MCHC RBC-ENTMCNC: 26.3 PG — LOW (ref 27–31)
MCHC RBC-ENTMCNC: 26.7 PG — LOW (ref 27–31)
MCHC RBC-ENTMCNC: 26.7 PG — LOW (ref 27–31)
MCHC RBC-ENTMCNC: 32.3 G/DL — SIGNIFICANT CHANGE UP (ref 32–37)
MCHC RBC-ENTMCNC: 32.4 G/DL — SIGNIFICANT CHANGE UP (ref 32–37)
MCHC RBC-ENTMCNC: 33 G/DL — SIGNIFICANT CHANGE UP (ref 32–37)
MCV RBC AUTO: 80.9 FL — LOW (ref 81–99)
MCV RBC AUTO: 81.3 FL — SIGNIFICANT CHANGE UP (ref 81–99)
MCV RBC AUTO: 82.4 FL — SIGNIFICANT CHANGE UP (ref 81–99)
MONOCYTES # BLD AUTO: 0.86 K/UL — HIGH (ref 0.1–0.6)
MONOCYTES NFR BLD AUTO: 6.9 % — SIGNIFICANT CHANGE UP (ref 1.7–9.3)
NEUTROPHILS # BLD AUTO: 10.63 K/UL — HIGH (ref 1.4–6.5)
NEUTROPHILS NFR BLD AUTO: 85 % — HIGH (ref 42.2–75.2)
NRBC # BLD: 0 /100 WBCS — SIGNIFICANT CHANGE UP (ref 0–0)
PLATELET # BLD AUTO: 168 K/UL — SIGNIFICANT CHANGE UP (ref 130–400)
PLATELET # BLD AUTO: 185 K/UL — SIGNIFICANT CHANGE UP (ref 130–400)
PLATELET # BLD AUTO: 187 K/UL — SIGNIFICANT CHANGE UP (ref 130–400)
POTASSIUM SERPL-MCNC: 4.4 MMOL/L — SIGNIFICANT CHANGE UP (ref 3.5–5)
POTASSIUM SERPL-SCNC: 4.4 MMOL/L — SIGNIFICANT CHANGE UP (ref 3.5–5)
RBC # BLD: 3.41 M/UL — LOW (ref 4.2–5.4)
RBC # BLD: 3.56 M/UL — LOW (ref 4.2–5.4)
RBC # BLD: 3.58 M/UL — LOW (ref 4.2–5.4)
RBC # FLD: 19.6 % — HIGH (ref 11.5–14.5)
RBC # FLD: 19.7 % — HIGH (ref 11.5–14.5)
RBC # FLD: 19.9 % — HIGH (ref 11.5–14.5)
SODIUM SERPL-SCNC: 140 MMOL/L — SIGNIFICANT CHANGE UP (ref 135–146)
WBC # BLD: 12.5 K/UL — HIGH (ref 4.8–10.8)
WBC # BLD: 13.65 K/UL — HIGH (ref 4.8–10.8)
WBC # BLD: 14.11 K/UL — HIGH (ref 4.8–10.8)
WBC # FLD AUTO: 12.5 K/UL — HIGH (ref 4.8–10.8)
WBC # FLD AUTO: 13.65 K/UL — HIGH (ref 4.8–10.8)
WBC # FLD AUTO: 14.11 K/UL — HIGH (ref 4.8–10.8)

## 2019-01-11 RX ORDER — IOHEXOL 300 MG/ML
30 INJECTION, SOLUTION INTRAVENOUS ONCE
Qty: 0 | Refills: 0 | Status: COMPLETED | OUTPATIENT
Start: 2019-01-11 | End: 2019-01-11

## 2019-01-11 RX ORDER — SODIUM CHLORIDE 9 MG/ML
1000 INJECTION, SOLUTION INTRAVENOUS
Qty: 0 | Refills: 0 | Status: DISCONTINUED | OUTPATIENT
Start: 2019-01-11 | End: 2019-01-12

## 2019-01-11 RX ORDER — SODIUM CHLORIDE 9 MG/ML
1000 INJECTION, SOLUTION INTRAVENOUS
Qty: 0 | Refills: 0 | Status: DISCONTINUED | OUTPATIENT
Start: 2019-01-11 | End: 2019-01-11

## 2019-01-11 RX ADMIN — Medication 100 MILLIGRAM(S): at 05:51

## 2019-01-11 RX ADMIN — Medication 1 MILLIGRAM(S): at 11:28

## 2019-01-11 RX ADMIN — MORPHINE SULFATE 2 MILLIGRAM(S): 50 CAPSULE, EXTENDED RELEASE ORAL at 02:58

## 2019-01-11 RX ADMIN — ATORVASTATIN CALCIUM 10 MILLIGRAM(S): 80 TABLET, FILM COATED ORAL at 21:14

## 2019-01-11 RX ADMIN — MORPHINE SULFATE 2 MILLIGRAM(S): 50 CAPSULE, EXTENDED RELEASE ORAL at 21:13

## 2019-01-11 RX ADMIN — IOHEXOL 30 MILLILITER(S): 300 INJECTION, SOLUTION INTRAVENOUS at 16:14

## 2019-01-11 RX ADMIN — PANTOPRAZOLE SODIUM 40 MILLIGRAM(S): 20 TABLET, DELAYED RELEASE ORAL at 17:38

## 2019-01-11 RX ADMIN — Medication 2000 UNIT(S): at 11:28

## 2019-01-11 RX ADMIN — PANTOPRAZOLE SODIUM 40 MILLIGRAM(S): 20 TABLET, DELAYED RELEASE ORAL at 05:51

## 2019-01-11 RX ADMIN — CHLORHEXIDINE GLUCONATE 1 APPLICATION(S): 213 SOLUTION TOPICAL at 05:50

## 2019-01-11 RX ADMIN — Medication 100 MILLIGRAM(S): at 17:56

## 2019-01-11 RX ADMIN — MORPHINE SULFATE 2 MILLIGRAM(S): 50 CAPSULE, EXTENDED RELEASE ORAL at 21:30

## 2019-01-11 RX ADMIN — MORPHINE SULFATE 2 MILLIGRAM(S): 50 CAPSULE, EXTENDED RELEASE ORAL at 03:30

## 2019-01-11 NOTE — PROGRESS NOTE ADULT - ASSESSMENT
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 Hemolysis panel reviewed  - S/p BM Bx, results are inconclusive, mild MDS is not excluded   - Continue with folic acid      # Cardiac arrythmias: no asystole on pacemaker interrogation - Medications adjusted by Cardiology    # Transitional cell cancer of the kidney, s/p resection in [facility]  on 6/1/2018, and reresection on 10/29/2018 (suspected initial stage was IIIa [id number])   - Following with Oncology (Dr. Fields) at [facility]  - Was previously consented for Ambassador trial and randomized to observation arm   - Follow up CT A/P on 12/6/2018  revealed L adnexal ovoid mass at 6.4x6.2cm, fluid density on attenuation as well as interval development of soft tissue density somewhat confluent masslike conglomerates along left para-aortic-iliac chain, most pronounced below level of aortic bifurcation, suspicious for mets, mildly prominent b/l lymph nodes in the inguinal regions, suspicious for mets, small right pleural effusion with interval increase.   - PET CT to restage was ordered on 12/27/2018 - not performed yet . Would hold on CT scan with IV contrast due to high creatinine  - Radiation oncology on board for pelvic radiation with curative intent once patient is stable    # Metallic mitral valve, replaced with tissue valve on 3/19/2018, metallic aortic valve in place  --On chronic anticoagulation with Coumadin for A. fib and metallic AV, Heparin gtt for GI procedure    # LLE : improving - likely 2/2 venous compression 2/2 adenopathy   -- L soleal DVT    Will follow

## 2019-01-11 NOTE — PROGRESS NOTE ADULT - ASSESSMENT
IMPRESSION    Acute on Chronic anemia (multifactorial) 2/2 LGIB 2/2 newly diagnosed 5cm friable colonic mass   Paroxysmal SVT   Atrial fibrillation  Rheumatic heart disease s/p bioprosthetic mitral valve, mechanical aortic valve   DM  HTN     PLAN    CNS: AAOx3, no CNS depressants     HEENT: Oral care     PULMONARY: HOB 45 degrees, aspiration precautions, 02 to maintain 02 sat > 92%     CARDIOVASCULAR: Echo with EF 69% and severe pulmonary HTN. Dr. Hunter following. Continue BB. Off AC 2/2 LGIB and in anticipation of possible surgical intervention.     GI: + diverticulosis, + internal hemorrhoids, 5cm fungating friable mass in descending colon visualized on colonoscopy, unable to be resected. NPO for now, IVFs, Protonix IV BID. No endoscopic intervention possible. Surgery to discuss options with patient today (Dr. Talbert, blue team).     RENAL: Monitor electrolytes and I&O     INFECTIOUS DISEASE: No active infection     HEMATOLOGICAL: Monitor CBC and transfuse PRN for goal > 8. Heme/onc following for transitional cell carcinoma. SCDs for DVT prophylaxis.     ENDOCRINE: Monitor fingersticks     MICU monitoring  FULL CODE

## 2019-01-11 NOTE — PROGRESS NOTE ADULT - SUBJECTIVE AND OBJECTIVE BOX
Patient is a 78y old  Female who presents with a chief complaint of dark black stools (10 Jim 2019 18:49)        Over Night Events:        ROS:  See HPI    PHYSICAL EXAM    ICU Vital Signs Last 24 Hrs  T(C): 36.3 (11 Jan 2019 04:00), Max: 36.7 (10 Jim 2019 16:03)  T(F): 97.3 (11 Jan 2019 04:00), Max: 98 (10 Jim 2019 16:03)  HR: 96 (11 Jan 2019 06:00) (76 - 106)  BP: 99/50 (11 Jan 2019 06:00) (91/48 - 146/84)  BP(mean): 66 (11 Jan 2019 06:00) (66 - 95)  ABP: --  ABP(mean): --  RR: 14 (11 Jan 2019 06:00) (14 - 32)  SpO2: 100% (11 Jan 2019 06:00) (91% - 100%)      General:  HEENT: MANUELA             Lymphatic system: No cervical LN   Lungs: Bilateral BS  Cardiovascular: Regular   Gastrointestinal: Soft, Positive BS  Extremities: No clubbing.  Moves extremities.  Full Range of motion   Skin: Warm, intact  Neurological: No motor or sensory deficit       01-10-19 @ 07:01  -  01-11-19 @ 07:00  --------------------------------------------------------  IN:    lactated ringers.: 550 mL    Oral Fluid: 3300 mL  Total IN: 3850 mL    OUT:    Voided: 1060 mL  Total OUT: 1060 mL    Total NET: 2790 mL          LABS:                            9.5    12.50 )-----------( 185      ( 11 Jan 2019 04:25 )             28.8                                               01-11    140  |  100  |  28<H>  ----------------------------<  87  4.4   |  24  |  1.5    Ca    8.5      11 Jan 2019 04:25  Mg     2.3     01-11    TPro  4.9<L>  /  Alb  2.5<L>  /  TBili  1.2  /  DBili  x   /  AST  12  /  ALT  7   /  AlkPhos  77  01-10      PTT - ( 11 Jan 2019 04:25 )  PTT:32.1 sec                                                                                     LIVER FUNCTIONS - ( 10 Jim 2019 00:35 )  Alb: 2.5 g/dL / Pro: 4.9 g/dL / ALK PHOS: 77 U/L / ALT: 7 U/L / AST: 12 U/L / GGT: x                                                                                                                                       MEDICATIONS  (STANDING):  atorvastatin 10 milliGRAM(s) Oral at bedtime  chlorhexidine 4% Liquid 1 Application(s) Topical <User Schedule>  cholecalciferol 2000 Unit(s) Oral daily  folic acid 1 milliGRAM(s) Oral daily  lactated ringers. 1000 milliLiter(s) (50 mL/Hr) IV Continuous <Continuous>  metoprolol tartrate 100 milliGRAM(s) Oral every 12 hours  pantoprazole  Injectable 40 milliGRAM(s) IV Push two times a day    MEDICATIONS  (PRN):  morphine  - Injectable 2 milliGRAM(s) IV Push every 6 hours PRN Severe Pain (7 - 10)      Xrays:  bibasilar opac, PVC                                                                                 < from: Transthoracic Echocardiogram (01.07.19 @ 14:04) >  Summary:   1. Normal global left ventricular systolic function.   2. LV Ejection Fraction by Pineda's Method with a biplane EF of 69 %.   3. Moderately increased LV wall thickness.   4. There is moderate concentric left ventricular hypertrophy.   5. Moderately reduced RV systolic function.   6. Mitral prosthesis is functioning normally.   7. Mild-moderate tricuspid regurgitation.   8. Abnormal stenosis of the aortic prosthesis.   9. Bioprosthesis in the aortic position.  10. Pulmonic valve regurgitation.  11. Estimated pulmonary artery systolic pressure is 62.9 mmHg assuming a   right atrial pressure of 8 mmHg, which is consistent with severe   pulmonary hypertension.  12. Pulmonary hypertension is present.  13. LA volume Index is 58.4 ml/m² ml/m2.    < end of copied text > Patient is a 78y old  Female who presents with a chief complaint of dark black stools (10 Jim 2019 18:49)        Over Night Events:    no overnight events, no bloody stools overnight  5cm descending colon mass on colono could not control bleeding  surgery to evalaute    ROS:  See HPI    PHYSICAL EXAM    ICU Vital Signs Last 24 Hrs  T(C): 36.3 (11 Jan 2019 04:00), Max: 36.7 (10 Jim 2019 16:03)  T(F): 97.3 (11 Jan 2019 04:00), Max: 98 (10 Jim 2019 16:03)  HR: 96 (11 Jan 2019 06:00) (76 - 106)  BP: 99/50 (11 Jan 2019 06:00) (91/48 - 146/84)  BP(mean): 66 (11 Jan 2019 06:00) (66 - 95)  ABP: --  ABP(mean): --  RR: 14 (11 Jan 2019 06:00) (14 - 32)  SpO2: 100% (11 Jan 2019 06:00) (91% - 100%)      General: NAD  HEENT: MANUELA             Lymphatic system: No cervical LN   Lungs: Bilateral BS bibasilar crackles  Cardiovascular: Regular   Gastrointestinal: Soft, Positive BS  Extremities: No clubbing.  Moves extremities.  Full Range of motion pedal edema  Skin: Warm, intact  Neurological: No motor or sensory deficit       01-10-19 @ 07:01  -  01-11-19 @ 07:00  --------------------------------------------------------  IN:    lactated ringers.: 550 mL    Oral Fluid: 3300 mL  Total IN: 3850 mL    OUT:    Voided: 1060 mL  Total OUT: 1060 mL    Total NET: 2790 mL          LABS:                            9.5    12.50 )-----------( 185      ( 11 Jan 2019 04:25 )             28.8                                               01-11    140  |  100  |  28<H>  ----------------------------<  87  4.4   |  24  |  1.5    Ca    8.5      11 Jan 2019 04:25  Mg     2.3     01-11    TPro  4.9<L>  /  Alb  2.5<L>  /  TBili  1.2  /  DBili  x   /  AST  12  /  ALT  7   /  AlkPhos  77  01-10      PTT - ( 11 Jan 2019 04:25 )  PTT:32.1 sec                                                                                     LIVER FUNCTIONS - ( 10 Jim 2019 00:35 )  Alb: 2.5 g/dL / Pro: 4.9 g/dL / ALK PHOS: 77 U/L / ALT: 7 U/L / AST: 12 U/L / GGT: x                                                                                                                                       MEDICATIONS  (STANDING):  atorvastatin 10 milliGRAM(s) Oral at bedtime  chlorhexidine 4% Liquid 1 Application(s) Topical <User Schedule>  cholecalciferol 2000 Unit(s) Oral daily  folic acid 1 milliGRAM(s) Oral daily  lactated ringers. 1000 milliLiter(s) (50 mL/Hr) IV Continuous <Continuous>  metoprolol tartrate 100 milliGRAM(s) Oral every 12 hours  pantoprazole  Injectable 40 milliGRAM(s) IV Push two times a day    MEDICATIONS  (PRN):  morphine  - Injectable 2 milliGRAM(s) IV Push every 6 hours PRN Severe Pain (7 - 10)      Xrays:  bibasilar opac, PVC                                                                                 < from: Transthoracic Echocardiogram (01.07.19 @ 14:04) >  Summary:   1. Normal global left ventricular systolic function.   2. LV Ejection Fraction by Pineda's Method with a biplane EF of 69 %.   3. Moderately increased LV wall thickness.   4. There is moderate concentric left ventricular hypertrophy.   5. Moderately reduced RV systolic function.   6. Mitral prosthesis is functioning normally.   7. Mild-moderate tricuspid regurgitation.   8. Abnormal stenosis of the aortic prosthesis.   9. Bioprosthesis in the aortic position.  10. Pulmonic valve regurgitation.  11. Estimated pulmonary artery systolic pressure is 62.9 mmHg assuming a   right atrial pressure of 8 mmHg, which is consistent with severe   pulmonary hypertension.  12. Pulmonary hypertension is present.  13. LA volume Index is 58.4 ml/m² ml/m2.    < end of copied text > Patient is a 78y old  Female who presents with a chief complaint of dark black stools (10 Jim 2019 18:49)        Over Night Events:    no overnight events, no bloody stools overnight  5cm descending colon mass on colono could not control bleeding  surgery to evaluate, no heparin    ROS:  See HPI    PHYSICAL EXAM    ICU Vital Signs Last 24 Hrs  T(C): 36.3 (11 Jan 2019 04:00), Max: 36.7 (10 Jim 2019 16:03)  T(F): 97.3 (11 Jan 2019 04:00), Max: 98 (10 Jim 2019 16:03)  HR: 96 (11 Jan 2019 06:00) (76 - 106)  BP: 99/50 (11 Jan 2019 06:00) (91/48 - 146/84)  BP(mean): 66 (11 Jan 2019 06:00) (66 - 95)  RR: 14 (11 Jan 2019 06:00) (14 - 32)  SpO2: 100% (11 Jan 2019 06:00) (91% - 100%)      General: NAD  HEENT: MANUELA/ pale        Lymphatic system: No cervical LN   Lungs: Bilateral BS bibasilar crackles  Cardiovascular: Regular   Gastrointestinal: Soft, Positive BS  Extremities: No clubbing.  Moves extremities.  Full Range of motion pedal edema  Skin: Warm, intact  Neurological: No motor or sensory deficit       01-10-19 @ 07:01  -  01-11-19 @ 07:00  --------------------------------------------------------  IN:    lactated ringers.: 550 mL    Oral Fluid: 3300 mL  Total IN: 3850 mL    OUT:    Voided: 1060 mL  Total OUT: 1060 mL    Total NET: 2790 mL          LABS:                            9.5    12.50 )-----------( 185      ( 11 Jan 2019 04:25 )             28.8                                               01-11    140  |  100  |  28<H>  ----------------------------<  87  4.4   |  24  |  1.5    Ca    8.5      11 Jan 2019 04:25  Mg     2.3     01-11    TPro  4.9<L>  /  Alb  2.5<L>  /  TBili  1.2  /  DBili  x   /  AST  12  /  ALT  7   /  AlkPhos  77  01-10      PTT - ( 11 Jan 2019 04:25 )  PTT:32.1 sec                                                                                     LIVER FUNCTIONS - ( 10 Jim 2019 00:35 )  Alb: 2.5 g/dL / Pro: 4.9 g/dL / ALK PHOS: 77 U/L / ALT: 7 U/L / AST: 12 U/L / GGT: x                                                                                                                                       MEDICATIONS  (STANDING):  atorvastatin 10 milliGRAM(s) Oral at bedtime  chlorhexidine 4% Liquid 1 Application(s) Topical <User Schedule>  cholecalciferol 2000 Unit(s) Oral daily  folic acid 1 milliGRAM(s) Oral daily  lactated ringers. 1000 milliLiter(s) (50 mL/Hr) IV Continuous <Continuous>  metoprolol tartrate 100 milliGRAM(s) Oral every 12 hours  pantoprazole  Injectable 40 milliGRAM(s) IV Push two times a day    MEDICATIONS  (PRN):  morphine  - Injectable 2 milliGRAM(s) IV Push every 6 hours PRN Severe Pain (7 - 10)      Xrays:  bibasilar opac, PVC                                                                                 < from: Transthoracic Echocardiogram (01.07.19 @ 14:04) >  Summary:   1. Normal global left ventricular systolic function.   2. LV Ejection Fraction by Pineda's Method with a biplane EF of 69 %.   3. Moderately increased LV wall thickness.   4. There is moderate concentric left ventricular hypertrophy.   5. Moderately reduced RV systolic function.   6. Mitral prosthesis is functioning normally.   7. Mild-moderate tricuspid regurgitation.   8. Abnormal stenosis of the aortic prosthesis.   9. Bioprosthesis in the aortic position.  10. Pulmonic valve regurgitation.  11. Estimated pulmonary artery systolic pressure is 62.9 mmHg assuming a   right atrial pressure of 8 mmHg, which is consistent with severe   pulmonary hypertension.  12. Pulmonary hypertension is present.  13. LA volume Index is 58.4 ml/m² ml/m2.    < end of copied text >

## 2019-01-11 NOTE — PROGRESS NOTE ADULT - ASSESSMENT
#symptomatic anemia 2/2 GI bleed s/p recent transfusion of 2 more units  GI performed colonoscopy-< from: Colonoscopy (01.10.19 @ 10:00) >  excavated lesions Multiple non-bleeding diverticula with medium openings were  seen in the sigmoid colon.   Protruding lesions Medium non-bleeding internal hemorrhoids were noted.   An ulcerated and fungating bleeding 5 cm mass of malignant appearance was found  in the mid-descending colon. The mass caused a partial obstruction. The scope  traversed the lesion.   Additional findings Two cc of 1:10,000 epinephrine was injected. An attempt to  place a endo-loop was unsuccessful. A clip was placed proximal and distal to the  lesion. Claudia ink was injected distal to the lesion (7cc)..     < end of copied text >    C/w protonix.  patient was seen by surgery and CT scan was ordered --pending presently    #  Atrila tachyarrythmia  s/p verapamil.  and periods of hypotension.--- for which she was given iv fluids   echo with EF 69% and severe pulmonary HTN. EPS following. Records from [facility] to be faxed. 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

## 2019-01-11 NOTE — PROGRESS NOTE ADULT - SUBJECTIVE AND OBJECTIVE BOX
SUBJECTIVE:    Patient is a 78y old Female who presents with a chief complaint of dark black stools (11 Jan 2019 12:47)    Currently admitted to medicine with the primary diagnosis of Lower GI bleed     Today is hospital day 9d.     PAST MEDICAL & SURGICAL HISTORY  CVA (cerebral vascular accident)  Anemia  Rheumatic fever  Diabetes  Osteoporosis  Mitral valve replaced  Atrial fibrillation: on coumadin at home  Hypertension  History of ureter stent  S/P AVR  H/O mitral valve replacement    ALLERGIES:  No Known Drug Allergies  shellfish (Unknown)    MEDICATIONS:  STANDING MEDICATIONS  atorvastatin 10 milliGRAM(s) Oral at bedtime  chlorhexidine 4% Liquid 1 Application(s) Topical <User Schedule>  cholecalciferol 2000 Unit(s) Oral daily  dextrose 5% + sodium chloride 0.9%. 1000 milliLiter(s) IV Continuous <Continuous>  folic acid 1 milliGRAM(s) Oral daily  iohexol 300 mG (iodine)/mL Oral Solution 30 milliLiter(s) Oral once  metoprolol tartrate 100 milliGRAM(s) Oral every 12 hours  pantoprazole  Injectable 40 milliGRAM(s) IV Push two times a day    PRN MEDICATIONS  morphine  - Injectable 2 milliGRAM(s) IV Push every 6 hours PRN    VITALS:   T(F): 98  HR: 84  BP: 103/53  RR: 25  SpO2: 100%    LABS:                        9.4    14.11 )-----------( 187      ( 11 Jan 2019 11:42 )             29.1     01-11    140  |  100  |  28<H>  ----------------------------<  87  4.4   |  24  |  1.5    Ca    8.5      11 Jan 2019 04:25  Mg     2.3     01-11    TPro  4.9<L>  /  Alb  2.5<L>  /  TBili  1.2  /  DBili  x   /  AST  12  /  ALT  7   /  AlkPhos  77  01-10    PTT - ( 11 Jan 2019 04:25 )  PTT:32.1 sec              RADIOLOGY:    PHYSICAL EXAM:  GEN: No acute distress  LUNGS: Clear to auscultation bilaterally   HEART: S1/S2 present. RRR.   ABD/ GI: Soft, non-tender, non-distended. Bowel sounds present  EXT: NC/NC/NE/2+PP/MARTELL  NEURO: AAOX3

## 2019-01-11 NOTE — PROGRESS NOTE ADULT - SUBJECTIVE AND OBJECTIVE BOX
GENERAL SURGERY PROGRESS NOTE     AUSTIN DAVIS  78y  Female  Hospital day :9d  Consult for descending colon mass  OVERNIGHT EVENTS:no acute events overnight, patient hasnot had any bowel movement, no flatus.     T(F): 97.3 (01-11-19 @ 04:00), Max: 98 (01-10-19 @ 16:03)  HR: 96 (01-11-19 @ 06:00) (76 - 106)  BP: 99/50 (01-11-19 @ 06:00) (91/48 - 146/84)  RR: 14 (01-11-19 @ 06:00) (14 - 32)  SpO2: 100% (01-11-19 @ 06:00) (91% - 100%)    DIET/FLUIDS: cholecalciferol 2000 Unit(s) Oral daily  folic acid 1 milliGRAM(s) Oral daily       GI proph:  pantoprazole  Injectable 40 milliGRAM(s) IV Push two times a day    AC/ proph:   ABx:     PHYSICAL EXAM:  GENERAL: on nasal cannula   CHEST/LUNG: Clear to auscultation bilaterally  HEART: Regular rate and rhythm  ABDOMEN: Soft, mildly tender, mildly distended       LABS  Labs:  CAPILLARY BLOOD GLUCOSE      POCT Blood Glucose.: 99 mg/dL (11 Jan 2019 05:37)                          9.5    12.50 )-----------( 185      ( 11 Jan 2019 04:25 )             28.8       Auto Neutrophil %: 85.0 % (01-11-19 @ 04:25)  Auto Immature Granulocyte %: 1.2 % (01-11-19 @ 04:25)  Auto Neutrophil %: 85.1 % (01-10-19 @ 20:33)  Auto Immature Granulocyte %: 1.4 % (01-10-19 @ 20:33)  Auto Neutrophil %: 87.3 % (01-10-19 @ 11:42)  Auto Immature Granulocyte %: 1.3 % (01-10-19 @ 11:42)    01-11    140  |  100  |  28<H>  ----------------------------<  87  4.4   |  24  |  1.5      Calcium, Total Serum: 8.5 mg/dL (01-11-19 @ 04:25)      LFTs:             4.9  | 1.2  | 12       ------------------[77      ( 10 Jim 2019 00:35 )  2.5  | x    | 7           Lipase:x      Amylase:x           ABG - ( 06 Jan 2019 19:29 )  pH: 7.36  /  pCO2: 48    /  pO2: 258   / HCO3: 27    / Base Excess: 1.0   /  SaO2: 100               Coags:     x      ----< x       ( 11 Jan 2019 04:25 )     32.1

## 2019-01-11 NOTE — PROGRESS NOTE ADULT - SUBJECTIVE AND OBJECTIVE BOX
Patient is a 78y old  Female who presents with a chief complaint of dark black stools (11 Jan 2019 11:43)    Subjective: Patient is lying in bed, very weak. She had a colonoscopy yesterday which revealed a mass in descending colon . She is awaiting CT abdomen and   surgical evaluation      Vital Signs Last 24 Hrs  T(C): 36.7 (11 Jan 2019 12:00), Max: 36.7 (10 Jim 2019 16:03)  T(F): 98 (11 Jan 2019 12:00), Max: 98 (10 Jim 2019 16:03)  HR: 84 (11 Jan 2019 12:00) (76 - 106)  BP: 103/53 (11 Jan 2019 12:00) (91/48 - 146/84)  BP(mean): 75 (11 Jan 2019 10:00) (66 - 93)  RR: 25 (11 Jan 2019 12:00) (14 - 32)  SpO2: 100% (11 Jan 2019 12:00) (91% - 100%)    PHYSICAL EXAM  General: adult in NAD  HEENT: clear oropharynx, anicteric sclera, pale conjunctiva  Neck: supple  CV: irregular rate and rythm , + click and murmur  Lungs: poor inspiratory effort  Abdomen: soft mildly tender  Ext: + edema  Skin: no rashes and no petechiae  Neuro: alert and oriented X 4, no focal deficits    MEDICATIONS  (STANDING):  atorvastatin 10 milliGRAM(s) Oral at bedtime  chlorhexidine 4% Liquid 1 Application(s) Topical <User Schedule>  cholecalciferol 2000 Unit(s) Oral daily  dextrose 5% + sodium chloride 0.9%. 1000 milliLiter(s) (40 mL/Hr) IV Continuous <Continuous>  folic acid 1 milliGRAM(s) Oral daily  iohexol 300 mG (iodine)/mL Oral Solution 30 milliLiter(s) Oral once  metoprolol tartrate 100 milliGRAM(s) Oral every 12 hours  pantoprazole  Injectable 40 milliGRAM(s) IV Push two times a day    MEDICATIONS  (PRN):  morphine  - Injectable 2 milliGRAM(s) IV Push every 6 hours PRN Severe Pain (7 - 10)      LABS:                          9.5    12.50 )-----------( 185      ( 11 Jan 2019 04:25 )             28.8         Mean Cell Volume : 80.9 fL  Mean Cell Hemoglobin : 26.7 pg  Mean Cell Hemoglobin Concentration : 33.0 g/dL  Auto Neutrophil # : 10.63 K/uL  Auto Lymphocyte # : 0.62 K/uL  Auto Monocyte # : 0.86 K/uL  Auto Eosinophil # : 0.20 K/uL  Auto Basophil # : 0.04 K/uL  Auto Neutrophil % : 85.0 %  Auto Lymphocyte % : 5.0 %  Auto Monocyte % : 6.9 %  Auto Eosinophil % : 1.6 %  Auto Basophil % : 0.3 %      Serial CBC's  01-11 @ 04:25  Hct-28.8 / Hgb-9.5 / Plat-185 / RBC-3.56 / WBC-12.50  Serial CBC's  01-10 @ 20:33  Hct-27.8 / Hgb-9.0 / Plat-174 / RBC-3.45 / WBC-13.68  Serial CBC's  01-10 @ 11:42  Hct-30.9 / Hgb-10.0 / Plat-198 / RBC-3.77 / WBC-12.66  Serial CBC's  01-10 @ 00:35  Hct-22.3 / Hgb-7.2 / Plat-170 / RBC-2.78 / WBC-8.99  Serial CBC's  01-09 @ 16:42  Hct-25.5 / Hgb-8.2 / Plat-195 / RBC-3.19 / WBC-9.65  Serial CBC's  01-09 @ 11:12  Hct-25.2 / Hgb-8.3 / Plat-189 / RBC-3.17 / WBC-9.46  Serial CBC's  01-09 @ 04:49  Hct-25.3 / Hgb-8.2 / Plat-193 / RBC-3.16 / WBC-9.53  Serial CBC's  01-08 @ 04:19  Hct-22.4 / Hgb-7.1 / Plat-203 / RBC-2.90 / WBC-12.10  Serial CBC's  01-08 @ 02:26  Hct-23.1 / Hgb-7.4 / Plat-216 / RBC-3.03 / WBC-10.52  Serial CBC's  01-07 @ 21:26  Hct-22.7 / Hgb-7.4 / Plat-208 / RBC-2.98 / WBC-10.29      01-11    140  |  100  |  28<H>  ----------------------------<  87  4.4   |  24  |  1.5    Ca    8.5      11 Jan 2019 04:25  Mg     2.3     01-11    TPro  4.9<L>  /  Alb  2.5<L>  /  TBili  1.2  /  DBili  x   /  AST  12  /  ALT  7   /  AlkPhos  77  01-10      PTT - ( 11 Jan 2019 04:25 )  PTT:32.1 sec      RADIOLOGY & ADDITIONAL STUDIES:  < from: Xray Chest 1 View- PORTABLE-Routine (01.10.19 @ 04:45) >  Impression:      No endotracheal tube is identified.    Stable cardiomegaly.            < end of copied text >

## 2019-01-11 NOTE — PROGRESS NOTE ADULT - SUBJECTIVE AND OBJECTIVE BOX
SUBJECTIVE:    Patient is a 78y old Female who presents with a chief complaint of dark black stools (11 Jan 2019 08:14)    Currently admitted to medicine with the primary diagnosis of Lower GI bleed     Today is hospital day 9d. No acute overnight events. During colonoscopy yesterday, a 5cm friable descending colonic mass was visualized, unable to be biopsied and suspicious for metastatic RCC. Patient is currently off heparin drip, NPO, with IVFs and PPI awaiting surgical consultation for determination if patient is a surgical candidate for resection.     PAST MEDICAL & SURGICAL HISTORY  CVA (cerebral vascular accident)  Anemia  Rheumatic fever  Diabetes  Osteoporosis  Mitral valve replaced  Atrial fibrillation: on coumadin at home  Hypertension  History of ureter stent  S/P AVR  H/O mitral valve replacement    SOCIAL HISTORY:  Patient denies alcohol, tobacco, and recreational drug use.     From ( X) home ( ) nursing home ( ) other   Ambulation status: (X ) ambulates independently ( ) ambulates with assistance ( ) ambulates with device ( ) dependent   Device: ( ) rolling walker ( ) cane     ALLERGIES:  No Known Drug Allergies  shellfish (Unknown)    MEDICATIONS:  STANDING MEDICATIONS  atorvastatin 10 milliGRAM(s) Oral at bedtime  chlorhexidine 4% Liquid 1 Application(s) Topical <User Schedule>  cholecalciferol 2000 Unit(s) Oral daily  dextrose 5% + sodium chloride 0.45%. 1000 milliLiter(s) IV Continuous <Continuous>  folic acid 1 milliGRAM(s) Oral daily  iohexol 300 mG (iodine)/mL Oral Solution 30 milliLiter(s) Oral once  metoprolol tartrate 100 milliGRAM(s) Oral every 12 hours  pantoprazole  Injectable 40 milliGRAM(s) IV Push two times a day    PRN MEDICATIONS  morphine  - Injectable 2 milliGRAM(s) IV Push every 6 hours PRN    VITALS:   T(F): 97.6  HR: 86  BP: 93/51  RR: 16  SpO2: 100%    LABS:                        9.5    12.50 )-----------( 185      ( 11 Jan 2019 04:25 )             28.8     01-11    140  |  100  |  28<H>  ----------------------------<  87  4.4   |  24  |  1.5    Ca    8.5      11 Jan 2019 04:25  Mg     2.3     01-11    TPro  4.9<L>  /  Alb  2.5<L>  /  TBili  1.2  /  DBili  x   /  AST  12  /  ALT  7   /  AlkPhos  77  01-10    PTT - ( 11 Jan 2019 04:25 )  PTT:32.1 sec    RADIOLOGY:  CT chest/abdomen/pelvis ordered, follow up results     PHYSICAL EXAM:  GEN: No acute distress, sitting in bedside chair   LUNGS: Clear to auscultation bilaterally, no labored breathing, on nasal cannula   HEART: Mechanical valve appreciated, normal rate and rhythm. Chest wall TTP.   ABD: Soft, non-tender, non-distended. Bowel sounds present.   EXT: Noncyanotic, L LE 1+edema, 2+ peripheral pulses, skin intact   NEURO: AAOX3, CN II-XII grossly intact     Lines/Tubes   Peripheral IV access  0.5L via NC

## 2019-01-11 NOTE — PROGRESS NOTE ADULT - SUBJECTIVE AND OBJECTIVE BOX
Results of EGD/Colonoscopy noted.  Shows tumor of descending colon - Bxs pending  No bleeding since yesterday so far but because of anticoagulation further bleeding is likely    PLAN:  CT  scan pending  2. Dr Talbert aware and pt will likely need surgical resection.

## 2019-01-11 NOTE — PROGRESS NOTE ADULT - ASSESSMENT
Plan:  1.Followup tumor markers  2.To discuss options with patient and family today   3.advise Ct abdomen and pelvis

## 2019-01-11 NOTE — PROGRESS NOTE ADULT - ASSESSMENT
IMPRESSION:     CP arrest? s/p amio bolus  LGIB - active bleeding  Colonic Mass  Metallic AV / afib on A/C  AFIB RVR controlled    PLAN:    CNS: no CNS depressant    HEENT:  Oral care    PULMONARY:  HOB @ 45 degrees, aspiration precaution, oxygen as need keep pulse ox>92%    CARDIOVASCULAR: b blocker, cardio f/u, STOP IVFs, I=O    GI: PPI q12h, GI follow up, NPO, surgery eval for resection    RENAL:  F/u  lytes.  Correct as needed. accurate I/O,    INFECTIOUS DISEASE: no ABX    HEMATOLOGICAL:  DVT prophylaxis. CBC Q 12H, hold heparin, transfuse if <7 or active bleeding    ENDOCRINE:  Follow up FS.  Insulin protocol if needed    CODE STATUS: FULL CODE    DISPOSITION: Transfer to floor in afternoon    POOR PROGNOSIS IMPRESSION:     CP arrest? s/p amio bolus  LGIB - active bleeding  Colonic Mass  Metallic AV / afib on A/C  AFIB RVR controlled    PLAN:    CNS: no CNS depressant    HEENT:  Oral care    PULMONARY:  HOB @ 45 degrees, aspiration precaution, oxygen as need keep pulse ox>92%    CARDIOVASCULAR: b blocker, cardio f/u, STOP IVFs, I=O    GI: PPI q12h, GI follow up, NPO, surgery eval for resection    RENAL:  F/u  lytes.  Correct as needed. accurate I/O,    INFECTIOUS DISEASE: no ABX    HEMATOLOGICAL:  DVT prophylaxis. CBC Q 12H, hold heparin, transfuse if <7 or active bleeding    ENDOCRINE:  Follow up FS.  Insulin protocol if needed    CODE STATUS: FULL CODE    DISPOSITION: keep in CCU    POOR PROGNOSIS

## 2019-01-11 NOTE — PROGRESS NOTE ADULT - ASSESSMENT
Hear rate is controlled.  C/w BB  Heparin on hold due to bleeding colon mass.  Surgery follow-up for possible resection.  The patient does have increased surgical risk, however she has been optimized from cardiac perspective, and likely will be able to tolerate abdominal surgery.  C/w PPI  Anticoagulation discussed with the team. From cardiac perspective, would prefer to restart Heparin, if not actively bleeding, given the mechanical valve in the aortic position, but now on hold, given the possible surgery and friable colon lesion.  Will follow with you.

## 2019-01-12 LAB
ANION GAP SERPL CALC-SCNC: 12 MMOL/L — SIGNIFICANT CHANGE UP (ref 7–14)
BASOPHILS # BLD AUTO: 0.03 K/UL — SIGNIFICANT CHANGE UP (ref 0–0.2)
BASOPHILS NFR BLD AUTO: 0.3 % — SIGNIFICANT CHANGE UP (ref 0–1)
BLD GP AB SCN SERPL QL: SIGNIFICANT CHANGE UP
BUN SERPL-MCNC: 24 MG/DL — HIGH (ref 10–20)
CALCIUM SERPL-MCNC: 8.3 MG/DL — LOW (ref 8.5–10.1)
CANCER AG19-9 SERPL-ACNC: 14.3 U/ML — SIGNIFICANT CHANGE UP
CEA SERPL-MCNC: 12.5 NG/ML — HIGH (ref 0–3.8)
CHLORIDE SERPL-SCNC: 100 MMOL/L — SIGNIFICANT CHANGE UP (ref 98–110)
CO2 SERPL-SCNC: 25 MMOL/L — SIGNIFICANT CHANGE UP (ref 17–32)
CREAT SERPL-MCNC: 1.4 MG/DL — SIGNIFICANT CHANGE UP (ref 0.7–1.5)
EOSINOPHIL # BLD AUTO: 0.25 K/UL — SIGNIFICANT CHANGE UP (ref 0–0.7)
EOSINOPHIL NFR BLD AUTO: 2.8 % — SIGNIFICANT CHANGE UP (ref 0–8)
GLUCOSE BLDC GLUCOMTR-MCNC: 149 MG/DL — HIGH (ref 70–99)
GLUCOSE BLDC GLUCOMTR-MCNC: 169 MG/DL — HIGH (ref 70–99)
GLUCOSE BLDC GLUCOMTR-MCNC: 172 MG/DL — HIGH (ref 70–99)
GLUCOSE BLDC GLUCOMTR-MCNC: 228 MG/DL — HIGH (ref 70–99)
GLUCOSE SERPL-MCNC: 138 MG/DL — HIGH (ref 70–99)
HCT VFR BLD CALC: 25.5 % — LOW (ref 37–47)
HCT VFR BLD CALC: 25.7 % — LOW (ref 37–47)
HCT VFR BLD CALC: 27.4 % — LOW (ref 37–47)
HGB BLD-MCNC: 8.2 G/DL — LOW (ref 12–16)
HGB BLD-MCNC: 8.3 G/DL — LOW (ref 12–16)
HGB BLD-MCNC: 8.7 G/DL — LOW (ref 12–16)
IMM GRANULOCYTES NFR BLD AUTO: 1.2 % — HIGH (ref 0.1–0.3)
LYMPHOCYTES # BLD AUTO: 0.51 K/UL — LOW (ref 1.2–3.4)
LYMPHOCYTES # BLD AUTO: 5.7 % — LOW (ref 20.5–51.1)
MAGNESIUM SERPL-MCNC: 2.1 MG/DL — SIGNIFICANT CHANGE UP (ref 1.8–2.4)
MCHC RBC-ENTMCNC: 25.9 PG — LOW (ref 27–31)
MCHC RBC-ENTMCNC: 26 PG — LOW (ref 27–31)
MCHC RBC-ENTMCNC: 26.6 PG — LOW (ref 27–31)
MCHC RBC-ENTMCNC: 31.8 G/DL — LOW (ref 32–37)
MCHC RBC-ENTMCNC: 31.9 G/DL — LOW (ref 32–37)
MCHC RBC-ENTMCNC: 32.5 G/DL — SIGNIFICANT CHANGE UP (ref 32–37)
MCV RBC AUTO: 81.5 FL — SIGNIFICANT CHANGE UP (ref 81–99)
MCV RBC AUTO: 81.6 FL — SIGNIFICANT CHANGE UP (ref 81–99)
MCV RBC AUTO: 81.7 FL — SIGNIFICANT CHANGE UP (ref 81–99)
MONOCYTES # BLD AUTO: 0.71 K/UL — HIGH (ref 0.1–0.6)
MONOCYTES NFR BLD AUTO: 7.9 % — SIGNIFICANT CHANGE UP (ref 1.7–9.3)
NEUTROPHILS # BLD AUTO: 7.37 K/UL — HIGH (ref 1.4–6.5)
NEUTROPHILS NFR BLD AUTO: 82.1 % — HIGH (ref 42.2–75.2)
NRBC # BLD: 0 /100 WBCS — SIGNIFICANT CHANGE UP (ref 0–0)
PLATELET # BLD AUTO: 169 K/UL — SIGNIFICANT CHANGE UP (ref 130–400)
PLATELET # BLD AUTO: 174 K/UL — SIGNIFICANT CHANGE UP (ref 130–400)
PLATELET # BLD AUTO: 175 K/UL — SIGNIFICANT CHANGE UP (ref 130–400)
POTASSIUM SERPL-MCNC: 4.3 MMOL/L — SIGNIFICANT CHANGE UP (ref 3.5–5)
POTASSIUM SERPL-SCNC: 4.3 MMOL/L — SIGNIFICANT CHANGE UP (ref 3.5–5)
RBC # BLD: 3.12 M/UL — LOW (ref 4.2–5.4)
RBC # BLD: 3.15 M/UL — LOW (ref 4.2–5.4)
RBC # BLD: 3.36 M/UL — LOW (ref 4.2–5.4)
RBC # FLD: 19.9 % — HIGH (ref 11.5–14.5)
SODIUM SERPL-SCNC: 137 MMOL/L — SIGNIFICANT CHANGE UP (ref 135–146)
TYPE + AB SCN PNL BLD: SIGNIFICANT CHANGE UP
WBC # BLD: 8.98 K/UL — SIGNIFICANT CHANGE UP (ref 4.8–10.8)
WBC # BLD: 9.07 K/UL — SIGNIFICANT CHANGE UP (ref 4.8–10.8)
WBC # BLD: 9.98 K/UL — SIGNIFICANT CHANGE UP (ref 4.8–10.8)
WBC # FLD AUTO: 8.98 K/UL — SIGNIFICANT CHANGE UP (ref 4.8–10.8)
WBC # FLD AUTO: 9.07 K/UL — SIGNIFICANT CHANGE UP (ref 4.8–10.8)
WBC # FLD AUTO: 9.98 K/UL — SIGNIFICANT CHANGE UP (ref 4.8–10.8)

## 2019-01-12 PROCEDURE — 99233 SBSQ HOSP IP/OBS HIGH 50: CPT

## 2019-01-12 RX ORDER — METOPROLOL TARTRATE 50 MG
100 TABLET ORAL ONCE
Qty: 0 | Refills: 0 | Status: COMPLETED | OUTPATIENT
Start: 2019-01-12 | End: 2019-01-12

## 2019-01-12 RX ORDER — HEPARIN SODIUM 5000 [USP'U]/ML
1200 INJECTION INTRAVENOUS; SUBCUTANEOUS
Qty: 25000 | Refills: 0 | Status: DISCONTINUED | OUTPATIENT
Start: 2019-01-12 | End: 2019-01-13

## 2019-01-12 RX ADMIN — PANTOPRAZOLE SODIUM 40 MILLIGRAM(S): 20 TABLET, DELAYED RELEASE ORAL at 17:21

## 2019-01-12 RX ADMIN — Medication 1 MILLIGRAM(S): at 11:27

## 2019-01-12 RX ADMIN — ATORVASTATIN CALCIUM 10 MILLIGRAM(S): 80 TABLET, FILM COATED ORAL at 21:42

## 2019-01-12 RX ADMIN — Medication 100 MILLIGRAM(S): at 17:21

## 2019-01-12 RX ADMIN — PANTOPRAZOLE SODIUM 40 MILLIGRAM(S): 20 TABLET, DELAYED RELEASE ORAL at 06:04

## 2019-01-12 RX ADMIN — Medication 100 MILLIGRAM(S): at 08:15

## 2019-01-12 RX ADMIN — HEPARIN SODIUM 12 UNIT(S)/HR: 5000 INJECTION INTRAVENOUS; SUBCUTANEOUS at 18:23

## 2019-01-12 RX ADMIN — Medication 2000 UNIT(S): at 11:27

## 2019-01-12 NOTE — PROGRESS NOTE ADULT - SUBJECTIVE AND OBJECTIVE BOX
Progress Note: General Surgery  Patient: AUSTIN DAVIS , 78y (1940)Female   MRN: [id number]  Location: [address]  Visit: 01-02-19 Inpatient  Date: 01-12-19 @ 07:20    Procedure/Diagnosis: descending colonic tumor  Events over 24h: Patient had a colonoscopy yesterday, 5cm mass at the distal colon was found on c scope.  Patient complains of abdominal distension, denies fever, chills, chest pain, shortness of breath.  CT A/P was performed yesterday.    Vitals: T(F): 98.5 (01-12-19 @ 05:30), Max: 98.5 (01-12-19 @ 05:30)  HR: 112 (01-12-19 @ 06:00)  BP: 118/60 (01-12-19 @ 06:00) (93/51 - 129/79)  RR: 21 (01-12-19 @ 06:00)  SpO2: 100% (01-12-19 @ 06:00)    In:   01-11-19 @ 07:01  -  01-12-19 @ 07:00  --------------------------------------------------------  IN: 1800 mL      Out:   01-11-19 @ 07:01  -  01-12-19 @ 07:00  --------------------------------------------------------  OUT:    Voided: 750 mL  Total OUT: 750 mL        Net:   01-11-19 @ 07:01  -  01-12-19 @ 07:00  --------------------------------------------------------  NET: 1050 mL      Diet: Diet, Clear Liquid:   Consistent Carbohydrate No Snacks  DASH/TLC Sodium & Cholesterol Restricted (01-11-19 @ 18:37)    IV Fluids: yes no , Type: cholecalciferol 2000 Unit(s) Oral daily  dextrose 5% + sodium chloride 0.9%. 1000 milliLiter(s) (40 mL/Hr) IV Continuous <Continuous>  folic acid 1 milliGRAM(s) Oral daily    Physical Examination:  General Appearance: NAD, alert and cooperative  HEENT: NCAT, WNL  Heart: S1 and S2. No murmurs. Rhythm is regular   Lungs: Clear to auscultation BL  Abdomen:  Positive bowel sounds. Soft, distended, nontender    Medications: [Standing]  atorvastatin 10 milliGRAM(s) Oral at bedtime  chlorhexidine 4% Liquid 1 Application(s) Topical <User Schedule>  cholecalciferol 2000 Unit(s) Oral daily  dextrose 5% + sodium chloride 0.9%. 1000 milliLiter(s) (40 mL/Hr) IV Continuous <Continuous>  folic acid 1 milliGRAM(s) Oral daily  metoprolol tartrate 100 milliGRAM(s) Oral every 12 hours  pantoprazole  Injectable 40 milliGRAM(s) IV Push two times a day    DVT Prophylaxis:   GI Prophylaxis: pantoprazole  Injectable 40 milliGRAM(s) IV Push two times a day    Antibiotics:   Anticoagulation:   Medications:[PRN]  morphine  - Injectable 2 milliGRAM(s) IV Push every 6 hours PRN    Labs:                        8.2    8.98  )-----------( 174      ( 12 Jan 2019 04:20 )             25.7     01-12    137  |  100  |  24<H>  ----------------------------<  138<H>  4.3   |  25  |  1.4    Ca    8.3<L>      12 Jan 2019 04:20  Mg     2.1     01-12        PTT - ( 11 Jan 2019 04:25 )  PTT:32.1 sec

## 2019-01-12 NOTE — PROGRESS NOTE ADULT - ASSESSMENT
Assessment:  78y Female patient admitted with rectal bleeding, found to have descending colonic mass    Plan:  -f/u CT A/P  -f/u tumor markers  -will discuss plan of care with patient and family    Date/Time: 01-12-19 @ 07:20

## 2019-01-12 NOTE — PROGRESS NOTE ADULT - ASSESSMENT
· Assessment		  #symptomatic anemia 2/2 GI bleed s/p recent transfusion of 2 more units  GI performed colonoscopy-< from: Colonoscopy (01.10.19 @ 10:00) >  excavated lesions Multiple non-bleeding diverticula with medium openings were  seen in the sigmoid colon.   Protruding lesions Medium non-bleeding internal hemorrhoids were noted.   An ulcerated and fungating bleeding 5 cm mass of malignant appearance was found  in the mid-descending colon. The mass caused a partial obstruction. The scope  traversed the lesion.   Additional findings Two cc of 1:10,000 epinephrine was injected. An attempt to  place a endo-loop was unsuccessful. A clip was placed proximal and distal to the  lesion. Claudia ink was injected distal to the lesion (7cc)..      CT ABD-< from: CT Abdomen and Pelvis w/ Oral Cont and w/ IV Cont (01.11.19 @ 17:20) >  6.8 cm sigmoid heterogeneous colonic mass compatible with a carcinoma.   Regional mesenteric lymph nodes in the left lower quadrant measure up to   1.8 cm, which are new compared to 10/31/2018.` No obvious distant   metastases.    Stable other findings as above.     Patient seen by surgery-- who think that it is unresctable and they need IR for  possible embolization of mass.        C/w protonix.    #  Atrial tachyarrythmia  s/p on metoprolol   echo with EF 69% and severe pulmonary HTN. EPS following. Records from [facility] to be faxed. 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

## 2019-01-12 NOTE — PROGRESS NOTE ADULT - SUBJECTIVE AND OBJECTIVE BOX
CHIEF COMPLAINT:    Patient is a 78y old Female who presents with a chief complaint of dark black stools (11 Jan 2019 15:23)    Currently admitted to medicine with the primary diagnosis of Lower GI bleed     Today is hospital day 10d. This morning she is resting comfortably in bed and reports no new issues or overnight events.     PAST MEDICAL & SURGICAL HISTORY  CVA (cerebral vascular accident)  Anemia  Rheumatic fever  Diabetes  Osteoporosis  Mitral valve replaced  Atrial fibrillation: on coumadin at home  Hypertension  History of ureter stent  S/P AVR  H/O mitral valve replacement      ALLERGIES:  No Known Drug Allergies  shellfish (Unknown)    MEDICATIONS:  STANDING MEDICATIONS  atorvastatin 10 milliGRAM(s) Oral at bedtime  chlorhexidine 4% Liquid 1 Application(s) Topical <User Schedule>  cholecalciferol 2000 Unit(s) Oral daily  dextrose 5% + sodium chloride 0.9%. 1000 milliLiter(s) IV Continuous <Continuous>  folic acid 1 milliGRAM(s) Oral daily  metoprolol tartrate 100 milliGRAM(s) Oral every 12 hours  pantoprazole  Injectable 40 milliGRAM(s) IV Push two times a day    PRN MEDICATIONS  morphine  - Injectable 2 milliGRAM(s) IV Push every 6 hours PRN    VITALS:   T(F): 97.4  HR: 84  BP: 109/60  RR: 14  SpO2: 100%    LABS:                        9.1    13.65 )-----------( 168      ( 11 Jan 2019 17:34 )             28.1     01-11    140  |  100  |  28<H>  ----------------------------<  87  4.4   |  24  |  1.5    Ca    8.5      11 Jan 2019 04:25  Mg     2.3     01-11    TPro  4.9<L>  /  Alb  2.5<L>  /  TBili  1.2  /  DBili  x   /  AST  12  /  ALT  7   /  AlkPhos  77  01-10    PTT - ( 11 Jan 2019 04:25 )  PTT:32.1 sec    RADIOLOGY:  CT abdomen/pelvis ordered, follow up results     PHYSICAL EXAM:  GEN: No acute distress, sitting in bedside chair   LUNGS: Clear to auscultation bilaterally, no labored breathing, on nasal cannula   HEART: Mechanical valve appreciated, normal rate and rhythm. Chest wall TTP.   ABD: Soft, non-tender, non-distended. Bowel sounds present.   EXT: Noncyanotic, L LE 1+edema, 2+ peripheral pulses, skin intact   NEURO: AAOX3, CN II-XII grossly intact

## 2019-01-12 NOTE — PROGRESS NOTE ADULT - ASSESSMENT
Acute on Chronic anemia (multifactorial) 2/2 LGIB 2/2 newly diagnosed 5cm friable colonic mass   Paroxysmal SVT   Atrial fibrillation  Rheumatic heart disease s/p bioprosthetic mitral valve, mechanical aortic valve   DM  HTN     PLAN    CNS: AAOx3, no CNS depressants     HEENT: Oral care     PULMONARY: HOB 45 degrees, aspiration precautions, 02 to maintain 02 sat > 92%     CARDIOVASCULAR: Echo with EF 69% and severe pulmonary HTN. Dr. Hunter following. Continue BB. Off AC 2/2 LGIB and in anticipation of possible surgical intervention.     GI: + diverticulosis, + internal hemorrhoids, 5cm fungating friable mass in descending colon visualized on colonoscopy, unable to be resected. NPO for now, IVFs, Protonix IV BID. No endoscopic intervention possible. Surgery to discuss options with patient today (Dr. Talbert, blue team).     RENAL: Monitor electrolytes and I&O     INFECTIOUS DISEASE: No active infection     HEMATOLOGICAL: Monitor CBC and transfuse PRN for goal > 8. Heme/onc following for transitional cell carcinoma. SCDs for DVT prophylaxis.     ENDOCRINE: Monitor fingersticks     MICU monitoring  FULL CODE

## 2019-01-12 NOTE — PROGRESS NOTE ADULT - SUBJECTIVE AND OBJECTIVE BOX
Patient is a 78y old  Female who presents with a chief complaint of dark black stools (12 Jan 2019 07:19)        Over Night Events: No overt bleeding.  On 2 liters O2.  Off pressors.  SP surgery eval.  Not candidate for surgical resection         ROS:  See HPI    PHYSICAL EXAM    ICU Vital Signs Last 24 Hrs  T(C): 36.9 (12 Jan 2019 05:30), Max: 36.9 (12 Jan 2019 05:30)  T(F): 98.5 (12 Jan 2019 05:30), Max: 98.5 (12 Jan 2019 05:30)  HR: 100 (12 Jan 2019 08:00) (76 - 120)  BP: 99/46 (12 Jan 2019 08:00) (97/54 - 129/79)  BP(mean): 66 (12 Jan 2019 08:00) (66 - 96)  ABP: --  ABP(mean): --  RR: 15 (12 Jan 2019 08:00) (14 - 32)  SpO2: 99% (12 Jan 2019 08:00) (99% - 100%)      General: In NAD   HEENT: MANUELA             Lymphatic system: No cervical LN   Lungs: Bilateral BS  Cardiovascular: Irregular   Gastrointestinal: Soft, Positive BS  Extremities: No clubbing.  Moves extremities.  Full Range of motion   Skin: Warm, intact  Neurological: No motor or sensory deficit       01-11-19 @ 07:01  -  01-12-19 @ 07:00  --------------------------------------------------------  IN:    dextrose 5% + sodium chloride 0.9%.: 800 mL    Oral Fluid: 1000 mL  Total IN: 1800 mL    OUT:    Voided: 750 mL  Total OUT: 750 mL    Total NET: 1050 mL      01-12-19 @ 07:01  -  01-12-19 @ 08:32  --------------------------------------------------------  IN:    dextrose 5% + sodium chloride 0.9%.: 80 mL  Total IN: 80 mL    OUT:  Total OUT: 0 mL    Total NET: 80 mL          LABS:                            8.2    8.98  )-----------( 174      ( 12 Jan 2019 04:20 )             25.7                                               01-12        137  |  100  |  24<H>  ----------------------------<  138<H>  4.3   |  25  |  1.4    Ca    8.3<L>      12 Jan 2019 04:20  Mg     2.1     01-12        PTT - ( 11 Jan 2019 04:25 )  PTT:32.1 sec                                                                                                                                                                                                                   MEDICATIONS  (STANDING):  atorvastatin 10 milliGRAM(s) Oral at bedtime  chlorhexidine 4% Liquid 1 Application(s) Topical <User Schedule>  cholecalciferol 2000 Unit(s) Oral daily  dextrose 5% + sodium chloride 0.9%. 1000 milliLiter(s) (40 mL/Hr) IV Continuous <Continuous>  folic acid 1 milliGRAM(s) Oral daily  metoprolol tartrate 100 milliGRAM(s) Oral every 12 hours  pantoprazole  Injectable 40 milliGRAM(s) IV Push two times a day    MEDICATIONS  (PRN):  morphine  - Injectable 2 milliGRAM(s) IV Push every 6 hours PRN Severe Pain (7 - 10)      Xrays:         pulm edema SMall bilateral effusion                                                                             ECHO

## 2019-01-12 NOTE — PROGRESS NOTE ADULT - ASSESSMENT
IMPRESSION:     CP arrest? s/p amio bolus  LGIB secondary to Colonic Mass  Metallic AV / afib on A/C.  NOw off AC   AFIB RVR controlled    PLAN:    CNS: no CNS depressant    HEENT:  Oral care    PULMONARY:  HOB @ 45 degrees, aspiration precaution, oxygen as need keep pulse ox>92%    CARDIOVASCULAR: b blocker, cardio f/u,     GI: PPI q12h, GI follow up,     RENAL:  F/u  lytes.  Correct as needed. accurate I/O,    INFECTIOUS DISEASE: no ABX    HEMATOLOGICAL:  DVT prophylaxis. FU CBC Q 12H,     ENDOCRINE:  Follow up FS.     CODE STATUS: FULL CODE    DISPOSITION: transfer to tele      per GI And cardiology    POOR PROGNOSIS

## 2019-01-12 NOTE — PROGRESS NOTE ADULT - SUBJECTIVE AND OBJECTIVE BOX
SUBJECTIVE:    Patient is a 78y old Female who presents with a chief complaint of dark black stools (12 Jan 2019 08:32)    Currently admitted to medicine with the primary diagnosis of Lower GI bleed     Today is hospital day 10d.     PAST MEDICAL & SURGICAL HISTORY  CVA (cerebral vascular accident)  Anemia  Rheumatic fever  Diabetes  Osteoporosis  Mitral valve replaced  Atrial fibrillation: on coumadin at home  Hypertension  History of ureter stent  S/P AVR  H/O mitral valve replacement    ALLERGIES:  No Known Drug Allergies  shellfish (Unknown)    MEDICATIONS:  STANDING MEDICATIONS  atorvastatin 10 milliGRAM(s) Oral at bedtime  chlorhexidine 4% Liquid 1 Application(s) Topical <User Schedule>  cholecalciferol 2000 Unit(s) Oral daily  folic acid 1 milliGRAM(s) Oral daily  metoprolol tartrate 100 milliGRAM(s) Oral every 12 hours  pantoprazole  Injectable 40 milliGRAM(s) IV Push two times a day    PRN MEDICATIONS  morphine  - Injectable 2 milliGRAM(s) IV Push every 6 hours PRN    VITALS:   T(F): 98.8  HR: 80  BP: 109/58  RR: 16  SpO2: 100%    LABS:                        8.2    8.98  )-----------( 174      ( 12 Jan 2019 04:20 )             25.7     01-12    137  |  100  |  24<H>  ----------------------------<  138<H>  4.3   |  25  |  1.4    Ca    8.3<L>      12 Jan 2019 04:20  Mg     2.1     01-12      PTT - ( 11 Jan 2019 04:25 )  PTT:32.1 sec              RADIOLOGY:    PHYSICAL EXAM:  GEN: No acute distress  LUNGS: Clear to auscultation bilaterally   HEART: S1/S2 present. RRR.   ABD/ GI: Soft, non-tender, non-distended. Bowel sounds present  EXT: NC/NC/NE/2+PP/MARTELL  NEURO: AAOX3

## 2019-01-12 NOTE — CHART NOTE - NSCHARTNOTEFT_GEN_A_CORE
Registered Dietitian Follow-Up     Patient Profile Reviewed                           Yes [v]   No []     Nutrition History Previously Obtained        Yes [v]  No []       Pertinent Subjective Information: Pt is very teary today,  at bedside. Pt has been tolerating clears (started yesterday), drinks/eats ~50% trays.      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 As per surgery team mass is unresectable, no signs of obstruction. Plan: IR to embolize mass, GI for possible stent if becomes obstructed. No surgical intervention due to pt's condition.       Diet order: Clear liquids, CHO consistent (no snacks), DASH/ TLC **** this means pt is on CLEAR LIQUID DIET ( pt has been on and off NPO/clears since 1/6)      Anthropometrics:  - Ht.  - Wt. 62.7 kg (1/11) , weight remains stable. Admit wt 61.9kg   - %wt change  - BMI - 29.0  - IBW     Pertinent Lab Data: 1/12 H/H - 8.2/25.7, BUN- 24, GLu- 138, GFR- 36     Pertinent Meds: metoprolol, morphine, lipitor, vitamin D3, Folic acid     Physical Findings:  - Appearance: OOB to chair, teary;  at bedside  - GI function: no BMs today per pt ; 4 red loose (bright and dark) on 1/10 per EMR  - Tubes:  - Oral/Mouth cavity:  - Skin: ecchymosis      Nutrition requirements (weight used: admitted 61.9kg)    Estimated energy needs:        8270-9141 kcal/day (MSJ x1.2-1.4).   Estimated protein needs:        64-87 g/day (1.2-1.4 g/kg actual wt)  Estimated fluid needs:            1mL/kcal or per LIP       Nutrient Intake: Not meeting needs.        [] Previous Nutrition Diagnosis:    1. Malnutrition - ongoing   2. Inadequate protein-energy intake - ongoing        Nutrition Intervention: Meal and snacks. Supplements.      Goal/Expected Outcome: PO intake > 75% meals, snacks and PO supplements in 3 days     Indicator/Monitoring: Will monitor energy intake, diet order, labs, body composition, NFPF.    Recommended: while on clear liquid diet (please discontinue CHO consistent, DASH/TLC modifiers) please add Prosource Gelatein Plus q 12 hrs (300kcal, 40gm protein) and Ensure Clear q 12 hrs (480kcal, 16gm protein). When appropriate please advance diet to GI soft, CHO consistent. If unable to advance diet to solids w/in 24-72hrs please consider NST eval. RD will follow.     at risk f/u

## 2019-01-13 LAB
ANION GAP SERPL CALC-SCNC: 14 MMOL/L — SIGNIFICANT CHANGE UP (ref 7–14)
APTT BLD: 62.5 SEC — HIGH (ref 27–39.2)
APTT BLD: 66.3 SEC — HIGH (ref 27–39.2)
APTT BLD: 75.7 SEC — CRITICAL HIGH (ref 27–39.2)
BUN SERPL-MCNC: 19 MG/DL — SIGNIFICANT CHANGE UP (ref 10–20)
CALCIUM SERPL-MCNC: 8.3 MG/DL — LOW (ref 8.5–10.1)
CHLORIDE SERPL-SCNC: 99 MMOL/L — SIGNIFICANT CHANGE UP (ref 98–110)
CO2 SERPL-SCNC: 24 MMOL/L — SIGNIFICANT CHANGE UP (ref 17–32)
CREAT SERPL-MCNC: 1.4 MG/DL — SIGNIFICANT CHANGE UP (ref 0.7–1.5)
GLUCOSE BLDC GLUCOMTR-MCNC: 147 MG/DL — HIGH (ref 70–99)
GLUCOSE BLDC GLUCOMTR-MCNC: 163 MG/DL — HIGH (ref 70–99)
GLUCOSE BLDC GLUCOMTR-MCNC: 249 MG/DL — HIGH (ref 70–99)
GLUCOSE SERPL-MCNC: 134 MG/DL — HIGH (ref 70–99)
HCT VFR BLD CALC: 26.8 % — LOW (ref 37–47)
HCT VFR BLD CALC: 29.7 % — LOW (ref 37–47)
HGB BLD-MCNC: 8.5 G/DL — LOW (ref 12–16)
HGB BLD-MCNC: 9.2 G/DL — LOW (ref 12–16)
MCHC RBC-ENTMCNC: 25.8 PG — LOW (ref 27–31)
MCHC RBC-ENTMCNC: 26.1 PG — LOW (ref 27–31)
MCHC RBC-ENTMCNC: 31 G/DL — LOW (ref 32–37)
MCHC RBC-ENTMCNC: 31.7 G/DL — LOW (ref 32–37)
MCV RBC AUTO: 82.2 FL — SIGNIFICANT CHANGE UP (ref 81–99)
MCV RBC AUTO: 83.2 FL — SIGNIFICANT CHANGE UP (ref 81–99)
NRBC # BLD: 0 /100 WBCS — SIGNIFICANT CHANGE UP (ref 0–0)
NRBC # BLD: 0 /100 WBCS — SIGNIFICANT CHANGE UP (ref 0–0)
PLATELET # BLD AUTO: 182 K/UL — SIGNIFICANT CHANGE UP (ref 130–400)
PLATELET # BLD AUTO: 216 K/UL — SIGNIFICANT CHANGE UP (ref 130–400)
POTASSIUM SERPL-MCNC: 4.3 MMOL/L — SIGNIFICANT CHANGE UP (ref 3.5–5)
POTASSIUM SERPL-SCNC: 4.3 MMOL/L — SIGNIFICANT CHANGE UP (ref 3.5–5)
RBC # BLD: 3.26 M/UL — LOW (ref 4.2–5.4)
RBC # BLD: 3.57 M/UL — LOW (ref 4.2–5.4)
RBC # FLD: 19.9 % — HIGH (ref 11.5–14.5)
RBC # FLD: 20.1 % — HIGH (ref 11.5–14.5)
SODIUM SERPL-SCNC: 137 MMOL/L — SIGNIFICANT CHANGE UP (ref 135–146)
WBC # BLD: 8.33 K/UL — SIGNIFICANT CHANGE UP (ref 4.8–10.8)
WBC # BLD: 8.52 K/UL — SIGNIFICANT CHANGE UP (ref 4.8–10.8)
WBC # FLD AUTO: 8.33 K/UL — SIGNIFICANT CHANGE UP (ref 4.8–10.8)
WBC # FLD AUTO: 8.52 K/UL — SIGNIFICANT CHANGE UP (ref 4.8–10.8)

## 2019-01-13 RX ORDER — SODIUM CHLORIDE 9 MG/ML
1000 INJECTION, SOLUTION INTRAVENOUS
Qty: 0 | Refills: 0 | Status: DISCONTINUED | OUTPATIENT
Start: 2019-01-13 | End: 2019-01-16

## 2019-01-13 RX ORDER — DEXTROSE 50 % IN WATER 50 %
12.5 SYRINGE (ML) INTRAVENOUS ONCE
Qty: 0 | Refills: 0 | Status: DISCONTINUED | OUTPATIENT
Start: 2019-01-13 | End: 2019-01-16

## 2019-01-13 RX ORDER — HEPARIN SODIUM 5000 [USP'U]/ML
900 INJECTION INTRAVENOUS; SUBCUTANEOUS
Qty: 25000 | Refills: 0 | Status: DISCONTINUED | OUTPATIENT
Start: 2019-01-13 | End: 2019-01-14

## 2019-01-13 RX ORDER — HEPARIN SODIUM 5000 [USP'U]/ML
1000 INJECTION INTRAVENOUS; SUBCUTANEOUS
Qty: 25000 | Refills: 0 | Status: DISCONTINUED | OUTPATIENT
Start: 2019-01-13 | End: 2019-01-13

## 2019-01-13 RX ORDER — DEXTROSE 50 % IN WATER 50 %
25 SYRINGE (ML) INTRAVENOUS ONCE
Qty: 0 | Refills: 0 | Status: DISCONTINUED | OUTPATIENT
Start: 2019-01-13 | End: 2019-01-16

## 2019-01-13 RX ORDER — GLUCAGON INJECTION, SOLUTION 0.5 MG/.1ML
1 INJECTION, SOLUTION SUBCUTANEOUS ONCE
Qty: 0 | Refills: 0 | Status: DISCONTINUED | OUTPATIENT
Start: 2019-01-13 | End: 2019-01-16

## 2019-01-13 RX ORDER — INSULIN LISPRO 100/ML
VIAL (ML) SUBCUTANEOUS
Qty: 0 | Refills: 0 | Status: DISCONTINUED | OUTPATIENT
Start: 2019-01-13 | End: 2019-01-16

## 2019-01-13 RX ORDER — DEXTROSE 50 % IN WATER 50 %
15 SYRINGE (ML) INTRAVENOUS ONCE
Qty: 0 | Refills: 0 | Status: DISCONTINUED | OUTPATIENT
Start: 2019-01-13 | End: 2019-01-16

## 2019-01-13 RX ADMIN — PANTOPRAZOLE SODIUM 40 MILLIGRAM(S): 20 TABLET, DELAYED RELEASE ORAL at 06:38

## 2019-01-13 RX ADMIN — Medication 1 MILLIGRAM(S): at 11:24

## 2019-01-13 RX ADMIN — ATORVASTATIN CALCIUM 10 MILLIGRAM(S): 80 TABLET, FILM COATED ORAL at 21:40

## 2019-01-13 RX ADMIN — HEPARIN SODIUM 12 UNIT(S)/HR: 5000 INJECTION INTRAVENOUS; SUBCUTANEOUS at 06:39

## 2019-01-13 RX ADMIN — Medication 2: at 11:23

## 2019-01-13 RX ADMIN — MORPHINE SULFATE 2 MILLIGRAM(S): 50 CAPSULE, EXTENDED RELEASE ORAL at 22:01

## 2019-01-13 RX ADMIN — Medication 100 MILLIGRAM(S): at 06:37

## 2019-01-13 RX ADMIN — Medication 2000 UNIT(S): at 14:01

## 2019-01-13 RX ADMIN — Medication 100 MILLIGRAM(S): at 17:32

## 2019-01-13 RX ADMIN — PANTOPRAZOLE SODIUM 40 MILLIGRAM(S): 20 TABLET, DELAYED RELEASE ORAL at 17:32

## 2019-01-13 RX ADMIN — MORPHINE SULFATE 2 MILLIGRAM(S): 50 CAPSULE, EXTENDED RELEASE ORAL at 04:20

## 2019-01-13 RX ADMIN — MORPHINE SULFATE 2 MILLIGRAM(S): 50 CAPSULE, EXTENDED RELEASE ORAL at 04:19

## 2019-01-13 RX ADMIN — CHLORHEXIDINE GLUCONATE 1 APPLICATION(S): 213 SOLUTION TOPICAL at 06:37

## 2019-01-13 RX ADMIN — MORPHINE SULFATE 2 MILLIGRAM(S): 50 CAPSULE, EXTENDED RELEASE ORAL at 22:02

## 2019-01-13 NOTE — PROGRESS NOTE ADULT - SUBJECTIVE AND OBJECTIVE BOX
SUBJECTIVE:    Patient is a 78y old Female who presents with a chief complaint of Melena (13 Jan 2019 09:57)    Currently admitted to medicine with the primary diagnosis of Lower GI bleed     Today is hospital day 11d.     PAST MEDICAL & SURGICAL HISTORY  CVA (cerebral vascular accident)  Anemia  Rheumatic fever  Diabetes  Osteoporosis  Mitral valve replaced  Atrial fibrillation: on coumadin at home  Hypertension  History of ureter stent  S/P AVR  H/O mitral valve replacement    ALLERGIES:  No Known Drug Allergies  shellfish (Unknown)    MEDICATIONS:  STANDING MEDICATIONS  atorvastatin 10 milliGRAM(s) Oral at bedtime  chlorhexidine 4% Liquid 1 Application(s) Topical <User Schedule>  cholecalciferol 2000 Unit(s) Oral daily  dextrose 5%. 1000 milliLiter(s) IV Continuous <Continuous>  dextrose 50% Injectable 12.5 Gram(s) IV Push once  dextrose 50% Injectable 25 Gram(s) IV Push once  dextrose 50% Injectable 25 Gram(s) IV Push once  folic acid 1 milliGRAM(s) Oral daily  heparin  Infusion 900 Unit(s)/Hr IV Continuous <Continuous>  insulin lispro (HumaLOG) corrective regimen sliding scale   SubCutaneous three times a day before meals  metoprolol tartrate 100 milliGRAM(s) Oral every 12 hours  pantoprazole  Injectable 40 milliGRAM(s) IV Push two times a day    PRN MEDICATIONS  dextrose 40% Gel 15 Gram(s) Oral once PRN  glucagon  Injectable 1 milliGRAM(s) IntraMuscular once PRN  morphine  - Injectable 2 milliGRAM(s) IV Push every 6 hours PRN    VITALS:   T(F): 97.2  HR: 76  BP: 138/69  RR: 24  SpO2: 100%    LABS:                        8.5    8.52  )-----------( 182      ( 13 Jan 2019 04:14 )             26.8     01-13    137  |  99  |  19  ----------------------------<  134<H>  4.3   |  24  |  1.4    Ca    8.3<L>      13 Jan 2019 04:14  Mg     2.1     01-12      PTT - ( 13 Jan 2019 11:50 )  PTT:75.7 sec              RADIOLOGY:    PHYSICAL EXAM:  GEN: No acute distress  LUNGS: Clear to auscultation bilaterally   HEART: S1/S2 present. RRR.   ABD/ GI: Soft, non-tender, non-distended. Bowel sounds present  EXT: NC/NC/NE/2+PP/MARTELL  NEURO: AAOX3

## 2019-01-13 NOTE — PROGRESS NOTE ADULT - SUBJECTIVE AND OBJECTIVE BOX
SUBJECTIVE:    Patient is a 78y old Female who presents with a chief complaint of dark black stools (12 Jan 2019 14:30)    Currently admitted to medicine with the primary diagnosis of LGIB 2/2 colon mass, primary vs. metastasis      Today is hospital day 11d. No acute overnight events. Hemoglobin is stable this AM and patient denies any bright red blood per rectum after heparin drip was restarted yesterday 1/12/19. Will confirm with IR and surgery that no intervention is possible. Patient and her family to consider transfer. She denies any headache, palpitations, abdominal pain, N/V/D/C, lightheadedness. She still reports chest wall TTP.     PAST MEDICAL & SURGICAL HISTORY  CVA (cerebral vascular accident)  Anemia  Rheumatic fever  Diabetes  Osteoporosis  Mitral valve replaced  Atrial fibrillation: on coumadin at home  Hypertension  History of ureter stent  S/P AVR  H/O mitral valve replacement    SOCIAL HISTORY:  Patient denies alcohol, tobacco, and recreational drug use.     From ( X) home ( ) nursing home ( ) other   Ambulation status: (X ) ambulates independently ( ) ambulates with assistance ( ) ambulates with device ( ) dependent   Device: ( ) rolling walker ( ) cane    ALLERGIES:  No Known Drug Allergies  shellfish (Unknown)    MEDICATIONS:  STANDING MEDICATIONS  atorvastatin 10 milliGRAM(s) Oral at bedtime  chlorhexidine 4% Liquid 1 Application(s) Topical <User Schedule>  cholecalciferol 2000 Unit(s) Oral daily  folic acid 1 milliGRAM(s) Oral daily  heparin  Infusion 1000 Unit(s)/Hr IV Continuous <Continuous>  metoprolol tartrate 100 milliGRAM(s) Oral every 12 hours  pantoprazole  Injectable 40 milliGRAM(s) IV Push two times a day    PRN MEDICATIONS  morphine  - Injectable 2 milliGRAM(s) IV Push every 6 hours PRN    VITALS:   T(F): 97.5  HR: 84  BP: 96/57  RR: 15  SpO2: 100%    LABS:                        8.5    8.52  )-----------( 182      ( 13 Jan 2019 04:14 )             26.8     01-13    137  |  99  |  19  ----------------------------<  134<H>  4.3   |  24  |  1.4    Ca    8.3<L>      13 Jan 2019 04:14  Mg     2.1     01-12      PTT - ( 13 Jan 2019 00:10 )  PTT:62.5 sec    RADIOLOGY:  No new imaging studies today     PHYSICAL EXAM:  GEN: No acute distress, lying comfortably in bed, pale   LUNGS: Clear to auscultation bilaterally, no labored breathing, on nasal cannula   HEART: Mechanical valve appreciated, normal rate and rhythm. Chest wall TTP.   ABD: Soft, non-tender, non-distended. Bowel sounds present.   EXT: Noncyanotic, L LE 1+edema resolving, 2+ peripheral pulses, skin intact   NEURO: AAOX3, CN II-XII grossly intact     Lines/Tubes   Peripheral IV access  0.5 L 02 via NC

## 2019-01-13 NOTE — PROGRESS NOTE ADULT - ASSESSMENT
IMPRESSION    Acute on Chronic anemia 2/2 LGIB 2/2 newly diagnosed 5cm friable colonic mass, primary vs. metastatic   Paroxysmal SVT   Atrial fibrillation  Cardiac arrest s/p amiodarone administration   Rheumatic heart disease s/p bioprosthetic mitral valve, mechanical aortic valve   DM  HTN     PLAN    CNS: Avoid CNS depressants     HEENT: Oral care     PULMONARY: HOB 45 degrees, aspiration precautions, 02 to maintain 02 sat > 92%      CARDIOVASCULAR: Echo with EF 69% and severe pulmonary HTN. Dr. Hunter following.  Continue BB for atrial fibrillation rate control. Heparin drip restarted on 1/12/19, monitor PTT for goal 50-55.     GI: + diverticulosis, + internal hemorrhoids, 5cm fungating friable mass in descending colon visualized on colonoscopy, unable to be resected surgically or endoscopically. Surgery can consider colostomy if mass causes obstruction. IR to discuss embolization. Advance diet slowly, PPI BID.     RENAL: Monitor electrolytes and I&O     INFECTIOUS DISEASE: No active infection     HEMATOLOGICAL: Monitor CBC q12h and transfuse PRN for goal > 8. Heme/onc following for transitional cell carcinoma.     ENDOCRINE: Monitor fingersticks, insulin protocol     CCU monitoring   FULL CODE

## 2019-01-13 NOTE — PROGRESS NOTE ADULT - ASSESSMENT
#symptomatic anemia 2/2 GI bleed s/p recent transfusion of 2 more units  GI performed colonoscopy-< from: Colonoscopy (01.10.19 @ 10:00) >  excavated lesions Multiple non-bleeding diverticula with medium openings were  seen in the sigmoid colon.   Protruding lesions Medium non-bleeding internal hemorrhoids were noted.   An ulcerated and fungating bleeding 5 cm mass of malignant appearance was found  in the mid-descending colon. The mass caused a partial obstruction. The scope  traversed the lesion.   Additional findings Two cc of 1:10,000 epinephrine was injected. An attempt to  place a endo-loop was unsuccessful. A clip was placed proximal and distal to the  lesion. Claudia ink was injected distal to the lesion (7cc)..      CT ABD-< from: CT Abdomen and Pelvis w/ Oral Cont and w/ IV Cont (01.11.19 @ 17:20) >  6.8 cm sigmoid heterogeneous colonic mass compatible with a carcinoma.   Regional mesenteric lymph nodes in the left lower quadrant measure up to   1.8 cm, which are new compared to 10/31/2018.` No obvious distant   metastases.    Stable other findings as above.     Patient seen by surgery-- who think that it is unresectable and they need IR for  possible embolization of mass.    family insisted on iv heparin despite knowing that the mass is the source of bleed and she may bleed again.    C/w protonix.    #  Atrial tachyarrythmia   on metoprolol   echo with EF 69% and severe pulmonary HTN. EPS following. Records from [facility] to be faxed. Continue metoprolol and heparin      #AF and bioprosthetic MV and metallic AV with h/o CVA  -coumadin on hold  -cont hep gtt (inr <2)---monitor ptt (goal 50-70) as she is bleeding      -#TCC-- s/p resection in [facility]  on 6/1/2018, and reresection on 10/29/2018 -- hematology follow up after Ct Scan abd.  thinks she can get chemotherapy.       prognosis is guarded . #symptomatic anemia 2/2 GI bleed s/p recent transfusion of 2 more units  GI performed colonoscopy-< from: Colonoscopy (01.10.19 @ 10:00) >  excavated lesions Multiple non-bleeding diverticula with medium openings were  seen in the sigmoid colon.   Protruding lesions Medium non-bleeding internal hemorrhoids were noted.   An ulcerated and fungating bleeding 5 cm mass of malignant appearance was found  in the mid-descending colon. The mass caused a partial obstruction. The scope  traversed the lesion.   Additional findings Two cc of 1:10,000 epinephrine was injected. An attempt to  place a endo-loop was unsuccessful. A clip was placed proximal and distal to the  lesion. Claudia ink was injected distal to the lesion (7cc)..      CT ABD-< from: CT Abdomen and Pelvis w/ Oral Cont and w/ IV Cont (01.11.19 @ 17:20) >  6.8 cm sigmoid heterogeneous colonic mass compatible with a carcinoma.   Regional mesenteric lymph nodes in the left lower quadrant measure up to   1.8 cm, which are new compared to 10/31/2018.` No obvious distant   metastases.    Stable other findings as above.     Patient seen by surgery-- who think that it is unresectable and they need IR for  possible embolization of mass.    family insisted on iv heparin despite knowing that the mass is the source of bleed and she may bleed again.    C/w protonix.    #  Atrial tachyarrythmia   on metoprolol   echo with EF 69% and severe pulmonary HTN. EPS following. Records from [facility] to be faxed. Continue metoprolol and heparin      #AF and bioprosthetic MV and metallic AV with h/o CVA  -coumadin on hold  -cont hep gtt (inr <2)---monitor ptt (goal 50-70) as she is bleeding      -#TCC-- s/p resection in [facility]  on 6/1/2018, and reresection on 10/29/2018 -- oncology follow up after Ct Scan abd.  thinks she can get chemotherapy.   Oncology has mentioned that radiation not an option as could lead to fistula formation.      prognosis is guarded .

## 2019-01-14 LAB
ALBUMIN SERPL ELPH-MCNC: 2.6 G/DL — LOW (ref 3.5–5.2)
ANION GAP SERPL CALC-SCNC: 11 MMOL/L — SIGNIFICANT CHANGE UP (ref 7–14)
APTT BLD: 68.5 SEC — HIGH (ref 27–39.2)
APTT BLD: 69.2 SEC — HIGH (ref 27–39.2)
BASOPHILS # BLD AUTO: 0.04 K/UL — SIGNIFICANT CHANGE UP (ref 0–0.2)
BASOPHILS NFR BLD AUTO: 0.5 % — SIGNIFICANT CHANGE UP (ref 0–1)
BLD GP AB SCN SERPL QL: SIGNIFICANT CHANGE UP
BUN SERPL-MCNC: 19 MG/DL — SIGNIFICANT CHANGE UP (ref 10–20)
CALCIUM SERPL-MCNC: 8.2 MG/DL — LOW (ref 8.5–10.1)
CHLORIDE SERPL-SCNC: 101 MMOL/L — SIGNIFICANT CHANGE UP (ref 98–110)
CO2 SERPL-SCNC: 26 MMOL/L — SIGNIFICANT CHANGE UP (ref 17–32)
CREAT SERPL-MCNC: 1.2 MG/DL — SIGNIFICANT CHANGE UP (ref 0.7–1.5)
EOSINOPHIL # BLD AUTO: 0.19 K/UL — SIGNIFICANT CHANGE UP (ref 0–0.7)
EOSINOPHIL NFR BLD AUTO: 2.3 % — SIGNIFICANT CHANGE UP (ref 0–8)
GLUCOSE BLDC GLUCOMTR-MCNC: 137 MG/DL — HIGH (ref 70–99)
GLUCOSE BLDC GLUCOMTR-MCNC: 151 MG/DL — HIGH (ref 70–99)
GLUCOSE BLDC GLUCOMTR-MCNC: 175 MG/DL — HIGH (ref 70–99)
GLUCOSE BLDC GLUCOMTR-MCNC: 192 MG/DL — HIGH (ref 70–99)
GLUCOSE SERPL-MCNC: 143 MG/DL — HIGH (ref 70–99)
HCT VFR BLD CALC: 27 % — LOW (ref 37–47)
HCT VFR BLD CALC: 28.5 % — LOW (ref 37–47)
HGB BLD-MCNC: 8.4 G/DL — LOW (ref 12–16)
HGB BLD-MCNC: 8.8 G/DL — LOW (ref 12–16)
IMM GRANULOCYTES NFR BLD AUTO: 1.5 % — HIGH (ref 0.1–0.3)
LYMPHOCYTES # BLD AUTO: 0.52 K/UL — LOW (ref 1.2–3.4)
LYMPHOCYTES # BLD AUTO: 6.4 % — LOW (ref 20.5–51.1)
MAGNESIUM SERPL-MCNC: 2 MG/DL — SIGNIFICANT CHANGE UP (ref 1.8–2.4)
MCHC RBC-ENTMCNC: 25.9 PG — LOW (ref 27–31)
MCHC RBC-ENTMCNC: 26 PG — LOW (ref 27–31)
MCHC RBC-ENTMCNC: 30.9 G/DL — LOW (ref 32–37)
MCHC RBC-ENTMCNC: 31.1 G/DL — LOW (ref 32–37)
MCV RBC AUTO: 83.3 FL — SIGNIFICANT CHANGE UP (ref 81–99)
MCV RBC AUTO: 84.3 FL — SIGNIFICANT CHANGE UP (ref 81–99)
MONOCYTES # BLD AUTO: 0.71 K/UL — HIGH (ref 0.1–0.6)
MONOCYTES NFR BLD AUTO: 8.7 % — SIGNIFICANT CHANGE UP (ref 1.7–9.3)
NEUTROPHILS # BLD AUTO: 6.55 K/UL — HIGH (ref 1.4–6.5)
NEUTROPHILS NFR BLD AUTO: 80.6 % — HIGH (ref 42.2–75.2)
NRBC # BLD: 0 /100 WBCS — SIGNIFICANT CHANGE UP (ref 0–0)
NRBC # BLD: 0 /100 WBCS — SIGNIFICANT CHANGE UP (ref 0–0)
PLATELET # BLD AUTO: 193 K/UL — SIGNIFICANT CHANGE UP (ref 130–400)
PLATELET # BLD AUTO: 216 K/UL — SIGNIFICANT CHANGE UP (ref 130–400)
POTASSIUM SERPL-MCNC: 4.9 MMOL/L — SIGNIFICANT CHANGE UP (ref 3.5–5)
POTASSIUM SERPL-SCNC: 4.9 MMOL/L — SIGNIFICANT CHANGE UP (ref 3.5–5)
RBC # BLD: 3.24 M/UL — LOW (ref 4.2–5.4)
RBC # BLD: 3.38 M/UL — LOW (ref 4.2–5.4)
RBC # FLD: 20.1 % — HIGH (ref 11.5–14.5)
RBC # FLD: 20.2 % — HIGH (ref 11.5–14.5)
SODIUM SERPL-SCNC: 138 MMOL/L — SIGNIFICANT CHANGE UP (ref 135–146)
TYPE + AB SCN PNL BLD: SIGNIFICANT CHANGE UP
WBC # BLD: 8.13 K/UL — SIGNIFICANT CHANGE UP (ref 4.8–10.8)
WBC # BLD: 9.16 K/UL — SIGNIFICANT CHANGE UP (ref 4.8–10.8)
WBC # FLD AUTO: 8.13 K/UL — SIGNIFICANT CHANGE UP (ref 4.8–10.8)
WBC # FLD AUTO: 9.16 K/UL — SIGNIFICANT CHANGE UP (ref 4.8–10.8)

## 2019-01-14 RX ADMIN — Medication 100 MILLIGRAM(S): at 17:27

## 2019-01-14 RX ADMIN — Medication 1 MILLIGRAM(S): at 13:09

## 2019-01-14 RX ADMIN — MORPHINE SULFATE 2 MILLIGRAM(S): 50 CAPSULE, EXTENDED RELEASE ORAL at 22:58

## 2019-01-14 RX ADMIN — ATORVASTATIN CALCIUM 10 MILLIGRAM(S): 80 TABLET, FILM COATED ORAL at 21:25

## 2019-01-14 RX ADMIN — Medication 1: at 08:46

## 2019-01-14 RX ADMIN — Medication 100 MILLIGRAM(S): at 06:27

## 2019-01-14 RX ADMIN — PANTOPRAZOLE SODIUM 40 MILLIGRAM(S): 20 TABLET, DELAYED RELEASE ORAL at 17:27

## 2019-01-14 RX ADMIN — HEPARIN SODIUM 9 UNIT(S)/HR: 5000 INJECTION INTRAVENOUS; SUBCUTANEOUS at 06:36

## 2019-01-14 RX ADMIN — PANTOPRAZOLE SODIUM 40 MILLIGRAM(S): 20 TABLET, DELAYED RELEASE ORAL at 06:27

## 2019-01-14 RX ADMIN — Medication 2000 UNIT(S): at 13:10

## 2019-01-14 RX ADMIN — MORPHINE SULFATE 2 MILLIGRAM(S): 50 CAPSULE, EXTENDED RELEASE ORAL at 23:28

## 2019-01-14 NOTE — PROGRESS NOTE ADULT - SUBJECTIVE AND OBJECTIVE BOX
Interventional Radiology Follow- Up Note    78y Female with BRBPR.    Procedure requested: Mesenteric angiogram      Vitals: T(F): 98 (01-14-19 @ 08:00), Max: 98.3 (01-13-19 @ 20:00)  HR: 76 (01-14-19 @ 08:00) (74 - 105)  BP: 123/63 (01-14-19 @ 08:00) (107/62 - 138/69)  RR: 24 (01-14-19 @ 08:00) (14 - 42)  SpO2: 100% (01-14-19 @ 08:00) (98% - 100%)  Wt(kg): --    LABS:                        8.4    8.13  )-----------( 193      ( 14 Jan 2019 04:32 )             27.0     01-14    138  |  101  |  19  ----------------------------<  143<H>  4.9   |  26  |  1.2    Ca    8.2<L>      14 Jan 2019 04:32  Mg     2.0     01-14    TPro  x   /  Alb  2.6<L>  /  TBili  x   /  DBili  x   /  AST  x   /  ALT  x   /  AlkPhos  x   01-14    PTT - ( 14 Jan 2019 04:32 )  PTT:69.2 sec    PHYSICAL EXAM:  General: Nontoxic, in NAD  Neuro:  Alert & oriented x 3      Impression: 78y Female admitted with LOWER GI BLEED HEMORRHOIDS ANEMIA      Plan:  - patient seen at bedside, resting comfortably, no acute complaints, admits to another bleeding episode today  - patient would like us to speak to  regarding her condition and care - will re-visit once  is bedside  -  continuous episodes of bleeding most likely venous in origin which is not amenable to embolization  - discussed with medical team - IR intervention not warranted at this time  - as per medicine patient's family is looking to transfer pt to [place]  - transfuse PRN  - will continue to follow      Please call Interventional Radiology [phone] with any questions, concerns, or issues regarding above. Interventional Radiology Follow- Up Note    78y Female with BRBPR.    Procedure requested: Mesenteric angiogram      Vitals: T(F): 98 (01-14-19 @ 08:00), Max: 98.3 (01-13-19 @ 20:00)  HR: 76 (01-14-19 @ 08:00) (74 - 105)  BP: 123/63 (01-14-19 @ 08:00) (107/62 - 138/69)  RR: 24 (01-14-19 @ 08:00) (14 - 42)  SpO2: 100% (01-14-19 @ 08:00) (98% - 100%)  Wt(kg): --    LABS:                        8.4    8.13  )-----------( 193      ( 14 Jan 2019 04:32 )             27.0     01-14    138  |  101  |  19  ----------------------------<  143<H>  4.9   |  26  |  1.2    Ca    8.2<L>      14 Jan 2019 04:32  Mg     2.0     01-14    TPro  x   /  Alb  2.6<L>  /  TBili  x   /  DBili  x   /  AST  x   /  ALT  x   /  AlkPhos  x   01-14    PTT - ( 14 Jan 2019 04:32 )  PTT:69.2 sec    PHYSICAL EXAM:  General: Nontoxic, in NAD  Neuro:  Alert & oriented x 3      Impression: 78y Female admitted with LOWER GI BLEED HEMORRHOIDS ANEMIA      Plan:  - patient seen at bedside, resting comfortably, no acute complaints, admits to another bleeding episode today  - patient would like us to speak to  regarding her condition and care - will re-visit once  is bedside  -  continuous episodes of bleeding most likely venous in origin which is not amenable to embolization.  In addition, many vessels that would have to be cannulated are also involved in this mass, precluding a simple angiogram  - discussed with medical team - IR intervention not warranted at this time  -strongly consider hospice/comfort care  - as per medicine patient's family is looking to transfer pt to [place]  - transfuse PRN  -IR can be part of a family meeting, if necessary  - will continue to follow      Please call Interventional Radiology [phone] with any questions, concerns, or issues regarding above.

## 2019-01-14 NOTE — PROGRESS NOTE ADULT - ASSESSMENT
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 Hemolysis panel reviewed  * S/p BM Bx, results are inconclusive, mild MDS is not excluded   *Continue with folic acid    # Cardiac arrythmias: no asystole on pacemaker interrogation - Medications adjusted by Cardiology      # Metallic mitral valve, replaced with tissue valve on 3/19/2018, metallic aortic valve in place  --On chronic anticoagulation with Coumadin for A. fib and metallic AV, Heparin gtt for GI procedure    # LLE : improving - likely 2/2 venous compression 2/2 adenopathy   -- L soleal DVT    Will follow

## 2019-01-14 NOTE — PROGRESS NOTE ADULT - SUBJECTIVE AND OBJECTIVE BOX
Patient is a 78y old  Female who presents with a chief complaint of dark black stools (13 Jan 2019 15:15)    HPI:  78 year old female with PMH of HTN, type II DM, A-fib on coumadin, h/o bradycardia s/p pacemaker, rheumatic heart disease s/p bioprosthetic mitral valve replacement, s/p metallic aortic valve replacement, and HFpEF, history of RLE DVT 25 year sago, Left nephrectomy, and osteoporosis, presents to the ED for dark black stools since 3 days. As per patient and son at bedside she has dark black stools since 3 days, not painful, last bowel movement last night, no bright red blood, no hematemesis, no dysphagia, buit admits to weight loss of 30 pounds in last year, loss of appetite and chronic intermittent abdominal pain, diffuse, no aggravating factors and relieving factor 3-4/10 in intensity. Also has c/o feeling weak, pale, light headedness. She receives frequent transfusions for microcytic anemia, last transfusion was three weeks ago follows up with Dr. Alvarenga she went to see Dr. Alvarenga blood work showed Hb of 4.8 referred to hospital.  Denies fever, chills, nausea, vomiting, dysuria,   pt was recently in hospital for left leg cellulitis; reports trace edema to left knee without pain.  She checks her INR regularly at home goal INR as per their cardiologist is 2-3. She didn;t receive coumadin today, last dose was yesterday 2.5mg   Last endoscopy and colonoscopy was done 5 years ago as per patient was normal.    vitals in ED: 108/51, 94, 98.5, 18, 96% on room air, Labs Hb 4.8(Baseline 7-8), INR 3.29, creatinine is 1.9 (baseline is 2 from may 2018,in april it was 1.1), chest xray was fine, s/p 2 units PRBC (02 Jan 2019 21:11)      SUBJ:  Patient seen and examined. Had another bleeding episode today. Heparin is on hold. In NSR, heart rate is in 80's. No chest pain or dyspnea.      MEDICATIONS  (STANDING):  atorvastatin 10 milliGRAM(s) Oral at bedtime  chlorhexidine 4% Liquid 1 Application(s) Topical <User Schedule>  cholecalciferol 2000 Unit(s) Oral daily  dextrose 5%. 1000 milliLiter(s) (50 mL/Hr) IV Continuous <Continuous>  dextrose 50% Injectable 12.5 Gram(s) IV Push once  dextrose 50% Injectable 25 Gram(s) IV Push once  dextrose 50% Injectable 25 Gram(s) IV Push once  folic acid 1 milliGRAM(s) Oral daily  heparin  Infusion 900 Unit(s)/Hr (9 mL/Hr) IV Continuous <Continuous>  insulin lispro (HumaLOG) corrective regimen sliding scale   SubCutaneous three times a day before meals  metoprolol tartrate 100 milliGRAM(s) Oral every 12 hours  pantoprazole  Injectable 40 milliGRAM(s) IV Push two times a day    MEDICATIONS  (PRN):  dextrose 40% Gel 15 Gram(s) Oral once PRN Blood Glucose LESS THAN 70 milliGRAM(s)/deciliter  glucagon  Injectable 1 milliGRAM(s) IntraMuscular once PRN Glucose LESS THAN 70 milligrams/deciliter  morphine  - Injectable 2 milliGRAM(s) IV Push every 6 hours PRN Severe Pain (7 - 10)            Vital Signs Last 24 Hrs  T(C): 36.7 (14 Jan 2019 08:00), Max: 36.8 (13 Jan 2019 20:00)  T(F): 98 (14 Jan 2019 08:00), Max: 98.3 (13 Jan 2019 20:00)  HR: 76 (14 Jan 2019 08:00) (74 - 105)  BP: 123/63 (14 Jan 2019 08:00) (107/62 - 138/69)  BP(mean): 91 (14 Jan 2019 08:00) (79 - 102)  RR: 24 (14 Jan 2019 08:00) (14 - 42)  SpO2: 100% (14 Jan 2019 08:00) (98% - 100%)      PHYSICAL EXAM:    GEN: AAO x 3, NAD  HEENT: NC/AT  Neck: No JVD  CV: Reg, S1- mech S2, 2/6 syst. murmur  Lungs: CTAB  Abd: Soft, non-tender  Ext: No edema      I&O's Summary    13 Jan 2019 07:01  -  14 Jan 2019 07:00  --------------------------------------------------------  IN: 407 mL / OUT: 1252 mL / NET: -845 mL    	    TELEMETRY:    ECG:    TTE:  < from: Transthoracic Echocardiogram (01.07.19 @ 14:04) >  Summary:   1. Normal global left ventricular systolic function.   2. LV Ejection Fraction by Pineda's Method with a biplane EF of 69 %.   3. Moderately increased LV wall thickness.   4. There is moderate concentric left ventricular hypertrophy.   5. Moderately reduced RV systolic function.   6. Mitral prosthesis is functioning normally.   7. Mild-moderate tricuspid regurgitation.   8. Abnormal stenosis of the aortic prosthesis.   9. Bioprosthesis in the aortic position.  10. Pulmonic valve regurgitation.  11. Estimated pulmonary artery systolic pressure is 62.9 mmHg assuming a   right atrial pressure of 8 mmHg, which is consistent with severe   pulmonary hypertension.  12. Pulmonary hypertension is present.  13. LA volume Index is 58.4 ml/m² ml/m2.    < end of copied text >      LABS:                        8.4    8.13  )-----------( 193      ( 14 Jan 2019 04:32 )             27.0     01-14    138  |  101  |  19  ----------------------------<  143<H>  4.9   |  26  |  1.2    Ca    8.2<L>      14 Jan 2019 04:32  Mg     2.0     01-14    TPro  x   /  Alb  2.6<L>  /  TBili  x   /  DBili  x   /  AST  x   /  ALT  x   /  AlkPhos  x   01-14        PTT - ( 14 Jan 2019 04:32 )  PTT:69.2 sec      BNP  RADIOLOGY & ADDITIONAL STUDIES:      IMPRESSION AND PLAN:

## 2019-01-14 NOTE — PROGRESS NOTE ADULT - SUBJECTIVE AND OBJECTIVE BOX
Patient is a 78y old  Female who presents with a chief complaint of dark black stools (14 Jan 2019 10:07)      Subjective: Patient is still having rectal bleed. Her heparin is intermittently off      Vital Signs Last 24 Hrs  T(C): 36.7 (14 Jan 2019 08:00), Max: 36.8 (13 Jan 2019 20:00)  T(F): 98 (14 Jan 2019 08:00), Max: 98.3 (13 Jan 2019 20:00)  HR: 76 (14 Jan 2019 08:00) (74 - 105)  BP: 123/63 (14 Jan 2019 08:00) (107/62 - 134/63)  BP(mean): 91 (14 Jan 2019 08:00) (80 - 94)  RR: 24 (14 Jan 2019 08:00) (14 - 42)  SpO2: 100% (14 Jan 2019 08:00) (98% - 100%)    PHYSICAL EXAM  General: adult , weak, pale  HEENT: clear oropharynx, anicteric sclera, pale conjunctiva  Neck: supple  CV: IRRR , with murmur +  Lungs: Poor inspiratory effort  Abdomen: soft non-tender non-distended  Ext:+ edema  Skin: no rashes and no petechiae  Neuro: alert and oriented X 4, no focal deficits    MEDICATIONS  (STANDING):  atorvastatin 10 milliGRAM(s) Oral at bedtime  chlorhexidine 4% Liquid 1 Application(s) Topical <User Schedule>  cholecalciferol 2000 Unit(s) Oral daily  dextrose 5%. 1000 milliLiter(s) (50 mL/Hr) IV Continuous <Continuous>  dextrose 50% Injectable 12.5 Gram(s) IV Push once  dextrose 50% Injectable 25 Gram(s) IV Push once  dextrose 50% Injectable 25 Gram(s) IV Push once  folic acid 1 milliGRAM(s) Oral daily  insulin lispro (HumaLOG) corrective regimen sliding scale   SubCutaneous three times a day before meals  metoprolol tartrate 100 milliGRAM(s) Oral every 12 hours  pantoprazole  Injectable 40 milliGRAM(s) IV Push two times a day    MEDICATIONS  (PRN):  dextrose 40% Gel 15 Gram(s) Oral once PRN Blood Glucose LESS THAN 70 milliGRAM(s)/deciliter  glucagon  Injectable 1 milliGRAM(s) IntraMuscular once PRN Glucose LESS THAN 70 milligrams/deciliter  morphine  - Injectable 2 milliGRAM(s) IV Push every 6 hours PRN Severe Pain (7 - 10)      LABS:                          8.4    8.13  )-----------( 193      ( 14 Jan 2019 04:32 )             27.0         Mean Cell Volume : 83.3 fL  Mean Cell Hemoglobin : 25.9 pg  Mean Cell Hemoglobin Concentration : 31.1 g/dL  Auto Neutrophil # : 6.55 K/uL  Auto Lymphocyte # : 0.52 K/uL  Auto Monocyte # : 0.71 K/uL  Auto Eosinophil # : 0.19 K/uL  Auto Basophil # : 0.04 K/uL  Auto Neutrophil % : 80.6 %  Auto Lymphocyte % : 6.4 %  Auto Monocyte % : 8.7 %  Auto Eosinophil % : 2.3 %  Auto Basophil % : 0.5 %      Serial CBC's  01-14 @ 04:32  Hct-27.0 / Hgb-8.4 / Plat-193 / RBC-3.24 / WBC-8.13  Serial CBC's  01-13 @ 17:10  Hct-29.7 / Hgb-9.2 / Plat-216 / RBC-3.57 / WBC-8.33  Serial CBC's  01-13 @ 04:14  Hct-26.8 / Hgb-8.5 / Plat-182 / RBC-3.26 / WBC-8.52  Serial CBC's  01-12 @ 17:14  Hct-27.4 / Hgb-8.7 / Plat-169 / RBC-3.36 / WBC-9.07  Serial CBC's  01-12 @ 04:20  Hct-25.7 / Hgb-8.2 / Plat-174 / RBC-3.15 / WBC-8.98  Serial CBC's  01-12 @ 00:30  Hct-25.5 / Hgb-8.3 / Plat-175 / RBC-3.12 / WBC-9.98  Serial CBC's  01-11 @ 17:34  Hct-28.1 / Hgb-9.1 / Plat-168 / RBC-3.41 / WBC-13.65  Serial CBC's  01-11 @ 11:42  Hct-29.1 / Hgb-9.4 / Plat-187 / RBC-3.58 / WBC-14.11  Serial CBC's  01-11 @ 04:25  Hct-28.8 / Hgb-9.5 / Plat-185 / RBC-3.56 / WBC-12.50  Serial CBC's  01-10 @ 20:33  Hct-27.8 / Hgb-9.0 / Plat-174 / RBC-3.45 / WBC-13.68      01-14    138  |  101  |  19  ----------------------------<  143<H>  4.9   |  26  |  1.2    Ca    8.2<L>      14 Jan 2019 04:32  Mg     2.0     01-14    TPro  x   /  Alb  2.6<L>  /  TBili  x   /  DBili  x   /  AST  x   /  ALT  x   /  AlkPhos  x   01-14      PTT - ( 14 Jan 2019 04:32 )  PTT:69.2 sec        RADIOLOGY & ADDITIONAL STUDIES:  < from: CT Abdomen and Pelvis w/ Oral Cont and w/ IV Cont (01.11.19 @ 17:20) >  IMPRESSION:     6.8 cm sigmoid heterogeneous colonic mass compatible with a carcinoma.   Regional mesenteric lymph nodes in the left lower quadrant measure up to   1.8 cm, which are new compared to 10/31/2018.` No obvious distant   metastases.    Stable other findings as above.      < end of copied text >

## 2019-01-14 NOTE — PROGRESS NOTE ADULT - ASSESSMENT
IMPRESSION    Acute on Chronic anemia 2/2 LGIB 2/2 newly diagnosed 5cm friable colonic mass, primary vs. metastatic   Paroxysmal SVT   Atrial fibrillation  Cardiac arrest s/p amiodarone administration   Rheumatic heart disease s/p bioprosthetic mitral valve, mechanical aortic valve   DM  HTN     PLAN    CNS: Avoid CNS depressants     HEENT: Oral care     PULMONARY: HOB 45 degrees, aspiration precautions, 02 to maintain 02 sat > 92%      CARDIOVASCULAR: Echo with EF 69% and severe pulmonary HTN. Dr. Hunter following.  Continue BB for atrial fibrillation rate control. Heparin drip again discontinued this AM for BRBPR. Can be restarted in 24 hrs if bleeding resolves, unless plan for intervention.     GI: + diverticulosis, + internal hemorrhoids, 5cm fungating friable mass in descending colon visualized on colonoscopy, unable to be resected surgically or endoscopically. Consider stent for evidence of obstruction. IR can consider embolization for active bleed w/ hemodynamic instability. Soft diet, PPI BID.     RENAL: Monitor electrolytes and I&O     INFECTIOUS DISEASE: No active infection     HEMATOLOGICAL: Monitor CBC q12h and transfuse PRN for goal > 8. Heme/onc following for transitional cell carcinoma. Chemotherapy and radiation not currently an option. SCDs for DVT prophylaxis for now.     ENDOCRINE: Monitor fingersticks, insulin protocol     CCU monitoring, possible downgrade to telemetry   FULL CODE

## 2019-01-14 NOTE — PROGRESS NOTE ADULT - SUBJECTIVE AND OBJECTIVE BOX
SUBJECTIVE:  HPI: 78 year old female with PMH of HTN, type II DM, A-fib on coumadin, h/o bradycardia s/p pacemaker, rheumatic heart disease s/p bioprosthetic mitral valve replacement, s/p metallic aortic valve replacement, and HFpEF, history of RLE DVT 25 year sago, Left nephrectomy, and osteoporosis, presents to the ED for dark black stools since 3 days. She went to see Dr. Alvarenga and blood work showed Hb of 4.8, so she was referred to hospital.  Denies fever, chills, nausea, vomiting, dysuria.  Last endoscopy and colonoscopy was done 5 years ago as per patient was normal.    Currently admitted to medicine with the primary diagnosis of Lower GI bleed.    She had another episode of passage of dark maroon colored liquid stool this AM and her heparin gtt was stopped.      Today is hospital day 13d.     PAST MEDICAL & SURGICAL HISTORY  CVA (cerebral vascular accident)  Anemia  Rheumatic fever  Diabetes  Osteoporosis  Mitral valve replaced  Atrial fibrillation: on coumadin at home  Hypertension  History of ureter stent  S/P AVR  H/O mitral valve replacement    ALLERGIES:  No Known Drug Allergies  shellfish (Unknown)    MEDICATIONS:  MEDICATIONS  (STANDING):  atorvastatin 10 milliGRAM(s) Oral at bedtime  chlorhexidine 4% Liquid 1 Application(s) Topical <User Schedule>  cholecalciferol 2000 Unit(s) Oral daily  dextrose 5%. 1000 milliLiter(s) (50 mL/Hr) IV Continuous <Continuous>  folic acid 1 milliGRAM(s) Oral daily  insulin lispro (HumaLOG) corrective regimen sliding scale subcutaneous three times a day before meals  metoprolol tartrate 100 milliGRAM(s) Oral every 12 hours  pantoprazole  Injectable 40 milliGRAM(s) IV Push two times a day      VITALS:   ICU Vital Signs Last 24 Hrs  T(C): 36.8 (14 Jan 2019 12:00), Max: 36.8 (13 Jan 2019 20:00)  T(F): 98.2 (14 Jan 2019 12:00), Max: 98.3 (13 Jan 2019 20:00)  HR: 78 (14 Jan 2019 13:19) (76 - 105)  BP: 128/68 (14 Jan 2019 13:19) (107/62 - 134/63)  BP(mean): 104 (14 Jan 2019 13:19) (80 - 104)  RR: 22 (14 Jan 2019 13:19) (14 - 42)  SpO2: 100% (14 Jan 2019 13:19) (98% - 100%)      LABS:                          8.4    8.13  )-----------( 193      (14 Jan 2019 04:32)             27.0     01-14    138  |  101  |  19  ----------------------------<  143<H>  4.9   |  26  |  1.2    Ca    8.2<L>      14 Jan 2019 04:32  Mg     2.0     01-14    TPro  x   /  Alb  2.6<L>  /  TBili  x   /  DBili  x   /  AST  x   /  ALT  x   /  AlkPhos  x   01-14        PHYSICAL EXAM:  GEN: No acute distress  HEENT: neck supple, no tenderness  LUNGS: Clear to auscultation bilaterally, with decreased breath sounds diffusely  HEART: S1/S2 present. RRR. systolic murmur-mechanical  ABD/ GI: Soft, non-tender, non-distended. Bowel sounds present  EXT: NC/NC/NE/2+PP/MARTELL  NEURO: AAOX3, able to move upper and lower extremities  SKIN: no new erythema      RADIOLOGY:  < from: CT Abdomen and Pelvis w/ Oral Cont and w/ IV Cont (01.11.19 @ 17:20) >  IMPRESSION:     6.8 cm sigmoid heterogeneous colonic mass compatible with a carcinoma.   Regional mesenteric lymph nodes in the left lower quadrant measure up to   1.8 cm, which are new compared to 10/31/2018.` No obvious distant   metastases.    < end of copied text > SUBJECTIVE:  HPI: 78 year old female with PMH of HTN, type II DM, A-fib on coumadin, h/o bradycardia s/p pacemaker, rheumatic heart disease s/p bioprosthetic mitral valve replacement, s/p metallic aortic valve replacement, and HFpEF, history of RLE DVT 25 year sago, Left nephrectomy, and osteoporosis, presents to the ED for dark black stools since 3 days. She went to see Dr. Alvarenga and blood work showed Hb of 4.8, so she was referred to hospital.  Denies fever, chills, nausea, vomiting, dysuria.  Last endoscopy and colonoscopy was done 5 years ago as per patient was normal.    Currently admitted to medicine with the primary diagnosis of Lower GI bleed.    She had another episode of passage of dark maroon colored liquid stool this AM and her heparin gtt was stopped.      Today is hospital day 13d.       ALLERGIES:  No Known Drug Allergies  shellfish (Unknown)    MEDICATIONS:  MEDICATIONS  (STANDING):  atorvastatin 10 milliGRAM(s) Oral at bedtime  chlorhexidine 4% Liquid 1 Application(s) Topical <User Schedule>  cholecalciferol 2000 Unit(s) Oral daily  dextrose 5%. 1000 milliLiter(s) (50 mL/Hr) IV Continuous <Continuous>  folic acid 1 milliGRAM(s) Oral daily  insulin lispro (HumaLOG) corrective regimen sliding scale subcutaneous three times a day before meals  metoprolol tartrate 100 milliGRAM(s) Oral every 12 hours  pantoprazole  Injectable 40 milliGRAM(s) IV Push two times a day      VITALS:   ICU Vital Signs Last 24 Hrs  T(C): 36.8 (14 Jan 2019 12:00), Max: 36.8 (13 Jan 2019 20:00)  T(F): 98.2 (14 Jan 2019 12:00), Max: 98.3 (13 Jan 2019 20:00)  HR: 78 (14 Jan 2019 13:19) (76 - 105)  BP: 128/68 (14 Jan 2019 13:19) (107/62 - 134/63)  BP(mean): 104 (14 Jan 2019 13:19) (80 - 104)  RR: 22 (14 Jan 2019 13:19) (14 - 42)  SpO2: 100% (14 Jan 2019 13:19) (98% - 100%)      LABS:                          8.4    8.13  )-----------( 193      (14 Jan 2019 04:32)             27.0     01-14    138  |  101  |  19  ----------------------------<  143<H>  4.9   |  26  |  1.2    Ca    8.2<L>      14 Jan 2019 04:32  Mg     2.0     01-14    TPro  x   /  Alb  2.6<L>  /  TBili  x   /  DBili  x   /  AST  x   /  ALT  x   /  AlkPhos  x   01-14        PHYSICAL EXAM:  GEN: No acute distress  HEENT: neck supple, no tenderness  LUNGS: Clear to auscultation bilaterally, with decreased breath sounds diffusely  HEART: S1/S2 present. RRR. systolic murmur-mechanical  ABD/ GI: Soft, non-tender, non-distended. Bowel sounds present  EXT: NC/NC/NE/2+PP/MARTELL  NEURO: AAOX3, able to move upper and lower extremities  SKIN: no new erythema      RADIOLOGY:  < from: CT Abdomen and Pelvis w/ Oral Cont and w/ IV Cont (01.11.19 @ 17:20) >  IMPRESSION:     6.8 cm sigmoid heterogeneous colonic mass compatible with a carcinoma.   Regional mesenteric lymph nodes in the left lower quadrant measure up to   1.8 cm, which are new compared to 10/31/2018.` No obvious distant   metastases.    < end of copied text >

## 2019-01-14 NOTE — PROGRESS NOTE ADULT - SUBJECTIVE AND OBJECTIVE BOX
SUBJECTIVE:    Patient is a 78y old Female who presents with a chief complaint of dark black stools (14 Jan 2019 09:03)    Currently admitted to medicine with the primary diagnosis of Lower GI bleed     Today is hospital day 12d. According to GI, there is no possibility of endoscopic intervention. Per surgery team, they cannot surgically resect the colonic mass. Radiation is not an option due to the high likelihood of fistula formation. Chemotherapy is not an option due to the patient's high risk of bleed 2/2 friable colonic mucosa. IR will consider embolization if patient has active bleed and is hemodynamically unstable, but she is currently optimized cardiac-wise. For episode of BRBPR with clots this AM, heparin drip has been stopped per Dr. Hunter. Family must decide with medical team on palliative/hospice care vs. transfer out for second opinion. Patient is currently asymptomatic and denies any headache, lightheadedness, chest pain, palpitations, dyspnea, abdominal pain, N/V at this time.     PAST MEDICAL & SURGICAL HISTORY  CVA (cerebral vascular accident)  Anemia  Rheumatic fever  Diabetes  Osteoporosis  Mitral valve replaced  Atrial fibrillation: on coumadin at home  Hypertension  History of ureter stent  S/P AVR  H/O mitral valve replacement    SOCIAL HISTORY:  Patient denies alcohol, tobacco, and recreational drug use.     From ( X) home ( ) nursing home ( ) other   Ambulation status: (X ) ambulates independently ( ) ambulates with assistance ( ) ambulates with device ( ) dependent   Device: ( ) rolling walker ( ) cane    ALLERGIES:  No Known Drug Allergies  shellfish (Unknown)    MEDICATIONS:  STANDING MEDICATIONS  atorvastatin 10 milliGRAM(s) Oral at bedtime  chlorhexidine 4% Liquid 1 Application(s) Topical <User Schedule>  cholecalciferol 2000 Unit(s) Oral daily  dextrose 5%. 1000 milliLiter(s) IV Continuous <Continuous>  dextrose 50% Injectable 12.5 Gram(s) IV Push once  dextrose 50% Injectable 25 Gram(s) IV Push once  dextrose 50% Injectable 25 Gram(s) IV Push once  folic acid 1 milliGRAM(s) Oral daily  insulin lispro (HumaLOG) corrective regimen sliding scale   SubCutaneous three times a day before meals  metoprolol tartrate 100 milliGRAM(s) Oral every 12 hours  pantoprazole  Injectable 40 milliGRAM(s) IV Push two times a day    PRN MEDICATIONS  dextrose 40% Gel 15 Gram(s) Oral once PRN  glucagon  Injectable 1 milliGRAM(s) IntraMuscular once PRN  morphine  - Injectable 2 milliGRAM(s) IV Push every 6 hours PRN    VITALS:   T(F): 98  HR: 76  BP: 123/63  RR: 24  SpO2: 100%    LABS:             8.4    8.13  )-----------( 193      ( 14 Jan 2019 04:32 )             27.0     01-14    138  |  101  |  19  ----------------------------<  143<H>  4.9   |  26  |  1.2    Ca    8.2<L>      14 Jan 2019 04:32  Mg     2.0     01-14    TPro  x   /  Alb  2.6<L>  /  TBili  x   /  DBili  x   /  AST  x   /  ALT  x   /  AlkPhos  x   01-14    PTT - ( 14 Jan 2019 04:32 )  PTT:69.2 sec    RADIOLOGY:  CXR     Impression:    Essentially unchanged small bilateral pleural effusions and prominent   pulmonary vasculature.      PHYSICAL EXAM:  GEN: No acute distress, sitting in bedside chair, pale   LUNGS: Clear to auscultation bilaterally, no labored breathing, on nasal cannula   HEART: Mechanical valve appreciated, normal rate, irregular rhythm. Chest wall TTP.   ABD: Soft, non-tender, non-distended. Bowel sounds present.   EXT: Noncyanotic, L LE 1+edema resolving, 2+ peripheral pulses, skin intact   NEURO: AAOX3, CN II-XII grossly intact     Lines/Tubes   Peripheral IV access  0.5 L 02 via NC

## 2019-01-14 NOTE — PROGRESS NOTE ADULT - SUBJECTIVE AND OBJECTIVE BOX
Recent events noted.  Colonoscopy showed large mass invading the descending colon.  	  CT abd/Pelvis 1/11/19: IMPRESSION:   6.8 cm sigmoid heterogeneous colonic mass compatible with a carcinoma. Regional mesenteric lymph nodes in the left lower quadrant measure up to 1.8 cm, which are new compared to 10/31/2018.` No obvious distant metastases.     Unfortunately, given direct invasion of the urothelial cancer into the colon, radiation to the pelvis would be contraindicated.  It will increase risk of fistula formation/sepsis.  Per her chart, surgery and embolization are not indicated at the moment  Given overall picture and poor prognosis, would recommend serious consideration of hospice/best supportive care.  Please call with questions.      Sincerely,  Christen Hampton M.D.     Electronically proofread and signed by:  Christen Hampton MD  Attending, Department of Radiation Medicine  [facility]

## 2019-01-14 NOTE — PROGRESS NOTE ADULT - ASSESSMENT
ASSESSMENT/PLAN:  78 year old female with PMH of HTN, type II DM, A-fib on coumadin, h/o bradycardia s/p pacemaker, rheumatic heart disease s/p bioprosthetic mitral valve replacement, s/p metallic aortic valve replacement, and HFpEF, history of RLE DVT 25 year sago, Left nephrectomy, and osteoporosis, presents to the hospital with acute Lower GI bleeding, found to have a fungating sigmoid colon mass on colonoscopy w additional findings of regional mesenteric LAD on subsequent CT imaging.    #symptomatic anemia, acute blood loss anemia 2/2 GI bleed   s/p recent transfusion PRBCs  GI performed colonoscopy-< from: Colonoscopy (01.10.19 @ 10:00) >  excavated lesions Multiple non-bleeding diverticula with medium openings were  seen in the sigmoid colon.   Protruding lesions Medium non-bleeding internal hemorrhoids were noted.   An ulcerated and fungating bleeding 5 cm mass of malignant appearance was found  in the mid-descending colon. The mass caused a partial obstruction. The scope  traversed the lesion.   Additional findings Two cc of 1:10,000 epinephrine was injected. An attempt to  place a endo-loop was unsuccessful. A clip was placed proximal and distal to the  lesion. Claudia ink was injected distal to the lesion (7cc)..      CT ABD-< from: CT Abdomen and Pelvis w/ Oral Cont and w/ IV Cont (01.11.19 @ 17:20) >  6.8 cm sigmoid heterogeneous colonic mass compatible with a carcinoma.   Regional mesenteric lymph nodes in the left lower quadrant measure up to   1.8 cm, which are new compared to 10/31/2018.` No obvious distant   metastases.       Patient seen by surgery-- who think that mass is unresectable.  Seen by Heme/Onc who agrees that palliative care would be better approach.    family disagrees and insists on wanting to take patient to [facility] for second opinion.    IV heparin gtt stopped since patient keeps rebleeding on this medication  pt remains at risk for CVA given comorbidities while off anticoag, and this has been explained to the family  on liquids diet, adv for comfort as corin    #AF w tachyarrhythmia and bioprosthetic MV and metallic AV with h/o CVA  -coumadin on hold  -heparin gtt on hold d/t repeated recurrent LGI bleeding  on metoprolol   echo with EF 69% and severe pulmonary HTN. EPS following. Records from [facility] to be faxed. Continue metoprolol     -#TCC-- s/p resection in [facility]  on 6/1/2018, and reresection on 10/29/2018   -- oncology follow up after Ct Scan abd.  thinks she can get chemotherapy.     Oncology has mentioned that radiation not an option as could lead to fistula formation.    DVT PPX: would use SCDs  GI PPX: on IV PPI  CODE STATUS: FULL  DISPO:  if does not bleed further and corin PO intake, family may choose to sign out AMA tomorrow  prognosis is very guarded to  poor ASSESSMENT/PLAN:  78 year old female with PMH of HTN, type II DM, A-fib on coumadin, h/o bradycardia s/p pacemaker, rheumatic heart disease s/p bioprosthetic mitral valve replacement, s/p metallic aortic valve replacement, and HFpEF, history of RLE DVT 25 year sago, Left nephrectomy, and osteoporosis, presents to the hospital with acute Lower GI bleeding, found to have a fungating sigmoid colon mass on colonoscopy w additional findings of regional mesenteric LAD on subsequent CT imaging.    #symptomatic anemia, acute blood loss anemia 2/2 GI bleed   s/p recent transfusion PRBCs  GI performed colonoscopy-< from: Colonoscopy (01.10.19 @ 10:00) >  excavated lesions Multiple non-bleeding diverticula with medium openings were  seen in the sigmoid colon.   Protruding lesions Medium non-bleeding internal hemorrhoids were noted.   An ulcerated and fungating bleeding 5 cm mass of malignant appearance was found  in the mid-descending colon. The mass caused a partial obstruction. The scope  traversed the lesion.   Additional findings Two cc of 1:10,000 epinephrine was injected. An attempt to  place a endo-loop was unsuccessful. A clip was placed proximal and distal to the  lesion. Claudia ink was injected distal to the lesion (7cc)..      CT ABD-< from: CT Abdomen and Pelvis w/ Oral Cont and w/ IV Cont (01.11.19 @ 17:20) >  6.8 cm sigmoid heterogeneous colonic mass compatible with a carcinoma.   Regional mesenteric lymph nodes in the left lower quadrant measure up to   1.8 cm, which are new compared to 10/31/2018.` No obvious distant   metastases.       Patient seen by surgery-- who think that mass is unresectable.  Seen by Heme/Onc who agrees that palliative care would be better approach.    family disagrees and insists on wanting to take patient to [facility] for second opinion.    IV heparin gtt stopped since patient keeps rebleeding on this medication  pt remains at risk for CVA given comorbidities while off anticoag, and this has been explained to the family  on liquids diet, adv for comfort as corin    #AF w tachyarrhythmia and bioprosthetic MV and metallic AV with h/o CVA  -coumadin on hold  -heparin gtt on hold d/t repeated recurrent LGI bleeding  on metoprolol   echo with EF 69% and severe pulmonary HTN. EPS following. Records from [facility] to be faxed. Continue metoprolol     -#[facility]-- [facility]  on 6[facility], and reresection on 10/29/2018   -- oncology follow up after Ct Scan abd.  thinks she can get chemotherapy.     Oncology has mentioned that radiation not an option as could lead to fistula formation.    DVT PPX: would use SCDs  GI PPX: on IV PPI  CODE STATUS: FULL  DISPO:  if does not bleed further and corin PO intake, family may choose to sign out AMA tomorrow  prognosis is very guarded to  poor     Senior Attending: Notes above reviewed. I agree with hospitalist note above and plan of care.

## 2019-01-14 NOTE — PROGRESS NOTE ADULT - ATTENDING COMMENTS
VY Matias
I had an extensive discussion with No her , her daughter in law Ene on the phone in regards to No's situation.   Over the weekend she was seen by surgical oncology, surgical intervention for bleeding was not deemed appropriate, she was also evaluated by IR, again embolization was deemed to be too high risk, specifically for ischemic complications, and only will be attempted if brisk active bleeding is ongoing, IR is following closely.   As per Rad Onc there is a very high risk of fistula formation so palliative radiation to the mass is not appropriate.   I have explained to them that palliative chemotherapy or immunotherapy may be appropriate but only if active bleeding has been contained.   Family is not interested in palliative/comfort car approach.  They are possibly interested in evaluation at another institution, they are interested in surgery.   I have at length explained that surgery, if attempted is not going to be curative in nature and may be very morbid.   I have also explained to the family that anticoagulation needs to go on hold if active bleeding is ongoing, elevating risk for valve thrombosis and CVA.  Time spent is 60 min. Case was also discussed with Surg Onc, Rad Onc and IR at length.   =
I have briefly discussed this situation with No and her . I am suspecting direct extension of urothelial met into descending colon. The mass was noted to be bleeding and near-obstructive. I have discussed the case with radiation oncology and surgical oncology. Possible fistula formation is high risk in this situation and therefore palliative radiation to this mass is not the optimal approach.   CT scan was ordered for evaluation of A/P.  I have briefly discussed the option of palliative approach focusing on comfort measures only. No and her  are interested in more aggressive management at this time.
No is s/p upgrade to CCU, code blue was called, she was seen and examined on nasal cannula today, GI work up deferred.   Keep Hb above 8.0.
Patient seen and examined independently. Agree with resident note.  was at bedside.  #  SVT s/p adenosine and verapamil.       #symptomatic anemia 2/2 GI bleed  -had gi bleed again and endoscopy planned if patient remains stable may get it in AM  C/w protonix.    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
S/p 2 units of PRBC overnight, for EGD and colonoscopy today.   Keep HB above 8.
Stable and afebrile.  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    IR to embolize mass.  GI for possible stent if she becomes obstructed.  No surgical intervention due to her condition.    Plan d/w medical resident in the CCU.
Stable and afebrile.  Mass in left colon.  No active bleed now.  CT scan of abdomen and pelvis with contrast to stage her disease.  will d/w plan with family and patient after imaging.
Patient seen and examined independently. Agree with resident note. patient is presently in EGD.
Pt seen and examined with Dr John.  No bleeding today.  HR is 130 and SBP is 98.  I believe the most likely cause of her LGI bleeding is diverticular 80% of which stop spontaneously and I would not perform colonoscopy in the current setting since it can worsen her cardiac picture.  This was explained to the patient, her  at the bedside and the critical care fellow.  Toward the end of her hospitalization when completely stable colonoscopy can be considered.  The patient and her  seemed to understand my reasoning but I did offer them the option of a second opinion consultation if they desired.  In the interim I would maximize her cardiac management as you are doing.  By report only 5 y/a she had a NL egd and colonoscopy.
Pt seen and examined with Dr John.  Recalled for hematochezia x 1 etiology unclear.  Pt had egd and colonoscopy reportedly normal five years ago.  Pt has intermittent hypotension from uncontrolled AF.  Pt has mechanical aortic valve and needs anticoagulation therefore it was recently started.  The patient is too unstable for endoscopic procedures therefore if bleeding recurs, IR intervention should be considered with embolization.

## 2019-01-14 NOTE — PROGRESS NOTE ADULT - ASSESSMENT
Events noted.  In NSR  Heparin on hold due to another episode of GIB. Restart in 24 hours if no recurrent active bleed.  C/w metoprolol.  Optimized in terms of her cardiac function, should she need IR or surgical procedure.  F/u with Onc and Surgery.  Please recall for any cardiac issues.

## 2019-01-15 LAB
ANION GAP SERPL CALC-SCNC: 12 MMOL/L — SIGNIFICANT CHANGE UP (ref 7–14)
BASOPHILS # BLD AUTO: 0.04 K/UL — SIGNIFICANT CHANGE UP (ref 0–0.2)
BASOPHILS NFR BLD AUTO: 0.5 % — SIGNIFICANT CHANGE UP (ref 0–1)
BUN SERPL-MCNC: 17 MG/DL — SIGNIFICANT CHANGE UP (ref 10–20)
CALCIUM SERPL-MCNC: 8.4 MG/DL — LOW (ref 8.5–10.1)
CHLORIDE SERPL-SCNC: 99 MMOL/L — SIGNIFICANT CHANGE UP (ref 98–110)
CO2 SERPL-SCNC: 27 MMOL/L — SIGNIFICANT CHANGE UP (ref 17–32)
CREAT SERPL-MCNC: 1.1 MG/DL — SIGNIFICANT CHANGE UP (ref 0.7–1.5)
EOSINOPHIL # BLD AUTO: 0.19 K/UL — SIGNIFICANT CHANGE UP (ref 0–0.7)
EOSINOPHIL NFR BLD AUTO: 2.4 % — SIGNIFICANT CHANGE UP (ref 0–8)
GLUCOSE BLDC GLUCOMTR-MCNC: 150 MG/DL — HIGH (ref 70–99)
GLUCOSE BLDC GLUCOMTR-MCNC: 163 MG/DL — HIGH (ref 70–99)
GLUCOSE BLDC GLUCOMTR-MCNC: 220 MG/DL — HIGH (ref 70–99)
GLUCOSE BLDC GLUCOMTR-MCNC: 269 MG/DL — HIGH (ref 70–99)
GLUCOSE SERPL-MCNC: 136 MG/DL — HIGH (ref 70–99)
HCT VFR BLD CALC: 26.9 % — LOW (ref 37–47)
HGB BLD-MCNC: 8.4 G/DL — LOW (ref 12–16)
IMM GRANULOCYTES NFR BLD AUTO: 1.3 % — HIGH (ref 0.1–0.3)
LYMPHOCYTES # BLD AUTO: 0.59 K/UL — LOW (ref 1.2–3.4)
LYMPHOCYTES # BLD AUTO: 7.5 % — LOW (ref 20.5–51.1)
MAGNESIUM SERPL-MCNC: 1.9 MG/DL — SIGNIFICANT CHANGE UP (ref 1.8–2.4)
MCHC RBC-ENTMCNC: 26.2 PG — LOW (ref 27–31)
MCHC RBC-ENTMCNC: 31.2 G/DL — LOW (ref 32–37)
MCV RBC AUTO: 83.8 FL — SIGNIFICANT CHANGE UP (ref 81–99)
MONOCYTES # BLD AUTO: 0.67 K/UL — HIGH (ref 0.1–0.6)
MONOCYTES NFR BLD AUTO: 8.5 % — SIGNIFICANT CHANGE UP (ref 1.7–9.3)
NEUTROPHILS # BLD AUTO: 6.32 K/UL — SIGNIFICANT CHANGE UP (ref 1.4–6.5)
NEUTROPHILS NFR BLD AUTO: 79.8 % — HIGH (ref 42.2–75.2)
NRBC # BLD: 0 /100 WBCS — SIGNIFICANT CHANGE UP (ref 0–0)
PLATELET # BLD AUTO: 204 K/UL — SIGNIFICANT CHANGE UP (ref 130–400)
POTASSIUM SERPL-MCNC: 4.9 MMOL/L — SIGNIFICANT CHANGE UP (ref 3.5–5)
POTASSIUM SERPL-SCNC: 4.9 MMOL/L — SIGNIFICANT CHANGE UP (ref 3.5–5)
RBC # BLD: 3.21 M/UL — LOW (ref 4.2–5.4)
RBC # FLD: 20.5 % — HIGH (ref 11.5–14.5)
SODIUM SERPL-SCNC: 138 MMOL/L — SIGNIFICANT CHANGE UP (ref 135–146)
WBC # BLD: 7.91 K/UL — SIGNIFICANT CHANGE UP (ref 4.8–10.8)
WBC # FLD AUTO: 7.91 K/UL — SIGNIFICANT CHANGE UP (ref 4.8–10.8)

## 2019-01-15 RX ORDER — METOPROLOL TARTRATE 50 MG
1 TABLET ORAL
Qty: 0 | Refills: 0 | COMMUNITY

## 2019-01-15 RX ORDER — ATORVASTATIN CALCIUM 80 MG/1
1 TABLET, FILM COATED ORAL
Qty: 0 | Refills: 0 | COMMUNITY
Start: 2019-01-15

## 2019-01-15 RX ORDER — MORPHINE SULFATE 50 MG/1
2 CAPSULE, EXTENDED RELEASE ORAL EVERY 6 HOURS
Qty: 0 | Refills: 0 | Status: DISCONTINUED | OUTPATIENT
Start: 2019-01-15 | End: 2019-01-16

## 2019-01-15 RX ORDER — METOPROLOL TARTRATE 50 MG
1 TABLET ORAL
Qty: 0 | Refills: 0 | COMMUNITY
Start: 2019-01-15

## 2019-01-15 RX ORDER — GLIMEPIRIDE 1 MG
1 TABLET ORAL
Qty: 0 | Refills: 0 | COMMUNITY

## 2019-01-15 RX ORDER — FUROSEMIDE 40 MG
1 TABLET ORAL
Qty: 0 | Refills: 0 | COMMUNITY

## 2019-01-15 RX ORDER — WARFARIN SODIUM 2.5 MG/1
1 TABLET ORAL
Qty: 0 | Refills: 0 | COMMUNITY

## 2019-01-15 RX ORDER — CHOLECALCIFEROL (VITAMIN D3) 125 MCG
2000 CAPSULE ORAL
Qty: 0 | Refills: 0 | COMMUNITY
Start: 2019-01-15

## 2019-01-15 RX ORDER — PANTOPRAZOLE SODIUM 20 MG/1
40 TABLET, DELAYED RELEASE ORAL
Qty: 0 | Refills: 0 | COMMUNITY
Start: 2019-01-15

## 2019-01-15 RX ORDER — FAMOTIDINE 10 MG/ML
1 INJECTION INTRAVENOUS
Qty: 0 | Refills: 0 | COMMUNITY

## 2019-01-15 RX ORDER — CHOLECALCIFEROL (VITAMIN D3) 125 MCG
1 CAPSULE ORAL
Qty: 0 | Refills: 0 | COMMUNITY

## 2019-01-15 RX ORDER — FOLIC ACID 0.8 MG
1 TABLET ORAL
Qty: 0 | Refills: 0 | COMMUNITY
Start: 2019-01-15

## 2019-01-15 RX ADMIN — MORPHINE SULFATE 2 MILLIGRAM(S): 50 CAPSULE, EXTENDED RELEASE ORAL at 22:08

## 2019-01-15 RX ADMIN — PANTOPRAZOLE SODIUM 40 MILLIGRAM(S): 20 TABLET, DELAYED RELEASE ORAL at 17:34

## 2019-01-15 RX ADMIN — Medication 1 MILLIGRAM(S): at 13:00

## 2019-01-15 RX ADMIN — Medication 100 MILLIGRAM(S): at 17:33

## 2019-01-15 RX ADMIN — Medication 2: at 17:32

## 2019-01-15 RX ADMIN — CHLORHEXIDINE GLUCONATE 1 APPLICATION(S): 213 SOLUTION TOPICAL at 12:07

## 2019-01-15 RX ADMIN — Medication 3: at 12:09

## 2019-01-15 RX ADMIN — Medication 2000 UNIT(S): at 13:00

## 2019-01-15 RX ADMIN — ATORVASTATIN CALCIUM 10 MILLIGRAM(S): 80 TABLET, FILM COATED ORAL at 22:08

## 2019-01-15 RX ADMIN — MORPHINE SULFATE 2 MILLIGRAM(S): 50 CAPSULE, EXTENDED RELEASE ORAL at 23:23

## 2019-01-15 RX ADMIN — PANTOPRAZOLE SODIUM 40 MILLIGRAM(S): 20 TABLET, DELAYED RELEASE ORAL at 05:33

## 2019-01-15 RX ADMIN — Medication 100 MILLIGRAM(S): at 05:32

## 2019-01-15 NOTE — DISCHARGE NOTE ADULT - PATIENT PORTAL LINK FT
You can access the [facility] Portal, offered by [facility], by registering with the following website: [email or web address]

## 2019-01-15 NOTE — DISCHARGE NOTE ADULT - MEDICATION SUMMARY - MEDICATIONS TO STOP TAKING
I will STOP taking the medications listed below when I get home from the hospital:    pravastatin 40 mg oral tablet  -- 1 tab(s) by mouth once a day    FeroSul 325 mg (65 mg elemental iron) oral tablet  -- 1 tab(s) by mouth 3 times a day   -- Check with your doctor before becoming pregnant.  Do not chew, break, or crush.  May discolor urine or feces.    Evista 60 mg oral tablet  -- 1 tab(s) by mouth once a day    glimepiride 4 mg oral tablet  -- 1 tab(s) by mouth once a day    furosemide 40 mg oral tablet  -- 1 tab(s) by mouth once a day    famotidine 20 mg oral tablet  -- 1 tab(s) by mouth 2 times a day    Coumadin 5 mg oral tablet  -- 1 tab(s) by mouth once a day,   please hold tonight (12/17), can resume tomorrow 12/18 or after visiting [facility]    clindamycin 300 mg oral capsule  -- 1 cap(s) by mouth every 8 hours

## 2019-01-15 NOTE — DISCHARGE NOTE ADULT - PLAN OF CARE
Manage and prevent complications Patient presented with melena (dark tarry stools) and hemoglobin of 4.8 on outpatient labs, which progressed to hematochezia during her hospital course. Anemia was treated with transfusions of packed red blood cells for goal hemoglobin > 8. Patient was seen and examined by GI, who performed an EGD and colonoscopy revealing a 5cm fungating friable mass in the descending colon. They were unable to perform any endoscopic intervention or biopsy the lesion. Per general surgery team, the mass is metastatic and invasive, and not amenable to resection. Per interventional radiology team, embolization for active LGIB is not possible at this time due to the vascular supply of the lesion. Per radiation-oncology, radiation therapy is not an option as fi Patient presented with melena (dark tarry stools) and hemoglobin of 4.8 on outpatient labs, which progressed to hematochezia during her hospital course. Anemia was treated with transfusions of packed red blood cells for goal hemoglobin > 8. Patient has a past medical history of transitional cell carcinoma s/p L nephrectomy and is followed by Dr. Alvarenga. Patient was seen and examined by GI, who performed an EGD and colonoscopy revealing a 5cm fungating friable mass in the descending colon. They were unable to perform any endoscopic intervention or biopsy the lesion. Per general surgery team, the mass is metastatic and invasive, and not amenable to resection. Per interventional radiology team, embolization for active lower GI bleed is not possible at this time due to the vascular supply of the lesion. Per radiation-oncology, radiation therapy is not an option as fistulization is highly likely. Per oncology, chemotherapy is not safe to initiate at this time. Patient is being transferred to [facility] for second surgical opinion. She will require close monitoring of her hemoglobin and hemodynamic status. She is currently off anticoagulation secondary to active lower GI bleed and on a proton pump inhibitor 2x/day. Monitor blood pressure Monitor fingersticks for blood glucose level. A1C 6.0. Sliding scale insulin as needed. Carbohydrate consistent diet. During this admission, patient developed supraventricular tachycardia which was unresponsive to two pushes of adenosine. After amiodarone administration, patient became pulseless and unresponsive and CODE BLUE was called, with two cycles of chest compressions prior to ROSC. It was questionable whether this was a true cardiac arrest vs. profound hypotension. Patient has a past medical history of rheumatic heart disease with a bioprosthetic mitral valve and mechanical aortic valve. Echocardiogram revealed ejection fracture of 69% and severe pulmonary HTN. Dr. Hunter, cardiologist, is following. Continue metoprolol for rate control. Patient currently off anticoagulation secondary to active bleed. If no plan for surgical intervention, please consider risks vs. benefits of restarting anticoagulation. Monitor blood pressure and adjust medications as necessary. Monitor fingersticks and maintain blood glucose 100 - 180. A1C 6.0. Sliding scale insulin as needed. Carbohydrate consistent diet. Monitor Patient has a past medical history of rheumatic heart disease with a bioprosthetic mitral valve and mechanical aortic valve. Will need to revisit the topic of anticoagulation; as of now patient does not want it due to bleeding risk. Echocardiogram revealed ejection fracture of 69% and severe pulmonary HTN. Dr. Hunter, cardiologist, was following at this institution. Continue metoprolol for rate control. Patient currently off anticoagulation secondary to active bleed. If no plan for surgical intervention, please consider risks vs. benefits of restarting anticoagulation.

## 2019-01-15 NOTE — PROGRESS NOTE ADULT - REASON FOR ADMISSION
Pallavi
Pallavi
dark black stools
LGIB
Pallavi
Pallavi
dark black stools

## 2019-01-15 NOTE — DISCHARGE NOTE ADULT - HOSPITAL COURSE
No Carter is a 78F with PMH of TCC s/p left nephrectomy, rheumatic fever s/p bioprosthetic mitral valve and mechanical aortic valve, atrial fibrillation, CVA with no residual deficits, multifactorial anemia, diabetes, osteoporosis, and HTN who presented to [facility] with melena after outpatient labs revealed hemoglobin of 4.8. Patient was resuscitated with fluids and PRBCs. While on the medical floor, patient developed a supraventricular tachycardia that was unable to be controlled with 2 pushes of adenosine. After amiodarone administration, the patient became pulseless and unresponsive. CODE BLUE was called and patient received 2 cycles of chest compressions prior to ROSC. Cardiology uncertain if this was an actual cardiac arrest vs. profound hypotensive event. She was upgraded to the CCU for continued cardiac monitoring. Rhythm was controlled with BB and anticoagulation achieved with heparin drip for atrial fibrillation. Echo revealed EF of 69% and severe pulmonary HTN. Per cardiology, patient optimized medically and hemodynamically stable.     While in CCU, patient developed painless hematochezia. GI workup (EGD and colonoscopy) revealed a 5cm friable fungating mass in the descending colon, unable to be biopsied or endoscopically resected. General surgery was consulted, who determined that the mass was likely an invasive metastasis and surgically unresectable. Radiation oncology determined patient not a candidate for radiation at this time due to high likelihood of fistulization. IR assessed the lesion and determined embolization not indicated at this time. Per oncology, chemotherapy is also not indicated due to the patient's active LGIB.     With cessation of heparin drip, the LGIB has decreased in intensity, however this puts the patient at risk of stroke due to her multiple risk factors (atrial fibrillation, malignancy, mechanical aortic valve). After discussion with patient and her family, it was decided to keep the heparin drip off. Hemoglobin is currently > 8 and patient is hemodynamically stable. She is being transferred to [facility] for a second surgical opinion and continued medical care. She no longer requires CCU care and can be transferred to the general medical floor per cardiology. She must be followed by the internal medicine team, oncology, general surgery, and cardiology for surgical clearance.

## 2019-01-15 NOTE — CHART NOTE - NSCHARTNOTEFT_GEN_A_CORE
<<<RESIDENT DISCHARGE NOTE>>>     AUSTIN DAVIS  MRN-[id number]    VITAL SIGNS:  T(F): 99.1 (01-15-19 @ 12:00), Max: 99.1 (01-15-19 @ 12:00)  HR: 82 (01-15-19 @ 14:11)  BP: 117/59 (01-15-19 @ 14:11)  SpO2: 100% (01-15-19 @ 14:11)    PHYSICAL EXAM  GENERAL: No acute distress, well-developed, pale   HEAD: Atraumatic, Normocephalic  EYES: EOMI, PERRLA, conjunctiva and sclera clear  NECK: Supple, no lymphadenopathy, no JVD  CHEST/LUNG: CTAB; No wheezes, rales, or rhonchi  HEART: Regular rate, irregular rhythm; mechanical valve appreciated   ABDOMEN: Soft, non-tender, non-distended; normal bowel sounds, no organomegaly  EXTREMITIES:  2+ peripheral pulses b/l, No clubbing, cyanosis; 1+ edema in L LE   NEUROLOGY: A&O x 3, no focal deficits  SKIN: No rashes or lesions    TEST RESULTS:                        8.4    7.91  )-----------( 204      ( 15 Jim 2019 05:52 )             26.9       01-15    138  |  99  |  17  ----------------------------<  136<H>  4.9   |  27  |  1.1    Ca    8.4<L>      15 Jim 2019 05:52  Mg     1.9     01-15    TPro  x   /  Alb  2.6<L>  /  TBili  x   /  DBili  x   /  AST  x   /  ALT  x   /  AlkPhos  x   01-14    Patient is to be transferred to [facility] (medicine floor) for second surgical opinion. She no longer requires CCU monitoring as she is medically optimized in terms of cardiac issues and hemodynamically stable. She requires monitoring of vitals and hemoglobin for her active lower GI bleed, which has decreased significantly since cessation of the heparin drip. Continue PPI.     FINAL DISCHARGE INTERVIEW:  Resident(s) Present: (Name: Kathy ROBERTS, RN Present: (Name: Funmilayo)    DISCHARGE MEDICATION RECONCILIATION  reviewed with Attending (Name: Dr. Da Silva)    DISPOSITION:   [  ] Home,    [  ] Home with Visiting Nursing Services,   [    ]  SNF/ NH,    [   ] Acute Rehab (4A),   [   X] Other (Specify: Hospital to hospital transfer to [place])

## 2019-01-15 NOTE — PROGRESS NOTE ADULT - ASSESSMENT
Stable from cardiac perspective.  C/w BB  Will attempt to re-start heparin at a lower dose.  F/u by Heme/Onc, IR and surgery noted.  Family is interested in transfer to [place] for second opinion.  Stable for transfer from cardiac perspective.  May go to the floor if not transferred.

## 2019-01-15 NOTE — DISCHARGE NOTE ADULT - MEDICATION SUMMARY - MEDICATIONS TO TAKE
I will START or STAY ON the medications listed below when I get home from the hospital:    atorvastatin 10 mg oral tablet  -- 1 tab(s) by mouth once a day (at bedtime)  -- Indication: For Hyperlipidemia    metoprolol tartrate 100 mg oral tablet  -- 1 tab(s) by mouth every 12 hours  -- Indication: For Atrial Fibrillation    pantoprazole 40 mg intravenous injection  -- 40 milligram(s) intravenous 2 times a day  -- Indication: For GERD    cholecalciferol oral tablet  -- 2000 unit(s) by mouth once a day  -- Indication: For Supplement    folic acid 1 mg oral tablet  -- 1 tab(s) by mouth once a day  -- Indication: For Supplement

## 2019-01-15 NOTE — DISCHARGE NOTE ADULT - ADDITIONAL INSTRUCTIONS
Transfer to [facility]   Follow up with cardiologist, oncologist, and general surgeon   2 large bore IV access for transfusions and resuscitations PRN   Monitor hemoglobin for goal > 8 Transfer to [facility] [place], operated by [facility] .  Follow up with cardiologist, oncologist, and general surgeon. Will need medical and cardiac clearance if plan for OR.  2 large bore IV access for transfusions.  Monitor hemoglobin for goal > 8.

## 2019-01-15 NOTE — DISCHARGE NOTE ADULT - CARE PROVIDERS DIRECT ADDRESSES
,DirectAddress_Unknown,[email or web address]

## 2019-01-15 NOTE — PROGRESS NOTE ADULT - SUBJECTIVE AND OBJECTIVE BOX
Patient is a 78y old  Female who presents with a chief complaint of dark black stools (14 Jan 2019 15:15)          SUBJ:  Patient seen and examined. Events noted. No bleeding since yesterday.         MEDICATIONS  (STANDING):  atorvastatin 10 milliGRAM(s) Oral at bedtime  chlorhexidine 4% Liquid 1 Application(s) Topical <User Schedule>  cholecalciferol 2000 Unit(s) Oral daily  dextrose 5%. 1000 milliLiter(s) (50 mL/Hr) IV Continuous <Continuous>  dextrose 50% Injectable 12.5 Gram(s) IV Push once  dextrose 50% Injectable 25 Gram(s) IV Push once  dextrose 50% Injectable 25 Gram(s) IV Push once  folic acid 1 milliGRAM(s) Oral daily  insulin lispro (HumaLOG) corrective regimen sliding scale   SubCutaneous three times a day before meals  metoprolol tartrate 100 milliGRAM(s) Oral every 12 hours  pantoprazole  Injectable 40 milliGRAM(s) IV Push two times a day    MEDICATIONS  (PRN):  dextrose 40% Gel 15 Gram(s) Oral once PRN Blood Glucose LESS THAN 70 milliGRAM(s)/deciliter  glucagon  Injectable 1 milliGRAM(s) IntraMuscular once PRN Glucose LESS THAN 70 milligrams/deciliter            Vital Signs Last 24 Hrs  T(C): 36.2 (15 Jim 2019 04:00), Max: 36.8 (14 Jan 2019 12:00)  T(F): 97.2 (15 Jim 2019 04:00), Max: 98.2 (14 Jan 2019 12:00)  HR: 108 (15 Jim 2019 06:00) (76 - 108)  BP: 128/70 (15 Jim 2019 06:00) (108/58 - 133/70)  BP(mean): 99 (15 Jim 2019 06:00) (78 - 104)  RR: 21 (15 Jim 2019 06:00) (16 - 32)  SpO2: 100% (15 Jim 2019 06:00) (97% - 100%)      PHYSICAL EXAM:    GEN: NAD  HEENT: NC/AT  Neck: No JVD  CV: Reg, S1- mech S2, 2/6 syst murmur  Lungs: CTAB  Abd: Soft, non-tender  Ext: No edema      I&O's Summary    14 Jan 2019 07:01  -  15 Jim 2019 07:00  --------------------------------------------------------  IN: 638 mL / OUT: 1350 mL / NET: -712 mL    	          LABS:                        8.4    7.91  )-----------( 204      ( 15 Jim 2019 05:52 )             26.9     01-15    138  |  99  |  17  ----------------------------<  136<H>  4.9   |  27  |  1.1    Ca    8.4<L>      15 Jim 2019 05:52  Mg     1.9     01-15    TPro  x   /  Alb  2.6<L>  /  TBili  x   /  DBili  x   /  AST  x   /  ALT  x   /  AlkPhos  x   01-14        PTT - ( 14 Jan 2019 04:32 )  PTT:69.2 sec      BNP  RADIOLOGY & ADDITIONAL STUDIES:      IMPRESSION AND PLAN:

## 2019-01-15 NOTE — DISCHARGE NOTE ADULT - INSTRUCTIONS
Carbohydrate consistent soft diet  Activity: increase as tolerated Carbohydrate consistent soft diet

## 2019-01-15 NOTE — CHART NOTE - NSCHARTNOTEFT_GEN_A_CORE
Registered Dietitian Follow-Up     Patient Profile Reviewed                           Yes [x]   No []     Nutrition History Previously Obtained        Yes [x]  No []       Pertinent Subjective Information: Pt. OOB to chair during visit,  at bedside, Pt. reports consuming liquids only as solid food cause her abd discomfort. Pt. likes Ensure supplements and would like to receive more supplement- previously recommended supplement has not been ordered. Will communicate with resident to activate order.      Pertinent Medical Interventions: symptomatic anemia, acute blood loss anemia 2/2 GI bleed s/p recent transfusion PRBCs. Cardio- stable for transfer/downgrade to medical floor. Colonoscopy showed large mass invading the descending colon- surgery and embolization are not indicated at the moment. Heme/onco: given overall picture and poor prognosis, would recommend serious consideration of hospice/best supportive care.     Diet order: soft, consistent carb     Anthropometrics:  - Ht. 144.7cm   - Wt. 68.2kg on 1/14 vs. 62.7 kg (1/11)- ?bed scale discrepancy, will continue to monitor wt trends   - %wt change  - BMI 29.9  - IBW 95lbs      Pertinent Lab Data: (1/15) RNC 3.21, Hg 8.4, Hct 26.9, glu 136, Ca 8.4, eGFR 49     Pertinent Meds: Lispro, Atorvastatin, vit D, Folic acid, Protonix, Metoprolol      Physical Findings:  - Appearance: alert&oriented  - GI function: reports abd discomfort with solids, prefers liquids  - Tubes: no tubes noted  - Oral/Mouth cavity: no symptoms noted  - Skin: ecchymosis (BS 18)       Nutrition Requirements   Weight Used: 61.9kg admission weight      Estimated Energy Needs    Continue [x]  Adjust [] 2192-1387 kcal/day (MSJ x1.2-1.4)  Adjusted Energy Recommendations:   kcal/day        Estimated Protein Needs    Continue [x]  Adjust []  64-87 g/day (1.2-1.4 g/kg actual wt)  Adjusted Protein Recommendations:   gm/day        Estimated Fluid Needs        Continue [x]  Adjust [] 1mL/kcal or per LIP  Adjusted Fluid Recommendations:   mL/day     Nutrient Intake: ~50% mostly liquids, not meeting estimated needs.         [] Previous Nutrition Diagnosis: Malnutrition, inadequate protein energy intake             [x] Ongoing          [] Resolved       Nutrition Intervention: meals and snacks, medical food supplement     Rec: Continue soft, consistent carb diet and order Glucerna BID, ProSource gelatein SF BID      Goal/Expected Outcome: In 3 days pt. to consume at least 50-75% PO + supplement at meals       Indicator/Monitoring: energy intake, body composition, NFPF, glucose profile

## 2019-01-15 NOTE — PROGRESS NOTE ADULT - SUBJECTIVE AND OBJECTIVE BOX
SUBJECTIVE:  HPI: 78 year old female with PMH of HTN, type II DM, A-fib on coumadin, h/o bradycardia s/p pacemaker, rheumatic heart disease s/p bioprosthetic mitral valve replacement, s/p metallic aortic valve replacement, and HFpEF, history of RLE DVT 25 year sago, Left nephrectomy, and osteoporosis, presents to the ED for dark black stools since 3 days. She went to see Dr. Alvarenga and blood work showed Hb of 4.8, so she was referred to hospital.  Denies fever, chills, nausea, vomiting, dysuria.  Last endoscopy and colonoscopy was done 5 years ago as per patient was normal.    Currently admitted to medicine with the primary diagnosis of Lower GI bleed.    Since off heparin gtt, no further bleeding episodes overnight.  Patient remains comfortable this morning, without any abdominal pain. She has not eaten much.      Today is hospital day 14d.     PAST MEDICAL & SURGICAL HISTORY  CVA (cerebral vascular accident)  Anemia  Rheumatic fever  Diabetes  Osteoporosis  Mitral valve replaced  Atrial fibrillation: on coumadin at home  Hypertension  History of ureter stent  S/P AVR  H/O mitral valve replacement    ALLERGIES:  No Known Drug Allergies  shellfish (Unknown)    MEDICATIONS:  MEDICATIONS  (STANDING):  atorvastatin 10 milliGRAM(s) Oral at bedtime  chlorhexidine 4% Liquid 1 Application(s) Topical <User Schedule>  cholecalciferol 2000 Unit(s) Oral daily  dextrose 5%. 1000 milliLiter(s) (50 mL/Hr) IV Continuous <Continuous>  folic acid 1 milliGRAM(s) Oral daily  insulin lispro (HumaLOG) corrective regimen sliding scale   SubCutaneous three times a day before meals  metoprolol tartrate 100 milliGRAM(s) Oral every 12 hours  pantoprazole  Injectable 40 milliGRAM(s) IV Push two times a day      VITALS:   ICU Vital Signs Last 24 Hrs  T(C): 36.6 (15 Jim 2019 08:00), Max: 36.8 (14 Jan 2019 12:00)  T(F): 97.9 (15 Jim 2019 08:00), Max: 98.2 (14 Jan 2019 12:00)  HR: 80 (15 Jim 2019 10:00) (78 - 108)  BP: 114/57 (15 Jim 2019 10:00) (108/58 - 133/70)  BP(mean): 76 (15 Jim 2019 10:00) (76 - 104)  RR: 23 (15 Jim 2019 10:00) (16 - 32)  SpO2: 100% (15 Jim 2019 10:00) (97% - 100%)        LABS:                          8.4    7.91  )-----------( 204      ( 15 Jim 2019 05:52 )             26.9     01-15    138  |  99  |  17  ----------------------------<  136<H>  4.9   |  27  |  1.1    Ca    8.4<L>      15 Jim 2019 05:52  Mg     1.9     01-15    TPro  x   /  Alb  2.6<L>  /  TBili  x   /  DBili  x   /  AST  x   /  ALT  x   /  AlkPhos  x   01-14      PHYSICAL EXAM:  GEN: No acute distress  HEENT: neck supple, no tenderness  LUNGS: Clear to auscultation bilaterally, with decreased breath sounds diffusely  HEART: S1/S2 present. RRR. systolic murmur-mechanical  ABD/ GI: Soft, non-tender, non-distended. Bowel sounds present  EXT: NC/NC/NE/2+PP/MARTELL  NEURO: AAOX3, able to move upper and lower extremities  SKIN: no new erythema      RADIOLOGY:  < from: CT Abdomen and Pelvis w/ Oral Cont and w/ IV Cont (01.11.19 @ 17:20) >  IMPRESSION:     6.8 cm sigmoid heterogeneous colonic mass compatible with a carcinoma.   Regional mesenteric lymph nodes in the left lower quadrant measure up to   1.8 cm, which are new compared to 10/31/2018.` No obvious distant   metastases.    < end of copied text >

## 2019-01-15 NOTE — DISCHARGE NOTE ADULT - MEDICATION SUMMARY - MEDICATIONS TO CHANGE
I will SWITCH the dose or number of times a day I take the medications listed below when I get home from the hospital:    metoprolol tartrate 50 mg oral tablet  -- 1 tab(s) by mouth every 12 hours

## 2019-01-15 NOTE — DISCHARGE NOTE ADULT - CARE PROVIDER_API CALL
Angel Trevizo), Anesthesiology; Internal Medicine  [address] [place] [address]  Phone: [phone]  Fax: [phone]    Ney Hunter), Cardiovascular Disease; Internal Medicine; Interventional Cardiology  [address] [place] [address]  Phone: [phone]  Fax: [phone]    Denice Alvarenga), HematologyOncology; Internal Medicine; Medical Oncology  [address] [place] [address]  Phone: [phone]  Fax: [phone]

## 2019-01-15 NOTE — DISCHARGE NOTE ADULT - CARE PLAN
Principal Discharge DX:	Colonic mass  Goal:	Manage and prevent complications  Assessment and plan of treatment:	Patient presented with melena (dark tarry stools) and hemoglobin of 4.8 on outpatient labs, which progressed to hematochezia during her hospital course. Anemia was treated with transfusions of packed red blood cells for goal hemoglobin > 8. Patient was seen and examined by GI, who performed an EGD and colonoscopy revealing a 5cm fungating friable mass in the descending colon. They were unable to perform any endoscopic intervention or biopsy the lesion. Per general surgery team, the mass is metastatic and invasive, and not amenable to resection. Per interventional radiology team, embolization for active LGIB is not possible at this time due to the vascular supply of the lesion. Per radiation-oncology, radiation therapy is not an option as fi  Secondary Diagnosis:	Hypertension  Goal:	Manage and prevent complications  Secondary Diagnosis:	Diabetes  Goal:	Manage and prevent complications  Secondary Diagnosis:	Cardiac arrest  Goal:	Manage and prevent complications  Secondary Diagnosis:	Rheumatic fever  Goal:	Manage and prevent complications  Secondary Diagnosis:	Atrial fibrillation  Goal:	Manage and prevent complications Principal Discharge DX:	Colonic mass  Goal:	Manage and prevent complications  Assessment and plan of treatment:	Patient presented with melena (dark tarry stools) and hemoglobin of 4.8 on outpatient labs, which progressed to hematochezia during her hospital course. Anemia was treated with transfusions of packed red blood cells for goal hemoglobin > 8. Patient has a past medical history of transitional cell carcinoma s/p L nephrectomy and is followed by Dr. Alvarenga. Patient was seen and examined by GI, who performed an EGD and colonoscopy revealing a 5cm fungating friable mass in the descending colon. They were unable to perform any endoscopic intervention or biopsy the lesion. Per general surgery team, the mass is metastatic and invasive, and not amenable to resection. Per interventional radiology team, embolization for active lower GI bleed is not possible at this time due to the vascular supply of the lesion. Per radiation-oncology, radiation therapy is not an option as fistulization is highly likely. Per oncology, chemotherapy is not safe to initiate at this time. Patient is being transferred to [facility] for second surgical opinion. She will require close monitoring of her hemoglobin and hemodynamic status. She is currently off anticoagulation secondary to active lower GI bleed and on a proton pump inhibitor 2x/day.  Secondary Diagnosis:	Hypertension  Goal:	Manage and prevent complications  Assessment and plan of treatment:	Monitor blood pressure  Secondary Diagnosis:	Diabetes  Goal:	Manage and prevent complications  Assessment and plan of treatment:	Monitor fingersticks for blood glucose level. A1C 6.0. Sliding scale insulin as needed. Carbohydrate consistent diet.  Secondary Diagnosis:	Cardiac arrest  Goal:	Manage and prevent complications  Assessment and plan of treatment:	During this admission, patient developed supraventricular tachycardia which was unresponsive to two pushes of adenosine. After amiodarone administration, patient became pulseless and unresponsive and CODE BLUE was called, with two cycles of chest compressions prior to ROSC. It was questionable whether this was a true cardiac arrest vs. profound hypotension.  Secondary Diagnosis:	Rheumatic fever  Goal:	Manage and prevent complications  Assessment and plan of treatment:	Patient has a past medical history of rheumatic heart disease with a bioprosthetic mitral valve and mechanical aortic valve.  Secondary Diagnosis:	Atrial fibrillation  Goal:	Manage and prevent complications  Assessment and plan of treatment:	Echocardiogram revealed ejection fracture of 69% and severe pulmonary HTN. Dr. Hunter, cardiologist, is following. Continue metoprolol for rate control. Patient currently off anticoagulation secondary to active bleed. If no plan for surgical intervention, please consider risks vs. benefits of restarting anticoagulation. Principal Discharge DX:	Colonic mass  Goal:	Manage and prevent complications  Assessment and plan of treatment:	Patient presented with melena (dark tarry stools) and hemoglobin of 4.8 on outpatient labs, which progressed to hematochezia during her hospital course. Anemia was treated with transfusions of packed red blood cells for goal hemoglobin > 8. Patient has a past medical history of transitional cell carcinoma s/p L nephrectomy and is followed by Dr. Alvarenga. Patient was seen and examined by GI, who performed an EGD and colonoscopy revealing a 5cm fungating friable mass in the descending colon. They were unable to perform any endoscopic intervention or biopsy the lesion. Per general surgery team, the mass is metastatic and invasive, and not amenable to resection. Per interventional radiology team, embolization for active lower GI bleed is not possible at this time due to the vascular supply of the lesion. Per radiation-oncology, radiation therapy is not an option as fistulization is highly likely. Per oncology, chemotherapy is not safe to initiate at this time. Patient is being transferred to [facility] for second surgical opinion. She will require close monitoring of her hemoglobin and hemodynamic status. She is currently off anticoagulation secondary to active lower GI bleed and on a proton pump inhibitor 2x/day.  Secondary Diagnosis:	Hypertension  Goal:	Manage and prevent complications  Assessment and plan of treatment:	Monitor blood pressure and adjust medications as necessary.  Secondary Diagnosis:	Diabetes  Goal:	Manage and prevent complications  Assessment and plan of treatment:	Monitor fingersticks and maintain blood glucose 100 - 180. A1C 6.0. Sliding scale insulin as needed. Carbohydrate consistent diet.  Secondary Diagnosis:	Cardiac arrest  Goal:	Monitor  Assessment and plan of treatment:	During this admission, patient developed supraventricular tachycardia which was unresponsive to two pushes of adenosine. After amiodarone administration, patient became pulseless and unresponsive and CODE BLUE was called, with two cycles of chest compressions prior to ROSC. It was questionable whether this was a true cardiac arrest vs. profound hypotension.  Secondary Diagnosis:	Rheumatic fever  Goal:	Manage and prevent complications  Assessment and plan of treatment:	Patient has a past medical history of rheumatic heart disease with a bioprosthetic mitral valve and mechanical aortic valve. Will need to revisit the topic of anticoagulation; as of now patient does not want it due to bleeding risk.  Secondary Diagnosis:	Atrial fibrillation  Goal:	Manage and prevent complications  Assessment and plan of treatment:	Echocardiogram revealed ejection fracture of 69% and severe pulmonary HTN. Dr. Hunter, cardiologist, was following at this institution. Continue metoprolol for rate control. Patient currently off anticoagulation secondary to active bleed. If no plan for surgical intervention, please consider risks vs. benefits of restarting anticoagulation.

## 2019-01-15 NOTE — PROGRESS NOTE ADULT - ASSESSMENT
ASSESSMENT/PLAN:  78 year old female with PMH of HTN, type II DM, A-fib on coumadin, h/o bradycardia s/p pacemaker, rheumatic heart disease s/p bioprosthetic mitral valve replacement, s/p metallic aortic valve replacement, and HFpEF, history of RLE DVT 25 year sago, Left nephrectomy, and osteoporosis, presents to the hospital with acute Lower GI bleeding, found to have a fungating sigmoid colon mass on colonoscopy w additional findings of regional mesenteric LAD on subsequent CT imaging.    #symptomatic anemia, acute blood loss anemia 2/2 GI bleed   s/p recent transfusion PRBCs  GI performed colonoscopy-< from: Colonoscopy (01.10.19 @ 10:00) >  excavated lesions Multiple non-bleeding diverticula with medium openings were  seen in the sigmoid colon.   Protruding lesions Medium non-bleeding internal hemorrhoids were noted.   An ulcerated and fungating bleeding 5 cm mass of malignant appearance was found  in the mid-descending colon. The mass caused a partial obstruction. The scope  traversed the lesion.   Additional findings Two cc of 1:10,000 epinephrine was injected. An attempt to  place a endo-loop was unsuccessful. A clip was placed proximal and distal to the  lesion. Claudia ink was injected distal to the lesion (7cc)..      CT ABD-< from: CT Abdomen and Pelvis w/ Oral Cont and w/ IV Cont (01.11.19 @ 17:20) >  6.8 cm sigmoid heterogeneous colonic mass compatible with a carcinoma.   Regional mesenteric lymph nodes in the left lower quadrant measure up to   1.8 cm, which are new compared to 10/31/2018.` No obvious distant   metastases.       Patient seen by surgery-- who feels that mass is unresectable.  Seen by Heme/Onc who agrees that palliative care would be better approach.    family disagrees and insists on wanting to transfer the patient into a [facility] tertiary care hospital ([place]) for second opinion   IV heparin gtt stopped since patient keeps rebleeding on this medication  pt remains at risk for CVA given comorbidities while off anticoag, and this has been explained to the family  adv diet for comfort as corin    #AF w tachyarrhythmia and bioprosthetic MV and metallic AV with h/o CVA  -coumadin on hold  -heparin gtt on hold d/t repeated recurrent LGI bleeding  on metoprolol   echo with EF 69% and severe pulmonary HTN. EPS following. Records from [facility] to be faxed. Continue metoprolol     -#TCC-- s/p resection in [facility]  on 6/1/2018, and reresection on 10/29/2018   -- oncology follow up after Ct Scan abd.  thinks she can get chemotherapy.     Oncology has mentioned that radiation not an option as could lead to fistula formation.    DVT PPX: would use SCDs  GI PPX: on IV PPI  CODE STATUS: FULL  DISPO:  CCU team & Cardiologist planning to transfer pt into [facility] for second opinion / further evaluation at pt/family request; pt is clinically stable for transfer ASSESSMENT/PLAN:  78 year old female with PMH of HTN, type II DM, A-fib on coumadin, h/o bradycardia s/p pacemaker, rheumatic heart disease s/p bioprosthetic mitral valve replacement, s/p metallic aortic valve replacement, and HFpEF, history of RLE DVT 25 year sago, Left nephrectomy, and osteoporosis, presents to the hospital with acute Lower GI bleeding, found to have a fungating sigmoid colon mass on colonoscopy w additional findings of regional mesenteric LAD on subsequent CT imaging.    #symptomatic anemia, acute blood loss anemia 2/2 GI bleed   s/p recent transfusion PRBCs  GI performed colonoscopy-< from: Colonoscopy (01.10.19 @ 10:00) >  excavated lesions Multiple non-bleeding diverticula with medium openings were  seen in the sigmoid colon.   Protruding lesions Medium non-bleeding internal hemorrhoids were noted.   An ulcerated and fungating bleeding 5 cm mass of malignant appearance was found  in the mid-descending colon. The mass caused a partial obstruction. The scope  traversed the lesion.   Additional findings Two cc of 1:10,000 epinephrine was injected. An attempt to  place a endo-loop was unsuccessful. A clip was placed proximal and distal to the  lesion. Claudia ink was injected distal to the lesion (7cc)..      CT ABD-< from: CT Abdomen and Pelvis w/ Oral Cont and w/ IV Cont (01.11.19 @ 17:20) >  6.8 cm sigmoid heterogeneous colonic mass compatible with a carcinoma.   Regional mesenteric lymph nodes in the left lower quadrant measure up to   1.8 cm, which are new compared to 10/31/2018.` No obvious distant   metastases.       Patient seen by surgery-- who feels that mass is unresectable.  Seen by Heme/Onc who agrees that palliative care would be better approach.    family disagrees and insists on wanting to transfer the patient into a [facility] tertiary care hospital ([place]) for second opinion   IV heparin gtt stopped since patient keeps rebleeding on this medication  pt remains at risk for CVA given comorbidities while off anticoag, and this has been explained to the family  adv diet for comfort as corin    #AF w tachyarrhythmia and bioprosthetic MV and metallic AV with h/o CVA  -coumadin on hold  -heparin gtt on hold d/t repeated recurrent LGI bleeding  on metoprolol   echo with EF 69% and severe pulmonary HTN. EPS following. Records from [facility] to be faxed. Continue metoprolol     -#TCC-- s/p resection in [facility]  on 6/1/2018, and reresection on 10/29/2018   -- oncology follow up after Ct Scan abd.  thinks she can get chemotherapy.     Oncology has mentioned that radiation not an option as could lead to fistula formation.    # Suspect vitamin D deficiency  -patient taking cholecalciferol daily    DVT PPX: would use SCDs  GI PPX: on IV PPI  CODE STATUS: FULL  DISPO:  CCU team & Cardiologist planning to transfer pt into [facility] for second opinion / further evaluation at pt/family request; pt is clinically stable for transfer

## 2019-01-16 VITALS
TEMPERATURE: 98 F | DIASTOLIC BLOOD PRESSURE: 60 MMHG | SYSTOLIC BLOOD PRESSURE: 126 MMHG | HEART RATE: 80 BPM | RESPIRATION RATE: 18 BRPM

## 2019-01-16 LAB
ANION GAP SERPL CALC-SCNC: 11 MMOL/L — SIGNIFICANT CHANGE UP (ref 7–14)
BUN SERPL-MCNC: 17 MG/DL — SIGNIFICANT CHANGE UP (ref 10–20)
CALCIUM SERPL-MCNC: 8.7 MG/DL — SIGNIFICANT CHANGE UP (ref 8.5–10.1)
CHLORIDE SERPL-SCNC: 100 MMOL/L — SIGNIFICANT CHANGE UP (ref 98–110)
CO2 SERPL-SCNC: 27 MMOL/L — SIGNIFICANT CHANGE UP (ref 17–32)
CREAT SERPL-MCNC: 1.2 MG/DL — SIGNIFICANT CHANGE UP (ref 0.7–1.5)
GLUCOSE BLDC GLUCOMTR-MCNC: 163 MG/DL — HIGH (ref 70–99)
GLUCOSE BLDC GLUCOMTR-MCNC: 173 MG/DL — HIGH (ref 70–99)
GLUCOSE BLDC GLUCOMTR-MCNC: 181 MG/DL — HIGH (ref 70–99)
GLUCOSE SERPL-MCNC: 157 MG/DL — HIGH (ref 70–99)
HCT VFR BLD CALC: 26.9 % — LOW (ref 37–47)
HCT VFR BLD CALC: 27.2 % — LOW (ref 37–47)
HGB BLD-MCNC: 8.2 G/DL — LOW (ref 12–16)
HGB BLD-MCNC: 8.4 G/DL — LOW (ref 12–16)
MCHC RBC-ENTMCNC: 25.5 PG — LOW (ref 27–31)
MCHC RBC-ENTMCNC: 25.8 PG — LOW (ref 27–31)
MCHC RBC-ENTMCNC: 30.5 G/DL — LOW (ref 32–37)
MCHC RBC-ENTMCNC: 30.9 G/DL — LOW (ref 32–37)
MCV RBC AUTO: 83.4 FL — SIGNIFICANT CHANGE UP (ref 81–99)
MCV RBC AUTO: 83.8 FL — SIGNIFICANT CHANGE UP (ref 81–99)
NRBC # BLD: 0 /100 WBCS — SIGNIFICANT CHANGE UP (ref 0–0)
NRBC # BLD: 0 /100 WBCS — SIGNIFICANT CHANGE UP (ref 0–0)
PLATELET # BLD AUTO: 222 K/UL — SIGNIFICANT CHANGE UP (ref 130–400)
PLATELET # BLD AUTO: 246 K/UL — SIGNIFICANT CHANGE UP (ref 130–400)
POTASSIUM SERPL-MCNC: 5.2 MMOL/L — HIGH (ref 3.5–5)
POTASSIUM SERPL-SCNC: 5.2 MMOL/L — HIGH (ref 3.5–5)
RBC # BLD: 3.21 M/UL — LOW (ref 4.2–5.4)
RBC # BLD: 3.26 M/UL — LOW (ref 4.2–5.4)
RBC # FLD: 20.5 % — HIGH (ref 11.5–14.5)
RBC # FLD: 20.5 % — HIGH (ref 11.5–14.5)
SODIUM SERPL-SCNC: 138 MMOL/L — SIGNIFICANT CHANGE UP (ref 135–146)
WBC # BLD: 7.98 K/UL — SIGNIFICANT CHANGE UP (ref 4.8–10.8)
WBC # BLD: 8.95 K/UL — SIGNIFICANT CHANGE UP (ref 4.8–10.8)
WBC # FLD AUTO: 7.98 K/UL — SIGNIFICANT CHANGE UP (ref 4.8–10.8)
WBC # FLD AUTO: 8.95 K/UL — SIGNIFICANT CHANGE UP (ref 4.8–10.8)

## 2019-01-16 RX ORDER — ONDANSETRON 8 MG/1
4 TABLET, FILM COATED ORAL ONCE
Qty: 0 | Refills: 0 | Status: COMPLETED | OUTPATIENT
Start: 2019-01-16 | End: 2019-01-16

## 2019-01-16 RX ADMIN — MORPHINE SULFATE 2 MILLIGRAM(S): 50 CAPSULE, EXTENDED RELEASE ORAL at 06:21

## 2019-01-16 RX ADMIN — Medication 2000 UNIT(S): at 11:59

## 2019-01-16 RX ADMIN — MORPHINE SULFATE 2 MILLIGRAM(S): 50 CAPSULE, EXTENDED RELEASE ORAL at 17:25

## 2019-01-16 RX ADMIN — Medication 100 MILLIGRAM(S): at 06:21

## 2019-01-16 RX ADMIN — MORPHINE SULFATE 2 MILLIGRAM(S): 50 CAPSULE, EXTENDED RELEASE ORAL at 06:00

## 2019-01-16 RX ADMIN — CHLORHEXIDINE GLUCONATE 1 APPLICATION(S): 213 SOLUTION TOPICAL at 07:15

## 2019-01-16 RX ADMIN — Medication 100 MILLIGRAM(S): at 17:50

## 2019-01-16 RX ADMIN — PANTOPRAZOLE SODIUM 40 MILLIGRAM(S): 20 TABLET, DELAYED RELEASE ORAL at 06:21

## 2019-01-16 RX ADMIN — ONDANSETRON 4 MILLIGRAM(S): 8 TABLET, FILM COATED ORAL at 17:49

## 2019-01-16 RX ADMIN — Medication 30 MILLILITER(S): at 11:58

## 2019-01-16 RX ADMIN — PANTOPRAZOLE SODIUM 40 MILLIGRAM(S): 20 TABLET, DELAYED RELEASE ORAL at 17:26

## 2019-01-16 RX ADMIN — Medication 1: at 08:29

## 2019-01-16 RX ADMIN — MORPHINE SULFATE 2 MILLIGRAM(S): 50 CAPSULE, EXTENDED RELEASE ORAL at 18:57

## 2019-01-16 RX ADMIN — Medication 1 MILLIGRAM(S): at 11:59

## 2019-01-16 NOTE — PROGRESS NOTE ADULT - ASSESSMENT
AUSTIN DAVIS 78y Female  MRN#: [id number]   CODE STATUS:       SUBJECTIVE    Patient is a 78y old Female who presents with a chief complaint of Lower GI bleed (15 Jim 2019 13:48)  Currently admitted to medicine with the primary diagnosis of Colonic mass  Hospital course has been complicated by .     Interval History: Today is hospital day 14d. Overnight . Today .    Present Today:           Ricketts Catheter ()No/ ()Yes? Indication:          Central Line ()No/ ()Yes? Indication:          IV Fluids ()No/ ()Yes? Type:  Rate:  Indication:    HPI:   HPI:  78 year old female with PMH of HTN, type II DM, A-fib on coumadin, h/o bradycardia s/p pacemaker, rheumatic heart disease s/p bioprosthetic mitral valve replacement, s/p metallic aortic valve replacement, and HFpEF, history of RLE DVT 25 year sago, Left nephrectomy, and osteoporosis, presents to the ED for dark black stools since 3 days. As per patient and son at bedside she has dark black stools since 3 days, not painful, last bowel movement last night, no bright red blood, no hematemesis, no dysphagia, buit admits to weight loss of 30 pounds in last year, loss of appetite and chronic intermittent abdominal pain, diffuse, no aggravating factors and relieving factor 3-4/10 in intensity. Also has c/o feeling weak, pale, light headedness. She receives frequent transfusions for microcytic anemia, last transfusion was three weeks ago follows up with Dr. Alvarenga she went to see Dr. Alvarenga blood work showed Hb of 4.8 referred to hospital.  Denies fever, chills, nausea, vomiting, dysuria,   pt was recently in hospital for left leg cellulitis; reports trace edema to left knee without pain.  She checks her INR regularly at home goal INR as per their cardiologist is 2-3. She didn;t receive coumadin today, last dose was yesterday 2.5mg   Last endoscopy and colonoscopy was done 5 years ago as per patient was normal.    vitals in ED: 108/51, 94, 98.5, 18, 96% on room air, Labs Hb 4.8(Baseline 7-8), INR 3.29, creatinine is 1.9 (baseline is 2 from may 2018,in april it was 1.1), chest xray was fine, s/p 2 units PRBC (02 Jan 2019 21:11)    Hospital Course:       PAST MEDICAL & SURGICAL HISTORY  CVA (cerebral vascular accident)  Anemia  Rheumatic fever  Diabetes  Osteoporosis  Mitral valve replaced  Atrial fibrillation: on coumadin at home  Hypertension  History of ureter stent  S/P AVR  H/O mitral valve replacement      ALLERGIES:  No Known Drug Allergies  shellfish (Unknown)      MEDICATIONS:  STANDING MEDICATIONS  atorvastatin 10 milliGRAM(s) Oral at bedtime  chlorhexidine 4% Liquid 1 Application(s) Topical <User Schedule>  cholecalciferol 2000 Unit(s) Oral daily  dextrose 5%. 1000 milliLiter(s) IV Continuous <Continuous>  dextrose 50% Injectable 12.5 Gram(s) IV Push once  dextrose 50% Injectable 25 Gram(s) IV Push once  dextrose 50% Injectable 25 Gram(s) IV Push once  folic acid 1 milliGRAM(s) Oral daily  insulin lispro (HumaLOG) corrective regimen sliding scale   SubCutaneous three times a day before meals  metoprolol tartrate 100 milliGRAM(s) Oral every 12 hours  pantoprazole  Injectable 40 milliGRAM(s) IV Push two times a day    PRN MEDICATIONS  aluminum hydroxide/magnesium hydroxide/simethicone Suspension 30 milliLiter(s) Oral every 4 hours PRN  dextrose 40% Gel 15 Gram(s) Oral once PRN  glucagon  Injectable 1 milliGRAM(s) IntraMuscular once PRN  morphine  - Injectable 2 milliGRAM(s) IV Push every 6 hours PRN          OBJECTIVE    VITAL SIGNS: Last 24 Hours  T(C): 36.9 (16 Jan 2019 14:12), Max: 37.1 (15 Jim 2019 20:00)  T(F): 98.4 (16 Jan 2019 14:12), Max: 98.8 (15 Jim 2019 20:00)  HR: 81 (16 Jan 2019 17:24) (80 - 85)  BP: 136/62 (16 Jan 2019 17:24) (118/68 - 139/64)  BP(mean): 102 (15 Jim 2019 22:00) (90 - 102)  RR: 18 (16 Jan 2019 14:12) (18 - 24)  SpO2: 96% (16 Jan 2019 08:31) (88% - 100%)      PHYSICAL EXAM:    GENERAL: NAD, well-developed, AAOx3  HEENT:  Atraumatic, Normocephalic. EOMI, PERRLA, conjunctiva and sclera clear, No JVD  PULMONARY: Clear to auscultation bilaterally; No wheeze  CARDIOVASCULAR: Regular rate and rhythm; No murmurs, rubs, or gallops  GASTROINTESTINAL: Soft, Nontender, Nondistended; Bowel sounds present  MUSCULOSKELETAL:  2+ Peripheral Pulses, No clubbing, cyanosis, or edema  NEUROLOGY: non-focal  SKIN: No rashes or lesions      LABS:                        8.4    7.98  )-----------( 246      ( 16 Jan 2019 06:21 )             27.2     01-16    138  |  100  |  17  ----------------------------<  157<H>  5.2<H>   |  27  |  1.2    Ca    8.7      16 Jan 2019 06:21  Mg     1.9     01-15                        RADIOLOGY:        ASSESSMENT AND PLAN    Patient is a 78y old Female who presents with a chief complaint of Lower GI bleed (15 Jim 2019 13:48)  Currently admitted to medicine with the primary diagnosis of Colonic mass  Hospital course has been complicated by .     LOWER GI BLEED HEMORRHOIDS ANEMIA    CVA (cerebral vascular accident)  Anemia  Rheumatic fever  Diabetes  Osteoporosis  Mitral valve replaced  Atrial fibrillation: on coumadin at home  Hypertension    DVT Prophylaxis    GI Prophylaxis      Prognosis:    Disposition:

## 2019-01-16 NOTE — PROGRESS NOTE ADULT - ASSESSMENT
1.	Acute on chronic blood loss anemia  2.	Sigmoid colon mass (colonoscopy findings)  3.	Extensive valvulopathy (rheumatic heart disease/prosthertic)  4.	H/o TCC s/p nephrectomy  5.	AFib on anticoagulant therapy   6.	Suspected Vit D deficiency 1.	Acute on chronic blood loss anemia  2.	Sigmoid colon mass (colonoscopy findings)  3.	Extensive valvulopathy (rheumatic heart disease/prosthertic)  4.	H/o TCC s/p nephrectomy  5.	AFib, not on anticoagulant therapy   6.	Suspected Vit D deficiency 1.	Acute on chronic blood loss anemia, being followed up; stable.  2.	Epigastric pain, likely dyspepsia. On therapy   3.	Sigmoid colon mass (colonoscopy findings), highly suspicious for malignancy. As per evaluations by our hem/onc and surgical team: No benefit from surgical exicison or chemotherapy. patient and her family members are seeking to be transferred to [facility]  4.	Extensive valvulopathy (rheumatic heart disease/prosthertic)  5.	H/o TCC s/p nephrectomy  6.	AFib, not on anticoagulant therapy   7.	Suspected Vit D deficiency on therapy     P L A N   ·	Change Proton Pump Inhibitor to PO twice daily  ·	F/U Hb/Ht  ·	As per [facility] ([phone]), awaiting bed availability for transfer (case already accepted by surgical oncology)   ·	NOT safe for anticoagulant at this time (bleeding mass)    Patient remains with poor prognosis overall despite all care. Patient and family members made aware regarding all above after numerous lengthy discussions with multiple subspecialists involved.    Will arrange for Goals of Care Conversation once the patient's immediate family members are gathered. However, it was made clear to the medical team that patient and family members are seeking aggressive surgical care elsewhere (placed a transfer request to [facility]).    I discussed all above with patient and family member (spouse) at bedside at length, all questions answered.   Case discussed with resident assigned.

## 2019-01-16 NOTE — PROGRESS NOTE ADULT - PROVIDER SPECIALTY LIST ADULT
CCU
CCU
Cardiology
Critical Care
Gastroenterology
Gastroenterology
Heme/Onc
Hospitalist
Internal Medicine
Intervent Radiology
Intervent Radiology
Pulmonology
Rad Onc
Surgery
Surgery
Critical Care
Gastroenterology
Heme/Onc
Heme/Onc
Hospitalist

## 2019-01-16 NOTE — PROGRESS NOTE ADULT - SUBJECTIVE AND OBJECTIVE BOX
AUSTIN DAVIS  78y, Female  Allergy: No Known Drug Allergies  shellfish (Unknown)    Hospital Day: 14d    Patient seen and examined earlier today.     PMH/PSH:  PAST MEDICAL & SURGICAL HISTORY:  CVA (cerebral vascular accident)  Anemia  Rheumatic fever  Diabetes  Osteoporosis  Mitral valve replaced  Atrial fibrillation: on coumadin at home  Hypertension  History of ureter stent  S/P AVR  H/O mitral valve replacement      LAST 24-Hr EVENTS:  Process of transfer to [place] started based on patient's request  Downgraded to medical floor     VITALS:  T(F): 97.8 (01-16-19 @ 05:36), Max: 99.1 (01-15-19 @ 12:00)  HR: 85 (01-16-19 @ 08:01)  BP: 130/60 (01-16-19 @ 05:36) (117/59 - 139/64)  RR: 18 (01-16-19 @ 05:36)  SpO2: 96% (01-16-19 @ 08:31)    TESTS & MEASUREMENTS:    01-14-19 @ 07:01  -  01-15-19 @ 07:00  --------------------------------------------------------  IN: 638 mL / OUT: 1350 mL / NET: -712 mL    01-15-19 @ 07:01  -  01-16-19 @ 07:00  --------------------------------------------------------  IN: 613 mL / OUT: 600 mL / NET: 13 mL                            8.4    7.98  )-----------( 246      ( 16 Jan 2019 06:21 )             27.2   Hemoglobin Trend:  Hemoglobin: 8.4 g/dL (01-16 @ 06:21)  Hemoglobin: 8.2 g/dL (01-16 @ 00:37)  Hemoglobin: 8.4 g/dL (01-15 @ 05:52)      01-16    138  |  100  |  17  ----------------------------<  157<H>  5.2<H>   |  27  |  1.2    Ca    8.7      16 Jan 2019 06:21  Mg     1.9     01-15      Creatinine Trend:  1.2 (01-16 @ 06:21)    1.1 (01-15 @ 05:52)    1.2 (01-14 @ 04:32)    1.4 (01-13 @ 04:14)    1.4 (01-12 @ 04:20)      RADIOLOGY & ADDITIONAL TESTS:  Essentially unchanged small bilateral pleural effusions and prominent   pulmonary vasculature.    < from: CT Abdomen and Pelvis w/ Oral Cont and w/ IV Cont (01.11.19 @ 17:20) >  6.8 cm sigmoid heterogeneous colonic mass compatible with a carcinoma.   Regional mesenteric lymph nodes in the left lower quadrant measure up to   1.8 cm, which are new compared to 10/31/2018.` No obvious distant   metastases.      MEDICATIONS:  MEDICATIONS  (STANDING):  atorvastatin 10 milliGRAM(s) Oral at bedtime  chlorhexidine 4% Liquid 1 Application(s) Topical <User Schedule>  cholecalciferol 2000 Unit(s) Oral daily  dextrose 5%. 1000 milliLiter(s) (50 mL/Hr) IV Continuous <Continuous>  dextrose 50% Injectable 12.5 Gram(s) IV Push once  dextrose 50% Injectable 25 Gram(s) IV Push once  dextrose 50% Injectable 25 Gram(s) IV Push once  folic acid 1 milliGRAM(s) Oral daily  insulin lispro (HumaLOG) corrective regimen sliding scale   SubCutaneous three times a day before meals  metoprolol tartrate 100 milliGRAM(s) Oral every 12 hours  pantoprazole  Injectable 40 milliGRAM(s) IV Push two times a day    MEDICATIONS  (PRN):  dextrose 40% Gel 15 Gram(s) Oral once PRN Blood Glucose LESS THAN 70 milliGRAM(s)/deciliter  glucagon  Injectable 1 milliGRAM(s) IntraMuscular once PRN Glucose LESS THAN 70 milligrams/deciliter  morphine  - Injectable 2 milliGRAM(s) IV Push every 6 hours PRN Moderate Pain (4 - 6)      HOME MEDICATIONS:  atorvastatin 10 mg oral tablet (01-15)  cholecalciferol oral tablet (01-15)  folic acid 1 mg oral tablet (01-15)  metoprolol tartrate 100 mg oral tablet (01-15)  pantoprazole 40 mg intravenous injection (01-15)      PHYSICAL EXAM:  GENERAL: A&O x3,  in NAD/P,   NECK: No Swelling  CHEST/LUNG: Good air entry, No wheezing  HEART: RRR, No murmurs  ABDOMEN: Soft, Bowel sounds present  EXTREMITIES:  No clubbing, AUSTIN DAVIS  78y, Female  Allergy: No Known Drug Allergies  shellfish (Unknown)    Hospital Day: 14d    Patient seen and examined earlier today.     PMH/PSH:  PAST MEDICAL & SURGICAL HISTORY:  CVA (cerebral vascular accident)  Anemia  Rheumatic fever  Diabetes  Osteoporosis  Mitral valve replaced  Atrial fibrillation: on coumadin at home  Hypertension  History of ureter stent  S/P AVR  H/O mitral valve replacement      LAST 24-Hr EVENTS:  Process of transfer to [place] started based on patient's request  Downgraded to medical floor     VITALS:  T(F): 97.8 (01-16-19 @ 05:36), Max: 99.1 (01-15-19 @ 12:00)  HR: 85 (01-16-19 @ 08:01)  BP: 130/60 (01-16-19 @ 05:36) (117/59 - 139/64)  RR: 18 (01-16-19 @ 05:36)  SpO2: 96% (01-16-19 @ 08:31)    TESTS & MEASUREMENTS:    01-14-19 @ 07:01  -  01-15-19 @ 07:00  --------------------------------------------------------  IN: 638 mL / OUT: 1350 mL / NET: -712 mL    01-15-19 @ 07:01  -  01-16-19 @ 07:00  --------------------------------------------------------  IN: 613 mL / OUT: 600 mL / NET: 13 mL                            8.4    7.98  )-----------( 246      ( 16 Jan 2019 06:21 )             27.2   Hemoglobin Trend:  Hemoglobin: 8.4 g/dL (01-16 @ 06:21)  Hemoglobin: 8.2 g/dL (01-16 @ 00:37)  Hemoglobin: 8.4 g/dL (01-15 @ 05:52)      01-16    138  |  100  |  17  ----------------------------<  157<H>  5.2<H>   |  27  |  1.2    Ca    8.7      16 Jan 2019 06:21  Mg     1.9     01-15      Creatinine Trend:  1.2 (01-16 @ 06:21)    1.1 (01-15 @ 05:52)    1.2 (01-14 @ 04:32)    1.4 (01-13 @ 04:14)    1.4 (01-12 @ 04:20)      RADIOLOGY & ADDITIONAL TESTS:  CXR:  Essentially unchanged small bilateral pleural effusions and prominent   pulmonary vasculature.    < from: CT Abdomen and Pelvis w/ Oral Cont and w/ IV Cont (01.11.19 @ 17:20) >  6.8 cm sigmoid heterogeneous colonic mass compatible with a carcinoma.   Regional mesenteric lymph nodes in the left lower quadrant measure up to   1.8 cm, which are new compared to 10/31/2018.` No obvious distant   metastases.      MEDICATIONS:  MEDICATIONS  (STANDING):  atorvastatin 10 milliGRAM(s) Oral at bedtime  chlorhexidine 4% Liquid 1 Application(s) Topical <User Schedule>  cholecalciferol 2000 Unit(s) Oral daily  dextrose 5%. 1000 milliLiter(s) (50 mL/Hr) IV Continuous <Continuous>  dextrose 50% Injectable 12.5 Gram(s) IV Push once  dextrose 50% Injectable 25 Gram(s) IV Push once  dextrose 50% Injectable 25 Gram(s) IV Push once  folic acid 1 milliGRAM(s) Oral daily  insulin lispro (HumaLOG) corrective regimen sliding scale   SubCutaneous three times a day before meals  metoprolol tartrate 100 milliGRAM(s) Oral every 12 hours  pantoprazole  Injectable 40 milliGRAM(s) IV Push two times a day    MEDICATIONS  (PRN):  dextrose 40% Gel 15 Gram(s) Oral once PRN Blood Glucose LESS THAN 70 milliGRAM(s)/deciliter  glucagon  Injectable 1 milliGRAM(s) IntraMuscular once PRN Glucose LESS THAN 70 milligrams/deciliter  morphine  - Injectable 2 milliGRAM(s) IV Push every 6 hours PRN Moderate Pain (4 - 6)      HOME MEDICATIONS:  atorvastatin 10 mg oral tablet (01-15)  cholecalciferol oral tablet (01-15)  folic acid 1 mg oral tablet (01-15)  metoprolol tartrate 100 mg oral tablet (01-15)  pantoprazole 40 mg intravenous injection (01-15)      PHYSICAL EXAM:  GENERAL: A&O x3,  in NAD/P,   NECK: No Swelling  CHEST/LUNG: Good air entry, No wheezing  HEART: RRR, No murmurs  ABDOMEN: Soft, Bowel sounds present  EXTREMITIES:  No clubbing, AUSTIN DAVIS  78y, Female  Allergy: No Known Drug Allergies  shellfish (Unknown)    Hospital Day: 14d    Patient seen and examined earlier today.   Spouse at bedside       LAST 24-Hr EVENTS:  Process of transfer to [place] started based on patient's request  Downgraded to medical floor   C/O this AM of mild postprandial epigastric pain    VITALS:  T(F): 97.8 (01-16-19 @ 05:36), Max: 99.1 (01-15-19 @ 12:00)  HR: 85 (01-16-19 @ 08:01)  BP: 130/60 (01-16-19 @ 05:36) (117/59 - 139/64)  RR: 18 (01-16-19 @ 05:36)  SpO2: 96% (01-16-19 @ 08:31)    TESTS & MEASUREMENTS:    01-14-19 @ 07:01  -  01-15-19 @ 07:00  --------------------------------------------------------  IN: 638 mL / OUT: 1350 mL / NET: -712 mL    01-15-19 @ 07:01  -  01-16-19 @ 07:00  --------------------------------------------------------  IN: 613 mL / OUT: 600 mL / NET: 13 mL                            8.4    7.98  )-----------( 246      ( 16 Jan 2019 06:21 )             27.2   Hemoglobin Trend:  Hemoglobin: 8.4 g/dL (01-16 @ 06:21)  Hemoglobin: 8.2 g/dL (01-16 @ 00:37)  Hemoglobin: 8.4 g/dL (01-15 @ 05:52)      01-16    138  |  100  |  17  ----------------------------<  157<H>  5.2<H>   |  27  |  1.2    Ca    8.7      16 Jan 2019 06:21  Mg     1.9     01-15      Creatinine Trend:  1.2 (01-16 @ 06:21)    1.1 (01-15 @ 05:52)    1.2 (01-14 @ 04:32)    1.4 (01-13 @ 04:14)    1.4 (01-12 @ 04:20)      RADIOLOGY & ADDITIONAL TESTS:  CXR:  Essentially unchanged small bilateral pleural effusions and prominent   pulmonary vasculature.    < from: CT Abdomen and Pelvis w/ Oral Cont and w/ IV Cont (01.11.19 @ 17:20) >  6.8 cm sigmoid heterogeneous colonic mass compatible with a carcinoma.   Regional mesenteric lymph nodes in the left lower quadrant measure up to   1.8 cm, which are new compared to 10/31/2018.` No obvious distant   metastases.      MEDICATIONS:  MEDICATIONS  (STANDING):  atorvastatin 10 milliGRAM(s) Oral at bedtime  chlorhexidine 4% Liquid 1 Application(s) Topical <User Schedule>  cholecalciferol 2000 Unit(s) Oral daily  dextrose 5%. 1000 milliLiter(s) (50 mL/Hr) IV Continuous <Continuous>  dextrose 50% Injectable 12.5 Gram(s) IV Push once  dextrose 50% Injectable 25 Gram(s) IV Push once  dextrose 50% Injectable 25 Gram(s) IV Push once  folic acid 1 milliGRAM(s) Oral daily  insulin lispro (HumaLOG) corrective regimen sliding scale   SubCutaneous three times a day before meals  metoprolol tartrate 100 milliGRAM(s) Oral every 12 hours  pantoprazole  Injectable 40 milliGRAM(s) IV Push two times a day    MEDICATIONS  (PRN):  dextrose 40% Gel 15 Gram(s) Oral once PRN Blood Glucose LESS THAN 70 milliGRAM(s)/deciliter  glucagon  Injectable 1 milliGRAM(s) IntraMuscular once PRN Glucose LESS THAN 70 milligrams/deciliter  morphine  - Injectable 2 milliGRAM(s) IV Push every 6 hours PRN Moderate Pain (4 - 6)      HOME MEDICATIONS:  atorvastatin 10 mg oral tablet (01-15)  cholecalciferol oral tablet (01-15)  folic acid 1 mg oral tablet (01-15)  metoprolol tartrate 100 mg oral tablet (01-15)  pantoprazole 40 mg intravenous injection (01-15)      PHYSICAL EXAM:  GENERAL: A&O x3,  in NAD/P, sitting in chair  NECK: No Swelling  CHEST/LUNG: Good air entry, No wheezing  HEART: RRR, No murmurs  ABDOMEN: Soft, Bowel sounds present  EXTREMITIES:  No clubbing,

## 2019-01-18 DIAGNOSIS — K57.30 DIVERTICULOSIS OF LARGE INTESTINE WITHOUT PERFORATION OR ABSCESS WITHOUT BLEEDING: ICD-10-CM

## 2019-01-18 DIAGNOSIS — I27.20 PULMONARY HYPERTENSION, UNSPECIFIED: ICD-10-CM

## 2019-01-18 DIAGNOSIS — K29.70 GASTRITIS, UNSPECIFIED, WITHOUT BLEEDING: ICD-10-CM

## 2019-01-18 DIAGNOSIS — I87.1 COMPRESSION OF VEIN: ICD-10-CM

## 2019-01-18 DIAGNOSIS — R19.5 OTHER FECAL ABNORMALITIES: ICD-10-CM

## 2019-01-18 DIAGNOSIS — Z91.013 ALLERGY TO SEAFOOD: ICD-10-CM

## 2019-01-18 DIAGNOSIS — R00.1 BRADYCARDIA, UNSPECIFIED: ICD-10-CM

## 2019-01-18 DIAGNOSIS — R60.0 LOCALIZED EDEMA: ICD-10-CM

## 2019-01-18 DIAGNOSIS — I47.1 SUPRAVENTRICULAR TACHYCARDIA: ICD-10-CM

## 2019-01-18 DIAGNOSIS — I50.32 CHRONIC DIASTOLIC (CONGESTIVE) HEART FAILURE: ICD-10-CM

## 2019-01-18 DIAGNOSIS — I09.9 RHEUMATIC HEART DISEASE, UNSPECIFIED: ICD-10-CM

## 2019-01-18 DIAGNOSIS — E43 UNSPECIFIED SEVERE PROTEIN-CALORIE MALNUTRITION: ICD-10-CM

## 2019-01-18 DIAGNOSIS — N17.9 ACUTE KIDNEY FAILURE, UNSPECIFIED: ICD-10-CM

## 2019-01-18 DIAGNOSIS — D56.9 THALASSEMIA, UNSPECIFIED: ICD-10-CM

## 2019-01-18 DIAGNOSIS — C78.5 SECONDARY MALIGNANT NEOPLASM OF LARGE INTESTINE AND RECTUM: ICD-10-CM

## 2019-01-18 DIAGNOSIS — I48.91 UNSPECIFIED ATRIAL FIBRILLATION: ICD-10-CM

## 2019-01-18 DIAGNOSIS — M81.0 AGE-RELATED OSTEOPOROSIS WITHOUT CURRENT PATHOLOGICAL FRACTURE: ICD-10-CM

## 2019-01-18 DIAGNOSIS — D68.32 HEMORRHAGIC DISORDER DUE TO EXTRINSIC CIRCULATING ANTICOAGULANTS: ICD-10-CM

## 2019-01-18 DIAGNOSIS — I12.9 HYPERTENSIVE CHRONIC KIDNEY DISEASE WITH STAGE 1 THROUGH STAGE 4 CHRONIC KIDNEY DISEASE, OR UNSPECIFIED CHRONIC KIDNEY DISEASE: ICD-10-CM

## 2019-01-18 DIAGNOSIS — Z90.5 ACQUIRED ABSENCE OF KIDNEY: ICD-10-CM

## 2019-01-18 DIAGNOSIS — I82.4Z2 ACUTE EMBOLISM AND THROMBOSIS OF UNSPECIFIED DEEP VEINS OF LEFT DISTAL LOWER EXTREMITY: ICD-10-CM

## 2019-01-18 DIAGNOSIS — C65.2 MALIGNANT NEOPLASM OF LEFT RENAL PELVIS: ICD-10-CM

## 2019-01-18 DIAGNOSIS — K56.600 PARTIAL INTESTINAL OBSTRUCTION, UNSPECIFIED AS TO CAUSE: ICD-10-CM

## 2019-01-18 DIAGNOSIS — I46.9 CARDIAC ARREST, CAUSE UNSPECIFIED: ICD-10-CM

## 2019-01-18 DIAGNOSIS — I95.9 HYPOTENSION, UNSPECIFIED: ICD-10-CM

## 2019-01-18 DIAGNOSIS — Z79.01 LONG TERM (CURRENT) USE OF ANTICOAGULANTS: ICD-10-CM

## 2019-01-18 DIAGNOSIS — C66.2 MALIGNANT NEOPLASM OF LEFT URETER: ICD-10-CM

## 2019-01-18 DIAGNOSIS — E11.22 TYPE 2 DIABETES MELLITUS WITH DIABETIC CHRONIC KIDNEY DISEASE: ICD-10-CM

## 2019-01-18 DIAGNOSIS — D62 ACUTE POSTHEMORRHAGIC ANEMIA: ICD-10-CM

## 2019-01-18 DIAGNOSIS — N18.4 CHRONIC KIDNEY DISEASE, STAGE 4 (SEVERE): ICD-10-CM

## 2019-01-18 DIAGNOSIS — R53.1 WEAKNESS: ICD-10-CM

## 2019-01-30 ENCOUNTER — APPOINTMENT (OUTPATIENT)
Dept: HEART AND VASCULAR | Facility: CLINIC | Age: 79
End: 2019-01-30

## 2019-02-13 ENCOUNTER — APPOINTMENT (OUTPATIENT)
Dept: HEART AND VASCULAR | Facility: CLINIC | Age: 79
End: 2019-02-13

## 2019-12-20 NOTE — PATIENT PROFILE ADULT - FUNCTIONAL SCREEN CURRENT LEVEL: EATING, MLM
----- Message from Yadi Isabel MD sent at [id number]/[phone]  2:05 PM EST -----  Recent blood work stable for patient with the exception of continued elevation in cholesterol at 228  If she has not already tried it I would recommend over-the-counter fish oil tablets 1000 mg once daily or red yeast rice tab once daily to try and lower her cholesterol further 
Pt aware
0 = independent

## 2019-12-23 NOTE — H&P ADULT - NSHPPOAURINARYCATHETER_GEN_ALL_CORE
----- Message from Elizabeth Madsen NP sent at 12/23/2019  3:15 PM CST -----  Please call patient regarding labs these are normal, sometimes can have gout with out having elevated uric acid immediately, how is she doing?  Did the indomethacin help ?   no

## 2019-12-31 NOTE — DISCHARGE NOTE ADULT - NURSING SECTION COMPLETE
"""Annual Type 2 Diabetic eye exam with dilation. No diabetic retinopathy found. Recommend annual dilated examinations. Patient instructed to call office immediately if sudden changes in vision occur. Emphasized importance of good blood glucose control. Summary of care provided to the physician managing the ongoing diabetes care. """ Patient/Caregiver provided printed discharge information.

## 2020-04-01 NOTE — DISCHARGE NOTE ADULT - ADMISSION DATE +STARTOFVISITDATE
Letter ok to go back to work saying that the symptoms he had were from his  Shingles vaccine NOT being sick.    Sent to     Riley@[address]    Routing to KRISTINE PEREZ RN      Statement Selected

## 2020-09-30 NOTE — PROVIDER CONTACT NOTE (OTHER) - SITUATION
hr to 120 dr maurer aware, 1 albumin to be given per dr maurer, rate on temporary pacemaker set to vvi 50 per dr sánchez.
no

## 2020-10-09 LAB
HCT VFR BLD CALC: 22.6 %
HGB BLD-MCNC: 6.6 G/DL
MCHC RBC-ENTMCNC: 20.6 PG
MCHC RBC-ENTMCNC: 29.2 G/DL
MCV RBC AUTO: 70.4 FL
PLATELET # BLD AUTO: 286 K/UL
PMV BLD: 11.1 FL
RBC # BLD: 3.21 M/UL
RBC # FLD: 19.6 %
WBC # FLD AUTO: 6.96 K/UL

## 2021-01-01 NOTE — CONSULT NOTE ADULT - MINUTES
Social Service Note:  Pt is NOT clear to discharge infant until cleared by [place] CPS.     Supriya Burleson BSW, [place] 47

## 2021-04-24 NOTE — DISCHARGE NOTE ADULT - OTHER SIGNIFICANT FINDINGS
Unresponsive
Colonoscopy Impression   Diverticulosis of the sigmoid colon.    Internal hemorrhoids.    Mass (5 cm) in the mid-descending colon.    Two cc of 1:10,000 epinephrine was injected. An attempt to place a endo-loop  was unsuccessful. A clip was placed proximal and distal to the lesion. Claudia ink  was injected distal to the lesion (7cc).     CT Abdomen/Pelvis    FINDINGS:    LOWER CHEST: Leads from a pacemaker are noted. Cardiomegaly and coronary   artery calcifications. Small right pleural effusion with adjacent   atelectasis and trace left pleural effusion with adjacent atelectasis.    HEPATOBILIARY: Unremarkable.    SPLEEN: Unremarkable.    PANCREAS: Unremarkable.    ADRENAL GLANDS: Unremarkable.    KIDNEYS: Left nephrectomy with expected surgical material.    ABDOMINOPELVIC NODES: 1.6 x 1.5 cm mesenteric lymph node (series 5, image   314) and second slightly superior lymph node in the left lower quadrant   measuring 1.8 x 1.4 cm (series 5, image 305).    PELVIC ORGANS: Recent pelvic surgery including oophorectomy and possible   hysterectomy.    PERITONEUM/MESENTERY/BOWEL: 6.2 x 6.4 x 6.8 cm sigmoid heterogeneous mass   with areas of low density suggestive of necrosis. Surgical luminal clips   are noted, likely from recent colonoscopy.    BONES/SOFT TISSUES: Stable degenerative changes. Stable sclerotic density   in the right femur. Stable other nonspecific sclerotic and lucent lesions   throughout the spine.     OTHER: Diffuse anasarca    IMPRESSION:     6.8 cm sigmoid heterogeneous colonic mass compatible with a carcinoma.   Regional mesenteric lymph nodes in the left lower quadrant measure up to   1.8 cm, which are new compared to 10/31/2018.` No obvious distant   metastases.    Stable other findings as above.

## 2021-07-01 NOTE — PATIENT PROFILE ADULT - BRADEN ACTIVITY
Saw patient in clinic to check in 5 weeks post discharge. Pt reports VAD parameters WNL and weight stable. Reviewed medications and answered any questions. Patient reports sleeping well and no anxiety since being home with LVAD. Patient is able to move around the house and care for himself independently.     Discussed specific new problems/stressors since being discharged from the hospital: none at this time. Empathized with patient and reviewed coping strategies: enlisting support from friends and love ones, attending patient and caregiver support groups, reviewing LVAD educational materials to reinforce knowledge, and talking about concerns with family/care providers/trusted others. Encouraged pt to page VAD Coordinator with any issues or questions. Pt verbalizes understanding.    
(4) walks frequently

## 2021-09-15 NOTE — DISCHARGE NOTE ADULT - LEARNING BEHAVIORAL ACTIVITIES TO COPE WITH URGES. FOR EXAMPLE, DISTRACTION AND CHANGING ROUTINES.
Minocycline Pregnancy And Lactation Text: This medication is Pregnancy Category D and not consider safe during pregnancy. It is also excreted in breast milk.
Isotretinoin Counseling: Patient should get monthly blood tests, not donate blood, not drive at night if vision affected, not share medication, and not undergo elective surgery for 6 months after tx completed. Side effects reviewed, pt to contact office should one occur.
Topical Sulfur Applications Pregnancy And Lactation Text: This medication is Pregnancy Category C and has an unknown safety profile during pregnancy. It is unknown if this topical medication is excreted in breast milk.
Erythromycin Pregnancy And Lactation Text: This medication is Pregnancy Category B and is considered safe during pregnancy. It is also excreted in breast milk.
Topical Retinoid Pregnancy And Lactation Text: This medication is Pregnancy Category C. It is unknown if this medication is excreted in breast milk.
Dapsone Pregnancy And Lactation Text: This medication is Pregnancy Category C and is not considered safe during pregnancy or breast feeding.
Statement Selected
Include Pregnancy/Lactation Warning?: No
Benzoyl Peroxide Pregnancy And Lactation Text: This medication is Pregnancy Category C. It is unknown if benzoyl peroxide is excreted in breast milk.
Tazorac Counseling:  Patient advised that medication is irritating and drying.  Patient may need to apply sparingly and wash off after an hour before eventually leaving it on overnight.  The patient verbalized understanding of the proper use and possible adverse effects of tazorac.  All of the patient's questions and concerns were addressed.
Isotretinoin Pregnancy And Lactation Text: This medication is Pregnancy Category X and is considered extremely dangerous during pregnancy. It is unknown if it is excreted in breast milk.
Topical Clindamycin Pregnancy And Lactation Text: This medication is Pregnancy Category B and is considered safe during pregnancy. It is unknown if it is excreted in breast milk.
Minocycline Counseling: Patient advised regarding possible photosensitivity and discoloration of the teeth, skin, lips, tongue and gums.  Patient instructed to avoid sunlight, if possible.  When exposed to sunlight, patients should wear protective clothing, sunglasses, and sunscreen.  The patient was instructed to call the office immediately if the following severe adverse effects occur:  hearing changes, easy bruising/bleeding, severe headache, or vision changes.  The patient verbalized understanding of the proper use and possible adverse effects of minocycline.  All of the patient's questions and concerns were addressed.
Spironolactone Counseling: Patient advised regarding risks of diarrhea, abdominal pain, hyperkalemia, birth defects (for female patients), liver toxicity and renal toxicity. The patient may need blood work to monitor liver and kidney function and potassium levels while on therapy. The patient verbalized understanding of the proper use and possible adverse effects of spironolactone.  All of the patient's questions and concerns were addressed.
Benzoyl Peroxide Counseling: Patient counseled that medicine may cause skin irritation and bleach clothing.  In the event of skin irritation, the patient was advised to reduce the amount of the drug applied or use it less frequently.   The patient verbalized understanding of the proper use and possible adverse effects of benzoyl peroxide.  All of the patient's questions and concerns were addressed.
Detail Level: Zone
Bactrim Pregnancy And Lactation Text: This medication is Pregnancy Category D and is known to cause fetal risk.  It is also excreted in breast milk.
Patient Specific Counseling (Will Not Stick From Patient To Patient): .\\n09/15/21 \\nStarting patient on tretinoin, will continue with topical clindamycin and oral doxycycline. \\nI suggested he use a CeraVe face wash and moisturizer as well as using spf daily. \\n\\nWe briefly discussed isotretinoin for the treatment of his acne.
Topical Sulfur Applications Counseling: Topical Sulfur Counseling: Patient counseled that this medication may cause skin irritation or allergic reactions.  In the event of skin irritation, the patient was advised to reduce the amount of the drug applied or use it less frequently.   The patient verbalized understanding of the proper use and possible adverse effects of topical sulfur application.  All of the patient's questions and concerns were addressed.
Tazorac Pregnancy And Lactation Text: This medication is not safe during pregnancy. It is unknown if this medication is excreted in breast milk.
Birth Control Pills Pregnancy And Lactation Text: This medication should be avoided if pregnant and for the first 30 days post-partum.
Dapsone Counseling: I discussed with the patient the risks of dapsone including but not limited to hemolytic anemia, agranulocytosis, rashes, methemoglobinemia, kidney failure, peripheral neuropathy, headaches, GI upset, and liver toxicity.  Patients who start dapsone require monitoring including baseline LFTs and weekly CBCs for the first month, then every month thereafter.  The patient verbalized understanding of the proper use and possible adverse effects of dapsone.  All of the patient's questions and concerns were addressed.
Topical Clindamycin Counseling: Patient counseled that this medication may cause skin irritation or allergic reactions.  In the event of skin irritation, the patient was advised to reduce the amount of the drug applied or use it less frequently.   The patient verbalized understanding of the proper use and possible adverse effects of clindamycin.  All of the patient's questions and concerns were addressed.
Sarecycline Counseling: Patient advised regarding possible photosensitivity and discoloration of the teeth, skin, lips, tongue and gums.  Patient instructed to avoid sunlight, if possible.  When exposed to sunlight, patients should wear protective clothing, sunglasses, and sunscreen.  The patient was instructed to call the office immediately if the following severe adverse effects occur:  hearing changes, easy bruising/bleeding, severe headache, or vision changes.  The patient verbalized understanding of the proper use and possible adverse effects of sarecycline.  All of the patient's questions and concerns were addressed.
Topical Retinoid counseling:  Patient advised to apply a pea-sized amount only at bedtime and wait 30 minutes after washing their face before applying.  If too drying, patient may add a non-comedogenic moisturizer. The patient verbalized understanding of the proper use and possible adverse effects of retinoids.  All of the patient's questions and concerns were addressed.
High Dose Vitamin A Pregnancy And Lactation Text: High dose vitamin A therapy is contraindicated during pregnancy and breast feeding.
Birth Control Pills Counseling: Birth Control Pill Counseling: I discussed with the patient the potential side effects of OCPs including but not limited to increased risk of stroke, heart attack, thrombophlebitis, deep venous thrombosis, hepatic adenomas, breast changes, GI upset, headaches, and depression.  The patient verbalized understanding of the proper use and possible adverse effects of OCPs. All of the patient's questions and concerns were addressed.
Spironolactone Pregnancy And Lactation Text: This medication can cause feminization of the male fetus and should be avoided during pregnancy. The active metabolite is also found in breast milk.
Azithromycin Counseling:  I discussed with the patient the risks of azithromycin including but not limited to GI upset, allergic reaction, drug rash, diarrhea, and yeast infections.
Tetracycline Counseling: Patient counseled regarding possible photosensitivity and increased risk for sunburn.  Patient instructed to avoid sunlight, if possible.  When exposed to sunlight, patients should wear protective clothing, sunglasses, and sunscreen.  The patient was instructed to call the office immediately if the following severe adverse effects occur:  hearing changes, easy bruising/bleeding, severe headache, or vision changes.  The patient verbalized understanding of the proper use and possible adverse effects of tetracycline.  All of the patient's questions and concerns were addressed. Patient understands to avoid pregnancy while on therapy due to potential birth defects.
High Dose Vitamin A Counseling: Side effects reviewed, pt to contact office should one occur.
Doxycycline Pregnancy And Lactation Text: This medication is Pregnancy Category D and not consider safe during pregnancy. It is also excreted in breast milk but is considered safe for shorter treatment courses.
Detail Level: Detailed
Azithromycin Pregnancy And Lactation Text: This medication is considered safe during pregnancy and is also secreted in breast milk.
Bactrim Counseling:  I discussed with the patient the risks of sulfa antibiotics including but not limited to GI upset, allergic reaction, drug rash, diarrhea, dizziness, photosensitivity, and yeast infections.  Rarely, more serious reactions can occur including but not limited to aplastic anemia, agranulocytosis, methemoglobinemia, blood dyscrasias, liver or kidney failure, lung infiltrates or desquamative/blistering drug rashes.
Doxycycline Counseling:  Patient counseled regarding possible photosensitivity and increased risk for sunburn.  Patient instructed to avoid sunlight, if possible.  When exposed to sunlight, patients should wear protective clothing, sunglasses, and sunscreen.  The patient was instructed to call the office immediately if the following severe adverse effects occur:  hearing changes, easy bruising/bleeding, severe headache, or vision changes.  The patient verbalized understanding of the proper use and possible adverse effects of doxycycline.  All of the patient's questions and concerns were addressed.
Erythromycin Counseling:  I discussed with the patient the risks of erythromycin including but not limited to GI upset, allergic reaction, drug rash, diarrhea, increase in liver enzymes, and yeast infections.

## 2022-08-25 NOTE — CHART NOTE - NSCHARTNOTESELECT_GEN_ALL_CORE
Event Note Xolair Pregnancy And Lactation Text: This medication is Pregnancy Category B and is considered safe during pregnancy. This medication is excreted in breast milk.

## 2022-10-15 NOTE — STUDENT SIGN OFF DOCUMENT - CO-SIGNATURE DATE
30-Mar-2018 14:45 Steroid in office today  Continue zyrtec 2.5ml daily  Delsym or mucinex DM for cough

## 2022-11-09 NOTE — ED ADULT NURSE NOTE - PRO INTERPRETER NEED 2
English Based on Standards of Care pt within % IBW (IBW is 178#, pt is 112% IBW), thus actual body weight used for all calculations. Needs adjusted according to age. Fluid recs per team.

## 2023-05-25 NOTE — CHART NOTE - NSCHARTNOTEFT_GEN_A_CORE
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 provides: 1440 nuris, 960 cc TV, 61 g pro, and 729 cc free water/day.     PO Intake: Good (%) [   ]  Fair (50-75%) [   ] Poor (<25%) [   ] NPO    GI Issues:  No n/v/d/c noted     Pain: Pt does not appear to be in any pain     Skin Integrity: surgical incision     Labs:   03-31    143  |  104  |  25<H>  ----------------------------<  124<H>  3.8   |  24  |  1.23    Ca    8.7      31 Mar 2018 02:30  Phos  3.8     03-30  Mg     2.4     03-31    TPro  5.9<L>  /  Alb  3.6  /  TBili  2.7<H>  /  DBili  x   /  AST  39  /  ALT  6<L>  /  AlkPhos  46  03-30    CAPILLARY BLOOD GLUCOSE      POCT Blood Glucose.: 146 mg/dL (31 Mar 2018 06:42)  POCT Blood Glucose.: 152 mg/dL (30 Mar 2018 21:33)  POCT Blood Glucose.: 161 mg/dL (30 Mar 2018 15:33)      Medications:  MEDICATIONS  (STANDING):  acetylcysteine  Oral Solution 600 milliGRAM(s) Oral two times a day  aspirin enteric coated 81 milliGRAM(s) Oral daily  atorvastatin 40 milliGRAM(s) Oral at bedtime  dextrose 5%. 1000 milliLiter(s) (50 mL/Hr) IV Continuous <Continuous>  dextrose 50% Injectable 50 milliLiter(s) IV Push every 15 minutes  dextrose 50% Injectable 25 milliLiter(s) IV Push every 15 minutes  docusate sodium 100 milliGRAM(s) Oral three times a day  ferrous    sulfate 325 milliGRAM(s) Oral daily  folic acid 1 milliGRAM(s) Oral daily  heparin  Infusion. 600 Unit(s)/Hr (6 mL/Hr) IV Continuous <Continuous>  insulin lispro (HumaLOG) corrective regimen sliding scale   SubCutaneous Before meals and at bedtime  levETIRAcetam  IVPB 500 milliGRAM(s) IV Intermittent every 12 hours  lidocaine   Patch 1 Patch Transdermal daily  norepinephrine Infusion 0.01 MICROgram(s)/kG/Min (1.08 mL/Hr) IV Continuous <Continuous>  pantoprazole    Tablet 40 milliGRAM(s) Oral before breakfast  senna 2 Tablet(s) Oral at bedtime  sodium chloride 0.45%. 1000 milliLiter(s) (10 mL/Hr) IV Continuous <Continuous>  sodium chloride 0.9%. 1000 milliLiter(s) (50 mL/Hr) IV Continuous <Continuous>  warfarin 5 milliGRAM(s) Oral once    MEDICATIONS  (PRN):  acetaminophen   Tablet 650 milliGRAM(s) Oral every 6 hours PRN mild pain  dextrose Gel 1 Dose(s) Oral once PRN Blood Glucose LESS THAN 70 milliGRAM(s)/deciliter  glucagon  Injectable 1 milliGRAM(s) IntraMuscular once PRN Glucose LESS THAN 70 milligrams/deciliter  polyethylene glycol 3350 17 Gram(s) Oral once PRN Constipation  traMADol 25 milliGRAM(s) Oral every 8 hours PRN Severe Pain (7 - 10)      Weight: 57.6 kg   Daily     Daily     Weight Change:     Estimated energy needs:   ABW 57.6kg used for EER and adjusted for possible pre-op/age  25-30kcal/kg (1440-1728kcal), 1.2-1.4g/kg (69-81g), 30-35ml/kg (1728-2016ml)    Subjective:   Pt is a 77y , Female, post op day # 1 s/p MV replacement via right thoracotomy. Pt was intubated for airway protection; extubated this morning. Pt sleeping at time of attempted interview. Currently receiving Jevity 1.5 @ 40 cc/hr x 24 hrs via NGT. provides: 1440 nuris, 960 cc TV, 61 g pro, and 729 cc free water/day.       Previous Nutrition Diagnosis:  Unintended weight loss RT inability to meet needs po per medical events and stress AEB pt reports 25% sig/unintentional wt loss x 1year    Active [X]  Resolved [   ]    If resolved, new PES:     Goal:  Pt to meet 75% of needs via EN or PO     Recommendations:  1. Continue current enteral feeding; monitor fingersticks and need for carb controlled formula 2/2 DM hx    2. Advance to DASH/TLC, consistent carb diet with no snacks   3. Monitor lytes and replete prn.   4. Update wt       Education:   Unable to educate at this time.     Risk Level: High [X] Moderate [   ] Low [   ]. Patient called security stating that her needs weren't attended. [facility] when asked why she felt that way she explained that she had a \"young's person's blood transfused and gave her energy and now she can't sleep\". Patient also threatened to call state representative if her needs were not met. Attending notified. Continue to monitor.

## 2024-05-09 NOTE — PATIENT PROFILE ADULT - BRADEN SENSORY
[Normal] : normal bowel sounds, non-tender, no masses, soft, no no hepato-splenomegaly (4) no impairment

## 2024-06-06 NOTE — PATIENT PROFILE ADULT - NSPROGENANESREACTION_GEN_A_NUR
6 week follow up left carpal tunnel release on 4/26/24.  She is working on scar massage.  She complains of lumps on her palm. These are early Dupuytren's contracture.  Wound is healing well.   She has mild numbness of fingers.  There is no tenderness of wound.  Full range of motion.    Continue  scar massage with vitamin-E cream.  Discontinue night splint.  Resume activity as tolerated.  Return to clinic as needed.   
Additional Notes: Mother prefers to only attempt conservative treatment at this time. All treatment options were discussed with mother.
no previous reaction

## 2025-02-05 NOTE — ED PROVIDER NOTE - X-RAY INTERPRETATION
Yes, will send in    Jose De Leon MD     ER physician/CXR: No consolidation/effusion/pntx, enlarged heart.

## 2025-05-13 NOTE — ED PROVIDER NOTE - CRITICAL CARE INDICATION, MLM
Start mounjaro 2.5mg weekly then after a month incr to 5mg weekly as tolerated.  If sugars later drop below 100 then stop glipizide.   patient was critically ill... Patient was critically ill with a high probability of imminent or life threatening deterioration.